# Patient Record
Sex: FEMALE | Race: BLACK OR AFRICAN AMERICAN | NOT HISPANIC OR LATINO | Employment: FULL TIME | URBAN - METROPOLITAN AREA
[De-identification: names, ages, dates, MRNs, and addresses within clinical notes are randomized per-mention and may not be internally consistent; named-entity substitution may affect disease eponyms.]

---

## 2017-01-09 ENCOUNTER — HOSPITAL ENCOUNTER (EMERGENCY)
Facility: CLINIC | Age: 42
Discharge: HOME | End: 2017-01-09
Attending: EMERGENCY MEDICINE | Admitting: EMERGENCY MEDICINE
Payer: COMMERCIAL

## 2017-01-09 ENCOUNTER — APPOINTMENT (OUTPATIENT)
Dept: GENERAL RADIOLOGY | Facility: CLINIC | Age: 42
End: 2017-01-09
Attending: EMERGENCY MEDICINE
Payer: COMMERCIAL

## 2017-01-09 VITALS
RESPIRATION RATE: 18 BRPM | BODY MASS INDEX: 30.12 KG/M2 | WEIGHT: 170 LBS | DIASTOLIC BLOOD PRESSURE: 78 MMHG | SYSTOLIC BLOOD PRESSURE: 126 MMHG | HEIGHT: 63 IN | TEMPERATURE: 98.1 F | OXYGEN SATURATION: 96 %

## 2017-01-09 DIAGNOSIS — R07.89 ATYPICAL CHEST PAIN: ICD-10-CM

## 2017-01-09 LAB
ANION GAP SERPL CALCULATED.3IONS-SCNC: 8 MMOL/L (ref 3–14)
BUN SERPL-MCNC: 6 MG/DL (ref 7–30)
CALCIUM SERPL-MCNC: 8.4 MG/DL (ref 8.5–10.1)
CHLORIDE SERPL-SCNC: 106 MMOL/L (ref 94–109)
CO2 SERPL-SCNC: 27 MMOL/L (ref 20–32)
CREAT SERPL-MCNC: 0.74 MG/DL (ref 0.52–1.04)
D DIMER PPP FEU-MCNC: 0.3 UG/ML FEU (ref 0–0.5)
ERYTHROCYTE [DISTWIDTH] IN BLOOD BY AUTOMATED COUNT: 14.5 % (ref 10–15)
GFR SERPL CREATININE-BSD FRML MDRD: 85 ML/MIN/1.7M2
GLUCOSE SERPL-MCNC: 87 MG/DL (ref 70–99)
HCT VFR BLD AUTO: 38.9 % (ref 35–47)
HGB BLD-MCNC: 13.5 G/DL (ref 11.7–15.7)
INTERPRETATION ECG - MUSE: NORMAL
MCH RBC QN AUTO: 29.8 PG (ref 26.5–33)
MCHC RBC AUTO-ENTMCNC: 34.7 G/DL (ref 31.5–36.5)
MCV RBC AUTO: 86 FL (ref 78–100)
PLATELET # BLD AUTO: 219 10E9/L (ref 150–450)
POTASSIUM SERPL-SCNC: 3.3 MMOL/L (ref 3.4–5.3)
RBC # BLD AUTO: 4.53 10E12/L (ref 3.8–5.2)
SODIUM SERPL-SCNC: 141 MMOL/L (ref 133–144)
TROPONIN I SERPL-MCNC: NORMAL UG/L (ref 0–0.04)
WBC # BLD AUTO: 8.1 10E9/L (ref 4–11)

## 2017-01-09 PROCEDURE — 85027 COMPLETE CBC AUTOMATED: CPT | Performed by: EMERGENCY MEDICINE

## 2017-01-09 PROCEDURE — 93005 ELECTROCARDIOGRAM TRACING: CPT

## 2017-01-09 PROCEDURE — 85379 FIBRIN DEGRADATION QUANT: CPT | Performed by: EMERGENCY MEDICINE

## 2017-01-09 PROCEDURE — 96374 THER/PROPH/DIAG INJ IV PUSH: CPT

## 2017-01-09 PROCEDURE — 99285 EMERGENCY DEPT VISIT HI MDM: CPT | Mod: 25

## 2017-01-09 PROCEDURE — 80048 BASIC METABOLIC PNL TOTAL CA: CPT | Performed by: EMERGENCY MEDICINE

## 2017-01-09 PROCEDURE — 71020 XR CHEST 2 VW: CPT

## 2017-01-09 PROCEDURE — 25000132 ZZH RX MED GY IP 250 OP 250 PS 637: Performed by: EMERGENCY MEDICINE

## 2017-01-09 PROCEDURE — 84484 ASSAY OF TROPONIN QUANT: CPT | Performed by: EMERGENCY MEDICINE

## 2017-01-09 PROCEDURE — 25000125 ZZHC RX 250: Performed by: EMERGENCY MEDICINE

## 2017-01-09 PROCEDURE — 96375 TX/PRO/DX INJ NEW DRUG ADDON: CPT

## 2017-01-09 RX ORDER — POTASSIUM CHLORIDE 1500 MG/1
40 TABLET, EXTENDED RELEASE ORAL ONCE
Status: COMPLETED | OUTPATIENT
Start: 2017-01-09 | End: 2017-01-09

## 2017-01-09 RX ORDER — MORPHINE SULFATE 4 MG/ML
4 INJECTION, SOLUTION INTRAMUSCULAR; INTRAVENOUS ONCE
Status: COMPLETED | OUTPATIENT
Start: 2017-01-09 | End: 2017-01-09

## 2017-01-09 RX ORDER — KETOROLAC TROMETHAMINE 15 MG/ML
15 INJECTION, SOLUTION INTRAMUSCULAR; INTRAVENOUS ONCE
Status: COMPLETED | OUTPATIENT
Start: 2017-01-09 | End: 2017-01-09

## 2017-01-09 RX ADMIN — KETOROLAC TROMETHAMINE 15 MG: 15 INJECTION, SOLUTION INTRAMUSCULAR; INTRAVENOUS at 20:29

## 2017-01-09 RX ADMIN — MORPHINE SULFATE 4 MG: 4 INJECTION, SOLUTION INTRAMUSCULAR; INTRAVENOUS at 22:16

## 2017-01-09 RX ADMIN — POTASSIUM CHLORIDE 40 MEQ: 1500 TABLET, EXTENDED RELEASE ORAL at 22:16

## 2017-01-09 ASSESSMENT — ENCOUNTER SYMPTOMS
VOMITING: 0
ABDOMINAL PAIN: 0
NAUSEA: 1

## 2017-01-09 NOTE — ED AVS SNAPSHOT
Emergency Department    [address] [place] [address]    Phone:  [phone]    Fax:  [phone]                                       Jeff Serra   MRN: [id number]    Department:   Emergency Department   Date of Visit:  1/9/2017           After Visit Summary Signature Page     I have received my discharge instructions, and my questions have been answered. I have discussed any challenges I see with this plan with the nurse or doctor.    ..........................................................................................................................................  Patient/Patient Representative Signature      ..........................................................................................................................................  Patient Representative Print Name and Relationship to Patient    ..................................................               ................................................  Date                                            Time    ..........................................................................................................................................  Reviewed by Signature/Title    ...................................................              ..............................................  Date                                                            Time

## 2017-01-09 NOTE — ED AVS SNAPSHOT
Emergency Department    [address] [facility] [address]    Phone:  [phone]    Fax:  [phone]                                       Jeff Serra   MRN: [id number]    Department:   Emergency Department   Date of Visit:  1/9/2017           Patient Information     Date Of Birth          1975        Your diagnoses for this visit were:     Atypical chest pain        You were seen by Stephany Mascorro MD.      Follow-up Information     Schedule an appointment as soon as possible for a visit with Jordyn Loredo MD.    Specialty:  Family Practice    Contact information:    [address] [address] [phone]          Follow up with  Emergency Department.    Specialty:  EMERGENCY MEDICINE    Why:  If symptoms worsen    Contact information:    [address] [facility] [id number] [phone]        Discharge Instructions       Follow up with your primary care provider about the pain  Try icing the area to see if this improves the pain    Discharge Instructions  Chest Pain    You have been seen today for chest pain or discomfort.  At this time, your doctor has found no signs that your chest pain is due to a serious or life-threatening condition, (or you have declined more testing and/or admission to the hospital). However, sometimes there is a serious problem that does not show up right away. Your evaluation today may not be complete and you may need further testing and evaluation.     You need to follow-up with your regular doctor within 3 days.    Return to the Emergency Department if:    Your chest pain changes, gets worse, starts to happen more often, or comes with less activity.    You are short of breath.    You get very weak or tired.    You pass out or faint.    You have any new symptoms, like fever, cough, numb legs, or you cough up blood.    You have anything else that worries you.    Until  you follow-up with your regular doctor please do the following:    Take one aspirin daily unless you have an allergy or are told not to by your doctor.    If a stress test appointment has been made, go to the appointment.    If you have questions, contact your regular doctor.    If your doctor today has told you to follow-up with your regular doctor, it is very important that you make an appointment with your clinic and go to the appointment.  If you do not follow-up with your primary doctor, it may result in missing an important development which could result in permanent injury or disability and/or lasting pain.  If there is any problem keeping your appointment, call your doctor or return to the Emergency Department.    If you were given a prescription for medicine here today, be sure to read all of the information (including the package insert) that comes with your prescription.  This will include important information about the medicine, its side effects, and any warnings that you need to know about.  The pharmacist who fills the prescription can provide more information and answer questions you may have about the medicine.  If you have questions or concerns that the pharmacist cannot address, please call or return to the Emergency Department.     Opioid Medication Information    Pain medications are among the most commonly prescribed medicines, so we are including this information for all our patients. If you did not receive pain medication or get a prescription for pain medicine, you can ignore it.     You may have been given a prescription for an opioid (narcotic) pain medicine and/or have received a pain medicine while here in the Emergency Department. These medicines can make you drowsy or impaired. You must not drive, operate dangerous equipment, or engage in any other dangerous activities while taking these medications. If you drive while taking these medications, you could be arrested for DUI, or driving  under the influence. Do not drink any alcohol while you are taking these medications.     Opioid pain medications can cause addiction. If you have a history of chemical dependency of any type, you are at a higher risk of becoming addicted to pain medications.  Only take these prescribed medications to treat your pain when all other options have been tried. Take it for as short a time and as few doses as possible. Store your pain pills in a secure place, as they are frequently stolen and provide a dangerous opportunity for children or visitors in your house to start abusing these powerful medications. We will not replace any lost or stolen medicine.  As soon as your pain is better, you should flush all your remaining medication.     Many prescription pain medications contain Tylenol  (acetaminophen), including Vicodin , Tylenol #3 , Norco , Lortab , and Percocet .  You should not take any extra pills of Tylenol  if you are using these prescription medications or you can get very sick.  Do not ever take more than 3000 mg of acetaminophen in any 24 hour period.    All opioids tend to cause constipation. Drink plenty of water and eat foods that have a lot of fiber, such as fruits, vegetables, prune juice, apple juice and high fiber cereal.  Take a laxative if you don t move your bowels at least every other day. Miralax , Milk of Magnesia, Colace , or Senna  can be used to keep you regular.      Remember that you can always come back to the Emergency Department if you are not able to see your regular doctor in the amount of time listed above, if you get any new symptoms, or if there is anything that worries you.          24 Hour Appointment Hotline       To make an appointment at any [facility], call [phone] ([phone]). If you don't have a family doctor or clinic, we will help you find one. [facility] clinics are conveniently located to serve the needs of you and your family.             Review of your  medicines      Our records show that you are taking the medicines listed below. If these are incorrect, please call your family doctor or clinic.        Dose / Directions Last dose taken    ASPIRIN PO   Dose:  81 mg        Take 81 mg by mouth daily   Refills:  0        azithromycin 250 MG tablet   Commonly known as:  ZITHROMAX   Quantity:  6 tablet        Two tabs today.  One tab daily x 4 days.   Refills:  0        fexofenadine 180 MG tablet   Commonly known as:  ALLEGRA   Dose:  180 mg   Quantity:  90 tablet        Take 1 tablet (180 mg) by mouth daily   Refills:  3        guaiFENesin-codeine 100-10 MG/5ML Soln solution   Commonly known as:  CHERATUSSIN AC   Dose:  1-2 tsp.   Quantity:  240 mL        Take 5-10 mLs by mouth every 4 hours as needed for cough   Refills:  0        hydrochlorothiazide 25 MG tablet   Commonly known as:  HYDRODIURIL   Dose:  25 mg        Take 25 mg by mouth daily   Refills:  0        loratadine 10 MG tablet   Commonly known as:  CLARITIN   Dose:  10 mg   Quantity:  30 tablet        Take 1 tablet (10 mg) by mouth daily as needed for allergies   Refills:  2        norethindrone 5 MG tablet   Commonly known as:  AYGESTIN   Dose:  5 mg   Quantity:  30 tablet        Take 1 tablet (5 mg) by mouth daily   Refills:  5        oxyCODONE 40 MG 12 hr tablet   Commonly known as:  OXYCONTIN   Dose:  40 mg   Quantity:  60 tablet        Take 1 tablet (40 mg) by mouth every 12 hours   Refills:  0        oxyCODONE-acetaminophen 7.5-325 MG per tablet   Commonly known as:  PERCOCET   Dose:  1-2 tablet   Quantity:  60 tablet        Take 1-2 tablets by mouth 3 times daily as needed (breakthrough pain)   Refills:  0        permethrin 5 % cream   Commonly known as:  ELIMITE   Quantity:  60 g        Apply cream from head to toe (execpt the face); leave on for 8-14 hours before washing off with water; may reapply in 1 week if live mites appear.   Refills:  1        predniSONE 20 MG tablet   Commonly known as:   DELTASONE   Quantity:  15 tablet        3 tabs daily for 5 days   Refills:  0                Procedures and tests performed during your visit     Basic metabolic panel    CBC (+ platelets, no diff)    Chest XR,  PA & LAT    D dimer quantitative    EKG 12 lead    Troponin I (now)      Orders Needing Specimen Collection     None      Pending Results     No orders found from 1/8/2017 to 1/10/2017.            Pending Culture Results     No orders found from 1/8/2017 to 1/10/2017.       Test Results from your hospital stay           1/9/2017  8:53 PM - Interface, Flexilab Results      Component Results     Component Value Ref Range & Units Status    Sodium 141 133 - 144 mmol/L Final    Potassium 3.3 (L) 3.4 - 5.3 mmol/L Final    Chloride 106 94 - 109 mmol/L Final    Carbon Dioxide 27 20 - 32 mmol/L Final    Anion Gap 8 3 - 14 mmol/L Final    Glucose 87 70 - 99 mg/dL Final    Urea Nitrogen 6 (L) 7 - 30 mg/dL Final    Creatinine 0.74 0.52 - 1.04 mg/dL Final    GFR Estimate 85 >60 mL/min/1.7m2 Final    Non  GFR Calc    GFR Estimate If Black >90   GFR Calc   >60 mL/min/1.7m2 Final    Calcium 8.4 (L) 8.5 - 10.1 mg/dL Final         1/9/2017  8:38 PM - Interface, Flexilab Results      Component Results     Component Value Ref Range & Units Status    WBC 8.1 4.0 - 11.0 10e9/L Final    RBC Count 4.53 3.8 - 5.2 10e12/L Final    Hemoglobin 13.5 11.7 - 15.7 g/dL Final    Hematocrit 38.9 35.0 - 47.0 % Final    MCV 86 78 - 100 fl Final    MCH 29.8 26.5 - 33.0 pg Final    MCHC 34.7 31.5 - 36.5 g/dL Final    RDW 14.5 10.0 - 15.0 % Final    Platelet Count 219 150 - 450 10e9/L Final         1/9/2017  8:57 PM - Interface, Flexilab Results      Component Results     Component Value Ref Range & Units Status    Troponin I ES  0.000 - 0.045 ug/L Final    <0.015  The 99th percentile for upper reference range is 0.045 ug/L.  Troponin values in   the range of 0.045 - 0.120 ug/L may be associated with risks of  adverse   clinical events.           1/9/2017  8:49 PM - Interface, Flexilab Results      Component Results     Component Value Ref Range & Units Status    D Dimer 0.3 0.0 - 0.50 ug/ml FEU Final    This D-dimer assay is intended for use in conjuntion with a clinical pretest   probability assessment model to exclude pulmonary embolism (PE) and as an aid   in the diagnosis of deep venous thrombosis (DVT) in outpatients suspected of   PE   or DVT. The cut-off value is 0.5 g/mL FEU.           1/9/2017 10:27 PM - Interface, Radiant Ib      Narrative     CHEST TWO VIEWS   1/9/2017  9:28 PM     HISTORY: Chest pain.    COMPARISON: 10/28/2014.        Impression     IMPRESSION: Normal.    ALTAGRACIA BURTON MD                Clinical Quality Measure: Blood Pressure Screening     Your blood pressure was checked while you were in the emergency department today. The last reading we obtained was  BP: 126/78 mmHg . Please read the guidelines below about what these numbers mean and what you should do about them.  If your systolic blood pressure (the top number) is less than 120 and your diastolic blood pressure (the bottom number) is less than 80, then your blood pressure is normal. There is nothing more that you need to do about it.  If your systolic blood pressure (the top number) is 120-139 or your diastolic blood pressure (the bottom number) is 80-89, your blood pressure may be higher than it should be. You should have your blood pressure rechecked within a year by a primary care provider.  If your systolic blood pressure (the top number) is 140 or greater or your diastolic blood pressure (the bottom number) is 90 or greater, you may have high blood pressure. High blood pressure is treatable, but if left untreated over time it can put you at risk for heart attack, stroke, or kidney failure. You should have your blood pressure rechecked by a primary care provider within the next 4 weeks.  If your provider in the emergency department  today gave you specific instructions to follow-up with your doctor or provider even sooner than that, you should follow that instruction and not wait for up to 4 weeks for your follow-up visit.        Thank you for choosing [facility]       Thank you for choosing [facility] for your care. Our goal is always to provide you with excellent care. Hearing back from our patients is one way we can continue to improve our services. Please take a few minutes to complete the written survey that you may receive in the mail after you visit with us. Thank you!        [employer] Information     [facility] [employer] [facility] gives you secure access to your electronic health record. If you see a primary care provider, you can also send messages to your care team and make appointments. If you have questions, please call your primary care clinic.  If you do not have a primary care provider, please call [phone] and they will assist you.        Care EveryWhere ID     This is your Care EveryWhere ID. This could be used by other organizations to access your [facility] medical records  [id number]        After Visit Summary       This is your record. Keep this with you and show to your community pharmacist(s) and doctor(s) at your next visit.

## 2017-01-10 LAB — INTERPRETATION ECG - MUSE: NORMAL

## 2017-01-10 NOTE — DISCHARGE INSTRUCTIONS
Follow up with your primary care provider about the pain  Try icing the area to see if this improves the pain    Discharge Instructions  Chest Pain    You have been seen today for chest pain or discomfort.  At this time, your doctor has found no signs that your chest pain is due to a serious or life-threatening condition, (or you have declined more testing and/or admission to the hospital). However, sometimes there is a serious problem that does not show up right away. Your evaluation today may not be complete and you may need further testing and evaluation.     You need to follow-up with your regular doctor within 3 days.    Return to the Emergency Department if:    Your chest pain changes, gets worse, starts to happen more often, or comes with less activity.    You are short of breath.    You get very weak or tired.    You pass out or faint.    You have any new symptoms, like fever, cough, numb legs, or you cough up blood.    You have anything else that worries you.    Until you follow-up with your regular doctor please do the following:    Take one aspirin daily unless you have an allergy or are told not to by your doctor.    If a stress test appointment has been made, go to the appointment.    If you have questions, contact your regular doctor.    If your doctor today has told you to follow-up with your regular doctor, it is very important that you make an appointment with your clinic and go to the appointment.  If you do not follow-up with your primary doctor, it may result in missing an important development which could result in permanent injury or disability and/or lasting pain.  If there is any problem keeping your appointment, call your doctor or return to the Emergency Department.    If you were given a prescription for medicine here today, be sure to read all of the information (including the package insert) that comes with your prescription.  This will include important information about the medicine,  its side effects, and any warnings that you need to know about.  The pharmacist who fills the prescription can provide more information and answer questions you may have about the medicine.  If you have questions or concerns that the pharmacist cannot address, please call or return to the Emergency Department.     Opioid Medication Information    Pain medications are among the most commonly prescribed medicines, so we are including this information for all our patients. If you did not receive pain medication or get a prescription for pain medicine, you can ignore it.     You may have been given a prescription for an opioid (narcotic) pain medicine and/or have received a pain medicine while here in the Emergency Department. These medicines can make you drowsy or impaired. You must not drive, operate dangerous equipment, or engage in any other dangerous activities while taking these medications. If you drive while taking these medications, you could be arrested for DUI, or driving under the influence. Do not drink any alcohol while you are taking these medications.     Opioid pain medications can cause addiction. If you have a history of chemical dependency of any type, you are at a higher risk of becoming addicted to pain medications.  Only take these prescribed medications to treat your pain when all other options have been tried. Take it for as short a time and as few doses as possible. Store your pain pills in a secure place, as they are frequently stolen and provide a dangerous opportunity for children or visitors in your house to start abusing these powerful medications. We will not replace any lost or stolen medicine.  As soon as your pain is better, you should flush all your remaining medication.     Many prescription pain medications contain Tylenol  (acetaminophen), including Vicodin , Tylenol #3 , Norco , Lortab , and Percocet .  You should not take any extra pills of Tylenol  if you are using these  prescription medications or you can get very sick.  Do not ever take more than 3000 mg of acetaminophen in any 24 hour period.    All opioids tend to cause constipation. Drink plenty of water and eat foods that have a lot of fiber, such as fruits, vegetables, prune juice, apple juice and high fiber cereal.  Take a laxative if you don t move your bowels at least every other day. Miralax , Milk of Magnesia, Colace , or Senna  can be used to keep you regular.      Remember that you can always come back to the Emergency Department if you are not able to see your regular doctor in the amount of time listed above, if you get any new symptoms, or if there is anything that worries you.

## 2017-01-10 NOTE — ED PROVIDER NOTES
History     Chief Complaint:  Chest Pain    HPI   Jeff Serra is a 41 year old female with a history of hypertension, DVT, PE and chronic pain syndrome who presents to the emergency department today for evaluation of chest pain. The patient had onset of tingling pain throughout her left arm around noon today that began to radiate into her left chest. She states she was simply sitting when her pain began. Her pain has been constant and she describes her chest pain as a tightening feeling. Pain has been constant. Her pain is worse with movement. Has had chest pain in past and stayed in hospital in Sept 2016 and had stress test. Pain similar. She denies any recent heavy lifting or falls recently. She has been feeling nauseated but denies vomiting or abdominal pain. She denies any heart problems, hypertension or hypercholesterolemia currently but notes she used to have hypertension and was on some medications to combat this previously. She is not diabetic and does not smoke. Her family heart history contains only an uncle who had a heart attack in his 50's. She has had no recent surgery or travel. She has some occasional feet swelling when ambulating a lot but states this is normal for her. She denies neck pain. She does have a history of PE but not on current anticoagulation. No leg swelling now. She took Benadryl earlier because she thought this might be allergy related but has taken no medication for the pain. Reports that she has not taken her narcotics for this pain.    9/17/2016:  Patient had left sided chest pain before, during and after the stress test  which did not vary from 5/10.  This was a normal stress echocardiogram with no evidence of stress-induced  ischemia. This was a normal stress EKG with no evidence of stress-induced  Ischemia.    Cardiac/PE/DVT Risk Factors:  History of hypertension - Negative  History of hyperlipidemia - Negative  History of diabetes - Negative  History of smoking -  "Negative  Personal history of PE/DVT - Positive  Family history of PE/DVT - Negative  Family history of heart complications - Negative  Recent travel - Negative  Recent surgery - Negative  Other immobilizations - Negative  Cancer - Negative    Allergies:  No Known Drug Allergies     Medications:    Percocet  Oxycontin  Aygestin   Hydrodiuril   Zithromax  Deltasone  Cheratussin  Allegra  Elimite  Claritin     Past Medical History:    Kidney stone   Hypertension   Chronic neck pain  Chronic low back pain  Right leg DVT  Pulmonary embolism    Hypokalemia   History of PE  Chronic pain syndrome    Past Surgical History:    Lithotripsy for kidney stones (2011)   Tubal ligation   Laparoscopy diagnostic (2016)      Family History:     Uncle - Heart Problem    Social History:  The patient was unaccompanied to the ED.  Smoking Status: Negative  Smokeless Tobacco: Negative  Alcohol Use: Occasional  Marital Status:   [2]     Review of Systems   Cardiovascular: Positive for chest pain. Negative for leg swelling.   Gastrointestinal: Positive for nausea. Negative for vomiting and abdominal pain.   Musculoskeletal:        Left arm pain   All other systems reviewed and are negative.    Physical Exam   Vitals:   Patient Vitals for the past 24 hrs:   BP Temp Temp src Heart Rate Resp SpO2 Height Weight   01/09/17 2000 (!) 155/103 mmHg - - 98 22 98 % - -   01/09/17 1935 170/85 mmHg 98.1  F (36.7  C) Temporal 96 - 99 % 1.6 m (5' 3\") 77.111 kg (170 lb)      Physical Exam    General: Resting comfortably on the gurney  Eyes:  The pupils are equal and round  ENT:    Atraumatic  Neck:  Normal range of motion  CV:  Regular rate and rhythm    Skin warm and well perfused     Radial pulses 2+ bilaterally    Tender to palpation of left anterior chest  Resp:  Lungs are clear    Non-labored    No rales    No wheezing   GI:  Abdomen is soft, there is no rigidity    No distension    No rebound tenderness     No abdominal tenderness  MS:  Normal " muscular tone    No asymmetric leg swelling    Pain in chest and left arm with movement of left arm    Tender to palpation of left arm    No left arm swelling, erythema  Skin:  No rash or acute skin lesions noted  Neuro:   Awake, alert.      Speech is normal and fluent.    Face is symmetric.     Moves all extremities equally    SILT on bilateral UE/LE   Psych: Normal affect.  Appropriate interactions.    Emergency Department Course     ECG:  ECG taken at 1942, ECG read at 1947  Normal sinus rhythm   Normal ECG  Rate 87 bpm. IA interval 122. QRS duration 80. QT/QTc 362/435. P-R-T axes 39 25 11.      ECG taken at 2244, ECG read at 2245  Normal sinus rhythm  Normal ECG  Rate 89 bpm. IA interval 120. QRS duration 84. QT/QTc 374/455. P-R-T axes 29 20 9.      Imaging:  Radiology findings were communicated with the patient who voiced understanding of the findings.    Chest XR, PA & LAT:   IMPRESSION: Normal.  Reading per radiology    Laboratory:  Laboratory findings were communicated with the patient who voiced understanding of the findings.    BMP: Potassium 3.3 (L), BUN 6 (L), Calcium 8.4 (L) (Creatinine 0.74)  CBC:  AWNL. (WBC 8.1, HGB 13.5, )   Troponin (Collected 1958): <0.015  D Dimer (Collected 1958): 0.3    Interventions:  2029 Toradol 15 mg IV   2216 potassium chloride 40 mEq PO  2216 morphine 4 mg IV     Emergency Department Course:  Nursing notes and vitals reviewed.  I performed an exam of the patient as documented above.   IV was inserted and blood was drawn for laboratory testing, results above.  The patient was sent for an x-ray while in the emergency department, results above.   At 2130 the patient was rechecked and was updated on the results of her laboratory and imaging studies.   I discussed the treatment plan with the patient. They expressed understanding of this plan and consented to discharge. They will be discharged home with instructions for care and follow up. In addition, the patient will  return to the emergency department if their symptoms persist, worsen, if new symptoms arise or if there is any concern.  All questions were answered.  I personally reviewed the laboratory and imaging results with the patient and answered all related questions prior to discharge.    Impression & Plan      Medical Decision Making:  Jeff Serra is a 41 year old female who presents to the emergency department with chest pain. Vital signs noted for hypertension, which she has a history on but not on any medications for this anymore. She has tenderness to palpation of her left chest and this pain is reproducible. She also had worsening pain in chest and left arm when she moves her left arm. Seems most consistent with musculoskeletal etiology given tenderness on palpation of chest/arm and worsens with movement. Low suspicion for ACS. EKG shows sinus rhythm without acute ischemic changes. Pain has been ongoing for several hours and troponin is normal. Do not think additional troponins are indicated given that pain has been present for several hours (>6 hours) and troponin is negative. Mild hypokalemia and this was replaced. D dimer also within normal limits. Atypical for PE, d-dimer nl, and no hypoxia, don't think additional workup is indicated. Chest x-ray was performed and appears within normal limits as well. She has normal mediastinum with no neuro deficits and equal pulses and I do not think dissection is likely. Recommended symptomatic treatment at home for pain and follow up with primary care provider. Has had recent stress test that was normal and given atypical pain, don't think additional stress test indicated. Repeat EKG with no dynamic changes. Reasons to return to the ED were discussed with patient.     Diagnosis:    ICD-10-CM    1. Atypical chest pain R07.89      Disposition:   Discharge to home    Scribe Disclosure:  I, Anamaria Altamirano, am serving as a scribe at 7:57 PM on 1/9/2017 to document  services personally performed by Stephany Mascorro MD, based on my observations and the provider's statements to me.    1/9/2017    EMERGENCY DEPARTMENT        Stephany Mascorro MD  01/10/17 0050

## 2017-01-12 ENCOUNTER — TELEPHONE (OUTPATIENT)
Dept: PEDIATRICS | Facility: CLINIC | Age: 42
End: 2017-01-12

## 2017-01-12 ENCOUNTER — RADIANT APPOINTMENT (OUTPATIENT)
Dept: GENERAL RADIOLOGY | Facility: CLINIC | Age: 42
End: 2017-01-12
Attending: FAMILY MEDICINE
Payer: COMMERCIAL

## 2017-01-12 ENCOUNTER — OFFICE VISIT (OUTPATIENT)
Dept: URGENT CARE | Facility: URGENT CARE | Age: 42
End: 2017-01-12
Payer: COMMERCIAL

## 2017-01-12 VITALS
SYSTOLIC BLOOD PRESSURE: 136 MMHG | TEMPERATURE: 98.2 F | WEIGHT: 182 LBS | BODY MASS INDEX: 32.25 KG/M2 | OXYGEN SATURATION: 96 % | HEART RATE: 100 BPM | HEIGHT: 63 IN | DIASTOLIC BLOOD PRESSURE: 80 MMHG

## 2017-01-12 DIAGNOSIS — R07.9 CHEST PAIN, UNSPECIFIED TYPE: Primary | ICD-10-CM

## 2017-01-12 DIAGNOSIS — M94.0 COSTOCHONDRITIS: ICD-10-CM

## 2017-01-12 DIAGNOSIS — I10 BENIGN ESSENTIAL HYPERTENSION: ICD-10-CM

## 2017-01-12 DIAGNOSIS — R11.2 NAUSEA AND VOMITING, INTRACTABILITY OF VOMITING NOT SPECIFIED, UNSPECIFIED VOMITING TYPE: ICD-10-CM

## 2017-01-12 DIAGNOSIS — M79.622 PAIN OF LEFT UPPER ARM: ICD-10-CM

## 2017-01-12 DIAGNOSIS — R51.9 INTRACTABLE HEADACHE, UNSPECIFIED CHRONICITY PATTERN, UNSPECIFIED HEADACHE TYPE: ICD-10-CM

## 2017-01-12 DIAGNOSIS — J20.9 ACUTE BRONCHITIS, UNSPECIFIED ORGANISM: ICD-10-CM

## 2017-01-12 DIAGNOSIS — R07.9 CHEST PAIN, UNSPECIFIED TYPE: ICD-10-CM

## 2017-01-12 DIAGNOSIS — I10 BENIGN ESSENTIAL HYPERTENSION: Primary | ICD-10-CM

## 2017-01-12 LAB
ANION GAP SERPL CALCULATED.3IONS-SCNC: 6 MMOL/L (ref 3–14)
BUN SERPL-MCNC: 7 MG/DL (ref 7–30)
CALCIUM SERPL-MCNC: 9.1 MG/DL (ref 8.5–10.1)
CHLORIDE SERPL-SCNC: 106 MMOL/L (ref 94–109)
CO2 SERPL-SCNC: 30 MMOL/L (ref 20–32)
CREAT SERPL-MCNC: 0.73 MG/DL (ref 0.52–1.04)
GFR SERPL CREATININE-BSD FRML MDRD: 87 ML/MIN/1.7M2
GLUCOSE SERPL-MCNC: 94 MG/DL (ref 70–99)
POTASSIUM SERPL-SCNC: 3.8 MMOL/L (ref 3.4–5.3)
SODIUM SERPL-SCNC: 142 MMOL/L (ref 133–144)
TROPONIN I SERPL-MCNC: NORMAL UG/L (ref 0–0.04)

## 2017-01-12 PROCEDURE — 80048 BASIC METABOLIC PNL TOTAL CA: CPT | Performed by: FAMILY MEDICINE

## 2017-01-12 PROCEDURE — 71020 XR CHEST 2 VW: CPT

## 2017-01-12 PROCEDURE — 93000 ELECTROCARDIOGRAM COMPLETE: CPT | Performed by: FAMILY MEDICINE

## 2017-01-12 PROCEDURE — 99215 OFFICE O/P EST HI 40 MIN: CPT | Performed by: FAMILY MEDICINE

## 2017-01-12 PROCEDURE — 36415 COLL VENOUS BLD VENIPUNCTURE: CPT | Performed by: FAMILY MEDICINE

## 2017-01-12 PROCEDURE — 84484 ASSAY OF TROPONIN QUANT: CPT | Performed by: FAMILY MEDICINE

## 2017-01-12 RX ORDER — LISINOPRIL 10 MG/1
10 TABLET ORAL DAILY
Qty: 30 TABLET | Refills: 0 | Status: CANCELLED | OUTPATIENT
Start: 2017-01-12

## 2017-01-12 RX ORDER — LISINOPRIL 10 MG/1
10 TABLET ORAL DAILY
Qty: 30 TABLET | Refills: 0 | Status: SHIPPED | OUTPATIENT
Start: 2017-01-12 | End: 2017-01-20

## 2017-01-12 RX ORDER — PREDNISONE 20 MG/1
TABLET ORAL
Qty: 15 TABLET | Refills: 0 | Status: CANCELLED | OUTPATIENT
Start: 2017-01-12

## 2017-01-12 RX ORDER — PREDNISONE 20 MG/1
20 TABLET ORAL DAILY
Qty: 5 TABLET | Refills: 0 | Status: SHIPPED | OUTPATIENT
Start: 2017-01-12 | End: 2017-01-17

## 2017-01-12 NOTE — MR AVS SNAPSHOT
After Visit Summary   1/12/2017    Jeff Serra    MRN: [id number]           Patient Information     Date Of Birth          1975        Visit Information        Provider Department      1/12/2017 9:30 AM Dylan Rodrigez DO [facility]        Today's Diagnoses     Chest pain, unspecified type    -  1     Benign essential hypertension         Pain of left upper arm         Nausea and vomiting, intractability of vomiting not specified, unspecified vomiting type         Intractable headache, unspecified chronicity pattern, unspecified headache type         Costochondritis            Follow-ups after your visit        Who to contact     If you have questions or need follow up information about today's clinic visit or your schedule please contact [facility] directly at [phone].  Normal or non-critical lab and imaging results will be communicated to you by MyChart, letter or phone within 4 business days after the clinic has received the results. If you do not hear from us within 7 days, please contact the clinic through MyChart or phone. If you have a critical or abnormal lab result, we will notify you by phone as soon as possible.  Submit refill requests through [employer] or call your pharmacy and they will forward the refill request to us. Please allow 3 business days for your refill to be completed.          Additional Information About Your Visit        MyChart Information     [employer] gives you secure access to your electronic health record. If you see a primary care provider, you can also send messages to your care team and make appointments. If you have questions, please call your primary care clinic.  If you do not have a primary care provider, please call [phone] and they will assist you.        Care EveryWhere ID     This is your Care EveryWhere ID. This could be used by other organizations to access your [facility]  "records  [id number]        Your Vitals Were     Pulse Temperature Height BMI (Body Mass Index) Pulse Oximetry       100 98.2  F (36.8  C) (Oral) 5' 3\" (1.6 m) 32.25 kg/m2 96%        Blood Pressure from Last 3 Encounters:   01/12/17 136/80   01/09/17 126/78   10/04/16 130/84    Weight from Last 3 Encounters:   01/12/17 182 lb (82.555 kg)   01/09/17 170 lb (77.111 kg)   10/04/16 176 lb 8 oz (80.06 kg)              We Performed the Following     Basic metabolic panel     EKG 12-lead complete w/read - Clinics     Troponin I          Today's Medication Changes          These changes are accurate as of: 1/12/17 11:00 AM.  If you have any questions, ask your nurse or doctor.               Start taking these medicines.        Dose/Directions    lisinopril 10 MG tablet   Commonly known as:  PRINIVIL/ZESTRIL   Used for:  Benign essential hypertension   Started by:  Dylan Rodrigez DO        Dose:  10 mg   Take 1 tablet (10 mg) by mouth daily   Quantity:  30 tablet   Refills:  0         These medicines have changed or have updated prescriptions.        Dose/Directions    fexofenadine 180 MG tablet   Commonly known as:  ALLEGRA   This may have changed:    - when to take this  - reasons to take this   Used for:  Seasonal allergic rhinitis        Dose:  180 mg   Take 1 tablet (180 mg) by mouth daily   Quantity:  90 tablet   Refills:  3       * predniSONE 20 MG tablet   Commonly known as:  DELTASONE   This may have changed:  Another medication with the same name was added. Make sure you understand how and when to take each.   Used for:  Acute bronchitis, unspecified organism   Changed by:  Jordyn Loredo MD        3 tabs daily for 5 days   Quantity:  15 tablet   Refills:  0       * predniSONE 20 MG tablet   Commonly known as:  DELTASONE   This may have changed:  You were already taking a medication with the same name, and this prescription was added. Make sure you understand how and when to take each.   Used for:  " Costochondritis   Changed by:  Dylan Rodrigez DO        Dose:  20 mg   Take 1 tablet (20 mg) by mouth daily for 5 days   Quantity:  5 tablet   Refills:  0       * Notice:  This list has 2 medication(s) that are the same as other medications prescribed for you. Read the directions carefully, and ask your doctor or other care provider to review them with you.         Where to get your medicines      These medications were sent to [id number] - [address] AT [facility] [address], [facility] [address]     Phone:  [phone]    - lisinopril 10 MG tablet  - predniSONE 20 MG tablet             Primary Care Provider Office Phone # Fax #    Jordyn Loredo [phone]       [address] [address]        Thank you!     Thank you for choosing [facility]  for your care. Our goal is always to provide you with excellent care. Hearing back from our patients is one way we can continue to improve our services. Please take a few minutes to complete the written survey that you may receive in the mail after your visit with us. Thank you!             Your Updated Medication List - Protect others around you: Learn how to safely use, store and throw away your medicines at www.disposemymeds.org.          This list is accurate as of: 1/12/17 11:00 AM.  Always use your most recent med list.                   Brand Name Dispense Instructions for use    ASPIRIN PO      Take 81 mg by mouth daily       azithromycin 250 MG tablet    ZITHROMAX    6 tablet    Two tabs today.  One tab daily x 4 days.       fexofenadine 180 MG tablet    ALLEGRA    90 tablet    Take 1 tablet (180 mg) by mouth daily       guaiFENesin-codeine 100-10 MG/5ML Soln solution    CHERATUSSIN AC    240 mL    Take 5-10 mLs by mouth every 4 hours as needed for cough       hydrochlorothiazide 25 MG tablet     HYDRODIURIL     Take 25 mg by mouth daily       lisinopril 10 MG tablet    PRINIVIL/ZESTRIL    30 tablet    Take 1 tablet (10 mg) by mouth daily       loratadine 10 MG tablet    CLARITIN    30 tablet    Take 1 tablet (10 mg) by mouth daily as needed for allergies       norethindrone 5 MG tablet    AYGESTIN    30 tablet    Take 1 tablet (5 mg) by mouth daily       oxyCODONE 40 MG 12 hr tablet    OXYCONTIN    60 tablet    Take 1 tablet (40 mg) by mouth every 12 hours       oxyCODONE-acetaminophen 7.5-325 MG per tablet    PERCOCET    60 tablet    Take 1-2 tablets by mouth 3 times daily as needed (breakthrough pain)       permethrin 5 % cream    ELIMITE    60 g    Apply cream from head to toe (execpt the face); leave on for 8-14 hours before washing off with water; may reapply in 1 week if live mites appear.       * predniSONE 20 MG tablet    DELTASONE    15 tablet    3 tabs daily for 5 days       * predniSONE 20 MG tablet    DELTASONE    5 tablet    Take 1 tablet (20 mg) by mouth daily for 5 days       * Notice:  This list has 2 medication(s) that are the same as other medications prescribed for you. Read the directions carefully, and ask your doctor or other care provider to review them with you.

## 2017-01-12 NOTE — TELEPHONE ENCOUNTER
Reason for Call:  Medication or medication refill:    Do you use a [facility]?  Name of the pharmacy and phone number for the current request:  [facility] [address] [place] [address] AVE [address] AT [facility]    Name of the medication requested: Lisinopril 10 mg, Prednisone 20 mg    Other request: Please call when approved.    Can we leave a detailed message on this number? YES    Phone number patient can be reached at: Cell number on file:    Telephone Information:   Mobile [phone]       Best Time: any     Call taken on 1/12/2017 at 12:11 PM by Mariam Figueredo
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
## 2017-01-12 NOTE — Clinical Note
[facility]  [address] [address]  [phone]      January 12, 2017    RE:  Jeff Serra                                                                                                                                                       [address] [facility] [address]            To whom it may concern:    Jeff Serra is under my professional care for Medical.   She  may return to work with the following: No working or lifting restrictions on or about 1-16-17.          Sincerely,        Dylan Rodrigez    [facility]

## 2017-01-12 NOTE — NURSING NOTE
"Chief Complaint   Patient presents with     Urgent Care     x3 days increasing BP and nausea since this morning        Initial /80 mmHg  Pulse 100  Temp(Src) 98.2  F (36.8  C) (Oral)  Ht 5' 3\" (1.6 m)  Wt 182 lb (82.555 kg)  BMI 32.25 kg/m2  SpO2 96% Estimated body mass index is 32.25 kg/(m^2) as calculated from the following:    Height as of this encounter: 5' 3\" (1.6 m).    Weight as of this encounter: 182 lb (82.555 kg).  BP completed using cuff size: danyel Shine MA      "

## 2017-01-20 ENCOUNTER — OFFICE VISIT (OUTPATIENT)
Dept: FAMILY MEDICINE | Facility: CLINIC | Age: 42
End: 2017-01-20
Payer: COMMERCIAL

## 2017-01-20 VITALS
BODY MASS INDEX: 32.85 KG/M2 | WEIGHT: 185.4 LBS | DIASTOLIC BLOOD PRESSURE: 86 MMHG | RESPIRATION RATE: 16 BRPM | HEIGHT: 63 IN | SYSTOLIC BLOOD PRESSURE: 138 MMHG | OXYGEN SATURATION: 99 % | HEART RATE: 88 BPM | TEMPERATURE: 97.9 F

## 2017-01-20 DIAGNOSIS — I10 HYPERTENSION GOAL BP (BLOOD PRESSURE) < 140/90: Primary | ICD-10-CM

## 2017-01-20 DIAGNOSIS — G89.29 CHRONIC NECK PAIN: ICD-10-CM

## 2017-01-20 DIAGNOSIS — M54.2 CHRONIC NECK PAIN: ICD-10-CM

## 2017-01-20 DIAGNOSIS — G89.29 CHRONIC MIDLINE LOW BACK PAIN WITHOUT SCIATICA: ICD-10-CM

## 2017-01-20 DIAGNOSIS — M54.50 CHRONIC MIDLINE LOW BACK PAIN WITHOUT SCIATICA: ICD-10-CM

## 2017-01-20 PROCEDURE — 99214 OFFICE O/P EST MOD 30 MIN: CPT | Performed by: FAMILY MEDICINE

## 2017-01-20 RX ORDER — OXYCODONE HCL 40 MG/1
40 TABLET, FILM COATED, EXTENDED RELEASE ORAL EVERY 12 HOURS
Qty: 60 TABLET | Refills: 0 | Status: SHIPPED | OUTPATIENT
Start: 2017-01-27 | End: 2017-02-08

## 2017-01-20 RX ORDER — OXYCODONE AND ACETAMINOPHEN 7.5; 325 MG/1; MG/1
1-2 TABLET ORAL 3 TIMES DAILY PRN
Qty: 60 TABLET | Refills: 0 | Status: SHIPPED | OUTPATIENT
Start: 2017-01-27 | End: 2017-02-20

## 2017-01-20 RX ORDER — LISINOPRIL 10 MG/1
10 TABLET ORAL DAILY
Qty: 90 TABLET | Refills: 0 | Status: SHIPPED | OUTPATIENT
Start: 2017-01-20 | End: 2017-06-06

## 2017-01-20 ASSESSMENT — PAIN SCALES - GENERAL: PAINLEVEL: NO PAIN (0)

## 2017-01-20 NOTE — PROGRESS NOTES
"  SUBJECTIVE:                                                    Jeff Serra is a 42 year old female who presents to clinic today for the following health issues:      ED/UC Followup:    Facility:  [facility]  Date of visit: 1/9/17,1/12/17  Reason for visit: chest pain, HTN, Nausea, Vomiting, L upper arm pain  Current Status: still having nausea, chest pain has improved  -- discuss BS levels -- pt states there are times it gets to the point where she might pass out     SUBJECTIVE:  Here today in follow-up of blood pressure and symptoms as above. Reviewed recent history including ER visits. Blood pressure is persistently been elevated and she was started on lisinopril 10 mg daily. No side effects from the medication. We have followed intermittent elevations of blood pressure in the past, but she has not had any additional risk factors of hyperlipidemia or diabetes. Stress test unremarkable last fall.  We talked about the normal diabetes screening which had her worried - she has noted what seems to be some low blood sugars recently. At times she can feel lightheaded and eating will improve her symptoms. I discussed with her that this is the opposite of diabetes and completely unrelated.    Review of systems otherwise negative.  Past medical, family, and social history reviewed and updated in chart.    OBJECTIVE:  /86 mmHg  Pulse 88  Temp(Src) 97.9  F (36.6  C) (Oral)  Resp 16  Ht 1.6 m (5' 3\")  Wt 84.097 kg (185 lb 6.4 oz)  BMI 32.85 kg/m2  SpO2 99%  Alert, pleasant, upbeat, and in no apparent discomfort.  S1 and S2 normal, no murmurs, clicks, gallops or rubs. Regular rate and rhythm. Chest is clear; no wheezes or rales. No edema or JVD.  Past labs reviewed with the patient.     ASSESSMENT / PLAN:  (I10) Hypertension goal BP (blood pressure) < 140/90  (primary encounter diagnosis)  Comment: We will stick with lisinopril at a dose of 10 mg for now continue to follow. Provided some information on " lifestyle improvement including the DASH diet  Plan: lisinopril (PRINIVIL/ZESTRIL) 10 MG tablet            (M54.5,  G89.29) Chronic midline low back pain without sciatica  Comment: Stable. Refilled.  Plan: oxyCODONE (OXYCONTIN) 40 MG 12 hr tablet,         oxyCODONE-acetaminophen (PERCOCET) 7.5-325 MG         per tablet            (M54.2,  G89.29) Chronic neck pain  Comment: Stable. Refilled.  Plan: oxyCODONE (OXYCONTIN) 40 MG 12 hr tablet,         oxyCODONE-acetaminophen (PERCOCET) 7.5-325 MG         per tablet            Follow up 2-3 months  S. Jae Loredo MD    (Chart documentation completed in part with Dragon voice-recognition software.  Even though reviewed some grammatical, spelling, and word errors may remain.)

## 2017-01-27 ENCOUNTER — TELEPHONE (OUTPATIENT)
Dept: FAMILY MEDICINE | Facility: CLINIC | Age: 42
End: 2017-01-27

## 2017-01-27 NOTE — TELEPHONE ENCOUNTER
Patient is returning call. She states she is still needing the prior auth to be completed on oxycontin. Please call to discuss if anyone is still in the clinic tonight. Otherwise, please call as soon as possible on Monday.    Jaqueline SALAZAR  Central Scheduler

## 2017-01-27 NOTE — TELEPHONE ENCOUNTER
Just printed those earlier today  Unfortunately we cannot send him to a pharmacy. She will need to  the written prescriptions

## 2017-01-27 NOTE — TELEPHONE ENCOUNTER
Reason for Call:  Other prescription    Detailed comments: Pt needs a Prior Auth sent to Pt's pharmacy for oxycodone.  Pt is currently out and would like another refill sent to the [facility] in [place].    Phone Number Patient can be reached at: Home number on file [phone] (home)    Best Time: Anytime    Can we leave a detailed message on this number? YES    Call taken on 1/27/2017 at 3:45 PM by Robert Riggins

## 2017-01-30 ENCOUNTER — MYC MEDICAL ADVICE (OUTPATIENT)
Dept: FAMILY MEDICINE | Facility: CLINIC | Age: 42
End: 2017-01-30

## 2017-01-30 NOTE — TELEPHONE ENCOUNTER
Sent over Cover My Meds - will await response from insurance.    [place]: [id number]    Winston Salinas MA

## 2017-02-06 NOTE — TELEPHONE ENCOUNTER
Received PA denial on Cover My Meds -   JORY NÚÑEZ ([place]: [id number]) - [id number]  OxyContin 40 mg ER tablets  Status: PA Response - Denied  Created: January 30th, 2017  Sent: January 31st, 2017      Routing to PCP as AMANDA Salinas MA

## 2017-02-08 ENCOUNTER — HOSPITAL ENCOUNTER (EMERGENCY)
Facility: CLINIC | Age: 42
Discharge: HOME | End: 2017-02-08
Attending: EMERGENCY MEDICINE | Admitting: EMERGENCY MEDICINE
Payer: COMMERCIAL

## 2017-02-08 ENCOUNTER — APPOINTMENT (OUTPATIENT)
Dept: CT IMAGING | Facility: CLINIC | Age: 42
End: 2017-02-08
Attending: EMERGENCY MEDICINE
Payer: COMMERCIAL

## 2017-02-08 ENCOUNTER — MYC MEDICAL ADVICE (OUTPATIENT)
Dept: FAMILY MEDICINE | Facility: CLINIC | Age: 42
End: 2017-02-08

## 2017-02-08 VITALS
DIASTOLIC BLOOD PRESSURE: 80 MMHG | OXYGEN SATURATION: 98 % | HEART RATE: 85 BPM | SYSTOLIC BLOOD PRESSURE: 135 MMHG | RESPIRATION RATE: 18 BRPM | TEMPERATURE: 98.6 F

## 2017-02-08 DIAGNOSIS — G89.29 CHRONIC MIDLINE LOW BACK PAIN WITHOUT SCIATICA: Primary | ICD-10-CM

## 2017-02-08 DIAGNOSIS — G89.29 CHRONIC NECK PAIN: ICD-10-CM

## 2017-02-08 DIAGNOSIS — M54.50 CHRONIC MIDLINE LOW BACK PAIN WITHOUT SCIATICA: Primary | ICD-10-CM

## 2017-02-08 DIAGNOSIS — M54.2 CHRONIC NECK PAIN: ICD-10-CM

## 2017-02-08 DIAGNOSIS — K59.00 CONSTIPATION, UNSPECIFIED CONSTIPATION TYPE: ICD-10-CM

## 2017-02-08 DIAGNOSIS — R10.84 ABDOMINAL PAIN, GENERALIZED: ICD-10-CM

## 2017-02-08 LAB
ALBUMIN SERPL-MCNC: 3.4 G/DL (ref 3.4–5)
ALBUMIN SERPL-MCNC: NORMAL G/DL (ref 3.4–5)
ALP SERPL-CCNC: 56 U/L (ref 40–150)
ALP SERPL-CCNC: NORMAL U/L (ref 40–150)
ALT SERPL W P-5'-P-CCNC: 30 U/L (ref 0–50)
ALT SERPL W P-5'-P-CCNC: NORMAL U/L (ref 0–50)
ANION GAP SERPL CALCULATED.3IONS-SCNC: 7 MMOL/L (ref 3–14)
ANION GAP SERPL CALCULATED.3IONS-SCNC: NORMAL MMOL/L (ref 6–17)
AST SERPL W P-5'-P-CCNC: 16 U/L (ref 0–45)
AST SERPL W P-5'-P-CCNC: NORMAL U/L (ref 0–45)
BASOPHILS # BLD AUTO: 0 10E9/L (ref 0–0.2)
BASOPHILS NFR BLD AUTO: 0.3 %
BILIRUB SERPL-MCNC: 0.9 MG/DL (ref 0.2–1.3)
BILIRUB SERPL-MCNC: NORMAL MG/DL (ref 0.2–1.3)
BUN SERPL-MCNC: 6 MG/DL (ref 7–30)
BUN SERPL-MCNC: NORMAL MG/DL (ref 7–30)
CALCIUM SERPL-MCNC: 7.7 MG/DL (ref 8.5–10.1)
CALCIUM SERPL-MCNC: NORMAL MG/DL (ref 8.5–10.1)
CHLORIDE SERPL-SCNC: 105 MMOL/L (ref 94–109)
CHLORIDE SERPL-SCNC: NORMAL MMOL/L (ref 94–109)
CO2 SERPL-SCNC: 27 MMOL/L (ref 20–32)
CO2 SERPL-SCNC: NORMAL MMOL/L (ref 20–32)
CREAT SERPL-MCNC: 0.79 MG/DL (ref 0.52–1.04)
CREAT SERPL-MCNC: NORMAL MG/DL (ref 0.52–1.04)
DIFFERENTIAL METHOD BLD: NORMAL
EOSINOPHIL # BLD AUTO: 0.1 10E9/L (ref 0–0.7)
EOSINOPHIL NFR BLD AUTO: 1.1 %
ERYTHROCYTE [DISTWIDTH] IN BLOOD BY AUTOMATED COUNT: 14.6 % (ref 10–15)
GFR SERPL CREATININE-BSD FRML MDRD: 79 ML/MIN/1.7M2
GFR SERPL CREATININE-BSD FRML MDRD: NORMAL ML/MIN/1.7M2
GLUCOSE SERPL-MCNC: 87 MG/DL (ref 70–99)
GLUCOSE SERPL-MCNC: NORMAL MG/DL (ref 70–99)
HCG SERPL QL: ABNORMAL
HCG SERPL QL: NEGATIVE
HCT VFR BLD AUTO: 37.5 % (ref 35–47)
HGB BLD-MCNC: 12.6 G/DL (ref 11.7–15.7)
IMM GRANULOCYTES # BLD: 0 10E9/L (ref 0–0.4)
IMM GRANULOCYTES NFR BLD: 0.3 %
LIPASE SERPL-CCNC: 121 U/L (ref 73–393)
LIPASE SERPL-CCNC: NORMAL U/L (ref 73–393)
LYMPHOCYTES # BLD AUTO: 1.9 10E9/L (ref 0.8–5.3)
LYMPHOCYTES NFR BLD AUTO: 25.4 %
MCH RBC QN AUTO: 29.2 PG (ref 26.5–33)
MCHC RBC AUTO-ENTMCNC: 33.6 G/DL (ref 31.5–36.5)
MCV RBC AUTO: 87 FL (ref 78–100)
MONOCYTES # BLD AUTO: 0.5 10E9/L (ref 0–1.3)
MONOCYTES NFR BLD AUTO: 7.1 %
NEUTROPHILS # BLD AUTO: 4.8 10E9/L (ref 1.6–8.3)
NEUTROPHILS NFR BLD AUTO: 65.8 %
NRBC # BLD AUTO: 0 10*3/UL
NRBC BLD AUTO-RTO: 0 /100
PLATELET # BLD AUTO: 198 10E9/L (ref 150–450)
POTASSIUM SERPL-SCNC: 3.3 MMOL/L (ref 3.4–5.3)
POTASSIUM SERPL-SCNC: NORMAL MMOL/L (ref 3.4–5.3)
PROT SERPL-MCNC: 6.7 G/DL (ref 6.8–8.8)
PROT SERPL-MCNC: NORMAL G/DL (ref 6.8–8.8)
RBC # BLD AUTO: 4.31 10E12/L (ref 3.8–5.2)
SODIUM SERPL-SCNC: 139 MMOL/L (ref 133–144)
SODIUM SERPL-SCNC: NORMAL MMOL/L (ref 133–144)
WBC # BLD AUTO: 7.3 10E9/L (ref 4–11)

## 2017-02-08 PROCEDURE — 25000132 ZZH RX MED GY IP 250 OP 250 PS 637: Performed by: EMERGENCY MEDICINE

## 2017-02-08 PROCEDURE — 74177 CT ABD & PELVIS W/CONTRAST: CPT

## 2017-02-08 PROCEDURE — 83690 ASSAY OF LIPASE: CPT | Performed by: EMERGENCY MEDICINE

## 2017-02-08 PROCEDURE — 99285 EMERGENCY DEPT VISIT HI MDM: CPT | Mod: 25

## 2017-02-08 PROCEDURE — 25500064 ZZH RX 255 OP 636: Performed by: EMERGENCY MEDICINE

## 2017-02-08 PROCEDURE — 84703 CHORIONIC GONADOTROPIN ASSAY: CPT | Performed by: EMERGENCY MEDICINE

## 2017-02-08 PROCEDURE — 25000125 ZZHC RX 250: Performed by: EMERGENCY MEDICINE

## 2017-02-08 PROCEDURE — 80053 COMPREHEN METABOLIC PANEL: CPT | Performed by: EMERGENCY MEDICINE

## 2017-02-08 RX ORDER — POLYETHYLENE GLYCOL 3350 17 G/17G
1 POWDER, FOR SOLUTION ORAL DAILY
Qty: 527 G | Refills: 0 | Status: SHIPPED | OUTPATIENT
Start: 2017-02-08 | End: 2017-03-10

## 2017-02-08 RX ORDER — IOPAMIDOL 755 MG/ML
93 INJECTION, SOLUTION INTRAVASCULAR ONCE
Status: COMPLETED | OUTPATIENT
Start: 2017-02-08 | End: 2017-02-08

## 2017-02-08 RX ORDER — POTASSIUM CHLORIDE 1500 MG/1
20 TABLET, EXTENDED RELEASE ORAL ONCE
Status: COMPLETED | OUTPATIENT
Start: 2017-02-08 | End: 2017-02-08

## 2017-02-08 RX ORDER — MORPHINE SULFATE 30 MG/1
30 TABLET, FILM COATED, EXTENDED RELEASE ORAL EVERY 12 HOURS
Qty: 60 TABLET | Refills: 0 | Status: SHIPPED | OUTPATIENT
Start: 2017-02-08 | End: 2017-02-20 | Stop reason: SINTOL

## 2017-02-08 RX ADMIN — IOPAMIDOL 93 ML: 755 INJECTION, SOLUTION INTRAVENOUS at 09:33

## 2017-02-08 RX ADMIN — SODIUM CHLORIDE 68 ML: 9 INJECTION, SOLUTION INTRAVENOUS at 09:33

## 2017-02-08 RX ADMIN — POTASSIUM CHLORIDE 20 MEQ: 1500 TABLET, EXTENDED RELEASE ORAL at 09:09

## 2017-02-08 ASSESSMENT — ENCOUNTER SYMPTOMS
VOMITING: 0
DIARRHEA: 1
CONSTIPATION: 1
ABDOMINAL PAIN: 1
NAUSEA: 1
COUGH: 1
CHILLS: 1

## 2017-02-08 NOTE — ED AVS SNAPSHOT
Emergency Department    [address] [facility] [address]    Phone:  [phone]    Fax:  [phone]                                       Jeff Serra   MRN: [id number]    Department:   Emergency Department   Date of Visit:  2/8/2017           Patient Information     Date Of Birth          1975        Your diagnoses for this visit were:     Abdominal pain, generalized     Constipation, unspecified constipation type        You were seen by Jasen Caruso MD.      Follow-up Information     Follow up with Jordyn Loredo MD In 5 days.    Specialty:  Family Practice    Contact information:    [address] [id number] [phone]          Follow up with  Emergency Department.    Specialty:  EMERGENCY MEDICINE    Why:  As needed, If symptoms worsen    Contact information:    [address] [facility] [id number] [phone]        Discharge Instructions         Abdominal Pain  Abdominal pain is pain in the stomach or intestinal area. Everyone has this pain from time to time. In many cases it goes away on its own. But abdominal pain can sometimes be due to a serious problem, such as appendicitis. So it s important to know when to seek help.  Causes of abdominal pain  There are many possible causes of abdominal pain. Common causes in adults include:    Constipation, diarrhea, or gas    GERD (gastroesophageal reflux disease) movement of stomach acid into the esophagus, also known as acid reflux or heartburn    Peptic ulcer (a sore in the lining of the stomach or small intestine)    Inflammation of the gallbladder, liver, or pancreas    Gallstones or kidney stones    Appendicitis     Obstruction of the intestines     Hernia (bulging of an internal organ through a muscle or other tissue)    Urinary tract infections    In women, menstrual cramps, fibroids, or endometriosis of the uterus    Inflammation or  infection of the intestines  Diagnosing the cause of abdominal pain  Your health care provider will examine you to help find the cause of your pain. If needed, tests will be ordered. Because abdominal pain has so many possible causes, it can be hard to discover the reason for the pain. Giving details about your pain can help. Be ready to tell your health care provider where and when you feel the pain and what makes it better or worse. Also mention whether you have other symptoms such as fever, tiredness, nausea, vomiting, or changes in bathroom habits.  Treating abdominal pain  Certain causes of pain, such as appendicitis or a bowel obstruction, need emergency treatment. Other problems can be treated with rest, fluids, or medications. Your health care provider can give you specific instructions for treatment or self-care based on the cause of your pain.  If you have vomiting or diarrhea, sip water or other clear fluids. When you are ready to eat solid foods again, start with small amounts of easy-to-digest, low-fat foods, such as applesauce, toast, or crackers.   When to call the doctor  Call 911 or go to the hospital right away if you:    Can t pass stool and are vomiting    Are vomiting blood or have black, tarry diarrhea    Also have chest, neck, or shoulder pain    Feel like you are about to pass out    Have pain in your shoulder blades with nausea    Have sudden, excruciating abdominal pain    Have new, severe pain unlike any you have felt before    Have a belly that is rigid, hard, and tender to touch  Call your doctor if you have:    Pain for more than 5 days    Bloating for more than 2 days    Diarrhea for more than 5 days    Fever of 101 F (38.3 C) or higher    Pain that continues to worsen    Unexplained weight loss    Continued lack of appetite    Blood in the stool  How to prevent abdominal pain  Here are some tips to help prevent abdominal pain:    Eat smaller amounts of food at one time.    Avoid  greasy, fried, or other high-fat foods.    Avoid foods that give you gas.    Exercise regularly.    Drink plenty of fluids.  To help prevent symptoms of gastroesophageal reflux disease (GERD):    Quit smoking.    Reduce alcohol and certain foods that increase stomach acid.     Lose excess weight.    Finish eating at least 2 hours before you go to bed or lie down.    Elevate the head of your bed.    8691-9058 [employer]. [address] [place] [address], [place] [address]. All rights reserved. This information is not intended as a substitute for professional medical care. Always follow your healthcare professional's instructions.        Constipation (Adult)  Constipation means that you have bowel movements that are less frequent than usual. Stools often become very hard and difficult to pass.  Constipation is very common. At some point in life it affects almost everyone. Since everyone's bowel habits are different, what is constipation to one person may not be to another. Your healthcare provider may do tests to diagnose constipation. It depends on what he or she finds when evaluating you.    Symptoms of constipation include:    Abdominal pain    Bloating    Vomiting    Painful bowel movements    Itching, swelling, bleeding, or pain around the anus  Causes  Constipation can have many causes. These include:    Diet low in fiber    Too much dairy    Not drinking enough liquids    Lack of exercise or physical activity. This is especially true for older adults.    Changes in lifestyle or daily routine, including pregnancy, aging, work, and travel    Frequent use or misuse of laxatives    Ignoring the urge to have a bowel movement or delaying it until later    Medicines, such as certain prescription pain medicines, iron supplements, antacids, certain antidepressants, and calcium supplements    Diseases like irritable bowel syndrome, bowel obstructions, stroke, diabetes, thyroid disease, Parkinson disease,  hemorrhoids, and colon cancer  Complications  Potential complications of constipation can include:    Hemorrhoids    Rectal bleeding from hemorrhoids or anal fissures (skin tears)    Hernias    Dependency on laxatives    Chronic constipation    Fecal impaction    Bowel obstruction or perforation  Home care  All treatment should be done after talking with your healthcare provider. This is especially true if you have another medical problems, are taking prescription medicines, or are an older adult. Treatment most often involves lifestyle changes. You may also need medicines. Your healthcare provider will tell you which will work best for you. Follow the advice below to help avoid this problem in the future.  Lifestyle changes  These lifestyle changes can help prevent constipation:    Diet. Eat a high-fiber diet, with fresh fruit and vegetables, and reduce dairy intake, meats, and processed foods    Fluids. It's important to get enough fluids each day. Drink plenty of water when you eat more fiber. If you are on diet that limits the amount of fluid you can have, talk about this with your healthcare provider.    Regular exercise. Check with your healthcare provider first.  Medications  Take any medicines as directed. Some laxatives are safe to use only every now and then. Others can be taken on a regular basis. Talk with your doctor or pharmacist if you have questions.  Prescription pain medicines can cause constipation. If you are taking this kind of medicine, ask your healthcare provider if you should also take a stool softener.  Medicines you may take to treat constipation include:    Fiber supplements    Stool softeners    Laxatives    Enemas    Rectal suppositories  Follow-up care  Follow up with your healthcare provider if symptoms don't get better in the next few days. You may need to have more tests or see a specialist.  Call 911  Call 911 if any of these occur:    Trouble breathing    Stiff, rigid abdomen that  is severely painful to touch    Confusion    Fainting or loss of consciousness    Rapid heart rate    Chest pain  When to seek medical advice  Call your healthcare provider right away if any of these occur:    Fever over 100.4 F (38 C)    Failure to resume normal bowel movements    Pain in your abdomen or back gets worse    Nausea or vomiting    Swelling in your abdomen    Blood in the stool    Black, tarry stool    Involuntary weight loss    Weakness    [id number] [employer]. [address] [place] [address], [place] [address]. All rights reserved. This information is not intended as a substitute for professional medical care. Always follow your healthcare professional's instructions.          Future Appointments        Provider Department Dept Phone Center    2/20/2017 2:40 PM Jordyn Loredo MD [facility] [phone] [facility]      24 Hour Appointment Hotline       To make an appointment at any [facility], call [phone] ([phone]). If you don't have a family doctor or clinic, we will help you find one. [facility] are conveniently located to serve the needs of you and your family.             Review of your medicines      START taking        Dose / Directions Last dose taken    polyethylene glycol powder   Commonly known as:  MIRALAX   Dose:  1 capful   Quantity:  527 g        Take 17 g (1 capful) by mouth daily   Refills:  0          Our records show that you are taking the medicines listed below. If these are incorrect, please call your family doctor or clinic.        Dose / Directions Last dose taken    ASPIRIN PO   Dose:  81 mg        Take 81 mg by mouth daily   Refills:  0        fexofenadine 180 MG tablet   Commonly known as:  ALLEGRA   Dose:  180 mg   Quantity:  90 tablet        Take 1 tablet (180 mg) by mouth daily   Refills:  3        hydrochlorothiazide 25 MG tablet   Commonly known as:  HYDRODIURIL   Dose:  25 mg        Take 25 mg by mouth daily    Refills:  0        lisinopril 10 MG tablet   Commonly known as:  PRINIVIL/ZESTRIL   Dose:  10 mg   Quantity:  90 tablet        Take 1 tablet (10 mg) by mouth daily   Refills:  0        loratadine 10 MG tablet   Commonly known as:  CLARITIN   Dose:  10 mg   Quantity:  30 tablet        Take 1 tablet (10 mg) by mouth daily as needed for allergies   Refills:  2        norethindrone 5 MG tablet   Commonly known as:  AYGESTIN   Dose:  5 mg   Quantity:  30 tablet        Take 1 tablet (5 mg) by mouth daily   Refills:  5        oxyCODONE 40 MG 12 hr tablet   Commonly known as:  OXYCONTIN   Dose:  40 mg   Quantity:  60 tablet        Take 1 tablet (40 mg) by mouth every 12 hours   Refills:  0        oxyCODONE-acetaminophen 7.5-325 MG per tablet   Commonly known as:  PERCOCET   Dose:  1-2 tablet   Quantity:  60 tablet        Take 1-2 tablets by mouth 3 times daily as needed (breakthrough pain)   Refills:  0        permethrin 5 % cream   Commonly known as:  ELIMITE   Quantity:  60 g        Apply cream from head to toe (execpt the face); leave on for 8-14 hours before washing off with water; may reapply in 1 week if live mites appear.   Refills:  1                Prescriptions were sent or printed at these locations (1 Prescription)                   Other Prescriptions                Printed at Department/Unit printer (1 of 1)         polyethylene glycol (MIRALAX) powder                Procedures and tests performed during your visit     Procedure/Test Number of Times Performed    CBC with platelets + differential 1    CT Abdomen Pelvis w Contrast 1    Comprehensive metabolic panel 2    HCG QUALitative pregnancy (blood) 1    HCG qualitative 1    Lipase 2      Orders Needing Specimen Collection     None      Pending Results     Date and Time Order Name Status Description    2/8/2017 0832 CT Abdomen Pelvis w Contrast Preliminary             Pending Culture Results     No orders found from 2/7/2017 to 2/9/2017.       Test Results  from your hospital stay           2/8/2017  7:34 AM - Interface, Flexilab Results      Component Results     Component Value Ref Range & Units Status    WBC 7.3 4.0 - 11.0 10e9/L Final    RBC Count 4.31 3.8 - 5.2 10e12/L Final    Hemoglobin 12.6 11.7 - 15.7 g/dL Final    Hematocrit 37.5 35.0 - 47.0 % Final    MCV 87 78 - 100 fl Final    MCH 29.2 26.5 - 33.0 pg Final    MCHC 33.6 31.5 - 36.5 g/dL Final    RDW 14.6 10.0 - 15.0 % Final    Platelet Count 198 150 - 450 10e9/L Final    Diff Method Automated Method  Final    % Neutrophils 65.8 % Final    % Lymphocytes 25.4 % Final    % Monocytes 7.1 % Final    % Eosinophils 1.1 % Final    % Basophils 0.3 % Final    % Immature Granulocytes 0.3 % Final    Nucleated RBCs 0 0 /100 Final    Absolute Neutrophil 4.8 1.6 - 8.3 10e9/L Final    Absolute Lymphocytes 1.9 0.8 - 5.3 10e9/L Final    Absolute Monocytes 0.5 0.0 - 1.3 10e9/L Final    Absolute Eosinophils 0.1 0.0 - 0.7 10e9/L Final    Absolute Basophils 0.0 0.0 - 0.2 10e9/L Final    Abs Immature Granulocytes 0.0 0 - 0.4 10e9/L Final    Absolute Nucleated RBC 0.0  Final         2/8/2017  7:41 AM - Interface, Flexilab Results      Component Results     Component Value Ref Range & Units Status    Sodium  133 - 144 mmol/L Final    Canceled, Test credited   Unsatisfactory specimen - hemolyzed      Potassium  3.4 - 5.3 mmol/L Final    Canceled, Test credited   Unsatisfactory specimen - hemolyzed      Chloride  94 - 109 mmol/L Final    Canceled, Test credited   Unsatisfactory specimen - hemolyzed      Carbon Dioxide  20 - 32 mmol/L Final    Canceled, Test credited   Unsatisfactory specimen - hemolyzed      Anion Gap  6 - 17 mmol/L Final    Canceled, Test credited   Unsatisfactory specimen - hemolyzed      Glucose  70 - 99 mg/dL Final    Canceled, Test credited   Unsatisfactory specimen - hemolyzed      Urea Nitrogen  7 - 30 mg/dL Final    Canceled, Test credited   Unsatisfactory specimen - hemolyzed      Creatinine  0.52 - 1.04  mg/dL Final    Canceled, Test credited   Unsatisfactory specimen - hemolyzed      GFR Estimate  >60 mL/min/1.7m2 Final    Canceled, Test credited   Unsatisfactory specimen - hemolyzed      GFR Estimate If Black  >60 mL/min/1.7m2 Final    Canceled, Test credited   Unsatisfactory specimen - hemolyzed      Calcium  8.5 - 10.1 mg/dL Final    Canceled, Test credited   Unsatisfactory specimen - hemolyzed      Bilirubin Total  0.2 - 1.3 mg/dL Final    Canceled, Test credited   Unsatisfactory specimen - hemolyzed      Albumin  3.4 - 5.0 g/dL Final    Canceled, Test credited   Unsatisfactory specimen - hemolyzed      Protein Total  6.8 - 8.8 g/dL Final    Canceled, Test credited   Unsatisfactory specimen - hemolyzed      Alkaline Phosphatase  40 - 150 U/L Final    Canceled, Test credited   Unsatisfactory specimen - hemolyzed      ALT  0 - 50 U/L Final    Canceled, Test credited   Unsatisfactory specimen - hemolyzed      AST  0 - 45 U/L Final    Canceled, Test credited   Unsatisfactory specimen - hemolyzed           2/8/2017  7:41 AM - Interface, Flexilab Results      Component Results     Component Value Ref Range & Units Status    Lipase  73 - 393 U/L Final    Canceled, Test credited   Unsatisfactory specimen - hemolyzed           2/8/2017  7:42 AM - Interface, Flexilab Results      Component Results     Component Value Ref Range & Units Status    HCG Qualitative Serum  NEG Final    Canceled, Test credited   Unsatisfactory specimen - hemolyzed   (A)         2/8/2017  8:20 AM - Interface, Flexilab Results      Component Results     Component Value Ref Range & Units Status    Sodium 139 133 - 144 mmol/L Final    Potassium 3.3 (L) 3.4 - 5.3 mmol/L Final    Chloride 105 94 - 109 mmol/L Final    Carbon Dioxide 27 20 - 32 mmol/L Final    Anion Gap 7 3 - 14 mmol/L Final    Glucose 87 70 - 99 mg/dL Final    Urea Nitrogen 6 (L) 7 - 30 mg/dL Final    Creatinine 0.79 0.52 - 1.04 mg/dL Final    GFR Estimate 79 >60 mL/min/1.7m2 Final     Non  GFR Calc    GFR Estimate If Black >90   GFR Calc   >60 mL/min/1.7m2 Final    Calcium 7.7 (L) 8.5 - 10.1 mg/dL Final    Bilirubin Total 0.9 0.2 - 1.3 mg/dL Final    Albumin 3.4 3.4 - 5.0 g/dL Final    Protein Total 6.7 (L) 6.8 - 8.8 g/dL Final    Alkaline Phosphatase 56 40 - 150 U/L Final    ALT 30 0 - 50 U/L Final    AST 16 0 - 45 U/L Final         2/8/2017  8:13 AM - Interface, Flexilab Results      Component Results     Component Value Ref Range & Units Status    HCG Qualitative Serum Negative NEG Final         2/8/2017  8:20 AM - Interface, Flexilab Results      Component Results     Component Value Ref Range & Units Status    Lipase 121 73 - 393 U/L Final         2/8/2017  9:56 AM - Interface, Radiant Ib      Narrative     CT ABDOMEN AND PELVIS WITH CONTRAST 2/8/2017 9:36 AM     HISTORY: Diffuse abdominal pain and bloating. Evaluate for bowel  obstruction.     COMPARISON: CT abdomen and pelvis 3/24/2015.    TECHNIQUE: Axial images from the lung bases to the symphysis are  performed with additional coronal reformatted images. 93 mL of Isovue  370 are given intravenously.  Radiation dose for this scan was reduced  using automated exposure control, adjustment of the mA and/or kV  according to patient size, or iterative reconstruction technique.    FINDINGS: The lung bases are clear.    Abdomen: The liver, spleen, gallbladder, pancreas, adrenal glands and  kidneys are unremarkable. No hydronephrosis. No enlarged lymph nodes.  The bowel is unremarkable. No obstruction or diverticulitis. Appendix  is normal.    Pelvis: The bladder, uterus, ovaries and rectum are unremarkable. No  enlarged pelvic lymph nodes or free pelvic fluid. Bone window  examination is within normal limits. No significant degenerative  changes are appreciated.        Impression     IMPRESSION:  1. No evidence of bowel obstruction, diverticulitis or appendicitis.  2. Abdominal and pelvic organs are within normal  limits. No  hydronephrosis. No calcified gallstones are appreciated.                Clinical Quality Measure: Blood Pressure Screening     Your blood pressure was checked while you were in the emergency department today. The last reading we obtained was  BP: (!) 146/94 mmHg . Please read the guidelines below about what these numbers mean and what you should do about them.  If your systolic blood pressure (the top number) is less than 120 and your diastolic blood pressure (the bottom number) is less than 80, then your blood pressure is normal. There is nothing more that you need to do about it.  If your systolic blood pressure (the top number) is 120-139 or your diastolic blood pressure (the bottom number) is 80-89, your blood pressure may be higher than it should be. You should have your blood pressure rechecked within a year by a primary care provider.  If your systolic blood pressure (the top number) is 140 or greater or your diastolic blood pressure (the bottom number) is 90 or greater, you may have high blood pressure. High blood pressure is treatable, but if left untreated over time it can put you at risk for heart attack, stroke, or kidney failure. You should have your blood pressure rechecked by a primary care provider within the next 4 weeks.  If your provider in the emergency department today gave you specific instructions to follow-up with your doctor or provider even sooner than that, you should follow that instruction and not wait for up to 4 weeks for your follow-up visit.        Thank you for choosing [facility]       Thank you for choosing [facility] for your care. Our goal is always to provide you with excellent care. Hearing back from our patients is one way we can continue to improve our services. Please take a few minutes to complete the written survey that you may receive in the mail after you visit with us. Thank you!        [employer] Information     [employer] gives you secure access to your electronic  health record. If you see a primary care provider, you can also send messages to your care team and make appointments. If you have questions, please call your primary care clinic.  If you do not have a primary care provider, please call [phone] and they will assist you.        Care EveryWhere ID     This is your Care EveryWhere ID. This could be used by other organizations to access your [facility] medical records  [id number]        After Visit Summary       This is your record. Keep this with you and show to your community pharmacist(s) and doctor(s) at your next visit.

## 2017-02-08 NOTE — DISCHARGE INSTRUCTIONS
Abdominal Pain  Abdominal pain is pain in the stomach or intestinal area. Everyone has this pain from time to time. In many cases it goes away on its own. But abdominal pain can sometimes be due to a serious problem, such as appendicitis. So it s important to know when to seek help.  Causes of abdominal pain  There are many possible causes of abdominal pain. Common causes in adults include:    Constipation, diarrhea, or gas    GERD (gastroesophageal reflux disease) movement of stomach acid into the esophagus, also known as acid reflux or heartburn    Peptic ulcer (a sore in the lining of the stomach or small intestine)    Inflammation of the gallbladder, liver, or pancreas    Gallstones or kidney stones    Appendicitis     Obstruction of the intestines     Hernia (bulging of an internal organ through a muscle or other tissue)    Urinary tract infections    In women, menstrual cramps, fibroids, or endometriosis of the uterus    Inflammation or infection of the intestines  Diagnosing the cause of abdominal pain  Your health care provider will examine you to help find the cause of your pain. If needed, tests will be ordered. Because abdominal pain has so many possible causes, it can be hard to discover the reason for the pain. Giving details about your pain can help. Be ready to tell your health care provider where and when you feel the pain and what makes it better or worse. Also mention whether you have other symptoms such as fever, tiredness, nausea, vomiting, or changes in bathroom habits.  Treating abdominal pain  Certain causes of pain, such as appendicitis or a bowel obstruction, need emergency treatment. Other problems can be treated with rest, fluids, or medications. Your health care provider can give you specific instructions for treatment or self-care based on the cause of your pain.  If you have vomiting or diarrhea, sip water or other clear fluids. When you are ready to eat solid foods again, start with  small amounts of easy-to-digest, low-fat foods, such as applesauce, toast, or crackers.   When to call the doctor  Call 911 or go to the hospital right away if you:    Can t pass stool and are vomiting    Are vomiting blood or have black, tarry diarrhea    Also have chest, neck, or shoulder pain    Feel like you are about to pass out    Have pain in your shoulder blades with nausea    Have sudden, excruciating abdominal pain    Have new, severe pain unlike any you have felt before    Have a belly that is rigid, hard, and tender to touch  Call your doctor if you have:    Pain for more than 5 days    Bloating for more than 2 days    Diarrhea for more than 5 days    Fever of 101 F (38.3 C) or higher    Pain that continues to worsen    Unexplained weight loss    Continued lack of appetite    Blood in the stool  How to prevent abdominal pain  Here are some tips to help prevent abdominal pain:    Eat smaller amounts of food at one time.    Avoid greasy, fried, or other high-fat foods.    Avoid foods that give you gas.    Exercise regularly.    Drink plenty of fluids.  To help prevent symptoms of gastroesophageal reflux disease (GERD):    Quit smoking.    Reduce alcohol and certain foods that increase stomach acid.     Lose excess weight.    Finish eating at least 2 hours before you go to bed or lie down.    Elevate the head of your bed.    7268-3433 [employer]. [address] [place] [address], [place] [address]. All rights reserved. This information is not intended as a substitute for professional medical care. Always follow your healthcare professional's instructions.        Constipation (Adult)  Constipation means that you have bowel movements that are less frequent than usual. Stools often become very hard and difficult to pass.  Constipation is very common. At some point in life it affects almost everyone. Since everyone's bowel habits are different, what is constipation to one person may not be to another. Your  healthcare provider may do tests to diagnose constipation. It depends on what he or she finds when evaluating you.    Symptoms of constipation include:    Abdominal pain    Bloating    Vomiting    Painful bowel movements    Itching, swelling, bleeding, or pain around the anus  Causes  Constipation can have many causes. These include:    Diet low in fiber    Too much dairy    Not drinking enough liquids    Lack of exercise or physical activity. This is especially true for older adults.    Changes in lifestyle or daily routine, including pregnancy, aging, work, and travel    Frequent use or misuse of laxatives    Ignoring the urge to have a bowel movement or delaying it until later    Medicines, such as certain prescription pain medicines, iron supplements, antacids, certain antidepressants, and calcium supplements    Diseases like irritable bowel syndrome, bowel obstructions, stroke, diabetes, thyroid disease, Parkinson disease, hemorrhoids, and colon cancer  Complications  Potential complications of constipation can include:    Hemorrhoids    Rectal bleeding from hemorrhoids or anal fissures (skin tears)    Hernias    Dependency on laxatives    Chronic constipation    Fecal impaction    Bowel obstruction or perforation  Home care  All treatment should be done after talking with your healthcare provider. This is especially true if you have another medical problems, are taking prescription medicines, or are an older adult. Treatment most often involves lifestyle changes. You may also need medicines. Your healthcare provider will tell you which will work best for you. Follow the advice below to help avoid this problem in the future.  Lifestyle changes  These lifestyle changes can help prevent constipation:    Diet. Eat a high-fiber diet, with fresh fruit and vegetables, and reduce dairy intake, meats, and processed foods    Fluids. It's important to get enough fluids each day. Drink plenty of water when you eat more  fiber. If you are on diet that limits the amount of fluid you can have, talk about this with your healthcare provider.    Regular exercise. Check with your healthcare provider first.  Medications  Take any medicines as directed. Some laxatives are safe to use only every now and then. Others can be taken on a regular basis. Talk with your doctor or pharmacist if you have questions.  Prescription pain medicines can cause constipation. If you are taking this kind of medicine, ask your healthcare provider if you should also take a stool softener.  Medicines you may take to treat constipation include:    Fiber supplements    Stool softeners    Laxatives    Enemas    Rectal suppositories  Follow-up care  Follow up with your healthcare provider if symptoms don't get better in the next few days. You may need to have more tests or see a specialist.  Call 911  Call 911 if any of these occur:    Trouble breathing    Stiff, rigid abdomen that is severely painful to touch    Confusion    Fainting or loss of consciousness    Rapid heart rate    Chest pain  When to seek medical advice  Call your healthcare provider right away if any of these occur:    Fever over 100.4 F (38 C)    Failure to resume normal bowel movements    Pain in your abdomen or back gets worse    Nausea or vomiting    Swelling in your abdomen    Blood in the stool    Black, tarry stool    Involuntary weight loss    Weakness    5068-1892 [employer]. [address] [place] [address], [place] [address]. All rights reserved. This information is not intended as a substitute for professional medical care. Always follow your healthcare professional's instructions.
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
## 2017-02-08 NOTE — ED AVS SNAPSHOT
Emergency Department    [address] [place] [address]    Phone:  [phone]    Fax:  [phone]                                       Jeff Serra   MRN: [id number]    Department:   Emergency Department   Date of Visit:  2/8/2017           After Visit Summary Signature Page     I have received my discharge instructions, and my questions have been answered. I have discussed any challenges I see with this plan with the nurse or doctor.    ..........................................................................................................................................  Patient/Patient Representative Signature      ..........................................................................................................................................  Patient Representative Print Name and Relationship to Patient    ..................................................               ................................................  Date                                            Time    ..........................................................................................................................................  Reviewed by Signature/Title    ...................................................              ..............................................  Date                                                            Time

## 2017-02-20 ENCOUNTER — OFFICE VISIT (OUTPATIENT)
Dept: FAMILY MEDICINE | Facility: CLINIC | Age: 42
End: 2017-02-20
Payer: COMMERCIAL

## 2017-02-20 VITALS
TEMPERATURE: 98.3 F | BODY MASS INDEX: 32.6 KG/M2 | DIASTOLIC BLOOD PRESSURE: 84 MMHG | HEIGHT: 63 IN | HEART RATE: 92 BPM | WEIGHT: 184 LBS | OXYGEN SATURATION: 99 % | SYSTOLIC BLOOD PRESSURE: 136 MMHG | RESPIRATION RATE: 16 BRPM

## 2017-02-20 DIAGNOSIS — G89.29 CHRONIC NECK PAIN: ICD-10-CM

## 2017-02-20 DIAGNOSIS — G89.29 CHRONIC MIDLINE LOW BACK PAIN WITHOUT SCIATICA: ICD-10-CM

## 2017-02-20 DIAGNOSIS — G89.4 CHRONIC PAIN SYNDROME: Primary | ICD-10-CM

## 2017-02-20 DIAGNOSIS — M54.2 CHRONIC NECK PAIN: ICD-10-CM

## 2017-02-20 DIAGNOSIS — M54.50 CHRONIC MIDLINE LOW BACK PAIN WITHOUT SCIATICA: ICD-10-CM

## 2017-02-20 PROCEDURE — 99213 OFFICE O/P EST LOW 20 MIN: CPT | Performed by: FAMILY MEDICINE

## 2017-02-20 RX ORDER — OXYCODONE HYDROCHLORIDE 15 MG/1
15-30 TABLET ORAL 3 TIMES DAILY PRN
Qty: 120 TABLET | Refills: 0 | Status: SHIPPED | OUTPATIENT
Start: 2017-02-20 | End: 2017-03-16

## 2017-02-20 ASSESSMENT — PAIN SCALES - GENERAL: PAINLEVEL: NO PAIN (0)

## 2017-02-20 NOTE — NURSING NOTE
"Chief Complaint   Patient presents with     Recheck Medication       Initial /84 (BP Location: Right arm, Patient Position: Right side, Cuff Size: Adult Regular)  Pulse 92  Temp 98.3  F (36.8  C) (Oral)  Resp 16  Ht 1.6 m (5' 3\")  Wt 83.5 kg (184 lb)  SpO2 99%  BMI 32.59 kg/m2 Estimated body mass index is 32.59 kg/(m^2) as calculated from the following:    Height as of this encounter: 1.6 m (5' 3\").    Weight as of this encounter: 83.5 kg (184 lb).  Medication Reconciliation: complete   Will Rita SIMMONS      "

## 2017-02-20 NOTE — MR AVS SNAPSHOT
After Visit Summary   2/20/2017    Jeff Serra    MRN: [id number]           Patient Information     Date Of Birth          1975        Visit Information        Provider Department      2/20/2017 2:40 PM Jordyn Loredo MD [facility]        Today's Diagnoses     Chronic pain syndrome    -  1    Chronic midline low back pain without sciatica        Chronic neck pain           Follow-ups after your visit        Follow-up notes from your care team     Return in about 3 months (around 5/20/2017).      Who to contact     If you have questions or need follow up information about today's clinic visit or your schedule please contact [facility] directly at [phone].  Normal or non-critical lab and imaging results will be communicated to you by MyChart, letter or phone within 4 business days after the clinic has received the results. If you do not hear from us within 7 days, please contact the clinic through [employer] or phone. If you have a critical or abnormal lab result, we will notify you by phone as soon as possible.  Submit refill requests through [employer] or call your pharmacy and they will forward the refill request to us. Please allow 3 business days for your refill to be completed.          Additional Information About Your Visit        MyChart Information     [employer] gives you secure access to your electronic health record. If you see a primary care provider, you can also send messages to your care team and make appointments. If you have questions, please call your primary care clinic.  If you do not have a primary care provider, please call [phone] and they will assist you.        Care EveryWhere ID     This is your Care EveryWhere ID. This could be used by other organizations to access your [facility] medical records  [id number]        Your Vitals Were     Pulse Temperature Respirations Height Pulse Oximetry BMI (Body Mass Index)    92 98.3  " F (36.8  C) (Oral) 16 1.6 m (5' 3\") 99% 32.59 kg/m2       Blood Pressure from Last 3 Encounters:   02/20/17 136/84   02/08/17 135/80   01/20/17 138/86    Weight from Last 3 Encounters:   02/20/17 83.5 kg (184 lb)   01/20/17 84.1 kg (185 lb 6.4 oz)   01/12/17 82.6 kg (182 lb)              Today, you had the following     No orders found for display         Today's Medication Changes          These changes are accurate as of: 2/20/17 11:59 PM.  If you have any questions, ask your nurse or doctor.               Start taking these medicines.        Dose/Directions    oxyCODONE 15 MG IR tablet   Commonly known as:  ROXICODONE   Used for:  Chronic midline low back pain without sciatica, Chronic neck pain   Replaces:  oxyCODONE-acetaminophen 7.5-325 MG per tablet   Started by:  Jordyn Loredo MD        Dose:  15-30 mg   Take 1-2 tablets (15-30 mg) by mouth 3 times daily as needed for moderate to severe pain   Quantity:  120 tablet   Refills:  0         These medicines have changed or have updated prescriptions.        Dose/Directions    fexofenadine 180 MG tablet   Commonly known as:  ALLEGRA   This may have changed:    - when to take this  - reasons to take this   Used for:  Seasonal allergic rhinitis        Dose:  180 mg   Take 1 tablet (180 mg) by mouth daily   Quantity:  90 tablet   Refills:  3         Stop taking these medicines if you haven't already. Please contact your care team if you have questions.     morphine 30 MG 12 hr tablet   Commonly known as:  MS CONTIN   Stopped by:  Jordyn Loredo MD           oxyCODONE-acetaminophen 7.5-325 MG per tablet   Commonly known as:  PERCOCET   Replaced by:  oxyCODONE 15 MG IR tablet   Stopped by:  Jordyn Loredo MD                Where to get your medicines      Some of these will need a paper prescription and others can be bought over the counter.  Ask your nurse if you have questions.     Bring a paper prescription for each of these medications "     oxyCODONE 15 MG IR tablet                Primary Care Provider Office Phone # Fax #    Jordyn Jae Loredo [phone]       [facility] [address] [place] [id number]        Thank you!     Thank you for choosing [facility]  for your care. Our goal is always to provide you with excellent care. Hearing back from our patients is one way we can continue to improve our services. Please take a few minutes to complete the written survey that you may receive in the mail after your visit with us. Thank you!             Your Updated Medication List - Protect others around you: Learn how to safely use, store and throw away your medicines at www.disposemymeds.org.          This list is accurate as of: 2/20/17 11:59 PM.  Always use your most recent med list.                   Brand Name Dispense Instructions for use    ASPIRIN PO      Take 81 mg by mouth daily       fexofenadine 180 MG tablet    ALLEGRA    90 tablet    Take 1 tablet (180 mg) by mouth daily       hydrochlorothiazide 25 MG tablet    HYDRODIURIL     Take 25 mg by mouth daily       lisinopril 10 MG tablet    PRINIVIL/ZESTRIL    90 tablet    Take 1 tablet (10 mg) by mouth daily       loratadine 10 MG tablet    CLARITIN    30 tablet    Take 1 tablet (10 mg) by mouth daily as needed for allergies       norethindrone 5 MG tablet    AYGESTIN    30 tablet    Take 1 tablet (5 mg) by mouth daily       oxyCODONE 15 MG IR tablet    ROXICODONE    120 tablet    Take 1-2 tablets (15-30 mg) by mouth 3 times daily as needed for moderate to severe pain       permethrin 5 % cream    ELIMITE    60 g    Apply cream from head to toe (execpt the face); leave on for 8-14 hours before washing off with water; may reapply in 1 week if live mites appear.       polyethylene glycol powder    MIRALAX    527 g    Take 17 g (1 capful) by mouth daily

## 2017-02-20 NOTE — PROGRESS NOTES
"  SUBJECTIVE:                                                    Jeff Serra is a 42 year old female who presents to clinic today for the following health issues:        Chronic Pain Follow-Up       Type / Location of Pain: neck, back  Analgesia/pain control:       Recent changes:  improved      Overall control: Tolerable with discomfort - intermittent  Activity level/function:      Daily activities:  Some days    Work:  Full time, no restrictions  Adverse effects:  No  Adherance    Taking medication as directed?  Yes    Participating in other treatments: None  Risk Factors:    Sleep:  Poor    Mood/anxiety:  Controlled - up and down    Recent family or social stressors:  none noted    Other aggravating factors: none  PHQ-9 SCORE 7/24/2015 8/31/2015 12/1/2015   Total Score 2 - -   Total Score - 4 3     CHAO-7 SCORE 7/24/2015 8/31/2015 12/1/2015   Total Score 1 - -   Total Score - 2 4     Encounter-Level CSA - 08/31/2015:                 Controlled Substance Agreement - Scan on 9/4/2015  5:07 PM : CONTROLLED SUBSTANCE AGREEMENT (below)               SUBJECTIVE:  Here in follow up chronic pain  Doing well.  Reports no interval health concerns.   Patient reports no side effects from medications, and desires no change in therapy.     Review of systems otherwise negative.  Past medical, family, and social history reviewed and updated in chart.    OBJECTIVE:  /84 (BP Location: Right arm, Patient Position: Right side, Cuff Size: Adult Regular)  Pulse 92  Temp 98.3  F (36.8  C) (Oral)  Resp 16  Ht 1.6 m (5' 3\")  Wt 83.5 kg (184 lb)  SpO2 99%  BMI 32.59 kg/m2  Alert, pleasant, upbeat, and in no apparent discomfort.  S1 and S2 normal, no murmurs, clicks, gallops or rubs. Regular rate and rhythm. Chest is clear; no wheezes or rales. No edema or JVD.  Past labs reviewed with the patient.     ASSESSMENT / PLAN:  (G89.4) Chronic pain syndrome  (primary encounter diagnosis)  Comment: stable - problem list and CSA up to " date   Plan:     (M54.5,  G89.29) Chronic midline low back pain without sciatica  Comment: stable - refilled   Plan: oxyCODONE (ROXICODONE) 15 MG IR tablet            (M54.2,  G89.29) Chronic neck pain  Comment: stable - refilled   Plan: oxyCODONE (ROXICODONE) 15 MG IR tablet            Follow up 3 months   MOISES Loredo MD    (Chart documentation completed in part with Dragon voice-recognition software.  Even though reviewed some grammatical, spelling, and word errors may remain.)

## 2017-03-14 ENCOUNTER — HOSPITAL ENCOUNTER (EMERGENCY)
Facility: CLINIC | Age: 42
Discharge: HOME | End: 2017-03-15
Attending: EMERGENCY MEDICINE | Admitting: EMERGENCY MEDICINE
Payer: COMMERCIAL

## 2017-03-14 DIAGNOSIS — R06.2 WHEEZING: ICD-10-CM

## 2017-03-14 DIAGNOSIS — J10.1 INFLUENZA A: ICD-10-CM

## 2017-03-14 PROCEDURE — 87804 INFLUENZA ASSAY W/OPTIC: CPT | Performed by: EMERGENCY MEDICINE

## 2017-03-14 PROCEDURE — 99284 EMERGENCY DEPT VISIT MOD MDM: CPT | Mod: 25

## 2017-03-14 NOTE — ED AVS SNAPSHOT
Emergency Department    [address] [place] [address]    Phone:  [phone]    Fax:  [phone]                                       Jeff Serra   MRN: [id number]    Department:   Emergency Department   Date of Visit:  3/14/2017           After Visit Summary Signature Page     I have received my discharge instructions, and my questions have been answered. I have discussed any challenges I see with this plan with the nurse or doctor.    ..........................................................................................................................................  Patient/Patient Representative Signature      ..........................................................................................................................................  Patient Representative Print Name and Relationship to Patient    ..................................................               ................................................  Date                                            Time    ..........................................................................................................................................  Reviewed by Signature/Title    ...................................................              ..............................................  Date                                                            Time

## 2017-03-14 NOTE — ED AVS SNAPSHOT
Emergency Department    [address] [facility] [address]    Phone:  [phone]    Fax:  [phone]                                       Jeff Serra   MRN: [id number]    Department:   Emergency Department   Date of Visit:  3/14/2017           Patient Information     Date Of Birth          1975        Your diagnoses for this visit were:     Influenza A     Wheezing        You were seen by Delon Nino MD.      Follow-up Information     Follow up with Jordyn Loredo MD. Schedule an appointment as soon as possible for a visit in 1 week.    Specialty:  Family Practice    Contact information:    [address] [id number] [phone]          Follow up with  Emergency Department.    Specialty:  EMERGENCY MEDICINE    Why:  If symptoms worsen    Contact information:    [address] [facility] [id number] [phone]        Discharge Instructions         Influenza  Influenza ( the flu ) is an infection that affects your respiratory tract (the mouth, nose, and lungs, and the passages between them). Unlike a cold, the flu can make you very ill. And it can lead to pneumonia, a serious lung infection. For some people, especially older adults, young children, and people with certain chronic conditions, the flu can have serious complications and even be fatal.  What Are the Risk Factors for the Flu?     Viruses that cause influenza spread through the air in droplets when someone who has the flu coughs, sneezes, laughs, or talks.   Anyone can get the flu. But you re more likely to become infected if you:    Have a weakened immune system.    Work in a health care setting where you may be exposed to flu germs.    Live or work with someone who has the flu.    Haven t received an annual flu shot.  How Does the Flu Spread?  The flu is caused by viruses. The viruses spread through the air in droplets when someone who  has the flu coughs, sneezes, laughs, or talks. You can become infected when you inhale these viruses directly. You can also become infected when you touch a surface on which the droplets have landed and then transfer the germs to your eyes, nose, or mouth. Touching used tissues, or sharing utensils, drinking glasses, or a toothbrush with an infected person can expose you to flu viruses, too.  What Are the Symptoms of the Flu?  Flu symptoms tend to come on quickly and may last a few days to a few weeks. They include:    Fever usually higher than 101 F  (38.3 C) and chills    Sore throat and headache    Dry cough    Runny nose    Tiredness and weakness    Muscle aches  Factors That Can Make Flu Worse  For some people, the flu can be very serious. The risk of complications is greater for:    Children under age 5.    Adults 65 years of age and older.    People with a chronic illness, such as diabetes or heart, kidney, or lung disease.    People who live in a nursing home or long-term care facility.   How Is the Flu Treated?  Influenza usually improves after 7 days or so. In some cases, your health care provider may prescribe an antiviral medication. This may help you get well sooner. For the medication to help, you need to take it as soon as possible (ideally within 48 hours) after your symptoms start. If you develop pneumonia or other serious illness, hospital care may be needed.  Easing Flu Symptoms    Drink lots of fluids such as water, juice, and warm soup. A good rule is to drink enough so that you urinate your normal amount.    Get plenty of rest.    Ask your health care provider what to take for fever and pain.    Call your provider if your fever rises over 101 F (38.3 C) or you become dizzy, lightheaded, or short of breath.  Taking Steps to Protect Others    Wash your hands often, especially after coughing or sneezing. Or, clean your hands with an alcohol-based hand  containing at least 60 percent  alcohol.    Cough or sneeze into a tissue. Then throw the tissue away and wash your hands. If you don t have a tissue, cough and sneeze into the crook of your elbow.    Stay home until at least 24 hours after you no longer have a fever or chills. Be sure the fever isn t being hidden by fever-reducing medication.    Don t share food, utensils, drinking glasses, or a toothbrush with others.    Ask your health care provider if others in your household should receive antiviral medication to help them avoid infection.  How Can the Flu Be Prevented?    One of the best ways to avoid the flu is to get a flu vaccination each year. Viruses that cause the flu change from year to year. For that reason, doctors recommend getting the flu vaccine each year, as soon as it's available in your area. The vaccine may be given as a shot or as a nasal spray. Your health care provider can tell you which vaccine is right for you.    Wash your hands often. Frequent handwashing is a proven way to help prevent infection.    Carry an alcohol-based hand gel containing at least 60 percent alcohol. Use it when you don t have access to soap and water. Then wash your hands as soon as you can.    Avoid touching your eyes, nose, and mouth.    At home and work, clean phones, computer keyboards, and toys often with disinfectant wipes.    If possible, avoid close contact with others who have the flu or symptoms of the flu.  Handwashing Tips  Handwashing is one of the best ways to prevent many common infections. If you re caring for or visiting someone with the flu, wash your hands each time you enter and leave the room. Follow these steps:    Use warm water and plenty of soap. Rub your hands together well.    Clean the whole hand, under your nails, between your fingers, and up the wrists.    Wash for at least 15 seconds.    Rinse, letting the water run down your fingers, not up your wrists.    Dry your hands well. Use a paper towel to turn off the  faucet and open the door.  Using Alcohol-Based Hand   Alcohol-based hand  are also a good choice. Use them when you don t have access to soap and water. Follow these steps:    Squeeze about a tablespoon of gel into the palm of one hand.    Rub your hands together briskly, cleaning the backs of your hands, the palms, between your fingers, and up the wrists.    Rub until the gel is gone and your hands are completely dry.  Preventing Influenza in Healthcare Settings  The flu is a special concern for people in hospitals and long-term care facilities. To help prevent the spread of flu, many hospitals and nursing homes take these steps:    Health care providers wash their hands or use an alcohol-based hand  before and after treating each patient.    People with the flu have private rooms and bathrooms or share a room with someone with the same infection.    High-risk patients who don t have the flu are encouraged to get the flu and pneumonia vaccines.    All health care workers are encouraged or required to get flu shots.        4072-2887 [employer]. [address] [place] [address]. All rights reserved. This information is not intended as a substitute for professional medical care. Always follow your healthcare professional's instructions.          Influenza (Adult)    Influenza is also called the flu. It is a viral illness that affects the air passages of your lungs. It is different from the common cold. The flu can easily be passed from one to person to another. It may be spread through the air by coughing and sneezing. Or it can be spread by touching the sick person and then touching your own eyes, nose, or mouth.  The flu starts 1 to 3 days after you are exposed to the flu virus. It may last for 1 to 2 weeks. You usually don t need to take antibiotics unless you have a complication. This might be an ear or sinus infection or pneumonia.  Symptoms of the flu may be mild or  severe. They can include extreme tiredness (wanting to stay in bed all day), chills, fevers, muscle aches, soreness with eye movement, headache, and a dry, hacking cough.  Home care  Follow these guidelines when caring for yourself at home:    Avoid being around cigarette smoke, whether yours or other people s.    Acetaminophen or ibuprofen will help ease your fever, muscle aches, and headache. Don t give aspirin to anyone younger than 18 who has the flu. Aspirin can harm the liver.    Nausea and loss of appetite are common with the flu. Eat light meals. Drink 6 to 8 glasses of liquids every day. Good choices are water, sport drinks, soft drinks without caffeine, juices, tea, and soup. Extra fluids will also help loosen secretions in your nose and lungs.    Over-the-counter cold medicines will not make the flu go away faster. But the medicines may help with coughing, sore throat, and congestion in your nose and sinuses. Don t use a decongestant if you have high blood pressure.    Stay home until your fever has been gone for at least 24 hours without using medicine to reduce fever.  Follow-up care  Follow up with your health care provider, or as advised, if you are not getting better over the next week.  If you are 65 or older, talk with your provider about getting a pneumococcal vaccine every 5 years. You should also get this vaccine if you have chronic asthma or COPD. All adults should get a flu vaccine every fall. Ask your provider about this.  When to seek medical advice  Call your health care provider right away if any of these occur:    Cough with lots of colored sputum (mucus) or blood in your sputum    Chest pain, shortness of breath, wheezing, or difficulty breathing    Severe headache, or face, neck, or ear pain    New rash with fever    Fever of 101 F (38 C) oral or higher that doesn t get better with fever medicine    Confusion, behavior change, or seizure    Severe weakness or dizziness    You get a  fever or cough after getting better for a few days       8763-5881 [employer]. [address] [place] [address], [place] [address]. All rights reserved. This information is not intended as a substitute for professional medical care. Always follow your healthcare professional's instructions.          24 Hour Appointment Hotline       To make an appointment at any [facility], call [phone] ([phone]). If you don't have a family doctor or clinic, we will help you find one. [facility] are conveniently located to serve the needs of you and your family.             Review of your medicines      START taking        Dose / Directions Last dose taken    albuterol 108 (90 BASE) MCG/ACT Inhaler   Commonly known as:  albuterol   Dose:  2 puff   Quantity:  1 Inhaler        Inhale 2 puffs into the lungs every 4 hours as needed for shortness of breath / dyspnea   Refills:  0        HYDROcodone-acetaminophen 5-325 MG per tablet   Commonly known as:  NORCO   Dose:  1-2 tablet   Quantity:  10 tablet        Take 1-2 tablets by mouth every 4 hours as needed for moderate to severe pain   Refills:  0        ibuprofen 600 MG tablet   Commonly known as:  ADVIL/MOTRIN   Dose:  600 mg   Quantity:  30 tablet        Take 1 tablet (600 mg) by mouth every 6 hours as needed for moderate pain   Refills:  0          CONTINUE these medicines which may have CHANGED, or have new prescriptions. If we are uncertain of the size of tablets/capsules you have at home, strength may be listed as something that might have changed.        Dose / Directions Last dose taken    fexofenadine 180 MG tablet   Commonly known as:  ALLEGRA   Dose:  180 mg   What changed:    - when to take this  - reasons to take this   Quantity:  90 tablet        Take 1 tablet (180 mg) by mouth daily   Refills:  3          Our records show that you are taking the medicines listed below. If these are incorrect, please call your family doctor or clinic.         Dose / Directions Last dose taken    ASPIRIN PO   Dose:  81 mg        Take 81 mg by mouth daily   Refills:  0        hydrochlorothiazide 25 MG tablet   Commonly known as:  HYDRODIURIL   Dose:  25 mg        Take 25 mg by mouth daily   Refills:  0        lisinopril 10 MG tablet   Commonly known as:  PRINIVIL/ZESTRIL   Dose:  10 mg   Quantity:  90 tablet        Take 1 tablet (10 mg) by mouth daily   Refills:  0        loratadine 10 MG tablet   Commonly known as:  CLARITIN   Dose:  10 mg   Quantity:  30 tablet        Take 1 tablet (10 mg) by mouth daily as needed for allergies   Refills:  2        norethindrone 5 MG tablet   Commonly known as:  AYGESTIN   Dose:  5 mg   Quantity:  30 tablet        Take 1 tablet (5 mg) by mouth daily   Refills:  5        oxyCODONE 15 MG IR tablet   Commonly known as:  ROXICODONE   Dose:  15-30 mg   Quantity:  120 tablet        Take 1-2 tablets (15-30 mg) by mouth 3 times daily as needed for moderate to severe pain   Refills:  0        permethrin 5 % cream   Commonly known as:  ELIMITE   Quantity:  60 g        Apply cream from head to toe (execpt the face); leave on for 8-14 hours before washing off with water; may reapply in 1 week if live mites appear.   Refills:  1                Prescriptions were sent or printed at these locations (3 Prescriptions)                   Other Prescriptions                Printed at Department/Unit printer (3 of 3)         ibuprofen (ADVIL/MOTRIN) 600 MG tablet               HYDROcodone-acetaminophen (NORCO) 5-325 MG per tablet               albuterol (ALBUTEROL) 108 (90 BASE) MCG/ACT Inhaler                Procedures and tests performed during your visit     Chest XR,  PA & LAT    Influenza A/B antigen      Orders Needing Specimen Collection     None      Pending Results     Date and Time Order Name Status Description    3/15/2017 0002 Chest XR,  PA & LAT Preliminary             Pending Culture Results     No orders found for last 3 day(s).              Test Results from your hospital stay     3/15/2017 12:12 AM - Interface, Flexilab Results      Component Results     Component Value Ref Range & Units Status    Influenza A/B Agn Specimen Nasal  Final    Influenza A Positive (A) NEG Final    Influenza B  NEG Final    Negative   Test results must be correlated with clinical data. If necessary, results   should be confirmed by a molecular assay or viral culture.           3/15/2017 12:44 AM - Interface, Radiant Ib      Narrative     CHEST 2 VIEWS  3/15/2017 12:09 AM     HISTORY: Cough and fever.    COMPARISON: 1/12/2017.    FINDINGS: The lungs are clear. Normal-sized cardiac silhouette.        Impression     IMPRESSION: No evidence of active cardiopulmonary disease.                Clinical Quality Measure: Blood Pressure Screening     Your blood pressure was checked while you were in the emergency department today. The last reading we obtained was  BP: 136/87 . Please read the guidelines below about what these numbers mean and what you should do about them.  If your systolic blood pressure (the top number) is less than 120 and your diastolic blood pressure (the bottom number) is less than 80, then your blood pressure is normal. There is nothing more that you need to do about it.  If your systolic blood pressure (the top number) is 120-139 or your diastolic blood pressure (the bottom number) is 80-89, your blood pressure may be higher than it should be. You should have your blood pressure rechecked within a year by a primary care provider.  If your systolic blood pressure (the top number) is 140 or greater or your diastolic blood pressure (the bottom number) is 90 or greater, you may have high blood pressure. High blood pressure is treatable, but if left untreated over time it can put you at risk for heart attack, stroke, or kidney failure. You should have your blood pressure rechecked by a primary care provider within the next 4 weeks.  If your provider in the  emergency department today gave you specific instructions to follow-up with your doctor or provider even sooner than that, you should follow that instruction and not wait for up to 4 weeks for your follow-up visit.        Thank you for choosing [facility]       Thank you for choosing [facility] for your care. Our goal is always to provide you with excellent care. Hearing back from our patients is one way we can continue to improve our services. Please take a few minutes to complete the written survey that you may receive in the mail after you visit with us. Thank you!        [employer] Information     [employer] gives you secure access to your electronic health record. If you see a primary care provider, you can also send messages to your care team and make appointments. If you have questions, please call your primary care clinic.  If you do not have a primary care provider, please call [phone] and they will assist you.        Care EveryWhere ID     This is your Care EveryWhere ID. This could be used by other organizations to access your [facility] medical records  [id number]        After Visit Summary       This is your record. Keep this with you and show to your community pharmacist(s) and doctor(s) at your next visit.

## 2017-03-15 ENCOUNTER — MYC MEDICAL ADVICE (OUTPATIENT)
Dept: FAMILY MEDICINE | Facility: CLINIC | Age: 42
End: 2017-03-15

## 2017-03-15 ENCOUNTER — APPOINTMENT (OUTPATIENT)
Dept: GENERAL RADIOLOGY | Facility: CLINIC | Age: 42
End: 2017-03-15
Attending: EMERGENCY MEDICINE
Payer: COMMERCIAL

## 2017-03-15 VITALS
DIASTOLIC BLOOD PRESSURE: 90 MMHG | SYSTOLIC BLOOD PRESSURE: 150 MMHG | OXYGEN SATURATION: 96 % | WEIGHT: 178 LBS | TEMPERATURE: 99.7 F | HEIGHT: 63 IN | BODY MASS INDEX: 31.54 KG/M2 | HEART RATE: 106 BPM

## 2017-03-15 DIAGNOSIS — J10.1 INFLUENZA A: Primary | ICD-10-CM

## 2017-03-15 LAB
FLUAV+FLUBV AG SPEC QL: ABNORMAL
FLUAV+FLUBV AG SPEC QL: POSITIVE
SPECIMEN SOURCE: ABNORMAL

## 2017-03-15 PROCEDURE — 40000275 ZZH STATISTIC RCP TIME EA 10 MIN

## 2017-03-15 PROCEDURE — 94640 AIRWAY INHALATION TREATMENT: CPT

## 2017-03-15 PROCEDURE — 25000132 ZZH RX MED GY IP 250 OP 250 PS 637: Performed by: EMERGENCY MEDICINE

## 2017-03-15 PROCEDURE — 25000125 ZZHC RX 250: Performed by: EMERGENCY MEDICINE

## 2017-03-15 PROCEDURE — 71020 XR CHEST 2 VW: CPT

## 2017-03-15 RX ORDER — IBUPROFEN 600 MG/1
600 TABLET, FILM COATED ORAL EVERY 6 HOURS PRN
Qty: 30 TABLET | Refills: 0 | Status: SHIPPED | OUTPATIENT
Start: 2017-03-15 | End: 2017-03-15

## 2017-03-15 RX ORDER — IBUPROFEN 600 MG/1
600 TABLET, FILM COATED ORAL ONCE
Status: COMPLETED | OUTPATIENT
Start: 2017-03-15 | End: 2017-03-15

## 2017-03-15 RX ORDER — IBUPROFEN 600 MG/1
600 TABLET, FILM COATED ORAL EVERY 6 HOURS PRN
Qty: 30 TABLET | Refills: 0 | Status: SHIPPED | OUTPATIENT
Start: 2017-03-15 | End: 2017-06-29

## 2017-03-15 RX ORDER — ALBUTEROL SULFATE 90 UG/1
2 AEROSOL, METERED RESPIRATORY (INHALATION) EVERY 4 HOURS PRN
Qty: 1 INHALER | Refills: 0 | Status: SHIPPED | OUTPATIENT
Start: 2017-03-15 | End: 2017-03-15

## 2017-03-15 RX ORDER — IPRATROPIUM BROMIDE AND ALBUTEROL SULFATE 2.5; .5 MG/3ML; MG/3ML
3 SOLUTION RESPIRATORY (INHALATION) ONCE
Status: COMPLETED | OUTPATIENT
Start: 2017-03-15 | End: 2017-03-15

## 2017-03-15 RX ORDER — HYDROCODONE BITARTRATE AND ACETAMINOPHEN 5; 325 MG/1; MG/1
2 TABLET ORAL ONCE
Status: COMPLETED | OUTPATIENT
Start: 2017-03-15 | End: 2017-03-15

## 2017-03-15 RX ORDER — HYDROCODONE BITARTRATE AND ACETAMINOPHEN 5; 325 MG/1; MG/1
1-2 TABLET ORAL EVERY 4 HOURS PRN
Qty: 10 TABLET | Refills: 0 | Status: SHIPPED | OUTPATIENT
Start: 2017-03-15 | End: 2017-05-03

## 2017-03-15 RX ORDER — HYDROCODONE BITARTRATE AND ACETAMINOPHEN 5; 325 MG/1; MG/1
1-2 TABLET ORAL EVERY 4 HOURS PRN
Qty: 10 TABLET | Refills: 0 | Status: SHIPPED | OUTPATIENT
Start: 2017-03-15 | End: 2017-03-15

## 2017-03-15 RX ORDER — ALBUTEROL SULFATE 90 UG/1
2 AEROSOL, METERED RESPIRATORY (INHALATION) EVERY 4 HOURS PRN
Qty: 1 INHALER | Refills: 0 | Status: SHIPPED | OUTPATIENT
Start: 2017-03-15 | End: 2017-06-29

## 2017-03-15 RX ADMIN — IPRATROPIUM BROMIDE AND ALBUTEROL SULFATE 3 ML: .5; 3 SOLUTION RESPIRATORY (INHALATION) at 00:12

## 2017-03-15 RX ADMIN — HYDROCODONE BITARTRATE AND ACETAMINOPHEN 2 TABLET: 5; 325 TABLET ORAL at 00:53

## 2017-03-15 RX ADMIN — IBUPROFEN 600 MG: 600 TABLET ORAL at 01:13

## 2017-03-15 ASSESSMENT — ENCOUNTER SYMPTOMS
DYSURIA: 0
COUGH: 1
VOMITING: 1
SHORTNESS OF BREATH: 1
DIARRHEA: 0
FEVER: 1

## 2017-03-15 NOTE — DISCHARGE INSTRUCTIONS
Influenza  Influenza ( the flu ) is an infection that affects your respiratory tract (the mouth, nose, and lungs, and the passages between them). Unlike a cold, the flu can make you very ill. And it can lead to pneumonia, a serious lung infection. For some people, especially older adults, young children, and people with certain chronic conditions, the flu can have serious complications and even be fatal.  What Are the Risk Factors for the Flu?     Viruses that cause influenza spread through the air in droplets when someone who has the flu coughs, sneezes, laughs, or talks.   Anyone can get the flu. But you re more likely to become infected if you:    Have a weakened immune system.    Work in a health care setting where you may be exposed to flu germs.    Live or work with someone who has the flu.    Haven t received an annual flu shot.  How Does the Flu Spread?  The flu is caused by viruses. The viruses spread through the air in droplets when someone who has the flu coughs, sneezes, laughs, or talks. You can become infected when you inhale these viruses directly. You can also become infected when you touch a surface on which the droplets have landed and then transfer the germs to your eyes, nose, or mouth. Touching used tissues, or sharing utensils, drinking glasses, or a toothbrush with an infected person can expose you to flu viruses, too.  What Are the Symptoms of the Flu?  Flu symptoms tend to come on quickly and may last a few days to a few weeks. They include:    Fever usually higher than 101 F  (38.3 C) and chills    Sore throat and headache    Dry cough    Runny nose    Tiredness and weakness    Muscle aches  Factors That Can Make Flu Worse  For some people, the flu can be very serious. The risk of complications is greater for:    Children under age 5.    Adults 65 years of age and older.    People with a chronic illness, such as diabetes or heart, kidney, or lung disease.    People who live in a nursing  home or long-term care facility.   How Is the Flu Treated?  Influenza usually improves after 7 days or so. In some cases, your health care provider may prescribe an antiviral medication. This may help you get well sooner. For the medication to help, you need to take it as soon as possible (ideally within 48 hours) after your symptoms start. If you develop pneumonia or other serious illness, hospital care may be needed.  Easing Flu Symptoms    Drink lots of fluids such as water, juice, and warm soup. A good rule is to drink enough so that you urinate your normal amount.    Get plenty of rest.    Ask your health care provider what to take for fever and pain.    Call your provider if your fever rises over 101 F (38.3 C) or you become dizzy, lightheaded, or short of breath.  Taking Steps to Protect Others    Wash your hands often, especially after coughing or sneezing. Or, clean your hands with an alcohol-based hand  containing at least 60 percent alcohol.    Cough or sneeze into a tissue. Then throw the tissue away and wash your hands. If you don t have a tissue, cough and sneeze into the crook of your elbow.    Stay home until at least 24 hours after you no longer have a fever or chills. Be sure the fever isn t being hidden by fever-reducing medication.    Don t share food, utensils, drinking glasses, or a toothbrush with others.    Ask your health care provider if others in your household should receive antiviral medication to help them avoid infection.  How Can the Flu Be Prevented?    One of the best ways to avoid the flu is to get a flu vaccination each year. Viruses that cause the flu change from year to year. For that reason, doctors recommend getting the flu vaccine each year, as soon as it's available in your area. The vaccine may be given as a shot or as a nasal spray. Your health care provider can tell you which vaccine is right for you.    Wash your hands often. Frequent handwashing is a proven way  to help prevent infection.    Carry an alcohol-based hand gel containing at least 60 percent alcohol. Use it when you don t have access to soap and water. Then wash your hands as soon as you can.    Avoid touching your eyes, nose, and mouth.    At home and work, clean phones, computer keyboards, and toys often with disinfectant wipes.    If possible, avoid close contact with others who have the flu or symptoms of the flu.  Handwashing Tips  Handwashing is one of the best ways to prevent many common infections. If you re caring for or visiting someone with the flu, wash your hands each time you enter and leave the room. Follow these steps:    Use warm water and plenty of soap. Rub your hands together well.    Clean the whole hand, under your nails, between your fingers, and up the wrists.    Wash for at least 15 seconds.    Rinse, letting the water run down your fingers, not up your wrists.    Dry your hands well. Use a paper towel to turn off the faucet and open the door.  Using Alcohol-Based Hand   Alcohol-based hand  are also a good choice. Use them when you don t have access to soap and water. Follow these steps:    Squeeze about a tablespoon of gel into the palm of one hand.    Rub your hands together briskly, cleaning the backs of your hands, the palms, between your fingers, and up the wrists.    Rub until the gel is gone and your hands are completely dry.  Preventing Influenza in Healthcare Settings  The flu is a special concern for people in hospitals and long-term care facilities. To help prevent the spread of flu, many hospitals and nursing homes take these steps:    Health care providers wash their hands or use an alcohol-based hand  before and after treating each patient.    People with the flu have private rooms and bathrooms or share a room with someone with the same infection.    High-risk patients who don t have the flu are encouraged to get the flu and pneumonia  vaccines.    All health care workers are encouraged or required to get flu shots.        6087-4511 [employer]. [address] [place] [address], [place] [address]. All rights reserved. This information is not intended as a substitute for professional medical care. Always follow your healthcare professional's instructions.          Influenza (Adult)    Influenza is also called the flu. It is a viral illness that affects the air passages of your lungs. It is different from the common cold. The flu can easily be passed from one to person to another. It may be spread through the air by coughing and sneezing. Or it can be spread by touching the sick person and then touching your own eyes, nose, or mouth.  The flu starts 1 to 3 days after you are exposed to the flu virus. It may last for 1 to 2 weeks. You usually don t need to take antibiotics unless you have a complication. This might be an ear or sinus infection or pneumonia.  Symptoms of the flu may be mild or severe. They can include extreme tiredness (wanting to stay in bed all day), chills, fevers, muscle aches, soreness with eye movement, headache, and a dry, hacking cough.  Home care  Follow these guidelines when caring for yourself at home:    Avoid being around cigarette smoke, whether yours or other people s.    Acetaminophen or ibuprofen will help ease your fever, muscle aches, and headache. Don t give aspirin to anyone younger than 18 who has the flu. Aspirin can harm the liver.    Nausea and loss of appetite are common with the flu. Eat light meals. Drink 6 to 8 glasses of liquids every day. Good choices are water, sport drinks, soft drinks without caffeine, juices, tea, and soup. Extra fluids will also help loosen secretions in your nose and lungs.    Over-the-counter cold medicines will not make the flu go away faster. But the medicines may help with coughing, sore throat, and congestion in your nose and sinuses. Don t use a decongestant if you have  high blood pressure.    Stay home until your fever has been gone for at least 24 hours without using medicine to reduce fever.  Follow-up care  Follow up with your health care provider, or as advised, if you are not getting better over the next week.  If you are 65 or older, talk with your provider about getting a pneumococcal vaccine every 5 years. You should also get this vaccine if you have chronic asthma or COPD. All adults should get a flu vaccine every fall. Ask your provider about this.  When to seek medical advice  Call your health care provider right away if any of these occur:    Cough with lots of colored sputum (mucus) or blood in your sputum    Chest pain, shortness of breath, wheezing, or difficulty breathing    Severe headache, or face, neck, or ear pain    New rash with fever    Fever of 101 F (38 C) oral or higher that doesn t get better with fever medicine    Confusion, behavior change, or seizure    Severe weakness or dizziness    You get a fever or cough after getting better for a few days       1146-0774 [employer]. [address] [place] [address], [place] [address]. All rights reserved. This information is not intended as a substitute for professional medical care. Always follow your healthcare professional's instructions.

## 2017-03-15 NOTE — ED PROVIDER NOTES
"  History     Chief Complaint:  Fever and cough    HPI   Jeff Serra is a 42 year old female who presents with concerns for fever and cough. The patient reports one week of URI symptoms and then onset of fever and worsening dry cough 3 days ago. Patient also notes mild shortness of breath and an episode of emesis after taking OTC cough medication. She also notes recent urinary frequency and cloudy urine. Patient denies any sick contacts. The patient denies any dysuria, diarrhea or any other symptoms.     Allergies:  No known drug allergies.     Medications:    lisinopril (PRINIVIL/ZESTRIL) 10 MG tablet  norethindrone (AYGESTIN) 5 MG tablet  hydrochlorothiazide (HYDRODIURIL) 25 MG tablet  ASPIRIN PO  fexofenadine (ALLEGRA) 180 MG tablet  permethrin (ELIMITE) 5 % cream  loratadine (CLARITIN) 10 MG tablet    Past Medical History:    Chronic low back pain  Chronic neck pain  Hypertension   Kidney stones  PE  DVT    Past Surgical History:    Lithotripsy   Tubal ligation  Laparoscopy     Family History:    History reviewed. No pertinent family history.     Social History:  Marital Status:     Smoking status: Never smoker  Alcohol use: Yes, occasional    Presents to the ED with family     Review of Systems   Constitutional: Positive for fever.   Respiratory: Positive for cough and shortness of breath.    Gastrointestinal: Positive for vomiting. Negative for diarrhea.   Genitourinary: Negative for dysuria.   All other systems reviewed and are negative.      Physical Exam     Patient Vitals for the past 24 hrs:   BP Temp Temp src Pulse SpO2 Height Weight   03/15/17 0128 - - - 113 96 % - -   03/15/17 0107 - - - 119 96 % - -   03/15/17 0106 136/87 101.9  F (38.8  C) Oral - - - -   03/15/17 0012 - - - - 98 % - -   03/14/17 2337 147/89 101.9  F (38.8  C) Oral 116 98 % 1.6 m (5' 3\") 80.7 kg (178 lb)      Physical Exam  Constitutional:  Looks uncomfortable. Cooperative.   HENT:   Head:    Atraumatic.   Mouth/Throat: "   Mildly erythematous posterior oropharynx without swelling or exudates.   Eyes:    Conjunctivae normal and EOM are normal.      Pupils are equal, round, and reactive to light.   Neck:    Normal range of motion. Neck supple.   Cardiovascular:  Tachycardic, regular rhythm, normal heart sounds and radial and dorsalis pedis pulses are 2+ and symmetric.    Pulmonary/Chest:  Occasional cough. Diminished breath sounds in bilateral bases.      No wheezes, rales or rhonchi.   Abdominal:   Soft. Bowel sounds are normal.      No splenomegaly or hepatomegaly. No tenderness. No rebound.   Musculoskeletal:  Normal range of motion. No edema and no tenderness.   Neurological:  Alert. Normal strength. No cranial nerve deficit.   Skin:    Skin is warm and dry.   Psychiatric:   Normal mood and affect.      Emergency Department Course     Imaging:  Radiographic findings were communicated with the patient who voiced understanding of the findings.    XR Chest PA & LAT  IMPRESSION: No evidence of active cardiopulmonary disease.  Preliminary radiology read.       Laboratory:  Influenza A/B: A positive, B negative    Interventions:  (0012) Albuterol-Ipratropium inhalation solution, 2.5 mg-0.5 mg/3 ml; 3 mL, inhalation   (0052) Norco 5-325 mg PO x 2  (0113) Ibuprofen 600 mg PO    Emergency Department Course:  Nursing notes and vitals reviewed.  (3786) I performed an exam of the patient as documented above.    The patient was sent for a chest xray while in the emergency department, findings above.      (0130) Findings and plan explained to the patient. Patient discharged home with instructions regarding supportive care, medications, and reasons to return. The importance of close follow-up was reviewed. The patient was prescribed Albuterol, Norco and Ibuprofen.      Impression & Plan      Medical Decision Making:    Jeff Serra is a 42 year old female who presents for evaluation of cough, fever and myalgias.  They have other symptoms  including cough and fever.   This is consistent with influenza.   The patient is out of treatment window for influenza and medications ordered as noted above.  They are at risk for pneumonia but no signs of this are detected on today's visit.  Close followup of primary care physician is indicated and return to the ED for high fevers > 103 for more than 48 hours more, increasing productive cough, shortness of breath, or confusion.  There is no signs of serious bacterial infection such as bacteremia, meningitis, UTI/pyelonephritis, strep pharyngitis, etc.         Diagnosis:    ICD-10-CM   1. Influenza A J10.1   2. Wheezing R06.2       Disposition:  Patient is discharged to home.     Discharge Medications:  New Prescriptions    ALBUTEROL (ALBUTEROL) 108 (90 BASE) MCG/ACT INHALER    Inhale 2 puffs into the lungs every 4 hours as needed for shortness of breath / dyspnea    HYDROCODONE-ACETAMINOPHEN (NORCO) 5-325 MG PER TABLET    Take 1-2 tablets by mouth every 4 hours as needed for moderate to severe pain    IBUPROFEN (ADVIL/MOTRIN) 600 MG TABLET    Take 1 tablet (600 mg) by mouth every 6 hours as needed for moderate pain         Fox Bernal  3/14/2017    EMERGENCY DEPARTMENT    I, Fox Bernal, am serving as a scribe on 3/14/2017 at 11:57 PM to personally document services performed by Dr. Nino based on my observations and the provider's statements to me.       Delon Nino MD  03/18/17 4783

## 2017-03-16 ENCOUNTER — MYC MEDICAL ADVICE (OUTPATIENT)
Dept: FAMILY MEDICINE | Facility: CLINIC | Age: 42
End: 2017-03-16

## 2017-03-16 DIAGNOSIS — M54.2 CHRONIC NECK PAIN: ICD-10-CM

## 2017-03-16 DIAGNOSIS — G89.29 CHRONIC NECK PAIN: ICD-10-CM

## 2017-03-16 DIAGNOSIS — M54.50 CHRONIC MIDLINE LOW BACK PAIN WITHOUT SCIATICA: ICD-10-CM

## 2017-03-16 DIAGNOSIS — G89.29 CHRONIC MIDLINE LOW BACK PAIN WITHOUT SCIATICA: ICD-10-CM

## 2017-03-16 NOTE — LETTER
[address] [address]  Phone: [phone]    March 16, 2017        Jeff Serra  [address] [facility] [address]          To whom it may concern:    RE: Jeff Serra    Off work due to illness 3/16/17    Please contact me for questions or concerns.      Sincerely,        Jordyn Loredo MD

## 2017-03-17 RX ORDER — OXYCODONE HYDROCHLORIDE 15 MG/1
15-30 TABLET ORAL 3 TIMES DAILY PRN
Qty: 120 TABLET | Refills: 0 | Status: SHIPPED | OUTPATIENT
Start: 2017-03-17 | End: 2017-04-17

## 2017-04-17 ENCOUNTER — MYC REFILL (OUTPATIENT)
Dept: FAMILY MEDICINE | Facility: CLINIC | Age: 42
End: 2017-04-17

## 2017-04-17 DIAGNOSIS — M54.2 CHRONIC NECK PAIN: ICD-10-CM

## 2017-04-17 DIAGNOSIS — M54.50 CHRONIC MIDLINE LOW BACK PAIN WITHOUT SCIATICA: ICD-10-CM

## 2017-04-17 DIAGNOSIS — G89.29 CHRONIC MIDLINE LOW BACK PAIN WITHOUT SCIATICA: ICD-10-CM

## 2017-04-17 DIAGNOSIS — G89.29 CHRONIC NECK PAIN: ICD-10-CM

## 2017-04-17 RX ORDER — OXYCODONE HYDROCHLORIDE 15 MG/1
15-30 TABLET ORAL 3 TIMES DAILY PRN
Qty: 120 TABLET | Refills: 0 | Status: SHIPPED | OUTPATIENT
Start: 2017-04-17 | End: 2017-05-09

## 2017-04-17 NOTE — TELEPHONE ENCOUNTER
Oxycodone 15 mg      Last Written Prescription Date: 03/17/17  Last Fill Quantity: 120,  # refills: 0   Last Office Visit with [facility], [facility] or [facility] prescribing provider: 02/20/17    Routing refill request to provider for review/approval because:  Drug not on the [facility] refill protocol

## 2017-04-18 NOTE — TELEPHONE ENCOUNTER
AMANDA Aguilar calling re:  Informing that her  Duy Serra will  script  If any questions call [phone], ok to leave a message  Thank you  OSCAR Blanchard  [facility]

## 2017-05-03 ENCOUNTER — OFFICE VISIT (OUTPATIENT)
Dept: URGENT CARE | Facility: URGENT CARE | Age: 42
End: 2017-05-03
Payer: MEDICAID

## 2017-05-03 ENCOUNTER — RADIANT APPOINTMENT (OUTPATIENT)
Dept: GENERAL RADIOLOGY | Facility: CLINIC | Age: 42
End: 2017-05-03
Attending: PHYSICIAN ASSISTANT
Payer: MEDICAID

## 2017-05-03 VITALS
SYSTOLIC BLOOD PRESSURE: 142 MMHG | TEMPERATURE: 98.2 F | DIASTOLIC BLOOD PRESSURE: 93 MMHG | WEIGHT: 179 LBS | BODY MASS INDEX: 31.71 KG/M2 | OXYGEN SATURATION: 99 % | HEART RATE: 98 BPM

## 2017-05-03 DIAGNOSIS — J30.2 SEASONAL ALLERGIC RHINITIS, UNSPECIFIED ALLERGIC RHINITIS TRIGGER: ICD-10-CM

## 2017-05-03 DIAGNOSIS — R05.9 COUGH: Primary | ICD-10-CM

## 2017-05-03 DIAGNOSIS — R05.9 COUGH: ICD-10-CM

## 2017-05-03 PROCEDURE — 99214 OFFICE O/P EST MOD 30 MIN: CPT | Performed by: PHYSICIAN ASSISTANT

## 2017-05-03 PROCEDURE — 71020 XR CHEST 2 VW: CPT

## 2017-05-03 RX ORDER — FEXOFENADINE HCL 180 MG/1
180 TABLET ORAL DAILY
Qty: 90 TABLET | Refills: 0 | Status: SHIPPED | OUTPATIENT
Start: 2017-05-03 | End: 2018-05-30

## 2017-05-03 NOTE — LETTER
[facility]  [address] [id number] [phone]      May 3, 2017    RE:  Jeff Serra                                                                                                                                                       [address] [facility] [address]            To whom it may concern:    Jeff Serra was seen in clinic today.  She may return to work tomorrow.            Sincerely,        Zabrina Stewart [facility] Urgent [facility]

## 2017-05-03 NOTE — NURSING NOTE
"Chief Complaint   Patient presents with     Cough     cough for one month.       Initial BP (!) 142/93 (BP Location: Left arm, Patient Position: Chair, Cuff Size: Adult Large)  Pulse 98  Temp 98.2  F (36.8  C)  Wt 179 lb (81.2 kg)  SpO2 99%  BMI 31.71 kg/m2 Estimated body mass index is 31.71 kg/(m^2) as calculated from the following:    Height as of 3/14/17: 5' 3\" (1.6 m).    Weight as of this encounter: 179 lb (81.2 kg).  Medication Reconciliation: complete    "

## 2017-05-03 NOTE — MR AVS SNAPSHOT
After Visit Summary   5/3/2017    Jeff Serra    MRN: [id number]           Patient Information     Date Of Birth          1975        Visit Information        Provider Department      5/3/2017 10:20 AM Zabrina Yusuf PA-C [facility]        Today's Diagnoses     Cough    -  1    Seasonal allergic rhinitis, unspecified allergic rhinitis trigger           Follow-ups after your visit        Who to contact     If you have questions or need follow up information about today's clinic visit or your schedule please contact [facility] directly at [phone].  Normal or non-critical lab and imaging results will be communicated to you by [employer], letter or phone within 4 business days after the clinic has received the results. If you do not hear from us within 7 days, please contact the clinic through [employer] or phone. If you have a critical or abnormal lab result, we will notify you by phone as soon as possible.  Submit refill requests through [employer] or call your pharmacy and they will forward the refill request to us. Please allow 3 business days for your refill to be completed.          Additional Information About Your Visit        MyChart Information     [employer] gives you secure access to your electronic health record. If you see a primary care provider, you can also send messages to your care team and make appointments. If you have questions, please call your primary care clinic.  If you do not have a primary care provider, please call [phone] and they will assist you.        Care EveryWhere ID     This is your Care EveryWhere ID. This could be used by other organizations to access your [facility] medical records  [id number]        Your Vitals Were     Pulse Temperature Pulse Oximetry BMI (Body Mass Index)          98 98.2  F (36.8  C) 99% 31.71 kg/m2         Blood Pressure from Last 3 Encounters:   05/03/17 (!) 142/93    03/15/17 150/90   02/20/17 136/84    Weight from Last 3 Encounters:   05/03/17 179 lb (81.2 kg)   03/14/17 178 lb (80.7 kg)   02/20/17 184 lb (83.5 kg)                 Where to get your medicines      These medications were sent to [facility] - [place], [place] - [address] AT [facility] [address] [facility] [address] [address]     Phone:  [phone]     fexofenadine 180 MG tablet          Primary Care Provider Office Phone # Fax #    Jordyn Jae Loredo [phone]       [address] [address]        Thank you!     Thank you for choosing [facility]  for your care. Our goal is always to provide you with excellent care. Hearing back from our patients is one way we can continue to improve our services. Please take a few minutes to complete the written survey that you may receive in the mail after your visit with us. Thank you!             Your Updated Medication List - Protect others around you: Learn how to safely use, store and throw away your medicines at www.disposemymeds.org.          This list is accurate as of: 5/3/17 12:10 PM.  Always use your most recent med list.                   Brand Name Dispense Instructions for use    albuterol 108 (90 BASE) MCG/ACT Inhaler    albuterol    1 Inhaler    Inhale 2 puffs into the lungs every 4 hours as needed for shortness of breath / dyspnea       ASPIRIN PO      Take 81 mg by mouth daily Reported on 5/3/2017       fexofenadine 180 MG tablet    ALLEGRA    90 tablet    Take 1 tablet (180 mg) by mouth daily       hydrochlorothiazide 25 MG tablet    HYDRODIURIL     Take 25 mg by mouth daily Reported on 5/3/2017       ibuprofen 600 MG tablet    ADVIL/MOTRIN    30 tablet    Take 1 tablet (600 mg) by mouth every 6 hours as needed for moderate pain       lisinopril 10 MG tablet    PRINIVIL/ZESTRIL    90 tablet    Take 1 tablet (10 mg)  by mouth daily       loratadine 10 MG tablet    CLARITIN    30 tablet    Take 1 tablet (10 mg) by mouth daily as needed for allergies       norethindrone 5 MG tablet    AYGESTIN    30 tablet    Take 1 tablet (5 mg) by mouth daily       oxyCODONE 15 MG IR tablet    ROXICODONE    120 tablet    Take 1-2 tablets (15-30 mg) by mouth 3 times daily as needed for moderate to severe pain       permethrin 5 % cream    ELIMITE    60 g    Apply cream from head to toe (execpt the face); leave on for 8-14 hours before washing off with water; may reapply in 1 week if live mites appear.
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
## 2017-05-09 ENCOUNTER — MYC REFILL (OUTPATIENT)
Dept: FAMILY MEDICINE | Facility: CLINIC | Age: 42
End: 2017-05-09

## 2017-05-09 DIAGNOSIS — G89.4 CHRONIC PAIN SYNDROME: ICD-10-CM

## 2017-05-09 DIAGNOSIS — G89.29 CHRONIC MIDLINE LOW BACK PAIN WITHOUT SCIATICA: ICD-10-CM

## 2017-05-09 DIAGNOSIS — M54.50 CHRONIC MIDLINE LOW BACK PAIN WITHOUT SCIATICA: ICD-10-CM

## 2017-05-09 DIAGNOSIS — M54.2 CHRONIC NECK PAIN: ICD-10-CM

## 2017-05-09 DIAGNOSIS — G89.29 CHRONIC NECK PAIN: ICD-10-CM

## 2017-05-09 RX ORDER — OXYCODONE HYDROCHLORIDE 15 MG/1
15-30 TABLET ORAL 3 TIMES DAILY PRN
Qty: 120 TABLET | Refills: 0 | Status: SHIPPED | OUTPATIENT
Start: 2017-05-12 | End: 2017-06-06

## 2017-05-09 NOTE — TELEPHONE ENCOUNTER
oxyCODONE (ROXICODONE) 15 MG IR tablet     Last Written Prescription Date:  4/17/17  Last Fill Quantity: 120,   # refills: 0  Last Office Visit with [facility], [facility] or [facility] prescribing provider: 2/20/17  Future Office visit:       Routing refill request to provider for review/approval because:  Drug not on the [facility], [facility] or [facility] refill protocol or controlled substance

## 2017-05-09 NOTE — TELEPHONE ENCOUNTER
Message from MyChart:  Original authorizing provider: Jordyn Loredo MD    Jeff PALOMINOKelly Ponce would like a refill of the following medications:  oxyCODONE (ROXICODONE) 15 MG IR tablet [Jordyn Loredo MD]    Preferred pharmacy: [facility] [address] [place] [address] AVE [address] AT [facility]    Comment:  this is an request for my pain medication thank you.

## 2017-06-06 ENCOUNTER — OFFICE VISIT (OUTPATIENT)
Dept: FAMILY MEDICINE | Facility: CLINIC | Age: 42
End: 2017-06-06
Payer: COMMERCIAL

## 2017-06-06 VITALS
WEIGHT: 182.8 LBS | HEART RATE: 100 BPM | DIASTOLIC BLOOD PRESSURE: 90 MMHG | TEMPERATURE: 98.7 F | SYSTOLIC BLOOD PRESSURE: 132 MMHG | OXYGEN SATURATION: 97 % | HEIGHT: 63 IN | BODY MASS INDEX: 32.39 KG/M2

## 2017-06-06 DIAGNOSIS — I10 HYPERTENSION GOAL BP (BLOOD PRESSURE) < 140/90: ICD-10-CM

## 2017-06-06 DIAGNOSIS — M54.50 CHRONIC MIDLINE LOW BACK PAIN WITHOUT SCIATICA: ICD-10-CM

## 2017-06-06 DIAGNOSIS — M54.2 CHRONIC NECK PAIN: ICD-10-CM

## 2017-06-06 DIAGNOSIS — G89.29 CHRONIC MIDLINE LOW BACK PAIN WITHOUT SCIATICA: ICD-10-CM

## 2017-06-06 DIAGNOSIS — G89.29 CHRONIC NECK PAIN: ICD-10-CM

## 2017-06-06 DIAGNOSIS — R05.9 COUGH: Primary | ICD-10-CM

## 2017-06-06 PROCEDURE — 99214 OFFICE O/P EST MOD 30 MIN: CPT | Performed by: FAMILY MEDICINE

## 2017-06-06 RX ORDER — PREDNISONE 20 MG/1
TABLET ORAL
Qty: 15 TABLET | Refills: 0 | Status: SHIPPED | OUTPATIENT
Start: 2017-06-06 | End: 2017-06-29

## 2017-06-06 RX ORDER — HYDROCHLOROTHIAZIDE 25 MG/1
25 TABLET ORAL DAILY
Qty: 30 TABLET | Status: CANCELLED | OUTPATIENT
Start: 2017-06-06

## 2017-06-06 RX ORDER — CODEINE PHOSPHATE AND GUAIFENESIN 10; 100 MG/5ML; MG/5ML
1-2 SOLUTION ORAL EVERY 4 HOURS PRN
Qty: 240 ML | Refills: 0 | Status: SHIPPED | OUTPATIENT
Start: 2017-06-06 | End: 2017-07-11

## 2017-06-06 RX ORDER — LOSARTAN POTASSIUM AND HYDROCHLOROTHIAZIDE 12.5; 5 MG/1; MG/1
1 TABLET ORAL DAILY
Qty: 90 TABLET | Refills: 3 | Status: SHIPPED | OUTPATIENT
Start: 2017-06-06 | End: 2017-07-11

## 2017-06-06 RX ORDER — OXYCODONE HYDROCHLORIDE 15 MG/1
15-30 TABLET ORAL 3 TIMES DAILY PRN
Qty: 120 TABLET | Refills: 0 | Status: SHIPPED | OUTPATIENT
Start: 2017-06-09 | End: 2017-07-11

## 2017-06-06 NOTE — MR AVS SNAPSHOT
After Visit Summary   6/6/2017    Jeff Serra    MRN: [id number]           Patient Information     Date Of Birth          1975        Visit Information        Provider Department      6/6/2017 4:20 PM Jordyn Loredo MD [facility]        Today's Diagnoses     Cough    -  1    Hypertension goal BP (blood pressure) < 140/90        Chronic midline low back pain without sciatica        Chronic neck pain           Follow-ups after your visit        Follow-up notes from your care team     Return in about 3 months (around 9/6/2017).      Who to contact     If you have questions or need follow up information about today's clinic visit or your schedule please contact [facility] directly at [phone].  Normal or non-critical lab and imaging results will be communicated to you by Vivatyhart, letter or phone within 4 business days after the clinic has received the results. If you do not hear from us within 7 days, please contact the clinic through Vivatyhart or phone. If you have a critical or abnormal lab result, we will notify you by phone as soon as possible.  Submit refill requests through BuildForge or call your pharmacy and they will forward the refill request to us. Please allow 3 business days for your refill to be completed.          Additional Information About Your Visit        MyChart Information     [employer] gives you secure access to your electronic health record. If you see a primary care provider, you can also send messages to your care team and make appointments. If you have questions, please call your primary care clinic.  If you do not have a primary care provider, please call [phone] and they will assist you.        Care EveryWhere ID     This is your Care EveryWhere ID. This could be used by other organizations to access your [place] medical records  [id number]        Your Vitals Were     Pulse Temperature Height Pulse Oximetry BMI (Body  "Mass Index)       100 98.7  F (37.1  C) (Oral) 1.6 m (5' 3\") 97% 32.38 kg/m2        Blood Pressure from Last 3 Encounters:   06/06/17 132/90   05/03/17 (!) 142/93   03/15/17 150/90    Weight from Last 3 Encounters:   06/06/17 82.9 kg (182 lb 12.8 oz)   05/03/17 81.2 kg (179 lb)   03/14/17 80.7 kg (178 lb)              Today, you had the following     No orders found for display         Today's Medication Changes          These changes are accurate as of: 6/6/17  4:38 PM.  If you have any questions, ask your nurse or doctor.               Start taking these medicines.        Dose/Directions    guaiFENesin-codeine 100-10 MG/5ML Soln solution   Commonly known as:  CHERATUSSIN AC   Used for:  Cough   Started by:  Jordyn Loredo MD        Dose:  1-2 tsp.   Take 5-10 mLs by mouth every 4 hours as needed for cough   Quantity:  240 mL   Refills:  0       losartan-hydrochlorothiazide 50-12.5 MG per tablet   Commonly known as:  HYZAAR   Used for:  Hypertension goal BP (blood pressure) < 140/90   Replaces:  lisinopril 10 MG tablet   Started by:  Jordyn Loredo MD        Dose:  1 tablet   Take 1 tablet by mouth daily   Quantity:  90 tablet   Refills:  3       predniSONE 20 MG tablet   Commonly known as:  DELTASONE   Used for:  Cough   Started by:  Jordyn Loredo MD        3 tabs daily for 3 days, 2 tabs x 2 days, 1 tab x 2 days   Quantity:  15 tablet   Refills:  0         Stop taking these medicines if you haven't already. Please contact your care team if you have questions.     hydrochlorothiazide 25 MG tablet   Commonly known as:  HYDRODIURIL   Stopped by:  Jordyn Loredo MD           lisinopril 10 MG tablet   Commonly known as:  PRINIVIL/ZESTRIL   Replaced by:  losartan-hydrochlorothiazide 50-12.5 MG per tablet   Stopped by:  Jordyn Loredo MD                Where to get your medicines      These [facility] [address] [place] [address] " [address]VE [address] AT [address]  [address], [facility] [address]     Phone:  [phone]     losartan-hydrochlorothiazide 50-12.5 MG per tablet    predniSONE 20 MG tablet         Some of these will need a paper prescription and others can be bought over the counter.  Ask your nurse if you have questions.     Bring a paper prescription for each of these medications     guaiFENesin-codeine 100-10 MG/5ML Soln solution    oxyCODONE 15 MG IR tablet                Primary Care Provider Office Phone # Fax #    Jordyn Jae Loredo [phone]       [address] [address]        Thank you!     Thank you for choosing [facility]  for your care. Our goal is always to provide you with excellent care. Hearing back from our patients is one way we can continue to improve our services. Please take a few minutes to complete the written survey that you may receive in the mail after your visit with us. Thank you!             Your Updated Medication List - Protect others around you: Learn how to safely use, store and throw away your medicines at www.disposemymeds.org.          This list is accurate as of: 6/6/17  4:38 PM.  Always use your most recent med list.                   Brand Name Dispense Instructions for use    albuterol 108 (90 BASE) MCG/ACT Inhaler    albuterol    1 Inhaler    Inhale 2 puffs into the lungs every 4 hours as needed for shortness of breath / dyspnea       ASPIRIN PO      Take 81 mg by mouth daily Reported on 5/3/2017       fexofenadine 180 MG tablet    ALLEGRA    90 tablet    Take 1 tablet (180 mg) by mouth daily       guaiFENesin-codeine 100-10 MG/5ML Soln solution    CHERATUSSIN AC    240 mL    Take 5-10 mLs by mouth every 4 hours as needed for cough       ibuprofen 600 MG tablet    ADVIL/MOTRIN    30 tablet    Take 1 tablet (600 mg) by mouth every 6 hours as needed for moderate pain       loratadine 10 MG  tablet    CLARITIN    30 tablet    Take 1 tablet (10 mg) by mouth daily as needed for allergies       losartan-hydrochlorothiazide 50-12.5 MG per tablet    HYZAAR    90 tablet    Take 1 tablet by mouth daily       norethindrone 5 MG tablet    AYGESTIN    30 tablet    Take 1 tablet (5 mg) by mouth daily       oxyCODONE 15 MG IR tablet   Start taking on:  6/9/2017    ROXICODONE    120 tablet    Take 1-2 tablets (15-30 mg) by mouth 3 times daily as needed for moderate to severe pain       permethrin 5 % cream    ELIMITE    60 g    Apply cream from head to toe (execpt the face); leave on for 8-14 hours before washing off with water; may reapply in 1 week if live mites appear.       predniSONE 20 MG tablet    DELTASONE    15 tablet    3 tabs daily for 3 days, 2 tabs x 2 days, 1 tab x 2 days

## 2017-06-06 NOTE — NURSING NOTE
"Chief Complaint   Patient presents with     Cough     Folloe up     URI     Refill Request       Initial /90 (BP Location: Right arm, Patient Position: Chair, Cuff Size: Adult Regular)  Pulse 100  Temp 98.7  F (37.1  C) (Oral)  Ht 5' 3\" (1.6 m)  Wt 182 lb 12.8 oz (82.9 kg)  SpO2 97%  BMI 32.38 kg/m2 Estimated body mass index is 32.38 kg/(m^2) as calculated from the following:    Height as of this encounter: 5' 3\" (1.6 m).    Weight as of this encounter: 182 lb 12.8 oz (82.9 kg).  Medication Reconciliation: complete   Colleen Mcdermott MA      "

## 2017-06-06 NOTE — PROGRESS NOTES
"  SUBJECTIVE:                                                    Jeff Serra is a 42 year old female who presents to clinic today for the following health issues:      ENT Symptoms             Symptoms: cc Present Absent Comment   Fever/Chills  x  Fever    Fatigue  x     Muscle Aches  x     Eye Irritation  x     Sneezing  x     Nasal Sudeep/Drg  x     Sinus Pressure/Pain  x     Loss of smell  x     Dental pain   x    Sore Throat  x     Swollen Glands   x    Ear Pain/Fullness  x     Cough  x  Side pain from coughing    Wheeze   x    Chest Pain  x     Shortness of breath  x     Rash  x  Face    Other         Symptom duration:  Over 1 month for the cough, cold for three weeks    Symptom severity:  Severe    Treatments tried:  Given allergy medication at urgent care, Robitussin, Nitequil    Contacts:  Yes- kids      SUBJECTIVE:  Here today for an ongoing cough. Notes symptoms as above but not all symptoms are present at all times. Reviewed urgent care visit and normal chest x-ray. Thought this was related to allergies and she was started on Allegra. This is really done nothing. She doesn't really feel any symptoms of allergies going on though. To her, it seems as though this started up around the time she had influenza A in March. Triggered by talking a lot or loudly. Feels as though a ticklish feeling in her lungs. She also did not start the lisinopril immediately when prescribed earlier this year but has been taking it daily lately.    Review of systems otherwise negative.  Past medical, family, and social history reviewed and updated in chart.    OBJECTIVE:  /90 (BP Location: Right arm, Patient Position: Chair, Cuff Size: Adult Regular)  Pulse 100  Temp 98.7  F (37.1  C) (Oral)  Ht 1.6 m (5' 3\")  Wt 82.9 kg (182 lb 12.8 oz)  SpO2 97%  BMI 32.38 kg/m2  Alert, pleasant, upbeat, and in no apparent discomfort.  Ears normal. Throat and pharynx normal. Neck supple. No adenopathy or masses in the neck or " supraclavicular regions. Sinuses non tender.  S1 and S2 normal, no murmurs, clicks, gallops or rubs. Regular rate and rhythm. Chest is clear; no wheezes or rales. No edema or JVD.  Past labs reviewed with the patient.     ASSESSMENT / PLAN:  (R05) Cough  (primary encounter diagnosis)  Comment: I suspect either post viral cough or her lisinopril or both. We will try a course of prednisone in suppressing cough with Robitussin-AC while changing her blood pressure medicine as well  Plan: predniSONE (DELTASONE) 20 MG tablet,         guaiFENesin-codeine (CHERATUSSIN AC) 100-10         MG/5ML SOLN solution            (I10) Hypertension goal BP (blood pressure) < 140/90  Comment: Change from hydrochlorothiazide and lisinopril to Hyzaar  Plan: losartan-hydrochlorothiazide (HYZAAR) 50-12.5         MG per tablet            (M54.5,  G89.29) Chronic midline low back pain without sciatica  Comment: refilled   Plan: oxyCODONE (ROXICODONE) 15 MG IR tablet            (M54.2,  G89.29) Chronic neck pain  Comment:   Plan: oxyCODONE (ROXICODONE) 15 MG IR tablet            Follow up 3 months  MOISES Loredo MD    (Chart documentation completed in part with Dragon voice-recognition software.  Even though reviewed some grammatical, spelling, and word errors may remain.)

## 2017-06-14 ENCOUNTER — CARE COORDINATION (OUTPATIENT)
Dept: CARE COORDINATION | Facility: CLINIC | Age: 42
End: 2017-06-14

## 2017-06-14 NOTE — LETTER
[address] [address]        June 14, 2017      Jeff Serra  [address] [place] [address]    Dear Jeff,  I am a clinic care coordinator who works with Dr. Loredo's clinic. I have been trying to reach you to introduce clinic care coordination and see if there was anything I can assist you with.  Below is a description of clinic care coordination  and how I can further assist you.     The clinic care coordinator is a registered nurse and/or  who understands the health care system. The goal of clinic care coordination is to help you manage your health and improve access to the [facility] in the most efficient manner.  The registered nurse can assist you in meeting your health care goals by providing education, coordinating services, and strengthening the communication among your providers. The  can assist you with financial, behavioral, psychosocial, and chemical dependency and counseling/psychiatric resources.    Please feel free to contact me at [phone] with any questions or concerns. We at [facility] are focused on providing you with the highest-quality healthcare experience possible and that all starts with you.       Sincerely,     Antonio NOVA,RN- BC  Clinic Care Coordinator  [facility]  Phone: [phone]      Enclosed: I have enclosed a copy of a 24 Hour Access Plan. This has helpful phone numbers for you to call when needed. Please keep this in an easy to access place to use as needed.

## 2017-06-14 NOTE — LETTER
Health Care Home - Access Care Plan  About Me  Patient Name:  Jeff Serra  YOB: 1975  Age:                            42 year old   [place] MRN:         [id number] Telephone Information:     Home Phone [phone]   Mobile [phone]       Address:    [address] [facility] [address] Email address:  [email or web address]      Emergency Contact(s)  Name Relationship Lgl Grd Work Phone Home Phone Mobile Phone   1. WILLIE SERRA* Spouse No NONE [phone]   2. NSC Other            Health Maintenance: Routine Health maintenance Reviewed: Due/Overdue  My Access Plan  Medical Emergency 911   Questions or concerns during clinic hours Primary Clinic Line, I will call the clinic directly: Primary Clinic: [facility] [phone] (resiticted)   24 Hour Appointment Line [phone] or  [phone] [place] ([phone])  (toll free)   24 Hour Nurse Line [phone] (toll free)   Questions or concerns outside clinic hours 24 Hour Appointment Line, I will call the after-hours on-call line:   [facility] [phone] or [phone] ([phone]) (toll-free)   Preferred Urgent Care Preferred Urgent Care: [facility] [phone]   Preferred Hospital Preferred Hospital: [facility]  [phone] (restricted)   Preferred Pharmacy [facility] [address] [place] [address] [address] AT [address]     Behavioral Health Crisis Line [facility], [phone] or [phone]

## 2017-06-14 NOTE — PROGRESS NOTES
Clinic Care Coordination Contact  [facility]/Voicemail    Referral Source: Pro-Active Outreach (IHP list)  Clinical Data: Care Coordinator Outreach  Per chart, patient is restricted to PCP and [facility]. Has had 3 ED visits in the last 6 months-one for CP, one for abdominal pain and the last for influenza. Had UC visit after Influenza for lingering cough. CXR neg and was started on Allegra for allergies. Had recent visit with PCP 6/6, for continued cough. Per note the following problems were addressed:  ASSESSMENT / PLAN:  (R05) Cough  (primary encounter diagnosis)  Comment: I suspect either post viral cough or her lisinopril or both. We will try a course of prednisone in suppressing cough with Robitussin-AC while changing her blood pressure medicine as well  Plan: predniSONE (DELTASONE) 20 MG tablet,         guaiFENesin-codeine (CHERATUSSIN AC) 100-10         MG/5ML SOLN solution  (I10) Hypertension goal BP (blood pressure) < 140/90  Comment: Change from hydrochlorothiazide and lisinopril to Hyzaar  Plan: losartan-hydrochlorothiazide (HYZAAR) 50-12.5         MG per tablet   (M54.5,  G89.29) Chronic midline low back pain without sciatica  Comment: refilled   Plan: oxyCODONE (ROXICODONE) 15 MG IR tablet  (M54.2,  G89.29) Chronic neck pain  Comment:   Plan: oxyCODONE (ROXICODONE) 15 MG IR tablet    Follow up 3 months  SKelly Loredo MD     Patient is on pain meds for chronic pain and these have not changed. Was given Prednisone and cough syrup for cough. BP this visit, was 132/90 with a goal of < 140/90. BP med changed as it may be contributing to cough as well.   Outreach attempted x 1.  Left message on voicemail with call back information and requested return call.  Plan: Care Coordinator will mail out care coordination introduction letter with care coordinator contact information and explanation of care coordination services. Care Coordinator will try to reach patient again in 1-2 business days.  Antonio Dunn  MAINORN,RN- BC  Clinic Care Coordinator  [facility]  Phone: [phone]

## 2017-06-16 NOTE — PROGRESS NOTES
Clinic Care Coordination Contact  [facility]/Voicemail    Referral Source: Pro-Active Outreach (IHP list)  Clinical Data: Care Coordinator Outreach  Refer to previous note for specifics from chart review.  Outreach attempted x 2.  Left message on voicemail with call back information and requested return call.  Plan: Care Coordinator mailed out care coordination introduction letter on 6/14/17. Care Coordinator will do no further outreaches at this time.  Antonio NOVA,RN- BC  Clinic Care Coordinator  [facility]  Phone: [phone]

## 2017-06-29 ENCOUNTER — HOSPITAL ENCOUNTER (EMERGENCY)
Facility: CLINIC | Age: 42
Discharge: HOME | End: 2017-06-30
Attending: EMERGENCY MEDICINE | Admitting: EMERGENCY MEDICINE
Payer: COMMERCIAL

## 2017-06-29 ENCOUNTER — APPOINTMENT (OUTPATIENT)
Dept: ULTRASOUND IMAGING | Facility: CLINIC | Age: 42
End: 2017-06-29
Attending: EMERGENCY MEDICINE
Payer: COMMERCIAL

## 2017-06-29 ENCOUNTER — APPOINTMENT (OUTPATIENT)
Dept: GENERAL RADIOLOGY | Facility: CLINIC | Age: 42
End: 2017-06-29
Attending: EMERGENCY MEDICINE
Payer: COMMERCIAL

## 2017-06-29 DIAGNOSIS — R10.31 ABDOMINAL PAIN, RIGHT LOWER QUADRANT: ICD-10-CM

## 2017-06-29 DIAGNOSIS — I10 ESSENTIAL HYPERTENSION: ICD-10-CM

## 2017-06-29 DIAGNOSIS — M79.661 PAIN OF RIGHT LOWER LEG: ICD-10-CM

## 2017-06-29 LAB
ANION GAP SERPL CALCULATED.3IONS-SCNC: 10 MMOL/L (ref 3–14)
BASOPHILS # BLD AUTO: 0 10E9/L (ref 0–0.2)
BASOPHILS NFR BLD AUTO: 0.4 %
BUN SERPL-MCNC: 10 MG/DL (ref 7–30)
CALCIUM SERPL-MCNC: 8.5 MG/DL (ref 8.5–10.1)
CHLORIDE SERPL-SCNC: 104 MMOL/L (ref 94–109)
CO2 SERPL-SCNC: 26 MMOL/L (ref 20–32)
CREAT SERPL-MCNC: 0.7 MG/DL (ref 0.52–1.04)
D DIMER PPP FEU-MCNC: 0.4 UG/ML FEU (ref 0–0.5)
DIFFERENTIAL METHOD BLD: NORMAL
EOSINOPHIL # BLD AUTO: 0.1 10E9/L (ref 0–0.7)
EOSINOPHIL NFR BLD AUTO: 1.1 %
ERYTHROCYTE [DISTWIDTH] IN BLOOD BY AUTOMATED COUNT: 14.9 % (ref 10–15)
GFR SERPL CREATININE-BSD FRML MDRD: ABNORMAL ML/MIN/1.7M2
GLUCOSE SERPL-MCNC: 104 MG/DL (ref 70–99)
HCT VFR BLD AUTO: 38.2 % (ref 35–47)
HGB BLD-MCNC: 13.2 G/DL (ref 11.7–15.7)
IMM GRANULOCYTES # BLD: 0 10E9/L (ref 0–0.4)
IMM GRANULOCYTES NFR BLD: 0.1 %
LYMPHOCYTES # BLD AUTO: 2.1 10E9/L (ref 0.8–5.3)
LYMPHOCYTES NFR BLD AUTO: 28 %
MCH RBC QN AUTO: 29.7 PG (ref 26.5–33)
MCHC RBC AUTO-ENTMCNC: 34.6 G/DL (ref 31.5–36.5)
MCV RBC AUTO: 86 FL (ref 78–100)
MONOCYTES # BLD AUTO: 0.6 10E9/L (ref 0–1.3)
MONOCYTES NFR BLD AUTO: 7.8 %
NEUTROPHILS # BLD AUTO: 4.7 10E9/L (ref 1.6–8.3)
NEUTROPHILS NFR BLD AUTO: 62.6 %
NRBC # BLD AUTO: 0 10*3/UL
NRBC BLD AUTO-RTO: 0 /100
PLATELET # BLD AUTO: 188 10E9/L (ref 150–450)
POTASSIUM SERPL-SCNC: 3.1 MMOL/L (ref 3.4–5.3)
RBC # BLD AUTO: 4.45 10E12/L (ref 3.8–5.2)
SODIUM SERPL-SCNC: 140 MMOL/L (ref 133–144)
TROPONIN I SERPL-MCNC: NORMAL UG/L (ref 0–0.04)
WBC # BLD AUTO: 7.4 10E9/L (ref 4–11)

## 2017-06-29 PROCEDURE — 80048 BASIC METABOLIC PNL TOTAL CA: CPT | Performed by: EMERGENCY MEDICINE

## 2017-06-29 PROCEDURE — 25000128 H RX IP 250 OP 636: Performed by: EMERGENCY MEDICINE

## 2017-06-29 PROCEDURE — 76705 ECHO EXAM OF ABDOMEN: CPT

## 2017-06-29 PROCEDURE — 85379 FIBRIN DEGRADATION QUANT: CPT | Performed by: EMERGENCY MEDICINE

## 2017-06-29 PROCEDURE — 71020 XR CHEST 2 VW: CPT

## 2017-06-29 PROCEDURE — 96360 HYDRATION IV INFUSION INIT: CPT

## 2017-06-29 PROCEDURE — 84484 ASSAY OF TROPONIN QUANT: CPT | Performed by: EMERGENCY MEDICINE

## 2017-06-29 PROCEDURE — 85025 COMPLETE CBC W/AUTO DIFF WBC: CPT | Performed by: EMERGENCY MEDICINE

## 2017-06-29 PROCEDURE — 99285 EMERGENCY DEPT VISIT HI MDM: CPT | Mod: 25

## 2017-06-29 RX ORDER — LABETALOL HYDROCHLORIDE 5 MG/ML
20 INJECTION, SOLUTION INTRAVENOUS ONCE
Status: DISCONTINUED | OUTPATIENT
Start: 2017-06-29 | End: 2017-06-30 | Stop reason: HOSPADM

## 2017-06-29 RX ORDER — POTASSIUM CHLORIDE 1.5 G/1.58G
40 POWDER, FOR SOLUTION ORAL ONCE
Status: COMPLETED | OUTPATIENT
Start: 2017-06-29 | End: 2017-06-30

## 2017-06-29 RX ADMIN — SODIUM CHLORIDE 1000 ML: 9 INJECTION, SOLUTION INTRAVENOUS at 23:26

## 2017-06-29 NOTE — ED AVS SNAPSHOT
Emergency Department    [address] [place] [address]    Phone:  [phone]    Fax:  [phone]                                       Jeff Serra   MRN: [id number]    Department:   Emergency Department   Date of Visit:  6/29/2017           After Visit Summary Signature Page     I have received my discharge instructions, and my questions have been answered. I have discussed any challenges I see with this plan with the nurse or doctor.    ..........................................................................................................................................  Patient/Patient Representative Signature      ..........................................................................................................................................  Patient Representative Print Name and Relationship to Patient    ..................................................               ................................................  Date                                            Time    ..........................................................................................................................................  Reviewed by Signature/Title    ...................................................              ..............................................  Date                                                            Time

## 2017-06-29 NOTE — ED AVS SNAPSHOT
Emergency Department    [address] [facility] [address]    Phone:  [phone]    Fax:  [phone]                                       Jeff Serra   MRN: [id number]    Department:   Emergency Department   Date of Visit:  6/29/2017           Patient Information     Date Of Birth          1975        Your diagnoses for this visit were:     Abdominal pain, right lower quadrant     Pain of right lower leg     Essential hypertension        You were seen by Eric Wilcox MD.      Follow-up Information     Follow up with Jordyn Loredo MD.    Specialty:  Family Practice    Why:  As needed, If symptoms worsen    Contact information:    [address] [id number] [phone]          Follow up with  Emergency Department.    Specialty:  EMERGENCY MEDICINE    Why:  As needed, If symptoms worsen    Contact information:    [address] [facility] [id number] [phone]        Discharge Instructions         *Abdominal Pain, Unknown Cause (Female)    The exact cause of your abdominal (stomach) pain is not certain. This does not mean that this is something to worry about, or the right tests were not done. Everyone likes to know the exact cause of the problem, but sometimes with abdominal pain, there is no clear-cut cause, and this could be a good thing. The good news is that your symptoms can be treated, and you will feel better.   Your condition does not seem serious now; however, sometimes the signs of a serious problem may take more time to appear. For this reason, it is important for you to watch for any new symptoms, problems, or worsening of your condition.  Over the next few days, the abdominal pain may come and go, or be continuous. Other common symptoms can include nausea and vomiting. Sometimes it can be difficult to tell if you feel nauseous, you may just feel bad and not associate that feeling with  nausea. Constipation, diarrhea, and a fever may go along with the pain.  The pain may continue even if treated correctly over the following days. Depending on how things go, sometimes the cause can become clear and may require further or different treatment. Additional evaluations, medications, or tests may be needed.  Home care  Your health care provider may prescribe medications for pain, symptoms, or an infection.  Follow the health care provider's instructions for taking these medications.  General care    Rest until your next exam. No strenuous activities.    Try to find positions that ease discomfort. A small pillow placed on the abdomen may help relieve pain.    Something warm on your abdomen (such as a heating pad) may help, but be careful not to burn yourself.  Diet    Do not force yourself to eat, especially if having cramps, vomiting, or diarrhea.    Water is important so you do not get dehydrated. Soup may also be good. Sports drinks may also help, especially if they are not too acidic. Make sure you don't drink sugary drinks as this can make things worse. Take liquids in small amounts. Do not guzzle them.    Caffeine sometimes makes the pain and cramping worse.    Avoid dairy products if you have vomiting or diarrhea.    Don't eat large amounts at a time. Wait a few minutes between bites.    Eat a diet low in fiber (called a low-residue diet). Foods allowed include refined breads, white rice, fruit and vegetable juices without pulp, tender meats. These foods will pass more easily through the intestine.    Avoid fried or fatty foods, dairy, alcohol and spicy foods until your symptoms go away.  Follow-up care  Follow up with your health care provider as instructed, or if your pain does not begin to improve in the next 24 hours.  When to seek medical care  Seek prompt medical care if any of the following occur:    Pain gets worse or moves to the right lower abdomen    New or worsening vomiting or  diarrhea    Swelling of the abdomen    Unable to pass stool for more than three days    New fever over 101  F (38.3 C), or rising fever    Blood in vomit or bowel movements (dark red or black color)    Jaundice (yellow color of eyes and skin)    Weakness, dizziness    Chest, arm, back, neck or jaw pain    Unexpected vaginal bleeding or missed period  Call 911  Call emergency services if any of the following occur:    Trouble breathing    Confusion    Fainting or loss of consciousness    Rapid heart rate    Seizure    [id number] [facility], [address] [place] [address], [place] [address]. All rights reserved. This information is not intended as a substitute for professional medical care. Always follow your healthcare professional's instructions.      These foods are high in Potassium.  In addition to any Potassium pills prescribed, these foods will more quickly improve the Potassium level in your body.    Baked Potatoes  Tomatoes  Lima Beans  Spinach  Bananas  Cantaloupe  Raisins  Oranges      Discharge Instructions  Hypertension - High Blood Pressure    During you visit to the Emergency Department, your blood pressure was higher than the recommended blood pressure.  This may be related to stress, pain, medication or other temporary conditions. In these cases, your blood pressure may return to normal on its own. If you have a history of high blood pressure, you may need to have your doctor adjust your medications. Sometimes, your high measurement here may indicate that you have developed high blood pressure that will stay high unless it is treated. Sudden very high blood pressure can cause problems, but usually high blood pressure causes problems over months to years.      Blood pressure is almost never lowered in the Emergency Department, because studies have shown that lowering blood pressure too quickly is much more dangerous than leaving it alone.    You need to follow up with your doctor in 1-3 days to get your  blood pressure rechecked.     Return to the Emergency Department if you start to have:    A severe headache.    Chest pain.    Shortness of breath.    Weakness or numbness that affects one part of the body.    Confusion.    Vision changes.    Significant swelling of legs and/or eyes.    A reaction to any medication started in the Emergency Department.    What can I do to help myself?    Avoid alcohol.    Take any blood pressure medicine that you are prescribed.    Get a good night s sleep.    Lower your salt intake.    Exercise.    Lose weight.    Manage stress.    If blood pressure medication was started in the Emergency Department:    The medicine may not have an immediate effect. The body and brain determine what blood pressure you have. The medicine s job is to retrain the body s  thermostat  to a lower blood pressure.    You will need to follow up with your doctor to see how this medicine is working for you.  If you were given a prescription for medicine here today, be sure to read all of the information (including the package insert) that comes with your prescription.  This will include important information about the medicine, its side effects, and any warnings that you need to know about.  The pharmacist who fills the prescription can provide more information and answer questions you may have about the medicine.  If you have questions or concerns that the pharmacist cannot address, please call or return to the Emergency Department.       Discharge References/Attachments     HYPOKALEMIA (ENGLISH)      24 Hour Appointment Hotline       To make an appointment at any [facility], call [phone] ([phone]). If you don't have a family doctor or clinic, we will help you find one. [facility] are conveniently located to serve the needs of you and your family.             Review of your medicines      START taking        Dose / Directions Last dose taken    potassium chloride SA 20 MEQ CR tablet    Commonly known as:  K-DUR/KLOR-CON M   Dose:  20 mEq   Quantity:  10 tablet        Take 1 tablet (20 mEq) by mouth daily for 10 days   Refills:  0          Our records show that you are taking the medicines listed below. If these are incorrect, please call your family doctor or clinic.        Dose / Directions Last dose taken    ASPIRIN PO   Dose:  81 mg        Take 81 mg by mouth daily Reported on 5/3/2017   Refills:  0        fexofenadine 180 MG tablet   Commonly known as:  ALLEGRA   Dose:  180 mg   Quantity:  90 tablet        Take 1 tablet (180 mg) by mouth daily   Refills:  0        guaiFENesin-codeine 100-10 MG/5ML Soln solution   Commonly known as:  CHERATUSSIN AC   Dose:  1-2 tsp.   Quantity:  240 mL        Take 5-10 mLs by mouth every 4 hours as needed for cough   Refills:  0        loratadine 10 MG tablet   Commonly known as:  CLARITIN   Dose:  10 mg   Quantity:  30 tablet        Take 1 tablet (10 mg) by mouth daily as needed for allergies   Refills:  2        losartan-hydrochlorothiazide 50-12.5 MG per tablet   Commonly known as:  HYZAAR   Dose:  1 tablet   Quantity:  90 tablet        Take 1 tablet by mouth daily   Refills:  3        norethindrone 5 MG tablet   Commonly known as:  AYGESTIN   Dose:  5 mg   Quantity:  30 tablet        Take 1 tablet (5 mg) by mouth daily   Refills:  5        oxyCODONE 15 MG IR tablet   Commonly known as:  ROXICODONE   Dose:  15-30 mg   Quantity:  120 tablet        Take 1-2 tablets (15-30 mg) by mouth 3 times daily as needed for moderate to severe pain   Refills:  0                Prescriptions were sent or printed at these locations (1 Prescription)                   Other Prescriptions                Printed at Department/Unit printer (1 of 1)         potassium chloride SA (K-DUR/KLOR-CON M) 20 MEQ CR tablet                Procedures and tests performed during your visit     Abdomen US, limited (RUQ only)    Basic metabolic panel    CBC with platelets differential    D  dimer quantitative    IV access    Troponin I    US Lower Extremity Venous Duplex Right    XR Chest 2 Views      Orders Needing Specimen Collection     None      Pending Results     Date and Time Order Name Status Description    6/29/2017 2304 Abdomen US, limited (RUQ only) Preliminary             Pending Culture Results     No orders found for last 3 day(s).            Pending Results Instructions     If you had any lab results that were not finalized at the time of your Discharge, you can call the ED Lab Result RN at [phone]. You will be contacted by this team for any positive Lab results or changes in treatment. The nurses are available 7 days a week from 10A to 6:30P.  You can leave a message 24 hours per day and they will return your call.        Test Results From Your Hospital Stay        6/29/2017 11:21 PM      Component Results     Component Value Ref Range & Units Status    WBC 7.4 4.0 - 11.0 10e9/L Final    RBC Count 4.45 3.8 - 5.2 10e12/L Final    Hemoglobin 13.2 11.7 - 15.7 g/dL Final    Hematocrit 38.2 35.0 - 47.0 % Final    MCV 86 78 - 100 fl Final    MCH 29.7 26.5 - 33.0 pg Final    MCHC 34.6 31.5 - 36.5 g/dL Final    RDW 14.9 10.0 - 15.0 % Final    Platelet Count 188 150 - 450 10e9/L Final    Diff Method Automated Method  Final    % Neutrophils 62.6 % Final    % Lymphocytes 28.0 % Final    % Monocytes 7.8 % Final    % Eosinophils 1.1 % Final    % Basophils 0.4 % Final    % Immature Granulocytes 0.1 % Final    Nucleated RBCs 0 0 /100 Final    Absolute Neutrophil 4.7 1.6 - 8.3 10e9/L Final    Absolute Lymphocytes 2.1 0.8 - 5.3 10e9/L Final    Absolute Monocytes 0.6 0.0 - 1.3 10e9/L Final    Absolute Eosinophils 0.1 0.0 - 0.7 10e9/L Final    Absolute Basophils 0.0 0.0 - 0.2 10e9/L Final    Abs Immature Granulocytes 0.0 0 - 0.4 10e9/L Final    Absolute Nucleated RBC 0.0  Final         6/29/2017 11:43 PM      Component Results     Component Value Ref Range & Units Status    Sodium 140 133 - 144  mmol/L Final    Potassium 3.1 (L) 3.4 - 5.3 mmol/L Final    Chloride 104 94 - 109 mmol/L Final    Carbon Dioxide 26 20 - 32 mmol/L Final    Anion Gap 10 3 - 14 mmol/L Final    Glucose 104 (H) 70 - 99 mg/dL Final    Urea Nitrogen 10 7 - 30 mg/dL Final    Creatinine 0.70 0.52 - 1.04 mg/dL Final    GFR Estimate >90  Non  GFR Calc   >60 mL/min/1.7m2 Final    GFR Estimate If Black >90   GFR Calc   >60 mL/min/1.7m2 Final    Calcium 8.5 8.5 - 10.1 mg/dL Final         6/29/2017 11:38 PM      Component Results     Component Value Ref Range & Units Status    D Dimer 0.4 0.0 - 0.50 ug/ml FEU Final    This D-dimer assay is intended for use in conjuntion with a clinical pretest   probability assessment model to exclude pulmonary embolism (PE) and as an aid   in the diagnosis of deep venous thrombosis (DVT) in outpatients suspected of   PE   or DVT. The cut-off value is 0.5 g/mL FEU.           6/30/2017 12:00 AM      Narrative     XR CHEST 2 VW  6/29/2017 11:52 PM      HISTORY: Right lower chest pain.     COMPARISON: 5/3/2017.    FINDINGS: The heart size is normal. The lungs are clear. No  pneumothorax or pleural effusion.        Impression     IMPRESSION:  No acute abnormality.    GAY SOLITARIO MD         6/30/2017 12:47 AM      Narrative     US ABDOMEN LIMITED  6/30/2017 12:31 AM      HISTORY: Pain right upper quadrant for two days, cholecystitis.     COMPARISON: None.    FINDINGS: The liver is diffusely increased in echotexture consistent  with fatty infiltration. No focal mass. There is no intrahepatic  biliary dilatation. The common hepatic duct is not seen due to  overlying bowel gas. The gallbladder is normal in appearance without  gallstones. The pancreas is completely obscured by bowel gas. The  right kidney measures 12.0 cm and is normal in appearance.        Impression     IMPRESSION:  1. Fatty infiltration of the liver.  2. Normal appearance of the gallbladder. No stones.          6/30/2017 12:36 AM      Narrative     US LOWER EXTREMITY VENOUS DUPLEX RIGHT   6/30/2017 12:32 AM     HISTORY: Right calf pain. History of DVT.    COMPARISON: 9/16/2016.    FINDINGS: Gray-scale, color and Doppler spectral analysis ultrasound  was performed of the right leg. Compression and augmentation imaging  was performed.    There is no evidence for deep venous thrombosis. The veins compress  and augment normally.        Impression     IMPRESSION: No DVT.     GAY SOLITARIO MD         6/29/2017 11:46 PM      Component Results     Component Value Ref Range & Units Status    Troponin I ES  0.000 - 0.045 ug/L Final    <0.015  The 99th percentile for upper reference range is 0.045 ug/L.  Troponin values in   the range of 0.045 - 0.120 ug/L may be associated with risks of adverse   clinical events.                  Clinical Quality Measure: Blood Pressure Screening     Your blood pressure was checked while you were in the emergency department today. The last reading we obtained was  BP: 152/89 . Please read the guidelines below about what these numbers mean and what you should do about them.  If your systolic blood pressure (the top number) is less than 120 and your diastolic blood pressure (the bottom number) is less than 80, then your blood pressure is normal. There is nothing more that you need to do about it.  If your systolic blood pressure (the top number) is 120-139 or your diastolic blood pressure (the bottom number) is 80-89, your blood pressure may be higher than it should be. You should have your blood pressure rechecked within a year by a primary care provider.  If your systolic blood pressure (the top number) is 140 or greater or your diastolic blood pressure (the bottom number) is 90 or greater, you may have high blood pressure. High blood pressure is treatable, but if left untreated over time it can put you at risk for heart attack, stroke, or kidney failure. You should have your blood pressure  rechecked by a primary care provider within the next 4 weeks.  If your provider in the emergency department today gave you specific instructions to follow-up with your doctor or provider even sooner than that, you should follow that instruction and not wait for up to 4 weeks for your follow-up visit.        Thank you for choosing [facility]       Thank you for choosing [facility] for your care. Our goal is always to provide you with excellent care. Hearing back from our patients is one way we can continue to improve our services. Please take a few minutes to complete the written survey that you may receive in the mail after you visit with us. Thank you!        [facility] Information     [facility] gives you secure access to your electronic health record. If you see a primary care provider, you can also send messages to your care team and make appointments. If you have questions, please call your primary care clinic.  If you do not have a primary care provider, please call [phone] and they will assist you.        Care EveryWhere ID     This is your Care EveryWhere ID. This could be used by other organizations to access your [facility] medical records  [id number]        Equal Access to Services     KHADAR BAEZ : Hadii roldan Marcos, waelvi casas, qagavi brian, ariel shafer . So [facility] [phone].    ATENCIÓN: Si habla español, tiene a guy disposición servicios gratuitos de asistencia lingüística. Llame al [phone].    We comply with applicable federal civil rights laws and [place] laws. We do not discriminate on the basis of race, color, national origin, age, disability sex, sexual orientation or gender identity.            After Visit Summary       This is your record. Keep this with you and show to your community pharmacist(s) and doctor(s) at your next visit.

## 2017-06-30 ENCOUNTER — APPOINTMENT (OUTPATIENT)
Dept: ULTRASOUND IMAGING | Facility: CLINIC | Age: 42
End: 2017-06-30
Attending: EMERGENCY MEDICINE
Payer: COMMERCIAL

## 2017-06-30 VITALS
WEIGHT: 182 LBS | RESPIRATION RATE: 18 BRPM | DIASTOLIC BLOOD PRESSURE: 89 MMHG | HEART RATE: 106 BPM | HEIGHT: 63 IN | TEMPERATURE: 98.9 F | OXYGEN SATURATION: 99 % | BODY MASS INDEX: 32.25 KG/M2 | SYSTOLIC BLOOD PRESSURE: 152 MMHG

## 2017-06-30 DIAGNOSIS — N92.0 EXCESSIVE OR FREQUENT MENSTRUATION: ICD-10-CM

## 2017-06-30 DIAGNOSIS — N94.6 DYSMENORRHEA: ICD-10-CM

## 2017-06-30 PROCEDURE — 93971 EXTREMITY STUDY: CPT | Mod: RT

## 2017-06-30 PROCEDURE — 25000132 ZZH RX MED GY IP 250 OP 250 PS 637: Performed by: EMERGENCY MEDICINE

## 2017-06-30 RX ORDER — NORETHINDRONE ACETATE 5 MG
TABLET ORAL
Qty: 30 TABLET | Refills: 5 | Status: SHIPPED | OUTPATIENT
Start: 2017-06-30 | End: 2017-09-14

## 2017-06-30 RX ORDER — POTASSIUM CHLORIDE 1500 MG/1
20 TABLET, EXTENDED RELEASE ORAL DAILY
Qty: 10 TABLET | Refills: 0 | Status: SHIPPED | OUTPATIENT
Start: 2017-06-30 | End: 2017-07-10

## 2017-06-30 RX ADMIN — POTASSIUM CHLORIDE 40 MEQ: 1.5 POWDER, FOR SOLUTION ORAL at 00:19

## 2017-06-30 ASSESSMENT — ENCOUNTER SYMPTOMS: ABDOMINAL PAIN: 1

## 2017-06-30 NOTE — DISCHARGE INSTRUCTIONS
*Abdominal Pain, Unknown Cause (Female)    The exact cause of your abdominal (stomach) pain is not certain. This does not mean that this is something to worry about, or the right tests were not done. Everyone likes to know the exact cause of the problem, but sometimes with abdominal pain, there is no clear-cut cause, and this could be a good thing. The good news is that your symptoms can be treated, and you will feel better.   Your condition does not seem serious now; however, sometimes the signs of a serious problem may take more time to appear. For this reason, it is important for you to watch for any new symptoms, problems, or worsening of your condition.  Over the next few days, the abdominal pain may come and go, or be continuous. Other common symptoms can include nausea and vomiting. Sometimes it can be difficult to tell if you feel nauseous, you may just feel bad and not associate that feeling with nausea. Constipation, diarrhea, and a fever may go along with the pain.  The pain may continue even if treated correctly over the following days. Depending on how things go, sometimes the cause can become clear and may require further or different treatment. Additional evaluations, medications, or tests may be needed.  Home care  Your health care provider may prescribe medications for pain, symptoms, or an infection.  Follow the health care provider's instructions for taking these medications.  General care    Rest until your next exam. No strenuous activities.    Try to find positions that ease discomfort. A small pillow placed on the abdomen may help relieve pain.    Something warm on your abdomen (such as a heating pad) may help, but be careful not to burn yourself.  Diet    Do not force yourself to eat, especially if having cramps, vomiting, or diarrhea.    Water is important so you do not get dehydrated. Soup may also be good. Sports drinks may also help, especially if they are not too acidic. Make sure you  don't drink sugary drinks as this can make things worse. Take liquids in small amounts. Do not guzzle them.    Caffeine sometimes makes the pain and cramping worse.    Avoid dairy products if you have vomiting or diarrhea.    Don't eat large amounts at a time. Wait a few minutes between bites.    Eat a diet low in fiber (called a low-residue diet). Foods allowed include refined breads, white rice, fruit and vegetable juices without pulp, tender meats. These foods will pass more easily through the intestine.    Avoid fried or fatty foods, dairy, alcohol and spicy foods until your symptoms go away.  Follow-up care  Follow up with your health care provider as instructed, or if your pain does not begin to improve in the next 24 hours.  When to seek medical care  Seek prompt medical care if any of the following occur:    Pain gets worse or moves to the right lower abdomen    New or worsening vomiting or diarrhea    Swelling of the abdomen    Unable to pass stool for more than three days    New fever over 101  F (38.3 C), or rising fever    Blood in vomit or bowel movements (dark red or black color)    Jaundice (yellow color of eyes and skin)    Weakness, dizziness    Chest, arm, back, neck or jaw pain    Unexpected vaginal bleeding or missed period  Call 911  Call emergency services if any of the following occur:    Trouble breathing    Confusion    Fainting or loss of consciousness    Rapid heart rate    Seizure    3102-2950 [facility], [address] [place] [address], [place] [address]. All rights reserved. This information is not intended as a substitute for professional medical care. Always follow your healthcare professional's instructions.      These foods are high in Potassium.  In addition to any Potassium pills prescribed, these foods will more quickly improve the Potassium level in your body.    Baked Potatoes  Tomatoes  Lima Beans  Spinach  Bananas  Cantaloupe  Raisins  Oranges      Discharge  Instructions  Hypertension - High Blood Pressure    During you visit to the Emergency Department, your blood pressure was higher than the recommended blood pressure.  This may be related to stress, pain, medication or other temporary conditions. In these cases, your blood pressure may return to normal on its own. If you have a history of high blood pressure, you may need to have your doctor adjust your medications. Sometimes, your high measurement here may indicate that you have developed high blood pressure that will stay high unless it is treated. Sudden very high blood pressure can cause problems, but usually high blood pressure causes problems over months to years.      Blood pressure is almost never lowered in the Emergency Department, because studies have shown that lowering blood pressure too quickly is much more dangerous than leaving it alone.    You need to follow up with your doctor in 1-3 days to get your blood pressure rechecked.     Return to the Emergency Department if you start to have:    A severe headache.    Chest pain.    Shortness of breath.    Weakness or numbness that affects one part of the body.    Confusion.    Vision changes.    Significant swelling of legs and/or eyes.    A reaction to any medication started in the Emergency Department.    What can I do to help myself?    Avoid alcohol.    Take any blood pressure medicine that you are prescribed.    Get a good night s sleep.    Lower your salt intake.    Exercise.    Lose weight.    Manage stress.    If blood pressure medication was started in the Emergency Department:    The medicine may not have an immediate effect. The body and brain determine what blood pressure you have. The medicine s job is to retrain the body s  thermostat  to a lower blood pressure.    You will need to follow up with your doctor to see how this medicine is working for you.  If you were given a prescription for medicine here today, be sure to read all of the  information (including the package insert) that comes with your prescription.  This will include important information about the medicine, its side effects, and any warnings that you need to know about.  The pharmacist who fills the prescription can provide more information and answer questions you may have about the medicine.  If you have questions or concerns that the pharmacist cannot address, please call or return to the Emergency Department.

## 2017-06-30 NOTE — TELEPHONE ENCOUNTER
norethindrone (AYGESTIN) 5 MG tablet      Last Written Prescription Date: 10/18/16  Last Fill Quantity: 30, # refills: 5  Last Office Visit with G, P or [facility] prescribing provider: 06/06/17 Dr. Loredo       BP Readings from Last 3 Encounters:   06/30/17 152/89   06/06/17 132/90   05/03/17 (!) 142/93     Date of last Breast Exam: 08/12/16

## 2017-06-30 NOTE — TELEPHONE ENCOUNTER
Routing refill request to provider for review/approval because:  Drug not on the [facility] refill protocol   Ayanna Bruce RN

## 2017-06-30 NOTE — ED PROVIDER NOTES
"  History     Chief Complaint:  Abdominal pain    HPI   Jeff Serra is a 42 year old female who presents with abdominal pain. The patient developed right-sided upper abdominal pain two days ago. The pain has been getting worse today. Patient still has her gallbladder.     The patient also complains of right leg pain and swelling. She has a prior history of right-leg DVT and PE. Blood pressure is elevated upon arrival, but patient notes she has been taking her BP meds as prescribed.    She is concerned about DVT.    Allergies:  The patient has no known drug allergies.    Medications:    Hyzaar  Roxicodone  Cheratussin  Allegra  Aygestin  Claritin  Aspirin     Past Medical History:    Chronic back pain  HTN  Kidney stone  PE  DVT    Past Surgical History:    Lithotripsy  Tubal ligation  Laparoscopic diagnostic gyn surgery    Family History:    Heart disease    Social History:  Relationship status:   Tobacco use: Negative  Alcohol use: Positive  The patient presents alone.    Review of Systems   Cardiovascular: Positive for leg swelling.   Gastrointestinal: Positive for abdominal pain.   Musculoskeletal:        Positive for right leg pain.   All other systems reviewed and are negative.      Physical Exam   First Vitals:  BP: (!) 173/100  Pulse: 106  Temp: 98.9  F (37.2  C)  Resp: 18  Height: 160 cm (5' 3\")  Weight: 82.6 kg (182 lb)  SpO2: 99 %    Physical Exam  General: Resting comfortably on the gurney, mild right-side pain.  Head:  The scalp, face, and head appear normal  Eyes:  The pupils are equal, round, and reactive to light    There is no nystagmus    Extraocular muscles are intact    Conjunctivae and sclerae are normal  ENT:    The nose is normal    Pinnae are normal    The oropharynx is normal    Uvula is in the midline  Neck:  Normal range of motion    There is no rigidity noted    There is no midline cervical spine pain/tenderness    Trachea is in the midline    No mass is " detected  CV:  Regular rate and underlying rhythm     Normal S1/S2, no S3/S4    No pathological murmur detected  Resp:  Lungs are clear    There is mild tenderness in right-subcostal and inferior chest region.     There is no tachypnea    Non-labored    No rales    No wheezing   GI:  Abdomen is soft, there is no rigidity    There is mild tenderness in the RUQ and subcostal region.     No distension    No tympani    No rebound tenderness     Non-surgical without peritoneal features  MS:  Right leg: There is mild soreness to the gastroc muscle medially and laterally. There is no obvious swelling   noted. The tibia and ankle appear normal. No swelling to foot or toes.     Normal muscular tone    Symmetric motor strength    No major joint effusions    No asymmetric leg swelling, no calf tenderness  Skin:  No rash or acute skin lesions noted  Neuro: Speech is normal and fluent  Psych:  Awake. Alert.      Normal affect.  Appropriate interactions.  Lymph: No anterior cervical lymphadenopathy noted      Emergency Department Course     Imaging:  Radiographic findings were communicated with the patient who voiced understanding of the findings.    Right Lower Extremity US, Arterial Duplex, per radiology:   No DVT.    Abdomen US, RUQ only, per radiology:   1. Fatty infiltration of the liver.  2. Normal appearance of the gallbladder. No stones.    Chest XR, per radiology:  No acute abnormality.    Laboratory:  CBC: WNL (WBC 7.4, HGB 13.2, )  BMP: Potassium 3.1 (L), Glucose 104 (H), o/w WNL (Creatinine 0.70)  2236: Troponin I: <0.015  D dimer: 0.4    Interventions:  2326: Normal Saline, 1000 mL, IV  0019: Potassium chloride, 40 mEq, PO    Emergency Department Course:  Nursing notes and vitals reviewed.  I performed an exam of the patient as documented above.  The above workup was undertaken.  0054: I rechecked the patient and discussed results.    Findings and plan explained to the Patient. Patient discharged home, status  improved, with instructions regarding supportive care, medications, and reasons to return as well as the importance of close follow-up was reviewed.      Impression & Plan      Medical Decision Making:  Jeff Serra is a 42 year old female who presents with several symptoms as noted above, including right abdominal and lower chest pain, concern for possible DVT in the right leg, and mild HTN. The patient had an extensive workup in the ED, including comprehensive blood work looking for infection, anemia, electrolyte disturbance, or renal failure, all of which are generally negative. Potassium is low at 3.1, and has been low in the past, likely secondary to hydrochlorothiazide therapy. Patient had a negative screening D-dimer at 0.4, and this coupled with a negative duplex ultrasound of the leg makes DVT extremely unlikely. PE is also essentially ruled out in this otherwise low risk patient. Troponin looking for cardiac etiologies is negative. Patient had RUQ pain. Ultrasound performed of the gallbladder, which is normal. The exact etiology of right abdominal and chest pain is unclear. Chest XR is also normal. Patient has history of chronic pain. I have reviewed the results with the patient and she is ready for discharge. Exact etiology of complaints today is unclear. Patient will need to follow up with PCP for further blood pressure management. I will place her on a potassium supplement for the next week.    Final diagnosis:  1. Hypertension  2. Subjective right leg pain  3. Abdominal pain    Disposition:  Discharge to home with primary care follow up.    Discharge Medications:   POTASSIUM CHLORIDE SA (K-DUR/KLOR-CON M) 20 MEQ CR TABLET    Take 1 tablet (20 mEq) by mouth daily for 10 days     Robert MOORE, am serving as a scribe on 6/29/2017 at 10:54 PM to personally document services performed by Eric Wilcox MD, based on my observations and the provider's statements to me.     EMERGENCY  DEPARTMENT       Rock, Eric GIMENEZ MD  06/30/17 0613

## 2017-07-11 ENCOUNTER — OFFICE VISIT (OUTPATIENT)
Dept: FAMILY MEDICINE | Facility: CLINIC | Age: 42
End: 2017-07-11
Payer: COMMERCIAL

## 2017-07-11 VITALS
RESPIRATION RATE: 16 BRPM | TEMPERATURE: 97.9 F | OXYGEN SATURATION: 99 % | BODY MASS INDEX: 32.11 KG/M2 | SYSTOLIC BLOOD PRESSURE: 128 MMHG | WEIGHT: 181.2 LBS | DIASTOLIC BLOOD PRESSURE: 82 MMHG | HEART RATE: 94 BPM | HEIGHT: 63 IN

## 2017-07-11 DIAGNOSIS — I10 HYPERTENSION GOAL BP (BLOOD PRESSURE) < 140/90: Primary | ICD-10-CM

## 2017-07-11 DIAGNOSIS — M54.2 CHRONIC NECK PAIN: ICD-10-CM

## 2017-07-11 DIAGNOSIS — G89.29 CHRONIC MIDLINE LOW BACK PAIN WITHOUT SCIATICA: ICD-10-CM

## 2017-07-11 DIAGNOSIS — M54.50 CHRONIC MIDLINE LOW BACK PAIN WITHOUT SCIATICA: ICD-10-CM

## 2017-07-11 DIAGNOSIS — G89.29 CHRONIC NECK PAIN: ICD-10-CM

## 2017-07-11 DIAGNOSIS — N92.0 EXCESSIVE OR FREQUENT MENSTRUATION: ICD-10-CM

## 2017-07-11 DIAGNOSIS — E87.6 HYPOKALEMIA: ICD-10-CM

## 2017-07-11 PROCEDURE — 99214 OFFICE O/P EST MOD 30 MIN: CPT | Performed by: FAMILY MEDICINE

## 2017-07-11 RX ORDER — LOSARTAN POTASSIUM 100 MG/1
100 TABLET ORAL DAILY
Qty: 30 TABLET | Refills: 1 | Status: SHIPPED | OUTPATIENT
Start: 2017-07-11 | End: 2018-09-04

## 2017-07-11 RX ORDER — OXYCODONE HYDROCHLORIDE 15 MG/1
15-30 TABLET ORAL 3 TIMES DAILY PRN
Qty: 120 TABLET | Refills: 0 | Status: SHIPPED | OUTPATIENT
Start: 2017-07-11 | End: 2017-07-28

## 2017-07-11 RX ORDER — POTASSIUM CHLORIDE 750 MG/1
10 TABLET, EXTENDED RELEASE ORAL DAILY
Qty: 10 TABLET | Refills: 0 | Status: SHIPPED | OUTPATIENT
Start: 2017-07-11 | End: 2017-10-31

## 2017-07-11 ASSESSMENT — PAIN SCALES - GENERAL: PAINLEVEL: NO PAIN (0)

## 2017-07-11 NOTE — PROGRESS NOTES
"  SUBJECTIVE:                                                    Jeff Serra is a 42 year old female who presents to clinic today for the following health issues:      Hypertension Follow-up      Outpatient blood pressures are being checked at home.  Results are up and down.    Low Salt Diet: this past weekend started doing low salt      Amount of exercise or physical activity: tried yesterday - BP, HR increased    Problems taking medications regularly: No    Medication side effects: BC? -- discharge, HA's, stomach cramps, spotting    Diet: regular (no restrictions)    SUBJECTIVE:  Here today in follow-up of hypertension. We recently changed her away from lisinopril to Hyzaar due to concerns regarding cough. Cough has actually gotten a little bit better. The patient's not feeling well overall. Just tired and run down. More abdominal cramping starting to have some breakthrough bleeding. She is on Aygestin as a way to suppress her menses. Saw Dr. Walker a couple of years ago and he suggested this versus endometrial ablation. Cannot have estrogen due to history of pulmonary emboli. Has been noted on recent lab work through urgent care or in ER the potassium is running low. Has not been replaced. Needs refill of oxycodone for chronic neck pain - Patient reports no side effects from medications, and desires no change in therapy.     Review of systems otherwise negative.  Past medical, family, and social history reviewed and updated in chart.    OBJECTIVE:  /82 (BP Location: Right arm, Patient Position: Right side, Cuff Size: Adult Regular)  Pulse 94  Temp 97.9  F (36.6  C) (Oral)  Resp 16  Ht 1.6 m (5' 3\")  Wt 82.2 kg (181 lb 3.2 oz)  SpO2 99%  BMI 32.1 kg/m2  Alert, pleasant, upbeat, and in no apparent discomfort.  S1 and S2 normal, no murmurs, clicks, gallops or rubs. Regular rate and rhythm. Chest is clear; no wheezes or rales. No edema or JVD.  The abdomen is soft without tenderness, guarding, " mass, rebound or organomegaly. Bowel sounds are normal. No CVA tenderness or inguinal adenopathy noted.  Past labs reviewed with the patient.     ASSESSMENT / PLAN:  (I10) Hypertension goal BP (blood pressure) < 140/90  (primary encounter diagnosis)  Comment: Blood pressure is well controlled on this current dosage of the but her hydrochlorothiazide seems to be dropping her potassium. This may play a large role in her overall achiness, etc.  We will go ahead and change to straight losartan at a dosage of 100 mg daily. Short-term supplementation of potassium. Plan to recheck blood pressure in 3-4 weeks  Plan: losartan (COZAAR) 100 MG tablet            (E87.6) Hypokalemia  Comment: Discontinue hydrochlorothiazide and short-term supplement of potassium  Plan: potassium chloride SA (K-DUR/KLOR-CON M) 10 MEQ        CR tablet            (N92.0) Excessive or frequent menstruation  Comment: Continue the Aygestin for now - if resolving the other issues does not help enough with her symptoms I suggest going back to see Dr. Walker  Plan:     (M54.5,  G89.29) Chronic midline low back pain without sciatica  Comment: stable - refilled   Plan: oxyCODONE (ROXICODONE) 15 MG IR tablet            (M54.2,  G89.29) Chronic neck pain  Comment:   Plan: oxyCODONE (ROXICODONE) 15 MG IR tablet            Follow up 3-4 weeks   MOISES Loredo MD    (Chart documentation completed in part with Dragon voice-recognition software.  Even though reviewed some grammatical, spelling, and word errors may remain.)

## 2017-07-11 NOTE — PATIENT INSTRUCTIONS
BP:  - Stop the Hyzaar (combination medicine)   - The water pill is lowering your potassium   - Probably making you feel crappy  - We will change to plain losartan at a dosage of 100 mg daily  - 10 days of a once daily potassium supplement to bring it back up quicker  - Recheck blood pressure in 4-6 weeks      Norethindrone:  - This might be perfectly fine once we get your potassium back to normal  - If not it looks like Dr. Walker was thinking he might need an endometrial ablation

## 2017-07-11 NOTE — MR AVS SNAPSHOT
After Visit Summary   7/11/2017    Jeff Serra    MRN: [id number]           Patient Information     Date Of Birth          1975        Visit Information        Provider Department      7/11/2017 6:40 PM Jordyn Loredo MD [facility]        Today's Diagnoses     Hypertension goal BP (blood pressure) < 140/90    -  1    Hypokalemia        Excessive or frequent menstruation        Chronic midline low back pain without sciatica        Chronic neck pain          Care Instructions    BP:  - Stop the Hyzaar (combination medicine)   - The water pill is lowering your potassium   - Probably making you feel crappy  - We will change to plain losartan at a dosage of 100 mg daily  - 10 days of a once daily potassium supplement to bring it back up quicker  - Recheck blood pressure in 4-6 weeks      Norethindrone:  - This might be perfectly fine once we get your potassium back to normal  - If not it looks like Dr. Walker was thinking he might need an endometrial ablation          Follow-ups after your visit        Follow-up notes from your care team     Return in about 6 weeks (around 8/22/2017).      Who to contact     If you have questions or need follow up information about today's clinic visit or your schedule please contact [facility] directly at [phone].  Normal or non-critical lab and imaging results will be communicated to you by MyChart, letter or phone within 4 business days after the clinic has received the results. If you do not hear from us within 7 days, please contact the clinic through MyChart or phone. If you have a critical or abnormal lab result, we will notify you by phone as soon as possible.  Submit refill requests through [employer] or call your pharmacy and they will forward the refill request to us. Please allow 3 business days for your refill to be completed.          Additional Information About Your Visit        MyChart Information      "[employer] gives you secure access to your electronic health record. If you see a primary care provider, you can also send messages to your care team and make appointments. If you have questions, please call your primary care clinic.  If you do not have a primary care provider, please call [phone] and they will assist you.        Care EveryWhere ID     This is your Care EveryWhere ID. This could be used by other organizations to access your [place] medical records  [id number]        Your Vitals Were     Pulse Temperature Respirations Height Pulse Oximetry BMI (Body Mass Index)    94 97.9  F (36.6  C) (Oral) 16 1.6 m (5' 3\") 99% 32.1 kg/m2       Blood Pressure from Last 3 Encounters:   07/11/17 128/82   06/30/17 152/89   06/06/17 132/90    Weight from Last 3 Encounters:   07/11/17 82.2 kg (181 lb 3.2 oz)   06/29/17 82.6 kg (182 lb)   06/06/17 82.9 kg (182 lb 12.8 oz)              Today, you had the following     No orders found for display         Today's Medication Changes          These changes are accurate as of: 7/11/17  6:53 PM.  If you have any questions, ask your nurse or doctor.               Start taking these medicines.        Dose/Directions    losartan 100 MG tablet   Commonly known as:  COZAAR   Used for:  Hypertension goal BP (blood pressure) < 140/90   Replaces:  losartan-hydrochlorothiazide 50-12.5 MG per tablet   Started by:  Jordyn Loredo MD        Dose:  100 mg   Take 1 tablet (100 mg) by mouth daily   Quantity:  30 tablet   Refills:  1         Stop taking these medicines if you haven't already. Please contact your care team if you have questions.     losartan-hydrochlorothiazide 50-12.5 MG per tablet   Commonly known as:  HYZAAR   Replaced by:  losartan 100 MG tablet   Stopped by:  Jordyn Loredo MD                Where to get your medicines      These medications were sent to [facility] [id number] [facility] [address] [place], [place] [address]E S AT [place] [address] " FANI REMI SANTANA [facility] [address]     Phone:  [phone]     losartan 100 MG tablet         Some of these will need a paper prescription and others can be bought over the counter.  Ask your nurse if you have questions.     Bring a paper prescription for each of these medications     oxyCODONE 15 MG IR tablet                Primary Care Provider Office Phone # Fax #    Jordyn Jae Loredo [phone]       [address] [address]        Equal Access to Services     [facility]: Hadii aad ku hadasho Soomaali, waaxda luqadaha, qaybta kaalmada adeegyada, waxay abigailin hayleigh shafer . So [facility] [phone].    ATENCIÓN: Si habla español, tiene a guy disposición servicios gratuitos de asistencia lingüística. LlReg[facility] [phone].    We comply with applicable federal civil rights laws and [place] laws. We do not discriminate on the basis of race, color, national origin, age, disability sex, sexual orientation or gender identity.            Thank you!     Thank you for choosing [facility]  for your care. Our goal is always to provide you with excellent care. Hearing back from our patients is one way we can continue to improve our services. Please take a few minutes to complete the written survey that you may receive in the mail after your visit with us. Thank you!             Your Updated Medication List - Protect others around you: Learn how to safely use, store and throw away your medicines at www.disposemymeds.org.          This list is accurate as of: 7/11/17  6:53 PM.  Always use your most recent med list.                   Brand Name Dispense Instructions for use Diagnosis    ASPIRIN PO      Take 81 mg by mouth daily Reported on 5/3/2017        fexofenadine 180 MG tablet    ALLEGRA    90 tablet    Take 1 tablet (180 mg) by mouth daily    Seasonal allergic rhinitis, unspecified allergic rhinitis trigger        loratadine 10 MG tablet    CLARITIN    30 tablet    Take 1 tablet (10 mg) by mouth daily as needed for allergies    Allergic rhinitis       losartan 100 MG tablet    COZAAR    30 tablet    Take 1 tablet (100 mg) by mouth daily    Hypertension goal BP (blood pressure) < 140/90       norethindrone 5 MG tablet    AYGESTIN    30 tablet    TAKE 1 TABLET BY MOUTH EVERY DAY    Dysmenorrhea, Excessive or frequent menstruation       oxyCODONE 15 MG IR tablet    ROXICODONE    120 tablet    Take 1-2 tablets (15-30 mg) by mouth 3 times daily as needed for moderate to severe pain    Chronic midline low back pain without sciatica, Chronic neck pain

## 2017-07-11 NOTE — NURSING NOTE
"Chief Complaint   Patient presents with     Recheck Medication       Initial /82 (BP Location: Right arm, Patient Position: Right side, Cuff Size: Adult Regular)  Pulse 94  Temp 97.9  F (36.6  C) (Oral)  Resp 16  Ht 1.6 m (5' 3\")  Wt 82.2 kg (181 lb 3.2 oz)  SpO2 99%  BMI 32.1 kg/m2 Estimated body mass index is 32.1 kg/(m^2) as calculated from the following:    Height as of this encounter: 1.6 m (5' 3\").    Weight as of this encounter: 82.2 kg (181 lb 3.2 oz).  Medication Reconciliation: complete     Will Rita SIMMONS      "

## 2017-07-17 ENCOUNTER — OFFICE VISIT (OUTPATIENT)
Dept: OBGYN | Facility: CLINIC | Age: 42
End: 2017-07-17
Payer: COMMERCIAL

## 2017-07-17 VITALS
DIASTOLIC BLOOD PRESSURE: 87 MMHG | WEIGHT: 184 LBS | SYSTOLIC BLOOD PRESSURE: 128 MMHG | HEART RATE: 103 BPM | BODY MASS INDEX: 32.59 KG/M2

## 2017-07-17 DIAGNOSIS — N92.1 BREAKTHROUGH BLEEDING: Primary | ICD-10-CM

## 2017-07-17 DIAGNOSIS — N94.10 DYSPAREUNIA IN FEMALE: ICD-10-CM

## 2017-07-17 DIAGNOSIS — R10.2 FEMALE PELVIC PAIN: ICD-10-CM

## 2017-07-17 DIAGNOSIS — N84.0 POLYP OF CORPUS UTERI: ICD-10-CM

## 2017-07-17 DIAGNOSIS — N39.46 MIXED INCONTINENCE URGE AND STRESS (MALE)(FEMALE): ICD-10-CM

## 2017-07-17 PROCEDURE — 99214 OFFICE O/P EST MOD 30 MIN: CPT | Performed by: OBSTETRICS & GYNECOLOGY

## 2017-07-17 RX ORDER — LOSARTAN POTASSIUM AND HYDROCHLOROTHIAZIDE 12.5; 5 MG/1; MG/1
TABLET ORAL
COMMUNITY
Start: 2017-07-07 | End: 2017-07-28

## 2017-07-17 NOTE — NURSING NOTE
"Chief Complaint   Patient presents with     Consult     Discuss fibroids,pressure and urinary incontinence       Initial /87 (BP Location: Right arm, Patient Position: Chair, Cuff Size: Adult Regular)  Pulse 103  Wt 83.5 kg (184 lb)  Breastfeeding? No  BMI 32.59 kg/m2 Estimated body mass index is 32.59 kg/(m^2) as calculated from the following:    Height as of 7/11/17: 1.6 m (5' 3\").    Weight as of this encounter: 83.5 kg (184 lb).  Medication Reconciliation: complete   Kathryn Pastrana CMA      "

## 2017-07-17 NOTE — PATIENT INSTRUCTIONS
If you have any questions regarding your visit, Please contact your care team.     [email or web address]: [phone]    [facility] CLINIC HOURS TELEPHONE NUMBER   AICHA Torres-    Evelyn Fraga-SHALINI Peralta-Medical Assistant   Monday-[place]  8:00a.m-4:45 p.m  Wednesday-[place] 8:00a.m-4:45 p.m.  Thursday-[place]  8:00a.m-4:45 p.m.  Friday-[place]  8:00a.m-4:45 p.m. [facility]  [address]  [place], [place] [address] [phone] ask [facility]  [phone] Fax  Imaging [phone]    [facility]  [address] [place] [address] Dr.  [place], [place] [address] [phone]    [facility]  [address] [place] [address]  [phone] ask [facility]  [phone] Fax  Imaging [phone]     Urgent Care locations:    [place]        [place] Monday-Friday  5 pm - 9 pm  Saturday and Sunday   9 am - 5 pm    Monday-Friday   11 am - 9 pm  Saturday and Sunday   9 am - 5 pm   [phone]       If you need a medication refill, please contact your pharmacy. Please allow 3 business days for your refill to be completed.  As always, Thank you for trusting us with your healthcare needs!

## 2017-07-17 NOTE — MR AVS SNAPSHOT
After Visit Summary   7/17/2017    Jeff Serra    MRN: [id number]           Patient Information     Date Of Birth          1975        Visit Information        Provider Department      7/17/2017 1:15 PM Filippo Brumfield MD [facility]        Care Instructions                                                        If you have any questions regarding your visit, Please contact your care team.     [employer] [facility]: [phone]    [facility] CLINIC HOURS TELEPHONE NUMBER   Filippo Brumfield M.D.      Arielle-    Evelyn Fraga-SHALINI Peralta-Medical Assistant   Monday-[place]  8:00a.m-4:45 p.m  Wednesday-[place] 8:00a.m-4:45 p.m.  Thursday-[place]  8:00a.m-4:45 p.m.  Friday-[place]  8:00a.m-4:45 p.m. [facility]  [address] [place] [address] [address].  [place], [place] [address] [phone] [facility] [employer]  [phone] Fax  Imaging [phone]    [facility]  [address] [place] [address] Dr.  [place], [place] [address] [phone]    [facility]  [address]  [place], [place] [address] [phone] [facility] [employer]  [phone] Fax  Imaging [phone]     Urgent Care locations:    [facility] Monday-Friday  5 pm - 9 pm  Saturday and Sunday   9 am - 5 pm    Monday-Friday   11 am - 9 pm  Saturday and Sunday   9 am - 5 pm   [phone]       If you need a medication refill, please contact your pharmacy. Please allow 3 business days for your refill to be completed.  As always, Thank you for trusting us with your healthcare needs!            Follow-ups after your visit        Who to contact     If you have questions or need follow up information about today's clinic visit or your schedule please contact [facility] directly at [phone].  Normal or non-critical lab and imaging results will be communicated to you by  MyChart, letter or phone within 4 business days after the clinic has received the results. If you do not hear from us within 7 days, please contact the clinic through [employer] or phone. If you have a critical or abnormal lab result, we will notify you by phone as soon as possible.  Submit refill requests through [employer] or call your pharmacy and they will forward the refill request to us. Please allow 3 business days for your refill to be completed.          Additional Information About Your Visit        [employer] Information     [employer] gives you secure access to your electronic health record. If you see a primary care provider, you can also send messages to your care team and make appointments. If you have questions, please call your primary care clinic.  If you do not have a primary care provider, please call [phone] and they will assist you.        Care EveryWhere ID     This is your Care EveryWhere ID. This could be used by other organizations to access your [place] medical records  [id number]        Your Vitals Were     Pulse Breastfeeding? BMI (Body Mass Index)             103 No 32.59 kg/m2          Blood Pressure from Last 3 Encounters:   07/17/17 128/87   07/11/17 128/82   06/30/17 152/89    Weight from Last 3 Encounters:   07/17/17 83.5 kg (184 lb)   07/11/17 82.2 kg (181 lb 3.2 oz)   06/29/17 82.6 kg (182 lb)              Today, you had the following     No orders found for display       Primary Care Provider Office Phone # Fax #    Jordyn Jae Loredo [phone]       [address] [address]        Equal Access to Services     [facility]: Hadii aad ku hadasho Soomaali, waaxda luqadaha, qaybta kaalmada adeegroberto, ariel marie. So [facility] [phone].    ATENCIÓN: Si habla español, tiene a guy disposición servicios gratuitos de asistencia lingüística. Llame al [phone].    We comply with applicable  federal civil rights laws and [place] laws. We do not discriminate on the basis of race, color, national origin, age, disability sex, sexual orientation or gender identity.            Thank you!     Thank you for choosing [facility]  for your care. Our goal is always to provide you with excellent care. Hearing back from our patients is one way we can continue to improve our services. Please take a few minutes to complete the written survey that you may receive in the mail after your visit with us. Thank you!             Your Updated Medication List - Protect others around you: Learn how to safely use, store and throw away your medicines at www.disposemymeds.org.          This list is accurate as of: 7/17/17  1:18 PM.  Always use your most recent med list.                   Brand Name Dispense Instructions for use Diagnosis    ASPIRIN PO      Take 81 mg by mouth daily Reported on 5/3/2017        fexofenadine 180 MG tablet    ALLEGRA    90 tablet    Take 1 tablet (180 mg) by mouth daily    Seasonal allergic rhinitis, unspecified allergic rhinitis trigger       loratadine 10 MG tablet    CLARITIN    30 tablet    Take 1 tablet (10 mg) by mouth daily as needed for allergies    Allergic rhinitis       losartan 100 MG tablet    COZAAR    30 tablet    Take 1 tablet (100 mg) by mouth daily    Hypertension goal BP (blood pressure) < 140/90       losartan-hydrochlorothiazide 50-12.5 MG per tablet    HYZAAR          norethindrone 5 MG tablet    AYGESTIN    30 tablet    TAKE 1 TABLET BY MOUTH EVERY DAY    Dysmenorrhea, Excessive or frequent menstruation       oxyCODONE 15 MG IR tablet    ROXICODONE    120 tablet    Take 1-2 tablets (15-30 mg) by mouth 3 times daily as needed for moderate to severe pain    Chronic midline low back pain without sciatica, Chronic neck pain       potassium chloride SA 10 MEQ CR tablet    K-DUR/KLOR-CON M    10 tablet    Take 1 tablet (10 mEq) by mouth daily    Hypokalemia

## 2017-07-17 NOTE — Clinical Note
Filippo Brumfield MD  [facility] Patient Care      Please assist patient in scheduling the surgery listed with Dr. Walker if he is in agreement.  Please review or place surgery orders to OB/GYN pool if approved.

## 2017-07-17 NOTE — LETTER
2017    RE: Jeff Serra,  1975    To Whom It May Concern:    Jeff Serra is under our care and was seen at our office on 2017 for a medical appointment.  I hope this information is sufficient for your needs.  If you have any additional questions regarding this matter please contact our office.  Thank you.      Sincerely,        Filippo Brumfield MD

## 2017-07-19 NOTE — PROGRESS NOTES
OB-GYN Problem-Oriented Visit or Consultation      Jeff Serra is a 42 year old year old P 5 who presents with a chief complaint of BTB on progestin treatment.  Referred by self.  No LMP recorded. Patient is not currently having periods (Reason: Birth Control).    HPI:     Followed by Dr. Walker and seeing me today to get in sooner. Had dysmenorrhea, menometrorrhagia, pelvic pain, and dyspareunia last yr and had laparoscopy by Dr. Walker showing a soft mobile uterus with subinvolution but no discrete fibroids. Patient thought she had fibroids. Pelvic u/s and MRI also were normal without adenomyosis but a tiny 4 mm endometrial polyp was suspected. Started on continuous Aygestin for suppression of menses and this worked until recent BTB spotting to light flow despite taking consistently. History of VTE and HTN contraindicate estrogen. Dr. Walker had advised endometrial ablation as next step if needed. Has new pelvic pressure also and dyspareunia and she thought this may be due to fibroids. Contraceptive method is TL. Also patient describes mixed urinary incontinence and desiring treatment.     Past medical, obstetrical, surgical, family and social history reviewed and as noted or updated in chart.     Allergies, meds and supplements are as noted or updated in chart.      ROS:   Systems reviewed include:  constitutional, gastrointestinal, genitourinary, psychological, hematologic/lymphatic and endocrine.    These systems were negative for significant symptoms except for the following additional: none; see HPI.    EXAM:  VS as noted.   Exam not repeated at this time.    Assessment:   Encounter Diagnoses   Name Primary?     Breakthrough bleeding Yes     Dyspareunia in female      Mixed incontinence urge and stress (male)(female)      Polyp of corpus uteri      Female pelvic pain      Plan and Recommendations: I reviewed the condition, causes, differential diagnosis, prognosis, evaluation and management  considerations and options.  Questions answered and information given. See orders. Advise hysteroscopy with possible Myosure polypectomy, dilatation and curettage, and endometrial ablation. I discussed the operation, indications, goals, general and specific risks, complications, alternatives and anticipated post-op course including success/failure rates, limitations and consequences.  Patient understands and desires to proceed and will arrange with Dr. Walker if he concurs. ACOG information given. Instructions reviewed. Pre-anesthetic medical evaluation is to be arranged.  Also advise [facility] consult with Dr. Washington.   Total encounter time= 30min. Direct counseling, education and care coordination time with the patient present= 20min.     A/P:  Jeff was seen today for consult.    Diagnoses and all orders for this visit:    Breakthrough bleeding    Dyspareunia in female    Mixed incontinence urge and stress (male)(female)  -     UROLOGY ADULT REFERRAL    Polyp of corpus uteri    Female pelvic pain        Filippo Brumfield MD

## 2017-07-20 ENCOUNTER — TELEPHONE (OUTPATIENT)
Dept: OBGYN | Facility: CLINIC | Age: 42
End: 2017-07-20

## 2017-07-20 DIAGNOSIS — N92.0 EXCESSIVE OR FREQUENT MENSTRUATION: Primary | ICD-10-CM

## 2017-07-20 NOTE — TELEPHONE ENCOUNTER
Patient seen on 7/17/17 with Dr. Brumfield. Chart routed to Dr. Walker to advise and place orders.    Office notes below.    Kathryn Pastrana CMA      Plan and Recommendations: I reviewed the condition, causes, differential diagnosis, prognosis, evaluation and management considerations and options.  Questions answered and information given. See orders. Advise hysteroscopy with possible Myosure polypectomy, dilatation and curettage, and endometrial ablation. I discussed the operation, indications, goals, general and specific risks, complications, alternatives and anticipated post-op course including success/failure rates, limitations and consequences.  Patient understands and desires to proceed and will arrange with Dr. Walker if he concurs. ACOG information given. Instructions reviewed. Pre-anesthetic medical evaluation is to be arranged.  Also advise [facility] consult with Dr. Washington.   Total encounter time= 30min. Direct counseling, education and care coordination time with the patient present= 20min.

## 2017-07-20 NOTE — TELEPHONE ENCOUNTER
[facility] Call Center    Phone Message    Name of Caller: Jeff    Phone Number: Home number on file [phone] (home)    Best time to return call: Any    May a detailed message be left on voicemail: yes    Relation to patient: Self    Reason for Call: Jeff called to schedule a Hysteroscopy.  She will wait for a call back.  Thank you.     Action Taken: Message routed to:  [facility] p [phone]

## 2017-07-23 NOTE — TELEPHONE ENCOUNTER
DX:   Encounter Diagnosis   Name Primary?     Excessive or frequent menstruation Yes        Procedure: Diagnostic Hysteroscopy, possible polypectomy versus biopsy, Endometrial ablation with Novasure    This is   A/an Outpatient procedure.     Date:       Time:      There is no height or weight on file to calculate BMI.   Location:        is at [facility]       Anesthesia: Local with MAC    Pre-op/Dates:     Post op appointment needed:  No    Pt: Pkt:  Pre Cert:  Notes:

## 2017-07-25 NOTE — TELEPHONE ENCOUNTER
Surgery Scheduled.    Date of Surgery 8/15/17 Time of Surgery 12:45 p.m.  Procedure: Diagnostic Hysteroscopy, possible polypectomy versus biopsy, endometrial ablation with Novasure  Hospital/Surgical Facility: [facility]  Surgeon: Dr. Walker  Type of Anesthesia Anticipated: Local with MAC  Pre-op: 7/2/17 with Dr. Loredo at 3:00 p.m    Pre-certification 7/25/17  Consent signed: N/A  Hospital Stay Same Day       [facility] surgery packet mailed to patient's home address.  Patient instructed NPO 12 hours prior to surgery, arrive 1.5 hours prior to surgery, Must have a .  Patient understood and agrees to the plan.      Kathryn Pastrana CMA

## 2017-07-25 NOTE — TELEPHONE ENCOUNTER
Surgery Pre-Certification    Medical Record Number: [id number]  Jeff Serra  YOB: 1975   Phone: [phone] (home) none (work)  Primary Provider: Jordyn Loredo    Reason for Admit:  Excessive or Frequent Menstruation    Surgeon: MICK Walker MD  Surgical Procedure: Diagnostic Hysteroscopy, Possible Polypectomy versus Biopsy, Endometrial ablation with Novasure.  ICD-9 Coded: N92.0  Date of Surgery: 8/15/17  Consent signed? N/A      Hospital: [facility]  Outpatient    Requestor:  Kathryn Pastrana     Location:  [facility] [phone]

## 2017-07-28 ENCOUNTER — OFFICE VISIT (OUTPATIENT)
Dept: FAMILY MEDICINE | Facility: CLINIC | Age: 42
End: 2017-07-28
Payer: COMMERCIAL

## 2017-07-28 VITALS
SYSTOLIC BLOOD PRESSURE: 130 MMHG | OXYGEN SATURATION: 99 % | WEIGHT: 185.8 LBS | DIASTOLIC BLOOD PRESSURE: 88 MMHG | HEIGHT: 63 IN | HEART RATE: 88 BPM | RESPIRATION RATE: 16 BRPM | TEMPERATURE: 98.5 F | BODY MASS INDEX: 32.92 KG/M2

## 2017-07-28 DIAGNOSIS — G89.29 CHRONIC MIDLINE LOW BACK PAIN WITHOUT SCIATICA: ICD-10-CM

## 2017-07-28 DIAGNOSIS — M54.50 CHRONIC MIDLINE LOW BACK PAIN WITHOUT SCIATICA: ICD-10-CM

## 2017-07-28 DIAGNOSIS — M54.2 CHRONIC NECK PAIN: ICD-10-CM

## 2017-07-28 DIAGNOSIS — G89.29 CHRONIC NECK PAIN: ICD-10-CM

## 2017-07-28 DIAGNOSIS — Z01.818 PREOP GENERAL PHYSICAL EXAM: Primary | ICD-10-CM

## 2017-07-28 PROCEDURE — 84132 ASSAY OF SERUM POTASSIUM: CPT | Performed by: FAMILY MEDICINE

## 2017-07-28 PROCEDURE — 99214 OFFICE O/P EST MOD 30 MIN: CPT | Performed by: FAMILY MEDICINE

## 2017-07-28 PROCEDURE — 36415 COLL VENOUS BLD VENIPUNCTURE: CPT | Performed by: FAMILY MEDICINE

## 2017-07-28 RX ORDER — AMOXICILLIN 250 MG
1 CAPSULE ORAL DAILY
Qty: 100 TABLET | Status: CANCELLED | OUTPATIENT
Start: 2017-07-28

## 2017-07-28 RX ORDER — AMOXICILLIN 250 MG
1 CAPSULE ORAL DAILY
COMMUNITY
End: 2017-07-28

## 2017-07-28 RX ORDER — OXYCODONE HYDROCHLORIDE 15 MG/1
15-30 TABLET ORAL 3 TIMES DAILY PRN
Qty: 120 TABLET | Refills: 0 | Status: SHIPPED | OUTPATIENT
Start: 2017-08-11 | End: 2017-09-08

## 2017-07-28 RX ORDER — AMOXICILLIN 250 MG
1 CAPSULE ORAL 2 TIMES DAILY PRN
Qty: 60 TABLET | Refills: 5 | Status: SHIPPED | OUTPATIENT
Start: 2017-07-28 | End: 2018-05-30

## 2017-07-28 ASSESSMENT — PAIN SCALES - GENERAL: PAINLEVEL: SEVERE PAIN (6)

## 2017-07-28 NOTE — LETTER
[address] [address]  Phone: [phone]    July 28, 2017        Jeff Serra  [address] [place] [id number]          To whom it may concern:    RE: Jeff Serra    Off work 7/26 - 8/30/17    Please contact me for questions or concerns.      Sincerely,        Jordyn Loredo MD

## 2017-07-28 NOTE — MR AVS SNAPSHOT
After Visit Summary   7/28/2017    Jeff Serra    MRN: [id number]           Patient Information     Date Of Birth          1975        Visit Information        Provider Department      7/28/2017 3:00 PM Jordyn Loredo MD [facility]        Today's Diagnoses     Preop general physical exam    -  1    Chronic midline low back pain without sciatica        Chronic neck pain          Care Instructions      Before Your Surgery      Call your surgeon if there is any change in your health. This includes signs of a cold or flu (such as a sore throat, runny nose, cough, rash or fever).    Do not smoke, drink alcohol or take over the counter medicine (unless your surgeon or primary care doctor tells you to) for the 24 hours before and after surgery.    If you take prescribed drugs: Follow your doctor s orders about which medicines to take and which to stop until after surgery.    Eating and drinking prior to surgery: follow the instructions from your surgeon    Take a shower or bath the night before surgery. Use the soap your surgeon gave you to gently clean your skin. If you do not have soap from your surgeon, use your regular soap. Do not shave or scrub the surgery site.  Wear clean pajamas and have clean sheets on your bed.           Follow-ups after your visit        Who to contact     If you have questions or need follow up information about today's clinic visit or your schedule please contact [facility] directly at [phone].  Normal or non-critical lab and imaging results will be communicated to you by MyChart, letter or phone within 4 business days after the clinic has received the results. If you do not hear from us within 7 days, please contact the clinic through [employer] or phone. If you have a critical or abnormal lab result, we will notify you by phone as soon as possible.  Submit refill requests through [employer] or call your pharmacy and they  "will forward the refill request to us. Please allow 3 business days for your refill to be completed.          Additional Information About Your Visit        [employer] Information     [employer] gives you secure access to your electronic health record. If you see a primary care provider, you can also send messages to your care team and make appointments. If you have questions, please call your primary care clinic.  If you do not have a primary care provider, please call [phone] and they will assist you.        Care EveryWhere ID     This is your Care EveryWhere ID. This could be used by other organizations to access your [place] medical records  [id number]        Your Vitals Were     Pulse Temperature Respirations Height Pulse Oximetry BMI (Body Mass Index)    88 98.5  F (36.9  C) (Oral) 16 1.6 m (5' 3\") 99% 32.91 kg/m2       Blood Pressure from Last 3 Encounters:   07/28/17 130/88   07/17/17 128/87   07/11/17 128/82    Weight from Last 3 Encounters:   07/28/17 84.3 kg (185 lb 12.8 oz)   07/17/17 83.5 kg (184 lb)   07/11/17 82.2 kg (181 lb 3.2 oz)              We Performed the Following     Potassium          Today's Medication Changes          These changes are accurate as of: 7/28/17  5:28 PM.  If you have any questions, ask your nurse or doctor.               These medicines have changed or have updated prescriptions.        Dose/Directions    senna-docusate 8.6-50 MG per tablet   Commonly known as:  SENOKOT-S;PERICOLACE   This may have changed:    - when to take this  - reasons to take this   Used for:  Chronic neck pain   Changed by:  Jordyn Loredo MD        Dose:  1 tablet   Take 1 tablet by mouth 2 times daily as needed for constipation   Quantity:  60 tablet   Refills:  5         Stop taking these medicines if you haven't already. Please contact your care team if you have questions.     ASPIRIN PO   Stopped by:  Jordyn Loredo MD           losartan-hydrochlorothiazide 50-12.5 MG per " tablet   Commonly known as:  HYZAAR   Stopped by:  Jordyn Loredo MD                Where to get your medicines      These medications were sent to [id number] - [address] AT [facility] [address], [facility] [address]     Phone:  [phone]     senna-docusate 8.6-50 MG per tablet         Some of these will need a paper prescription and others can be bought over the counter.  Ask your nurse if you have questions.     Bring a paper prescription for each of these medications     oxyCODONE 15 MG IR tablet                Primary Care Provider Office Phone # Fax #    Jordyn Loredo [phone]       [address] [address]        Equal Access to Services     KHADAR BAEZ : Hadii aad ku hadasho Soomaali, waaxda luqadaha, qaybta kaalmada adeegyada, waxay abigailin hayaan hammad shafer . So [facility] [phone].    ATENCIÓN: Si habla español, tiene a guy disposición servicios gratuitos de asistencia lingüística. Llame al [phone].    We comply with applicable federal civil rights laws and [place] laws. We do not discriminate on the basis of race, color, national origin, age, disability sex, sexual orientation or gender identity.            Thank you!     Thank you for choosing [facility]  for your care. Our goal is always to provide you with excellent care. Hearing back from our patients is one way we can continue to improve our services. Please take a few minutes to complete the written survey that you may receive in the mail after your visit with us. Thank you!             Your Updated Medication List - Protect others around you: Learn how to safely use, store and throw away your medicines at www.disposemymeds.org.          This list is accurate as of: 7/28/17  5:28 PM.  Always use your most recent med list.                   Brand Name Dispense  Instructions for use Diagnosis    fexofenadine 180 MG tablet    ALLEGRA    90 tablet    Take 1 tablet (180 mg) by mouth daily    Seasonal allergic rhinitis, unspecified allergic rhinitis trigger       loratadine 10 MG tablet    CLARITIN    30 tablet    Take 1 tablet (10 mg) by mouth daily as needed for allergies    Allergic rhinitis       losartan 100 MG tablet    COZAAR    30 tablet    Take 1 tablet (100 mg) by mouth daily    Hypertension goal BP (blood pressure) < 140/90       norethindrone 5 MG tablet    AYGESTIN    30 tablet    TAKE 1 TABLET BY MOUTH EVERY DAY    Dysmenorrhea, Excessive or frequent menstruation       oxyCODONE 15 MG IR tablet   Start taking on:  8/11/2017    ROXICODONE    120 tablet    Take 1-2 tablets (15-30 mg) by mouth 3 times daily as needed for moderate to severe pain    Chronic midline low back pain without sciatica, Chronic neck pain       potassium chloride SA 10 MEQ CR tablet    K-DUR/KLOR-CON M    10 tablet    Take 1 tablet (10 mEq) by mouth daily    Hypokalemia       senna-docusate 8.6-50 MG per tablet    SENOKOT-S;PERICOLACE    60 tablet    Take 1 tablet by mouth 2 times daily as needed for constipation    Chronic neck pain
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
## 2017-07-28 NOTE — NURSING NOTE
"Chief Complaint   Patient presents with     Pre-Op Exam       Initial /88 (BP Location: Right arm, Patient Position: Right side, Cuff Size: Adult Large)  Pulse 88  Temp 98.5  F (36.9  C) (Oral)  Resp 16  Ht 1.6 m (5' 3\")  Wt 84.3 kg (185 lb 12.8 oz)  SpO2 99%  BMI 32.91 kg/m2 Estimated body mass index is 32.91 kg/(m^2) as calculated from the following:    Height as of this encounter: 1.6 m (5' 3\").    Weight as of this encounter: 84.3 kg (185 lb 12.8 oz).  Medication Reconciliation: complete     Will Rita SIMMONS      "

## 2017-07-31 LAB — POTASSIUM SERPL-SCNC: 3.5 MMOL/L (ref 3.4–5.3)

## 2017-08-07 ENCOUNTER — TELEPHONE (OUTPATIENT)
Dept: FAMILY MEDICINE | Facility: CLINIC | Age: 42
End: 2017-08-07

## 2017-08-11 ENCOUNTER — TELEPHONE (OUTPATIENT)
Dept: FAMILY MEDICINE | Facility: CLINIC | Age: 42
End: 2017-08-11

## 2017-08-11 NOTE — TELEPHONE ENCOUNTER
Received a fax from [facility] to inform that patient is having surgery 8/15/2107.     [facility] would like the form signed to authorize the surgery.     Forwarding to Provider to review and sign or advise.        Sonya Henao CMA

## 2017-08-15 ENCOUNTER — TELEPHONE (OUTPATIENT)
Dept: OBGYN | Facility: CLINIC | Age: 42
End: 2017-08-15

## 2017-08-15 NOTE — TELEPHONE ENCOUNTER
Message handled by Nurse Triage with [facility] - provider name: Dr. Walker.  Dr. Walker stated he did not realize patient was given a Rx a few days ago for such a large quantity. Dr. Walker does not want patient to have the oxycodone Rx that he gave her today and can use her previous Rx for the post-op pain prn. Dr. Walker requested that pharmacist would destroy/shred the Rx.   Return call to pharmacy and spoke to Santa. Gave orders per Dr. Walker. She stated she has the Rx but the patient is not waiting for Rx as she was in the drive through. She will shred the Rx and call the patient to update her. Philly Villarreal RN, BAN

## 2017-08-15 NOTE — TELEPHONE ENCOUNTER
Pharmacist   oxyCODONE (ROXICODONE) 15 MG IR tablet 120 tablet 0 8/11/2017  No   Sig: Take 1-2 tablets (15-30 mg) by mouth 3 times daily as needed for moderate to severe pa     Phone call from pharmacist stating patient is wanting to fill an oxycodone 5 mg #15 tab take every 4-6 hr prn pain.   Pharmacist wanted Dr. Walker to be aware that patient filled above Rx on 08-09-17. Noted a different strength oxycodone.   Explained do noted patient had surgery today but Dr. Walker is not in clinic now. Explained unable to give verbal to fill Rx and will have to get message to Dr. Walker. Explained will get a message to him but not sure if he will see the message today.  Noted patient had surgery today so was given for post op pain.     Will call back to pharmacy as quickly as possible. Patient using [facility] in [place]. Philly Villarreal RN, BAN

## 2017-09-08 ENCOUNTER — MYC REFILL (OUTPATIENT)
Dept: FAMILY MEDICINE | Facility: CLINIC | Age: 42
End: 2017-09-08

## 2017-09-08 DIAGNOSIS — G89.29 CHRONIC MIDLINE LOW BACK PAIN WITHOUT SCIATICA: ICD-10-CM

## 2017-09-08 DIAGNOSIS — G89.29 CHRONIC NECK PAIN: ICD-10-CM

## 2017-09-08 DIAGNOSIS — M54.2 CHRONIC NECK PAIN: ICD-10-CM

## 2017-09-08 DIAGNOSIS — M54.50 CHRONIC MIDLINE LOW BACK PAIN WITHOUT SCIATICA: ICD-10-CM

## 2017-09-08 RX ORDER — OXYCODONE HYDROCHLORIDE 15 MG/1
15-30 TABLET ORAL 3 TIMES DAILY PRN
Qty: 120 TABLET | Refills: 0 | Status: SHIPPED | OUTPATIENT
Start: 2017-09-09 | End: 2017-10-10

## 2017-09-08 NOTE — TELEPHONE ENCOUNTER
Message from MyChart:  Original authorizing provider: MD Jeff Membreno GEORGETTEKelly Donohueon would like a refill of the following medications:  oxyCODONE (ROXICODONE) 15 MG IR tablet [Jordyn Loredo MD]    Preferred pharmacy: [facility] [address] [place] [address] [facility] [address] [facility]    Comment:

## 2017-09-08 NOTE — TELEPHONE ENCOUNTER
oxyCODONE (ROXICODONE) 15 MG IR tablet      Last Written Prescription Date:  8/11/17  Last Fill Quantity: 120,   # refills: 0  Last Office Visit with [facility], [facility] or [facility] prescribing provider: 7/28/17 Dr. Loredo  Future Office visit:       Routing refill request to provider for review/approval because:  Drug not on the [facility], [facility] or [facility] refill protocol or controlled substance

## 2017-09-14 ENCOUNTER — APPOINTMENT (OUTPATIENT)
Dept: CT IMAGING | Facility: CLINIC | Age: 42
End: 2017-09-14
Attending: EMERGENCY MEDICINE
Payer: COMMERCIAL

## 2017-09-14 ENCOUNTER — TELEPHONE (OUTPATIENT)
Dept: OBGYN | Facility: CLINIC | Age: 42
End: 2017-09-14

## 2017-09-14 ENCOUNTER — APPOINTMENT (OUTPATIENT)
Dept: ULTRASOUND IMAGING | Facility: CLINIC | Age: 42
End: 2017-09-14
Attending: EMERGENCY MEDICINE
Payer: COMMERCIAL

## 2017-09-14 ENCOUNTER — HOSPITAL ENCOUNTER (EMERGENCY)
Facility: CLINIC | Age: 42
Discharge: HOME | End: 2017-09-14
Attending: EMERGENCY MEDICINE | Admitting: EMERGENCY MEDICINE
Payer: COMMERCIAL

## 2017-09-14 VITALS
TEMPERATURE: 98.1 F | SYSTOLIC BLOOD PRESSURE: 139 MMHG | BODY MASS INDEX: 32.78 KG/M2 | WEIGHT: 185 LBS | HEIGHT: 63 IN | DIASTOLIC BLOOD PRESSURE: 91 MMHG | OXYGEN SATURATION: 99 % | HEART RATE: 88 BPM | RESPIRATION RATE: 185 BRPM

## 2017-09-14 DIAGNOSIS — R35.0 URINARY FREQUENCY: ICD-10-CM

## 2017-09-14 DIAGNOSIS — R10.84 ABDOMINAL PAIN, GENERALIZED: ICD-10-CM

## 2017-09-14 LAB
ALBUMIN SERPL-MCNC: 3.9 G/DL (ref 3.4–5)
ALBUMIN UR-MCNC: NEGATIVE MG/DL
ALP SERPL-CCNC: 75 U/L (ref 40–150)
ALT SERPL W P-5'-P-CCNC: 39 U/L (ref 0–50)
ANION GAP SERPL CALCULATED.3IONS-SCNC: 6 MMOL/L (ref 3–14)
APPEARANCE UR: CLEAR
AST SERPL W P-5'-P-CCNC: 13 U/L (ref 0–45)
BACTERIA #/AREA URNS HPF: ABNORMAL /HPF
BASOPHILS # BLD AUTO: 0 10E9/L (ref 0–0.2)
BASOPHILS NFR BLD AUTO: 0.5 %
BILIRUB SERPL-MCNC: 1 MG/DL (ref 0.2–1.3)
BILIRUB UR QL STRIP: NEGATIVE
BUN SERPL-MCNC: 5 MG/DL (ref 7–30)
CALCIUM SERPL-MCNC: 8.5 MG/DL (ref 8.5–10.1)
CHLORIDE SERPL-SCNC: 105 MMOL/L (ref 94–109)
CO2 SERPL-SCNC: 29 MMOL/L (ref 20–32)
COLOR UR AUTO: YELLOW
CREAT SERPL-MCNC: 0.66 MG/DL (ref 0.52–1.04)
DIFFERENTIAL METHOD BLD: NORMAL
EOSINOPHIL # BLD AUTO: 0.1 10E9/L (ref 0–0.7)
EOSINOPHIL NFR BLD AUTO: 0.8 %
ERYTHROCYTE [DISTWIDTH] IN BLOOD BY AUTOMATED COUNT: 13.9 % (ref 10–15)
GFR SERPL CREATININE-BSD FRML MDRD: >90 ML/MIN/1.7M2
GLUCOSE SERPL-MCNC: 83 MG/DL (ref 70–99)
GLUCOSE UR STRIP-MCNC: NEGATIVE MG/DL
HCT VFR BLD AUTO: 37.7 % (ref 35–47)
HGB BLD-MCNC: 13.3 G/DL (ref 11.7–15.7)
HGB UR QL STRIP: NEGATIVE
IMM GRANULOCYTES # BLD: 0 10E9/L (ref 0–0.4)
IMM GRANULOCYTES NFR BLD: 0.2 %
KETONES UR STRIP-MCNC: NEGATIVE MG/DL
LACTATE BLD-SCNC: 1.2 MMOL/L (ref 0.7–2)
LEUKOCYTE ESTERASE UR QL STRIP: ABNORMAL
LYMPHOCYTES # BLD AUTO: 1.6 10E9/L (ref 0.8–5.3)
LYMPHOCYTES NFR BLD AUTO: 24.4 %
MCH RBC QN AUTO: 30.2 PG (ref 26.5–33)
MCHC RBC AUTO-ENTMCNC: 35.3 G/DL (ref 31.5–36.5)
MCV RBC AUTO: 86 FL (ref 78–100)
MONOCYTES # BLD AUTO: 0.5 10E9/L (ref 0–1.3)
MONOCYTES NFR BLD AUTO: 7.5 %
NEUTROPHILS # BLD AUTO: 4.3 10E9/L (ref 1.6–8.3)
NEUTROPHILS NFR BLD AUTO: 66.6 %
NITRATE UR QL: NEGATIVE
NRBC # BLD AUTO: 0 10*3/UL
NRBC BLD AUTO-RTO: 0 /100
PH UR STRIP: 7.5 PH (ref 5–7)
PLATELET # BLD AUTO: 186 10E9/L (ref 150–450)
POTASSIUM SERPL-SCNC: 3.2 MMOL/L (ref 3.4–5.3)
PROT SERPL-MCNC: 7.3 G/DL (ref 6.8–8.8)
RBC # BLD AUTO: 4.4 10E12/L (ref 3.8–5.2)
RBC #/AREA URNS AUTO: ABNORMAL /HPF
SODIUM SERPL-SCNC: 140 MMOL/L (ref 133–144)
SOURCE: ABNORMAL
SP GR UR STRIP: 1.01 (ref 1–1.03)
TRICHOMONAS #/AREA URNS HPF: PRESENT /HPF
UROBILINOGEN UR STRIP-ACNC: 0.2 EU/DL (ref 0.2–1)
WBC # BLD AUTO: 6.8 10E9/L (ref 4–11)
WBC #/AREA URNS AUTO: ABNORMAL /HPF

## 2017-09-14 PROCEDURE — 25000128 H RX IP 250 OP 636: Performed by: EMERGENCY MEDICINE

## 2017-09-14 PROCEDURE — 96361 HYDRATE IV INFUSION ADD-ON: CPT

## 2017-09-14 PROCEDURE — 96376 TX/PRO/DX INJ SAME DRUG ADON: CPT

## 2017-09-14 PROCEDURE — 81001 URINALYSIS AUTO W/SCOPE: CPT | Performed by: EMERGENCY MEDICINE

## 2017-09-14 PROCEDURE — 80053 COMPREHEN METABOLIC PANEL: CPT | Performed by: EMERGENCY MEDICINE

## 2017-09-14 PROCEDURE — 99285 EMERGENCY DEPT VISIT HI MDM: CPT | Mod: 25

## 2017-09-14 PROCEDURE — 74177 CT ABD & PELVIS W/CONTRAST: CPT

## 2017-09-14 PROCEDURE — 83605 ASSAY OF LACTIC ACID: CPT | Performed by: EMERGENCY MEDICINE

## 2017-09-14 PROCEDURE — 96375 TX/PRO/DX INJ NEW DRUG ADDON: CPT

## 2017-09-14 PROCEDURE — 25000132 ZZH RX MED GY IP 250 OP 250 PS 637: Performed by: EMERGENCY MEDICINE

## 2017-09-14 PROCEDURE — 85025 COMPLETE CBC W/AUTO DIFF WBC: CPT | Performed by: EMERGENCY MEDICINE

## 2017-09-14 PROCEDURE — 76830 TRANSVAGINAL US NON-OB: CPT

## 2017-09-14 PROCEDURE — 25000125 ZZHC RX 250: Performed by: EMERGENCY MEDICINE

## 2017-09-14 PROCEDURE — 96374 THER/PROPH/DIAG INJ IV PUSH: CPT

## 2017-09-14 RX ORDER — IOPAMIDOL 755 MG/ML
93 INJECTION, SOLUTION INTRAVASCULAR ONCE
Status: COMPLETED | OUTPATIENT
Start: 2017-09-14 | End: 2017-09-14

## 2017-09-14 RX ORDER — MORPHINE SULFATE 4 MG/ML
4 INJECTION, SOLUTION INTRAMUSCULAR; INTRAVENOUS
Status: DISCONTINUED | OUTPATIENT
Start: 2017-09-14 | End: 2017-09-14 | Stop reason: HOSPADM

## 2017-09-14 RX ORDER — POTASSIUM CHLORIDE 1.5 G/1.58G
40 POWDER, FOR SOLUTION ORAL ONCE
Status: COMPLETED | OUTPATIENT
Start: 2017-09-14 | End: 2017-09-14

## 2017-09-14 RX ORDER — CEPHALEXIN 500 MG/1
500 CAPSULE ORAL 3 TIMES DAILY
Qty: 21 CAPSULE | Refills: 0 | Status: SHIPPED | OUTPATIENT
Start: 2017-09-14 | End: 2017-09-21

## 2017-09-14 RX ORDER — ONDANSETRON 2 MG/ML
4 INJECTION INTRAMUSCULAR; INTRAVENOUS EVERY 30 MIN PRN
Status: DISCONTINUED | OUTPATIENT
Start: 2017-09-14 | End: 2017-09-14 | Stop reason: HOSPADM

## 2017-09-14 RX ADMIN — MORPHINE SULFATE 4 MG: 4 INJECTION, SOLUTION INTRAMUSCULAR; INTRAVENOUS at 13:51

## 2017-09-14 RX ADMIN — IOPAMIDOL 93 ML: 755 INJECTION, SOLUTION INTRAVENOUS at 16:40

## 2017-09-14 RX ADMIN — POTASSIUM CHLORIDE 40 MEQ: 1.5 POWDER, FOR SOLUTION ORAL at 16:03

## 2017-09-14 RX ADMIN — ONDANSETRON 4 MG: 2 SOLUTION INTRAMUSCULAR; INTRAVENOUS at 13:51

## 2017-09-14 RX ADMIN — MORPHINE SULFATE 4 MG: 4 INJECTION, SOLUTION INTRAMUSCULAR; INTRAVENOUS at 16:03

## 2017-09-14 RX ADMIN — SODIUM CHLORIDE 68 ML: 9 INJECTION, SOLUTION INTRAVENOUS at 16:40

## 2017-09-14 RX ADMIN — SODIUM CHLORIDE 1000 ML: 9 INJECTION, SOLUTION INTRAVENOUS at 13:46

## 2017-09-14 ASSESSMENT — ENCOUNTER SYMPTOMS
DYSURIA: 0
ABDOMINAL PAIN: 1
VOMITING: 0
FREQUENCY: 0
CHILLS: 0
FEVER: 1
HEMATURIA: 0
DIARRHEA: 1
NAUSEA: 0

## 2017-09-14 NOTE — TELEPHONE ENCOUNTER
Patient called with concerns of acute sharp pain to her right lower abdomen.  The pain she describes is deep closest to where her right tube is located.  She rated her pain an 8 out of 10 on the pain scale. She is post op hysteroscopy endometrial ablation with Novasure 8/15/2017.  Dr. Walker is not in clinic until Monday.  I huddled with Dr. Lazcano.  Patient needs to be evaluated in the ER.  Possible ultrasound to be done.  Patient understands and will go to [facility].  Lucita Lipscomb RN

## 2017-09-14 NOTE — ED AVS SNAPSHOT
Emergency Department    [address] [place] [address]    Phone:  [phone]    Fax:  [phone]                                       Jeff Serra   MRN: [id number]    Department:   Emergency Department   Date of Visit:  9/14/2017           After Visit Summary Signature Page     I have received my discharge instructions, and my questions have been answered. I have discussed any challenges I see with this plan with the nurse or doctor.    ..........................................................................................................................................  Patient/Patient Representative Signature      ..........................................................................................................................................  Patient Representative Print Name and Relationship to Patient    ..................................................               ................................................  Date                                            Time    ..........................................................................................................................................  Reviewed by Signature/Title    ...................................................              ..............................................  Date                                                            Time

## 2017-09-14 NOTE — ED PROVIDER NOTES
"  History     Chief Complaint:  Abdominal pain    HPI   Jeff Serra is a 42 year old female who presents with abdominal pain. The patient recently underwent endometrial ablation last month on 8/15 and has otherwise been feeling fine without complication. She states that last night she had a fairly quick onset of suprapubic abdominal pain with some associated subjective fever and small amount of diarrhea. The pain has remained constant without relief. The patient called her OBGYN clinic today to schedule an appointment but given the location of her pain she was prompted to come here for emergency evaluation. Here, she localizes it in her lower abdomen, right worse than mid/central. She denies any nausea, vomiting, vaginal discharge/bleeding, urinary symptoms.     Sexually active, but monogamous with , no vaginal discharge.  No dysuria, but does state mild increase in frequency.    Allergies:  No known drug allergies     Medications:    Oxycodone  Cozaar  K-dur  Allegra  Claritin     Past Medical History:    Chronic low back pain  Chronic neck pain  Hypertension  Kidney stone  PE  DVT     Past Surgical History:    Laparoscopy diagnostic  Tubal ligation  Endometrial ablation  Lithotripsy     Family History:    Heart disease     Social History:  Smoking status: Never smoker  Alcohol use: Occasionally    Marital Status:        Review of Systems   Constitutional: Positive for fever. Negative for chills.   Gastrointestinal: Positive for abdominal pain and diarrhea. Negative for nausea and vomiting.   Genitourinary: Negative for dysuria, frequency, hematuria and vaginal discharge.   All other systems reviewed and are negative.      Physical Exam   Patient Vitals for the past 24 hrs:   BP Temp Pulse Resp SpO2 Height Weight   09/14/17 1600 (!) 143/92 - - - 100 % - -   09/14/17 1430 - - - - 100 % - -   09/14/17 1306 (!) 158/94 98.1  F (36.7  C) 88 (!) 185 100 % 1.6 m (5' 3\") 83.9 kg (185 lb)      Physical " Exam  General: Pt seen on [facility], cooperative, and alert to conversation  Eyes: Tracking well, clear conj BL  ENT: MMM, neck supple with no TTP  CV: RRR with no murmurs and no JVD noted  Respiratory:  Lungs are CTA BL.  No tachypnea, no accessory m use, speaking in full sentences.  Abd:Soft, + RLQ and suprapubic ttp, no GR  Skin: No skin changes, no edema or rash present to extremities  Neuro: Clear speech and no facial droop.  Psych: Not RIS, no e/o AH/VH    Emergency Department Course     Imaging:  Radiographic findings were communicated with the patient who voiced understanding of the findings.    CT-scan Abdomen/Pelvis w/ contrast:  No acute abnormality in the abdomen or pelvis. No cause  for right lower quadrant pain is identified.  Preliminary result per radiology.      US Pelvic Complete w/ transvaginal:  9 mm endometrial thickness after endometrial ablation. No  acute process demonstrated.  As read by Radiology.     Laboratory:  CBC: WNL (WBC 6.8, HGB 13.3, )    CMP: Potassium 3.2 (L), BUN 5 (L), o/w WNL (Creatinine 0.66)   Lactic Acid: 1.2    Interventions:  1346 - NS 1L IV Bolus   1351 - Zofran 4 mg IV  1603 - Morphine 4 mg IV  1603 - Klor-con 40 mEq PO    Emergency Department Course:  Past medical records, nursing notes, and vitals reviewed.  1426: I performed an exam of the patient and obtained history, as documented above.    IV inserted and blood drawn. The patient was placed on continuous cardiac monitoring and pulse oximetry.   The patient was sent for a US and CT-scan while in the emergency department, findings above.     1700: I rechecked the patient.  Findings and plan explained to the Patient. Patient discharged home with instructions regarding supportive care, medications, and reasons to return. The importance of close follow-up was reviewed.      Impression & Plan      Medical Decision Making:  Jeff Serra is a 42 year old female who is one month out from  endometrial ablation who presents with right lower quadrant pain. She does have significant guarding and tenderness at this point, and a normal pelvic ultrasound. With this in mind, CT-scan was done and this shows no cause of the patient s pain today. The patient has normal vital signs, normal white count, and I feel she can safely be discharged to home. Incidentally, she has low potassium for which she has received oral repletion. I have discussed very clear return to ED precautions and the patient is okay with this plan.    Patient does have some urinary frequency however, and a urine from clinic today that shows positive leuk esterase negative nitrate, 5-9 white blood cells but also with several squamous epithelial cells as well.  There is no clear cause of patient's pain has been found today, and patient states no dysuria but does have some increased frequency, will plan on treatment for empiric UTI.  No need to repeat UA here.  Diagnosis:    ICD-10-CM    1. Abdominal pain, RLQ R10.84    2. Urinary frequency R35.0        Ron Soto  9/14/2017    EMERGENCY DEPARTMENT  I, Ron Soto, am serving as a scribe at 2:26 PM on 9/14/2017 to document services personally performed by Eric Loyd MD based on my observations and the provider's statements to me.       Eric Loyd MD  09/14/17 3200

## 2017-09-14 NOTE — DISCHARGE INSTRUCTIONS
Abdominal Pain    Abdominal pain is pain in the stomach or belly area. Everyone has this pain from time to time. In many cases it goes away on its own. But abdominal pain can sometimes be due to a serious problem, such as appendicitis. So it s important to know when to seek help.  Causes of abdominal pain  There are many possible causes of abdominal pain. Common causes in adults include:    Constipation, diarrhea, or gas    Stomach acid flowing back up into the esophagus (acid reflux or heartburn)    Severe acid reflux, called GERD (gastroesophageal reflux disease)    A sore in the lining of the stomach or small intestine (peptic ulcer)    Inflammation of the gallbladder, liver, or pancreas    Gallstones or kidney stones    Appendicitis     Intestinal blockage     An internal organ pushing through a muscle or other tissue (hernia)    Urinary tract infections    In women, menstrual cramps, fibroids, or endometriosis    Inflammation or infection of the intestines  Diagnosing the cause of abdominal pain  Your healthcare provider will do a physical exam help find the cause of your pain. If needed, tests will be ordered. Belly pain has many possible causes. So it can be hard to find the reason for your pain. Giving details about your pain can help. Tell your provider where and when you feel the pain, and what makes it better or worse. Also let your provider know if you have other symptoms such as:    Fever    Tiredness    Upset stomach (nausea)    Vomiting    Changes in bathroom habits  Treating abdominal pain  Some causes of pain need emergency medical treatment right away. These include appendicitis or a bowel blockage. Other problems can be treated with rest, fluids, or medicines. Your healthcare provider can give you specific instructions for treatment or self-care based on what is causing your pain.  If you have vomiting or diarrhea, sip water or other clear fluids. When you are ready to eat solid foods again,  start with small amounts of easy-to-digest, low-fat foods. These include apple sauce, toast, or crackers.   When to seek medical care  Call 911 or go to the hospital right away if you:    Can t pass stool and are vomiting    Are vomiting blood or have bloody diarrhea or black, tarry diarrhea    Have chest, neck, or shoulder pain    Feel like you might pass out    Have pain in your shoulder blades with nausea    Have sudden, severe belly pain    Have new, severe pain unlike any you have felt before    Have a belly that is rigid, hard, and tender to touch  Call your healthcare provider if you have:    Pain for more than 5 days    Bloating for more than 2 days    Diarrhea for more than 5 days    A fever of 100.4 F (38.0 C) or higher, or as directed by your provider    Pain that gets worse    Weight loss for no reason    Continued lack of appetite    Blood in your stool  How to prevent abdominal pain  Here are some tips to help prevent abdominal pain:    Eat smaller amounts of food at one time.    Avoid greasy, fried, or other high-fat foods.    Avoid foods that give you gas.    Exercise regularly.    Drink plenty of fluids.  To help prevent GERD symptoms:    Quit smoking.    Reduce alcohol and certain foods that increase stomach acid.    Avoid aspirin and over-the-counter pain and fever medicines (NSAIDS or nonsteroidal anti-inflammatory drugs), if possible    Lose extra weight.    Finish eating at least 2 hours before you go to bed or lie down.    Raise the head of your bed.  Date Last Reviewed: 7/1/2016 2000-2017 [employer]. [address] [place] [address], [place] [address]. All rights reserved. This information is not intended as a substitute for professional medical care. Always follow your healthcare professional's instructions.          Discharge Instructions  Abdominal Pain    Abdominal pain can be caused by many things. Your evaluation today does not show the exact cause for your pain. Your doctor  today has decided that it is unlikely your pain is due to a life threatening problem, or a problem requiring surgery or hospital admission. Sometimes those problems cannot be found right away, so it is very important that you follow up as directed.  Sometimes only the changes which occur over time allow the cause of your pain to be found.    Return to the Emergency Department for a recheck in 8-12 hours if your pain continues.  If your pain gets worse, changes in location, or feels different, return to the Emergency Department right away.    ADULTS:  Return to the Emergency Department right away if:      You get an oral temperature above 102oF or as directed by your doctor.    You have blood in your stools (bright red or black, tarry stools).    You keep throwing up or can t drink liquids.    You see blood when you throw up.    You can t have a bowel movement or you can t pass gas.    Your stomach gets bloated or bigger.    Your skin or the whites of your eyes look yellow.    You faint.    You have bloody, frequent or painful urination.    You have new symptoms or anything that worries you.    CHILDREN:  Return to the Emergency Department right away if your child has any of the above-listed symptoms or the following:      Pushes your hand away or screams/cries when his/her belly is touched.    You notice your child is very fussy or weak.    Your child is very tired and is too tired to eat or drink.    Your child is dehydrated.  Signs of dehydration can be:  o Your infant has had no wet diapers in 4-5 hours.  o Your older child has not passed urine in 6-8 hours.  o Your infant or child starts to have dry mouth and lips, or no saliva or tears.    PREGNANT WOMEN:  Return to the Emergency Department right away if you have any of the above-listed symptoms or the following:      You have bleeding, leaking fluid or passing tissue from the vagina.    You have worse pain or cramping, or pain in your shoulder or back.    You  have vomiting that will not stop.    You have painful or bloody urination.    You have a temperature of 100oF or more.    Your baby is not moving as much as usual.    You faint.    You get a bad headache with or without eye problems and abdominal pain.    You have a convulsion or seizure.    You have unusual discharge from your vagina and abdominal pain.    Abdominal pain is pretty common during pregnancy.  Your pain may or may not be related to your pregnancy. You should follow-up closely with your OB doctor so they can evaluate you and your baby.  Until you follow-up with your regular doctor, do the following:       Avoid sex and do not put anything in your vagina.    Drink clear fluids.    Only take medications approved by your doctor.    MORE INFORMATION:    Appendicitis:  A possible cause of abdominal pain in any person who still has their appendix is acute appendicitis. Appendicitis is often hard to diagnose.  Testing does not always rule out early appendicitis or other causes of abdominal pain. Close follow-up with your doctor and re-evaluations may be needed to figure out the reason for your abdominal pain.    Follow-up:  It is very important that you make an appointment with your clinic and go to the appointment.  If you do not follow-up with your primary doctor, it may result in missing an important development which could result in permanent injury or disability and/or lasting pain.  If there is any problem keeping your appointment, call your doctor or return to the Emergency Department.    Medications:  Take your medications as directed by your doctor today.  Before using over-the-counter medications, ask your doctor and make sure to take the medications as directed.  If you have any questions about medications, ask your doctor.    Diet:  Resume your normal diet as much as possible, but do not eat fried, fatty or spicy foods while you have pain.  Do not drink alcohol or have caffeine.  Do not smoke  "tobacco.    Probiotics: If you have been given an antibiotic, you may want to also take a probiotic pill or eat yogurt with live cultures. Probiotics have \"good bacteria\" to help your intestines stay healthy. Studies have shown that probiotics help prevent diarrhea and other intestine problems (including C. diff infection) when you take antibiotics. You can buy these without a prescription in the pharmacy section of the store.     If you were given a prescription for medicine here today, be sure to read all of the information (including the package insert) that comes with your prescription.  This will include important information about the medicine, its side effects, and any warnings that you need to know about.  The pharmacist who fills the prescription can provide more information and answer questions you may have about the medicine.  If you have questions or concerns that the pharmacist cannot address, please call or return to the Emergency Department.         Opioid Medication Information    Pain medications are among the most commonly prescribed medicines, so we are including this information for all our patients. If you did not receive pain medication or get a prescription for pain medicine, you can ignore it.     You may have been given a prescription for an opioid (narcotic) pain medicine and/or have received a pain medicine while here in the Emergency Department. These medicines can make you drowsy or impaired. You must not drive, operate dangerous equipment, or engage in any other dangerous activities while taking these medications. If you drive while taking these medications, you could be arrested for DUI, or driving under the influence. Do not drink any alcohol while you are taking these medications.     Opioid pain medications can cause addiction. If you have a history of chemical dependency of any type, you are at a higher risk of becoming addicted to pain medications.  Only take these prescribed " medications to treat your pain when all other options have been tried. Take it for as short a time and as few doses as possible. Store your pain pills in a secure place, as they are frequently stolen and provide a dangerous opportunity for children or visitors in your house to start abusing these powerful medications. We will not replace any lost or stolen medicine.  As soon as your pain is better, you should flush all your remaining medication.     Many prescription pain medications contain Tylenol  (acetaminophen), including Vicodin , Tylenol #3 , Norco , Lortab , and Percocet .  You should not take any extra pills of Tylenol  if you are using these prescription medications or you can get very sick.  Do not ever take more than 3000 mg of acetaminophen in any 24 hour period.    All opioids tend to cause constipation. Drink plenty of water and eat foods that have a lot of fiber, such as fruits, vegetables, prune juice, apple juice and high fiber cereal.  Take a laxative if you don t move your bowels at least every other day. Miralax , Milk of Magnesia, Colace , or Senna  can be used to keep you regular.      Remember that you can always come back to the Emergency Department if you are not able to see your regular doctor in the amount of time listed above, if you get any new symptoms, or if there is anything that worries you.

## 2017-09-14 NOTE — ED AVS SNAPSHOT
Emergency Department    [address] [facility] [address]    Phone:  [phone]    Fax:  [phone]                                       Jeff Serra   MRN: [id number]    Department:   Emergency Department   Date of Visit:  9/14/2017           Patient Information     Date Of Birth          1975        Your diagnoses for this visit were:     Abdominal pain, RLQ     Urinary frequency        You were seen by Eric Loyd MD.      Follow-up Information     Follow up with Jordyn Loredo MD. Call in 2 days.    Specialty:  Family Practice    Contact information:    [address]  [place] [address] [phone]          Follow up with  Emergency Department.    Specialty:  EMERGENCY MEDICINE    Why:  If symptoms worsen    Contact information:    [address] [facility] [id number] [phone]        Discharge Instructions         Abdominal Pain    Abdominal pain is pain in the stomach or belly area. Everyone has this pain from time to time. In many cases it goes away on its own. But abdominal pain can sometimes be due to a serious problem, such as appendicitis. So it s important to know when to seek help.  Causes of abdominal pain  There are many possible causes of abdominal pain. Common causes in adults include:    Constipation, diarrhea, or gas    Stomach acid flowing back up into the esophagus (acid reflux or heartburn)    Severe acid reflux, called GERD (gastroesophageal reflux disease)    A sore in the lining of the stomach or small intestine (peptic ulcer)    Inflammation of the gallbladder, liver, or pancreas    Gallstones or kidney stones    Appendicitis     Intestinal blockage     An internal organ pushing through a muscle or other tissue (hernia)    Urinary tract infections    In women, menstrual cramps, fibroids, or endometriosis    Inflammation or infection of the intestines  Diagnosing the cause of abdominal  pain  Your healthcare provider will do a physical exam help find the cause of your pain. If needed, tests will be ordered. Belly pain has many possible causes. So it can be hard to find the reason for your pain. Giving details about your pain can help. Tell your provider where and when you feel the pain, and what makes it better or worse. Also let your provider know if you have other symptoms such as:    Fever    Tiredness    Upset stomach (nausea)    Vomiting    Changes in bathroom habits  Treating abdominal pain  Some causes of pain need emergency medical treatment right away. These include appendicitis or a bowel blockage. Other problems can be treated with rest, fluids, or medicines. Your healthcare provider can give you specific instructions for treatment or self-care based on what is causing your pain.  If you have vomiting or diarrhea, sip water or other clear fluids. When you are ready to eat solid foods again, start with small amounts of easy-to-digest, low-fat foods. These include apple sauce, toast, or crackers.   When to seek medical care  Call 911 or go to the hospital right away if you:    Can t pass stool and are vomiting    Are vomiting blood or have bloody diarrhea or black, tarry diarrhea    Have chest, neck, or shoulder pain    Feel like you might pass out    Have pain in your shoulder blades with nausea    Have sudden, severe belly pain    Have new, severe pain unlike any you have felt before    Have a belly that is rigid, hard, and tender to touch  Call your healthcare provider if you have:    Pain for more than 5 days    Bloating for more than 2 days    Diarrhea for more than 5 days    A fever of 100.4 F (38.0 C) or higher, or as directed by your provider    Pain that gets worse    Weight loss for no reason    Continued lack of appetite    Blood in your stool  How to prevent abdominal pain  Here are some tips to help prevent abdominal pain:    Eat smaller amounts of food at one time.    Avoid  greasy, fried, or other high-fat foods.    Avoid foods that give you gas.    Exercise regularly.    Drink plenty of fluids.  To help prevent GERD symptoms:    Quit smoking.    Reduce alcohol and certain foods that increase stomach acid.    Avoid aspirin and over-the-counter pain and fever medicines (NSAIDS or nonsteroidal anti-inflammatory drugs), if possible    Lose extra weight.    Finish eating at least 2 hours before you go to bed or lie down.    Raise the head of your bed.  Date Last Reviewed: 7/1/2016 2000-2017 [employer]. [address] [place] [id number]. All rights reserved. This information is not intended as a substitute for professional medical care. Always follow your healthcare professional's instructions.          Discharge Instructions  Abdominal Pain    Abdominal pain can be caused by many things. Your evaluation today does not show the exact cause for your pain. Your doctor today has decided that it is unlikely your pain is due to a life threatening problem, or a problem requiring surgery or hospital admission. Sometimes those problems cannot be found right away, so it is very important that you follow up as directed.  Sometimes only the changes which occur over time allow the cause of your pain to be found.    Return to the Emergency Department for a recheck in 8-12 hours if your pain continues.  If your pain gets worse, changes in location, or feels different, return to the Emergency Department right away.    ADULTS:  Return to the Emergency Department right away if:      You get an oral temperature above 102oF or as directed by your doctor.    You have blood in your stools (bright red or black, tarry stools).    You keep throwing up or can t drink liquids.    You see blood when you throw up.    You can t have a bowel movement or you can t pass gas.    Your stomach gets bloated or bigger.    Your skin or the whites of your eyes look yellow.    You faint.    You have  bloody, frequent or painful urination.    You have new symptoms or anything that worries you.    CHILDREN:  Return to the Emergency Department right away if your child has any of the above-listed symptoms or the following:      Pushes your hand away or screams/cries when his/her belly is touched.    You notice your child is very fussy or weak.    Your child is very tired and is too tired to eat or drink.    Your child is dehydrated.  Signs of dehydration can be:  o Your infant has had no wet diapers in 4-5 hours.  o Your older child has not passed urine in 6-8 hours.  o Your infant or child starts to have dry mouth and lips, or no saliva or tears.    PREGNANT WOMEN:  Return to the Emergency Department right away if you have any of the above-listed symptoms or the following:      You have bleeding, leaking fluid or passing tissue from the vagina.    You have worse pain or cramping, or pain in your shoulder or back.    You have vomiting that will not stop.    You have painful or bloody urination.    You have a temperature of 100oF or more.    Your baby is not moving as much as usual.    You faint.    You get a bad headache with or without eye problems and abdominal pain.    You have a convulsion or seizure.    You have unusual discharge from your vagina and abdominal pain.    Abdominal pain is pretty common during pregnancy.  Your pain may or may not be related to your pregnancy. You should follow-up closely with your OB doctor so they can evaluate you and your baby.  Until you follow-up with your regular doctor, do the following:       Avoid sex and do not put anything in your vagina.    Drink clear fluids.    Only take medications approved by your doctor.    MORE INFORMATION:    Appendicitis:  A possible cause of abdominal pain in any person who still has their appendix is acute appendicitis. Appendicitis is often hard to diagnose.  Testing does not always rule out early appendicitis or other causes of abdominal  "pain. Close follow-up with your doctor and re-evaluations may be needed to figure out the reason for your abdominal pain.    Follow-up:  It is very important that you make an appointment with your clinic and go to the appointment.  If you do not follow-up with your primary doctor, it may result in missing an important development which could result in permanent injury or disability and/or lasting pain.  If there is any problem keeping your appointment, call your doctor or return to the Emergency Department.    Medications:  Take your medications as directed by your doctor today.  Before using over-the-counter medications, ask your doctor and make sure to take the medications as directed.  If you have any questions about medications, ask your doctor.    Diet:  Resume your normal diet as much as possible, but do not eat fried, fatty or spicy foods while you have pain.  Do not drink alcohol or have caffeine.  Do not smoke tobacco.    Probiotics: If you have been given an antibiotic, you may want to also take a probiotic pill or eat yogurt with live cultures. Probiotics have \"good bacteria\" to help your intestines stay healthy. Studies have shown that probiotics help prevent diarrhea and other intestine problems (including C. diff infection) when you take antibiotics. You can buy these without a prescription in the pharmacy section of the store.     If you were given a prescription for medicine here today, be sure to read all of the information (including the package insert) that comes with your prescription.  This will include important information about the medicine, its side effects, and any warnings that you need to know about.  The pharmacist who fills the prescription can provide more information and answer questions you may have about the medicine.  If you have questions or concerns that the pharmacist cannot address, please call or return to the Emergency Department.         Opioid Medication Information    Pain " medications are among the most commonly prescribed medicines, so we are including this information for all our patients. If you did not receive pain medication or get a prescription for pain medicine, you can ignore it.     You may have been given a prescription for an opioid (narcotic) pain medicine and/or have received a pain medicine while here in the Emergency Department. These medicines can make you drowsy or impaired. You must not drive, operate dangerous equipment, or engage in any other dangerous activities while taking these medications. If you drive while taking these medications, you could be arrested for DUI, or driving under the influence. Do not drink any alcohol while you are taking these medications.     Opioid pain medications can cause addiction. If you have a history of chemical dependency of any type, you are at a higher risk of becoming addicted to pain medications.  Only take these prescribed medications to treat your pain when all other options have been tried. Take it for as short a time and as few doses as possible. Store your pain pills in a secure place, as they are frequently stolen and provide a dangerous opportunity for children or visitors in your house to start abusing these powerful medications. We will not replace any lost or stolen medicine.  As soon as your pain is better, you should flush all your remaining medication.     Many prescription pain medications contain Tylenol  (acetaminophen), including Vicodin , Tylenol #3 , Norco , Lortab , and Percocet .  You should not take any extra pills of Tylenol  if you are using these prescription medications or you can get very sick.  Do not ever take more than 3000 mg of acetaminophen in any 24 hour period.    All opioids tend to cause constipation. Drink plenty of water and eat foods that have a lot of fiber, such as fruits, vegetables, prune juice, apple juice and high fiber cereal.  Take a laxative if you don t move your bowels at  least every other day. Miralax , Milk of Magnesia, Colace , or Senna  can be used to keep you regular.      Remember that you can always come back to the Emergency Department if you are not able to see your regular doctor in the amount of time listed above, if you get any new symptoms, or if there is anything that worries you.          24 Hour Appointment Hotline       To make an appointment at any [facility], call [phone] ([phone]). If you don't have a family doctor or clinic, we will help you find one. [facility] are conveniently located to serve the needs of you and your family.             Review of your medicines      START taking        Dose / Directions Last dose taken    cephALEXin 500 MG capsule   Commonly known as:  KEFLEX   Dose:  500 mg   Quantity:  21 capsule        Take 1 capsule (500 mg) by mouth 3 times daily for 7 days   Refills:  0          Our records show that you are taking the medicines listed below. If these are incorrect, please call your family doctor or clinic.        Dose / Directions Last dose taken    fexofenadine 180 MG tablet   Commonly known as:  ALLEGRA   Dose:  180 mg   Quantity:  90 tablet        Take 1 tablet (180 mg) by mouth daily   Refills:  0        loratadine 10 MG tablet   Commonly known as:  CLARITIN   Dose:  10 mg   Quantity:  30 tablet        Take 1 tablet (10 mg) by mouth daily as needed for allergies   Refills:  2        losartan 100 MG tablet   Commonly known as:  COZAAR   Dose:  100 mg   Quantity:  30 tablet        Take 1 tablet (100 mg) by mouth daily   Refills:  1        oxyCODONE 15 MG IR tablet   Commonly known as:  ROXICODONE   Dose:  15-30 mg   Quantity:  120 tablet        Take 1-2 tablets (15-30 mg) by mouth 3 times daily as needed for moderate to severe pain   Refills:  0        potassium chloride SA 10 MEQ CR tablet   Commonly known as:  K-DUR/KLOR-CON M   Dose:  10 mEq   Quantity:  10 tablet        Take 1 tablet (10 mEq) by mouth  daily   Refills:  0        senna-docusate 8.6-50 MG per tablet   Commonly known as:  SENOKOT-S;PERICOLACE   Dose:  1 tablet   Quantity:  60 tablet        Take 1 tablet by mouth 2 times daily as needed for constipation   Refills:  5                Prescriptions were sent or printed at these locations (1 Prescription)                   Other Prescriptions                Printed at Department/Unit printer (1 of 1)         cephALEXin (KEFLEX) 500 MG capsule                Procedures and tests performed during your visit     CBC with platelets differential    CT Abdomen Pelvis w Contrast    Comprehensive metabolic panel    Lactic acid whole blood    UA with Microscopic    US Pelvic Complete w Transvaginal      Orders Needing Specimen Collection     None      Pending Results     Date and Time Order Name Status Description    9/14/2017 1553 CT Abdomen Pelvis w Contrast Preliminary             Pending Culture Results     No orders found from 9/12/2017 to 9/15/2017.            Pending Results Instructions     If you had any lab results that were not finalized at the time of your Discharge, you can call the ED Lab Result RN at [phone]. You will be contacted by this team for any positive Lab results or changes in treatment. The nurses are available 7 days a week from 10A to 6:30P.  You can leave a message 24 hours per day and they will return your call.        Test Results From Your Hospital Stay        9/14/2017  2:39 PM      Component Results     Component Value Ref Range & Units Status    WBC 6.8 4.0 - 11.0 10e9/L Final    RBC Count 4.40 3.8 - 5.2 10e12/L Final    Hemoglobin 13.3 11.7 - 15.7 g/dL Final    Hematocrit 37.7 35.0 - 47.0 % Final    MCV 86 78 - 100 fl Final    MCH 30.2 26.5 - 33.0 pg Final    MCHC 35.3 31.5 - 36.5 g/dL Final    RDW 13.9 10.0 - 15.0 % Final    Platelet Count 186 150 - 450 10e9/L Final    Diff Method Automated Method  Final    % Neutrophils 66.6 % Final    % Lymphocytes 24.4 % Final    %  Monocytes 7.5 % Final    % Eosinophils 0.8 % Final    % Basophils 0.5 % Final    % Immature Granulocytes 0.2 % Final    Nucleated RBCs 0 0 /100 Final    Absolute Neutrophil 4.3 1.6 - 8.3 10e9/L Final    Absolute Lymphocytes 1.6 0.8 - 5.3 10e9/L Final    Absolute Monocytes 0.5 0.0 - 1.3 10e9/L Final    Absolute Eosinophils 0.1 0.0 - 0.7 10e9/L Final    Absolute Basophils 0.0 0.0 - 0.2 10e9/L Final    Abs Immature Granulocytes 0.0 0 - 0.4 10e9/L Final    Absolute Nucleated RBC 0.0  Final         9/14/2017  2:29 PM      Component Results     Component Value Ref Range & Units Status    Sodium 140 133 - 144 mmol/L Final    Potassium 3.2 (L) 3.4 - 5.3 mmol/L Final    Chloride 105 94 - 109 mmol/L Final    Carbon Dioxide 29 20 - 32 mmol/L Final    Anion Gap 6 3 - 14 mmol/L Final    Glucose 83 70 - 99 mg/dL Final    Urea Nitrogen 5 (L) 7 - 30 mg/dL Final    Creatinine 0.66 0.52 - 1.04 mg/dL Final    GFR Estimate >90 >60 mL/min/1.7m2 Final    Non  GFR Calc    GFR Estimate If Black >90 >60 mL/min/1.7m2 Final    African American GFR Calc    Calcium 8.5 8.5 - 10.1 mg/dL Final    Bilirubin Total 1.0 0.2 - 1.3 mg/dL Final    Albumin 3.9 3.4 - 5.0 g/dL Final    Protein Total 7.3 6.8 - 8.8 g/dL Final    Alkaline Phosphatase 75 40 - 150 U/L Final    ALT 39 0 - 50 U/L Final    AST 13 0 - 45 U/L Final         9/14/2017  2:16 PM      Component Results     Component Value Ref Range & Units Status    Lactic Acid 1.2 0.7 - 2.0 mmol/L Final         9/14/2017  4:00 PM      Narrative     ULTRASOUND PELVIS WITH TRANSVAGINAL IMAGING  9/14/2017 3:33 PM     HISTORY: Pelvic pain.    COMPARISON: None.    FINDINGS:  Transvaginal images were performed to better evaluate the  patient's uterus, ovaries and endometrial stripe.    No fibroids are evident. The uterus is normal. Endometrial stripe  measures 9 mm and is normal for patient's age and menstrual status.  The right ovary is normal. The left ovary demonstrates a small  dominant  follicle.  No adnexal masses are present. No free pelvic  fluid is present.        Impression     IMPRESSION: 9 mm endometrial thickness after endometrial ablation. No  acute process demonstrated.    CHIOMA LUNA MD         9/14/2017  4:54 PM      Narrative     CT ABDOMEN AND PELVIS WITH CONTRAST   9/14/2017 4:46 PM     HISTORY: Right lower quadrant pain.    TECHNIQUE: 93 mL Isovue -370 IV were administered. After contrast  administration, volumetric helical sections were acquired from the  lung bases to the ischial tuberosities. Coronal images were also  reconstructed. Radiation dose for this scan was reduced using  automated exposure control, adjustment of the mA and/or kV according  to patient size, or iterative reconstruction technique.    COMPARISON: CT of the abdomen and pelvis performed 2/8/2017. Pelvic  ultrasound performed earlier today.    FINDINGS: No bowel obstruction. The appendix is well seen, and is  unremarkable. No evidence for appendicitis. No free fluid in the  pelvis. Scattered colonic diverticula are noted, without convincing  evidence for diverticulitis. No adnexal masses are seen. The liver,  gallbladder, spleen, adrenal glands, pancreas, and kidneys are  unremarkable. No hydronephrosis. No intra-abdominal fluid collections  to suggest abscess. The visualized lung bases are clear.        Impression     IMPRESSION: No acute abnormality in the abdomen or pelvis. No cause  for right lower quadrant pain is identified.                  Clinical Quality Measure: Blood Pressure Screening     Your blood pressure was checked while you were in the emergency department today. The last reading we obtained was  BP: (!) 139/91 . Please read the guidelines below about what these numbers mean and what you should do about them.  If your systolic blood pressure (the top number) is less than 120 and your diastolic blood pressure (the bottom number) is less than 80, then your blood pressure is normal. There is  nothing more that you need to do about it.  If your systolic blood pressure (the top number) is 120-139 or your diastolic blood pressure (the bottom number) is 80-89, your blood pressure may be higher than it should be. You should have your blood pressure rechecked within a year by a primary care provider.  If your systolic blood pressure (the top number) is 140 or greater or your diastolic blood pressure (the bottom number) is 90 or greater, you may have high blood pressure. High blood pressure is treatable, but if left untreated over time it can put you at risk for heart attack, stroke, or kidney failure. You should have your blood pressure rechecked by a primary care provider within the next 4 weeks.  If your provider in the emergency department today gave you specific instructions to follow-up with your doctor or provider even sooner than that, you should follow that instruction and not wait for up to 4 weeks for your follow-up visit.        Thank you for choosing [facility]       Thank you for choosing [facility] for your care. Our goal is always to provide you with excellent care. Hearing back from our patients is one way we can continue to improve our services. Please take a few minutes to complete the written survey that you may receive in the mail after you visit with us. Thank you!        [employer] Information     [employer] gives you secure access to your electronic health record. If you see a primary care provider, you can also send messages to your care team and make appointments. If you have questions, please call your primary care clinic.  If you do not have a primary care provider, please call [phone] and they will assist you.        Care EveryWhere ID     This is your Care EveryWhere ID. This could be used by other organizations to access your [facility] medical records  [id number]        Equal Access to Services     KHADAR BAEZ AH: lesli Wesley qaybta kaalmada  ariel brian ah. So [facility] [phone].    ATENCIÓN: Si habla español, tiene a guy disposición servicios gratuitos de asistencia lingüística. Llame al [phone].    We comply with applicable federal civil rights laws and [place] laws. We do not discriminate on the basis of race, color, national origin, age, disability sex, sexual orientation or gender identity.            After Visit Summary       This is your record. Keep this with you and show to your community pharmacist(s) and doctor(s) at your next visit.

## 2017-09-29 ENCOUNTER — HOSPITAL ENCOUNTER (EMERGENCY)
Facility: CLINIC | Age: 42
Discharge: HOME | End: 2017-09-29
Attending: EMERGENCY MEDICINE | Admitting: EMERGENCY MEDICINE
Payer: COMMERCIAL

## 2017-09-29 ENCOUNTER — APPOINTMENT (OUTPATIENT)
Dept: GENERAL RADIOLOGY | Facility: CLINIC | Age: 42
End: 2017-09-29
Attending: EMERGENCY MEDICINE
Payer: COMMERCIAL

## 2017-09-29 VITALS
WEIGHT: 178 LBS | DIASTOLIC BLOOD PRESSURE: 109 MMHG | HEIGHT: 63 IN | HEART RATE: 101 BPM | BODY MASS INDEX: 31.54 KG/M2 | OXYGEN SATURATION: 100 % | SYSTOLIC BLOOD PRESSURE: 159 MMHG | TEMPERATURE: 98 F | RESPIRATION RATE: 28 BRPM

## 2017-09-29 DIAGNOSIS — R00.2 HEART PALPITATIONS: ICD-10-CM

## 2017-09-29 LAB
ANION GAP SERPL CALCULATED.3IONS-SCNC: 7 MMOL/L (ref 3–14)
BASOPHILS # BLD AUTO: 0 10E9/L (ref 0–0.2)
BASOPHILS NFR BLD AUTO: 0.2 %
BUN SERPL-MCNC: 5 MG/DL (ref 7–30)
CALCIUM SERPL-MCNC: 8.9 MG/DL (ref 8.5–10.1)
CHLORIDE SERPL-SCNC: 105 MMOL/L (ref 94–109)
CO2 SERPL-SCNC: 28 MMOL/L (ref 20–32)
CREAT SERPL-MCNC: 0.7 MG/DL (ref 0.52–1.04)
D DIMER PPP FEU-MCNC: 0.4 UG/ML FEU (ref 0–0.5)
DIFFERENTIAL METHOD BLD: NORMAL
EOSINOPHIL # BLD AUTO: 0.1 10E9/L (ref 0–0.7)
EOSINOPHIL NFR BLD AUTO: 1.2 %
ERYTHROCYTE [DISTWIDTH] IN BLOOD BY AUTOMATED COUNT: 14 % (ref 10–15)
GFR SERPL CREATININE-BSD FRML MDRD: >90 ML/MIN/1.7M2
GLUCOSE SERPL-MCNC: 82 MG/DL (ref 70–99)
HCT VFR BLD AUTO: 38.8 % (ref 35–47)
HGB BLD-MCNC: 13.2 G/DL (ref 11.7–15.7)
IMM GRANULOCYTES # BLD: 0 10E9/L (ref 0–0.4)
IMM GRANULOCYTES NFR BLD: 0.2 %
INTERPRETATION ECG - MUSE: NORMAL
LYMPHOCYTES # BLD AUTO: 2 10E9/L (ref 0.8–5.3)
LYMPHOCYTES NFR BLD AUTO: 33.8 %
MCH RBC QN AUTO: 29.1 PG (ref 26.5–33)
MCHC RBC AUTO-ENTMCNC: 34 G/DL (ref 31.5–36.5)
MCV RBC AUTO: 86 FL (ref 78–100)
MONOCYTES # BLD AUTO: 0.5 10E9/L (ref 0–1.3)
MONOCYTES NFR BLD AUTO: 8.3 %
NEUTROPHILS # BLD AUTO: 3.3 10E9/L (ref 1.6–8.3)
NEUTROPHILS NFR BLD AUTO: 56.3 %
NRBC # BLD AUTO: 0 10*3/UL
NRBC BLD AUTO-RTO: 0 /100
PLATELET # BLD AUTO: 199 10E9/L (ref 150–450)
POTASSIUM SERPL-SCNC: 3.3 MMOL/L (ref 3.4–5.3)
RBC # BLD AUTO: 4.53 10E12/L (ref 3.8–5.2)
SODIUM SERPL-SCNC: 140 MMOL/L (ref 133–144)
TROPONIN I SERPL-MCNC: <0.015 UG/L (ref 0–0.04)
WBC # BLD AUTO: 5.8 10E9/L (ref 4–11)

## 2017-09-29 PROCEDURE — 96374 THER/PROPH/DIAG INJ IV PUSH: CPT

## 2017-09-29 PROCEDURE — 99285 EMERGENCY DEPT VISIT HI MDM: CPT | Mod: 25

## 2017-09-29 PROCEDURE — 93005 ELECTROCARDIOGRAM TRACING: CPT

## 2017-09-29 PROCEDURE — 80048 BASIC METABOLIC PNL TOTAL CA: CPT | Performed by: EMERGENCY MEDICINE

## 2017-09-29 PROCEDURE — 71020 XR CHEST 2 VW: CPT

## 2017-09-29 PROCEDURE — 85379 FIBRIN DEGRADATION QUANT: CPT | Performed by: EMERGENCY MEDICINE

## 2017-09-29 PROCEDURE — 96361 HYDRATE IV INFUSION ADD-ON: CPT

## 2017-09-29 PROCEDURE — 84484 ASSAY OF TROPONIN QUANT: CPT | Performed by: EMERGENCY MEDICINE

## 2017-09-29 PROCEDURE — 85025 COMPLETE CBC W/AUTO DIFF WBC: CPT | Performed by: EMERGENCY MEDICINE

## 2017-09-29 PROCEDURE — 25000128 H RX IP 250 OP 636: Performed by: EMERGENCY MEDICINE

## 2017-09-29 RX ORDER — SODIUM CHLORIDE 9 MG/ML
1000 INJECTION, SOLUTION INTRAVENOUS CONTINUOUS
Status: DISCONTINUED | OUTPATIENT
Start: 2017-09-29 | End: 2017-09-30 | Stop reason: HOSPADM

## 2017-09-29 RX ORDER — LORAZEPAM 2 MG/ML
0.5 INJECTION INTRAMUSCULAR ONCE
Status: COMPLETED | OUTPATIENT
Start: 2017-09-29 | End: 2017-09-29

## 2017-09-29 RX ADMIN — LORAZEPAM 0.5 MG: 2 INJECTION INTRAMUSCULAR; INTRAVENOUS at 22:30

## 2017-09-29 RX ADMIN — SODIUM CHLORIDE 1000 ML: 9 INJECTION, SOLUTION INTRAVENOUS at 22:23

## 2017-09-29 ASSESSMENT — ENCOUNTER SYMPTOMS
PALPITATIONS: 1
LIGHT-HEADEDNESS: 1
ABDOMINAL PAIN: 0
APPETITE CHANGE: 0
NUMBNESS: 1

## 2017-09-29 NOTE — ED AVS SNAPSHOT
"  Emergency Department    [address] [facility] [address]    Phone:  [phone]    Fax:  [phone]                                       Jeff Serra   MRN: [id number]    Department:   Emergency Department   Date of Visit:  9/29/2017           Patient Information     Date Of Birth          1975        Your diagnoses for this visit were:     Heart palpitations        You were seen by Trierweiler, Chad A, MD.      Follow-up Information     Follow up with Jordyn Loredo MD In 1 week.    Specialty:  Family Practice    Contact information:    [address] [facility] [address] [phone]          Follow up with  Emergency Department.    Specialty:  EMERGENCY MEDICINE    Why:  If symptoms worsen    Contact information:    [address] [facility] [address] [phone]        Discharge Instructions         * Heart Palpitations    Palpitations refers to the feeling that your heart is beating hard, fast or irregular. Some people describe it as \"pounding\" or \"skipped beats\". Palpitations may occur in persons with heart disease, but can also occur in healthy persons. Your doctor does not believe that anything dangerous is causing your symptoms at this time.  Heart-Related Causes:    Arrhythmia (a change from the heart's normal rhythm)    Disease of the heart valves  Non-Heart-Related Causes:    Certain medicines (such as asthma inhalers and decongestants)    Some herbal supplements, energy drinks and pills, and weight loss pills    Illegal stimulant drugs (such as cocaine, crank, methamphetamine, PCP)    Caffeine, alcohol and tobacco    Medical conditions such as thyroid disease, anemia, anxiety and panic disorder  Sometimes the cause cannot be found.  Home Care:  1. Avoid excess caffeine, alcohol, tobacco and any stimulant drugs.  2. Tell your doctor about any prescription or over-the-counter or herbal medicines you take.  Follow Up  with your " doctor or as advised by our staff.  Get Prompt Medical Attention  if any of the following occur together with palpitations:    Weakness, dizziness, light-headed or fainting    Chest pain or shortness of breath    Rapid heart rate (over 120 beats per minute, at rest)    Palpitations that lasts over 20 minutes    Weakness of an arm or leg or one side of the face    Difficulty with speech or vision    6947-8258 [employer]. [address] [place] [address], [place] [address]. All rights reserved. This information is not intended as a substitute for professional medical care. Always follow your healthcare professional's instructions.          Discharge References/Attachments     [facility]ARDIOVASCULAR HEALTH, CONTROLLING (ENGLISH)      24 Hour Appointment Hotline       To make an appointment at any [facility] clinic, call [phone] ([phone]). If you don't have a family doctor or clinic, we will help you find one. [facility] clinics are conveniently located to serve the needs of you and your family.             Review of your medicines      Our records show that you are taking the medicines listed below. If these are incorrect, please call your family doctor or clinic.        Dose / Directions Last dose taken    fexofenadine 180 MG tablet   Commonly known as:  ALLEGRA   Dose:  180 mg   Quantity:  90 tablet        Take 1 tablet (180 mg) by mouth daily   Refills:  0        loratadine 10 MG tablet   Commonly known as:  CLARITIN   Dose:  10 mg   Quantity:  30 tablet        Take 1 tablet (10 mg) by mouth daily as needed for allergies   Refills:  2        losartan 100 MG tablet   Commonly known as:  COZAAR   Dose:  100 mg   Quantity:  30 tablet        Take 1 tablet (100 mg) by mouth daily   Refills:  1        oxyCODONE 15 MG IR tablet   Commonly known as:  ROXICODONE   Dose:  15-30 mg   Quantity:  120 tablet        Take 1-2 tablets (15-30 mg) by mouth 3 times daily as needed for moderate to severe pain    Refills:  0        potassium chloride SA 10 MEQ CR tablet   Commonly known as:  K-DUR/KLOR-CON M   Dose:  10 mEq   Quantity:  10 tablet        Take 1 tablet (10 mEq) by mouth daily   Refills:  0        senna-docusate 8.6-50 MG per tablet   Commonly known as:  SENOKOT-S;PERICOLACE   Dose:  1 tablet   Quantity:  60 tablet        Take 1 tablet by mouth 2 times daily as needed for constipation   Refills:  5                Procedures and tests performed during your visit     Basic metabolic panel    CBC with platelets differential    Cardiac Continuous Monitoring    D dimer quantitative    EKG 12 lead    Peripheral IV: Standard    Pulse oximetry nursing    Troponin I    XR Chest 2 Views      Orders Needing Specimen Collection     None      Pending Results     No orders found from 9/27/2017 to 9/30/2017.            Pending Culture Results     No orders found from 9/27/2017 to 9/30/2017.            Pending Results Instructions     If you had any lab results that were not finalized at the time of your Discharge, you can call the ED Lab Result RN at [phone]. You will be contacted by this team for any positive Lab results or changes in treatment. The nurses are available 7 days a week from 10A to 6:30P.  You can leave a message 24 hours per day and they will return your call.        Test Results From Your Hospital Stay        9/29/2017 10:08 PM      Component Results     Component Value Ref Range & Units Status    WBC 5.8 4.0 - 11.0 10e9/L Final    RBC Count 4.53 3.8 - 5.2 10e12/L Final    Hemoglobin 13.2 11.7 - 15.7 g/dL Final    Hematocrit 38.8 35.0 - 47.0 % Final    MCV 86 78 - 100 fl Final    MCH 29.1 26.5 - 33.0 pg Final    MCHC 34.0 31.5 - 36.5 g/dL Final    RDW 14.0 10.0 - 15.0 % Final    Platelet Count 199 150 - 450 10e9/L Final    Diff Method Automated Method  Final    % Neutrophils 56.3 % Final    % Lymphocytes 33.8 % Final    % Monocytes 8.3 % Final    % Eosinophils 1.2 % Final    % Basophils 0.2 % Final     % Immature Granulocytes 0.2 % Final    Nucleated RBCs 0 0 /100 Final    Absolute Neutrophil 3.3 1.6 - 8.3 10e9/L Final    Absolute Lymphocytes 2.0 0.8 - 5.3 10e9/L Final    Absolute Monocytes 0.5 0.0 - 1.3 10e9/L Final    Absolute Eosinophils 0.1 0.0 - 0.7 10e9/L Final    Absolute Basophils 0.0 0.0 - 0.2 10e9/L Final    Abs Immature Granulocytes 0.0 0 - 0.4 10e9/L Final    Absolute Nucleated RBC 0.0  Final         9/29/2017 10:33 PM      Component Results     Component Value Ref Range & Units Status    Sodium 140 133 - 144 mmol/L Final    Potassium 3.3 (L) 3.4 - 5.3 mmol/L Final    Chloride 105 94 - 109 mmol/L Final    Carbon Dioxide 28 20 - 32 mmol/L Final    Anion Gap 7 3 - 14 mmol/L Final    Glucose 82 70 - 99 mg/dL Final    Urea Nitrogen 5 (L) 7 - 30 mg/dL Final    Creatinine 0.70 0.52 - 1.04 mg/dL Final    GFR Estimate >90 >60 mL/min/1.7m2 Final    Non  GFR Calc    GFR Estimate If Black >90 >60 mL/min/1.7m2 Final    African American GFR Calc    Calcium 8.9 8.5 - 10.1 mg/dL Final         9/29/2017 10:38 PM      Component Results     Component Value Ref Range & Units Status    Troponin I ES <0.015 0.000 - 0.045 ug/L Final    The 99th percentile for upper reference range is 0.045 ug/L.  Troponin values   in the range of 0.045 - 0.120 ug/L may be associated with risks of adverse   clinical events.           9/29/2017 10:28 PM      Component Results     Component Value Ref Range & Units Status    D Dimer 0.4 0.0 - 0.50 ug/ml FEU Final    This D-dimer assay is intended for use in conjunction with a clinical pretest   probability assessment model to exclude pulmonary embolism (PE) and deep   venous thrombosis (DVT) in outpatients suspected of PE or DVT. The cut-off   value is 0.5 ug/mL FEU.           9/29/2017 10:37 PM      Narrative     XR CHEST 2 VW   9/29/2017 10:28 PM     HISTORY: Chest pain    COMPARISON: 6/29/2017.    FINDINGS: Negative. No interval change.        Impression     IMPRESSION:  Negative.    ERNESTO AMEZQUITA MD                Clinical Quality Measure: Blood Pressure Screening     Your blood pressure was checked while you were in the emergency department today. The last reading we obtained was  BP: (!) 159/109 . Please read the guidelines below about what these numbers mean and what you should do about them.  If your systolic blood pressure (the top number) is less than 120 and your diastolic blood pressure (the bottom number) is less than 80, then your blood pressure is normal. There is nothing more that you need to do about it.  If your systolic blood pressure (the top number) is 120-139 or your diastolic blood pressure (the bottom number) is 80-89, your blood pressure may be higher than it should be. You should have your blood pressure rechecked within a year by a primary care provider.  If your systolic blood pressure (the top number) is 140 or greater or your diastolic blood pressure (the bottom number) is 90 or greater, you may have high blood pressure. High blood pressure is treatable, but if left untreated over time it can put you at risk for heart attack, stroke, or kidney failure. You should have your blood pressure rechecked by a primary care provider within the next 4 weeks.  If your provider in the emergency department today gave you specific instructions to follow-up with your doctor or provider even sooner than that, you should follow that instruction and not wait for up to 4 weeks for your follow-up visit.        Thank you for choosing [facility]       Thank you for choosing [facility] for your care. Our goal is always to provide you with excellent care. Hearing back from our patients is one way we can continue to improve our services. Please take a few minutes to complete the written survey that you may receive in the mail after you visit with us. Thank you!        [employer] Information     [facility] gives you secure access to your electronic health record. If you see a primary care  provider, you can also send messages to your care team and make appointments. If you have questions, please call your primary care clinic.  If you do not have a primary care provider, please call [phone] and they will assist you.        Care EveryWhere ID     This is your Care EveryWhere ID. This could be used by other organizations to access your [facility] medical records  [id number]        Equal Access to Services     KHADAR BAEZ : Sari Marcos, lesli casas, leonie brian, ariel marie. So [facility] [phone].    ATENCIÓN: Si habla español, tiene a guy disposición servicios gratuitos de asistencia lingüística. Llame al [phone].    We comply with applicable federal civil rights laws and [place] laws. We do not discriminate on the basis of race, color, national origin, age, disability, sex, sexual orientation, or gender identity.            After Visit Summary       This is your record. Keep this with you and show to your community pharmacist(s) and doctor(s) at your next visit.

## 2017-09-29 NOTE — ED AVS SNAPSHOT
Emergency Department    [address] [place] [address]    Phone:  [phone]    Fax:  [phone]                                       Jeff Serra   MRN: [id number]    Department:   Emergency Department   Date of Visit:  9/29/2017           After Visit Summary Signature Page     I have received my discharge instructions, and my questions have been answered. I have discussed any challenges I see with this plan with the nurse or doctor.    ..........................................................................................................................................  Patient/Patient Representative Signature      ..........................................................................................................................................  Patient Representative Print Name and Relationship to Patient    ..................................................               ................................................  Date                                            Time    ..........................................................................................................................................  Reviewed by Signature/Title    ...................................................              ..............................................  Date                                                            Time

## 2017-09-30 NOTE — ED PROVIDER NOTES
History     Chief Complaint:  Lightheadedness      HPI   Jeff Serra is a 42 year old female who presents with lightheadedness. The patient reports that she was driving home today at 1800 when she had a sudden onset of feeling like she was going to pass out and her whole body went numb. She states that when she got home she continued to feel light headed and also began to feel heart palpitations. She then tried to take her blood pressure and when she wrapped the cuff around her right arm she began to feel pain on the back side of her right upper arm She states that her current symptoms do not feel similar to her past episodes of chest problems. She notes that she had a normal day today and ate and drank at her baseline. She notes that her symptoms are all still currently present. Patient denies abdominal pain.    Allergies:  The patient has no known drug allergies.    Medications:    Roxicodone  Senokot  Cozaar  K-Dur  Allegra  Claritin    Past Medical History:    Excessive or frequent menstruation  Chronic midline low back pain without sciatica  Renal calculi  Dysmenorrhea  Pelvic pain  Chronic pain syndrome  Allergic rhinitis  Pulmonary embolism  Closed fracture of right fibula  Hypokalemia  Hypertension goal < 140/90  LDL goal less than 160  Chronic neck pain  Chronic low back pain  Right leg DVT    Past Surgical History:    Laparoscopy diagnostic (Gyn)  Laparotomy mini, tubal ligation (post partum)  Lithotripsy    Family History:    Heart disease    Social History:  Relationship status:   Tobacco use: Neg  Alcohol use: Pos (Occasional)  The patient presents alone.     Review of Systems   Constitutional: Negative for appetite change.   Cardiovascular: Positive for palpitations.   Gastrointestinal: Negative for abdominal pain.   Musculoskeletal:        Positive for pain in right upper arm.   Neurological: Positive for light-headedness and numbness (Whole body).   All other systems reviewed and are  "negative.    Physical Exam     Patient Vitals for the past 24 hrs:   BP Temp Temp src Pulse Heart Rate Resp SpO2 Height Weight   09/29/17 2113 (!) 159/109 - - - 92 28 100 % - -   09/29/17 1926 (!) 168/111 98  F (36.7  C) Oral 101 - 16 100 % 1.6 m (5' 3\") 80.7 kg (178 lb)     Physical Exam  Eye:  Pupils are equal, round, and reactive.  Extraocular movements intact.    ENT:  No rhinorrhea.  Moist mucus membranes.  Normal tongue and tonsil.    Cardiac:  Regular rate and rhythm.  No murmurs, gallops, or rubs.    Pulmonary:  Clear to auscultation bilaterally.  No wheezes, rales, or rhonchi.    Abdomen:  Positive bowel sounds.  Abdomen is soft and non-distended, without focal tenderness.    Musculoskeletal:  Normal movement of all extremities without evidence for deficit. No lower extremity edema or asymmetry.    Skin:  Warm and dry without rashes.    Neurologic:  Non-focal exam without asymmetric weakness or numbness.     Psychiatric:  Normal affect with appropriate interaction with examiner.    Emergency Department Course   ECG @ 1947  Indication: Lightheadedness  Rate 95 bpm.   WI interval 126 ms.   QRS duration 80 ms.   QT/QTc 352/442 ms.   P-R-T axes 22.  Notes: Normal sinus rhythm, Normal ECG.  Time read 2110     Imaging:  Radiographic findings were communicated with the patient who voiced understanding of the findings.    Chest XR, per radiology:  Negative.    Laboratory:  CBC: WNL (WBC 5.8, HGB 13.2, )   BMP: Potassium 3.3 (L), BUN 5 (L) o/w WNL (Creatinine 0.70)   2108: Troponin I: <0.015  D dimer: 0.4    Interventions:  2223: Normal Saline, 1000 mL, IV  2230: Ativan, 0.5 mg, IV    Emergency Department Course:  Nursing notes and vitals reviewed.  I performed an exam of the patient as documented above.  The above workup was undertaken.  2248: I rechecked the patient and discussed results.  Findings and plan explained to the Patient. Patient discharged home, status improved, with instructions regarding " supportive care, medications, and reasons to return as well as the importance of close follow-up was reviewed.    Impression & Plan    Medical Decision Making:  Jeff Serra is a 42 year old female who presents to us with complaints of palpitations, feeling anxious, and feeling lightheaded.  This is not dissimilar to prior evaluations in this emergency department.  She has a known history of prior DVT/PE for which she is not on anticoagulation.  She otherwise has no cardiac history.  On exam, she appears anxious which improved with Ativan.  EKG and continuous telemetry monitoring does not show any evidence of tachycardia, PVCs, or other electrical abnormality.  Laboratory investigation shows an unremarkable hemoglobin and reassuring lites.  Her troponin is negative and I feel that with a negative d-dimer, she is low risk per well's criteria and would not require a CT.    I feel the patient is safe for discharge home.  She describes increasing stress in her life and I believe that this was more of an acute reaction to stress than anything more organic.  She feels comfortable with the plan for discharge and to follow up with her primary doctor.  She was otherwise advised to return to us immediately if she develops any persistent discomfort, syncope, or other emergent concerns.    Diagnosis:    ICD-10-CM   1. Heart palpitations R00.2     Disposition:  Discharged to home.    I, Yaima Soto, am serving as a scribe on 9/29/2017 at 9:08 PM to personally document services performed by Trierweiler, Chad A, MD, based on my observations and the provider's statements to me.     EMERGENCY DEPARTMENT       Trierweiler, Chad A, MD  09/30/17 0044

## 2017-09-30 NOTE — DISCHARGE INSTRUCTIONS
"  * Heart Palpitations    Palpitations refers to the feeling that your heart is beating hard, fast or irregular. Some people describe it as \"pounding\" or \"skipped beats\". Palpitations may occur in persons with heart disease, but can also occur in healthy persons. Your doctor does not believe that anything dangerous is causing your symptoms at this time.  Heart-Related Causes:    Arrhythmia (a change from the heart's normal rhythm)    Disease of the heart valves  Non-Heart-Related Causes:    Certain medicines (such as asthma inhalers and decongestants)    Some herbal supplements, energy drinks and pills, and weight loss pills    Illegal stimulant drugs (such as cocaine, crank, methamphetamine, PCP)    Caffeine, alcohol and tobacco    Medical conditions such as thyroid disease, anemia, anxiety and panic disorder  Sometimes the cause cannot be found.  Home Care:  1. Avoid excess caffeine, alcohol, tobacco and any stimulant drugs.  2. Tell your doctor about any prescription or over-the-counter or herbal medicines you take.  Follow Up  with your doctor or as advised by our staff.  Get Prompt Medical Attention  if any of the following occur together with palpitations:    Weakness, dizziness, light-headed or fainting    Chest pain or shortness of breath    Rapid heart rate (over 120 beats per minute, at rest)    Palpitations that lasts over 20 minutes    Weakness of an arm or leg or one side of the face    Difficulty with speech or vision    6084-2340 [employer]. [address] [place] [address]. All rights reserved. This information is not intended as a substitute for professional medical care. Always follow your healthcare professional's instructions.        "

## 2017-10-10 DIAGNOSIS — M54.50 CHRONIC MIDLINE LOW BACK PAIN WITHOUT SCIATICA: ICD-10-CM

## 2017-10-10 DIAGNOSIS — M54.2 CHRONIC NECK PAIN: ICD-10-CM

## 2017-10-10 DIAGNOSIS — G89.29 CHRONIC NECK PAIN: ICD-10-CM

## 2017-10-10 DIAGNOSIS — G89.29 CHRONIC MIDLINE LOW BACK PAIN WITHOUT SCIATICA: ICD-10-CM

## 2017-10-10 RX ORDER — OXYCODONE HYDROCHLORIDE 15 MG/1
15-30 TABLET ORAL 3 TIMES DAILY PRN
Qty: 120 TABLET | Refills: 0 | Status: SHIPPED | OUTPATIENT
Start: 2017-10-10 | End: 2017-11-01

## 2017-10-24 ENCOUNTER — TELEPHONE (OUTPATIENT)
Dept: OBGYN | Facility: CLINIC | Age: 42
End: 2017-10-24

## 2017-10-24 NOTE — TELEPHONE ENCOUNTER
Patient called with concerns of vaginal bleeding.  She had a hysteroscopy ablation with Nov[employer] in August.  She has had on and off vaginal bleeding which she expects.  Recently she has had increase vaginal bleeding.  Sunday she had bright red blood pouring out of her.  Today the blood is brownish in color-moderate amount.  She is not sure if she has hemorrhoids because she bleeds more after having a stool.  She denied pelvic pain or abdominal pain. I placed patient with Dr. Walker in [facility] tomorrow morning.  She is aware she is being worked into the schedule so might have to wait.  Lucita Lipscomb RN

## 2017-10-25 ENCOUNTER — OFFICE VISIT (OUTPATIENT)
Dept: OBGYN | Facility: CLINIC | Age: 42
End: 2017-10-25
Payer: COMMERCIAL

## 2017-10-25 ENCOUNTER — TELEPHONE (OUTPATIENT)
Dept: OBGYN | Facility: CLINIC | Age: 42
End: 2017-10-25

## 2017-10-25 VITALS
SYSTOLIC BLOOD PRESSURE: 139 MMHG | HEART RATE: 86 BPM | TEMPERATURE: 98.3 F | DIASTOLIC BLOOD PRESSURE: 96 MMHG | OXYGEN SATURATION: 99 % | WEIGHT: 184.2 LBS | BODY MASS INDEX: 32.63 KG/M2

## 2017-10-25 DIAGNOSIS — N93.9 ABNORMAL UTERINE BLEEDING (AUB): Primary | ICD-10-CM

## 2017-10-25 PROCEDURE — 99214 OFFICE O/P EST MOD 30 MIN: CPT | Performed by: OBSTETRICS & GYNECOLOGY

## 2017-10-25 NOTE — NURSING NOTE
"Chief Complaint   Patient presents with     Consult     Heavy Bleeding post Ablation 08/15/2017       Initial BP (!) 138/96  Pulse 86  Temp 98.3  F (36.8  C) (Oral)  Wt 83.6 kg (184 lb 3.2 oz)  SpO2 99%  BMI 32.63 kg/m2 Estimated body mass index is 32.63 kg/(m^2) as calculated from the following:    Height as of 9/29/17: 1.6 m (5' 3\").    Weight as of this encounter: 83.6 kg (184 lb 3.2 oz).  Medication Reconciliation: complete   Stephany Noonan, CMA      "

## 2017-10-25 NOTE — PROGRESS NOTES
Jeff is a 42 year old   here for follow up of her bleeding.  She is bleeding daily and it waxes and wanes, but never stops.  She reports no significant relief from the bleeding since her ablation..  ROS: No urinary frequency or dysuria, bladder or kidney problems  ROS: Ten point review of systems was reviewed and negative except the above.    PMH: Her past medical, surgical, and obstetric histories were reviewed and are documented in their appropriate chart areas.    ALL/Meds: Her medication and allergy histories were reviewed and are documented in their appropriate chart areas.    SH/FMH: Reviewed and documented in the appropriate area.    PE: BP (!) 139/96  Pulse 86  Temp 98.3  F (36.8  C) (Oral)  Wt 83.6 kg (184 lb 3.2 oz)  SpO2 99%  BMI 32.63 kg/m2    Discussed that repeat ablation hasn't been shown to be effective.  I recommend a hysterectomy.  Reviewed the risks, benefits, and alternatives of hysterectomy including but not limited to bleeding, infection, injury to bowel,bladder and other structures.  The patient is aware that hysterectomy will render her sterile and unable to have further children.  We discussed the different routes of surgery including abdominal, vaginal, and laparoscopic and the possibility that the route of surgery may change during the procedure.  We discussed both total and supracervical hysterectomy and the benefits and contraindications involved.  We discussed ovarian sparing as well as oophrectomy. Reviewed pre and post op course. Patient was given the opportunity to ask questions and have them answered. The patient agrees.    A/P:   Jeff presents with (N93.9) Abnormal uterine bleeding (AUB)  (primary encounter diagnosis)  Comment: 20 minutes was spent face to face with the patient today, out of a total visit of 30 minutes.  Over 50% of the visit was spent in counseling and coordination of care.      Plan: TOTAL LAPAROSCOPIC HYSTERECTOMY BILATERAL SALPINGECTOMY            -    No orders of the defined types were placed in this encounter.        Margarito Walker MD FACOG

## 2017-10-25 NOTE — PATIENT INSTRUCTIONS
If you have any questions regarding your visit, Please contact your care team.    Women s Health CLINIC HOURS TELEPHONE NUMBER   MD Stephany Hatfield CMA Lisa -    SHALINI Beavers RN       Monday:       7:30-4:30 [place]  Wednesday:       7:30-4:30 [place]  Thursday:       7:30-1:30 [place]  Friday:       7:30-11:30 [facility]  [address] [facility] [place]  [address]  [phone] ask for Women[facility]  [address] [place] [address]  [phone] ask for [facility]    Imaging Scheduling for [place]:  [phone]    Imaging Scheduling for [place]: [phone]       Urgent Care locations:    [facility] Saturday and Sunday   9 am - 5 pm    Monday-Friday   12 pm - 8 pm  Saturday and Sunday   9 am - 5 pm   [phone]     [facility] Labor and Delivery:  [phone]    If you need a medication refill, please contact your pharmacy. Please allow 3 business days for your refill to be completed.  As always, Thank you for trusting us with your healthcare needs!

## 2017-10-25 NOTE — MR AVS SNAPSHOT
After Visit Summary   10/25/2017    Jeff Serra    MRN: [id number]           Patient Information     Date Of Birth          1975        Visit Information        Provider Department      10/25/2017 10:00 AM Margarito Walker MD [facility]        Today's Diagnoses     Abnormal uterine bleeding (AUB)    -  1      Care Instructions                                                         If you have any questions regarding your visit, Please contact your care team.    Women s Health CLINIC HOURS TELEPHONE NUMBER   MD Stephany Hatfield CMA Lisa -    SHALINI Beavers RN       Monday:       7:30-4:30 [place]  Wednesday:       7:30-4:30 [place]  Thursday:       7:30-1:30 [place]  Friday:       7:30-11:30 [facility]  [address] [facility] [place]  [address]  [phone] ask for Critical access hospital's [facility]  [address] [place] [address]  [phone] ask for [facility]    Imaging Scheduling for [place]:  [phone]    Imaging Scheduling for [place]: [phone]       Urgent Care locations:    [facility] Saturday and Sunday   9 am - 5 pm    Monday-Friday   12 pm - 8 pm  Saturday and Sunday   9 am - 5 pm   [phone]     [facility] Labor and Delivery:  [phone]    If you need a medication refill, please contact your pharmacy. Please allow 3 business days for your refill to be completed.  As always, Thank you for trusting us with your healthcare needs!              Follow-ups after your visit        Who to contact     If you have questions or need follow up information about today's clinic visit or your schedule please contact [facility] directly at [phone].  Normal or non-critical lab and imaging results will be communicated to you by MyChart, letter or phone within 4 business days after the clinic has  received the results. If you do not hear from us within 7 days, please contact the clinic through [employer] or phone. If you have a critical or abnormal lab result, we will notify you by phone as soon as possible.  Submit refill requests through [employer] or call your pharmacy and they will forward the refill request to us. Please allow 3 business days for your refill to be completed.          Additional Information About Your Visit        [employer] [facility] Information     [employer] gives you secure access to your electronic health record. If you see a primary care provider, you can also send messages to your care team and make appointments. If you have questions, please call your primary care clinic.  If you do not have a primary care provider, please call [phone] and they will assist you.        Care EveryWhere ID     This is your Care EveryWhere ID. This could be used by other organizations to access your [facility] medical records  [id number]        Your Vitals Were     Pulse Temperature Pulse Oximetry BMI (Body Mass Index)          86 98.3  F (36.8  C) (Oral) 99% 32.63 kg/m2         Blood Pressure from Last 3 Encounters:   10/25/17 (!) 139/96   09/29/17 (!) 159/109   09/14/17 (!) 139/91    Weight from Last 3 Encounters:   10/25/17 83.6 kg (184 lb 3.2 oz)   09/29/17 80.7 kg (178 lb)   09/14/17 83.9 kg (185 lb)              Today, you had the following     No orders found for display       Primary Care Provider Office Phone # Fax #    Jordyn Jae Loredo [phone] [address] [facility] [address]        Equal Access to Services     [facility] Center[facility] : Hadii aad ku hadasho Soomaali, waaxda luqadaha, qaybta kaalmada ariel brian. So [facility] [phone].    ATENCIÓN: Si habla español, tiene a guy disposición servicios gratuitos de asistencia lingüística. Llame al [phone].    We comply with applicable federal civil rights laws and [place] laws. We do  not discriminate on the basis of race, color, national origin, age, disability, sex, sexual orientation, or gender identity.            Thank you!     Thank you for choosing [facility]  for your care. Our goal is always to provide you with excellent care. Hearing back from our patients is one way we can continue to improve our services. Please take a few minutes to complete the written survey that you may receive in the mail after your visit with us. Thank you!             Your Updated Medication List - Protect others around you: Learn how to safely use, store and throw away your medicines at www.disposemymeds.org.          This list is accurate as of: 10/25/17  4:56 PM.  Always use your most recent med list.                   Brand Name Dispense Instructions for use Diagnosis    fexofenadine 180 MG tablet    ALLEGRA    90 tablet    Take 1 tablet (180 mg) by mouth daily    Seasonal allergic rhinitis, unspecified allergic rhinitis trigger       loratadine 10 MG tablet    CLARITIN    30 tablet    Take 1 tablet (10 mg) by mouth daily as needed for allergies    Allergic rhinitis       losartan 100 MG tablet    COZAAR    30 tablet    Take 1 tablet (100 mg) by mouth daily    Hypertension goal BP (blood pressure) < 140/90       oxyCODONE 15 MG IR tablet    ROXICODONE    120 tablet    Take 1-2 tablets (15-30 mg) by mouth 3 times daily as needed for moderate to severe pain    Chronic midline low back pain without sciatica, Chronic neck pain       potassium chloride SA 10 MEQ CR tablet    K-DUR/KLOR-CON M    10 tablet    Take 1 tablet (10 mEq) by mouth daily    Hypokalemia       senna-docusate 8.6-50 MG per tablet    SENOKOT-S;PERICOLACE    60 tablet    Take 1 tablet by mouth 2 times daily as needed for constipation    Chronic neck pain

## 2017-10-25 NOTE — TELEPHONE ENCOUNTER
Jeff Serra  1975  Gender: Female  Phone: [phone] (home) none (work)     Pre-operative diagnosis:   Encounter Diagnosis   Name Primary?     Abnormal uterine bleeding (AUB) Yes     Surgical procedure: TOTAL LAPAROSCOPIC HYSTERECTOMY BILATERAL SALPINGECTOMY   Special equipment: None    Anesthesia: General  Position:  Lithotomy   Latex Allergy: No  VRE or MRSA or Other Precautions: None  Initiate Pre-op orders for above procedure: Yes as ordered in Epic.   Additional orders noted there also.   Location for Surgery: [facility]  Consent signed: Not yet.     Sterilization or Hysterectomy consent signed in advance: Yes:   Date sterilization consent signed for Med Assistance pt: NA  Surgeon: MD DENIS SweetOG  PA assistant: No  Physician assistant: No  Operating room  requested: Yes    Electronically signed by: MD DENIS SweetOG         October 25, 2017          4:51 PM   There is no height or weight on file to calculate BMI.  (Cut off for ASC  is 45)   Surgery Date:  TBD  Estimated Case Length: 2 hour(s).  Scheduled as: Day of Surgery Admit    Other Special Preparations: None.  Pre-anesthetic medical evaluation: to be scheduled with their primary care provider.  Post op appointment needed:  Yes in 2 weeks

## 2017-10-26 NOTE — TELEPHONE ENCOUNTER
Surgery     Medical Record Number: [id number]  Jeff Serra  YOB: 1975   Phone: [phone] (home) none (work)  Primary Provider: Jordyn Loredo    Reason for Admit:  Same Day    Surgeon: MICK Walker MD  Surgical Procedure: TOTAL LAPAROSCOPIC HYSTERECTOMY BILATERAL SALPINGECTOMY   ICD-9 Coded: N 92.0  Date of Surgery: 11/17/2017  Consent signed? No    Date signed: not yet  Hospital: [facility]  Outpatient less than 24 hour stay  -Pre op need's to be scheduled  -Informed PT that a drivers needed  -Packet placed in mail  -2 week post to be scheduled  Requestor:  Stephany Noonan     Location:  [facility] [phone]            Surgery Pre-Certification    Medical Record Number: [id number]  Jeff Serra  YOB: 1975   Phone: [phone] (home) none (work)  Primary Provider: Jordyn Loredo    Reason for Admit:  Same Day    Surgeon: MICK Walker MD  Surgical Procedure: TOTAL LAPAROSCOPIC HYSTERECTOMY BILATERAL SALPINGECTOMY   ICD-9 Coded: N 92.0  Date of Surgery: 11/17/2017  Consent signed? No    Date signed: not yet  Hospital: [facility]  Outpatient less than 24 hour stay    Requestor:  Stephany Noonan     Location:  [facility] [phone]

## 2017-10-31 ENCOUNTER — HOSPITAL ENCOUNTER (EMERGENCY)
Facility: CLINIC | Age: 42
Discharge: HOME | End: 2017-10-31
Attending: EMERGENCY MEDICINE | Admitting: EMERGENCY MEDICINE
Payer: COMMERCIAL

## 2017-10-31 VITALS
SYSTOLIC BLOOD PRESSURE: 131 MMHG | OXYGEN SATURATION: 99 % | RESPIRATION RATE: 17 BRPM | HEIGHT: 63 IN | DIASTOLIC BLOOD PRESSURE: 90 MMHG | TEMPERATURE: 98.1 F | WEIGHT: 189 LBS | BODY MASS INDEX: 33.49 KG/M2

## 2017-10-31 DIAGNOSIS — E87.6 HYPOKALEMIA: ICD-10-CM

## 2017-10-31 DIAGNOSIS — R42 LIGHTHEADEDNESS: ICD-10-CM

## 2017-10-31 LAB
ALBUMIN UR-MCNC: NEGATIVE MG/DL
ANION GAP SERPL CALCULATED.3IONS-SCNC: 10 MMOL/L (ref 3–14)
APPEARANCE UR: CLEAR
BASOPHILS # BLD AUTO: 0 10E9/L (ref 0–0.2)
BASOPHILS NFR BLD AUTO: 0.5 %
BILIRUB UR QL STRIP: NEGATIVE
BUN SERPL-MCNC: 5 MG/DL (ref 7–30)
CALCIUM SERPL-MCNC: 8.9 MG/DL (ref 8.5–10.1)
CHLORIDE SERPL-SCNC: 102 MMOL/L (ref 94–109)
CO2 SERPL-SCNC: 27 MMOL/L (ref 20–32)
COLOR UR AUTO: ABNORMAL
CREAT SERPL-MCNC: 0.75 MG/DL (ref 0.52–1.04)
D DIMER PPP FEU-MCNC: 0.3 UG/ML FEU (ref 0–0.5)
DIFFERENTIAL METHOD BLD: NORMAL
EOSINOPHIL # BLD AUTO: 0.1 10E9/L (ref 0–0.7)
EOSINOPHIL NFR BLD AUTO: 1.1 %
ERYTHROCYTE [DISTWIDTH] IN BLOOD BY AUTOMATED COUNT: 13.9 % (ref 10–15)
GFR SERPL CREATININE-BSD FRML MDRD: 84 ML/MIN/1.7M2
GLUCOSE SERPL-MCNC: 91 MG/DL (ref 70–99)
GLUCOSE UR STRIP-MCNC: NEGATIVE MG/DL
HCT VFR BLD AUTO: 40.5 % (ref 35–47)
HGB BLD-MCNC: 13.7 G/DL (ref 11.7–15.7)
HGB UR QL STRIP: NEGATIVE
IMM GRANULOCYTES # BLD: 0 10E9/L (ref 0–0.4)
IMM GRANULOCYTES NFR BLD: 0.2 %
INTERPRETATION ECG - MUSE: NORMAL
KETONES UR STRIP-MCNC: NEGATIVE MG/DL
LEUKOCYTE ESTERASE UR QL STRIP: NEGATIVE
LYMPHOCYTES # BLD AUTO: 1.9 10E9/L (ref 0.8–5.3)
LYMPHOCYTES NFR BLD AUTO: 30.8 %
MCH RBC QN AUTO: 28.3 PG (ref 26.5–33)
MCHC RBC AUTO-ENTMCNC: 33.8 G/DL (ref 31.5–36.5)
MCV RBC AUTO: 84 FL (ref 78–100)
MONOCYTES # BLD AUTO: 0.6 10E9/L (ref 0–1.3)
MONOCYTES NFR BLD AUTO: 9.5 %
MUCOUS THREADS #/AREA URNS LPF: PRESENT /LPF
NEUTROPHILS # BLD AUTO: 3.6 10E9/L (ref 1.6–8.3)
NEUTROPHILS NFR BLD AUTO: 57.9 %
NITRATE UR QL: NEGATIVE
PH UR STRIP: 6.5 PH (ref 5–7)
PLATELET # BLD AUTO: 193 10E9/L (ref 150–450)
POTASSIUM SERPL-SCNC: 2.9 MMOL/L (ref 3.4–5.3)
RBC # BLD AUTO: 4.84 10E12/L (ref 3.8–5.2)
RBC #/AREA URNS AUTO: 1 /HPF (ref 0–2)
SODIUM SERPL-SCNC: 139 MMOL/L (ref 133–144)
SOURCE: ABNORMAL
SP GR UR STRIP: 1 (ref 1–1.03)
SQUAMOUS #/AREA URNS AUTO: 1 /HPF (ref 0–1)
TROPONIN I SERPL-MCNC: <0.015 UG/L (ref 0–0.04)
UROBILINOGEN UR STRIP-MCNC: NORMAL MG/DL (ref 0–2)
WBC # BLD AUTO: 6.2 10E9/L (ref 4–11)
WBC #/AREA URNS AUTO: 2 /HPF (ref 0–2)

## 2017-10-31 PROCEDURE — 93005 ELECTROCARDIOGRAM TRACING: CPT

## 2017-10-31 PROCEDURE — 80048 BASIC METABOLIC PNL TOTAL CA: CPT | Performed by: EMERGENCY MEDICINE

## 2017-10-31 PROCEDURE — 85025 COMPLETE CBC W/AUTO DIFF WBC: CPT | Performed by: EMERGENCY MEDICINE

## 2017-10-31 PROCEDURE — 84484 ASSAY OF TROPONIN QUANT: CPT | Performed by: EMERGENCY MEDICINE

## 2017-10-31 PROCEDURE — 81001 URINALYSIS AUTO W/SCOPE: CPT | Performed by: EMERGENCY MEDICINE

## 2017-10-31 PROCEDURE — 25000128 H RX IP 250 OP 636: Performed by: EMERGENCY MEDICINE

## 2017-10-31 PROCEDURE — 99284 EMERGENCY DEPT VISIT MOD MDM: CPT

## 2017-10-31 PROCEDURE — 25000132 ZZH RX MED GY IP 250 OP 250 PS 637: Performed by: EMERGENCY MEDICINE

## 2017-10-31 PROCEDURE — 85379 FIBRIN DEGRADATION QUANT: CPT | Performed by: EMERGENCY MEDICINE

## 2017-10-31 RX ORDER — SODIUM CHLORIDE 9 MG/ML
1000 INJECTION, SOLUTION INTRAVENOUS CONTINUOUS
Status: DISCONTINUED | OUTPATIENT
Start: 2017-10-31 | End: 2017-10-31 | Stop reason: HOSPADM

## 2017-10-31 RX ORDER — POTASSIUM CHLORIDE 1500 MG/1
20 TABLET, EXTENDED RELEASE ORAL DAILY
Qty: 2 TABLET | Refills: 1 | Status: SHIPPED | OUTPATIENT
Start: 2017-10-31 | End: 2018-03-07

## 2017-10-31 RX ORDER — POTASSIUM CHLORIDE 1500 MG/1
40 TABLET, EXTENDED RELEASE ORAL ONCE
Status: COMPLETED | OUTPATIENT
Start: 2017-10-31 | End: 2017-10-31

## 2017-10-31 RX ADMIN — SODIUM CHLORIDE 1000 ML: 9 INJECTION, SOLUTION INTRAVENOUS at 00:55

## 2017-10-31 RX ADMIN — POTASSIUM CHLORIDE 40 MEQ: 1500 TABLET, EXTENDED RELEASE ORAL at 01:37

## 2017-10-31 ASSESSMENT — ENCOUNTER SYMPTOMS
SHORTNESS OF BREATH: 1
FEVER: 0
ABDOMINAL PAIN: 0
LIGHT-HEADEDNESS: 1
CHEST TIGHTNESS: 1
NAUSEA: 1

## 2017-10-31 NOTE — ED AVS SNAPSHOT
Emergency Department    [address] [place] [address]    Phone:  [phone]    Fax:  [phone]                                       Jeff Serra   MRN: [id number]    Department:   Emergency Department   Date of Visit:  10/31/2017           After Visit Summary Signature Page     I have received my discharge instructions, and my questions have been answered. I have discussed any challenges I see with this plan with the nurse or doctor.    ..........................................................................................................................................  Patient/Patient Representative Signature      ..........................................................................................................................................  Patient Representative Print Name and Relationship to Patient    ..................................................               ................................................  Date                                            Time    ..........................................................................................................................................  Reviewed by Signature/Title    ...................................................              ..............................................  Date                                                            Time

## 2017-10-31 NOTE — ED PROVIDER NOTES
"  History     Chief Complaint:  Chest Tightness    HPI   Jeff Serra is a 42 year old female with a history of hypertension, pulmonary embolism, and DVT who presents to the ED for evaluation of chest tightness. For the last 2 days, she has been having a centralized chest tightness with associated shortness of breath, nausea, and lightheadedness. She took an aleve last night, with no relief of symptoms. She notes her blood pressures have been in the 160s recently, which also worries her. Of note, she has been compliant with her blood pressure medications.    Allergies:  NKDA    Medications:    Roxicodone  Pericolace  Losartan  KCl  Allegra  Claritin     Past Medical History:    Chronic back and neck pain  HTN  Hypokalemia  Kidney stone  PE  Right leg DVT  Dysmenorrhea    Past Surgical History:    Lithotripsy  Tubal ligation  Gyn surgery    Family History:    Heart disease    Social History:  Marital Status:   [2]  Negative for tobacco use.  Negative for alcohol use.    Review of Systems   Constitutional: Negative for fever.   Respiratory: Positive for chest tightness and shortness of breath.    Gastrointestinal: Positive for nausea. Negative for abdominal pain.   Neurological: Positive for light-headedness.   All other systems reviewed and are negative.      Physical Exam   First Vitals:  BP: (!) 163/100  Heart Rate: 134  Temp: 98.1  F (36.7  C)  Height: 160 cm (5' 3\")  Weight: 85.7 kg (189 lb)    Physical Exam  General: Appears well-developed and well-nourished.   Head: No signs of trauma.   Mouth/Throat: Oropharynx is clear and moist.   CV: Normal rate and regular rhythm.    Resp: Effort normal and breath sounds normal. No respiratory distress.   GI: Soft. There is no tenderness or guarding.  Normal bowel sounds.  No CVA tenderness.  MSK: Normal range of motion. no edema. No Calf tenderness.  Neuro: The patient is alert and oriented.  Speech normal.  GCS 15  Skin: Skin is warm and dry. No rash noted. "   Psych: normal mood and affect. behavior is normal.       Emergency Department Course   ECG:  Indication: chest pain  Time: 0026  Vent. Rate 87 bpm. NH interval 122. QRS duration 84. QT/QTc 384/462 P-R-T axis 46 23 26.  Normal sinus rhythm. Normal ECG. Read time: 0032    Laboratory:  CBC: WBC: 6.2, HGB: 13.7, PLT: 193  BMP: Potassium 2.9 (L), BUN 5 (L), o/w WNL (Creatinine: 0.75)    Troponin: <0.015  D dimer: 0.3    UA with Microscopic: mucous present (A), o/w WNL    Interventions:  0055 NS 1L IV  0137 KCl SA 40 mEq Oral    Emergency Department Course:  Nursing notes and vitals reviewed. 0040 I performed an exam of the patient as documented above.     IV inserted. Medicine administered as documented above. Blood drawn. This was sent to the lab for further testing, results above.    The patient provided a urine sample here in the emergency department. This was sent for laboratory testing, findings above.     EKG obtained in the ED, see results above.     0152 I rechecked the patient and discussed the results of her workup thus far.     Findings and plan explained to the Patient. Patient discharged home with instructions regarding supportive care, medications, and reasons to return. The importance of close follow-up was reviewed. The patient was prescribed Potassium Chloride SA.    I personally reviewed the laboratory results with the Patient and answered all related questions prior to discharge.       Impression & Plan      Medical Decision Making:  Jeff Serra is a 42 year old female who presents due to feeling somewhat lightheaded and generally unwell. She said she's had symptoms for the last couple of days and noted her blood pressure been somewhat elevated to 160 systolic. On my evaluation, she was not in any respiratory distress and appeared well overall.  An EKG was obtained and showed normal sinus rhythm without any concerning ST segment changes or arrhythmias.  Blood work was obtained that showed  normal hemoglobin, which the patient was worried about since she's had some abnormal vaginal bleeding and is scheduled for a hysterectomy. She also had a normal troponin.  I have a lower suspicion for an acute MI, given she has had symptoms for the last two days with a normal EKG and normal troponin. She does have a history of blood clot issues in the past, so I did obtain a d-dimer which was negative, so I have a low suspicion for pulmonary embolism. Her potassium did come back somewhat on the low side and she was given oral replacement for this.  The patient has been told that she's had low potassium in the past as well.  Given the overall negative workup, the patient is appropriate for discharge home and outpatient management. I recommended that she continues to push plenty fluids and that she eat foods that are high in potassium.  She was also given a couple of extra doses of potassium supplementation for the next couple of days.  She was told to check in with her primary care doctor, or return immediately for any new or worsened symptoms or further concerns.    Diagnosis:    ICD-10-CM   1. Lightheadedness R42   2. Hypokalemia E87.6     Disposition:  discharged to home    Discharge Medications:  New Prescriptions    POTASSIUM CHLORIDE SA (KLOR-CON) 20 MEQ CR TABLET    Take 1 tablet (20 mEq) by mouth daily     I, Carlie Andrew, am serving as a scribe on 10/31/2017 at 12:33 AM to personally document services performed by Jasen Caruso MD based on my observations and the provider's statements to me.     Carlie Andrew  10/31/2017    EMERGENCY DEPARTMENT       Jasen Caruso MD  10/31/17 0421

## 2017-10-31 NOTE — ED AVS SNAPSHOT
Emergency Department    [address] [facility] [address]    Phone:  [phone]    Fax:  [phone]                                       Jeff Serra   MRN: [id number]    Department:   Emergency Department   Date of Visit:  10/31/2017           Patient Information     Date Of Birth          1975        Your diagnoses for this visit were:     Lightheadedness     Hypokalemia        You were seen by Jasen Caruso MD.      Follow-up Information     Follow up with Jordyn Loredo MD In 5 days.    Specialty:  Family Practice    Contact information:    [address]  [place] [id number] [phone]          Follow up with  Emergency Department.    Specialty:  EMERGENCY MEDICINE    Why:  As needed, If symptoms worsen    Contact information:    [address] [facility] [address] [phone]        Discharge Instructions         Discharge Instructions for Hypokalemia  You have been diagnosed with hypokalemia. This means you have a low level of potassium in your blood. Potassium helps your nerve and muscle cells work as they should. These cells include the cells in your heart. A low level of potassium in the blood can cause serious problems, such as abnormal heart rhythms and even heart attack.  Diet changes  Eat more potassium-rich foods:    Bananas    Oranges and orange juice    Tomatoes, tomato sauce, and tomato juice    Leafy green vegetables, such as spinach, kale, salad greens, collards, and chard    Melons (all kinds)    Pomegranates    Peas    Beans    Potatoes    Sweet potatoes    Avocados, including guacamole    Vegetable juices, such as V8    Fruit juices    All nuts and seeds    Fish, including tuna, halibut, salmon, cod, snapper, vanna, swordfish, and perch    Milk, including fat-free, low-fat, whole, chocolate, and buttermilk    Soy milk  Other home care    Take a potassium supplement as directed by your healthcare  provider.    After strenuous exercise or any activity that causes you to sweat a lot, grab a beverage high in potassium. This includes chocolate milk, coconut water, orange juice, or low-sodium vegetable juices.    Be sure to eat foods or drink fluids that contain potassium if you are having diarrhea or vomiting.    Have your potassium levels checked regularly.    Take all medicines exactly as directed.    Tell your healthcare provider about all prescription and over-the-counter medicines you are taking. This includes herbal products.    Avoid foods that are high in salt. Avoid canned and prepared foods that are high in salt.  Follow-up    Make a follow-up appointment as directed by our staff.    Keep all follow-up appointments. Your healthcare provider needs to monitor your condition closely.     When to call your healthcare provider  Call your provider right away or go to the emergency room if you have any of the following:    Vomiting    Fatigue    Diarrhea    Rapid, irregular heartbeat    Shortness of breath    Chest pain    Muscle cramps, spasms, or twitching    Weakness    Paralysis   Date Last Reviewed: 6/1/2017 2000-2017 [employer]. [address] [place] [address]. All rights reserved. This information is not intended as a substitute for professional medical care. Always follow your healthcare professional's instructions.        Dizziness (Uncertain Cause)  Dizziness is a common symptom. It may be described as lightheadedness, spinning, or feeling like you are going to faint. Dizziness can have many causes.  Be sure to tell the healthcare provider about:    All medicines you take, including prescription, over-the-counter, herbs, and supplements    Any other symptoms you have    Any health problems you are being treated for    Anything that causes the dizziness to get worse or better  Today's exam did not show an exact cause for your dizziness. Other tests may be needed. Follow up  with your healthcare provider.  Home care    Dizziness that occurs with sudden standing may be a sign of mild dehydration. Drink extra fluids for the next few days.    If you recently started a new medicine, stopped a medicine, or had the dose of a current medicine changed, talk with the prescribing healthcare provider. Your medicine plan may need adjustment.    If dizziness lasts more than a few seconds, sit or lie down until it passes. This may help prevent injury in case you pass out.    Do not drive or use power tools or dangerous equipment until you have had no dizziness for at least 48 hours.  Follow-up care  Follow up with your healthcare provider for further evaluation within the next 7 days or as advised.  When to seek medical advice  Call your healthcare provider for any of the following:    Worsening of symptoms or new symptoms    Passing out or seizure    Repeated vomiting    Headache    Palpitations (the sense that your heart is fluttering or beating fast or hard)    Shortness of breath    Blood in vomit or stool (black or red color)    Weakness of an arm or leg or one side of the face    Vision or hearing changes    Trouble walking or speaking    Chest, arm, neck, back, or jaw pain  Date Last Reviewed: 8/23/2015 2000-2017 [employer]. [address] [place] [address]. All rights reserved. This information is not intended as a substitute for professional medical care. Always follow your healthcare professional's instructions.            Future Appointments        Provider Department Dept Phone Center    11/1/2017 10:00 AM Jordyn Loredo MD [facility] [phone] [facility]      24 Hour Appointment Hotline       To make an appointment at any [facility], call [phone] ([phone]). If you don't have a family doctor or clinic, we will help you find one. [facility] are conveniently located to serve the needs of you and your  family.             Review of your medicines      CONTINUE these medicines which may have CHANGED, or have new prescriptions. If we are uncertain of the size of tablets/capsules you have at home, strength may be listed as something that might have changed.        Dose / Directions Last dose taken    potassium chloride SA 20 MEQ CR tablet   Commonly known as:  KLOR-CON   Dose:  20 mEq   What changed:    - medication strength  - how much to take   Quantity:  2 tablet        Take 1 tablet (20 mEq) by mouth daily   Refills:  1          Our records show that you are taking the medicines listed below. If these are incorrect, please call your family doctor or clinic.        Dose / Directions Last dose taken    fexofenadine 180 MG tablet   Commonly known as:  ALLEGRA   Dose:  180 mg   Quantity:  90 tablet        Take 1 tablet (180 mg) by mouth daily   Refills:  0        loratadine 10 MG tablet   Commonly known as:  CLARITIN   Dose:  10 mg   Quantity:  30 tablet        Take 1 tablet (10 mg) by mouth daily as needed for allergies   Refills:  2        losartan 100 MG tablet   Commonly known as:  COZAAR   Dose:  100 mg   Quantity:  30 tablet        Take 1 tablet (100 mg) by mouth daily   Refills:  1        oxyCODONE 15 MG IR tablet   Commonly known as:  ROXICODONE   Dose:  15-30 mg   Quantity:  120 tablet        Take 1-2 tablets (15-30 mg) by mouth 3 times daily as needed for moderate to severe pain   Refills:  0        senna-docusate 8.6-50 MG per tablet   Commonly known as:  SENOKOT-S;PERICOLACE   Dose:  1 tablet   Quantity:  60 tablet        Take 1 tablet by mouth 2 times daily as needed for constipation   Refills:  5                Prescriptions were sent or printed at these locations (1 Prescription)                   Other Prescriptions                Printed at Department/Unit printer (1 of 1)         potassium chloride SA (KLOR-CON) 20 MEQ CR tablet                Procedures and tests performed during your visit      Basic metabolic panel    CBC with platelets + differential    D dimer quantitative    EKG 12 lead    Troponin I (now)    UA with Microscopic      Orders Needing Specimen Collection     None      Pending Results     No orders found from 10/29/2017 to 11/1/2017.            Pending Culture Results     No orders found from 10/29/2017 to 11/1/2017.            Pending Results Instructions     If you had any lab results that were not finalized at the time of your Discharge, you can call the ED Lab Result RN at [phone]. You will be contacted by this team for any positive Lab results or changes in treatment. The nurses are available 7 days a week from 10A to 6:30P.  You can leave a message 24 hours per day and they will return your call.        Test Results From Your Hospital Stay        10/31/2017  1:09 AM      Component Results     Component Value Ref Range & Units Status    WBC 6.2 4.0 - 11.0 10e9/L Final    RBC Count 4.84 3.8 - 5.2 10e12/L Final    Hemoglobin 13.7 11.7 - 15.7 g/dL Final    Hematocrit 40.5 35.0 - 47.0 % Final    MCV 84 78 - 100 fl Final    MCH 28.3 26.5 - 33.0 pg Final    MCHC 33.8 31.5 - 36.5 g/dL Final    RDW 13.9 10.0 - 15.0 % Final    Platelet Count 193 150 - 450 10e9/L Final    Diff Method Automated Method  Final    % Neutrophils 57.9 % Final    % Lymphocytes 30.8 % Final    % Monocytes 9.5 % Final    % Eosinophils 1.1 % Final    % Basophils 0.5 % Final    % Immature Granulocytes 0.2 % Final    Absolute Neutrophil 3.6 1.6 - 8.3 10e9/L Final    Absolute Lymphocytes 1.9 0.8 - 5.3 10e9/L Final    Absolute Monocytes 0.6 0.0 - 1.3 10e9/L Final    Absolute Eosinophils 0.1 0.0 - 0.7 10e9/L Final    Absolute Basophils 0.0 0.0 - 0.2 10e9/L Final    Abs Immature Granulocytes 0.0 0 - 0.4 10e9/L Final         10/31/2017  1:24 AM      Component Results     Component Value Ref Range & Units Status    Sodium 139 133 - 144 mmol/L Final    Potassium 2.9 (L) 3.4 - 5.3 mmol/L Final    Chloride 102 94 - 109 mmol/L  Final    Carbon Dioxide 27 20 - 32 mmol/L Final    Anion Gap 10 3 - 14 mmol/L Final    Glucose 91 70 - 99 mg/dL Final    Urea Nitrogen 5 (L) 7 - 30 mg/dL Final    Creatinine 0.75 0.52 - 1.04 mg/dL Final    GFR Estimate 84 >60 mL/min/1.7m2 Final    Non  GFR Calc    GFR Estimate If Black >90 >60 mL/min/1.7m2 Final    African American GFR Calc    Calcium 8.9 8.5 - 10.1 mg/dL Final         10/31/2017  1:27 AM      Component Results     Component Value Ref Range & Units Status    Troponin I ES <0.015 0.000 - 0.045 ug/L Final    The 99th percentile for upper reference range is 0.045 ug/L.  Troponin values   in the range of 0.045 - 0.120 ug/L may be associated with risks of adverse   clinical events.           10/31/2017  1:11 AM      Component Results     Component Value Ref Range & Units Status    Color Urine Light Yellow  Final    Appearance Urine Clear  Final    Glucose Urine Negative NEG^Negative mg/dL Final    Bilirubin Urine Negative NEG^Negative Final    Ketones Urine Negative NEG^Negative mg/dL Final    Specific Gravity Urine 1.004 1.003 - 1.035 Final    Blood Urine Negative NEG^Negative Final    pH Urine 6.5 5.0 - 7.0 pH Final    Protein Albumin Urine Negative NEG^Negative mg/dL Final    Urobilinogen mg/dL Normal 0.0 - 2.0 mg/dL Final    Nitrite Urine Negative NEG^Negative Final    Leukocyte Esterase Urine Negative NEG^Negative Final    Source Midstream Urine  Final    WBC Urine 2 0 - 2 /HPF Final    RBC Urine 1 0 - 2 /HPF Final    Squamous Epithelial /HPF Urine 1 0 - 1 /HPF Final    Mucous Urine Present (A) NEG^Negative /LPF Final         10/31/2017  1:23 AM      Component Results     Component Value Ref Range & Units Status    D Dimer 0.3 0.0 - 0.50 ug/ml FEU Final    This D-dimer assay is intended for use in conjunction with a clinical pretest   probability assessment model to exclude pulmonary embolism (PE) and deep   venous thrombosis (DVT) in outpatients suspected of PE or DVT. The cut-off    value is 0.5 ug/mL FEU.                  Clinical Quality Measure: Blood Pressure Screening     Your blood pressure was checked while you were in the emergency department today. The last reading we obtained was  BP: 129/87 . Please read the guidelines below about what these numbers mean and what you should do about them.  If your systolic blood pressure (the top number) is less than 120 and your diastolic blood pressure (the bottom number) is less than 80, then your blood pressure is normal. There is nothing more that you need to do about it.  If your systolic blood pressure (the top number) is 120-139 or your diastolic blood pressure (the bottom number) is 80-89, your blood pressure may be higher than it should be. You should have your blood pressure rechecked within a year by a primary care provider.  If your systolic blood pressure (the top number) is 140 or greater or your diastolic blood pressure (the bottom number) is 90 or greater, you may have high blood pressure. High blood pressure is treatable, but if left untreated over time it can put you at risk for heart attack, stroke, or kidney failure. You should have your blood pressure rechecked by a primary care provider within the next 4 weeks.  If your provider in the emergency department today gave you specific instructions to follow-up with your doctor or provider even sooner than that, you should follow that instruction and not wait for up to 4 weeks for your follow-up visit.        Thank you for choosing [facility]       Thank you for choosing [facility] for your care. Our goal is always to provide you with excellent care. Hearing back from our patients is one way we can continue to improve our services. Please take a few minutes to complete the written survey that you may receive in the mail after you visit with us. Thank you!        [employer] Information     [employer] gives you secure access to your electronic health record. If you see a primary care provider,  you can also send messages to your care team and make appointments. If you have questions, please call your primary care clinic.  If you do not have a primary care provider, please call [phone] and they will assist you.        Care EveryWhere ID     This is your Care EveryWhere ID. This could be used by other organizations to access your [facility] medical records  [id number]        Equal Access to Services     KHADAR BAEZ : Hadii roldan Marcos, waelianada augusto, qaeanta kaalmadino brian, ariel marie. So [facility] [phone].    ATENCIÓN: Si habla español, tiene a guy disposición servicios gratuitos de asistencia lingüística. Carlos al [phone].    We comply with applicable federal civil rights laws and [place] laws. We do not discriminate on the basis of race, color, national origin, age, disability, sex, sexual orientation, or gender identity.            After Visit Summary       This is your record. Keep this with you and show to your community pharmacist(s) and doctor(s) at your next visit.

## 2017-10-31 NOTE — DISCHARGE INSTRUCTIONS
Discharge Instructions for Hypokalemia  You have been diagnosed with hypokalemia. This means you have a low level of potassium in your blood. Potassium helps your nerve and muscle cells work as they should. These cells include the cells in your heart. A low level of potassium in the blood can cause serious problems, such as abnormal heart rhythms and even heart attack.  Diet changes  Eat more potassium-rich foods:    Bananas    Oranges and orange juice    Tomatoes, tomato sauce, and tomato juice    Leafy green vegetables, such as spinach, kale, salad greens, collards, and chard    Melons (all kinds)    Pomegranates    Peas    Beans    Potatoes    Sweet potatoes    Avocados, including guacamole    Vegetable juices, such as V8    Fruit juices    All nuts and seeds    Fish, including tuna, halibut, salmon, cod, snapper, vanna, swordfish, and perch    Milk, including fat-free, low-fat, whole, chocolate, and buttermilk    Soy milk  Other home care    Take a potassium supplement as directed by your healthcare provider.    After strenuous exercise or any activity that causes you to sweat a lot, grab a beverage high in potassium. This includes chocolate milk, coconut water, orange juice, or low-sodium vegetable juices.    Be sure to eat foods or drink fluids that contain potassium if you are having diarrhea or vomiting.    Have your potassium levels checked regularly.    Take all medicines exactly as directed.    Tell your healthcare provider about all prescription and over-the-counter medicines you are taking. This includes herbal products.    Avoid foods that are high in salt. Avoid canned and prepared foods that are high in salt.  Follow-up    Make a follow-up appointment as directed by our staff.    Keep all follow-up appointments. Your healthcare provider needs to monitor your condition closely.     When to call your healthcare provider  Call your provider right away or go to the emergency room if you have any of  the following:    Vomiting    Fatigue    Diarrhea    Rapid, irregular heartbeat    Shortness of breath    Chest pain    Muscle cramps, spasms, or twitching    Weakness    Paralysis   Date Last Reviewed: 6/1/2017 2000-2017 [employer] [address] [place] [address], [place] [address]. All rights reserved. This information is not intended as a substitute for professional medical care. Always follow your healthcare professional's instructions.        Dizziness (Uncertain Cause)  Dizziness is a common symptom. It may be described as lightheadedness, spinning, or feeling like you are going to faint. Dizziness can have many causes.  Be sure to tell the healthcare provider about:    All medicines you take, including prescription, over-the-counter, herbs, and supplements    Any other symptoms you have    Any health problems you are being treated for    Anything that causes the dizziness to get worse or better  Today's exam did not show an exact cause for your dizziness. Other tests may be needed. Follow up with your healthcare provider.  Home care    Dizziness that occurs with sudden standing may be a sign of mild dehydration. Drink extra fluids for the next few days.    If you recently started a new medicine, stopped a medicine, or had the dose of a current medicine changed, talk with the prescribing healthcare provider. Your medicine plan may need adjustment.    If dizziness lasts more than a few seconds, sit or lie down until it passes. This may help prevent injury in case you pass out.    Do not drive or use power tools or dangerous equipment until you have had no dizziness for at least 48 hours.  Follow-up care  Follow up with your healthcare provider for further evaluation within the next 7 days or as advised.  When to seek medical advice  Call your healthcare provider for any of the following:    Worsening of symptoms or new symptoms    Passing out or seizure    Repeated vomiting    Headache    Palpitations  (the sense that your heart is fluttering or beating fast or hard)    Shortness of breath    Blood in vomit or stool (black or red color)    Weakness of an arm or leg or one side of the face    Vision or hearing changes    Trouble walking or speaking    Chest, arm, neck, back, or jaw pain  Date Last Reviewed: 8/23/2015 2000-2017 [employer]. [address] [place] [address]. All rights reserved. This information is not intended as a substitute for professional medical care. Always follow your healthcare professional's instructions.

## 2017-11-01 ENCOUNTER — OFFICE VISIT (OUTPATIENT)
Dept: FAMILY MEDICINE | Facility: CLINIC | Age: 42
End: 2017-11-01
Payer: COMMERCIAL

## 2017-11-01 VITALS
OXYGEN SATURATION: 95 % | HEIGHT: 63 IN | HEART RATE: 84 BPM | TEMPERATURE: 97.9 F | SYSTOLIC BLOOD PRESSURE: 136 MMHG | RESPIRATION RATE: 16 BRPM | BODY MASS INDEX: 33.4 KG/M2 | WEIGHT: 188.5 LBS | DIASTOLIC BLOOD PRESSURE: 86 MMHG

## 2017-11-01 DIAGNOSIS — M54.2 CHRONIC NECK PAIN: ICD-10-CM

## 2017-11-01 DIAGNOSIS — Z23 NEED FOR PROPHYLACTIC VACCINATION AND INOCULATION AGAINST INFLUENZA: ICD-10-CM

## 2017-11-01 DIAGNOSIS — G89.29 CHRONIC MIDLINE LOW BACK PAIN WITHOUT SCIATICA: ICD-10-CM

## 2017-11-01 DIAGNOSIS — Z01.818 PREOP GENERAL PHYSICAL EXAM: Primary | ICD-10-CM

## 2017-11-01 DIAGNOSIS — M54.50 CHRONIC MIDLINE LOW BACK PAIN WITHOUT SCIATICA: ICD-10-CM

## 2017-11-01 DIAGNOSIS — G89.29 CHRONIC NECK PAIN: ICD-10-CM

## 2017-11-01 DIAGNOSIS — E87.6 HYPOKALEMIA: ICD-10-CM

## 2017-11-01 PROCEDURE — 90686 IIV4 VACC NO PRSV 0.5 ML IM: CPT | Performed by: FAMILY MEDICINE

## 2017-11-01 PROCEDURE — 99214 OFFICE O/P EST MOD 30 MIN: CPT | Mod: 25 | Performed by: FAMILY MEDICINE

## 2017-11-01 PROCEDURE — 90471 IMMUNIZATION ADMIN: CPT | Performed by: FAMILY MEDICINE

## 2017-11-01 RX ORDER — POTASSIUM CHLORIDE 750 MG/1
10 TABLET, EXTENDED RELEASE ORAL DAILY
Qty: 15 TABLET | Refills: 0 | Status: SHIPPED | OUTPATIENT
Start: 2017-11-01 | End: 2018-03-07

## 2017-11-01 RX ORDER — OXYCODONE HYDROCHLORIDE 15 MG/1
15-30 TABLET ORAL 3 TIMES DAILY PRN
Qty: 120 TABLET | Refills: 0 | Status: SHIPPED | OUTPATIENT
Start: 2017-11-06 | End: 2017-12-01

## 2017-11-01 ASSESSMENT — PAIN SCALES - GENERAL: PAINLEVEL: NO PAIN (0)

## 2017-11-01 NOTE — PROGRESS NOTES
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
## 2017-11-01 NOTE — NURSING NOTE
"Chief Complaint   Patient presents with     Pre-Op Exam       Initial /86 (BP Location: Right arm, Patient Position: Right side, Cuff Size: Adult Regular)  Pulse 84  Temp 97.9  F (36.6  C) (Oral)  Resp 16  Ht 1.6 m (5' 3\")  Wt 85.5 kg (188 lb 8 oz)  SpO2 95%  BMI 33.39 kg/m2 Estimated body mass index is 33.39 kg/(m^2) as calculated from the following:    Height as of this encounter: 1.6 m (5' 3\").    Weight as of this encounter: 85.5 kg (188 lb 8 oz).  Medication Reconciliation: completecomplete    Winston Salinas MA      "

## 2017-11-01 NOTE — MR AVS SNAPSHOT
After Visit Summary   11/1/2017    Jeff Serra    MRN: [id number]           Patient Information     Date Of Birth          1975        Visit Information        Provider Department      11/1/2017 10:00 AM Jordyn Loredo MD [facility]        Today's Diagnoses     Preop general physical exam    -  1    Hypokalemia        Need for prophylactic vaccination and inoculation against influenza        Chronic midline low back pain without sciatica        Chronic neck pain          Care Instructions      Before Your Surgery      Call your surgeon if there is any change in your health. This includes signs of a cold or flu (such as a sore throat, runny nose, cough, rash or fever).    Do not smoke, drink alcohol or take over the counter medicine (unless your surgeon or primary care doctor tells you to) for the 24 hours before and after surgery.    If you take prescribed drugs: Follow your doctor s orders about which medicines to take and which to stop until after surgery.    Eating and drinking prior to surgery: follow the instructions from your surgeon    Take a shower or bath the night before surgery. Use the soap your surgeon gave you to gently clean your skin. If you do not have soap from your surgeon, use your regular soap. Do not shave or scrub the surgery site.  Wear clean pajamas and have clean sheets on your bed.           Follow-ups after your visit        Follow-up notes from your care team     Return if symptoms worsen or fail to improve.      Future tests that were ordered for you today     Open Future Orders        Priority Expected Expires Ordered    Potassium Routine 11/13/2017 11/1/2018 11/1/2017            Who to contact     If you have questions or need follow up information about today's clinic visit or your schedule please contact [facility] directly at [phone].  Normal or non-critical lab and imaging results will be communicated to you  "by [employer], letter or phone within 4 business days after the clinic has received the results. If you do not hear from us within 7 days, please contact the clinic through [employer] or phone. If you have a critical or abnormal lab result, we will notify you by phone as soon as possible.  Submit refill requests through [employer] or call your pharmacy and they will forward the refill request to us. Please allow 3 business days for your refill to be completed.          Additional Information About Your Visit        [employer] Information     [employer] gives you secure access to your electronic health record. If you see a primary care provider, you can also send messages to your care team and make appointments. If you have questions, please call your primary care clinic.  If you do not have a primary care provider, please call [phone] and they will assist you.        Care EveryWhere ID     This is your Care EveryWhere ID. This could be used by other organizations to access your [place] medical records  [id number]        Your Vitals Were     Pulse Temperature Respirations Height Pulse Oximetry BMI (Body Mass Index)    84 97.9  F (36.6  C) (Oral) 16 1.6 m (5' 3\") 95% 33.39 kg/m2       Blood Pressure from Last 3 Encounters:   11/01/17 136/86   10/31/17 131/90   10/25/17 (!) 139/96    Weight from Last 3 Encounters:   11/01/17 85.5 kg (188 lb 8 oz)   10/31/17 85.7 kg (189 lb)   10/25/17 83.6 kg (184 lb 3.2 oz)              We Performed the Following     FLU VAC, SPLIT VIRUS IM > 3 YO (QUADRIVALENT) [26830]     Vaccine Administration, Initial [53878]          Today's Medication Changes          These changes are accurate as of: 11/1/17 11:06 AM.  If you have any questions, ask your nurse or doctor.               These medicines have changed or have updated prescriptions.        Dose/Directions    * potassium chloride SA 20 MEQ CR tablet   Commonly known as:  KLOR-CON   This may have changed:  Another medication with the same " name was added. Make sure you understand how and when to take each.        Dose:  20 mEq   Take 1 tablet (20 mEq) by mouth daily   Quantity:  2 tablet   Refills:  1       * potassium chloride SA 10 MEQ CR tablet   Commonly known as:  K-DUR/KLOR-CON M   This may have changed:  You were already taking a medication with the same name, and this prescription was added. Make sure you understand how and when to take each.   Used for:  Hypokalemia   Changed by:  Jordyn Loredo MD        Dose:  10 mEq   Take 1 tablet (10 mEq) by mouth daily   Quantity:  15 tablet   Refills:  0       * Notice:  This list has 2 medication(s) that are the same as other medications prescribed for you. Read the directions carefully, and ask your doctor or other care provider to review them with you.         Where to get your medicines      These medications were sent to [facility] [id number] [place] [address] AT [facility] [address] [facility] [address]     Phone:  [phone]     potassium chloride SA 10 MEQ CR tablet         Some of these will need a paper prescription and others can be bought over the counter.  Ask your nurse if you have questions.     Bring a paper prescription for each of these medications     oxyCODONE 15 MG IR tablet                Primary Care Provider Office Phone # Fax #    Jordyn Loredo [phone] [address] [facility] [address]        Equal Access to Services     [facility] Hospital[facility] AH: Hadii aad ku hadasho Soomaali, waaxda luqadaha, qaybta kaalmada adeegyada, waxay marielle hayleigh marie. So [facility] [phone].    ATENCIÓN: Si habla español, tiene a guy disposición servicios gratuitos de asistencia lingüística. Llame al [phone].    We comply with applicable federal civil rights laws and [place] laws. We do not discriminate on the basis of race, color, national origin, age, disability, sex, sexual  orientation, or gender identity.            Thank you!     Thank you for choosing [facility]  for your care. Our goal is always to provide you with excellent care. Hearing back from our patients is one way we can continue to improve our services. Please take a few minutes to complete the written survey that you may receive in the mail after your visit with us. Thank you!             Your Updated Medication List - Protect others around you: Learn how to safely use, store and throw away your medicines at www.disposemymeds.org.          This list is accurate as of: 11/1/17 11:06 AM.  Always use your most recent med list.                   Brand Name Dispense Instructions for use Diagnosis    fexofenadine 180 MG tablet    ALLEGRA    90 tablet    Take 1 tablet (180 mg) by mouth daily    Seasonal allergic rhinitis, unspecified allergic rhinitis trigger       loratadine 10 MG tablet    CLARITIN    30 tablet    Take 1 tablet (10 mg) by mouth daily as needed for allergies    Allergic rhinitis       losartan 100 MG tablet    COZAAR    30 tablet    Take 1 tablet (100 mg) by mouth daily    Hypertension goal BP (blood pressure) < 140/90       oxyCODONE 15 MG IR tablet   Start taking on:  11/6/2017    ROXICODONE    120 tablet    Take 1-2 tablets (15-30 mg) by mouth 3 times daily as needed for moderate to severe pain    Chronic midline low back pain without sciatica, Chronic neck pain       * potassium chloride SA 20 MEQ CR tablet    KLOR-CON    2 tablet    Take 1 tablet (20 mEq) by mouth daily        * potassium chloride SA 10 MEQ CR tablet    K-DUR/KLOR-CON M    15 tablet    Take 1 tablet (10 mEq) by mouth daily    Hypokalemia       senna-docusate 8.6-50 MG per tablet    SENOKOT-S;PERICOLACE    60 tablet    Take 1 tablet by mouth 2 times daily as needed for constipation    Chronic neck pain       * Notice:  This list has 2 medication(s) that are the same as other medications prescribed for you. Read the  directions carefully, and ask your doctor or other care provider to review them with you.

## 2017-11-06 ENCOUNTER — MYC MEDICAL ADVICE (OUTPATIENT)
Dept: FAMILY MEDICINE | Facility: CLINIC | Age: 42
End: 2017-11-06

## 2017-11-06 ENCOUNTER — OFFICE VISIT (OUTPATIENT)
Dept: FAMILY MEDICINE | Facility: CLINIC | Age: 42
End: 2017-11-06
Payer: COMMERCIAL

## 2017-11-06 VITALS
HEART RATE: 86 BPM | BODY MASS INDEX: 32.82 KG/M2 | DIASTOLIC BLOOD PRESSURE: 86 MMHG | OXYGEN SATURATION: 98 % | RESPIRATION RATE: 16 BRPM | HEIGHT: 63 IN | SYSTOLIC BLOOD PRESSURE: 136 MMHG | TEMPERATURE: 98 F | WEIGHT: 185.2 LBS

## 2017-11-06 DIAGNOSIS — R10.31 ABDOMINAL PAIN, RIGHT LOWER QUADRANT: Primary | ICD-10-CM

## 2017-11-06 LAB
ALBUMIN SERPL-MCNC: 3.6 G/DL (ref 3.4–5)
ALBUMIN UR-MCNC: NEGATIVE MG/DL
ALP SERPL-CCNC: 81 U/L (ref 40–150)
ALT SERPL W P-5'-P-CCNC: 44 U/L (ref 0–50)
ANION GAP SERPL CALCULATED.3IONS-SCNC: 5 MMOL/L (ref 3–14)
APPEARANCE UR: CLEAR
AST SERPL W P-5'-P-CCNC: 19 U/L (ref 0–45)
BILIRUB SERPL-MCNC: 0.8 MG/DL (ref 0.2–1.3)
BILIRUB UR QL STRIP: NEGATIVE
BUN SERPL-MCNC: 7 MG/DL (ref 7–30)
CALCIUM SERPL-MCNC: 8.5 MG/DL (ref 8.5–10.1)
CHLORIDE SERPL-SCNC: 102 MMOL/L (ref 94–109)
CO2 SERPL-SCNC: 32 MMOL/L (ref 20–32)
COLOR UR AUTO: YELLOW
CREAT SERPL-MCNC: 0.66 MG/DL (ref 0.52–1.04)
ERYTHROCYTE [DISTWIDTH] IN BLOOD BY AUTOMATED COUNT: 14.1 % (ref 10–15)
GFR SERPL CREATININE-BSD FRML MDRD: >90 ML/MIN/1.7M2
GLUCOSE SERPL-MCNC: 115 MG/DL (ref 70–99)
GLUCOSE UR STRIP-MCNC: NEGATIVE MG/DL
HCT VFR BLD AUTO: 41.3 % (ref 35–47)
HGB BLD-MCNC: 13.7 G/DL (ref 11.7–15.7)
HGB UR QL STRIP: NEGATIVE
KETONES UR STRIP-MCNC: NEGATIVE MG/DL
LEUKOCYTE ESTERASE UR QL STRIP: NEGATIVE
MCH RBC QN AUTO: 28.7 PG (ref 26.5–33)
MCHC RBC AUTO-ENTMCNC: 33.2 G/DL (ref 31.5–36.5)
MCV RBC AUTO: 87 FL (ref 78–100)
NITRATE UR QL: NEGATIVE
PH UR STRIP: 7.5 PH (ref 5–7)
PLATELET # BLD AUTO: 194 10E9/L (ref 150–450)
POTASSIUM SERPL-SCNC: 3.3 MMOL/L (ref 3.4–5.3)
PROT SERPL-MCNC: 7.7 G/DL (ref 6.8–8.8)
RBC # BLD AUTO: 4.77 10E12/L (ref 3.8–5.2)
SODIUM SERPL-SCNC: 139 MMOL/L (ref 133–144)
SOURCE: ABNORMAL
SP GR UR STRIP: 1.02 (ref 1–1.03)
UROBILINOGEN UR STRIP-ACNC: 0.2 EU/DL (ref 0.2–1)
WBC # BLD AUTO: 6.4 10E9/L (ref 4–11)

## 2017-11-06 PROCEDURE — 99214 OFFICE O/P EST MOD 30 MIN: CPT | Performed by: FAMILY MEDICINE

## 2017-11-06 PROCEDURE — 80053 COMPREHEN METABOLIC PANEL: CPT | Performed by: FAMILY MEDICINE

## 2017-11-06 PROCEDURE — 85027 COMPLETE CBC AUTOMATED: CPT | Performed by: FAMILY MEDICINE

## 2017-11-06 PROCEDURE — 36415 COLL VENOUS BLD VENIPUNCTURE: CPT | Performed by: FAMILY MEDICINE

## 2017-11-06 PROCEDURE — 81003 URINALYSIS AUTO W/O SCOPE: CPT | Performed by: FAMILY MEDICINE

## 2017-11-06 RX ORDER — HYDROMORPHONE HYDROCHLORIDE 2 MG/1
2-4 TABLET ORAL EVERY 4 HOURS PRN
Qty: 20 TABLET | Refills: 0 | Status: SHIPPED | OUTPATIENT
Start: 2017-11-06 | End: 2017-11-21

## 2017-11-06 ASSESSMENT — PAIN SCALES - GENERAL: PAINLEVEL: WORST PAIN (10)

## 2017-11-06 NOTE — NURSING NOTE
"Chief Complaint   Patient presents with     Abdominal Pain       Initial /86 (BP Location: Right arm, Patient Position: Right side, Cuff Size: Adult Regular)  Pulse 86  Temp 98  F (36.7  C) (Oral)  Resp 16  Ht 1.6 m (5' 3\")  Wt 84 kg (185 lb 3.2 oz)  SpO2 98%  BMI 32.81 kg/m2 Estimated body mass index is 32.81 kg/(m^2) as calculated from the following:    Height as of this encounter: 1.6 m (5' 3\").    Weight as of this encounter: 84 kg (185 lb 3.2 oz).  Medication Reconciliation: complete       Will Rita SIMMONS      "

## 2017-11-06 NOTE — LETTER
November 6, 2017        Jeff Serra  [address] [facility] [address]          To whom it may concern:    RE: Jeff Serra    Patient was seen and treated today at our clinic.  Off work due to illness 11/6 - 11/7/17    Please contact me for questions or concerns.      Sincerely,        Jordyn Loredo MD

## 2017-11-06 NOTE — MR AVS SNAPSHOT
After Visit Summary   11/6/2017    Jeff Serra    MRN: [id number]           Patient Information     Date Of Birth          1975        Visit Information        Provider Department      11/6/2017 3:00 PM Jordyn Loredo MD [facility]        Today's Diagnoses     Abdominal pain, right lower quadrant    -  1       Follow-ups after your visit        Your next 10 appointments already scheduled     Nov 07, 2017  2:15 PM CST   CT ABDOMEN PELVIS W CONTRAST with [facility]CT[facility]   [facility]    [address] [place] [id number] [phone]           Please bring any scans or X-rays taken at other hospitals, if similar tests were done. Also bring a list of your medicines, including vitamins, minerals and over-the-counter drugs. It is safest to leave personal items at home.  Be sure to tell your doctor:   If you have any allergies.   If there s any chance you are pregnant.   If you are breastfeeding.   If you have any special needs.  You may have contrast for this exam. To prepare:   Do not eat or drink for 2 hours before your exam. If you need to take medicine, you may take it with small sips of water. (We may ask you to take liquid medicine as well.)   The day before your exam, drink extra fluids at least six 8-ounce glasses (unless your doctor tells you to restrict your fluids).  Patients over 70 or patients with diabetes or kidney problems:   If you haven t had a blood test (creatinine test) within the last 30 days, go to your clinic or Diagnostic Imaging Department for this test.  If you have diabetes:   If your kidney function is normal, continue taking your metformin (Avandamet, Glucophage, Glucovance, Metaglip) on the day of your exam.   If your kidney function is abnormal, wait 48 hours before restarting this medicine.  You will have oral contrast for this exam:   You will drink the contrast at home. Get this from  your clinic or Diagnostic Imaging Department. Please follow the directions given.  Please wear loose clothing, such as a sweat suit or jogging clothes. Avoid snaps, zippers and other metal. We may ask you to undress and put on a hospital gown.  If you have any questions, please call the Imaging Department where you will have your exam.              Future tests that were ordered for you today     Open Future Orders        Priority Expected Expires Ordered    CT Abdomen Pelvis w Contrast STAT  12/21/2017 11/6/2017            Who to contact     If you have questions or need follow up information about today's clinic visit or your schedule please contact [facility] directly at [phone].  Normal or non-critical lab and imaging results will be communicated to you by Localisthart, letter or phone within 4 business days after the clinic has received the results. If you do not hear from us within 7 days, please contact the clinic through Transmit Promot or phone. If you have a critical or abnormal lab result, we will notify you by phone as soon as possible.  Submit refill requests through 8aweek or call your pharmacy and they will forward the refill request to us. Please allow 3 business days for your refill to be completed.          Additional Information About Your Visit        Localisthart Information     8aweek gives you secure access to your electronic health record. If you see a primary care provider, you can also send messages to your care team and make appointments. If you have questions, please call your primary care clinic.  If you do not have a primary care provider, please call [phone] and they will assist you.        Care EveryWhere ID     This is your Care EveryWhere ID. This could be used by other organizations to access your [facility] medical records  [id number]        Your Vitals Were     Pulse Temperature Respirations Height Pulse Oximetry BMI (Body Mass Index)    86 98  F (36.7  C) (Oral) 16 1.6  "m (5' 3\") 98% 32.81 kg/m2       Blood Pressure from Last 3 Encounters:   11/06/17 136/86   11/01/17 136/86   10/31/17 131/90    Weight from Last 3 Encounters:   11/06/17 84 kg (185 lb 3.2 oz)   11/01/17 85.5 kg (188 lb 8 oz)   10/31/17 85.7 kg (189 lb)              We Performed the Following     *UA reflex to Microscopic and Culture ([place] (except [place] and [place])     CBC with platelets     Comprehensive metabolic panel          Today's Medication Changes          These changes are accurate as of: 11/6/17  3:52 PM.  If you have any questions, ask your nurse or doctor.               Start taking these medicines.        Dose/Directions    HYDROmorphone 2 MG tablet   Commonly known as:  DILAUDID   Used for:  Abdominal pain, right lower quadrant   Started by:  Jordyn Loredo MD        Dose:  2-4 mg   Take 1-2 tablets (2-4 mg) by mouth every 4 hours as needed for breakthrough pain maximum 8 tablet(s) per day   Quantity:  20 tablet   Refills:  0            Where to get your medicines      Some of these will need a paper prescription and others can be bought over the counter.  Ask your nurse if you have questions.     Bring a paper prescription for each of these medications     HYDROmorphone 2 MG tablet                Primary Care Provider Office Phone # Fax #    Jordyn Loredo [phone] [address] [facility], [place] [address]        Equal Access to Services     [facility] Center[facility] AH: Hadii roldan ku hadasho Soomaali, waaxda luqadaha, qaybta kaalmada adeegyada, ariel shafer . So [facility] [phone].    ATENCIÓN: Si habla carolyne, tiene a guy disposición servicios gratuitos de asistencia lingüística. Llame al [phone].    We comply with applicable federal civil rights laws and [place] laws. We do not discriminate on the basis of race, color, national origin, age, disability, sex, sexual orientation, or gender identity.          "   Thank you!     Thank you for choosing [facility]  for your care. Our goal is always to provide you with excellent care. Hearing back from our patients is one way we can continue to improve our services. Please take a few minutes to complete the written survey that you may receive in the mail after your visit with us. Thank you!             Your Updated Medication List - Protect others around you: Learn how to safely use, store and throw away your medicines at www.disposemymeds.org.          This list is accurate as of: 11/6/17  3:52 PM.  Always use your most recent med list.                   Brand Name Dispense Instructions for use Diagnosis    fexofenadine 180 MG tablet    ALLEGRA    90 tablet    Take 1 tablet (180 mg) by mouth daily    Seasonal allergic rhinitis, unspecified allergic rhinitis trigger       HYDROmorphone 2 MG tablet    DILAUDID    20 tablet    Take 1-2 tablets (2-4 mg) by mouth every 4 hours as needed for breakthrough pain maximum 8 tablet(s) per day    Abdominal pain, right lower quadrant       loratadine 10 MG tablet    CLARITIN    30 tablet    Take 1 tablet (10 mg) by mouth daily as needed for allergies    Allergic rhinitis       losartan 100 MG tablet    COZAAR    30 tablet    Take 1 tablet (100 mg) by mouth daily    Hypertension goal BP (blood pressure) < 140/90       oxyCODONE IR 15 MG tablet    ROXICODONE    120 tablet    Take 1-2 tablets (15-30 mg) by mouth 3 times daily as needed for moderate to severe pain    Chronic midline low back pain without sciatica, Chronic neck pain       * potassium chloride SA 20 MEQ CR tablet    KLOR-CON    2 tablet    Take 1 tablet (20 mEq) by mouth daily        * potassium chloride SA 10 MEQ CR tablet    K-DUR/KLOR-CON M    15 tablet    Take 1 tablet (10 mEq) by mouth daily    Hypokalemia       senna-docusate 8.6-50 MG per tablet    SENOKOT-S;PERICOLACE    60 tablet    Take 1 tablet by mouth 2 times daily as needed for constipation     Chronic neck pain       * Notice:  This list has 2 medication(s) that are the same as other medications prescribed for you. Read the directions carefully, and ask your doctor or other care provider to review them with you.

## 2017-11-06 NOTE — PROGRESS NOTES
"  SUBJECTIVE:   Jeff Serra is a 42 year old female who presents to clinic today for the following health issues:      ABDOMINAL   PAIN     Onset: 3 days    Description:   Character: Sharp  Location: right lower quadrant (started on R flank)  Radiation: None    Intensity: 10/10    Progression of Symptoms:  worsening    Accompanying Signs & Symptoms:  Fever/Chills?: no   Gas/Bloating: YES  Nausea: YES  Vomitting: no   Diarrhea?: YES  Constipation:YES  Dysuria or Hematuria: no    History:   Trauma: no   Previous similar pain: YES - kidney stone  Previous tests done: none    Precipitating factors:   Does the pain change with:     Food: no      BM: no     Urination: no     Alleviating factors:  None    Therapies Tried and outcome: None    LMP:  not applicable     SUBJECTIVE:  Here today with the above symptoms started a few days ago. Patient contacted me via my chart with concern for appendicitis. Appetite is down and she is feeling somewhat nauseated. Right lower quadrant pain. Stools a little loose. No vaginal discharge. No urinary symptoms.    Review of systems otherwise negative.  Past medical, family, and social history reviewed and updated in chart.    OBJECTIVE:  /86 (BP Location: Right arm, Patient Position: Right side, Cuff Size: Adult Regular)  Pulse 86  Temp 98  F (36.7  C) (Oral)  Resp 16  Ht 1.6 m (5' 3\")  Wt 84 kg (185 lb 3.2 oz)  SpO2 98%  BMI 32.81 kg/m2  Alert, pleasant, upbeat, and in no apparent discomfort.  Eyes normal in color  Ears normal. Throat and pharynx normal. Neck supple. No adenopathy or masses in the neck or supraclavicular regions. Sinuses non tender.  S1 and S2 normal, no murmurs, clicks, gallops or rubs. Regular rate and rhythm. Chest is clear; no wheezes or rales. No edema or JVD.  Abdomen - soft throughout without rebound or guarding. She is tender in the right lower quadrant but no focal masses or point tenderness noted  Past labs reviewed with the patient. "     UA RESULTS:  Recent Labs   Lab Test  11/06/17   1520  10/31/17   0052   COLOR  Yellow  Light Yellow   APPEARANCE  Clear  Clear   URINEGLC  Negative  Negative   URINEBILI  Negative  Negative   URINEKETONE  Negative  Negative   SG  1.020  1.004   UBLD  Negative  Negative   URINEPH  7.5*  6.5   PROTEIN  Negative  Negative   UROBILINOGEN  0.2   --    NITRITE  Negative  Negative   LEUKEST  Negative  Negative   RBCU   --   1   WBCU   --   2     CBC RESULTS:   Recent Labs   Lab Test  11/06/17   1517   WBC  6.4   RBC  4.77   HGB  13.7   HCT  41.3   MCV  87   MCH  28.7   MCHC  33.2   RDW  14.1   PLT  194         ASSESSMENT / PLAN:  (R10.31) Abdominal pain, right lower quadrant  (primary encounter diagnosis)  Comment: Abdominal pain, right lower quadrLow level of concern for appendicitis, but patient is worried about this and has an upcoming surgery. Given that point we discussed options and she would feel more comfortable having another CT scan done to rule out something that could cause a surgical problem.  Plan: *UA reflex to Microscopic and Culture ([place]         and [facility] (except [place] and         [place]), CBC with platelets, Comprehensive         metabolic panel, CT Abdomen Pelvis w Contrast            Follow up based upon results   MOISES Loredo MD    (Chart documentation completed in part with Dragon voice-recognition software.  Even though reviewed some grammatical, spelling, and word errors may remain.)

## 2017-11-06 NOTE — TELEPHONE ENCOUNTER
See other [employer] message.    Latricia Zapien RN, [facility]

## 2017-11-07 ENCOUNTER — RADIANT APPOINTMENT (OUTPATIENT)
Dept: CT IMAGING | Facility: CLINIC | Age: 42
End: 2017-11-07
Attending: FAMILY MEDICINE
Payer: COMMERCIAL

## 2017-11-07 DIAGNOSIS — R10.31 ABDOMINAL PAIN, RIGHT LOWER QUADRANT: ICD-10-CM

## 2017-11-07 PROCEDURE — 74177 CT ABD & PELVIS W/CONTRAST: CPT | Performed by: RADIOLOGY

## 2017-11-07 RX ORDER — IOPAMIDOL 755 MG/ML
135 INJECTION, SOLUTION INTRAVASCULAR ONCE
Status: COMPLETED | OUTPATIENT
Start: 2017-11-07 | End: 2017-11-07

## 2017-11-07 RX ADMIN — IOPAMIDOL 135 ML: 755 INJECTION, SOLUTION INTRAVASCULAR at 14:21

## 2017-11-08 NOTE — PROGRESS NOTES
Jeff,  Not sure why you are having the pain, but it is definitely not appendicitis.  Everything looked fine.  MOISES Loredo M.D.

## 2017-11-20 ENCOUNTER — MYC MEDICAL ADVICE (OUTPATIENT)
Dept: FAMILY MEDICINE | Facility: CLINIC | Age: 42
End: 2017-11-20

## 2017-11-20 DIAGNOSIS — R10.31 ABDOMINAL PAIN, RIGHT LOWER QUADRANT: ICD-10-CM

## 2017-11-21 RX ORDER — HYDROMORPHONE HYDROCHLORIDE 2 MG/1
2-4 TABLET ORAL EVERY 4 HOURS PRN
Qty: 40 TABLET | Refills: 0 | Status: SHIPPED | OUTPATIENT
Start: 2017-11-21 | End: 2018-01-09

## 2017-12-01 ENCOUNTER — MYC REFILL (OUTPATIENT)
Dept: FAMILY MEDICINE | Facility: CLINIC | Age: 42
End: 2017-12-01

## 2017-12-01 ENCOUNTER — HOSPITAL ENCOUNTER (EMERGENCY)
Facility: CLINIC | Age: 42
Discharge: HOME | End: 2017-12-01
Attending: EMERGENCY MEDICINE | Admitting: EMERGENCY MEDICINE
Payer: COMMERCIAL

## 2017-12-01 VITALS
OXYGEN SATURATION: 98 % | TEMPERATURE: 98.7 F | BODY MASS INDEX: 31.71 KG/M2 | DIASTOLIC BLOOD PRESSURE: 93 MMHG | HEIGHT: 63 IN | SYSTOLIC BLOOD PRESSURE: 141 MMHG | WEIGHT: 179 LBS | HEART RATE: 104 BPM | RESPIRATION RATE: 16 BRPM

## 2017-12-01 DIAGNOSIS — G89.29 CHRONIC MIDLINE LOW BACK PAIN WITHOUT SCIATICA: ICD-10-CM

## 2017-12-01 DIAGNOSIS — M54.2 CHRONIC NECK PAIN: ICD-10-CM

## 2017-12-01 DIAGNOSIS — J30.2 SEASONAL ALLERGIC RHINITIS, UNSPECIFIED CHRONICITY, UNSPECIFIED TRIGGER: Primary | ICD-10-CM

## 2017-12-01 DIAGNOSIS — M54.50 CHRONIC MIDLINE LOW BACK PAIN WITHOUT SCIATICA: ICD-10-CM

## 2017-12-01 DIAGNOSIS — G89.29 CHRONIC NECK PAIN: ICD-10-CM

## 2017-12-01 DIAGNOSIS — J30.9 ALLERGIC RHINITIS: ICD-10-CM

## 2017-12-01 DIAGNOSIS — N93.9 VAGINAL BLEEDING: ICD-10-CM

## 2017-12-01 LAB
ERYTHROCYTE [DISTWIDTH] IN BLOOD BY AUTOMATED COUNT: 13.6 % (ref 10–15)
HCT VFR BLD AUTO: 38.1 % (ref 35–47)
HGB BLD-MCNC: 13.2 G/DL (ref 11.7–15.7)
MCH RBC QN AUTO: 28.8 PG (ref 26.5–33)
MCHC RBC AUTO-ENTMCNC: 34.6 G/DL (ref 31.5–36.5)
MCV RBC AUTO: 83 FL (ref 78–100)
PLATELET # BLD AUTO: 224 10E9/L (ref 150–450)
RBC # BLD AUTO: 4.58 10E12/L (ref 3.8–5.2)
WBC # BLD AUTO: 7.4 10E9/L (ref 4–11)

## 2017-12-01 PROCEDURE — 99283 EMERGENCY DEPT VISIT LOW MDM: CPT

## 2017-12-01 PROCEDURE — 85027 COMPLETE CBC AUTOMATED: CPT | Performed by: EMERGENCY MEDICINE

## 2017-12-01 ASSESSMENT — ENCOUNTER SYMPTOMS: ABDOMINAL PAIN: 1

## 2017-12-01 NOTE — TELEPHONE ENCOUNTER
Message from MyChart:  Original authorizing provider: MD Jeff Membreno would like a refill of the following medications:  loratadine (CLARITIN) 10 MG tablet [Jordyn Loredo MD]    Preferred pharmacy: [facility] #[id number] [place] [id number] [facility] AT CORNER OF [address] [place] [address]    Comment:

## 2017-12-01 NOTE — DISCHARGE INSTRUCTIONS
Continue to have nothing in vagina  Take it easy today  If bleeding starts again, would recommend calling the clinic and seeing if you can get seen in clinic today  Otherwise follow-up with your OB/GYN next week at your appointment    Otherwise concerning symptoms would be severe abdominal pain, fever

## 2017-12-01 NOTE — ED AVS SNAPSHOT
Emergency Department    [address] [place] [address]    Phone:  [phone]    Fax:  [phone]                                       Jeff Serra   MRN: [id number]    Department:   Emergency Department   Date of Visit:  12/1/2017           After Visit Summary Signature Page     I have received my discharge instructions, and my questions have been answered. I have discussed any challenges I see with this plan with the nurse or doctor.    ..........................................................................................................................................  Patient/Patient Representative Signature      ..........................................................................................................................................  Patient Representative Print Name and Relationship to Patient    ..................................................               ................................................  Date                                            Time    ..........................................................................................................................................  Reviewed by Signature/Title    ...................................................              ..............................................  Date                                                            Time

## 2017-12-01 NOTE — TELEPHONE ENCOUNTER
oxyCODONE (ROXICODONE) 15 MG IR tablet      Last Written Prescription Date:  11/06/17  Last Fill Quantity: 120,   # refills: 0  Last Office Visit: 11/06/17 Dr. Loredo  Future Office visit:    Next 5 appointments (look out 90 days)     Dec 06, 2017  1:30 PM CST   Office Visit with Margarito Walker MD   [facility] ([facility]    [address] [place] [id number] [phone]                   Routing refill request to provider for review/approval because:  Drug not on the [facility], [facility] or  Health refill protocol or controlled substance

## 2017-12-01 NOTE — ED PROVIDER NOTES
History     Chief Complaint:  Vaginal Bleeding    HPI   Jeff Serra is a 42 year old female who presents to the emergency department today for evaluation of vaginal bleeding. The patient reports she had a laparoscopic total hysterectomy on 11/17/17 in [facility] in [place] and as of this morning at 0730 presents with heavy vaginal bleeding that is still present. She reports going through 2 pads already over one hour. Had spotting last night. She reports no restrictions given after the surgery and she has not called the clinic or her physician, Dr. Walker, regarding the bleeding. The patient reports the surgery went well with only lower abdominal pain that has been present since surgery though improved. Not on anticoagulation.    Allergies:  No Known Drug Allergies    Medications:    Dilaudid  Potassium Chloride  Roxicodone   Senokot  Cozaar  Allegra  Claritin     Past Medical History:    Chronic low back pain   Chronic neck pain   HTN (hypertension)   Kidney stone   Pulmonary embolism  Right leg DVT    Past Surgical History:    Laparotomy mini, tubal ligation (post partum), combined  Lithotripsy     Family History:    History reviewed. No pertinent family history.     Social History:  The patient was accompanied to the ED by .  Smoking Status: Never Smoker  Smokeless Tobacco: Never Used  Alcohol Use: Positive  Marital Status:   [2]     Review of Systems   Gastrointestinal: Positive for abdominal pain (suprapubic).   Genitourinary: Positive for vaginal bleeding (heavy). Negative for vaginal discharge.   All other systems reviewed and are negative.    Physical Exam     Patient Vitals for the past 24 hrs:   BP Temp Temp src Pulse Heart Rate Resp SpO2 Height Weight   12/01/17 1000 (!) 141/93 - - - - - 98 % - -   12/01/17 0930 (!) 149/93 - - - 83 16 97 % - -   12/01/17 0900 (!) 131/92 - - - 84 16 96 % - -   12/01/17 0841 - - - - 95 - 97 % - -   12/01/17 0826 (!) 140/98 - - - 104  "16 98 % - -   12/01/17 0809 (!) 157/105 98.7  F (37.1  C) Oral 104 - 18 96 % 1.6 m (5' 3\") 81.2 kg (179 lb)     Physical Exam  General: Resting comfortably on the gurney  Eyes:  The pupils are equal and round  ENT:    Moist mucous membranes  Neck:  Normal range of motion  CV:  Tachycardic rate and rhythm    Skin warm and well perfused   Resp:  Lungs are clear    Non-labored    No rales    No wheezing   GI:  Abdomen is soft, there is no rigidity    No distension    Minimal suprapubic tenderness     Incisions are clean, dry and intact on abdomen  :  No active bleeding.     Clots in vaginal vault.    No visible location of bleeding    No sutures seen  MS:  Normal muscular tone  Skin:  No rash or acute skin lesions noted  Neuro:   Awake, alert.      Speech is normal and fluent.    Face is symmetric.     Moves all extremities  Psych:  Normal affect.  Appropriate interactions.    Emergency Department Course     Laboratory:  Laboratory findings were communicated with the patient who voiced understanding of the findings.    CBC: WBC 7.4, HGB 13.2,     Emergency Department Course:    0809 Nursing notes and vitals reviewed.    0813 I performed an exam of the patient as documented above.     0830 I performed a pelvic exam of the patient as documented above.     0845 I consulted with Dr. Val Napoles, the on-call physician for Dr. Walker, regarding the patient's presentation and findings.     0900 Has not had any vaginal bleeding since vaginal exam. Pt stood up and no bleeding    0902 I updated with Dr. Val Napoles who recommended consulting [facility]    0904 I rechecked the patient and updated her on the plan.    0922 I consulted with Dr. Betsey Claros from [facility].     0932 Dr. Claros called me back after consulting with a colleague.     0939 I updated Dr. Napoles and consulted for further plan and treatment. Dr. Napoles recommended if the patient is stable for discharge " then she can be discharged and follow up in clinic.      0948 I discussed the treatment plan with the patient. They expressed understanding of this plan and consented to discharge. They will be discharged home with instructions for care and follow up. In addition, the patient will return to the emergency department if their symptoms persist, worsen, if new symptoms arise or if there is any concern.  All questions were answered.    0950 I personally reviewed the laboratory and physical examination results with the patient and answered all related questions prior to discharge.    0952 On my final reassessment, the patient continues to have no bleeding and denies any new abdominal tenderness.     Impression & Plan      Medical Decision Making:  Jeff Serra is a 42 year old female who presents to the emergency department today for evaluation of vaginal bleeding. She has minimal suprapubic tenderness on exam (has been having mild pain since surgery and this is unchanged) and incisions look clean, dry, and intact. She did have recent hysterectomy and on vaginal exam, no obvious bleeding at this point, although there are some clots. I discussed the patient with provider, Dr. Napoles, who is on call for Dr. Walker, who recommended speaking to [facility]. Dr. Napoles/Denise does not come to [facility] and in this case usually [facility] would see the patient given that both groups are part of [facility]. [facility] provider [facility] and should not be seen by their group either and they don't usually cover the [facility] patients. Given this, I spoke with Dr. Napoles again who recommended that if patient is stable then she could be discharged and follow up in clinic. I do think she is stable and I reevaluated her twice and she continues to have no vaginal bleeding. I doubt perforation or intraabdominal process given minimal  suprapubic tenderness and no peritoneal signs. The bleeding has stopped and so the patient will call the clinic if the bleeding restarts and she can hopefully be seen in clinic today, otherwise Dr. Napoles recommended the patient be seen in [place] emergency department as they do cover that hospital if she would need an intervention. Does have f/u appt next week with Dr. Walker. The patient is in agreement with this plan. She is hemodynamically stable with a normal hemoglobin and no vaginal bleeding at this point. Reasons to return to the emergency department were discussed with the patient.     Diagnosis:    ICD-10-CM    1. Vaginal bleeding N93.9      Disposition:   The patient is discharged to home.    Scribe Disclosure:  Ty MOORE, am serving as a scribe at 8:07 AM on 12/1/2017 to document services personally performed by Stephany Mascorro MD based on my observations and the provider's statements to me.     EMERGENCY DEPARTMENT       Stephany Mascorro MD  12/01/17 [id number]

## 2017-12-01 NOTE — TELEPHONE ENCOUNTER
Message from MyChart:  Original authorizing provider: MD Jeff Membreno would like a refill of the following medications:  oxyCODONE (ROXICODONE) 15 MG IR tablet [Jordyn Loredo MD]    Preferred pharmacy: [facility] [address] [place] [address] AT [facility]    Comment:  Hi Dr Loredo this is Jeff Serra requesting for my pain medication thank you.

## 2017-12-01 NOTE — ED AVS SNAPSHOT
Emergency Department    [address] [facility] [address]    Phone:  [phone]    Fax:  [phone]                                       Jeff Serra   MRN: [id number]    Department:   Emergency Department   Date of Visit:  12/1/2017           Patient Information     Date Of Birth          1975        Your diagnoses for this visit were:     Vaginal bleeding        You were seen by Stephany Mascorro MD.      Follow-up Information     Schedule an appointment as soon as possible for a visit with Margarito Walker MD.    Specialty:  OB/Gyn    Contact information:    [address]  [place] [address] [phone]          Follow up with  Emergency Department.    Specialty:  EMERGENCY MEDICINE    Why:  If symptoms worsen    Contact information:    [address] [facility] [id number] [phone]        Discharge Instructions       Continue to have nothing in vagina  Take it easy today  If bleeding starts again, would recommend calling the clinic and seeing if you can get seen in clinic today  Otherwise follow-up with your OB/GYN next week at your appointment    Otherwise concerning symptoms would be severe abdominal pain, fever    Future Appointments        Provider Department Dept Phone Center    12/6/2017 1:30 PM Margarito Walker MD [facility] [phone] [facility]      24 Hour Appointment Hotline       To make an appointment at any [facility], call [phone] ([phone]). If you don't have a family doctor or clinic, we will help you find one. [facility] are conveniently located to serve the needs of you and your family.             Review of your medicines      Our records show that you are taking the medicines listed below. If these are incorrect, please call your family doctor or clinic.        Dose / Directions Last dose taken    fexofenadine 180 MG tablet   Commonly known as:  ALLEGRA   Dose:  180 mg    Quantity:  90 tablet        Take 1 tablet (180 mg) by mouth daily   Refills:  0        HYDROmorphone 2 MG tablet   Commonly known as:  DILAUDID   Dose:  2-4 mg   Quantity:  40 tablet        Take 1-2 tablets (2-4 mg) by mouth every 4 hours as needed for breakthrough pain maximum 8 tablet(s) per day   Refills:  0        loratadine 10 MG tablet   Commonly known as:  CLARITIN   Dose:  10 mg   Quantity:  30 tablet        Take 1 tablet (10 mg) by mouth daily as needed for allergies   Refills:  2        losartan 100 MG tablet   Commonly known as:  COZAAR   Dose:  100 mg   Quantity:  30 tablet        Take 1 tablet (100 mg) by mouth daily   Refills:  1        oxyCODONE IR 15 MG tablet   Commonly known as:  ROXICODONE   Dose:  15-30 mg   Quantity:  120 tablet        Take 1-2 tablets (15-30 mg) by mouth 3 times daily as needed for moderate to severe pain   Refills:  0        * potassium chloride SA 20 MEQ CR tablet   Commonly known as:  KLOR-CON   Dose:  20 mEq   Quantity:  2 tablet        Take 1 tablet (20 mEq) by mouth daily   Refills:  1        * potassium chloride SA 10 MEQ CR tablet   Commonly known as:  K-DUR/KLOR-CON M   Dose:  10 mEq   Quantity:  15 tablet        Take 1 tablet (10 mEq) by mouth daily   Refills:  0        senna-docusate 8.6-50 MG per tablet   Commonly known as:  SENOKOT-S;PERICOLACE   Dose:  1 tablet   Quantity:  60 tablet        Take 1 tablet by mouth 2 times daily as needed for constipation   Refills:  5        * Notice:  This list has 2 medication(s) that are the same as other medications prescribed for you. Read the directions carefully, and ask your doctor or other care provider to review them with you.            Procedures and tests performed during your visit     CBC (+ platelets, no diff)      Orders Needing Specimen Collection     None      Pending Results     No orders found from 11/29/2017 to 12/2/2017.            Pending Culture Results     No orders found from 11/29/2017 to 12/2/2017.             Pending Results Instructions     If you had any lab results that were not finalized at the time of your Discharge, you can call the ED Lab Result RN at [phone]. You will be contacted by this team for any positive Lab results or changes in treatment. The nurses are available 7 days a week from 10A to 6:30P.  You can leave a message 24 hours per day and they will return your call.        Test Results From Your Hospital Stay        12/1/2017  8:33 AM      Component Results     Component Value Ref Range & Units Status    WBC 7.4 4.0 - 11.0 10e9/L Final    RBC Count 4.58 3.8 - 5.2 10e12/L Final    Hemoglobin 13.2 11.7 - 15.7 g/dL Final    Hematocrit 38.1 35.0 - 47.0 % Final    MCV 83 78 - 100 fl Final    MCH 28.8 26.5 - 33.0 pg Final    MCHC 34.6 31.5 - 36.5 g/dL Final    RDW 13.6 10.0 - 15.0 % Final    Platelet Count 224 150 - 450 10e9/L Final                Clinical Quality Measure: Blood Pressure Screening     Your blood pressure was checked while you were in the emergency department today. The last reading we obtained was  BP: (!) 149/93 . Please read the guidelines below about what these numbers mean and what you should do about them.  If your systolic blood pressure (the top number) is less than 120 and your diastolic blood pressure (the bottom number) is less than 80, then your blood pressure is normal. There is nothing more that you need to do about it.  If your systolic blood pressure (the top number) is 120-139 or your diastolic blood pressure (the bottom number) is 80-89, your blood pressure may be higher than it should be. You should have your blood pressure rechecked within a year by a primary care provider.  If your systolic blood pressure (the top number) is 140 or greater or your diastolic blood pressure (the bottom number) is 90 or greater, you may have high blood pressure. High blood pressure is treatable, but if left untreated over time it can put you at risk for heart attack, stroke, or  kidney failure. You should have your blood pressure rechecked by a primary care provider within the next 4 weeks.  If your provider in the emergency department today gave you specific instructions to follow-up with your doctor or provider even sooner than that, you should follow that instruction and not wait for up to 4 weeks for your follow-up visit.        Thank you for choosing [facility]       Thank you for choosing [facility] for your care. Our goal is always to provide you with excellent care. Hearing back from our patients is one way we can continue to improve our services. Please take a few minutes to complete the written survey that you may receive in the mail after you visit with us. Thank you!        [employer] Information     [employer] gives you secure access to your electronic health record. If you see a primary care provider, you can also send messages to your care team and make appointments. If you have questions, please call your primary care clinic.  If you do not have a primary care provider, please call [phone] and they will assist you.        Care EveryWhere ID     This is your Care EveryWhere ID. This could be used by other organizations to access your [facility] medical records  [id number]        Equal Access to Services     KHADAR BAEZ : Hadbrian Marcos, lesli casas, ariel self. So [facility] [phone].    ATENCIÓN: Si habla español, tiene a guy disposición servicios gratuitos de asistencia lingüística. Carlos al [phone].    We comply with applicable federal civil rights laws and [place] laws. We do not discriminate on the basis of race, color, national origin, age, disability, sex, sexual orientation, or gender identity.            After Visit Summary       This is your record. Keep this with you and show to your community pharmacist(s) and doctor(s) at your next visit.

## 2017-12-01 NOTE — ED NOTES
Patient with new onset vagiinal bleeding. She had no vaginal bleeding since hysterectomy 2 weeks ago.

## 2017-12-04 RX ORDER — OXYCODONE HYDROCHLORIDE 15 MG/1
15-30 TABLET ORAL 3 TIMES DAILY PRN
Qty: 120 TABLET | Refills: 0 | Status: SHIPPED | OUTPATIENT
Start: 2017-12-05 | End: 2018-01-09

## 2017-12-04 NOTE — TELEPHONE ENCOUNTER
Reason for Call:  Other prescription    Detailed comments: Jeff called to follow up on her refill request.     Phone Number Patient can be reached at: Home number on file [phone] (home)    Best Time: Any    Can we leave a detailed message on this number? YES    Call taken on 12/4/2017 at 2:04 PM by Reg Perez

## 2017-12-04 NOTE — TELEPHONE ENCOUNTER
Reason for Call:  Other prescription    Detailed comments: Jeff called to follow up on her refill request     Phone Number Patient can be reached at: Home number on file [phone] (home)    Best Time: Any    Can we leave a detailed message on this number? YES    Call taken on 12/4/2017 at 2:05 PM by Reg Perez

## 2017-12-05 RX ORDER — LORATADINE 10 MG/1
10 TABLET ORAL DAILY PRN
Qty: 90 TABLET | Refills: 3 | Status: SHIPPED | OUTPATIENT
Start: 2017-12-05 | End: 2018-01-05

## 2017-12-06 ENCOUNTER — OFFICE VISIT (OUTPATIENT)
Dept: OBGYN | Facility: CLINIC | Age: 42
End: 2017-12-06
Payer: COMMERCIAL

## 2017-12-06 VITALS
TEMPERATURE: 97.8 F | OXYGEN SATURATION: 98 % | WEIGHT: 184.2 LBS | BODY MASS INDEX: 32.63 KG/M2 | DIASTOLIC BLOOD PRESSURE: 76 MMHG | SYSTOLIC BLOOD PRESSURE: 124 MMHG | HEART RATE: 97 BPM

## 2017-12-06 DIAGNOSIS — Z98.890 POST-OPERATIVE STATE: ICD-10-CM

## 2017-12-06 DIAGNOSIS — R82.90 NONSPECIFIC FINDING ON EXAMINATION OF URINE: ICD-10-CM

## 2017-12-06 DIAGNOSIS — R30.0 DYSURIA: Primary | ICD-10-CM

## 2017-12-06 LAB
ALBUMIN UR-MCNC: 10 MG/DL
APPEARANCE UR: ABNORMAL
BACTERIA #/AREA URNS HPF: ABNORMAL /HPF
BILIRUB UR QL STRIP: NEGATIVE
COLOR UR AUTO: YELLOW
GLUCOSE UR STRIP-MCNC: NEGATIVE MG/DL
HGB UR QL STRIP: ABNORMAL
KETONES UR STRIP-MCNC: 5 MG/DL
LEUKOCYTE ESTERASE UR QL STRIP: ABNORMAL
MUCOUS THREADS #/AREA URNS LPF: PRESENT /LPF
NITRATE UR QL: NEGATIVE
NON-SQ EPI CELLS #/AREA URNS LPF: ABNORMAL /LPF
PH UR STRIP: 6.5 PH (ref 5–7)
RBC #/AREA URNS AUTO: ABNORMAL /HPF
SOURCE: ABNORMAL
SP GR UR STRIP: 1.01 (ref 1–1.03)
UROBILINOGEN UR STRIP-MCNC: NORMAL MG/DL (ref 0–2)
WBC #/AREA URNS AUTO: ABNORMAL /HPF

## 2017-12-06 PROCEDURE — 87086 URINE CULTURE/COLONY COUNT: CPT | Performed by: OBSTETRICS & GYNECOLOGY

## 2017-12-06 PROCEDURE — 81001 URINALYSIS AUTO W/SCOPE: CPT | Performed by: OBSTETRICS & GYNECOLOGY

## 2017-12-06 PROCEDURE — 99024 POSTOP FOLLOW-UP VISIT: CPT | Performed by: OBSTETRICS & GYNECOLOGY

## 2017-12-06 NOTE — PATIENT INSTRUCTIONS
If you have any questions regarding your visit, Please contact your care team.    Women s Health CLINIC HOURS TELEPHONE NUMBER   MD Stephany Hatfield CMA Lisa -    SHALINI Beavers RN       Monday:       7:30-4:30 [place]  Wednesday:       7:30-4:30 [place]  Thursday:       7:30-1:30 [place]  Friday:       7:30-11:30 [facility]  [address] [place]  [address]  [phone] ask for Women's [facility]  [address] [place] [address]  [phone] ask for [facility] [place]    Imaging Scheduling for [place]:  [phone]    Imaging Scheduling for [place]: [phone]       Urgent Care locations:    [facility] Saturday and Sunday   9 am - 5 pm    Monday-Friday   12 pm - 8 pm  Saturday and Sunday   9 am - 5 pm   [phone]     [facility] Labor and Delivery:  [phone]    If you need a medication refill, please contact your pharmacy. Please allow 3 business days for your refill to be completed.  As always, Thank you for trusting us with your healthcare needs!

## 2017-12-06 NOTE — MR AVS SNAPSHOT
After Visit Summary   12/6/2017    Jeff Serra    MRN: [id number]           Patient Information     Date Of Birth          1975        Visit Information        Provider Department      12/6/2017 1:30 PM Margarito Walker MD [facility]        Today's Diagnoses     Dysuria    -  1    Nonspecific finding on examination of urine        Post-operative state          Care Instructions                                                         If you have any questions regarding your visit, Please contact your care team.    Women s Health CLINIC HOURS TELEPHONE NUMBER   MD Stephany Hatfield CMA Lisa -    SHALINI Beavers RN       Monday:       7:30-4:30 [place]  Wednesday:       7:30-4:30 [place]  Thursday:       7:30-1:30 [place]  Friday:       7:30-11:30 [facility]  [address] [place]  [address] [phone] ask for [facility]  [id number] [place] [address] [place] [address] [phone] ask for [facility]    Imaging Scheduling for [place]:  [phone]    Imaging Scheduling for [place]: [phone]       Urgent Care locations:    [facility] Saturday and Sunday   9 am - 5 pm    Monday-Friday   12 pm - 8 pm  Saturday and Sunday   9 am - 5 pm   [phone]     [facility] Labor and Delivery:  [phone]    If you need a medication refill, please contact your pharmacy. Please allow 3 business days for your refill to be completed.  As always, Thank you for trusting us with your healthcare needs!              Follow-ups after your visit        Your next 10 appointments already scheduled     Dec 20, 2017  2:00 PM CST   Office Visit with Margarito Walker MD   [facility] ([facility])    [id number] [place] [address] [address]  [facility] [address] [phone]           Bring a current list of meds  and any records pertaining to this visit. For Physicals, please bring immunization records and any forms needing to be filled out. Please arrive 10 minutes early to complete paperwork.              Who to contact     If you have questions or need follow up information about today's clinic visit or your schedule please contact [facility] directly at [phone].  Normal or non-critical lab and imaging results will be communicated to you by Nov[employer]xishart, letter or phone within 4 business days after the clinic has received the results. If you do not hear from us within 7 days, please contact the clinic through Novopyxishart or phone. If you have a critical or abnormal lab result, we will notify you by phone as soon as possible.  Submit refill requests through [employer] or call your pharmacy and they will forward the refill request to us. Please allow 3 business days for your refill to be completed.          Additional Information About Your Visit        [employer] Information     [employer] gives you secure access to your electronic health record. If you see a primary care provider, you can also send messages to your care team and make appointments. If you have questions, please call your primary care clinic.  If you do not have a primary care provider, please call [phone] and they will assist you.        Care EveryWhere ID     This is your Care EveryWhere ID. This could be used by other organizations to access your [facility] medical records  [id number]        Your Vitals Were     Pulse Temperature Last Period Pulse Oximetry BMI (Body Mass Index)       97 97.8  F (36.6  C) (Oral) 09/12/2016 98% 32.63 kg/m2        Blood Pressure from Last 3 Encounters:   12/06/17 124/76   12/01/17 (!) 141/93   11/06/17 136/86    Weight from Last 3 Encounters:   12/06/17 83.6 kg (184 lb 3.2 oz)   12/01/17 81.2 kg (179 lb)   11/06/17 84 kg (185 lb 3.2 oz)              We Performed the Following     UA reflex to Microscopic and  Culture     Urine Culture Aerobic Bacterial     Urine Microscopic        Primary Care Provider Office Phone # Fax #    Jordyn Jae Loredo [phone] [address] [facility] [address]        Equal Access to Services     KHADAR BAEZ : Hadii roldan whittaker longo Soomaali, waaxda luqadaha, qaybta kaalmada adeegyada, waxobi leen hammad feliciano soni marie. So [facility] [phone].    ATENCIÓN: Si habla español, tiene a guy disposición servicios gratuitos de asistencia lingüística. Llame al [phone].    We comply with applicable federal civil rights laws and [place] laws. We do not discriminate on the basis of race, color, national origin, age, disability, sex, sexual orientation, or gender identity.            Thank you!     Thank you for choosing [facility]  for your care. Our goal is always to provide you with excellent care. Hearing back from our patients is one way we can continue to improve our services. Please take a few minutes to complete the written survey that you may receive in the mail after your visit with us. Thank you!             Your Updated Medication List - Protect others around you: Learn how to safely use, store and throw away your medicines at www.disposemymeds.org.          This list is accurate as of: 12/6/17 11:59 PM.  Always use your most recent med list.                   Brand Name Dispense Instructions for use Diagnosis    fexofenadine 180 MG tablet    ALLEGRA    90 tablet    Take 1 tablet (180 mg) by mouth daily    Seasonal allergic rhinitis, unspecified allergic rhinitis trigger       HYDROmorphone 2 MG tablet    DILAUDID    40 tablet    Take 1-2 tablets (2-4 mg) by mouth every 4 hours as needed for breakthrough pain maximum 8 tablet(s) per day    Abdominal pain, right lower quadrant       loratadine 10 MG tablet    CLARITIN    90 tablet    Take 1 tablet (10 mg) by mouth daily as needed for allergies    Seasonal allergic rhinitis,  unspecified chronicity, unspecified trigger       losartan 100 MG tablet    COZAAR    30 tablet    Take 1 tablet (100 mg) by mouth daily    Hypertension goal BP (blood pressure) < 140/90       oxyCODONE IR 15 MG tablet    ROXICODONE    120 tablet    Take 1-2 tablets (15-30 mg) by mouth 3 times daily as needed for moderate to severe pain    Chronic midline low back pain without sciatica, Chronic neck pain       * potassium chloride SA 20 MEQ CR tablet    KLOR-CON    2 tablet    Take 1 tablet (20 mEq) by mouth daily        * potassium chloride SA 10 MEQ CR tablet    K-DUR/KLOR-CON M    15 tablet    Take 1 tablet (10 mEq) by mouth daily    Hypokalemia       senna-docusate 8.6-50 MG per tablet    SENOKOT-S;PERICOLACE    60 tablet    Take 1 tablet by mouth 2 times daily as needed for constipation    Chronic neck pain       * Notice:  This list has 2 medication(s) that are the same as other medications prescribed for you. Read the directions carefully, and ask your doctor or other care provider to review them with you.

## 2017-12-06 NOTE — NURSING NOTE
"Chief Complaint   Patient presents with     Surgical Followup     TOTAL LAPAROSCOPIC HYSTERECTOMY BILATERAL SALPINGECTOMY 11/17/2017       Initial /76  Pulse 97  Temp 97.8  F (36.6  C) (Oral)  Wt 83.6 kg (184 lb 3.2 oz)  LMP 09/12/2016  SpO2 98%  BMI 32.63 kg/m2 Estimated body mass index is 32.63 kg/(m^2) as calculated from the following:    Height as of 12/1/17: 1.6 m (5' 3\").    Weight as of this encounter: 83.6 kg (184 lb 3.2 oz).  Medication Reconciliation: complete   Stephany Noonan CMA      "

## 2017-12-06 NOTE — PROGRESS NOTES
Jeff is a 42 year old  2 weeks s/p  TOTAL LAPAROSCOPIC HYSTERECTOMY complicated by spotting.  She was seen in the ED, no active bleeding and she reports the bleeding has stopped..  She is currently requiring Ibuprofen for pain management.  + vaginal bleeding, - hot flashes.  + GI/ complaints.  Energy level is Medium.  Denies fever.  Reviewed the pathology results  PE: There were no vitals taken for this visit.  Abd: soft, non tender, no masses  Incision: intact, no erythema, induration or discharge  vaginal cuff intact and non tender  Ext. Genitalia: Normal  Vagina:cuff healing, no lesions, Normal mucosa, no discharge  BME: no tenderness    A/P 2 weeks s/p surgery, doing well     1. Increase activity as tolerated, follow up in 2 weeks  Margarito Walker

## 2017-12-07 LAB
BACTERIA SPEC CULT: NORMAL
BACTERIA SPEC CULT: NORMAL
SPECIMEN SOURCE: NORMAL

## 2017-12-20 ENCOUNTER — OFFICE VISIT (OUTPATIENT)
Dept: OBGYN | Facility: CLINIC | Age: 42
End: 2017-12-20
Payer: COMMERCIAL

## 2017-12-20 VITALS
OXYGEN SATURATION: 100 % | TEMPERATURE: 98 F | DIASTOLIC BLOOD PRESSURE: 90 MMHG | HEART RATE: 96 BPM | BODY MASS INDEX: 32.36 KG/M2 | WEIGHT: 182.7 LBS | SYSTOLIC BLOOD PRESSURE: 138 MMHG

## 2017-12-20 DIAGNOSIS — Z98.890 POST-OPERATIVE STATE: Primary | ICD-10-CM

## 2017-12-20 PROCEDURE — 99024 POSTOP FOLLOW-UP VISIT: CPT | Performed by: OBSTETRICS & GYNECOLOGY

## 2017-12-20 NOTE — PATIENT INSTRUCTIONS
If you have any questions regarding your visit, Please contact your care team.    Women s Health CLINIC HOURS TELEPHONE NUMBER   MD Stephany Hatfield CMA Lisa -    SHALINI Beavers RN       Monday:       7:30-4:30 [place]  Wednesday:       7:30-4:30 [place]  Thursday:       7:30-1:30 [place]  Friday:       7:30-11:30 [facility]  [address] [place]  [address]  [phone] ask for Women's [facility]  [address] [place] [address]  [phone] ask for [facility]    Imaging Scheduling for [place]:  [phone]    Imaging Scheduling for [place]: [phone]       Urgent Care locations:    [facility] Saturday and Sunday   9 am - 5 pm    Monday-Friday   12 pm - 8 pm  Saturday and Sunday   9 am - 5 pm   [phone]     [facility] Labor and Delivery:  [phone]    If you need a medication refill, please contact your pharmacy. Please allow 3 business days for your refill to be completed.  As always, Thank you for trusting us with your healthcare needs!

## 2017-12-20 NOTE — LETTER
[address] [address]  [phone]    Jeff Serra    To whom it may concern:    Jeff has been under my care for her surgery.  She may return to work without restriction, as of December 27, 2017.    Thank you.    Margarito Walker MD FACOG  December 20, 2017

## 2017-12-20 NOTE — MR AVS SNAPSHOT
After Visit Summary   12/20/2017    Jeff Serra    MRN: [id number]           Patient Information     Date Of Birth          1975        Visit Information        Provider Department      12/20/2017 2:00 PM Margarito Walker MD [facility]        Today's Diagnoses     Post-operative state    -  1      Care Instructions                                                         If you have any questions regarding your visit, Please contact your care team.    Women s Health CLINIC HOURS TELEPHONE NUMBER   MD Stephany Hatfield CMA Lisa -    SHALINI Beavers RN       Monday:       7:30-4:30 [place]  Wednesday:       7:30-4:30 [place]  Thursday:       7:30-1:30 [place]  Friday:       7:30-11:30 [facility]  [address] [place], [place]  [id number] [phone] ask for [facility]  [address] [place] [address]  [phone] ask for [facility]    Imaging Scheduling for [place]:  [phone]    Imaging Scheduling for [place]: [phone]       Urgent Care locations:    [facility] Saturday and Sunday   9 am - 5 pm    Monday-Friday   12 pm - 8 pm  Saturday and Sunday   9 am - 5 pm   [phone]     [facility] Labor and Delivery:  [phone]    If you need a medication refill, please contact your pharmacy. Please allow 3 business days for your refill to be completed.  As always, Thank you for trusting us with your healthcare needs!              Follow-ups after your visit        Who to contact     If you have questions or need follow up information about today's clinic visit or your schedule please contact [facility] directly at [phone].  Normal or non-critical lab and imaging results will be communicated to you by MyChart, letter or phone within 4 business days after the clinic has received the  results. If you do not hear from us within 7 days, please contact the clinic through [employer] or phone. If you have a critical or abnormal lab result, we will notify you by phone as soon as possible.  Submit refill requests through [employer] or call your pharmacy and they will forward the refill request to us. Please allow 3 business days for your refill to be completed.          Additional Information About Your Visit        [email or web address] Information     [employer] gives you secure access to your electronic health record. If you see a primary care provider, you can also send messages to your care team and make appointments. If you have questions, please call your primary care clinic.  If you do not have a primary care provider, please call [phone] and they will assist you.        Care EveryWhere ID     This is your Care EveryWhere ID. This could be used by other organizations to access your [facility] medical records  [id number]        Your Vitals Were     Pulse Temperature Last Period Pulse Oximetry BMI (Body Mass Index)       96 98  F (36.7  C) (Oral) 09/12/2016 100% 32.36 kg/m2        Blood Pressure from Last 3 Encounters:   12/20/17 138/90   12/06/17 124/76   12/01/17 (!) 141/93    Weight from Last 3 Encounters:   12/20/17 82.9 kg (182 lb 11.2 oz)   12/06/17 83.6 kg (184 lb 3.2 oz)   12/01/17 81.2 kg (179 lb)              Today, you had the following     No orders found for display       Primary Care Provider Office Phone # Fax #    Jordyn Jae Loredo [phone] [address] [facility] [address] [address]        Equal Access to Services     [facility]: Hadii roldan ku hadasho Somariza, waaxda luqadaha, qaybta kaalmada roc, ariel marie. So [facility] [phone].    ATENCIÓN: Si habla español, tiene a guy disposición servicios gratuitos de asistencia lingüística. Llame al [phone].    We comply with applicable federal civil rights laws and [place] laws. We  do not discriminate on the basis of race, color, national origin, age, disability, sex, sexual orientation, or gender identity.            Thank you!     Thank you for choosing [facility]  for your care. Our goal is always to provide you with excellent care. Hearing back from our patients is one way we can continue to improve our services. Please take a few minutes to complete the written survey that you may receive in the mail after your visit with us. Thank you!             Your Updated Medication List - Protect others around you: Learn how to safely use, store and throw away your medicines at www.disposemymeds.org.          This list is accurate as of: 12/20/17 11:59 PM.  Always use your most recent med list.                   Brand Name Dispense Instructions for use Diagnosis    fexofenadine 180 MG tablet    ALLEGRA    90 tablet    Take 1 tablet (180 mg) by mouth daily    Seasonal allergic rhinitis, unspecified allergic rhinitis trigger       HYDROmorphone 2 MG tablet    DILAUDID    40 tablet    Take 1-2 tablets (2-4 mg) by mouth every 4 hours as needed for breakthrough pain maximum 8 tablet(s) per day    Abdominal pain, right lower quadrant       loratadine 10 MG tablet    CLARITIN    90 tablet    Take 1 tablet (10 mg) by mouth daily as needed for allergies    Seasonal allergic rhinitis, unspecified chronicity, unspecified trigger       losartan 100 MG tablet    COZAAR    30 tablet    Take 1 tablet (100 mg) by mouth daily    Hypertension goal BP (blood pressure) < 140/90       oxyCODONE IR 15 MG tablet    ROXICODONE    120 tablet    Take 1-2 tablets (15-30 mg) by mouth 3 times daily as needed for moderate to severe pain    Chronic midline low back pain without sciatica, Chronic neck pain       * potassium chloride SA 20 MEQ CR tablet    KLOR-CON    2 tablet    Take 1 tablet (20 mEq) by mouth daily        * potassium chloride SA 10 MEQ CR tablet    K-DUR/KLOR-CON M    15 tablet    Take 1 tablet  (10 mEq) by mouth daily    Hypokalemia       senna-docusate 8.6-50 MG per tablet    SENOKOT-S;PERICOLACE    60 tablet    Take 1 tablet by mouth 2 times daily as needed for constipation    Chronic neck pain       * Notice:  This list has 2 medication(s) that are the same as other medications prescribed for you. Read the directions carefully, and ask your doctor or other care provider to review them with you.

## 2017-12-20 NOTE — NURSING NOTE
"Chief Complaint   Patient presents with     Surgical Followup     Dysuria       Initial /90  Pulse 96  Temp 98  F (36.7  C) (Oral)  Wt 82.9 kg (182 lb 11.2 oz)  LMP 09/12/2016  SpO2 100%  BMI 32.36 kg/m2 Estimated body mass index is 32.36 kg/(m^2) as calculated from the following:    Height as of 12/1/17: 1.6 m (5' 3\").    Weight as of this encounter: 82.9 kg (182 lb 11.2 oz).  Medication Reconciliation: complete   Stephany Noonan CMA      "

## 2017-12-26 ENCOUNTER — TELEPHONE (OUTPATIENT)
Dept: OBGYN | Facility: CLINIC | Age: 42
End: 2017-12-26

## 2017-12-26 ENCOUNTER — TELEPHONE (OUTPATIENT)
Dept: FAMILY MEDICINE | Facility: CLINIC | Age: 42
End: 2017-12-26

## 2017-12-26 NOTE — TELEPHONE ENCOUNTER
Provider received a disability form from [facility], it was filled out, signed, and faxed on 12/26/2017 to # [phone].  Stephany Noonan CMA

## 2017-12-26 NOTE — PROGRESS NOTES
Jeff is a 42 year old  4 weeks s/p  TOTAL LAPAROSCOPIC HYSTERECTOMY BILATERAL SALPINGECTOMY complicated by vaginal bleeding.  She is currently requiring nothing for pain management.  - vaginal bleeding, - hot flashes.  - GI/ complaints.  Energy level is Medium.  Denies fever.  Reviewed the pathology results previously  PE: /90  Pulse 96  Temp 98  F (36.7  C) (Oral)  Wt 82.9 kg (182 lb 11.2 oz)  LMP 2016  SpO2 100%  BMI 32.36 kg/m2  Abd: soft, non tender, no masses  Incision: intact, no erythema, induration or discharge  vaginal cuff intact and non tender  Ext. Genitalia: Normal  Vagina:cuff healing, no lesions, Normal mucosa, no discharge  BME: no mass or tenderness    A/P 4 weeks s/p surgery, doing well     1. Release back to work as of   Follow up as needed  Margarito Walker

## 2018-01-05 ENCOUNTER — MYC REFILL (OUTPATIENT)
Dept: FAMILY MEDICINE | Facility: CLINIC | Age: 43
End: 2018-01-05

## 2018-01-05 DIAGNOSIS — J30.2 SEASONAL ALLERGIC RHINITIS, UNSPECIFIED CHRONICITY, UNSPECIFIED TRIGGER: ICD-10-CM

## 2018-01-05 DIAGNOSIS — M54.50 CHRONIC MIDLINE LOW BACK PAIN WITHOUT SCIATICA: ICD-10-CM

## 2018-01-05 DIAGNOSIS — G89.29 CHRONIC NECK PAIN: ICD-10-CM

## 2018-01-05 DIAGNOSIS — G89.29 CHRONIC MIDLINE LOW BACK PAIN WITHOUT SCIATICA: ICD-10-CM

## 2018-01-05 DIAGNOSIS — M54.2 CHRONIC NECK PAIN: ICD-10-CM

## 2018-01-05 RX ORDER — OXYCODONE HYDROCHLORIDE 15 MG/1
15-30 TABLET ORAL 3 TIMES DAILY PRN
Qty: 120 TABLET | Refills: 0 | Status: CANCELLED | OUTPATIENT
Start: 2018-01-05

## 2018-01-05 NOTE — TELEPHONE ENCOUNTER
Requested Prescriptions   Pending Prescriptions Disp Refills     oxyCODONE IR (ROXICODONE) 15 MG tablet  Last Written Prescription Date:  12/05/17  Last Fill Quantity: 120 tablet,   # refills: 0  Last Office Visit: 11/6/17  Future Office visit:       Routing refill request to provider for review/approval because:  Drug not on the [facility], [facility] or [facility] refill protocol or controlled substance   120 tablet 0     Sig: Take 1-2 tablets (15-30 mg) by mouth 3 times daily as needed for moderate to severe pain    There is no refill protocol information for this order                loratadine (CLARITIN) 10 MG tablet  May have refills available.  Last Written Prescription Date:  12/5/17  Last Fill Quantity: 90 tablet,  # refills: 3   Last Office Visit with [facility], [facility] or [facility] prescribing provider:  11/6/17   Future Office Visit:    90 tablet 3     Sig: Take 1 tablet (10 mg) by mouth daily as needed for allergies    Antihistamines Protocol Passed    1/5/2018  7:51 AM       Passed - Recent or future visit with authorizing provider's specialty    Patient had office visit in the last year or has a visit in the next 30 days with authorizing provider.  See chart review.              Passed - Patient is age 3 or older    Apply age and/or weight-based dosing for peds patients age 3 and older.    Forward request to provider for patients under the age of 3.

## 2018-01-05 NOTE — TELEPHONE ENCOUNTER
Message from MyChart:  Original authorizing provider: Jordyn Loredo MD    Jeff Serra would like a refill of the following medications:  oxyCODONE IR (ROXICODONE) 15 MG tablet [Jordyn Loredo MD]  loratadine (CLARITIN) 10 MG tablet [Jordyn Loredo MD]    Preferred pharmacy: [facility] [address] [place] [address] AT [facility] [address]    Comment:  loradine was sent to the wrong pharmacy it was sent to [facility] and never got filled when its suppose to be changed to [facility] pharmacy off of [address] in [facility] please update thank you.

## 2018-01-09 ENCOUNTER — MYC REFILL (OUTPATIENT)
Dept: FAMILY MEDICINE | Facility: CLINIC | Age: 43
End: 2018-01-09

## 2018-01-09 DIAGNOSIS — M54.50 CHRONIC MIDLINE LOW BACK PAIN WITHOUT SCIATICA: ICD-10-CM

## 2018-01-09 DIAGNOSIS — G89.29 CHRONIC NECK PAIN: ICD-10-CM

## 2018-01-09 DIAGNOSIS — G89.29 CHRONIC MIDLINE LOW BACK PAIN WITHOUT SCIATICA: ICD-10-CM

## 2018-01-09 DIAGNOSIS — J30.2 SEASONAL ALLERGIC RHINITIS, UNSPECIFIED CHRONICITY, UNSPECIFIED TRIGGER: ICD-10-CM

## 2018-01-09 DIAGNOSIS — M54.2 CHRONIC NECK PAIN: ICD-10-CM

## 2018-01-09 RX ORDER — LORATADINE 10 MG/1
10 TABLET ORAL DAILY PRN
Qty: 90 TABLET | Refills: 3 | Status: CANCELLED | OUTPATIENT
Start: 2018-01-09

## 2018-01-09 RX ORDER — OXYCODONE HYDROCHLORIDE 15 MG/1
15-30 TABLET ORAL 3 TIMES DAILY PRN
Qty: 120 TABLET | Refills: 0 | Status: SHIPPED | OUTPATIENT
Start: 2018-01-09 | End: 2018-02-06

## 2018-01-09 RX ORDER — LORATADINE 10 MG/1
10 TABLET ORAL DAILY PRN
Qty: 90 TABLET | Refills: 3 | Status: SHIPPED | OUTPATIENT
Start: 2018-01-09 | End: 2018-09-21

## 2018-01-09 NOTE — TELEPHONE ENCOUNTER
"Patient wanted loratadine sent to [facility] pharmacy in [place] [facility] of [facility]. Sent new prescription there.     Prescription approved per [facility] Refill Protocol.    Requested Prescriptions   Pending Prescriptions Disp Refills     loratadine (CLARITIN) 10 MG tablet 90 tablet 3     Sig: Take 1 tablet (10 mg) by mouth daily as needed for allergies    Antihistamines Protocol Passed    1/5/2018  8:07 AM       Passed - Recent or future visit with authorizing provider's specialty    Patient had office visit in the last year or has a visit in the next 30 days with authorizing provider.  See \"Patient Info\" tab in inbasket, or \"Choose Columns\" in Meds & Orders section of the refill encounter.              Passed - Patient is age 3 or older    Apply age and/or weight-based dosing for peds patients age 3 and older.    Forward request to provider for patients under the age of 3.          Herminia Rutherford RN, BSN    "

## 2018-01-09 NOTE — TELEPHONE ENCOUNTER
Message from MyChart:  Original authorizing provider: Jordyn Loredo MD    Himaanayeliwaaqr PALOMINOKelly Ponce would like a refill of the following medications:  oxyCODONE IR (ROXICODONE) 15 MG tablet [Jordyn Loredo MD]  loratadine (CLARITIN) 10 MG tablet [Jordyn Loredo MD]    Preferred pharmacy: [facility] [address] [place] [address] [address] AT [facility]    Comment:

## 2018-01-09 NOTE — TELEPHONE ENCOUNTER
Claritin was sent to pharmacy on 12/7/17 and patient should still have refills. Notified her of this via [employer].     Please advise on Oxycodone refill. Last OV: 11/6/17    Routing refill request to provider for review/approval because:  Drug not on the [facility] refill protocol     Herminia Rutherford RN, BSN

## 2018-02-06 ENCOUNTER — MYC REFILL (OUTPATIENT)
Dept: FAMILY MEDICINE | Facility: CLINIC | Age: 43
End: 2018-02-06

## 2018-02-06 DIAGNOSIS — G89.29 CHRONIC MIDLINE LOW BACK PAIN WITHOUT SCIATICA: ICD-10-CM

## 2018-02-06 DIAGNOSIS — G89.29 CHRONIC NECK PAIN: ICD-10-CM

## 2018-02-06 DIAGNOSIS — M54.50 CHRONIC MIDLINE LOW BACK PAIN WITHOUT SCIATICA: ICD-10-CM

## 2018-02-06 DIAGNOSIS — M54.2 CHRONIC NECK PAIN: ICD-10-CM

## 2018-02-06 RX ORDER — OXYCODONE HYDROCHLORIDE 15 MG/1
15-30 TABLET ORAL 3 TIMES DAILY PRN
Qty: 120 TABLET | Refills: 0 | Status: CANCELLED | OUTPATIENT
Start: 2018-02-06

## 2018-02-06 RX ORDER — OXYCODONE HYDROCHLORIDE 15 MG/1
15-30 TABLET ORAL 3 TIMES DAILY PRN
Qty: 120 TABLET | Refills: 0 | Status: SHIPPED | OUTPATIENT
Start: 2018-02-06 | End: 2018-03-07

## 2018-02-06 NOTE — TELEPHONE ENCOUNTER
Reason for Call:  Other prescription    Detailed comments: Patient called please allow  Duy Serra to pickup my scripts.    Phone Number Patient can be reached at: Cell number on file:    Telephone Information:   Mobile [phone]       Best Time: any    Can we leave a detailed message on this number? YES    Call taken on 2/6/2018 at 11:34 AM by Mariam Figueredo

## 2018-02-06 NOTE — TELEPHONE ENCOUNTER
Message from MyChart:  Original authorizing provider: MD Jeff Membrenoon would like a refill of the following medications:  oxyCODONE IR (ROXICODONE) 15 MG tablet [Jordyn Loredo MD]    Preferred pharmacy: [facility] [address] [place] [address] [address] AT [facility] & [facility]    Comment:

## 2018-02-06 NOTE — TELEPHONE ENCOUNTER
Requested Prescriptions   Pending Prescriptions Disp Refills     oxyCODONE IR (ROXICODONE) 15 MG tablet 120 tablet 0     Sig: Take 1-2 tablets (15-30 mg) by mouth 3 times daily as needed for moderate to severe pain    There is no refill protocol information for this order      Last OV: 11/6/17  Routing refill request to provider for review/approval because:  Drug not on the [facility] refill protocol     Herminia Rutherford RN, BSN

## 2018-02-06 NOTE — TELEPHONE ENCOUNTER
Reviewed [place]  and last filled 1/10/18.  No fills outside of this office.  Problem list reports visit required every 3 months.   Last visit 11/2017  Prescription refilled. Please place at  and notify patient

## 2018-02-09 ENCOUNTER — TELEPHONE (OUTPATIENT)
Dept: FAMILY MEDICINE | Facility: CLINIC | Age: 43
End: 2018-02-09

## 2018-02-09 NOTE — TELEPHONE ENCOUNTER
Obtain PA or change medications?    PA needed for oxyCODONE IR (ROXICODONE) 15 MG tablet.  To Complete the PA:  1. Got to key.covermymeds.com and click Enter a Key  2. Enter the patient's last name and  and the key'     Blanco: [id number]     Last Name: Ponce    : 1975  3. Complete the form and click Send To Plan

## 2018-02-10 NOTE — TELEPHONE ENCOUNTER
PA Initiation    Medication: oxyCODONE IR (ROXICODONE) 15 MG tablet  Insurance Company: [place] Medicaid ([facility]) - Phone [phone] Fax [phone]  Pharmacy Filling the Rx: [facility] [address] [place] [address] [address] AT [facility]  Filling Pharmacy Phone: [phone]  Filling Pharmacy Fax:  [phone]  Start Date: 2/10/2018

## 2018-02-13 NOTE — TELEPHONE ENCOUNTER
Form in basket, but I really do not think most of this is applicable to us.  This is not a form that I need to fill out her sign.  So included some basic information and I think they want a copy of the pain contract and/or some recent chart notes.  But most of the form is for her to fill out and sign

## 2018-02-13 NOTE — TELEPHONE ENCOUNTER
Received response back from [employer] that they need the 'High Dose Opioid Drug Prior Authorization' form signed by the prescriber along with a 'Clinic Tool for the Assessment and Management of Persistent Pain form' signed by the patient. Below are the links for the forms:     https://[employer]/download/high-dose-opioid-drug-pa/    https://[employer]/download/pain-management/     If you could please fill them out and obtain patient's signature and prescriber's signature where needed, then either fax these filled out forms back to us at the PA team fax# [phone] or attach in EPIC That would be great. Please notify us once sent/attached. Thanks

## 2018-02-16 ENCOUNTER — MYC MEDICAL ADVICE (OUTPATIENT)
Dept: FAMILY MEDICINE | Facility: CLINIC | Age: 43
End: 2018-02-16

## 2018-02-16 NOTE — TELEPHONE ENCOUNTER
Form was sent to scanning before getting signature.    Printed another form and placed at     This writer attempted to contact pt on 02/16/18      Reason for call form and left detailed message.      If patient calls back:   Relay message that pt will need to stop in to sign forms for Prior Auth on oxycodone so we can fax to insurance, (read verbatim), document that pt called and close encounter        Mary Vaca, CMA

## 2018-02-23 NOTE — TELEPHONE ENCOUNTER
This writer attempted to contact 1 on 02/23/18      Reason for call form and left detailed message.      If patient calls back:  Relay message that pt will need to stop in to sign forms for Prior Auth on oxycodone so we can fax to insurance, (read verbatim), document that pt called and close encounter      Terrence Kennedy, Medical Assistant

## 2018-02-26 ENCOUNTER — TELEPHONE (OUTPATIENT)
Dept: FAMILY MEDICINE | Facility: CLINIC | Age: 43
End: 2018-02-26

## 2018-02-27 ENCOUNTER — TELEPHONE (OUTPATIENT)
Dept: FAMILY MEDICINE | Facility: CLINIC | Age: 43
End: 2018-02-27

## 2018-02-27 NOTE — TELEPHONE ENCOUNTER
Jose?      Are they wondering whether I wrote her an extension note?  I did fill out a form earlier in February.  Not sure if that is what they are referring to.  Otherwise I had be happy to take a look at whatever note they have to see if it is my writing or not.

## 2018-02-27 NOTE — TELEPHONE ENCOUNTER
"That actually is not my handwriting.  Most of the form, dated November 6, 2017, is mine.  But I did not write the part about \"for another month.\"  So if the patient feels she is not ready to go back to work I need to see her in the office.  I have not seen her since early November  "

## 2018-02-27 NOTE — TELEPHONE ENCOUNTER
Called them back - they are going to fax us a copy of the note to review.     Gali - can you look for this one?    Thanks    Will Rita SIMMONS

## 2018-02-27 NOTE — TELEPHONE ENCOUNTER
..Reason for Call:  medical forms    Detailed comments: needs to clarify the additional hand written note added to extend for one more month; it is believed it may not have been done by staff.     Phone Number Patient can be reached at:   phone number:  [phone]    Best Time: anytime    Can we leave a detailed message on this number? YES    Call taken on 2/27/2018 at 8:20 AM by Earnestine Blanchard

## 2018-02-28 NOTE — TELEPHONE ENCOUNTER
This writer attempted to contact pt on 02/28/18      Reason for call form/appt and left detailed message.      If patient calls back:   Relay message below per Dr. Loredo. And schedule pt for appt , (read verbatim), document that pt called and close encounter        Mary Vaca CMA

## 2018-03-01 NOTE — TELEPHONE ENCOUNTER
Vane  returned call    Best number to reach caller: [phone]    Is it ok to leave a detailed message: YES

## 2018-03-05 NOTE — TELEPHONE ENCOUNTER
Forms copied, scanned and originals placed at .    LM informing pt.    Gali HOSKINS, Patient Care

## 2018-03-07 ENCOUNTER — OFFICE VISIT (OUTPATIENT)
Dept: FAMILY MEDICINE | Facility: CLINIC | Age: 43
End: 2018-03-07
Payer: COMMERCIAL

## 2018-03-07 VITALS
SYSTOLIC BLOOD PRESSURE: 130 MMHG | HEIGHT: 63 IN | WEIGHT: 179.3 LBS | DIASTOLIC BLOOD PRESSURE: 80 MMHG | OXYGEN SATURATION: 99 % | TEMPERATURE: 97.9 F | RESPIRATION RATE: 18 BRPM | HEART RATE: 89 BPM | BODY MASS INDEX: 31.77 KG/M2

## 2018-03-07 DIAGNOSIS — M54.50 CHRONIC MIDLINE LOW BACK PAIN WITHOUT SCIATICA: ICD-10-CM

## 2018-03-07 DIAGNOSIS — M54.2 CHRONIC NECK PAIN: ICD-10-CM

## 2018-03-07 DIAGNOSIS — Z90.710 S/P HYSTERECTOMY: Primary | ICD-10-CM

## 2018-03-07 DIAGNOSIS — G89.29 CHRONIC NECK PAIN: ICD-10-CM

## 2018-03-07 DIAGNOSIS — G89.29 CHRONIC MIDLINE LOW BACK PAIN WITHOUT SCIATICA: ICD-10-CM

## 2018-03-07 PROCEDURE — 99213 OFFICE O/P EST LOW 20 MIN: CPT | Performed by: FAMILY MEDICINE

## 2018-03-07 RX ORDER — OXYCODONE HYDROCHLORIDE 15 MG/1
15-30 TABLET ORAL 3 TIMES DAILY PRN
Qty: 120 TABLET | Refills: 0 | Status: SHIPPED | OUTPATIENT
Start: 2018-03-07 | End: 2018-04-04

## 2018-03-07 NOTE — NURSING NOTE
"Chief Complaint   Patient presents with     RECHECK       Initial /80 (BP Location: Right arm, Patient Position: Sitting, Cuff Size: Adult Regular)  Pulse 89  Temp 97.9  F (36.6  C) (Oral)  Resp 18  Ht 1.6 m (5' 3\")  Wt 81.3 kg (179 lb 4.8 oz)  LMP 09/12/2016  SpO2 99%  BMI 31.76 kg/m2 Estimated body mass index is 31.76 kg/(m^2) as calculated from the following:    Height as of this encounter: 1.6 m (5' 3\").    Weight as of this encounter: 81.3 kg (179 lb 4.8 oz).  Medication Reconciliation: fanny Vaca        "

## 2018-03-07 NOTE — LETTER
March 7, 2018        Jeff Serra  [address] [address]          To whom it may concern:    RE: Jeff Serra    I am the primary physician for Ms. Jeff Serra.  She had a hysterectomy performed 11/17/17 and was off work in a convalescent state until 1/16/18 when she returned to work.  Please make appropriate accommodations.    Please contact me for questions or concerns.      Sincerely,        Jordyn Loredo MD

## 2018-03-07 NOTE — PROGRESS NOTES
"  SUBJECTIVE:   Jeff Serra is a 43 year old female who presents to clinic today for the following health issues:      Chronic Pain Follow-Up       Type / Location of Pain: abdominal pain  Analgesia/pain control:       Recent changes:  same      Overall control: Tolerable with discomfort  Activity level/function:      Daily activities:  Can do most things most days, with some rest    Work:  Pain does not limit any  work  Adverse effects:  No  Adherance    Taking medication as directed?  Yes    Participating in other treatments: yes  Risk Factors:    Sleep:  Good    Mood/anxiety:  controlled    Recent family or social stressors:  none noted    Other aggravating factors: none  PHQ-9 SCORE 7/24/2015 8/31/2015 12/1/2015   Total Score 2 - -   Total Score - 4 3     CHAO-7 SCORE 7/24/2015 8/31/2015 12/1/2015   Total Score 1 - -   Total Score - 2 4     Encounter-Level CSA - 08/31/2015:          Controlled Substance Agreement - Scan on 9/4/2015  5:07 PM : CONTROLLED SUBSTANCE AGREEMENT (below)                Amount of exercise or physical activity: None    Problems taking medications regularly: No    Medication side effects: none    Diet: regular (no restrictions)    SUBJECTIVE:  Here in follow up hysterectomy, back pain.  Continues to heal up  Patient reports no side effects from medications, and desires no change in therapy.     Review of systems otherwise negative.  Past medical, family, and social history reviewed and updated in chart.    OBJECTIVE:  /80 (BP Location: Right arm, Patient Position: Sitting, Cuff Size: Adult Regular)  Pulse 89  Temp 97.9  F (36.6  C) (Oral)  Resp 18  Ht 1.6 m (5' 3\")  Wt 81.3 kg (179 lb 4.8 oz)  LMP 09/12/2016  SpO2 99%  BMI 31.76 kg/m2  Alert, pleasant, upbeat, and in no apparent discomfort.  S1 and S2 normal, no murmurs, clicks, gallops or rubs. Regular rate and rhythm. Chest is clear; no wheezes or rales. No edema or JVD.  The abdomen is soft without tenderness, " guarding, mass, rebound or organomegaly. Bowel sounds are normal. No CVA tenderness or inguinal adenopathy noted.   Past labs reviewed with the patient.     ASSESSMENT / PLAN:  (Z90.710) S/P hysterectomy  (primary encounter diagnosis)  Comment: continues to improve - back at work now   Plan:     (M54.5,  G89.29) Chronic midline low back pain without sciatica  Comment: stable - refilled   Plan: oxyCODONE IR (ROXICODONE) 15 MG tablet            (M54.2,  G89.29) Chronic neck pain  Comment:   Plan: oxyCODONE IR (ROXICODONE) 15 MG tablet            Follow up 3 months   S. Jae Loredo MD    (Chart documentation completed in part with Dragon voice-recognition software.  Even though reviewed some grammatical, spelling, and word errors may remain.)

## 2018-03-07 NOTE — MR AVS SNAPSHOT
After Visit Summary   3/7/2018    Jeff Serra    MRN: [id number]           Patient Information     Date Of Birth          1975        Visit Information        Provider Department      3/7/2018 4:00 PM Jordyn Loredo MD [facility]        Today's Diagnoses     S/P hysterectomy    -  1    Chronic midline low back pain without sciatica        Chronic neck pain           Follow-ups after your visit        Follow-up notes from your care team     Return in about 3 months (around 6/7/2018).      Who to contact     If you have questions or need follow up information about today's clinic visit or your schedule please contact [facility] directly at [phone].  Normal or non-critical lab and imaging results will be communicated to you by MyChart, letter or phone within 4 business days after the clinic has received the results. If you do not hear from us within 7 days, please contact the clinic through [employer] or phone. If you have a critical or abnormal lab result, we will notify you by phone as soon as possible.  Submit refill requests through Clutch or call your pharmacy and they will forward the refill request to us. Please allow 3 business days for your refill to be completed.          Additional Information About Your Visit        MyChart Information     Clutch gives you secure access to your electronic health record. If you see a primary care provider, you can also send messages to your care team and make appointments. If you have questions, please call your primary care clinic.  If you do not have a primary care provider, please call [phone] and they will assist you.        Care EveryWhere ID     This is your Care EveryWhere ID. This could be used by other organizations to access your [place] medical records  [id number]        Your Vitals Were     Pulse Temperature Respirations Height Last Period Pulse Oximetry    89 97.9  F (36.6  C) (Oral)  "18 1.6 m (5' 3\") 09/12/2016 99%    BMI (Body Mass Index)                   31.76 kg/m2            Blood Pressure from Last 3 Encounters:   03/07/18 130/80   12/20/17 138/90   12/06/17 124/76    Weight from Last 3 Encounters:   03/07/18 81.3 kg (179 lb 4.8 oz)   12/20/17 82.9 kg (182 lb 11.2 oz)   12/06/17 83.6 kg (184 lb 3.2 oz)              Today, you had the following     No orders found for display         Where to get your medicines      Some of these will need a paper prescription and others can be bought over the counter.  Ask your nurse if you have questions.     Bring a paper prescription for each of these medications     oxyCODONE IR 15 MG tablet          Primary Care Provider Office Phone # Fax #    Jordyn Jae Loredo [phone] [address] [facility] [address]        Equal Access to Services     [facility] : Hadii aad ku hadasho Soomaali, waaxda luqadaha, qaybta kaalmada adeegyada, waxay abigailin heath shafer . So [facility] [phone].    ATENCIÓN: Si habla español, tiene a guy disposición servicios gratuitos de asistencia lingüística. Llame al [phone].    We comply with applicable federal civil rights laws and [place] laws. We do not discriminate on the basis of race, color, national origin, age, disability, sex, sexual orientation, or gender identity.            Thank you!     Thank you for choosing [facility]  for your care. Our goal is always to provide you with excellent care. Hearing back from our patients is one way we can continue to improve our services. Please take a few minutes to complete the written survey that you may receive in the mail after your visit with us. Thank you!             Your Updated Medication List - Protect others around you: Learn how to safely use, store and throw away your medicines at www.disposemymeds.org.          This list is accurate as of 3/7/18 11:59 PM.  Always use your most recent med list. "                   Brand Name Dispense Instructions for use Diagnosis    fexofenadine 180 MG tablet    ALLEGRA    90 tablet    Take 1 tablet (180 mg) by mouth daily    Seasonal allergic rhinitis, unspecified allergic rhinitis trigger       loratadine 10 MG tablet    CLARITIN    90 tablet    Take 1 tablet (10 mg) by mouth daily as needed for allergies    Seasonal allergic rhinitis, unspecified chronicity, unspecified trigger       losartan 100 MG tablet    COZAAR    30 tablet    Take 1 tablet (100 mg) by mouth daily    Hypertension goal BP (blood pressure) < 140/90       oxyCODONE IR 15 MG tablet    ROXICODONE    120 tablet    Take 1-2 tablets (15-30 mg) by mouth 3 times daily as needed for moderate to severe pain    Chronic midline low back pain without sciatica, Chronic neck pain       senna-docusate 8.6-50 MG per tablet    SENOKOT-S;PERICOLACE    60 tablet    Take 1 tablet by mouth 2 times daily as needed for constipation    Chronic neck pain

## 2018-03-08 ASSESSMENT — PATIENT HEALTH QUESTIONNAIRE - PHQ9: 5. POOR APPETITE OR OVEREATING: SEVERAL DAYS

## 2018-03-08 ASSESSMENT — ANXIETY QUESTIONNAIRES
1. FEELING NERVOUS, ANXIOUS, OR ON EDGE: NOT AT ALL
IF YOU CHECKED OFF ANY PROBLEMS ON THIS QUESTIONNAIRE, HOW DIFFICULT HAVE THESE PROBLEMS MADE IT FOR YOU TO DO YOUR WORK, TAKE CARE OF THINGS AT HOME, OR GET ALONG WITH OTHER PEOPLE: SOMEWHAT DIFFICULT
2. NOT BEING ABLE TO STOP OR CONTROL WORRYING: SEVERAL DAYS
3. WORRYING TOO MUCH ABOUT DIFFERENT THINGS: SEVERAL DAYS
5. BEING SO RESTLESS THAT IT IS HARD TO SIT STILL: NOT AT ALL
6. BECOMING EASILY ANNOYED OR IRRITABLE: NOT AT ALL
GAD7 TOTAL SCORE: 3
7. FEELING AFRAID AS IF SOMETHING AWFUL MIGHT HAPPEN: NOT AT ALL

## 2018-03-09 ENCOUNTER — TELEPHONE (OUTPATIENT)
Dept: FAMILY MEDICINE | Facility: CLINIC | Age: 43
End: 2018-03-09

## 2018-03-09 ASSESSMENT — ANXIETY QUESTIONNAIRES: GAD7 TOTAL SCORE: 3

## 2018-03-09 ASSESSMENT — PATIENT HEALTH QUESTIONNAIRE - PHQ9: SUM OF ALL RESPONSES TO PHQ QUESTIONS 1-9: 2

## 2018-03-09 NOTE — TELEPHONE ENCOUNTER
[place] [facility] [place] [facility] Prior Auth Form placed on Dr. Loredo's desk for completion        Camelia CHAMPION (R))

## 2018-03-11 PROBLEM — Z90.710 S/P HYSTERECTOMY: Status: ACTIVE | Noted: 2018-03-11

## 2018-03-11 NOTE — TELEPHONE ENCOUNTER
The remaining part of the form is a self-assessment form for her to fill out - have her  that section to fill out then we can fax it all in together

## 2018-03-12 NOTE — TELEPHONE ENCOUNTER
LM informing pt that she needs to complte part of the form.    Form placed at .    Gali HOSKINS, Patient Care

## 2018-04-04 ENCOUNTER — MYC REFILL (OUTPATIENT)
Dept: FAMILY MEDICINE | Facility: CLINIC | Age: 43
End: 2018-04-04

## 2018-04-04 DIAGNOSIS — G89.29 CHRONIC NECK PAIN: ICD-10-CM

## 2018-04-04 DIAGNOSIS — M54.50 CHRONIC MIDLINE LOW BACK PAIN WITHOUT SCIATICA: ICD-10-CM

## 2018-04-04 DIAGNOSIS — G89.29 CHRONIC MIDLINE LOW BACK PAIN WITHOUT SCIATICA: ICD-10-CM

## 2018-04-04 DIAGNOSIS — M54.2 CHRONIC NECK PAIN: ICD-10-CM

## 2018-04-04 RX ORDER — OXYCODONE HYDROCHLORIDE 15 MG/1
15-30 TABLET ORAL 3 TIMES DAILY PRN
Qty: 120 TABLET | Refills: 0 | Status: SHIPPED | OUTPATIENT
Start: 2018-04-06 | End: 2018-05-09

## 2018-04-04 RX ORDER — OXYCODONE HYDROCHLORIDE 15 MG/1
15-30 TABLET ORAL 3 TIMES DAILY PRN
Qty: 120 TABLET | Refills: 0 | Status: CANCELLED | OUTPATIENT
Start: 2018-04-04

## 2018-04-04 NOTE — TELEPHONE ENCOUNTER
Message from MyChart:  Original authorizing provider: MD Jeff Membreno would like a refill of the following medications:  oxyCODONE IR (ROXICODONE) 15 MG tablet [Jordyn Loredo MD]    Preferred pharmacy: WRITTEN PRESCRIPTION REQUESTED    Comment:

## 2018-04-04 NOTE — TELEPHONE ENCOUNTER
Requested Prescriptions   Pending Prescriptions Disp Refills     oxyCODONE IR (ROXICODONE) 15 MG tablet 120 tablet 0     Sig: Take 1-2 tablets (15-30 mg) by mouth 3 times daily as needed for moderate to severe pain    There is no refill protocol information for this order        oxyCODONE IR (ROXICODONE) 15 MG tablet      Last Written Prescription Date:  3/7/18  Last Fill Quantity: 120,   # refills: 0  Last Office Visit: 3/7/18  Future Office visit:       Routing refill request to provider for review/approval because:  Drug not on the [facility], P or [facility] refill protocol or controlled substance

## 2018-04-04 NOTE — TELEPHONE ENCOUNTER
Message from MyChart:  Original authorizing provider: MD Jeff Membreno GEORGETTEKelly Donohueon would like a refill of the following medications:  oxyCODONE IR (ROXICODONE) 15 MG tablet [Jordyn Loredo MD]    Preferred pharmacy: [facility] - [address]    Comment:

## 2018-04-13 ENCOUNTER — OFFICE VISIT (OUTPATIENT)
Dept: FAMILY MEDICINE | Facility: CLINIC | Age: 43
End: 2018-04-13
Payer: COMMERCIAL

## 2018-04-13 VITALS
WEIGHT: 181 LBS | DIASTOLIC BLOOD PRESSURE: 92 MMHG | BODY MASS INDEX: 32.07 KG/M2 | HEIGHT: 63 IN | HEART RATE: 79 BPM | SYSTOLIC BLOOD PRESSURE: 162 MMHG | RESPIRATION RATE: 16 BRPM | TEMPERATURE: 98.2 F

## 2018-04-13 DIAGNOSIS — I10 HYPERTENSION GOAL BP (BLOOD PRESSURE) < 140/90: ICD-10-CM

## 2018-04-13 DIAGNOSIS — F11.20 CONTINUOUS OPIOID DEPENDENCE (H): ICD-10-CM

## 2018-04-13 DIAGNOSIS — M54.2 CERVICALGIA: Primary | ICD-10-CM

## 2018-04-13 DIAGNOSIS — M54.2 CHRONIC NECK PAIN: ICD-10-CM

## 2018-04-13 DIAGNOSIS — G89.29 CHRONIC NECK PAIN: ICD-10-CM

## 2018-04-13 PROCEDURE — 99214 OFFICE O/P EST MOD 30 MIN: CPT | Performed by: FAMILY MEDICINE

## 2018-04-13 NOTE — NURSING NOTE
"Chief Complaint   Patient presents with     Neck Pain     X 2 DAYS on both sides down both shoulders        Initial BP (!) 162/92 (BP Location: Right arm, Patient Position: Chair, Cuff Size: Adult Large)  Pulse 79  Temp 98.2  F (36.8  C) (Oral)  Resp 16  Ht 5' 3\" (1.6 m)  Wt 181 lb (82.1 kg)  LMP 09/12/2016  Breastfeeding? No  BMI 32.06 kg/m2 Estimated body mass index is 32.06 kg/(m^2) as calculated from the following:    Height as of this encounter: 5' 3\" (1.6 m).    Weight as of this encounter: 181 lb (82.1 kg).  Medication Reconciliation: complete rt arm Randi Olivera MA      "

## 2018-04-13 NOTE — LETTER
[address] [address]  [phone]          April 13, 2018    Jeff Serra                                                                                                                     [address] [facility] [address]            Re  Jeff,    Ms Serra has been to the doctor. Please excuse her    Sincerely,         Felix Nobles MD

## 2018-04-13 NOTE — MR AVS SNAPSHOT
After Visit Summary   4/13/2018    Jeff Serra    MRN: [id number]           Patient Information     Date Of Birth          1975        Visit Information        Provider Department      4/13/2018 9:30 AM Felix Nobles MD [facility]        Today's Diagnoses     Cervicalgia    -  1    Chronic neck pain        Continuous opioid dependence (H)        Hypertension goal BP (blood pressure) < 140/90           Follow-ups after your visit        Additional Services     [facility] PT, HAND, AND CHIROPRACTIC REFERRAL       **This order will print in the [facility] Scheduling Office**    Physical Therapy, Hand Therapy and Chiropractic Care are available through:    *[facility]  *[facility]  *[facility]    Call one number to schedule at any of the above locations: [phone].    Your provider has referred you to: As Indicated:     Indication/Reason for Referral: cervicalgia  Onset of Illness: years  Therapy Orders: Evaluate and Treat  Special Programs: None and [facility]  Special Request:     Phylicia Whatley      Additional Comments for the Therapist or Chiropractor:     Please be aware that coverage of these services is subject to the terms and limitations of your health insurance plan.  Call member services at your health plan with any benefit or coverage questions.      Please bring the following to your appointment:    *Your personal calendar for scheduling future appointments  *Comfortable clothing                  Who to contact     If you have questions or need follow up information about today's clinic visit or your schedule please contact [facility] directly at [phone].  Normal or non-critical lab and imaging results will be communicated to you by MyChart, letter or phone within 4 business days after the clinic has received the results. If you do not hear from us within 7 days, please contact the  "clinic through [employer] or phone. If you have a critical or abnormal lab result, we will notify you by phone as soon as possible.  Submit refill requests through [employer] or call your pharmacy and they will forward the refill request to us. Please allow 3 business days for your refill to be completed.          Additional Information About Your Visit        Personify Inchart Information     [employer] gives you secure access to your electronic health record. If you see a primary care provider, you can also send messages to your care team and make appointments. If you have questions, please call your primary care clinic.  If you do not have a primary care provider, please call [phone] and they will assist you.        Care EveryWhere ID     This is your Care EveryWhere ID. This could be used by other organizations to access your [place] medical records  [id number]        Your Vitals Were     Pulse Temperature Respirations Height Last Period Breastfeeding?    79 98.2  F (36.8  C) (Oral) 16 5' 3\" (1.6 m) 09/12/2016 No    BMI (Body Mass Index)                   32.06 kg/m2            Blood Pressure from Last 3 Encounters:   04/13/18 (!) 162/92   03/07/18 130/80   12/20/17 138/90    Weight from Last 3 Encounters:   04/13/18 181 lb (82.1 kg)   03/07/18 179 lb 4.8 oz (81.3 kg)   12/20/17 182 lb 11.2 oz (82.9 kg)              We Performed the Following     [facility] PT, HAND, AND CHIROPRACTIC REFERRAL          Today's Medication Changes          These changes are accurate as of 4/13/18 10:20 AM.  If you have any questions, ask your nurse or doctor.               Start taking these medicines.        Dose/Directions    diclofenac 1 % Gel topical gel   Commonly known as:  VOLTAREN   Used for:  Cervicalgia   Started by:  Felix Nobles MD        Dose:  4 g   Apply 4 g topically 4 times daily   Quantity:  100 g   Refills:  1            Where to get your medicines      These medications were sent to [facility] " [facility] [address] [facility]  [address] [facility] [address]     Phone:  [phone]     diclofenac 1 % Gel topical gel                Primary Care Provider Fax #    Physician No Ref-Primary [phone]       No address on file        Equal Access to Services     DIPTICARMEN SASHA : Hadii roldan whittaker longo Socharlesali, waaxda luqadaha, qaybta kaalmada adeegyada, waxobi abigailin hayaan adefinn feliciano soni marie. So [facility] [phone].    ATENCIÓN: Si habla español, tiene a guy disposición servicios gratuitos de asistencia lingüística. Llame al [phone].    We comply with applicable federal civil rights laws and [place] laws. We do not discriminate on the basis of race, color, national origin, age, disability, sex, sexual orientation, or gender identity.            Thank you!     Thank you for choosing [facility]  for your care. Our goal is always to provide you with excellent care. Hearing back from our patients is one way we can continue to improve our services. Please take a few minutes to complete the written survey that you may receive in the mail after your visit with us. Thank you!             Your Updated Medication List - Protect others around you: Learn how to safely use, store and throw away your medicines at www.disposemymeds.org.          This list is accurate as of 4/13/18 10:20 AM.  Always use your most recent med list.                   Brand Name Dispense Instructions for use Diagnosis    diclofenac 1 % Gel topical gel    VOLTAREN    100 g    Apply 4 g topically 4 times daily    Cervicalgia       fexofenadine 180 MG tablet    ALLEGRA    90 tablet    Take 1 tablet (180 mg) by mouth daily    Seasonal allergic rhinitis, unspecified allergic rhinitis trigger       loratadine 10 MG tablet    CLARITIN    90 tablet    Take 1 tablet (10 mg) by mouth daily as needed for allergies    Seasonal allergic rhinitis, unspecified chronicity, unspecified trigger       losartan 100 MG tablet    COZAAR     30 tablet    Take 1 tablet (100 mg) by mouth daily    Hypertension goal BP (blood pressure) < 140/90       oxyCODONE IR 15 MG tablet    ROXICODONE    120 tablet    Take 1-2 tablets (15-30 mg) by mouth 3 times daily as needed for moderate to severe pain    Chronic midline low back pain without sciatica, Chronic neck pain       senna-docusate 8.6-50 MG per tablet    SENOKOT-S;PERICOLACE    60 tablet    Take 1 tablet by mouth 2 times daily as needed for constipation    Chronic neck pain
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
## 2018-05-09 ENCOUNTER — MYC REFILL (OUTPATIENT)
Dept: FAMILY MEDICINE | Facility: CLINIC | Age: 43
End: 2018-05-09

## 2018-05-09 DIAGNOSIS — M54.2 CHRONIC NECK PAIN: ICD-10-CM

## 2018-05-09 DIAGNOSIS — M54.50 CHRONIC MIDLINE LOW BACK PAIN WITHOUT SCIATICA: ICD-10-CM

## 2018-05-09 DIAGNOSIS — G89.29 CHRONIC NECK PAIN: ICD-10-CM

## 2018-05-09 DIAGNOSIS — G89.29 CHRONIC MIDLINE LOW BACK PAIN WITHOUT SCIATICA: ICD-10-CM

## 2018-05-09 RX ORDER — OXYCODONE HYDROCHLORIDE 15 MG/1
15-30 TABLET ORAL 3 TIMES DAILY PRN
Qty: 120 TABLET | Refills: 0 | Status: SHIPPED | OUTPATIENT
Start: 2018-05-09 | End: 2018-06-08

## 2018-05-30 ENCOUNTER — APPOINTMENT (OUTPATIENT)
Dept: CT IMAGING | Facility: CLINIC | Age: 43
End: 2018-05-30
Attending: FAMILY MEDICINE
Payer: COMMERCIAL

## 2018-05-30 ENCOUNTER — HOSPITAL ENCOUNTER (EMERGENCY)
Facility: CLINIC | Age: 43
Discharge: HOME | End: 2018-05-30
Attending: FAMILY MEDICINE | Admitting: FAMILY MEDICINE
Payer: COMMERCIAL

## 2018-05-30 ENCOUNTER — APPOINTMENT (OUTPATIENT)
Dept: ULTRASOUND IMAGING | Facility: CLINIC | Age: 43
End: 2018-05-30
Attending: FAMILY MEDICINE
Payer: COMMERCIAL

## 2018-05-30 VITALS
BODY MASS INDEX: 31.8 KG/M2 | HEART RATE: 72 BPM | OXYGEN SATURATION: 100 % | SYSTOLIC BLOOD PRESSURE: 134 MMHG | TEMPERATURE: 97.9 F | WEIGHT: 179.5 LBS | RESPIRATION RATE: 18 BRPM | DIASTOLIC BLOOD PRESSURE: 80 MMHG

## 2018-05-30 DIAGNOSIS — R10.31 ABDOMINAL PAIN, RIGHT LOWER QUADRANT: ICD-10-CM

## 2018-05-30 LAB
ALBUMIN SERPL-MCNC: 4.1 G/DL (ref 3.4–5)
ALBUMIN UR-MCNC: NEGATIVE MG/DL
ALP SERPL-CCNC: 82 U/L (ref 40–150)
ALT SERPL W P-5'-P-CCNC: 27 U/L (ref 0–50)
ANION GAP SERPL CALCULATED.3IONS-SCNC: 7 MMOL/L (ref 3–14)
APPEARANCE UR: CLEAR
AST SERPL W P-5'-P-CCNC: 17 U/L (ref 0–45)
BASOPHILS # BLD AUTO: 0 10E9/L (ref 0–0.2)
BASOPHILS NFR BLD AUTO: 0.2 %
BILIRUB SERPL-MCNC: 1.1 MG/DL (ref 0.2–1.3)
BILIRUB UR QL STRIP: NEGATIVE
BUN SERPL-MCNC: 5 MG/DL (ref 7–30)
CALCIUM SERPL-MCNC: 8.7 MG/DL (ref 8.5–10.1)
CHLORIDE SERPL-SCNC: 103 MMOL/L (ref 94–109)
CO2 SERPL-SCNC: 30 MMOL/L (ref 20–32)
COLOR UR AUTO: ABNORMAL
CREAT SERPL-MCNC: 0.6 MG/DL (ref 0.52–1.04)
DIFFERENTIAL METHOD BLD: NORMAL
EOSINOPHIL # BLD AUTO: 0.1 10E9/L (ref 0–0.7)
EOSINOPHIL NFR BLD AUTO: 0.8 %
ERYTHROCYTE [DISTWIDTH] IN BLOOD BY AUTOMATED COUNT: 14.7 % (ref 10–15)
GFR SERPL CREATININE-BSD FRML MDRD: >90 ML/MIN/1.7M2
GLUCOSE SERPL-MCNC: 87 MG/DL (ref 70–99)
GLUCOSE UR STRIP-MCNC: NEGATIVE MG/DL
HCG UR QL: NEGATIVE
HCT VFR BLD AUTO: 39.7 % (ref 35–47)
HGB BLD-MCNC: 13.2 G/DL (ref 11.7–15.7)
HGB UR QL STRIP: NEGATIVE
IMM GRANULOCYTES # BLD: 0 10E9/L (ref 0–0.4)
IMM GRANULOCYTES NFR BLD: 0.2 %
INTERNAL QC OK POCT: YES
KETONES UR STRIP-MCNC: NEGATIVE MG/DL
LEUKOCYTE ESTERASE UR QL STRIP: NEGATIVE
LIPASE SERPL-CCNC: 98 U/L (ref 73–393)
LYMPHOCYTES # BLD AUTO: 1.6 10E9/L (ref 0.8–5.3)
LYMPHOCYTES NFR BLD AUTO: 25.8 %
MCH RBC QN AUTO: 28.2 PG (ref 26.5–33)
MCHC RBC AUTO-ENTMCNC: 33.2 G/DL (ref 31.5–36.5)
MCV RBC AUTO: 85 FL (ref 78–100)
MONOCYTES # BLD AUTO: 0.5 10E9/L (ref 0–1.3)
MONOCYTES NFR BLD AUTO: 7.5 %
NEUTROPHILS # BLD AUTO: 4.1 10E9/L (ref 1.6–8.3)
NEUTROPHILS NFR BLD AUTO: 65.5 %
NITRATE UR QL: NEGATIVE
NRBC # BLD AUTO: 0 10*3/UL
NRBC BLD AUTO-RTO: 0 /100
PH UR STRIP: 7 PH (ref 5–7)
PLATELET # BLD AUTO: 190 10E9/L (ref 150–450)
POTASSIUM SERPL-SCNC: 3.3 MMOL/L (ref 3.4–5.3)
PROT SERPL-MCNC: 7.7 G/DL (ref 6.8–8.8)
RBC # BLD AUTO: 4.68 10E12/L (ref 3.8–5.2)
RBC #/AREA URNS AUTO: 0 /HPF (ref 0–2)
SODIUM SERPL-SCNC: 140 MMOL/L (ref 133–144)
SOURCE: ABNORMAL
SP GR UR STRIP: 1 (ref 1–1.03)
SQUAMOUS #/AREA URNS AUTO: 1 /HPF (ref 0–1)
UROBILINOGEN UR STRIP-MCNC: NORMAL MG/DL (ref 0–2)
WBC # BLD AUTO: 6.2 10E9/L (ref 4–11)
WBC #/AREA URNS AUTO: <1 /HPF (ref 0–5)

## 2018-05-30 PROCEDURE — 96361 HYDRATE IV INFUSION ADD-ON: CPT | Performed by: FAMILY MEDICINE

## 2018-05-30 PROCEDURE — 74176 CT ABD & PELVIS W/O CONTRAST: CPT

## 2018-05-30 PROCEDURE — 99285 EMERGENCY DEPT VISIT HI MDM: CPT | Mod: Z6 | Performed by: FAMILY MEDICINE

## 2018-05-30 PROCEDURE — 80053 COMPREHEN METABOLIC PANEL: CPT | Performed by: FAMILY MEDICINE

## 2018-05-30 PROCEDURE — 81001 URINALYSIS AUTO W/SCOPE: CPT | Performed by: FAMILY MEDICINE

## 2018-05-30 PROCEDURE — 25000128 H RX IP 250 OP 636: Performed by: FAMILY MEDICINE

## 2018-05-30 PROCEDURE — 81025 URINE PREGNANCY TEST: CPT | Performed by: FAMILY MEDICINE

## 2018-05-30 PROCEDURE — 99285 EMERGENCY DEPT VISIT HI MDM: CPT | Mod: 25 | Performed by: FAMILY MEDICINE

## 2018-05-30 PROCEDURE — 83690 ASSAY OF LIPASE: CPT | Performed by: FAMILY MEDICINE

## 2018-05-30 PROCEDURE — 85025 COMPLETE CBC W/AUTO DIFF WBC: CPT | Performed by: FAMILY MEDICINE

## 2018-05-30 PROCEDURE — 76830 TRANSVAGINAL US NON-OB: CPT

## 2018-05-30 PROCEDURE — 96374 THER/PROPH/DIAG INJ IV PUSH: CPT | Performed by: FAMILY MEDICINE

## 2018-05-30 PROCEDURE — 96375 TX/PRO/DX INJ NEW DRUG ADDON: CPT | Performed by: FAMILY MEDICINE

## 2018-05-30 PROCEDURE — 96376 TX/PRO/DX INJ SAME DRUG ADON: CPT | Performed by: FAMILY MEDICINE

## 2018-05-30 RX ORDER — POLYETHYLENE GLYCOL 3350 17 G/17G
1 POWDER, FOR SOLUTION ORAL DAILY
Qty: 527 G | Refills: 0 | Status: SHIPPED | OUTPATIENT
Start: 2018-05-30 | End: 2018-06-29

## 2018-05-30 RX ORDER — ONDANSETRON 2 MG/ML
4 INJECTION INTRAMUSCULAR; INTRAVENOUS
Status: COMPLETED | OUTPATIENT
Start: 2018-05-30 | End: 2018-05-30

## 2018-05-30 RX ORDER — SODIUM CHLORIDE, SODIUM LACTATE, POTASSIUM CHLORIDE, CALCIUM CHLORIDE 600; 310; 30; 20 MG/100ML; MG/100ML; MG/100ML; MG/100ML
1000 INJECTION, SOLUTION INTRAVENOUS CONTINUOUS
Status: DISCONTINUED | OUTPATIENT
Start: 2018-05-30 | End: 2018-05-30 | Stop reason: HOSPADM

## 2018-05-30 RX ORDER — HYDROMORPHONE HYDROCHLORIDE 1 MG/ML
0.5 INJECTION, SOLUTION INTRAMUSCULAR; INTRAVENOUS; SUBCUTANEOUS ONCE
Status: COMPLETED | OUTPATIENT
Start: 2018-05-30 | End: 2018-05-30

## 2018-05-30 RX ORDER — MAGNESIUM CITRATE
1 SOLUTION, ORAL ORAL ONCE
Qty: 1 BOTTLE | Refills: 1 | Status: SHIPPED | OUTPATIENT
Start: 2018-05-30 | End: 2018-05-30

## 2018-05-30 RX ORDER — KETOROLAC TROMETHAMINE 15 MG/ML
15 INJECTION, SOLUTION INTRAMUSCULAR; INTRAVENOUS ONCE
Status: COMPLETED | OUTPATIENT
Start: 2018-05-30 | End: 2018-05-30

## 2018-05-30 RX ADMIN — SODIUM CHLORIDE, POTASSIUM CHLORIDE, SODIUM LACTATE AND CALCIUM CHLORIDE 1000 ML: 600; 310; 30; 20 INJECTION, SOLUTION INTRAVENOUS at 13:12

## 2018-05-30 RX ADMIN — HYDROMORPHONE HYDROCHLORIDE 0.5 MG: 1 INJECTION, SOLUTION INTRAMUSCULAR; INTRAVENOUS; SUBCUTANEOUS at 12:59

## 2018-05-30 RX ADMIN — ONDANSETRON 4 MG: 2 INJECTION INTRAMUSCULAR; INTRAVENOUS at 11:43

## 2018-05-30 RX ADMIN — KETOROLAC TROMETHAMINE 15 MG: 15 INJECTION, SOLUTION INTRAMUSCULAR; INTRAVENOUS at 11:43

## 2018-05-30 RX ADMIN — SODIUM CHLORIDE, POTASSIUM CHLORIDE, SODIUM LACTATE AND CALCIUM CHLORIDE 1000 ML: 600; 310; 30; 20 INJECTION, SOLUTION INTRAVENOUS at 11:43

## 2018-05-30 NOTE — ED AVS SNAPSHOT
[facility], Emergency Department    [address]    [place] [address]    Phone:  [phone]    Fax:  [phone]                                       Jeff Serra   MRN: [id number]    Department:  [facility], Emergency Department   Date of Visit:  5/30/2018           Patient Information     Date Of Birth          1975        Your diagnoses for this visit were:     Abdominal pain, right lower quadrant        You were seen by Arturo Lara MD.        Discharge Instructions       Thank you for choosing [facility].     Please closely monitor for further symptoms. Return to the Emergency Department if you develop any new or worsening signs or symptoms.    If you received any opiate pain medications or sedatives during your visit, please do not drive for at least 8 hours.     Labs, cultures or final xray interpretations may still need to be reviewed.  We will call you if your plan of care needs to be changed.    Please follow up with your primary care physician or clinic.      Abdominal Pain    Abdominal pain is pain in the stomach or belly area. Everyone has this pain from time to time. In many cases it goes away on its own. But abdominal pain can sometimes be due to a serious problem, such as appendicitis. So it s important to know when to seek help.  Causes of abdominal pain  There are many possible causes of abdominal pain. Common causes in adults include:    Constipation, diarrhea, or gas    Stomach acid flowing back up into the esophagus (acid reflux or heartburn)    Severe acid reflux, called GERD (gastroesophageal reflux disease)    A sore in the lining of the stomach or small intestine (peptic ulcer)    Inflammation of the gallbladder, liver, or pancreas    Gallstones or kidney stones    Appendicitis     Intestinal blockage     An internal organ pushing through a muscle or other tissue (hernia)    Urinary tract infections    In women, menstrual cramps,  fibroids, or endometriosis    Inflammation or infection of the intestines  Diagnosing the cause of abdominal pain  Your healthcare provider will do a physical exam help find the cause of your pain. If needed, tests will be ordered. Belly pain has many possible causes. So it can be hard to find the reason for your pain. Giving details about your pain can help. Tell your provider where and when you feel the pain, and what makes it better or worse. Also let your provider know if you have other symptoms such as:    Fever    Tiredness    Upset stomach (nausea)    Vomiting    Changes in bathroom habits  Treating abdominal pain  Some causes of pain need emergency medical treatment right away. These include appendicitis or a bowel blockage. Other problems can be treated with rest, fluids, or medicines. Your healthcare provider can give you specific instructions for treatment or self-care based on what is causing your pain.  If you have vomiting or diarrhea, sip water or other clear fluids. When you are ready to eat solid foods again, start with small amounts of easy-to-digest, low-fat foods. These include apple sauce, toast, or crackers.   When to seek medical care  Call 911 or go to the hospital right away if you:    Can t pass stool and are vomiting    Are vomiting blood or have bloody diarrhea or black, tarry diarrhea    Have chest, neck, or shoulder pain    Feel like you might pass out    Have pain in your shoulder blades with nausea    Have sudden, severe belly pain    Have new, severe pain unlike any you have felt before    Have a belly that is rigid, hard, and tender to touch  Call your healthcare provider if you have:    Pain for more than 5 days    Bloating for more than 2 days    Diarrhea for more than 5 days    A fever of 100.4 F (38 C) or higher, or as directed by your healthcare provider    Pain that gets worse    Weight loss for no reason    Continued lack of appetite    Blood in your stool  How to prevent  abdominal pain  Here are some tips to help prevent abdominal pain:    Eat smaller amounts of food at one time.    Avoid greasy, fried, or other high-fat foods.    Avoid foods that give you gas.    Exercise regularly.    Drink plenty of fluids.  To help prevent GERD symptoms:    Quit smoking.    Reduce alcohol and certain foods that increase stomach acid.    Avoid aspirin and over-the-counter pain and fever medicines (NSAIDS or nonsteroidal anti-inflammatory drugs), if possible    Lose extra weight.    Finish eating at least 2 hours before you go to bed or lie down.    Raise the head of your bed.  Date Last Reviewed: 7/1/2016 2000-2017 [employer]. [address] [place] [address], [place] [address]. All rights reserved. This information is not intended as a substitute for professional medical care. Always follow your healthcare professional's instructions.          24 Hour Appointment Hotline       To make an appointment at any [facility], call [phone] ([phone]). If you don't have a family doctor or clinic, we will help you find one. [facility] are conveniently located to serve the needs of you and your family.             Review of your medicines      START taking        Dose / Directions Last dose taken    citrate of magnesia Soln   Dose:  1 Bottle   Quantity:  1 Bottle        Take 1 Bottle by mouth once for 1 dose   Refills:  1        polyethylene glycol powder   Commonly known as:  MIRALAX   Dose:  1 capful   Quantity:  527 g        Take 17 g (1 capful) by mouth daily   Refills:  0          Our records show that you are taking the medicines listed below. If these are incorrect, please call your family doctor or clinic.        Dose / Directions Last dose taken    diclofenac 1 % Gel topical gel   Commonly known as:  VOLTAREN   Dose:  4 g   Quantity:  100 g        Apply 4 g topically 4 times daily   Refills:  1        loratadine 10 MG tablet   Commonly known as:  CLARITIN   Dose:  10  mg   Quantity:  90 tablet        Take 1 tablet (10 mg) by mouth daily as needed for allergies   Refills:  3        losartan 100 MG tablet   Commonly known as:  COZAAR   Dose:  100 mg   Quantity:  30 tablet        Take 1 tablet (100 mg) by mouth daily   Refills:  1        oxyCODONE IR 15 MG tablet   Commonly known as:  ROXICODONE   Dose:  15-30 mg   Quantity:  120 tablet        Take 1-2 tablets (15-30 mg) by mouth 3 times daily as needed for moderate to severe pain   Refills:  0                Prescriptions were sent or printed at these locations (2 Prescriptions)                   Other Prescriptions                Printed at Department/Unit printer (2 of 2)         Magnesium Citrate (CITRATE OF MAGNESIA) SOLN               polyethylene glycol (MIRALAX) powder                Procedures and tests performed during your visit     CBC with platelets differential    CT Abdomen Pelvis without Contrast (stone protocol)    Comprehensive metabolic panel    Lipase    UA with Microscopic reflex to Culture    US Pelvis Cmplt w Transvag & Doppler LmtPel Duplex Limited    hCG qual urine POCT      Orders Needing Specimen Collection     None      Pending Results     Date and Time Order Name Status Description    5/30/2018 1257 US Pelvis Cmplt w Transvag & Doppler LmtPel Duplex Limited Preliminary     5/30/2018 1122 CT Abdomen Pelvis without Contrast (stone protocol) Preliminary             Pending Culture Results     No orders found from 5/28/2018 to 5/31/2018.            Pending Results Instructions     If you had any lab results that were not finalized at the time of your Discharge, you can call the ED Lab Result RN at [phone]. You will be contacted by this team for any positive Lab results or changes in treatment. The nurses are available 7 days a week from 10A to 6:30P.  You can leave a message 24 hours per day and they will return your call.        Thank you for choosing [facility]       Thank you for choosing [facility] for  your care. Our goal is always to provide you with excellent care. Hearing back from our patients is one way we can continue to improve our services. Please take a few minutes to complete the written survey that you may receive in the mail after you visit with us. Thank you!        [employer] Information     [employer] gives you secure access to your electronic health record. If you see a primary care provider, you can also send messages to your care team and make appointments. If you have questions, please call your primary care clinic.  If you do not have a primary care provider, please call [phone] and they will assist you.        Care EveryWhere ID     This is your Care EveryWhere ID. This could be used by other organizations to access your [facility] medical records  [id number]        Equal Access to Services     KHADAR BAEZ : Sari Marcos, lesli casas, leonie brian, ariel marie. So [facility] [phone].    ATENCIÓN: Si habla español, tiene a guy disposición servicios gratuitos de asistencia lingüística. Llame al [phone].    We comply with applicable federal civil rights laws and [place] laws. We do not discriminate on the basis of race, color, national origin, age, disability, sex, sexual orientation, or gender identity.            After Visit Summary       This is your record. Keep this with you and show to your community pharmacist(s) and doctor(s) at your next visit.

## 2018-05-30 NOTE — ED AVS SNAPSHOT
[facility], [place], Emergency Department    [address]    [facility] [address]    Phone:  [phone]    Fax:  [phone]                                       Jeff Serra   MRN: [id number]    Department:  [facility], Emergency Department   Date of Visit:  5/30/2018           After Visit Summary Signature Page     I have received my discharge instructions, and my questions have been answered. I have discussed any challenges I see with this plan with the nurse or doctor.    ..........................................................................................................................................  Patient/Patient Representative Signature      ..........................................................................................................................................  Patient Representative Print Name and Relationship to Patient    ..................................................               ................................................  Date                                            Time    ..........................................................................................................................................  Reviewed by Signature/Title    ...................................................              ..............................................  Date                                                            Time

## 2018-05-30 NOTE — DISCHARGE INSTRUCTIONS
Thank you for choosing [facility].     Please closely monitor for further symptoms. Return to the Emergency Department if you develop any new or worsening signs or symptoms.    If you received any opiate pain medications or sedatives during your visit, please do not drive for at least 8 hours.     Labs, cultures or final xray interpretations may still need to be reviewed.  We will call you if your plan of care needs to be changed.    Please follow up with your primary care physician or clinic.      Abdominal Pain    Abdominal pain is pain in the stomach or belly area. Everyone has this pain from time to time. In many cases it goes away on its own. But abdominal pain can sometimes be due to a serious problem, such as appendicitis. So it s important to know when to seek help.  Causes of abdominal pain  There are many possible causes of abdominal pain. Common causes in adults include:    Constipation, diarrhea, or gas    Stomach acid flowing back up into the esophagus (acid reflux or heartburn)    Severe acid reflux, called GERD (gastroesophageal reflux disease)    A sore in the lining of the stomach or small intestine (peptic ulcer)    Inflammation of the gallbladder, liver, or pancreas    Gallstones or kidney stones    Appendicitis     Intestinal blockage     An internal organ pushing through a muscle or other tissue (hernia)    Urinary tract infections    In women, menstrual cramps, fibroids, or endometriosis    Inflammation or infection of the intestines  Diagnosing the cause of abdominal pain  Your healthcare provider will do a physical exam help find the cause of your pain. If needed, tests will be ordered. Belly pain has many possible causes. So it can be hard to find the reason for your pain. Giving details about your pain can help. Tell your provider where and when you feel the pain, and what makes it better or worse. Also let your provider know if you have other symptoms such  as:    Fever    Tiredness    Upset stomach (nausea)    Vomiting    Changes in bathroom habits  Treating abdominal pain  Some causes of pain need emergency medical treatment right away. These include appendicitis or a bowel blockage. Other problems can be treated with rest, fluids, or medicines. Your healthcare provider can give you specific instructions for treatment or self-care based on what is causing your pain.  If you have vomiting or diarrhea, sip water or other clear fluids. When you are ready to eat solid foods again, start with small amounts of easy-to-digest, low-fat foods. These include apple sauce, toast, or crackers.   When to seek medical care  Call 911 or go to the hospital right away if you:    Can t pass stool and are vomiting    Are vomiting blood or have bloody diarrhea or black, tarry diarrhea    Have chest, neck, or shoulder pain    Feel like you might pass out    Have pain in your shoulder blades with nausea    Have sudden, severe belly pain    Have new, severe pain unlike any you have felt before    Have a belly that is rigid, hard, and tender to touch  Call your healthcare provider if you have:    Pain for more than 5 days    Bloating for more than 2 days    Diarrhea for more than 5 days    A fever of 100.4 F (38 C) or higher, or as directed by your healthcare provider    Pain that gets worse    Weight loss for no reason    Continued lack of appetite    Blood in your stool  How to prevent abdominal pain  Here are some tips to help prevent abdominal pain:    Eat smaller amounts of food at one time.    Avoid greasy, fried, or other high-fat foods.    Avoid foods that give you gas.    Exercise regularly.    Drink plenty of fluids.  To help prevent GERD symptoms:    Quit smoking.    Reduce alcohol and certain foods that increase stomach acid.    Avoid aspirin and over-the-counter pain and fever medicines (NSAIDS or nonsteroidal anti-inflammatory drugs), if possible    Lose extra weight.    Finish  eating at least 2 hours before you go to bed or lie down.    Raise the head of your bed.  Date Last Reviewed: 7/1/2016 2000-2017 [employer]. [address] [place] [address], [place] [address]. All rights reserved. This information is not intended as a substitute for professional medical care. Always follow your healthcare professional's instructions.
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
## 2018-06-08 ENCOUNTER — TELEPHONE (OUTPATIENT)
Dept: FAMILY MEDICINE | Facility: CLINIC | Age: 43
End: 2018-06-08

## 2018-06-08 ENCOUNTER — MYC REFILL (OUTPATIENT)
Dept: FAMILY MEDICINE | Facility: CLINIC | Age: 43
End: 2018-06-08

## 2018-06-08 DIAGNOSIS — M54.2 CHRONIC NECK PAIN: ICD-10-CM

## 2018-06-08 DIAGNOSIS — M54.50 CHRONIC MIDLINE LOW BACK PAIN WITHOUT SCIATICA: ICD-10-CM

## 2018-06-08 DIAGNOSIS — G89.29 CHRONIC NECK PAIN: ICD-10-CM

## 2018-06-08 DIAGNOSIS — G89.29 CHRONIC MIDLINE LOW BACK PAIN WITHOUT SCIATICA: ICD-10-CM

## 2018-06-08 DIAGNOSIS — G89.4 CHRONIC PAIN SYNDROME: Primary | ICD-10-CM

## 2018-06-08 NOTE — TELEPHONE ENCOUNTER
Requested Prescriptions   Pending Prescriptions Disp Refills     oxyCODONE IR (ROXICODONE) 15 MG tablet      Last Written Prescription Date:  5/9/18  Last Fill Quantity: 120 tablet,   # refills: 0  Last Office Visit:  Dr. Loredo  3/07/18  Future Office visit:       Routing refill request to provider for review/approval because:  Drug not on the G, P or [facility] refill protocol or controlled substance 120 tablet 0     Sig: Take 1-2 tablets (15-30 mg) by mouth 3 times daily as needed for moderate to severe pain    There is no refill protocol information for this order

## 2018-06-08 NOTE — TELEPHONE ENCOUNTER
Routing refill request to provider for review/approval because:  Drug not on the [facility] refill protocol   Denise Noland RN

## 2018-06-08 NOTE — LETTER
June 8, 2018      Jeff Serra  [address] [address] [address]        Dear Jeff,     Please call [phone] to schedule a blood pressure recheck appointment with your provider. You are overdue for a visit.       Thanks,      [facility]

## 2018-06-08 NOTE — TELEPHONE ENCOUNTER
Panel Management Review      BP Readings from Last 1 Encounters:   05/30/18 134/80      Last Office Visit with this department: 3/9/2018        Patient is due/failing the following:   BP CHECK    Action needed:   Patient needs office visit for hypertension with provider.    Type of outreach:    Sent [employer] message. and Sent letter.    Terrence Kennedy, Medical Assistant  .

## 2018-06-11 DIAGNOSIS — G89.4 CHRONIC PAIN SYNDROME: ICD-10-CM

## 2018-06-11 PROCEDURE — 99000 SPECIMEN HANDLING OFFICE-LAB: CPT | Performed by: FAMILY MEDICINE

## 2018-06-11 PROCEDURE — 80307 DRUG TEST PRSMV CHEM ANLYZR: CPT | Mod: 90 | Performed by: FAMILY MEDICINE

## 2018-06-11 RX ORDER — OXYCODONE HYDROCHLORIDE 15 MG/1
15-30 TABLET ORAL 3 TIMES DAILY PRN
Qty: 120 TABLET | Refills: 0 | Status: SHIPPED | OUTPATIENT
Start: 2018-06-11 | End: 2018-07-09

## 2018-06-11 NOTE — TELEPHONE ENCOUNTER
Prescription refilled.  But I would like a urine sample left for urine tox screen before picking up prescription.  This is part of the new policy regarding chronic pain prescriptions.

## 2018-06-16 LAB — COMPREHEN DRUG ANALYSIS UR: NORMAL

## 2018-06-18 NOTE — PROGRESS NOTES
Jeff,  Your urine screen was normal and as expected - showing the medication as prescribed.  As part of [facility]'s new monitoring policy we will periodically recheck this test.  MOISES Loredo M.D.

## 2018-06-21 ENCOUNTER — APPOINTMENT (OUTPATIENT)
Dept: CT IMAGING | Facility: CLINIC | Age: 43
End: 2018-06-21
Attending: INTERNAL MEDICINE
Payer: COMMERCIAL

## 2018-06-21 ENCOUNTER — HOSPITAL ENCOUNTER (EMERGENCY)
Facility: CLINIC | Age: 43
Discharge: HOME | End: 2018-06-22
Attending: INTERNAL MEDICINE | Admitting: INTERNAL MEDICINE
Payer: COMMERCIAL

## 2018-06-21 VITALS
BODY MASS INDEX: 31.71 KG/M2 | HEART RATE: 89 BPM | DIASTOLIC BLOOD PRESSURE: 89 MMHG | WEIGHT: 179 LBS | TEMPERATURE: 98.1 F | SYSTOLIC BLOOD PRESSURE: 125 MMHG | RESPIRATION RATE: 16 BRPM | OXYGEN SATURATION: 99 %

## 2018-06-21 DIAGNOSIS — R06.09 DYSPNEA ON EXERTION: ICD-10-CM

## 2018-06-21 LAB
ALBUMIN SERPL-MCNC: 3.7 G/DL (ref 3.4–5)
ALP SERPL-CCNC: 80 U/L (ref 40–150)
ALT SERPL W P-5'-P-CCNC: 25 U/L (ref 0–50)
ANION GAP SERPL CALCULATED.3IONS-SCNC: 6 MMOL/L (ref 3–14)
AST SERPL W P-5'-P-CCNC: 14 U/L (ref 0–45)
BASOPHILS # BLD AUTO: 0 10E9/L (ref 0–0.2)
BASOPHILS NFR BLD AUTO: 0.1 %
BILIRUB SERPL-MCNC: 0.7 MG/DL (ref 0.2–1.3)
BUN SERPL-MCNC: 9 MG/DL (ref 7–30)
CALCIUM SERPL-MCNC: 8.3 MG/DL (ref 8.5–10.1)
CHLORIDE SERPL-SCNC: 107 MMOL/L (ref 94–109)
CO2 SERPL-SCNC: 29 MMOL/L (ref 20–32)
CREAT SERPL-MCNC: 0.73 MG/DL (ref 0.52–1.04)
CRP SERPL-MCNC: <2.9 MG/L (ref 0–8)
D DIMER PPP FEU-MCNC: 0.3 UG/ML FEU (ref 0–0.5)
DIFFERENTIAL METHOD BLD: NORMAL
EOSINOPHIL # BLD AUTO: 0.1 10E9/L (ref 0–0.7)
EOSINOPHIL NFR BLD AUTO: 0.8 %
ERYTHROCYTE [DISTWIDTH] IN BLOOD BY AUTOMATED COUNT: 14.3 % (ref 10–15)
GFR SERPL CREATININE-BSD FRML MDRD: 86 ML/MIN/1.7M2
GLUCOSE SERPL-MCNC: 74 MG/DL (ref 70–99)
HCT VFR BLD AUTO: 39.6 % (ref 35–47)
HGB BLD-MCNC: 13.3 G/DL (ref 11.7–15.7)
IMM GRANULOCYTES # BLD: 0 10E9/L (ref 0–0.4)
IMM GRANULOCYTES NFR BLD: 0.1 %
INR PPP: 0.96 (ref 0.86–1.14)
LYMPHOCYTES # BLD AUTO: 2.4 10E9/L (ref 0.8–5.3)
LYMPHOCYTES NFR BLD AUTO: 34.1 %
MCH RBC QN AUTO: 28.7 PG (ref 26.5–33)
MCHC RBC AUTO-ENTMCNC: 33.6 G/DL (ref 31.5–36.5)
MCV RBC AUTO: 86 FL (ref 78–100)
MONOCYTES # BLD AUTO: 0.5 10E9/L (ref 0–1.3)
MONOCYTES NFR BLD AUTO: 7.5 %
NEUTROPHILS # BLD AUTO: 4 10E9/L (ref 1.6–8.3)
NEUTROPHILS NFR BLD AUTO: 57.4 %
NRBC # BLD AUTO: 0 10*3/UL
NRBC BLD AUTO-RTO: 0 /100
NT-PROBNP SERPL-MCNC: 27 PG/ML (ref 0–450)
PLATELET # BLD AUTO: 182 10E9/L (ref 150–450)
POTASSIUM SERPL-SCNC: 3.4 MMOL/L (ref 3.4–5.3)
PROT SERPL-MCNC: 7.6 G/DL (ref 6.8–8.8)
RBC # BLD AUTO: 4.63 10E12/L (ref 3.8–5.2)
SODIUM SERPL-SCNC: 142 MMOL/L (ref 133–144)
TROPONIN I SERPL-MCNC: <0.015 UG/L (ref 0–0.04)
WBC # BLD AUTO: 7.1 10E9/L (ref 4–11)

## 2018-06-21 PROCEDURE — 25000128 H RX IP 250 OP 636: Performed by: INTERNAL MEDICINE

## 2018-06-21 PROCEDURE — 85610 PROTHROMBIN TIME: CPT | Performed by: INTERNAL MEDICINE

## 2018-06-21 PROCEDURE — 93005 ELECTROCARDIOGRAM TRACING: CPT | Performed by: INTERNAL MEDICINE

## 2018-06-21 PROCEDURE — 84484 ASSAY OF TROPONIN QUANT: CPT | Performed by: INTERNAL MEDICINE

## 2018-06-21 PROCEDURE — 25000125 ZZHC RX 250: Performed by: INTERNAL MEDICINE

## 2018-06-21 PROCEDURE — 83880 ASSAY OF NATRIURETIC PEPTIDE: CPT | Performed by: INTERNAL MEDICINE

## 2018-06-21 PROCEDURE — 85379 FIBRIN DEGRADATION QUANT: CPT | Performed by: INTERNAL MEDICINE

## 2018-06-21 PROCEDURE — 99285 EMERGENCY DEPT VISIT HI MDM: CPT | Mod: 25 | Performed by: INTERNAL MEDICINE

## 2018-06-21 PROCEDURE — 80053 COMPREHEN METABOLIC PANEL: CPT | Performed by: INTERNAL MEDICINE

## 2018-06-21 PROCEDURE — 85025 COMPLETE CBC W/AUTO DIFF WBC: CPT | Performed by: INTERNAL MEDICINE

## 2018-06-21 PROCEDURE — 86140 C-REACTIVE PROTEIN: CPT | Performed by: INTERNAL MEDICINE

## 2018-06-21 PROCEDURE — 93010 ELECTROCARDIOGRAM REPORT: CPT | Mod: Z6 | Performed by: INTERNAL MEDICINE

## 2018-06-21 PROCEDURE — 71260 CT THORAX DX C+: CPT

## 2018-06-21 RX ORDER — IOPAMIDOL 755 MG/ML
100 INJECTION, SOLUTION INTRAVASCULAR ONCE
Status: COMPLETED | OUTPATIENT
Start: 2018-06-21 | End: 2018-06-21

## 2018-06-21 RX ORDER — ALBUTEROL SULFATE 90 UG/1
2 AEROSOL, METERED RESPIRATORY (INHALATION) EVERY 6 HOURS PRN
Qty: 1 INHALER | Refills: 0 | Status: SHIPPED | OUTPATIENT
Start: 2018-06-21 | End: 2018-10-18

## 2018-06-21 RX ADMIN — SODIUM CHLORIDE 68 ML: 9 INJECTION, SOLUTION INTRAVENOUS at 21:59

## 2018-06-21 RX ADMIN — IOPAMIDOL 64 ML: 755 INJECTION, SOLUTION INTRAVENOUS at 21:58

## 2018-06-21 ASSESSMENT — ENCOUNTER SYMPTOMS
DIFFICULTY URINATING: 0
NAUSEA: 0
NUMBNESS: 0
FLANK PAIN: 0
ADENOPATHY: 0
WHEEZING: 0
LIGHT-HEADEDNESS: 0
BACK PAIN: 1
ABDOMINAL PAIN: 0
CHEST TIGHTNESS: 1
SHORTNESS OF BREATH: 1
WEAKNESS: 0
VOMITING: 0
CHILLS: 0
NECK PAIN: 1
COUGH: 1
TROUBLE SWALLOWING: 0
PALPITATIONS: 1
FEVER: 0
CONFUSION: 0
SEIZURES: 0
DIARRHEA: 1

## 2018-06-21 NOTE — ED AVS SNAPSHOT
[facility], Emergency Department    [address]    [place] [address]    Phone:  [phone]    Fax:  [phone]                                       Jeff Serra   MRN: [id number]    Department:  [facility], Emergency Department   Date of Visit:  6/21/2018           Patient Information     Date Of Birth          1975        Your diagnoses for this visit were:     Dyspnea on exertion        You were seen by Dylan Crabtree MD.      Follow-up Information     Follow up with Jordyn Loredo MD.    Specialty:  Family Practice    Contact information:    [address]  [place] [address] [phone]          Discharge Instructions       Albuterol 2 puffs 4 times/ day as needed for shortness of breath on exertion.  Follow up with your primary clinic next week.  Return as needed for new or worsening symptoms.    24 Hour Appointment Hotline       To make an appointment at any [facility] clinic, call [phone]). If you don't have a family doctor or clinic, we will help you find one. [facility] clinics are conveniently located to serve the needs of you and your family.             Review of your medicines      START taking        Dose / Directions Last dose taken    albuterol 108 (90 Base) MCG/ACT Inhaler   Commonly known as:  PROAIR HFA/PROVENTIL HFA/VENTOLIN HFA   Dose:  2 puff   Quantity:  1 Inhaler        Inhale 2 puffs into the lungs every 6 hours as needed for shortness of breath / dyspnea or wheezing   Refills:  0          Our records show that you are taking the medicines listed below. If these are incorrect, please call your family doctor or clinic.        Dose / Directions Last dose taken    diclofenac 1 % Gel topical gel   Commonly known as:  VOLTAREN   Dose:  4 g   Quantity:  100 g        Apply 4 g topically 4 times daily   Refills:  1        loratadine 10 MG tablet   Commonly known as:  CLARITIN   Dose:  10 mg   Quantity:  90 tablet         Take 1 tablet (10 mg) by mouth daily as needed for allergies   Refills:  3        losartan 100 MG tablet   Commonly known as:  COZAAR   Dose:  100 mg   Quantity:  30 tablet        Take 1 tablet (100 mg) by mouth daily   Refills:  1        oxyCODONE IR 15 MG tablet   Commonly known as:  ROXICODONE   Dose:  15-30 mg   Quantity:  120 tablet        Take 1-2 tablets (15-30 mg) by mouth 3 times daily as needed for moderate to severe pain   Refills:  0        polyethylene glycol powder   Commonly known as:  MIRALAX   Dose:  1 capful   Quantity:  527 g        Take 17 g (1 capful) by mouth daily   Refills:  0                Prescriptions were sent or printed at these locations (1 Prescription)                   Other Prescriptions                Printed at Department/Unit printer (1 of 1)         albuterol (PROAIR HFA/PROVENTIL HFA/VENTOLIN HFA) 108 (90 Base) MCG/ACT Inhaler                Procedures and tests performed during your visit     BNP    CBC with platelets differential    CRP inflammation    Chest CT, IV contrast only - PE protocol    Comprehensive metabolic panel    D dimer quantitative    EKG 12 lead    INR    Troponin I      Orders Needing Specimen Collection     None      Pending Results     Date and Time Order Name Status Description    6/21/2018 2037 EKG 12 lead Preliminary             Pending Culture Results     No orders found from 6/19/2018 to 6/22/2018.            Pending Results Instructions     If you had any lab results that were not finalized at the time of your Discharge, you can call the ED Lab Result RN at [phone]. You will be contacted by this team for any positive Lab results or changes in treatment. The nurses are available 7 days a week from 10A to 6:30P.  You can leave a message 24 hours per day and they will return your call.        Thank you for choosing [facility]       Thank you for choosing [facility] for your care. Our goal is always to provide you with excellent care. Hearing back  from our patients is one way we can continue to improve our services. Please take a few minutes to complete the written survey that you may receive in the mail after you visit with us. Thank you!        [facility] Information     [facility] gives you secure access to your electronic health record. If you see a primary care provider, you can also send messages to your care team and make appointments. If you have questions, please call your primary care clinic.  If you do not have a primary care provider, please call [phone] and they will assist you.        Care EveryWhere ID     This is your Care EveryWhere ID. This could be used by other organizations to access your [facility] medical records  [id number]        Equal Access to Services     KHADAR BAEZ : Sari Marcos, lesli casas, leonie brian, ariel marie. So [facility] [phone].    ATENCIÓN: Si habla español, tiene a guy disposición servicios gratuitos de asistencia lingüística. Llame al [phone].    We comply with applicable federal civil rights laws and [place] laws. We do not discriminate on the basis of race, color, national origin, age, disability, sex, sexual orientation, or gender identity.            After Visit Summary       This is your record. Keep this with you and show to your community pharmacist(s) and doctor(s) at your next visit.

## 2018-06-21 NOTE — ED AVS SNAPSHOT
[facility], [place], Emergency Department    [address]    [facility] [address]    Phone:  [phone]    Fax:  [phone]                                       Jeff Serra   MRN: [id number]    Department:  [facility], Emergency Department   Date of Visit:  6/21/2018           After Visit Summary Signature Page     I have received my discharge instructions, and my questions have been answered. I have discussed any challenges I see with this plan with the nurse or doctor.    ..........................................................................................................................................  Patient/Patient Representative Signature      ..........................................................................................................................................  Patient Representative Print Name and Relationship to Patient    ..................................................               ................................................  Date                                            Time    ..........................................................................................................................................  Reviewed by Signature/Title    ...................................................              ..............................................  Date                                                            Time

## 2018-06-22 LAB — INTERPRETATION ECG - MUSE: NORMAL

## 2018-06-22 NOTE — DISCHARGE INSTRUCTIONS
Albuterol 2 puffs 4 times/ day as needed for shortness of breath on exertion.  Follow up with your primary clinic next week.  Return as needed for new or worsening symptoms.

## 2018-07-09 ENCOUNTER — MYC REFILL (OUTPATIENT)
Dept: FAMILY MEDICINE | Facility: CLINIC | Age: 43
End: 2018-07-09

## 2018-07-09 DIAGNOSIS — M54.50 CHRONIC MIDLINE LOW BACK PAIN WITHOUT SCIATICA: ICD-10-CM

## 2018-07-09 DIAGNOSIS — G89.29 CHRONIC NECK PAIN: ICD-10-CM

## 2018-07-09 DIAGNOSIS — M54.2 CHRONIC NECK PAIN: ICD-10-CM

## 2018-07-09 DIAGNOSIS — G89.29 CHRONIC MIDLINE LOW BACK PAIN WITHOUT SCIATICA: ICD-10-CM

## 2018-07-09 RX ORDER — OXYCODONE HYDROCHLORIDE 15 MG/1
15-30 TABLET ORAL 3 TIMES DAILY PRN
Qty: 120 TABLET | Refills: 0 | Status: SHIPPED | OUTPATIENT
Start: 2018-07-09 | End: 2018-08-08

## 2018-07-10 ENCOUNTER — TELEPHONE (OUTPATIENT)
Dept: FAMILY MEDICINE | Facility: CLINIC | Age: 43
End: 2018-07-10

## 2018-07-10 NOTE — TELEPHONE ENCOUNTER
Due for ov with pcp, please schedule for med check.     [place] Prescription Monitoring Program was reviewed and confirmed past prescriptions. No variances were noted. Will fill per pain contract in pcp absence.      please place rx at front for  and update patient when completed.

## 2018-07-10 NOTE — TELEPHONE ENCOUNTER
...Reason for Call:  Other     Detailed comments: Patient called said her  will  her written script today for her.    Phone Number Patient can be reached at: Home number on file [phone] (home)    Best Time: anytime    Can we leave a detailed message on this number? YES    Call taken on 7/10/2018 at 8:34 AM by Jarad Tolliver

## 2018-08-08 ENCOUNTER — MYC MEDICAL ADVICE (OUTPATIENT)
Dept: OTHER | Age: 43
End: 2018-08-08

## 2018-08-08 ENCOUNTER — MYC REFILL (OUTPATIENT)
Dept: FAMILY MEDICINE | Facility: CLINIC | Age: 43
End: 2018-08-08

## 2018-08-08 DIAGNOSIS — J30.2 SEASONAL ALLERGIC RHINITIS, UNSPECIFIED CHRONICITY, UNSPECIFIED TRIGGER: ICD-10-CM

## 2018-08-08 DIAGNOSIS — I10 HYPERTENSION GOAL BP (BLOOD PRESSURE) < 140/90: ICD-10-CM

## 2018-08-08 DIAGNOSIS — M54.2 CHRONIC NECK PAIN: ICD-10-CM

## 2018-08-08 DIAGNOSIS — M54.50 CHRONIC MIDLINE LOW BACK PAIN WITHOUT SCIATICA: ICD-10-CM

## 2018-08-08 DIAGNOSIS — G89.29 CHRONIC NECK PAIN: ICD-10-CM

## 2018-08-08 DIAGNOSIS — G89.29 CHRONIC MIDLINE LOW BACK PAIN WITHOUT SCIATICA: ICD-10-CM

## 2018-08-08 RX ORDER — OXYCODONE HYDROCHLORIDE 15 MG/1
15-30 TABLET ORAL 3 TIMES DAILY PRN
Qty: 120 TABLET | Refills: 0 | Status: SHIPPED | OUTPATIENT
Start: 2018-08-08 | End: 2018-09-07

## 2018-08-08 NOTE — TELEPHONE ENCOUNTER
Requested Prescriptions   Pending Prescriptions Disp Refills     oxyCODONE IR (ROXICODONE) 15 MG tablet 120 tablet 0     Sig: Take 1-2 tablets (15-30 mg) by mouth 3 times daily as needed for moderate to severe pain    There is no refill protocol information for this order        Routing refill request to provider for review/approval because:  Drug not on the [facility] refill protocol     Herminia Rutherford RN, BSN

## 2018-08-08 NOTE — TELEPHONE ENCOUNTER
"Requested Prescriptions   Pending Prescriptions Disp Refills     losartan (COZAAR) 100 MG tablet 30 tablet 1     Sig: Take 1 tablet (100 mg) by mouth daily    Angiotensin-II Receptors Passed    8/8/2018  8:25 AM       Passed - Blood pressure under 140/90 in past 12 months    BP Readings from Last 3 Encounters:   06/21/18 125/89   05/30/18 134/80   04/13/18 (!) 162/92                Passed - Recent (12 mo) or future (30 days) visit within the authorizing provider's specialty    Patient had office visit in the last 12 months or has a visit in the next 30 days with authorizing provider or within the authorizing provider's specialty.  See \"Patient Info\" tab in inbasket, or \"Choose Columns\" in Meds & Orders section of the refill encounter.           Passed - Patient is age 18 or older       Passed - No active pregnancy on record       Passed - Normal serum creatinine on file in past 12 months    Recent Labs   Lab Test  06/21/18 2128   CR  0.73            Passed - Normal serum potassium on file in past 12 months    Recent Labs   Lab Test  06/21/18 2128   POTASSIUM  3.4                   Passed - No positive pregnancy test in past 12 months        loratadine (CLARITIN) 10 MG tablet 90 tablet 3     Sig: Take 1 tablet (10 mg) by mouth daily as needed for allergies    Antihistamines Protocol Passed    8/8/2018  8:25 AM       Passed - Patient is 3-64 years of age    Apply weight-based dosing for peds patients age 3 - 12 years of age.    Forward request to provider for patients under the age of 3 or over the age of 64.         Passed - Recent (12 mo) or future (30 days) visit within the authorizing provider's specialty    Patient had office visit in the last 12 months or has a visit in the next 30 days with authorizing provider or within the authorizing provider's specialty.  See \"Patient Info\" tab in inbasket, or \"Choose Columns\" in Meds & Orders section of the refill encounter.            loratadine (CLARITIN) 10 MG " tablet  Last Written Prescription Date:  1/9/18  Last Fill Quantity: 90,  # refills: 3   Last office visit: 4/13/2018 with prescribing provider:  Dr. Facundo Tian Office Visit:      losartan (COZAAR) 100 MG tablet  Last Written Prescription Date:  7/11/17  Last Fill Quantity: 30,  # refills: 1   Last office visit: 4/13/2018 with prescribing provider:  Dr. Facundo Tian Office Visit:

## 2018-08-08 NOTE — TELEPHONE ENCOUNTER
[facility]  Original authorizing provider: Carmenza Mccartney MD    Jeff Serra would like a refill of the following medications:  oxyCODONE IR (ROXICODONE) 15 MG tablet [Carmenza Mccartney MD]    Preferred pharmacy: WRITTEN PRESCRIPTION REQUESTED    Comment:      Medication renewals requested in this message routed to other providers:  losartan (COZAAR) 100 MG tablet [Jordyn Loredo MD]  loratadine (CLARITIN) 10 MG tablet [Jordyn Loredo MD]

## 2018-08-08 NOTE — TELEPHONE ENCOUNTER
Due for med check - please schedule with pcp  please place rx at front for  and update patient when completed.      [place] Pres[place] was [place] and confirmed past prescriptions. No variances were noted.

## 2018-08-08 NOTE — TELEPHONE ENCOUNTER
Routing refill request to provider for review/approval because: Losartan  A break in medication - a 2 month supply sent over 1 year ago on 7/11/17:  Medication Detail      Disp Refills Start End SYDNIE   losartan (COZAAR) 100 MG tablet 30 tablet 1 7/11/2017  --   Sig - Route: Take 1 tablet (100 mg) by mouth daily - Oral   Class: E-Prescribe   Order: [id number]   E-Prescribing Status: Receipt confirmed by pharmacy (7/11/2017  6:49 PM CDT)       Also, refill request for Loratadine is too soon, patient had a year supply of Rx sent to pharmacy in 01/2018 - patient should have enough refills until 12/2018.    Routing to provider to review and advise.     Angelica Bernardo RN

## 2018-08-08 NOTE — TELEPHONE ENCOUNTER
Message from [facility]:  Original authorizing provider: Jordyn Loredo MD    Jeff Serra would like a refill of the following medications:  losartan (COZAAR) 100 MG tablet [Jordyn Loredo MD]  loratadine (CLARITIN) 10 MG tablet [Jordyn Loredo MD]    Preferred pharmacy: WRITTEN PRESCRIPTION REQUESTED    Comment:      Medication renewals requested in this message routed to other providers:  oxyCODONE IR (ROXICODONE) 15 MG tablet [Carmenza Mccartney MD]

## 2018-08-08 NOTE — TELEPHONE ENCOUNTER
Please clarify with patient why refill being requested of med that appears she was not taking over the last year (see RN note).   She is due for med check and should be scheduled

## 2018-08-09 RX ORDER — LORATADINE 10 MG/1
10 TABLET ORAL DAILY PRN
Qty: 90 TABLET | Refills: 3 | Status: CANCELLED | OUTPATIENT
Start: 2018-08-09

## 2018-08-09 NOTE — TELEPHONE ENCOUNTER
Paty messaged patient letting her know her RX for oxycodone is ready for  at the .    Eugenie Neumann

## 2018-08-09 NOTE — TELEPHONE ENCOUNTER
Reason for Call:  Other prescription    Detailed comments: Pt calling for  she would like to authorize her spouse Duy Serra to come  hard copy of Rx at .    Phone Number Patient can be reached at: Home number on file [phone] (home)    Best Time: anytime    Can we leave a detailed message on this number? YES    Call taken on 8/9/2018 at 8:00 AM by Robert Riggins

## 2018-08-09 NOTE — TELEPHONE ENCOUNTER
Reason for Call:  Other prescription    Detailed comments: Patient returned call and made an appointment for 9/21/2018.    Phone Number Patient can be reached at: Home number on file [phone] (home)    Best Time: any    Can we leave a detailed message on this number? YES    Call taken on 8/9/2018 at 8:59 AM by Temitope Quinteros

## 2018-08-09 NOTE — TELEPHONE ENCOUNTER
This writer attempted to contact [phone] on 08/09/18      Reason for call Losartan Medication and left message.      If patient calls back:   Schedule Office Visit appointment within 1 month with PCP, for Medication Check, needs to be seen before provider can refill  Losartan medication, document that pt called and close encounter         Francoise Rhoades MA

## 2018-08-09 NOTE — TELEPHONE ENCOUNTER
Please clarify with patient if she is taking the losartan. Appears she should have been out of this med for 11 months.     Med might have been changed from losartan-hydrochlorothiazide to losartan last year     Wondering if she has actually been taking the losartan-hydrochlorothiazide instead.

## 2018-08-10 NOTE — TELEPHONE ENCOUNTER
This writer attempted to contact 1 on 08/10/18      Reason for call Medication and left detailed message.      If patient calls back:   Patient contacted by [facility] Team (MA/TC). Inform patient that someone from the team will contact them, document that pt called and route to care team.         LXIONG3, MEDICAL ASSISTANT

## 2018-08-20 NOTE — TELEPHONE ENCOUNTER
This writer attempted to contact 1 on 08/20/18      Reason for call Medication  and left detailed message.      If patient calls back:   Patient contacted by [facility] Team (MA/TC). Inform patient that someone from the team will contact them, document that pt called and route to care team.       LXIONG3, MEDICAL ASSISTANT

## 2018-08-23 RX ORDER — LOSARTAN POTASSIUM 100 MG/1
100 TABLET ORAL DAILY
Qty: 30 TABLET | Refills: 1 | OUTPATIENT
Start: 2018-08-23

## 2018-08-23 NOTE — TELEPHONE ENCOUNTER
Please send mychart with questions below.   I will decline refill for now until we hear back from her.

## 2018-08-23 NOTE — TELEPHONE ENCOUNTER
LM for pt to call clinic.  3rd attempt, with no response.  Please advise.      Gali HOSKINS, Patient Care

## 2018-09-04 ENCOUNTER — OFFICE VISIT (OUTPATIENT)
Dept: URGENT CARE | Facility: URGENT CARE | Age: 43
End: 2018-09-04
Payer: COMMERCIAL

## 2018-09-04 ENCOUNTER — HOSPITAL ENCOUNTER (EMERGENCY)
Facility: CLINIC | Age: 43
Discharge: HOME | End: 2018-09-04
Attending: EMERGENCY MEDICINE | Admitting: EMERGENCY MEDICINE
Payer: COMMERCIAL

## 2018-09-04 ENCOUNTER — APPOINTMENT (OUTPATIENT)
Dept: GENERAL RADIOLOGY | Facility: CLINIC | Age: 43
End: 2018-09-04
Attending: EMERGENCY MEDICINE
Payer: COMMERCIAL

## 2018-09-04 VITALS
BODY MASS INDEX: 32.78 KG/M2 | DIASTOLIC BLOOD PRESSURE: 97 MMHG | WEIGHT: 185 LBS | HEART RATE: 90 BPM | TEMPERATURE: 98.4 F | OXYGEN SATURATION: 99 % | SYSTOLIC BLOOD PRESSURE: 147 MMHG | HEIGHT: 63 IN | RESPIRATION RATE: 16 BRPM

## 2018-09-04 VITALS
WEIGHT: 185.8 LBS | OXYGEN SATURATION: 100 % | BODY MASS INDEX: 32.91 KG/M2 | DIASTOLIC BLOOD PRESSURE: 93 MMHG | SYSTOLIC BLOOD PRESSURE: 154 MMHG | TEMPERATURE: 98 F | HEART RATE: 95 BPM

## 2018-09-04 DIAGNOSIS — M94.0 COSTOCHONDRITIS: ICD-10-CM

## 2018-09-04 DIAGNOSIS — J06.9 UPPER RESPIRATORY TRACT INFECTION, UNSPECIFIED TYPE: ICD-10-CM

## 2018-09-04 DIAGNOSIS — R07.9 ACUTE CHEST PAIN: Primary | ICD-10-CM

## 2018-09-04 LAB — INTERPRETATION ECG - MUSE: NORMAL

## 2018-09-04 PROCEDURE — 99215 OFFICE O/P EST HI 40 MIN: CPT | Performed by: PEDIATRICS

## 2018-09-04 PROCEDURE — 93005 ELECTROCARDIOGRAM TRACING: CPT | Performed by: PEDIATRICS

## 2018-09-04 PROCEDURE — 93005 ELECTROCARDIOGRAM TRACING: CPT

## 2018-09-04 PROCEDURE — 99284 EMERGENCY DEPT VISIT MOD MDM: CPT | Mod: 25

## 2018-09-04 PROCEDURE — 25000132 ZZH RX MED GY IP 250 OP 250 PS 637: Performed by: EMERGENCY MEDICINE

## 2018-09-04 PROCEDURE — 71046 X-RAY EXAM CHEST 2 VIEWS: CPT

## 2018-09-04 RX ORDER — NAPROXEN 500 MG/1
500 TABLET ORAL 2 TIMES DAILY WITH MEALS
Qty: 20 TABLET | Refills: 0 | Status: SHIPPED | OUTPATIENT
Start: 2018-09-04 | End: 2018-09-21

## 2018-09-04 RX ORDER — NAPROXEN 500 MG/1
500 TABLET ORAL ONCE
Status: COMPLETED | OUTPATIENT
Start: 2018-09-04 | End: 2018-09-04

## 2018-09-04 RX ADMIN — NAPROXEN 500 MG: 500 TABLET ORAL at 20:13

## 2018-09-04 ASSESSMENT — ENCOUNTER SYMPTOMS: COUGH: 1

## 2018-09-04 NOTE — PATIENT INSTRUCTIONS
Go to Emergency Department, for evaluation, treatment and observation of:   - your high blood pressure (150s/90s & 152/105 in Urgent Care). High blood pressure can cause blurry vision (which you mentioned), chest pain, and headaches.   -  chest tightness (history of PE about 1 year ago). Chest pain concerning potentially for heart attack, PE, hypertensive urgency. Ms. Serra has discomfort with deep breaths, lying back. No hypoxia in urgent care. EKG completed, sent with patient.   - Right flank pain ( with hx of nephrolithiasis). UA with eval for infection, nephrolithiasis recommended.

## 2018-09-04 NOTE — NURSING NOTE
"Chief Complaint   Patient presents with     Urgent Care     right side back pain and chest congestion        Initial BP (!) 154/93 (BP Location: Left arm, Patient Position: Chair, Cuff Size: Adult Large)  Pulse 95  Temp 98  F (36.7  C) (Tympanic)  Wt 185 lb 12.8 oz (84.3 kg)  LMP 09/12/2016  SpO2 100%  BMI 32.91 kg/m2 Estimated body mass index is 32.91 kg/(m^2) as calculated from the following:    Height as of 4/13/18: 5' 3\" (1.6 m).    Weight as of this encounter: 185 lb 12.8 oz (84.3 kg)..    BP completed using cuff size: large  MEDICATIONS REVIEWED  SOCIAL AND FAMILY HX REVIEWED  Coretta Moore CMA  "
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
## 2018-09-04 NOTE — MR AVS SNAPSHOT
After Visit Summary   9/4/2018    Jeff Serra    MRN: [id number]           Patient Information     Date Of Birth          1975        Visit Information        Provider Department      9/4/2018 4:10 PM Felecia Willard MD [facility]        Care Instructions    Go to Emergency Department, for evaluation, treatment and observation of:   - your high blood pressure (150s/90s & 152/105 in Urgent Care). High blood pressure can cause blurry vision (which you mentioned), chest pain, and headaches.   -  chest tightness (history of PE about 1 year ago). Chest pain concerning potentially for heart attack, PE, hypertensive urgency. Ms. Serra has discomfort with deep breaths, lying back. No hypoxia in urgent care. EKG completed, sent with patient.   - Right flank pain ( with hx of nephrolithiasis). UA with eval for infection, nephrolithiasis recommended.                    Follow-ups after your visit        Follow-up notes from your care team     Return in about 1 week (around 9/11/2018).      Your next 10 appointments already scheduled     Sep 21, 2018  3:20 PM CDT   [facility]ord[facility]o [facility]   [facility]    [address] [place] [id number] [phone]           Bring a current list of meds and any records pertaining to this visit. For Physicals, please bring immunization records and any forms needing to be filled out. Please arrive 10 minutes early to complete paperwork.              Who to contact     If you have questions or need follow up information about today's clinic visit or your schedule please contact [facility] directly at [phone].  Normal or non-critical lab and imaging results will be communicated to you by MyChart, letter or phone within 4 business days after the clinic has received the results. If you do not hear from us within 7 days,  please contact the clinic through [employer] or phone. If you have a critical or abnormal lab result, we will notify you by phone as soon as possible.  Submit refill requests through [employer] or call your pharmacy and they will forward the refill request to us. Please allow 3 business days for your refill to be completed.          Additional Information About Your Visit        [employer] Information     [employer] gives you secure access to your electronic health record. If you see a primary care provider, you can also send messages to your care team and make appointments. If you have questions, please call your primary care clinic.  If you do not have a primary care provider, please call [phone] and they will assist you.        Care EveryWhere ID     This is your Care EveryWhere ID. This could be used by other organizations to access your [place] medical records  [id number]        Your Vitals Were     Pulse Temperature Last Period Pulse Oximetry BMI (Body Mass Index)       95 98  F (36.7  C) (Tympanic) 09/12/2016 100% 32.91 kg/m2        Blood Pressure from Last 3 Encounters:   09/04/18 (!) 154/93   06/21/18 125/89   05/30/18 134/80    Weight from Last 3 Encounters:   09/04/18 185 lb 12.8 oz (84.3 kg)   06/21/18 179 lb (81.2 kg)   05/30/18 179 lb 8 oz (81.4 kg)              Today, you had the following     No orders found for display       Primary Care Provider Office Phone # Fax #    Jordyn Jae Loredo [phone] [address] [facility] [address]        Equal Access to Services     [facility]: Hadii aad ku hadasho Soomaali, waaxda luqadaha, qaybta kaalmada adejordy, ariel shafer . So [facility] [phone].    ATENCIÓN: Si habla español, tiene a guy disposición servicios gratuitos de asistencia lingüística. Llame al [phone].    We comply with applicable federal civil rights laws and [place] laws. We do not discriminate on the basis of race, color,  national origin, age, disability, sex, sexual orientation, or gender identity.            Thank you!     Thank you for choosing [facility]  for your care. Our goal is always to provide you with excellent care. Hearing back from our patients is one way we can continue to improve our services. Please take a few minutes to complete the written survey that you may receive in the mail after your visit with us. Thank you!             Your Updated Medication List - Protect others around you: Learn how to safely use, store and throw away your medicines at www.disposemymeds.org.          This list is accurate as of 9/4/18  6:09 PM.  Always use your most recent med list.                   Brand Name Dispense Instructions for use Diagnosis    albuterol 108 (90 Base) MCG/ACT inhaler    PROAIR HFA/PROVENTIL HFA/VENTOLIN HFA    1 Inhaler    Inhale 2 puffs into the lungs every 6 hours as needed for shortness of breath / dyspnea or wheezing        diclofenac 1 % Gel topical gel    VOLTAREN    100 g    Apply 4 g topically 4 times daily    Cervicalgia       loratadine 10 MG tablet    CLARITIN    90 tablet    Take 1 tablet (10 mg) by mouth daily as needed for allergies    Seasonal allergic rhinitis, unspecified chronicity, unspecified trigger       losartan 100 MG tablet    COZAAR    30 tablet    Take 1 tablet (100 mg) by mouth daily    Hypertension goal BP (blood pressure) < 140/90       oxyCODONE IR 15 MG tablet    ROXICODONE    120 tablet    Take 1-2 tablets (15-30 mg) by mouth 3 times daily as needed for moderate to severe pain    Chronic midline low back pain without sciatica, Chronic neck pain

## 2018-09-04 NOTE — ED AVS SNAPSHOT
Emergency Department    [address] [facility] [address]    Phone:  [phone]    Fax:  [phone]                                       Jeff Serra   MRN: [id number]    Department:   Emergency Department   Date of Visit:  9/4/2018           Patient Information     Date Of Birth          1975        Your diagnoses for this visit were:     Upper respiratory tract infection, unspecified type     Costochondritis        You were seen by Sarah Osullivan MD.      Follow-up Information     Schedule an appointment as soon as possible for a visit with Jordyn Loredo MD.    Specialty:  Family Practice    Why:  If symptoms worsen    Contact information:    [address]  [place] [address] [phone]          Discharge Instructions       Heat and/or ice to your chest wall, over-the-counter cough syrup as needed.  Naprosyn 2 times a day with food to help with the inflammation in your rib cartilage.  Follow-up in the clinic later this week if needed.        Viral Upper Respiratory Illness (Adult)  You have a viral upper respiratory illness (URI), which is another term for the common cold. This illness is contagious during the first few days. It is spread through the air by coughing and sneezing. It may also be spread by direct contact (touching the sick person and then touching your own eyes, nose, or mouth). Frequent handwashing will decrease risk of spread. Most viral illnesses go away within 7 to 10 days with rest and simple home remedies. Sometimes the illness may last for several weeks. Antibiotics will not kill a virus, and they are generally not prescribed for this condition.    Home care    If symptoms are severe, rest at home for the first 2 to 3 days. When you resume activity, don't let yourself get too tired.    Avoid being exposed to cigarette smoke (yours or others ).    You may use acetaminophen or ibuprofen to control pain and fever, unless another  medicine was prescribed. If you have chronic liver or kidney disease, have ever had a stomach ulcer or gastrointestinal bleeding, or are taking blood-thinning medicines, talk with your healthcare provider before using these medicines. Aspirin should never be given to anyone under 18 years of age who is ill with a viral infection or fever. It may cause severe liver or brain damage.    Your appetite may be poor, so a light diet is fine. Avoid dehydration by drinking 6 to 8 glasses of fluids per day (water, soft drinks, juices, tea, or soup). Extra fluids will help loosen secretions in the nose and lungs.    Over-the-counter cold medicines will not shorten the length of time you re sick, but they may be helpful for the following symptoms: cough, sore throat, and nasal and sinus congestion. (Note: Do not use decongestants if you have high blood pressure.)  Follow-up care  Follow up with your healthcare provider, or as advised.  When to seek medical advice  Call your healthcare provider right away if any of these occur:    Cough with lots of colored sputum (mucus)    Severe headache; face, neck, or ear pain    Difficulty swallowing due to throat pain    Fever of 100.4 F (38 C) or higher, or as directed by your healthcare provider  Call 911  Call 911 if any of these occur:    Chest pain, shortness of breath, wheezing, or difficulty breathing    Coughing up blood    Inability to swallow due to throat pain  Date Last Reviewed: 9/13/2015 2000-2017 [employer]. [address] [place], [place] [id number]. All rights reserved. This information is not intended as a substitute for professional medical care. Always follow your healthcare professional's instructions.          Chest Wall Pain: Costochondritis    The chest pain that you have had today is caused by costochondritis. This condition is caused by an inflammation of the cartilage joining your ribs to your breastbone. It is not caused by heart or lung  problems. Your healthcare team has made sure that the chest pain you feel is not from a life threatening cause of chest pain such as heart attack, collapsed lung, blood clot in the lung, tear in the aorta, or esophageal rupture. The inflammation may have been brought on by a blow to the chest, lifting heavy objects, intense exercise, or an illness that made you cough and sneeze a lot. It often occurs during times of emotional stress. It can be painful, but it is not dangerous. It usually goes away in 1 to 2 weeks. But it may happen again. Rarely, a more serious condition may cause symptoms similar to costochondritis. That s why it s important to watch for the warning signs listed below.  Home care  Follow these guidelines when caring for yourself at home:    If you feel that emotional stress is a cause of your condition, try to figure out the sources of that stress. It may not be obvious. Learn ways to deal with the stress in your life. This can include regular exercise, muscle relaxation, meditation, or simply taking time out for yourself.    You may use acetaminophen, ibuprofen, or naproxen to control pain, unless another pain medicine was prescribed. If you have liver or kidney disease or ever had a stomach ulcer, talk with your healthcare provider before using these medicines.    You can also help ease pain by using a hot, wet compress or heating pad. Use this with or without a medicated skin cream that helps relieves pain.    Do stretching exercise as advised by your provider.    Take any prescribed medicines as directed.  Follow-up care  Follow up with your healthcare provider, or as advised, if you do not start to get better in the next 2 days.  When to seek medical advice  Call your healthcare provider right away if any of these occur:    A change in the type of pain. Call if it feels different, becomes more serious, lasts longer, or spreads into your shoulder, arm, neck, jaw, or back.    Shortness of breath  or pain gets worse when you breathe    Weakness, dizziness, or fainting    Cough with dark-colored sputum (phlegm) or blood    Abdominal pain    Dark red or black stools    Fever of 100.4 F (38 C) or higher, or as directed by your healthcare provider  Date Last Reviewed: 12/1/2016 2000-2017 [employer]. [address] [place] [address]. All rights reserved. This information is not intended as a substitute for professional medical care. Always follow your healthcare professional's instructions.          Your next 10 appointments already scheduled     Sep 21, 2018  3:20 PM CDT   Office Visit with Jordyn Loredo MD   [facility]    [address] [place] [id number] [phone]           Bring a current list of meds and any records pertaining to this visit. For Physicals, please bring immunization records and any forms needing to be filled out. Please arrive 10 minutes early to complete paperwork.              24 Hour Appointment Hotline       To make an appointment at any [facility], call [phone] ([phone]). If you don't have a family doctor or clinic, we will help you find one. [facility] clinics are conveniently located to serve the needs of you and your family.             Review of your medicines      START taking        Dose / Directions Last dose taken    naproxen 500 MG tablet   Commonly known as:  NAPROSYN   Dose:  500 mg   Quantity:  20 tablet        Take 1 tablet (500 mg) by mouth 2 times daily (with meals)   Refills:  0          Our records show that you are taking the medicines listed below. If these are incorrect, please call your family doctor or clinic.        Dose / Directions Last dose taken    albuterol 108 (90 Base) MCG/ACT inhaler   Commonly known as:  PROAIR HFA/PROVENTIL HFA/VENTOLIN HFA   Dose:  2 puff   Quantity:  1 Inhaler        Inhale 2 puffs into the lungs every 6 hours as  needed for shortness of breath / dyspnea or wheezing   Refills:  0        diclofenac 1 % Gel topical gel   Commonly known as:  VOLTAREN   Dose:  4 g   Quantity:  100 g        Apply 4 g topically 4 times daily   Refills:  1        loratadine 10 MG tablet   Commonly known as:  CLARITIN   Dose:  10 mg   Quantity:  90 tablet        Take 1 tablet (10 mg) by mouth daily as needed for allergies   Refills:  3        oxyCODONE IR 15 MG tablet   Commonly known as:  ROXICODONE   Dose:  15-30 mg   Quantity:  120 tablet        Take 1-2 tablets (15-30 mg) by mouth 3 times daily as needed for moderate to severe pain   Refills:  0                Prescriptions were sent or printed at these locations (1 Prescription)                   Other Prescriptions                Printed at Department/Unit printer (1 of 1)         naproxen (NAPROSYN) 500 MG tablet                Procedures and tests performed during your visit     Chest XR,  PA & LAT    EKG 12-lead, tracing only      Orders Needing Specimen Collection     None      Pending Results     Date and Time Order Name Status Description    9/4/2018 1950 Chest XR,  PA & LAT Preliminary     9/4/2018 1810 EKG 12-LEAD, TRACING ONLY Preliminary             Pending Culture Results     No orders found from 9/2/2018 to 9/5/2018.            Pending Results Instructions     If you had any lab results that were not finalized at the time of your Discharge, you can call the ED Lab Result RN at [phone]. You will be contacted by this team for any positive Lab results or changes in treatment. The nurses are available 7 days a week from 10A to 6:30P.  You can leave a message 24 hours per day and they will return your call.        Test Results From Your Hospital Stay        9/4/2018  8:19 PM      Narrative     XR CHEST TWO VIEWS  9/4/2018 8:11 PM     COMPARISON: None.    HISTORY: Cough, reported fevers.    FINDINGS: The cardiac silhouette, pulmonary vasculature, lungs and  pleural spaces are within  normal limits.        Impression     IMPRESSION: Clear lungs.                Clinical Quality Measure: Blood Pressure Screening     Your blood pressure was checked while you were in the emergency department today. The last reading we obtained was  BP: (!) 147/97 . Please read the guidelines below about what these numbers mean and what you should do about them.  If your systolic blood pressure (the top number) is less than 120 and your diastolic blood pressure (the bottom number) is less than 80, then your blood pressure is normal. There is nothing more that you need to do about it.  If your systolic blood pressure (the top number) is 120-139 or your diastolic blood pressure (the bottom number) is 80-89, your blood pressure may be higher than it should be. You should have your blood pressure rechecked within a year by a primary care provider.  If your systolic blood pressure (the top number) is 140 or greater or your diastolic blood pressure (the bottom number) is 90 or greater, you may have high blood pressure. High blood pressure is treatable, but if left untreated over time it can put you at risk for heart attack, stroke, or kidney failure. You should have your blood pressure rechecked by a primary care provider within the next 4 weeks.  If your provider in the emergency department today gave you specific instructions to follow-up with your doctor or provider even sooner than that, you should follow that instruction and not wait for up to 4 weeks for your follow-up visit.        Thank you for choosing [facility]       Thank you for choosing [facility] for your care. Our goal is always to provide you with excellent care. Hearing back from our patients is one way we can continue to improve our services. Please take a few minutes to complete the written survey that you may receive in the mail after you visit with us. Thank you!        [employer] Information     [employer] gives you secure access to your electronic health  record. If you see a primary care provider, you can also send messages to your care team and make appointments. If you have questions, please call your primary care clinic.  If you do not have a primary care provider, please call [phone] and they will assist you.        Care EveryWhere ID     This is your Care EveryWhere ID. This could be used by other organizations to access your [place] medical records  [id number]        Equal Access to Services     KHADAR BAEZ : Sari Marcos, lesli casas, leonie brian, ariel marie. So [facility] [phone].    ATENCIÓN: Si habla español, tiene a guy disposición servicios gratuitos de asistencia lingüística. Llame al [phone].    We comply with applicable federal civil rights laws and [place] laws. We do not discriminate on the basis of race, color, national origin, age, disability, sex, sexual orientation, or gender identity.            After Visit Summary       This is your record. Keep this with you and show to your community pharmacist(s) and doctor(s) at your next visit.

## 2018-09-04 NOTE — ED AVS SNAPSHOT
Emergency Department    [address] [place] [address]    Phone:  [phone]    Fax:  [phone]                                       Jeff Serra   MRN: [id number]    Department:   Emergency Department   Date of Visit:  9/4/2018           After Visit Summary Signature Page     I have received my discharge instructions, and my questions have been answered. I have discussed any challenges I see with this plan with the nurse or doctor.    ..........................................................................................................................................  Patient/Patient Representative Signature      ..........................................................................................................................................  Patient Representative Print Name and Relationship to Patient    ..................................................               ................................................  Date                                            Time    ..........................................................................................................................................  Reviewed by Signature/Title    ...................................................              ..............................................  Date                                                            Time          22EPIC Rev 08/18

## 2018-09-05 NOTE — DISCHARGE INSTRUCTIONS
Heat and/or ice to your chest wall, over-the-counter cough syrup as needed.  Naprosyn 2 times a day with food to help with the inflammation in your rib cartilage.  Follow-up in the clinic later this week if needed.        Viral Upper Respiratory Illness (Adult)  You have a viral upper respiratory illness (URI), which is another term for the common cold. This illness is contagious during the first few days. It is spread through the air by coughing and sneezing. It may also be spread by direct contact (touching the sick person and then touching your own eyes, nose, or mouth). Frequent handwashing will decrease risk of spread. Most viral illnesses go away within 7 to 10 days with rest and simple home remedies. Sometimes the illness may last for several weeks. Antibiotics will not kill a virus, and they are generally not prescribed for this condition.    Home care    If symptoms are severe, rest at home for the first 2 to 3 days. When you resume activity, don't let yourself get too tired.    Avoid being exposed to cigarette smoke (yours or others ).    You may use acetaminophen or ibuprofen to control pain and fever, unless another medicine was prescribed. If you have chronic liver or kidney disease, have ever had a stomach ulcer or gastrointestinal bleeding, or are taking blood-thinning medicines, talk with your healthcare provider before using these medicines. Aspirin should never be given to anyone under 18 years of age who is ill with a viral infection or fever. It may cause severe liver or brain damage.    Your appetite may be poor, so a light diet is fine. Avoid dehydration by drinking 6 to 8 glasses of fluids per day (water, soft drinks, juices, tea, or soup). Extra fluids will help loosen secretions in the nose and lungs.    Over-the-counter cold medicines will not shorten the length of time you re sick, but they may be helpful for the following symptoms: cough, sore throat, and nasal and sinus congestion.  (Note: Do not use decongestants if you have high blood pressure.)  Follow-up care  Follow up with your healthcare provider, or as advised.  When to seek medical advice  Call your healthcare provider right away if any of these occur:    Cough with lots of colored sputum (mucus)    Severe headache; face, neck, or ear pain    Difficulty swallowing due to throat pain    Fever of 100.4 F (38 C) or higher, or as directed by your healthcare provider  Call 911  Call 911 if any of these occur:    Chest pain, shortness of breath, wheezing, or difficulty breathing    Coughing up blood    Inability to swallow due to throat pain  Date Last Reviewed: 9/13/2015 2000-2017 [employer]. [address] [place] [address], [place] [address]. All rights reserved. This information is not intended as a substitute for professional medical care. Always follow your healthcare professional's instructions.          Chest Wall Pain: Costochondritis    The chest pain that you have had today is caused by costochondritis. This condition is caused by an inflammation of the cartilage joining your ribs to your breastbone. It is not caused by heart or lung problems. Your healthcare team has made sure that the chest pain you feel is not from a life threatening cause of chest pain such as heart attack, collapsed lung, blood clot in the lung, tear in the aorta, or esophageal rupture. The inflammation may have been brought on by a blow to the chest, lifting heavy objects, intense exercise, or an illness that made you cough and sneeze a lot. It often occurs during times of emotional stress. It can be painful, but it is not dangerous. It usually goes away in 1 to 2 weeks. But it may happen again. Rarely, a more serious condition may cause symptoms similar to costochondritis. That s why it s important to watch for the warning signs listed below.  Home care  Follow these guidelines when caring for yourself at home:    If you feel that emotional stress  is a cause of your condition, try to figure out the sources of that stress. It may not be obvious. Learn ways to deal with the stress in your life. This can include regular exercise, muscle relaxation, meditation, or simply taking time out for yourself.    You may use acetaminophen, ibuprofen, or naproxen to control pain, unless another pain medicine was prescribed. If you have liver or kidney disease or ever had a stomach ulcer, talk with your healthcare provider before using these medicines.    You can also help ease pain by using a hot, wet compress or heating pad. Use this with or without a medicated skin cream that helps relieves pain.    Do stretching exercise as advised by your provider.    Take any prescribed medicines as directed.  Follow-up care  Follow up with your healthcare provider, or as advised, if you do not start to get better in the next 2 days.  When to seek medical advice  Call your healthcare provider right away if any of these occur:    A change in the type of pain. Call if it feels different, becomes more serious, lasts longer, or spreads into your shoulder, arm, neck, jaw, or back.    Shortness of breath or pain gets worse when you breathe    Weakness, dizziness, or fainting    Cough with dark-colored sputum (phlegm) or blood    Abdominal pain    Dark red or black stools    Fever of 100.4 F (38 C) or higher, or as directed by your healthcare provider  Date Last Reviewed: 12/1/2016 2000-2017 [employer]. [address] [place] [address], [place] [address]. All rights reserved. This information is not intended as a substitute for professional medical care. Always follow your healthcare professional's instructions.

## 2018-09-05 NOTE — ED PROVIDER NOTES
"  History     Chief Complaint:  Chest Pain      HPI   Jeff Serra is a 43 year old female, with a history of pulmonary embolism and hypertension, who presents with chest pain. Patient states she began having pain about four days ago. She has also been experiencing cough with no phlegm as well as fevers for the past two days of around 101. She describes the pain as chest tightness. Patient does not smoke. She has been taking Nyquil and she took two 400 mg Ibuprofen this morning and states it did not relieve her pain.  Of note, patient has a chronic pain contract.            Allergies:  No Known Drug Allergies    Medications:    Albuterol  Voltaren  Claritin   Oxycodone     Past Medical History:    Allergic rhinitis   Hypokalemia   Chronic pain syndrome   Renal calculi   Dysmenorrhea   Excessive or frequent menstruation   Continuous opoid dependence   Chronic low back pain  Chronic neck pain  Continuous opioid dependence   Hypertension   Kidney stone  Pulmonary embolism   Right leg DVT    Past Surgical History:    Hysterectomy   Laparoscopy   Laparotomy mini, tubal ligation  Lithotripsy     Family History:    Heart disease    Social History:  Smoking Status: No  Smokeless Tobacco: No  Alcohol Use: No  Marital Status:   [2]    Review of Systems   Respiratory: Positive for cough.    Cardiovascular: Positive for chest pain.   All other systems reviewed and are negative.    Physical Exam   Vitals:  Patient Vitals for the past 24 hrs:   BP Temp Temp src Pulse Heart Rate Resp SpO2 Height Weight   09/04/18 2046 (!) 147/97 - - - 95 - 99 % - -   09/04/18 2030 (!) 147/97 - - - - - 100 % - -   09/04/18 2016 - - - - - - 100 % - -   09/04/18 2015 (!) 154/96 - - - - - - - -   09/04/18 1924 (!) 183/104 98.4  F (36.9  C) Temporal 90 - 16 100 % 1.6 m (5' 3\") 83.9 kg (185 lb)           Physical Exam  Nursing note and vitals reviewed.    Constitutional:  Appears well-developed and well-nourished, comfortable.    HENT:   "   Nose normal.  No discharge.      Oropharynx is clear and moist.  Lymph:  No enlarged or tender cervical or submandibular lymph nodes.   Cardiovascular:  Normal rate, regular rhythm with normal S1 and S2.      Normal heart sounds and peripheral pulses 2+ and equal.       No murmur or rocco.  Pulmonary:  Effort normal and breath sounds clear to auscultation bilaterally       No respiratory distress.  No stridor.     No wheezes. No rales.     GI:    Soft. No distension and no mass. No tenderness.      No rebound and no guarding. No flank pain.  No HSM.  Musculoskeletal:  Normal range of motion. No extremity deformity.  No calf tenderness.     No edema.  No cyanosis. Lower bilateral costochondral tenderness which    reproduces her pain.    Neurological:   Alert and oriented. No cranial nerve deficit, no facial droop.   Skin:    Skin is warm and dry. No rash noted. No diaphoresis.      No erythema. No pallor.  No lesions.  Psychiatric:   Behavior is normal. Appropriate mood and affect.     Judgment and thought content normal.     Emergency Department Course     ECG:  ECG taken at 1928, ECG read at 1945  Normal sinus rhythm with sinus arrhythmia   Normal ECG  Rate 83 bpm. MN interval 128. QRS duration 80. QT/QTc 398/467. P-R-T axes 38, 5, 1.  No significant changes compared EKG dated 06/21/18    Imaging:  Radiology findings were communicated with the patient who voiced understanding of the findings.  Chest XR, PA, & LAT  IMPRESSION: Clear lungs.  Reading per radiology.    Interventions:  2013 Naproxen 500 mg Oral    Emergency Department Course:  Nursing notes and vitals reviewed.  I performed an exam of the patient as documented above.     2040 check in with the patient.    I personally answered all related questions prior to discharge.    Findings and plan explained to the patient. Patient discharged home with instructions regarding supportive care, medications, and reasons to return. The importance of close follow-up  was reviewed.     Impression & Plan      Medical Decision Making:  Patient comes in with some anterior chest pain which is pleuritic. History of PE but it was years ago after surgery. Her vital signs do not suggest PE, she has been coughing and reports of a low-grade fever at home. I did a chest x-ray which was normal. She was given 500 mg of oral Naprosyn here for the pain. I suspect that this is costochondritis secondary to her coughing.  It is reproducible on palpation. She will go home on Naprosyn and follow-up in the clinic as needed. Her EKG was unremarkable as well. There is no findings to suggest PE coronary disease or dissection.    Diagnosis:    ICD-10-CM    1. Upper respiratory tract infection, unspecified type J06.9    2. Costochondritis M94.0        Disposition:   Discharged    Discharged Instructions:   Heat and/or ice to your chest wall, over-the-counter cough syrup as needed. Naprosyn 2 times a day with food to help with the inflammation in your rib cartilage. Follow-up in the clinic later this week if needed.    CMS Diagnoses: None     Discharge Medications:  Discharge Medication List as of 9/4/2018  8:42 PM      START taking these medications    Details   naproxen (NAPROSYN) 500 MG tablet Take 1 tablet (500 mg) by mouth 2 times daily (with meals), Disp-20 tablet, R-0, Local Print           Scribe Disclosure:  I, Paula Paul, am serving as a scribe at 7:46 PM on 9/4/2018 to document services personally performed by Sarah Osullivan MD, based on my observations and the provider's statements to me.   EMERGENCY DEPARTMENT       Sarah Osullivan MD  09/05/18 0104

## 2018-09-07 ENCOUNTER — MYC REFILL (OUTPATIENT)
Dept: FAMILY MEDICINE | Facility: CLINIC | Age: 43
End: 2018-09-07

## 2018-09-07 ENCOUNTER — MYC MEDICAL ADVICE (OUTPATIENT)
Dept: FAMILY MEDICINE | Facility: CLINIC | Age: 43
End: 2018-09-07

## 2018-09-07 DIAGNOSIS — M54.50 CHRONIC MIDLINE LOW BACK PAIN WITHOUT SCIATICA: ICD-10-CM

## 2018-09-07 DIAGNOSIS — G89.29 CHRONIC NECK PAIN: ICD-10-CM

## 2018-09-07 DIAGNOSIS — G89.29 CHRONIC MIDLINE LOW BACK PAIN WITHOUT SCIATICA: ICD-10-CM

## 2018-09-07 DIAGNOSIS — M54.2 CHRONIC NECK PAIN: ICD-10-CM

## 2018-09-07 DIAGNOSIS — G89.4 CHRONIC PAIN SYNDROME: ICD-10-CM

## 2018-09-07 RX ORDER — OXYCODONE HYDROCHLORIDE 15 MG/1
15 TABLET ORAL 3 TIMES DAILY PRN
Qty: 90 TABLET | Refills: 0 | Status: SHIPPED | OUTPATIENT
Start: 2018-09-07 | End: 2018-09-21

## 2018-09-07 RX ORDER — OXYCODONE HYDROCHLORIDE 15 MG/1
15-30 TABLET ORAL 3 TIMES DAILY PRN
Qty: 120 TABLET | Refills: 0 | Status: CANCELLED | OUTPATIENT
Start: 2018-09-07

## 2018-09-07 NOTE — TELEPHONE ENCOUNTER
Requested Prescriptions   Pending Prescriptions Disp Refills     oxyCODONE IR (ROXICODONE) 15 MG tablet  Last Written Prescription Date:  8/8/18  Last Fill Quantity: 120 tablet,  # refills: 0   Last office visit: 4/13/2018 with prescribing provider:  Dr. Mccartney   Future Office Visit:   Next 5 appointments (look out 90 days)     Sep 21, 2018  3:20 PM CDT   Office Visit with Jordyn [facility]o MD   [facility]    [address] [place] [id number] [phone]                Routing refill request to provider for review/approval because:  Drug not on the [facility], P or  Health refill protocol or controlled substance   120 tablet 0     Sig: Take 1-2 tablets (15-30 mg) by mouth 3 times daily as needed for moderate to severe pain    There is no refill protocol information for this order

## 2018-09-07 NOTE — TELEPHONE ENCOUNTER
Message from [employer]:  Original authorizing provider: MD Jeff Goodrich would like a refill of the following medications:  oxyCODONE IR (ROXICODONE) 15 MG tablet [Carmenza Mccartney MD]    Preferred pharmacy: WRITTEN PRESCRIPTION REQUESTED    Comment:

## 2018-09-07 NOTE — TELEPHONE ENCOUNTER
Message from [employer]:  Original authorizing provider: MD Jeff Goodrich would like a refill of the following medications:  oxyCODONE IR (ROXICODONE) 15 MG tablet [Carmenza Mccartney MD]    Preferred pharmacy: WRITTEN PRESCRIPTION REQUESTED    Comment:

## 2018-09-07 NOTE — TELEPHONE ENCOUNTER
Requested Prescriptions   Pending Prescriptions Disp Refills     oxyCODONE IR (ROXICODONE) 15 MG tablet    Routing refill request to provider for review/approval because:  Drug not on the [facility], [facility] or [facility] refill protocol or controlled substance  Last Written Prescription Date:  8/8/18  Last Fill Quantity: 120 tablet,  # refills: 0   Last office visit: 4/13/2018 with prescribing provider:  Dr. Mccartney   Future Office Visit:   Next 5 appointments (look out 90 days)     Sep 21, 2018  3:20 PM CDT   Office Visit with Jordyn Loredo MD   [facility]    [address] [place] [id number] [phone]                  120 tablet 0     Sig: Take 1-2 tablets (15-30 mg) by mouth 3 times daily as needed for moderate to severe pain    There is no refill protocol information for this order

## 2018-09-21 ENCOUNTER — OFFICE VISIT (OUTPATIENT)
Dept: FAMILY MEDICINE | Facility: CLINIC | Age: 43
End: 2018-09-21
Payer: COMMERCIAL

## 2018-09-21 VITALS
BODY MASS INDEX: 33.13 KG/M2 | TEMPERATURE: 98.2 F | WEIGHT: 187 LBS | HEIGHT: 63 IN | OXYGEN SATURATION: 100 % | RESPIRATION RATE: 107 BRPM | DIASTOLIC BLOOD PRESSURE: 119 MMHG | SYSTOLIC BLOOD PRESSURE: 164 MMHG

## 2018-09-21 DIAGNOSIS — M54.50 CHRONIC MIDLINE LOW BACK PAIN WITHOUT SCIATICA: ICD-10-CM

## 2018-09-21 DIAGNOSIS — M54.2 CHRONIC NECK PAIN: ICD-10-CM

## 2018-09-21 DIAGNOSIS — G89.29 CHRONIC NECK PAIN: ICD-10-CM

## 2018-09-21 DIAGNOSIS — G89.29 CHRONIC MIDLINE LOW BACK PAIN WITHOUT SCIATICA: ICD-10-CM

## 2018-09-21 DIAGNOSIS — M79.672 BILATERAL FOOT PAIN: ICD-10-CM

## 2018-09-21 DIAGNOSIS — J30.2 SEASONAL ALLERGIC RHINITIS, UNSPECIFIED CHRONICITY, UNSPECIFIED TRIGGER: ICD-10-CM

## 2018-09-21 DIAGNOSIS — I10 HYPERTENSION GOAL BP (BLOOD PRESSURE) < 140/90: Primary | ICD-10-CM

## 2018-09-21 DIAGNOSIS — M79.671 BILATERAL FOOT PAIN: ICD-10-CM

## 2018-09-21 DIAGNOSIS — Z23 NEED FOR PROPHYLACTIC VACCINATION AND INOCULATION AGAINST INFLUENZA: ICD-10-CM

## 2018-09-21 PROCEDURE — 90686 IIV4 VACC NO PRSV 0.5 ML IM: CPT | Performed by: FAMILY MEDICINE

## 2018-09-21 PROCEDURE — 99214 OFFICE O/P EST MOD 30 MIN: CPT | Mod: 25 | Performed by: FAMILY MEDICINE

## 2018-09-21 PROCEDURE — 90471 IMMUNIZATION ADMIN: CPT | Performed by: FAMILY MEDICINE

## 2018-09-21 RX ORDER — LORATADINE 10 MG/1
10 TABLET ORAL DAILY PRN
Qty: 90 TABLET | Refills: 3 | Status: SHIPPED | OUTPATIENT
Start: 2018-09-21 | End: 2020-04-08

## 2018-09-21 RX ORDER — AMLODIPINE BESYLATE 5 MG/1
5 TABLET ORAL DAILY
Qty: 30 TABLET | Refills: 0 | Status: SHIPPED | OUTPATIENT
Start: 2018-09-21 | End: 2018-10-19

## 2018-09-21 RX ORDER — OXYCODONE HYDROCHLORIDE 15 MG/1
15 TABLET ORAL 3 TIMES DAILY PRN
Qty: 90 TABLET | Refills: 0 | Status: SHIPPED | OUTPATIENT
Start: 2018-10-05 | End: 2018-10-24

## 2018-09-21 NOTE — MR AVS SNAPSHOT
After Visit Summary   9/21/2018    Jeff Serra    MRN: [id number]           Patient Information     Date Of Birth          1975        Visit Information        Provider Department      9/21/2018 3:20 PM Jordyn Loredo MD [facility]        Today's Diagnoses     Hypertension goal BP (blood pressure) < 140/90    -  1    Bilateral foot pain        Chronic midline low back pain without sciatica        Chronic neck pain        Seasonal allergic rhinitis, unspecified chronicity, unspecified trigger        Need for prophylactic vaccination and inoculation against influenza          Care Instructions    BP:    - start  1 tablet 5 mg daily  - Schedule a blood pressure recheck with 1 of our nurses in 2-4 weeks (before the prescription runs out) and we can decide whether to continue the same dosage or make some adjustments      Feet:    - Let us try a dose of piroxicam at bedtime for a week or 2 and see if you wake up feeling better          Follow-ups after your visit        Follow-up notes from your care team     Return in about 3 weeks (around 10/12/2018) for BP Recheck.      Who to contact     If you have questions or need follow up information about today's clinic visit or your schedule please contact [facility] directly at [phone].  Normal or non-critical lab and imaging results will be communicated to you by MyChart, letter or phone within 4 business days after the clinic has received the results. If you do not hear from us within 7 days, please contact the clinic through [employer] or phone. If you have a critical or abnormal lab result, we will notify you by phone as soon as possible.  Submit refill requests through [employer] or call your pharmacy and they will forward the refill request to us. Please allow 3 business days for your refill to be completed.          Additional Information About Your Visit        MyChart Information     [employer] gives you  "secure access to your electronic health record. If you see a primary care provider, you can also send messages to your care team and make appointments. If you have questions, please call your primary care clinic.  If you do not have a primary care provider, please call [phone] and they will assist you.        Care EveryWhere ID     This is your Care EveryWhere ID. This could be used by other organizations to access your [facility] medical records  [id number]        Your Vitals Were     Temperature Respirations Height Last Period Pulse Oximetry BMI (Body Mass Index)    98.2  F (36.8  C) (Oral) 107 1.6 m (5' 3\") 09/12/2016 100% 33.13 kg/m2       Blood Pressure from Last 3 Encounters:   09/21/18 (!) 164/119   09/04/18 (!) 147/97   09/04/18 (!) 154/93    Weight from Last 3 Encounters:   09/21/18 84.8 kg (187 lb)   09/04/18 83.9 kg (185 lb)   09/04/18 84.3 kg (185 lb 12.8 oz)              We Performed the Following          ADMIN VACCINE, FIRST [30969]     HC FLU VAC PRESRV FREE QUAD SPLIT VIR 3+YRS IM  [49023]          Today's Medication Changes          These changes are accurate as of 9/21/18  3:51 PM.  If you have any questions, ask your nurse or doctor.               Start taking these medicines.        Dose/Directions    amLODIPine 5 MG tablet   Commonly known as:  NORVASC   Used for:  Hypertension goal BP (blood pressure) < 140/90   Started by:  Jordyn Loredo MD        Dose:  5 mg   Take 1 tablet (5 mg) by mouth daily   Quantity:  30 tablet   Refills:  0       piroxicam 20 MG capsule   Commonly known as:  FELDENE   Used for:  Bilateral foot pain   Started by:  Jodryn Loredo MD        Dose:  20 mg   Take 1 capsule (20 mg) by mouth At Bedtime   Quantity:  30 capsule   Refills:  0         These medicines have changed or have updated prescriptions.        Dose/Directions    oxyCODONE IR 15 MG tablet   Commonly known as:  ROXICODONE   This may have changed:  These instructions start on " 10/5/2018. If you are unsure what to do until then, ask your doctor or other care provider.   Used for:  Chronic midline low back pain without sciatica, Chronic neck pain   Changed by:  Jordyn Loredo MD        Dose:  15 mg   Start taking on:  10/5/2018   Take 1 tablet (15 mg) by mouth 3 times daily as needed for moderate to severe pain   Quantity:  90 tablet   Refills:  0         Stop taking these medicines if you haven't already. Please contact your care team if you have questions.     naproxen 500 MG tablet   Commonly known as:  NAPROSYN   Stopped by:  Jordyn Loredo MD                Where to get your medicines      These medications were sent to [facility] [address] [facility] [address] [facility] [address] [address] [facility], [facility] [address]     Phone:  [phone]     amLODIPine 5 MG tablet    loratadine 10 MG tablet    piroxicam 20 MG capsule         Some of these will need a paper prescription and others can be bought over the counter.  Ask your nurse if you have questions.     Bring a paper prescription for each of these medications     oxyCODONE IR 15 MG tablet               Information about OPIOIDS     PRESCRIPTION OPIOIDS: WHAT YOU NEED TO KNOW   We gave you an opioid (narcotic) pain medicine. It is important to manage your pain, but opioids are not always the best choice. You should first try all the other options your care team gave you. Take this medicine for as short a time (and as few doses) as possible.    Some activities can increase your pain, such as bandage changes or therapy sessions. It may help to take your pain medicine 30 to 60 minutes before these activities. Reduce your stress by getting enough sleep, working on hobbies you enjoy and practicing relaxation or meditation. Talk to your care team about ways to manage your pain beyond prescription opioids.    These medicines have risks:    DO NOT drive when on new or higher  doses of pain medicine. These medicines can affect your alertness and reaction times, and you could be arrested for driving under the influence (DUI). If you need to use opioids long-term, talk to your care team about driving.    DO NOT operate heavy machinery    DO NOT do any other dangerous activities while taking these medicines.    DO NOT drink any alcohol while taking these medicines.     If the opioid prescribed includes acetaminophen, DO NOT take with any other medicines that contain acetaminophen. Read all labels carefully. Look for the word  acetaminophen  or  Tylenol.  Ask your pharmacist if you have questions or are unsure.    You can get addicted to pain medicines, especially if you have a history of addiction (chemical, alcohol or substance dependence). Talk to your care team about ways to reduce this risk.    All opioids tend to cause constipation. Drink plenty of water and eat foods that have a lot of fiber, such as fruits, vegetables, prune juice, apple juice and high-fiber cereal. Take a laxative (Miralax, milk of magnesia, Colace, Senna) if you don t move your bowels at least every other day. Other side effects include upset stomach, sleepiness, dizziness, throwing up, tolerance (needing more of the medicine to have the same effect), physical dependence and slowed breathing.    Store your pills in a secure place, locked if possible. We will not replace any lost or stolen medicine. If you don t finish your medicine, please throw away (dispose) as directed by your pharmacist. The [place] [employer]ution [employer] has more information about safe disposal: [email or web address]         Primary Care Provider Office Phone # Fax #    Jordyn Loredo [phone] [address] [facility] [address]        Equal Access to Services     KHADAR BAEZ AH: lesli Wesley qaybta kaalmada  ariel brianfinn bacaaan ah. So [facility] [phone].    ATENCIÓN: Si rosala carolyne, tiene a guy disposición servicios gratuitos de asistencia lingüística. Carlos al [phone].    We comply with applicable federal civil rights laws and [place] laws. We do not discriminate on the basis of race, color, national origin, age, disability, sex, sexual orientation, or gender identity.            Thank you!     Thank you for choosing [facility]  for your care. Our goal is always to provide you with excellent care. Hearing back from our patients is one way we can continue to improve our services. Please take a few minutes to complete the written survey that you may receive in the mail after your visit with us. Thank you!             Your Updated Medication List - Protect others around you: Learn how to safely use, store and throw away your medicines at www.disposemymeds.org.          This list is accurate as of 9/21/18  3:51 PM.  Always use your most recent med list.                   Brand Name Dispense Instructions for use Diagnosis    albuterol 108 (90 Base) MCG/ACT inhaler    PROAIR HFA/PROVENTIL HFA/VENTOLIN HFA    1 Inhaler    Inhale 2 puffs into the lungs every 6 hours as needed for shortness of breath / dyspnea or wheezing        amLODIPine 5 MG tablet    NORVASC    30 tablet    Take 1 tablet (5 mg) by mouth daily    Hypertension goal BP (blood pressure) < 140/90       diclofenac 1 % Gel topical gel    VOLTAREN    100 g    Apply 4 g topically 4 times daily    Cervicalgia       loratadine 10 MG tablet    CLARITIN    90 tablet    Take 1 tablet (10 mg) by mouth daily as needed for allergies    Seasonal allergic rhinitis, unspecified chronicity, unspecified trigger       oxyCODONE IR 15 MG tablet   Start taking on:  10/5/2018    ROXICODONE    90 tablet    Take 1 tablet (15 mg) by mouth 3 times daily as needed for moderate to severe pain    Chronic midline low back pain without  sciatica, Chronic neck pain       piroxicam 20 MG capsule    FELDENE    30 capsule    Take 1 capsule (20 mg) by mouth At Bedtime    Bilateral foot pain

## 2018-09-21 NOTE — PATIENT INSTRUCTIONS
BP:    - start  1 tablet 5 mg daily  - Schedule a blood pressure recheck with 1 of our nurses in 2-4 weeks (before the prescription runs out) and we can decide whether to continue the same dosage or make some adjustments      Feet:    - Let us try a dose of piroxicam at bedtime for a week or 2 and see if you wake up feeling better

## 2018-09-21 NOTE — PROGRESS NOTES
"  SUBJECTIVE:   Jeff Serra is a 43 year old female who presents to clinic today for the following health issues:      Hypertension Follow-up      Outpatient blood pressures are being checked at home.  Results are 150/100.    Low Salt Diet: no added salt      Amount of exercise or physical activity: None    Problems taking medications regularly: No    Medication side effects: none    Diet: regular (no restrictions)    SUBJECTIVE:  Here today to discuss blood pressure as above.  The patient has a history of hypertension was previously on lisinopril and/or losartan but it stopped these over a year ago.  However blood pressures have been consistently running high even in other locations and the patient is now concerned enough to want to talk about.  She denies any symptoms related to the blood pressure such as headache, visual changes, cardiac symptoms, etc.  It has been a bit since we last checked her cholesterol panel but was not particularly high and she does not carry a diagnosis of diabetes or early coronary disease.    Most notably we have dealt with some chronic neck and back pain and it kept a close eye on her use of pain medication regarding this.  She is now having trouble with bilateral foot pain, left greater than right, especially in the mornings.  Her feet feel very stiff.  Not particularly in the heel or the toes or any location, the entire feet basically.  She is concerned may represent something going on with her nerves were related to her blood pressure.    Review of systems otherwise negative.  Past medical, family, and social history reviewed and updated in chart.    OBJECTIVE:  BP (!) 164/119 (BP Location: Left arm, Patient Position: Sitting, Cuff Size: Adult Large)  Temp 98.2  F (36.8  C) (Oral)  Resp (!) 107  Ht 1.6 m (5' 3\")  Wt 84.8 kg (187 lb)  LMP 09/12/2016  SpO2 100%  BMI 33.13 kg/m2  Alert, pleasant, upbeat, and in no apparent discomfort.  S1 and S2 normal, no murmurs, " clicks, gallops or rubs. Regular rate and rhythm. Chest is clear; no wheezes or rales. No edema or JVD.  Normal peripheral pulses  No specific tenderness over the medial plantar tubercles  Past labs reviewed with the patient.     ASSESSMENT / PLAN:  (I10) Hypertension goal BP (blood pressure) < 140/90  (primary encounter diagnosis)  Comment: Tend to get her back on therapy for hypertension and I think a good choice to be a calcium channel blocker starting 5 mg of amlodipine.    Plan: amLODIPine (NORVASC) 5 MG tablet        Start medication return for blood pressure recheck before the month is up for adjustments or continuation    (M79.671,  M79.672) Bilateral foot pain  Comment: at bedtime.  I think this is more of a mechanical foot pain, not related to progression of lumbar disease  Plan: piroxicam (FELDENE) 20 MG capsule            (M54.5,  G89.29) Chronic midline low back pain without sciatica  Comment: Stable and monitored.  Refilled  Plan: oxyCODONE IR (ROXICODONE) 15 MG tablet            (M54.2,  G89.29) Chronic neck pain  Comment:   Plan: oxyCODONE IR (ROXICODONE) 15 MG tablet            (J30.2) Seasonal allergic rhinitis, unspecified chronicity, unspecified trigger  Comment: Stable.  Refilled.  Plan: loratadine (CLARITIN) 10 MG tablet            (Z23) Need for prophylactic vaccination and inoculation against influenza  Comment:   Plan: HC FLU VAC PRESRV FREE QUAD SPLIT VIR 3+YRS IM         [14068],      ADMIN VACCINE, FIRST [99652]            Follow up within 1 month for blood pressure recheck  SKelly Loredo MD    (Chart documentation completed in part with Dragon voice-recognition software.  Even though reviewed some grammatical, spelling, and word errors may remain.)       Injectable Influenza Immunization Documentation    1.  Is the person to be vaccinated sick today?   No    2. Does the person to be vaccinated have an allergy to a component   of the vaccine?   No  Egg Allergy Algorithm Link    3.  Has the person to be vaccinated ever had a serious reaction   to influenza vaccine in the past?   No    4. Has the person to be vaccinated ever had Guillain-Barré syndrome?   No    Form completed by BRADLY, MEDICAL ASSISTANT

## 2018-10-03 ENCOUNTER — HOSPITAL ENCOUNTER (EMERGENCY)
Facility: CLINIC | Age: 43
Discharge: HOME | End: 2018-10-03
Attending: FAMILY MEDICINE | Admitting: FAMILY MEDICINE
Payer: COMMERCIAL

## 2018-10-03 ENCOUNTER — APPOINTMENT (OUTPATIENT)
Dept: CT IMAGING | Facility: CLINIC | Age: 43
End: 2018-10-03
Payer: COMMERCIAL

## 2018-10-03 VITALS
RESPIRATION RATE: 14 BRPM | HEART RATE: 123 BPM | SYSTOLIC BLOOD PRESSURE: 129 MMHG | BODY MASS INDEX: 32.77 KG/M2 | WEIGHT: 185 LBS | OXYGEN SATURATION: 100 % | DIASTOLIC BLOOD PRESSURE: 80 MMHG | TEMPERATURE: 98.8 F

## 2018-10-03 DIAGNOSIS — R10.9 RIGHT FLANK PAIN: ICD-10-CM

## 2018-10-03 DIAGNOSIS — R10.31 RLQ ABDOMINAL PAIN: ICD-10-CM

## 2018-10-03 DIAGNOSIS — R10.11 RUQ ABDOMINAL PAIN: ICD-10-CM

## 2018-10-03 LAB
ALBUMIN SERPL-MCNC: 4.1 G/DL (ref 3.4–5)
ALBUMIN UR-MCNC: NEGATIVE MG/DL
ALP SERPL-CCNC: 88 U/L (ref 40–150)
ALT SERPL W P-5'-P-CCNC: 68 U/L (ref 0–50)
ANION GAP SERPL CALCULATED.3IONS-SCNC: 5 MMOL/L (ref 3–14)
APPEARANCE UR: CLEAR
AST SERPL W P-5'-P-CCNC: 34 U/L (ref 0–45)
BASOPHILS # BLD AUTO: 0 10E9/L (ref 0–0.2)
BASOPHILS NFR BLD AUTO: 0.3 %
BILIRUB DIRECT SERPL-MCNC: 0.2 MG/DL (ref 0–0.2)
BILIRUB SERPL-MCNC: 0.9 MG/DL (ref 0.2–1.3)
BILIRUB UR QL STRIP: NEGATIVE
BUN SERPL-MCNC: 6 MG/DL (ref 7–30)
CALCIUM SERPL-MCNC: 8.7 MG/DL (ref 8.5–10.1)
CHLORIDE SERPL-SCNC: 105 MMOL/L (ref 94–109)
CO2 SERPL-SCNC: 30 MMOL/L (ref 20–32)
COLOR UR AUTO: ABNORMAL
CREAT SERPL-MCNC: 0.62 MG/DL (ref 0.52–1.04)
D DIMER PPP FEU-MCNC: 0.3 UG/ML FEU (ref 0–0.5)
DIFFERENTIAL METHOD BLD: NORMAL
EOSINOPHIL # BLD AUTO: 0.1 10E9/L (ref 0–0.7)
EOSINOPHIL NFR BLD AUTO: 1 %
ERYTHROCYTE [DISTWIDTH] IN BLOOD BY AUTOMATED COUNT: 14.1 % (ref 10–15)
GFR SERPL CREATININE-BSD FRML MDRD: >90 ML/MIN/1.7M2
GLUCOSE SERPL-MCNC: 87 MG/DL (ref 70–99)
GLUCOSE UR STRIP-MCNC: NEGATIVE MG/DL
HCG UR QL: NEGATIVE
HCT VFR BLD AUTO: 39.9 % (ref 35–47)
HGB BLD-MCNC: 13.5 G/DL (ref 11.7–15.7)
HGB UR QL STRIP: NEGATIVE
IMM GRANULOCYTES # BLD: 0 10E9/L (ref 0–0.4)
IMM GRANULOCYTES NFR BLD: 0.2 %
KETONES UR STRIP-MCNC: NEGATIVE MG/DL
LEUKOCYTE ESTERASE UR QL STRIP: NEGATIVE
LYMPHOCYTES # BLD AUTO: 1.7 10E9/L (ref 0.8–5.3)
LYMPHOCYTES NFR BLD AUTO: 27.7 %
MCH RBC QN AUTO: 28.9 PG (ref 26.5–33)
MCHC RBC AUTO-ENTMCNC: 33.8 G/DL (ref 31.5–36.5)
MCV RBC AUTO: 85 FL (ref 78–100)
MONOCYTES # BLD AUTO: 0.5 10E9/L (ref 0–1.3)
MONOCYTES NFR BLD AUTO: 7.4 %
NEUTROPHILS # BLD AUTO: 4 10E9/L (ref 1.6–8.3)
NEUTROPHILS NFR BLD AUTO: 63.4 %
NITRATE UR QL: NEGATIVE
NRBC # BLD AUTO: 0 10*3/UL
NRBC BLD AUTO-RTO: 0 /100
PH UR STRIP: 6.5 PH (ref 5–7)
PLATELET # BLD AUTO: 196 10E9/L (ref 150–450)
POTASSIUM SERPL-SCNC: 3.5 MMOL/L (ref 3.4–5.3)
PROT SERPL-MCNC: 8 G/DL (ref 6.8–8.8)
RBC # BLD AUTO: 4.67 10E12/L (ref 3.8–5.2)
RBC #/AREA URNS AUTO: 0 /HPF (ref 0–2)
SODIUM SERPL-SCNC: 140 MMOL/L (ref 133–144)
SOURCE: ABNORMAL
SP GR UR STRIP: 1 (ref 1–1.03)
SQUAMOUS #/AREA URNS AUTO: 2 /HPF (ref 0–1)
UROBILINOGEN UR STRIP-MCNC: NORMAL MG/DL (ref 0–2)
WBC # BLD AUTO: 6.3 10E9/L (ref 4–11)
WBC #/AREA URNS AUTO: <1 /HPF (ref 0–5)

## 2018-10-03 PROCEDURE — 81001 URINALYSIS AUTO W/SCOPE: CPT | Performed by: FAMILY MEDICINE

## 2018-10-03 PROCEDURE — 96361 HYDRATE IV INFUSION ADD-ON: CPT | Mod: 59

## 2018-10-03 PROCEDURE — 96374 THER/PROPH/DIAG INJ IV PUSH: CPT

## 2018-10-03 PROCEDURE — 96376 TX/PRO/DX INJ SAME DRUG ADON: CPT

## 2018-10-03 PROCEDURE — 80048 BASIC METABOLIC PNL TOTAL CA: CPT | Performed by: STUDENT IN AN ORGANIZED HEALTH CARE EDUCATION/TRAINING PROGRAM

## 2018-10-03 PROCEDURE — 85025 COMPLETE CBC W/AUTO DIFF WBC: CPT | Performed by: STUDENT IN AN ORGANIZED HEALTH CARE EDUCATION/TRAINING PROGRAM

## 2018-10-03 PROCEDURE — 74176 CT ABD & PELVIS W/O CONTRAST: CPT

## 2018-10-03 PROCEDURE — 99284 EMERGENCY DEPT VISIT MOD MDM: CPT | Mod: 25

## 2018-10-03 PROCEDURE — 80076 HEPATIC FUNCTION PANEL: CPT | Performed by: STUDENT IN AN ORGANIZED HEALTH CARE EDUCATION/TRAINING PROGRAM

## 2018-10-03 PROCEDURE — 85379 FIBRIN DEGRADATION QUANT: CPT | Performed by: STUDENT IN AN ORGANIZED HEALTH CARE EDUCATION/TRAINING PROGRAM

## 2018-10-03 PROCEDURE — 99284 EMERGENCY DEPT VISIT MOD MDM: CPT | Mod: Z6 | Performed by: FAMILY MEDICINE

## 2018-10-03 PROCEDURE — 81025 URINE PREGNANCY TEST: CPT | Performed by: FAMILY MEDICINE

## 2018-10-03 PROCEDURE — 25000128 H RX IP 250 OP 636: Performed by: STUDENT IN AN ORGANIZED HEALTH CARE EDUCATION/TRAINING PROGRAM

## 2018-10-03 PROCEDURE — 25000131 ZZH RX MED GY IP 250 OP 636 PS 637: Performed by: STUDENT IN AN ORGANIZED HEALTH CARE EDUCATION/TRAINING PROGRAM

## 2018-10-03 RX ORDER — KETOROLAC TROMETHAMINE 15 MG/ML
15 INJECTION, SOLUTION INTRAMUSCULAR; INTRAVENOUS ONCE
Status: COMPLETED | OUTPATIENT
Start: 2018-10-03 | End: 2018-10-03

## 2018-10-03 RX ORDER — POLYETHYLENE GLYCOL 3350 17 G/17G
1 POWDER, FOR SOLUTION ORAL DAILY
Qty: 527 G | Refills: 0 | Status: SHIPPED | OUTPATIENT
Start: 2018-10-03 | End: 2019-03-19

## 2018-10-03 RX ORDER — ONDANSETRON 4 MG/1
4 TABLET, ORALLY DISINTEGRATING ORAL ONCE
Status: COMPLETED | OUTPATIENT
Start: 2018-10-03 | End: 2018-10-03

## 2018-10-03 RX ORDER — MAGNESIUM CARB/ALUMINUM HYDROX 105-160MG
296 TABLET,CHEWABLE ORAL ONCE
Qty: 1 BOTTLE | Refills: 1 | Status: SHIPPED | OUTPATIENT
Start: 2018-10-03 | End: 2019-03-19

## 2018-10-03 RX ORDER — SODIUM CHLORIDE 9 MG/ML
INJECTION, SOLUTION INTRAVENOUS CONTINUOUS
Status: DISCONTINUED | OUTPATIENT
Start: 2018-10-03 | End: 2018-10-03 | Stop reason: HOSPADM

## 2018-10-03 RX ADMIN — ONDANSETRON 4 MG: 4 TABLET, ORALLY DISINTEGRATING ORAL at 13:14

## 2018-10-03 RX ADMIN — KETOROLAC TROMETHAMINE 15 MG: 15 INJECTION, SOLUTION INTRAMUSCULAR; INTRAVENOUS at 16:04

## 2018-10-03 RX ADMIN — SODIUM CHLORIDE: 9 INJECTION, SOLUTION INTRAVENOUS at 13:14

## 2018-10-03 RX ADMIN — KETOROLAC TROMETHAMINE 15 MG: 15 INJECTION, SOLUTION INTRAMUSCULAR; INTRAVENOUS at 13:14

## 2018-10-03 ASSESSMENT — ENCOUNTER SYMPTOMS
ABDOMINAL DISTENTION: 0
BACK PAIN: 1
ABDOMINAL PAIN: 0
FEVER: 1
PALPITATIONS: 0
HEADACHES: 0
VOMITING: 0
COUGH: 0
SHORTNESS OF BREATH: 1
DIARRHEA: 0
DIFFICULTY URINATING: 0
CHILLS: 0
CHEST TIGHTNESS: 0
WHEEZING: 0
HEMATURIA: 0
NAUSEA: 1
FLANK PAIN: 1
APPETITE CHANGE: 1
CONSTIPATION: 1
DYSURIA: 1

## 2018-10-03 NOTE — ED PROVIDER NOTES
"  History     Chief Complaint   Patient presents with     Flank Pain     R side flank pain that radiates to upper abd started Sunday; +urinary frequency     HPI  Jeff Serra is a 43 year old female with a past medical history significant for nephrolithiasis, history of provoked right LE DVT and PE in 2011, chronic low back pain, hypertension recently started on amlodipine, surgeries significant for laparotomy and hysterectomy presenting to the ED with right upper quadrant abdominal pain and some right sided flank pain. The pain started Sunday, comes and goes, and is a burning sensation. The pain radiates and \"wraps around\" right side of back to right upper quadrant. Feels similar in nature to previous kidney stones. She reports a history of pyelonephritis \"years ago\". Some dysuria. No vaginal discharge or hematuria. Reports feeling \"feverish\" but not taking her temperature. Denies chills. Reports some nausea. No vomiting, no diarrhea. Chronic constipation. Decreased appetite. No recent sickness. Some increased shortness of breath which worries patient as she has history of DVT/PE. No recent travel. Not on OCP as patient has hysterectomy. She believes that she has had increased LE swelling lately bilaterally.   Pain is worse with eating, walking, and taking deep breaths. Tried ibuprofen but did not seem to help. Works as  and enjoys her job.     I have reviewed the Medications, Allergies, Past Medical and Surgical History, and Social History in the Epic system.    Review of Systems   Constitutional: Positive for appetite change (decreased) and fever. Negative for chills.   Respiratory: Positive for shortness of breath. Negative for cough, chest tightness and wheezing.    Cardiovascular: Positive for leg swelling (bilaterally). Negative for chest pain and palpitations.   Gastrointestinal: Positive for constipation (chronic) and nausea. Negative for abdominal distention, abdominal pain, " diarrhea and vomiting.   Genitourinary: Positive for dysuria and flank pain (right sided). Negative for difficulty urinating, hematuria and urgency.   Musculoskeletal: Positive for back pain (chronic).   Neurological: Negative for headaches.     Physical Exam   BP: (!) 159/94  Pulse: 123  Heart Rate: 98  Temp: 97.4  F (36.3  C)  Resp: 18  Weight: 83.9 kg (185 lb)  SpO2: 97 %    Physical Exam   Constitutional: She is oriented to person, place, and time. She appears well-developed and well-nourished. No distress.   Eyes: Conjunctivae and EOM are normal.   Neck: Neck supple.   Cardiovascular: Regular rhythm, S1 normal, S2 normal and intact distal pulses.  Tachycardia present.    No murmur heard.  Pulmonary/Chest: Breath sounds normal. No respiratory distress. She has no wheezes.   Abdominal: Soft. Bowel sounds are normal. She exhibits no distension. There is tenderness (RLQ and RUQ pain on palpation). There is no rebound and no guarding.   Genitourinary:   Genitourinary Comments: Right CVA tenderness to palpation   Musculoskeletal: She exhibits no edema (no edema bilaterally) or tenderness (no calf tenderness).   Negative homans, no pain on palpation, no asymmetry of LE   Neurological: She is alert and oriented to person, place, and time.   Skin: Skin is warm and dry. She is not diaphoretic.   Psychiatric: She has a normal mood and affect. Her behavior is normal.   Vitals reviewed.    ED Course     ED Course     Procedures           Labs Ordered and Resulted from Time of ED Arrival Up to the Time of Departure from the ED   ROUTINE UA WITH MICROSCOPIC REFLEX TO CULTURE - Abnormal; Notable for the following:        Result Value    Squamous Epithelial /HPF Urine 2 (*)     All other components within normal limits   BASIC METABOLIC PANEL - Abnormal; Notable for the following:     Urea Nitrogen 6 (*)     All other components within normal limits   HEPATIC PANEL - Abnormal; Notable for the following:     ALT 68 (*)     All  other components within normal limits   HCG QUALITATIVE URINE   CBC WITH PLATELETS DIFFERENTIAL   D DIMER QUANTITATIVE        Assessments & Plan (with Medical Decision Making)   Jeff Serra is a 43 year old female with a past medical history significant for nephrolithiasis, chronic pain, history of provoked right LE DVT and PE in 2011, chronic low back pain, hypertension recently started on amlodipine, surgeries significant for laparotomy and hysterectomy presenting to the ED with right upper quadrant abdominal pain and some right sided flank pain. Initial differential diagnosis includes nephrolithiasis, cholelithiasis, cholecystitis, pyelonephritis, acute gastroenteritis, PE, herniated disc, SBO, appendicitis, abdominal compartment syndrome, diverticulitis, ischemic bowel, AAA rupture, vertebral fracture.     Vitals show tachycardia initially of 123, BP elevated at 159/94, afebrile, RR 18 and satting 97% on RA. Physical exam shows patient resting in bed, non toxic appearing, mildly uncomfortable. Exam was significant for tachycardia, abdominal exam shows right sided pain to palpation, normal bowel sounds with no rebound or concern for peritonitis or perforation. Exam otherwise benign and nonsurgical. Mild right sided flank pain on palpation. No concern for DVT on physical exam. Labs ordered included UA, CBC, CMP, D dimer. Wells criteria score of 3 with prior history of PE along with tachycardia. Given MIVF and IV toradol along with Zofran tablet for nausea.  CBC within normal limits. D dimer negative. UA negative. UPT negative. Hepatic panel largely normal with mildly elevated ALT of 68. CT scan w/o contrast shows no hydronephrosis or nephrolithiasis and no other acute abnormalities. Patient was treated for pain here with two 15 mg IV toradol administrations with improved pain.     Etiology of abdominal pain is unclear at this time (and she was seen in the ED back in May 2018 with similar presentation, also  negative workup). Will likely benefit from close follow up with PCP for outpatient evaluation and management. She has a history of constipation so we will send her home with stool softeners and laxative as this has seemed to help in the past. Follow up with PCP in 1 week. May benefit from outpatient GI consult.     I have reviewed the nursing notes.    I have reviewed the findings, diagnosis, plan and need for follow up with the patient.  Discharge Medication List as of 10/3/2018  4:17 PM      START taking these medications    Details   magnesium citrate 1.745 GM/30ML solution Take 296 mLs by mouth once for 1 dose, Disp-1 Bottle, R-1, Local Print      polyethylene glycol (MIRALAX) powder Take 17 g (1 capful) by mouth daily, Disp-527 g, R-0, Local Print           Final diagnoses:   Right flank pain   RLQ abdominal pain   RUQ abdominal pain     10/3/2018   [facility], EMERGENCY DEPARTMENT    David Romero MD PGY-1  [facility]   This data collected with the Resident working in the Emergency Department.  Patient was seen and evaluated by myself and I repeated the history and physical exam with the patient.  The plan of care was discussed with them.  The key portions of the note including the entire assessment and plan reflect my documentation.           Arturo Lara MD  10/03/18 [id number]

## 2018-10-03 NOTE — ED AVS SNAPSHOT
[facility], Emergency Department    [address]    [place] [address]    Phone:  [phone]    Fax:  [phone]                                       Jeff Serra   MRN: [id number]    Department:  [facility], Emergency Department   Date of Visit:  10/3/2018           Patient Information     Date Of Birth          1975        Your diagnoses for this visit were:     Right flank pain     RLQ abdominal pain     RUQ abdominal pain        You were seen by Arturo Lara MD.        Discharge Instructions       Follow up with PCP in 1 week.   Return to ED if symptoms worsen or do not improve.     Discharge References/Attachments     FLANK PAIN, UNCERTAIN CAUSE (ENGLISH)    ABDOMINAL PAIN, UNKNOWN CAUSE, (FEMALE) (ENGLISH)    ABDOMINAL PAIN, ADULT (ENGLISH)      24 Hour Appointment Hotline       To make an appointment at any [facility] clinic, call [phone] ([phone]). If you don't have a family doctor or clinic, we will help you find one. [facility] clinics are conveniently located to serve the needs of you and your family.             Review of your medicines      START taking        Dose / Directions Last dose taken    magnesium citrate 1.745 GM/30ML solution   Dose:  296 mL   Quantity:  1 Bottle        Take 296 mLs by mouth once for 1 dose   Refills:  1        polyethylene glycol powder   Commonly known as:  MIRALAX   Dose:  1 capful   Quantity:  527 g        Take 17 g (1 capful) by mouth daily   Refills:  0          Our records show that you are taking the medicines listed below. If these are incorrect, please call your family doctor or clinic.        Dose / Directions Last dose taken    albuterol 108 (90 Base) MCG/ACT inhaler   Commonly known as:  PROAIR HFA/PROVENTIL HFA/VENTOLIN HFA   Dose:  2 puff   Quantity:  1 Inhaler        Inhale 2 puffs into the lungs every 6 hours as needed for shortness of breath / dyspnea or wheezing   Refills:  0        amLODIPine 5 MG tablet    Commonly known as:  NORVASC   Dose:  5 mg   Quantity:  30 tablet        Take 1 tablet (5 mg) by mouth daily   Refills:  0        diclofenac 1 % Gel topical gel   Commonly known as:  VOLTAREN   Dose:  4 g   Quantity:  100 g        Apply 4 g topically 4 times daily   Refills:  1        loratadine 10 MG tablet   Commonly known as:  CLARITIN   Dose:  10 mg   Quantity:  90 tablet        Take 1 tablet (10 mg) by mouth daily as needed for allergies   Refills:  3        oxyCODONE IR 15 MG tablet   Commonly known as:  ROXICODONE   Dose:  15 mg   Quantity:  90 tablet   Start taking on:  10/5/2018        Take 1 tablet (15 mg) by mouth 3 times daily as needed for moderate to severe pain   Refills:  0        piroxicam 20 MG capsule   Commonly known as:  FELDENE   Dose:  20 mg   Quantity:  30 capsule        Take 1 capsule (20 mg) by mouth At Bedtime   Refills:  0                Prescriptions were sent or printed at these locations (2 Prescriptions)                   Other Prescriptions                Printed at Department/Unit printer (2 of 2)         magnesium citrate 1.745 GM/30ML solution               polyethylene glycol (MIRALAX) powder                Procedures and tests performed during your visit     Abd/pelvis CT no contrast - Stone Protocol    Basic metabolic panel    CBC with platelets differential    D dimer quantitative    HCG qualitative urine    Hepatic panel    UA with Microscopic reflex to Culture      Orders Needing Specimen Collection     None      Pending Results     No orders found from 10/1/2018 to 10/4/2018.            Pending Culture Results     No orders found from 10/1/2018 to 10/4/2018.            Pending Results Instructions     If you had any lab results that were not finalized at the time of your Discharge, you can call the ED Lab Result RN at [phone]. You will be contacted by this team for any positive Lab results or changes in treatment. The nurses are available 7 days a week from 10A to  6:30P.  You can leave a message 24 hours per day and they will return your call.        Thank you for choosing [facility]       Thank you for choosing [facility] for your care. Our goal is always to provide you with excellent care. Hearing back from our patients is one way we can continue to improve our services. Please take a few minutes to complete the written survey that you may receive in the mail after you visit with us. Thank you!        [employer] Information     [facility] gives you secure access to your electronic health record. If you see a primary care provider, you can also send messages to your care team and make appointments. If you have questions, please call your primary care clinic.  If you do not have a primary care provider, please call [phone] and they will assist you.        Care EveryWhere ID     This is your Care EveryWhere ID. This could be used by other organizations to access your [facility] medical records  [id number]        Equal Access to Services     KHADAR BAEZ : Sari Marcos, lesli casas, ariel self . So [facility] [phone].    ATENCIÓN: Si habla español, tiene a guy disposición servicios gratuitos de asistencia lingüística. Llame al [phone].    We comply with applicable federal civil rights laws and [place] laws. We do not discriminate on the basis of race, color, national origin, age, disability, sex, sexual orientation, or gender identity.            After Visit Summary       This is your record. Keep this with you and show to your community pharmacist(s) and doctor(s) at your next visit.

## 2018-10-03 NOTE — ED AVS SNAPSHOT
[facility], [place], Emergency Department    [address]    [facility] [address]    Phone:  [phone]    Fax:  [phone]                                       Jeff Serra   MRN: [id number]    Department:  [facility], Emergency Department   Date of Visit:  10/3/2018           After Visit Summary Signature Page     I have received my discharge instructions, and my questions have been answered. I have discussed any challenges I see with this plan with the nurse or doctor.    ..........................................................................................................................................  Patient/Patient Representative Signature      ..........................................................................................................................................  Patient Representative Print Name and Relationship to Patient    ..................................................               ................................................  Date                                   Time    ..........................................................................................................................................  Reviewed by Signature/Title    ...................................................              ..............................................  Date                                               Time          22EPIC Rev 08/18

## 2018-10-16 ENCOUNTER — HOSPITAL ENCOUNTER (EMERGENCY)
Facility: CLINIC | Age: 43
Discharge: HOME | End: 2018-10-16
Attending: EMERGENCY MEDICINE | Admitting: EMERGENCY MEDICINE
Payer: COMMERCIAL

## 2018-10-16 VITALS
DIASTOLIC BLOOD PRESSURE: 99 MMHG | RESPIRATION RATE: 18 BRPM | WEIGHT: 178 LBS | SYSTOLIC BLOOD PRESSURE: 140 MMHG | HEART RATE: 108 BPM | BODY MASS INDEX: 31.54 KG/M2 | TEMPERATURE: 98.5 F | HEIGHT: 63 IN | OXYGEN SATURATION: 100 %

## 2018-10-16 DIAGNOSIS — E86.0 DEHYDRATION: ICD-10-CM

## 2018-10-16 DIAGNOSIS — R10.9 ABDOMINAL PAIN, UNSPECIFIED ABDOMINAL LOCATION: ICD-10-CM

## 2018-10-16 LAB
ALBUMIN SERPL-MCNC: 4.1 G/DL (ref 3.4–5)
ALBUMIN UR-MCNC: NEGATIVE MG/DL
ALP SERPL-CCNC: 82 U/L (ref 40–150)
ALT SERPL W P-5'-P-CCNC: 52 U/L (ref 0–50)
ANION GAP SERPL CALCULATED.3IONS-SCNC: 6 MMOL/L (ref 3–14)
APPEARANCE UR: CLEAR
AST SERPL W P-5'-P-CCNC: 20 U/L (ref 0–45)
BASOPHILS # BLD AUTO: 0 10E9/L (ref 0–0.2)
BASOPHILS NFR BLD AUTO: 0.3 %
BILIRUB SERPL-MCNC: 0.8 MG/DL (ref 0.2–1.3)
BILIRUB UR QL STRIP: NEGATIVE
BUN SERPL-MCNC: 7 MG/DL (ref 7–30)
CALCIUM SERPL-MCNC: 8.8 MG/DL (ref 8.5–10.1)
CHLORIDE SERPL-SCNC: 105 MMOL/L (ref 94–109)
CO2 SERPL-SCNC: 29 MMOL/L (ref 20–32)
COLOR UR AUTO: ABNORMAL
CREAT SERPL-MCNC: 0.58 MG/DL (ref 0.52–1.04)
DIFFERENTIAL METHOD BLD: NORMAL
EOSINOPHIL # BLD AUTO: 0 10E9/L (ref 0–0.7)
EOSINOPHIL NFR BLD AUTO: 0.3 %
ERYTHROCYTE [DISTWIDTH] IN BLOOD BY AUTOMATED COUNT: 14.3 % (ref 10–15)
GFR SERPL CREATININE-BSD FRML MDRD: >90 ML/MIN/1.7M2
GLUCOSE SERPL-MCNC: 85 MG/DL (ref 70–99)
GLUCOSE UR STRIP-MCNC: NEGATIVE MG/DL
HCT VFR BLD AUTO: 38.4 % (ref 35–47)
HGB BLD-MCNC: 13.2 G/DL (ref 11.7–15.7)
HGB UR QL STRIP: NEGATIVE
IMM GRANULOCYTES # BLD: 0 10E9/L (ref 0–0.4)
IMM GRANULOCYTES NFR BLD: 0.2 %
INTERPRETATION ECG - MUSE: NORMAL
KETONES UR STRIP-MCNC: NEGATIVE MG/DL
LACTATE BLD-SCNC: 1.5 MMOL/L (ref 0.7–2)
LEUKOCYTE ESTERASE UR QL STRIP: NEGATIVE
LIPASE SERPL-CCNC: 99 U/L (ref 73–393)
LYMPHOCYTES # BLD AUTO: 2 10E9/L (ref 0.8–5.3)
LYMPHOCYTES NFR BLD AUTO: 30.4 %
MCH RBC QN AUTO: 29.1 PG (ref 26.5–33)
MCHC RBC AUTO-ENTMCNC: 34.4 G/DL (ref 31.5–36.5)
MCV RBC AUTO: 85 FL (ref 78–100)
MONOCYTES # BLD AUTO: 0.4 10E9/L (ref 0–1.3)
MONOCYTES NFR BLD AUTO: 5.9 %
NEUTROPHILS # BLD AUTO: 4.1 10E9/L (ref 1.6–8.3)
NEUTROPHILS NFR BLD AUTO: 62.9 %
NITRATE UR QL: NEGATIVE
NRBC # BLD AUTO: 0 10*3/UL
NRBC BLD AUTO-RTO: 0 /100
PH UR STRIP: 7 PH (ref 5–7)
PLATELET # BLD AUTO: 172 10E9/L (ref 150–450)
POTASSIUM SERPL-SCNC: 3.4 MMOL/L (ref 3.4–5.3)
PROT SERPL-MCNC: 8 G/DL (ref 6.8–8.8)
RBC # BLD AUTO: 4.54 10E12/L (ref 3.8–5.2)
RBC #/AREA URNS AUTO: 0 /HPF (ref 0–2)
SODIUM SERPL-SCNC: 140 MMOL/L (ref 133–144)
SOURCE: ABNORMAL
SP GR UR STRIP: 1.01 (ref 1–1.03)
SQUAMOUS #/AREA URNS AUTO: 2 /HPF (ref 0–1)
UROBILINOGEN UR STRIP-MCNC: NORMAL MG/DL (ref 0–2)
WBC # BLD AUTO: 6.2 10E9/L (ref 4–11)
WBC #/AREA URNS AUTO: 1 /HPF (ref 0–5)

## 2018-10-16 PROCEDURE — 96360 HYDRATION IV INFUSION INIT: CPT

## 2018-10-16 PROCEDURE — 83605 ASSAY OF LACTIC ACID: CPT | Performed by: EMERGENCY MEDICINE

## 2018-10-16 PROCEDURE — 83690 ASSAY OF LIPASE: CPT | Performed by: EMERGENCY MEDICINE

## 2018-10-16 PROCEDURE — 85025 COMPLETE CBC W/AUTO DIFF WBC: CPT | Performed by: EMERGENCY MEDICINE

## 2018-10-16 PROCEDURE — 99284 EMERGENCY DEPT VISIT MOD MDM: CPT | Mod: 25

## 2018-10-16 PROCEDURE — 81001 URINALYSIS AUTO W/SCOPE: CPT | Performed by: EMERGENCY MEDICINE

## 2018-10-16 PROCEDURE — 80053 COMPREHEN METABOLIC PANEL: CPT | Performed by: EMERGENCY MEDICINE

## 2018-10-16 PROCEDURE — 93005 ELECTROCARDIOGRAM TRACING: CPT

## 2018-10-16 PROCEDURE — 25000128 H RX IP 250 OP 636: Performed by: EMERGENCY MEDICINE

## 2018-10-16 RX ORDER — LORAZEPAM 2 MG/ML
1 INJECTION INTRAMUSCULAR ONCE
Status: DISCONTINUED | OUTPATIENT
Start: 2018-10-16 | End: 2018-10-16

## 2018-10-16 RX ADMIN — SODIUM CHLORIDE 1000 ML: 9 INJECTION, SOLUTION INTRAVENOUS at 11:25

## 2018-10-16 ASSESSMENT — ENCOUNTER SYMPTOMS
LIGHT-HEADEDNESS: 1
VOMITING: 1
FEVER: 0
ABDOMINAL PAIN: 1
MYALGIAS: 1
DIARRHEA: 1
NECK STIFFNESS: 1
NAUSEA: 1
APPETITE CHANGE: 1
FREQUENCY: 0
SORE THROAT: 0
BLOOD IN STOOL: 0
HEMATURIA: 0
COUGH: 1
CHILLS: 1
DYSURIA: 1
RHINORRHEA: 0
HEADACHES: 1

## 2018-10-16 NOTE — ED PROVIDER NOTES
History     Chief Complaint:  Dizziness    HPI   Jeff Serra is a 43 year old female who presents to the emergency department today for evaluation of lightheadedness, body aches, and dysuria. The patient reports that for the past week she has experienced lightheadedness, bladder pressure, neck soreness, head pressure, chills, painful urination, nausea with 1 episode of vomiting, diarrhea, decreased appetite, and cough. The patient reports that she is under quite a bit of stress right now particularly with work. She notes taking Tylenol about 0700 this morning upon arriving at work. She denies any hematuria, urinary frequency, vaginal discharge or bleeding, blood in the stool, measured fever, blood in the stool, rhinorrhea, congestions, sore throat. She denies any recent surgery, falls, or trauma.     Allergies:  No Known Drug Allergies     Medications:    Albuterol, inhaler  Amlodipine  Diclofenac  Claritin  Oxycodone  Feldene  Miralax     Past Medical History:    Chronic low back pain  Chronic neck pain  Continuous opiod dependence  Hypertension   Kidney stone  PE  Right leg DVT    Past Surgical History:    Hysterectomy  Tubal ligation  Lithotripsy    Family History:    History reviewed. No pertinent family history.     Social History:  Smoking Status: Never Smoker  Smokeless Tobacco: Never Used  Alcohol Use: Positive  Marital Status:       Review of Systems   Constitutional: Positive for appetite change and chills. Negative for fever.   HENT: Negative for congestion, rhinorrhea and sore throat.    Respiratory: Positive for cough.    Gastrointestinal: Positive for abdominal pain, diarrhea, nausea and vomiting. Negative for blood in stool.   Genitourinary: Positive for dysuria. Negative for frequency, hematuria, vaginal bleeding and vaginal discharge.   Musculoskeletal: Positive for myalgias and neck stiffness.   Neurological: Positive for light-headedness and headaches.   All other systems reviewed  "and are negative.    Physical Exam     Patient Vitals for the past 24 hrs:   BP Temp Temp src Pulse Resp SpO2 Height Weight   10/16/18 1223 - - - 93 - 100 % - -   10/16/18 1200 (!) 139/97 - - 95 - 100 % - -   10/16/18 1154 (!) 141/94 - - - - 100 % - -   10/16/18 1018 (!) 153/98 98.5  F (36.9  C) Temporal 108 18 100 % 1.6 m (5' 3\") 80.7 kg (178 lb)        Physical Exam  Physical Exam   General:  Sitting on bed.   HENT:  No obvious trauma to head  Right Ear:  External ear normal.   Left Ear:  External ear normal.   Nose:  Nose normal.   Eyes:  Conjunctivae and EOM are normal. Pupils are equal, round, and reactive.   Neck: Normal range of motion. Neck supple. No tracheal deviation present.   CV:  Normal heart sounds. No murmur heard.  Pulm/Chest: Effort normal and breath sounds normal.   Abd: Soft. No distension. There is very slight b/l lower quadrant tenderness. There is no rigidity, no rebound and no guarding.   M/S: Normal range of motion.   Neuro: Alert. GCS 15. CN II-XII grossly intact. No focal weakness.  Skin: Skin is warm and dry. No rash noted. Not diaphoretic.   Psych: Normal mood and affect. Behavior is normal.      Emergency Department Course     ECG:  ECG taken at 1023, ECG read at 1059  Sinus tachycardia  Possible left atrial enlargement  Borderline ECG  Rate 102 bpm. DC interval 118 ms. QRS duration 84 ms. QT/QTc 356/463 ms. P-R-T axes 51 30 24.    Laboratory:  Laboratory findings were communicated with the patient who voiced understanding of the findings.    CBC: WBC 6.2, HGB 13.2,   CMP: ALT 52 (H) o/w WNL (Creatinine 0.58)  Lipase: 99  Lactic Acid (Resulted: 1137): 1.5    UA: Squamous Epithelial 2 (H)    Interventions:  1125 NS 1000 ml IV    Emergency Department Course:    1048 Nursing notes and vitals reviewed.    1053 I performed an exam of the patient as documented above.     1123 IV was inserted and blood was drawn for laboratory testing, results above.    1203 The patient was rechecked " and updated.     1220 I personally reviewed the ECG and laboratory results with the patient and answered all related questions prior to discharge.    Impression & Plan    Medical Decision Making:  Jeff Serra is a very pleasant 43 year old female who presents to the emergency department today for evaluation of multiple concerns of been ongoing for the past week.  The patient has had some lightheadedness.  She does not describe vertigo.  The patient has no nystagmus.  The patient was provided IV fluids and did feel somewhat better.  Labs are unremarkable.  She has no fever nor does she have a leukocytosis or elevation in her lactate to suggest a significant infection.  She has clear lungs and no cough to suggest pneumonia.  She has no meningeal signs to suggest meningitis. The patient mentions slight lower abdominal pain.  She has no significant tenderness on exam and since labs are unremarkable I do not believe imaging is indicated.  Reviewed the risk and benefit of this with the patient including the possibility of performing a CT scan of her abdomen and pelvis and after doing so the patient is in agreement against this.  A screening EKG shows a normal sinus rhythm.  The patient reports significant increased stress with concern of paying for bills and stress at work being a  for medical bills.  She also reports poor sleep recently.  I encouraged rest and taking the rest and a day off of work.  The patient agrees that this would help her to feel better.  I discussed with her certainly there could be other pathology that is not clearly present at this time and she should return immediately if she develops any new symptoms.    The treatment plan was discussed with the patient and they expressed understanding of this plan and consented to the plan.  In addition, the patient will return to the emergency department if their symptoms persist, worsen, if new symptoms arise or if there is any concern  as other pathology may be present that is not evident at this time. They also understand the importance of close follow up in the clinic and if unable to do so will return to the emergency department for a reevaluation. All questions were answered.    Diagnosis:    ICD-10-CM    1. Dehydration E86.0    2. Abdominal pain, unspecified abdominal location R10.9      Disposition:   The patient is discharged to home.    Discharge Medications:  No discharge medications.    Scribe Disclosure:  Jaqueline MOORE, am serving as a scribe at 10:51 AM on 10/16/2018 to document services personally performed by Jacoby Iglesias DO based on my observations and the provider's statements to me.    EMERGENCY DEPARTMENT       Jacoby Iglesias DO  10/16/18 [id number]

## 2018-10-16 NOTE — ED AVS SNAPSHOT
Emergency Department    [address] [place] [address]    Phone:  [phone]    Fax:  [phone]                                       Jeff Serra   MRN: [id number]    Department:   Emergency Department   Date of Visit:  10/16/2018           After Visit Summary Signature Page     I have received my discharge instructions, and my questions have been answered. I have discussed any challenges I see with this plan with the nurse or doctor.    ..........................................................................................................................................  Patient/Patient Representative Signature      ..........................................................................................................................................  Patient Representative Print Name and Relationship to Patient    ..................................................               ................................................  Date                                   Time    ..........................................................................................................................................  Reviewed by Signature/Title    ...................................................              ..............................................  Date                                               Time          22EPIC Rev 08/18

## 2018-10-16 NOTE — ED AVS SNAPSHOT
Emergency Department    [address] [facility] [address]    Phone:  [phone]    Fax:  [phone]                                       Jeff Serra   MRN: [id number]    Department:   Emergency Department   Date of Visit:  10/16/2018           Patient Information     Date Of Birth          1975        Your diagnoses for this visit were:     Dehydration     Abdominal pain, unspecified abdominal location        You were seen by Eric Loyd MD and Jacoby Iglesias DO.      Follow-up Information     Follow up with Jordyn Loredo MD In 2 days.    Specialty:  Family Practice    Contact information:    [address]  [place] [address] [phone]          Follow up with  Emergency Department.    Specialty:  EMERGENCY MEDICINE    Why:  If symptoms worsen    Contact information:    [address] [facility] [id number] [phone]        Discharge Instructions         Dehydration (Adult)  Dehydration occurs when your body loses too much fluid. This may be the result of prolonged vomiting or diarrhea, excessive sweating, or a high fever. It may also happen if you don t drink enough fluid when you re sick or out in the heat. Misuse of diuretics (water pills) can also be a cause.  Symptoms include thirst and decreased urine output. You may also feel dizzy, weak, fatigued, or very drowsy. The diet described below is usually enough to treat dehydration. In some cases, you may need medicine.  Home care    Drink at least 12 8-ounce glasses of fluid every day to resolve the dehydration. Fluid may include water; orange juice; lemonade; apple, grape, or cranberry juice; clear fruit drinks; electrolyte replacement and sports drinks; and teas and coffee without caffeine. Don't drink alcohol. If you have been diagnosed with a kidney disease, ask your doctor how much and what types of fluids you should drink to prevent  dehydration. If you have kidney disease, fluid can build up in the body. This can be dangerous to your health.    If you have a fever, muscle aches, or a headache as a result of a cold or flu, you may take acetaminophen or ibuprofen, unless another medicine was prescribed. If you have chronic liver or kidney disease, or have ever had a stomach ulcer or gastrointestinal bleeding, talk with your healthcare provider before using these medicines. Don't take aspirin if you are younger than 18 and have a fever. Aspirin raises the chance for severe liver injury.  Follow-up care  Follow up with your healthcare provider, or as advised.  When to seek medical advice  Call your healthcare provider right away if any of these occur:    Continued vomiting    Frequent diarrhea (more than 5 times a day); blood (red or black color) or mucus in diarrhea    Blood in vomit or stool    Swollen abdomen or increasing abdominal pain    Weakness, dizziness, or fainting    Unusual drowsiness or confusion    Reduced urine output or extreme thirst    Fever of 100.4 F (38 C) or higher  Date Last Reviewed: 5/1/2017 2000-2017 [employer]. [address] [place] [address]. All rights reserved. This information is not intended as a substitute for professional medical care. Always follow your healthcare professional's instructions.          Abdominal Pain    Abdominal pain is pain in the stomach or belly area. Everyone has this pain from time to time. In many cases it goes away on its own. But abdominal pain can sometimes be due to a serious problem, such as appendicitis. So it s important to know when to seek help.  Causes of abdominal pain  There are many possible causes of abdominal pain. Common causes in adults include:    Constipation, diarrhea, or gas    Stomach acid flowing back up into the esophagus (acid reflux or heartburn)    Severe acid reflux, called GERD (gastroesophageal reflux disease)    A sore in the lining  of the stomach or small intestine (peptic ulcer)    Inflammation of the gallbladder, liver, or pancreas    Gallstones or kidney stones    Appendicitis     Intestinal blockage     An internal organ pushing through a muscle or other tissue (hernia)    Urinary tract infections    In women, menstrual cramps, fibroids, or endometriosis    Inflammation or infection of the intestines  Diagnosing the cause of abdominal pain  Your healthcare provider will do a physical exam help find the cause of your pain. If needed, tests will be ordered. Belly pain has many possible causes. So it can be hard to find the reason for your pain. Giving details about your pain can help. Tell your provider where and when you feel the pain, and what makes it better or worse. Also let your provider know if you have other symptoms such as:    Fever    Tiredness    Upset stomach (nausea)    Vomiting    Changes in bathroom habits  Treating abdominal pain  Some causes of pain need emergency medical treatment right away. These include appendicitis or a bowel blockage. Other problems can be treated with rest, fluids, or medicines. Your healthcare provider can give you specific instructions for treatment or self-care based on what is causing your pain.  If you have vomiting or diarrhea, sip water or other clear fluids. When you are ready to eat solid foods again, start with small amounts of easy-to-digest, low-fat foods. These include apple sauce, toast, or crackers.   When to seek medical care  Call 911 or go to the hospital right away if you:    Can t pass stool and are vomiting    Are vomiting blood or have bloody diarrhea or black, tarry diarrhea    Have chest, neck, or shoulder pain    Feel like you might pass out    Have pain in your shoulder blades with nausea    Have sudden, severe belly pain    Have new, severe pain unlike any you have felt before    Have a belly that is rigid, hard, and tender to touch  Call your healthcare provider if you  have:    Pain for more than 5 days    Bloating for more than 2 days    Diarrhea for more than 5 days    A fever of 100.4 F (38 C) or higher, or as directed by your healthcare provider    Pain that gets worse    Weight loss for no reason    Continued lack of appetite    Blood in your stool  How to prevent abdominal pain  Here are some tips to help prevent abdominal pain:    Eat smaller amounts of food at one time.    Avoid greasy, fried, or other high-fat foods.    Avoid foods that give you gas.    Exercise regularly.    Drink plenty of fluids.  To help prevent GERD symptoms:    Quit smoking.    Reduce alcohol and certain foods that increase stomach acid.    Avoid aspirin and over-the-counter pain and fever medicines (NSAIDS or nonsteroidal anti-inflammatory drugs), if possible    Lose extra weight.    Finish eating at least 2 hours before you go to bed or lie down.    Raise the head of your bed.  Date Last Reviewed: 7/1/2016 2000-2017 [employer]. [address] [place] [address]. All rights reserved. This information is not intended as a substitute for professional medical care. Always follow your healthcare professional's instructions.          Your next 10 appointments already scheduled     Oct 18, 2018  3:40 PM CDT   [facility] Rosalee Nicole [facility]-C   [facility]    [address] [place] [id number] [phone]           Bring a current list of meds and any records pertaining to this visit. For Physicals, please bring immunization records and any forms needing to be filled out. Please arrive 10 minutes early to complete paperwork.              24 Hour Appointment Hotline       To make an appointment at any [facility], call [phone] ([phone]). If you don't have a family doctor or clinic, we will help you find one. [facility] are conveniently located to serve the needs of you and your  family.             Review of your medicines      Our records show that you are taking the medicines listed below. If these are incorrect, please call your family doctor or clinic.        Dose / Directions Last dose taken    albuterol 108 (90 Base) MCG/ACT inhaler   Commonly known as:  PROAIR HFA/PROVENTIL HFA/VENTOLIN HFA   Dose:  2 puff   Quantity:  1 Inhaler        Inhale 2 puffs into the lungs every 6 hours as needed for shortness of breath / dyspnea or wheezing   Refills:  0        amLODIPine 5 MG tablet   Commonly known as:  NORVASC   Dose:  5 mg   Quantity:  30 tablet        Take 1 tablet (5 mg) by mouth daily   Refills:  0        diclofenac 1 % Gel topical gel   Commonly known as:  VOLTAREN   Dose:  4 g   Quantity:  100 g        Apply 4 g topically 4 times daily   Refills:  1        loratadine 10 MG tablet   Commonly known as:  CLARITIN   Dose:  10 mg   Quantity:  90 tablet        Take 1 tablet (10 mg) by mouth daily as needed for allergies   Refills:  3        oxyCODONE IR 15 MG tablet   Commonly known as:  ROXICODONE   Dose:  15 mg   Quantity:  90 tablet        Take 1 tablet (15 mg) by mouth 3 times daily as needed for moderate to severe pain   Refills:  0        piroxicam 20 MG capsule   Commonly known as:  FELDENE   Dose:  20 mg   Quantity:  30 capsule        Take 1 capsule (20 mg) by mouth At Bedtime   Refills:  0        polyethylene glycol powder   Commonly known as:  MIRALAX   Dose:  1 capful   Quantity:  527 g        Take 17 g (1 capful) by mouth daily   Refills:  0                Procedures and tests performed during your visit     CBC with platelets differential    Comprehensive metabolic panel    EKG 12 lead    Lactic acid whole blood    Lipase    UA with Microscopic      Orders Needing Specimen Collection     None      Pending Results     No orders found from 10/14/2018 to 10/17/2018.            Pending Culture Results     No orders found from 10/14/2018 to 10/17/2018.            Pending Results  Instructions     If you had any lab results that were not finalized at the time of your Discharge, you can call the ED Lab Result RN at [phone]. You will be contacted by this team for any positive Lab results or changes in treatment. The nurses are available 7 days a week from 10A to 6:30P.  You can leave a message 24 hours per day and they will return your call.        Test Results From Your Hospital Stay        10/16/2018 11:03 AM      Component Results     Component Value Ref Range & Units Status    Color Urine Straw  Final    Appearance Urine Clear  Final    Glucose Urine Negative NEG^Negative mg/dL Final    Bilirubin Urine Negative NEG^Negative Final    Ketones Urine Negative NEG^Negative mg/dL Final    Specific Gravity Urine 1.006 1.003 - 1.035 Final    Blood Urine Negative NEG^Negative Final    pH Urine 7.0 5.0 - 7.0 pH Final    Protein Albumin Urine Negative NEG^Negative mg/dL Final    Urobilinogen mg/dL Normal 0.0 - 2.0 mg/dL Final    Nitrite Urine Negative NEG^Negative Final    Leukocyte Esterase Urine Negative NEG^Negative Final    Source Midstream Urine  Final    WBC Urine 1 0 - 5 /HPF Final    RBC Urine 0 0 - 2 /HPF Final    Squamous Epithelial /HPF Urine 2 (H) 0 - 1 /HPF Final         10/16/2018 12:25 PM      Component Results     Component Value Ref Range & Units Status    WBC 6.2 4.0 - 11.0 10e9/L Final    RBC Count 4.54 3.8 - 5.2 10e12/L Final    Hemoglobin 13.2 11.7 - 15.7 g/dL Final    Hematocrit 38.4 35.0 - 47.0 % Final    MCV 85 78 - 100 fl Final    MCH 29.1 26.5 - 33.0 pg Final    MCHC 34.4 31.5 - 36.5 g/dL Final    RDW 14.3 10.0 - 15.0 % Final    Platelet Count 172 150 - 450 10e9/L Final    Diff Method Automated Method  Final    % Neutrophils 62.9 % Final    % Lymphocytes 30.4 % Final    % Monocytes 5.9 % Final    % Eosinophils 0.3 % Final    % Basophils 0.3 % Final    % Immature Granulocytes 0.2 % Final    Nucleated RBCs 0 0 /100 Final    Absolute Neutrophil 4.1 1.6 - 8.3 10e9/L Final     Absolute Lymphocytes 2.0 0.8 - 5.3 10e9/L Final    Absolute Monocytes 0.4 0.0 - 1.3 10e9/L Final    Absolute Eosinophils 0.0 0.0 - 0.7 10e9/L Final    Absolute Basophils 0.0 0.0 - 0.2 10e9/L Final    Abs Immature Granulocytes 0.0 0 - 0.4 10e9/L Final    Absolute Nucleated RBC 0.0  Final         10/16/2018 11:54 AM      Component Results     Component Value Ref Range & Units Status    Sodium 140 133 - 144 mmol/L Final    Potassium 3.4 3.4 - 5.3 mmol/L Final    Chloride 105 94 - 109 mmol/L Final    Carbon Dioxide 29 20 - 32 mmol/L Final    Anion Gap 6 3 - 14 mmol/L Final    Glucose 85 70 - 99 mg/dL Final    Urea Nitrogen 7 7 - 30 mg/dL Final    Creatinine 0.58 0.52 - 1.04 mg/dL Final    GFR Estimate >90 >60 mL/min/1.7m2 Final    Non  GFR Calc    GFR Estimate If Black >90 >60 mL/min/1.7m2 Final    African American GFR Calc    Calcium 8.8 8.5 - 10.1 mg/dL Final    Bilirubin Total 0.8 0.2 - 1.3 mg/dL Final    Albumin 4.1 3.4 - 5.0 g/dL Final    Protein Total 8.0 6.8 - 8.8 g/dL Final    Alkaline Phosphatase 82 40 - 150 U/L Final    ALT 52 (H) 0 - 50 U/L Final    AST 20 0 - 45 U/L Final         10/16/2018 11:51 AM      Component Results     Component Value Ref Range & Units Status    Lipase 99 73 - 393 U/L Final         10/16/2018 11:37 AM      Component Results     Component Value Ref Range & Units Status    Lactic Acid 1.5 0.7 - 2.0 mmol/L Final                Clinical Quality Measure: Blood Pressure Screening     Your blood pressure was checked while you were in the emergency department today. The last reading we obtained was  BP: (!) 139/97 (Simultaneous filing. User may not have seen previous data.) . Please read the guidelines below about what these numbers mean and what you should do about them.  If your systolic blood pressure (the top number) is less than 120 and your diastolic blood pressure (the bottom number) is less than 80, then your blood pressure is normal. There is nothing more that you  need to do about it.  If your systolic blood pressure (the top number) is 120-139 or your diastolic blood pressure (the bottom number) is 80-89, your blood pressure may be higher than it should be. You should have your blood pressure rechecked within a year by a primary care provider.  If your systolic blood pressure (the top number) is 140 or greater or your diastolic blood pressure (the bottom number) is 90 or greater, you may have high blood pressure. High blood pressure is treatable, but if left untreated over time it can put you at risk for heart attack, stroke, or kidney failure. You should have your blood pressure rechecked by a primary care provider within the next 4 weeks.  If your provider in the emergency department today gave you specific instructions to follow-up with your doctor or provider even sooner than that, you should follow that instruction and not wait for up to 4 weeks for your follow-up visit.        Thank you for choosing [facility]       Thank you for choosing [facility] for your care. Our goal is always to provide you with excellent care. Hearing back from our patients is one way we can continue to improve our services. Please take a few minutes to complete the written survey that you may receive in the mail after you visit with us. Thank you!        [facility] Information     [employer] gives you secure access to your electronic health record. If you see a primary care provider, you can also send messages to your care team and make appointments. If you have questions, please call your primary care clinic.  If you do not have a primary care provider, please call [phone] and they will assist you.        Care EveryWhere ID     This is your Care EveryWhere ID. This could be used by other organizations to access your [facility] medical records  [id number]        Equal Access to Services     KHADAR BAEZ : Sari Marcos, lesli casas, ariel self  hammad shafer ah. So [facility] [phone].    ATENCIÓN: Si habla español, tiene a guy disposición servicios gratuitos de asistencia lingüística. Llame al [phone].    We comply with applicable federal civil rights laws and [place] laws. We do not discriminate on the basis of race, color, national origin, age, disability, sex, sexual orientation, or gender identity.            After Visit Summary       This is your record. Keep this with you and show to your community pharmacist(s) and doctor(s) at your next visit.

## 2018-10-16 NOTE — DISCHARGE INSTRUCTIONS
Dehydration (Adult)  Dehydration occurs when your body loses too much fluid. This may be the result of prolonged vomiting or diarrhea, excessive sweating, or a high fever. It may also happen if you don t drink enough fluid when you re sick or out in the heat. Misuse of diuretics (water pills) can also be a cause.  Symptoms include thirst and decreased urine output. You may also feel dizzy, weak, fatigued, or very drowsy. The diet described below is usually enough to treat dehydration. In some cases, you may need medicine.  Home care    Drink at least 12 8-ounce glasses of fluid every day to resolve the dehydration. Fluid may include water; orange juice; lemonade; apple, grape, or cranberry juice; clear fruit drinks; electrolyte replacement and sports drinks; and teas and coffee without caffeine. Don't drink alcohol. If you have been diagnosed with a kidney disease, ask your doctor how much and what types of fluids you should drink to prevent dehydration. If you have kidney disease, fluid can build up in the body. This can be dangerous to your health.    If you have a fever, muscle aches, or a headache as a result of a cold or flu, you may take acetaminophen or ibuprofen, unless another medicine was prescribed. If you have chronic liver or kidney disease, or have ever had a stomach ulcer or gastrointestinal bleeding, talk with your healthcare provider before using these medicines. Don't take aspirin if you are younger than 18 and have a fever. Aspirin raises the chance for severe liver injury.  Follow-up care  Follow up with your healthcare provider, or as advised.  When to seek medical advice  Call your healthcare provider right away if any of these occur:    Continued vomiting    Frequent diarrhea (more than 5 times a day); blood (red or black color) or mucus in diarrhea    Blood in vomit or stool    Swollen abdomen or increasing abdominal pain    Weakness, dizziness, or fainting    Unusual drowsiness or  confusion    Reduced urine output or extreme thirst    Fever of 100.4 F (38 C) or higher  Date Last Reviewed: 5/1/2017 2000-2017 [employer]. [address] [place] [address], [place] [address]. All rights reserved. This information is not intended as a substitute for professional medical care. Always follow your healthcare professional's instructions.          Abdominal Pain    Abdominal pain is pain in the stomach or belly area. Everyone has this pain from time to time. In many cases it goes away on its own. But abdominal pain can sometimes be due to a serious problem, such as appendicitis. So it s important to know when to seek help.  Causes of abdominal pain  There are many possible causes of abdominal pain. Common causes in adults include:    Constipation, diarrhea, or gas    Stomach acid flowing back up into the esophagus (acid reflux or heartburn)    Severe acid reflux, called GERD (gastroesophageal reflux disease)    A sore in the lining of the stomach or small intestine (peptic ulcer)    Inflammation of the gallbladder, liver, or pancreas    Gallstones or kidney stones    Appendicitis     Intestinal blockage     An internal organ pushing through a muscle or other tissue (hernia)    Urinary tract infections    In women, menstrual cramps, fibroids, or endometriosis    Inflammation or infection of the intestines  Diagnosing the cause of abdominal pain  Your healthcare provider will do a physical exam help find the cause of your pain. If needed, tests will be ordered. Belly pain has many possible causes. So it can be hard to find the reason for your pain. Giving details about your pain can help. Tell your provider where and when you feel the pain, and what makes it better or worse. Also let your provider know if you have other symptoms such as:    Fever    Tiredness    Upset stomach (nausea)    Vomiting    Changes in bathroom habits  Treating abdominal pain  Some causes of pain need emergency medical  treatment right away. These include appendicitis or a bowel blockage. Other problems can be treated with rest, fluids, or medicines. Your healthcare provider can give you specific instructions for treatment or self-care based on what is causing your pain.  If you have vomiting or diarrhea, sip water or other clear fluids. When you are ready to eat solid foods again, start with small amounts of easy-to-digest, low-fat foods. These include apple sauce, toast, or crackers.   When to seek medical care  Call 911 or go to the hospital right away if you:    Can t pass stool and are vomiting    Are vomiting blood or have bloody diarrhea or black, tarry diarrhea    Have chest, neck, or shoulder pain    Feel like you might pass out    Have pain in your shoulder blades with nausea    Have sudden, severe belly pain    Have new, severe pain unlike any you have felt before    Have a belly that is rigid, hard, and tender to touch  Call your healthcare provider if you have:    Pain for more than 5 days    Bloating for more than 2 days    Diarrhea for more than 5 days    A fever of 100.4 F (38 C) or higher, or as directed by your healthcare provider    Pain that gets worse    Weight loss for no reason    Continued lack of appetite    Blood in your stool  How to prevent abdominal pain  Here are some tips to help prevent abdominal pain:    Eat smaller amounts of food at one time.    Avoid greasy, fried, or other high-fat foods.    Avoid foods that give you gas.    Exercise regularly.    Drink plenty of fluids.  To help prevent GERD symptoms:    Quit smoking.    Reduce alcohol and certain foods that increase stomach acid.    Avoid aspirin and over-the-counter pain and fever medicines (NSAIDS or nonsteroidal anti-inflammatory drugs), if possible    Lose extra weight.    Finish eating at least 2 hours before you go to bed or lie down.    Raise the head of your bed.  Date Last Reviewed: 7/1/2016 2000-2017 [employer]. [address]  [place] [address]. All rights reserved. This information is not intended as a substitute for professional medical care. Always follow your healthcare professional's instructions.

## 2018-10-18 ENCOUNTER — OFFICE VISIT (OUTPATIENT)
Dept: FAMILY MEDICINE | Facility: CLINIC | Age: 43
End: 2018-10-18
Payer: COMMERCIAL

## 2018-10-18 VITALS
WEIGHT: 186 LBS | RESPIRATION RATE: 17 BRPM | TEMPERATURE: 98.2 F | HEART RATE: 98 BPM | OXYGEN SATURATION: 100 % | SYSTOLIC BLOOD PRESSURE: 133 MMHG | BODY MASS INDEX: 32.96 KG/M2 | HEIGHT: 63 IN | DIASTOLIC BLOOD PRESSURE: 88 MMHG

## 2018-10-18 DIAGNOSIS — R42 DIZZINESS: ICD-10-CM

## 2018-10-18 DIAGNOSIS — R07.1 CHEST PAIN ON BREATHING: ICD-10-CM

## 2018-10-18 DIAGNOSIS — R10.9 RIGHT FLANK PAIN: Primary | ICD-10-CM

## 2018-10-18 LAB
ALBUMIN UR-MCNC: NEGATIVE MG/DL
APPEARANCE UR: CLEAR
BASOPHILS # BLD AUTO: 0 10E9/L (ref 0–0.2)
BASOPHILS NFR BLD AUTO: 0.4 %
BILIRUB UR QL STRIP: NEGATIVE
COLOR UR AUTO: YELLOW
D DIMER PPP FEU-MCNC: 0.3 UG/ML FEU (ref 0–0.5)
DIFFERENTIAL METHOD BLD: NORMAL
EOSINOPHIL # BLD AUTO: 0.1 10E9/L (ref 0–0.7)
EOSINOPHIL NFR BLD AUTO: 1 %
ERYTHROCYTE [DISTWIDTH] IN BLOOD BY AUTOMATED COUNT: 14.7 % (ref 10–15)
GLUCOSE UR STRIP-MCNC: NEGATIVE MG/DL
HCT VFR BLD AUTO: 38.3 % (ref 35–47)
HGB BLD-MCNC: 12.9 G/DL (ref 11.7–15.7)
HGB UR QL STRIP: NEGATIVE
KETONES UR STRIP-MCNC: ABNORMAL MG/DL
LEUKOCYTE ESTERASE UR QL STRIP: NEGATIVE
LYMPHOCYTES # BLD AUTO: 2.1 10E9/L (ref 0.8–5.3)
LYMPHOCYTES NFR BLD AUTO: 30.6 %
MCH RBC QN AUTO: 29.1 PG (ref 26.5–33)
MCHC RBC AUTO-ENTMCNC: 33.7 G/DL (ref 31.5–36.5)
MCV RBC AUTO: 86 FL (ref 78–100)
MONOCYTES # BLD AUTO: 0.7 10E9/L (ref 0–1.3)
MONOCYTES NFR BLD AUTO: 10.2 %
NEUTROPHILS # BLD AUTO: 4 10E9/L (ref 1.6–8.3)
NEUTROPHILS NFR BLD AUTO: 57.8 %
NITRATE UR QL: NEGATIVE
PH UR STRIP: 8.5 PH (ref 5–7)
PLATELET # BLD AUTO: 182 10E9/L (ref 150–450)
RBC # BLD AUTO: 4.44 10E12/L (ref 3.8–5.2)
SOURCE: ABNORMAL
SP GR UR STRIP: 1.01 (ref 1–1.03)
UROBILINOGEN UR STRIP-ACNC: 0.2 EU/DL (ref 0.2–1)
WBC # BLD AUTO: 7 10E9/L (ref 4–11)

## 2018-10-18 PROCEDURE — 81003 URINALYSIS AUTO W/O SCOPE: CPT | Performed by: PHYSICIAN ASSISTANT

## 2018-10-18 PROCEDURE — 85379 FIBRIN DEGRADATION QUANT: CPT | Performed by: PHYSICIAN ASSISTANT

## 2018-10-18 PROCEDURE — 36415 COLL VENOUS BLD VENIPUNCTURE: CPT | Performed by: PHYSICIAN ASSISTANT

## 2018-10-18 PROCEDURE — 85025 COMPLETE CBC W/AUTO DIFF WBC: CPT | Performed by: PHYSICIAN ASSISTANT

## 2018-10-18 PROCEDURE — 99214 OFFICE O/P EST MOD 30 MIN: CPT | Performed by: PHYSICIAN ASSISTANT

## 2018-10-18 ASSESSMENT — PAIN SCALES - GENERAL: PAINLEVEL: SEVERE PAIN (7)

## 2018-10-18 NOTE — PATIENT INSTRUCTIONS
Schedule ultrasound at [facility] ([phone]) for evaluation of abdominal pain.   Go to emergency department if any change in symptoms or worsening symptoms.   Follow up with Dr. Loredo  On Monday if symptoms not improving - may cancel appointment if symptoms resolved.     Follow up with us At [facility], we strive to deliver an exceptional experience to you, every time we see you.  If you receive a survey in the mail, please send us back your thoughts. We really do value your feedback.    Your care team:     Family Medicine   KATE Hays MD Emily Bunt, APRN CNP   S. MD Christine Edmond MD Angela Wermerskirchen, MD         Clinic hours: Monday - Wednesday 7 am-7 pm   Thursdays and Fridays 7 am-5 pm.     [facility]: Monday - Friday 11 am-9 pm,   Saturday and Sunday 9 am-5 pm.    [facility]: Monday -Thursday 8 am-8 pm; Friday 8 am-6 pm; Saturday and Sunday 9 am-5 pm.     [facility]: Monday - Thursday 8 am - 7 pm; Friday 8 am - 6 pm    Clinic: [phone]   [facility]: [phone]   [facility]: [phone]

## 2018-10-18 NOTE — LETTER
October 18, 2018      Jeff Serra  [address] [address]        To Whom It May Concern,     Please excuse Jeff from work until Tuesday October 23.  She was seen and evaluated in clinic.        Sincerely,        Rosalee Nicole PA-C

## 2018-10-18 NOTE — MR AVS SNAPSHOT
After Visit Summary   10/18/2018    Jeff Serra    MRN: [id number]           Patient Information     Date Of Birth          1975        Visit Information        Provider Department      10/18/2018 3:40 PM Rosalee Nicole PA-C [facility]        Today's Diagnoses     Right flank pain    -  1    Chest pain on breathing        Dizziness          Care Instructions    Schedule ultrasound at [facility] ([phone]) for evaluation of abdominal pain.   Go to emergency department if any change in symptoms or worsening symptoms.   Follow up with Dr. Loredo  On Monday if symptoms not improving - may cancel appointment if symptoms resolved.     Follow up with us At [facility], we strive to deliver an exceptional experience to you, every time we see you.  If you receive a survey in the mail, please send us back your thoughts. We really do value your feedback.    Your care team:     Family Medicine   KATE Hays MD Emily Bunt, APRN CNP   S. MD Christine Edmond MD Angela Wermerskirchen, MD         Clinic hours: Monday - Wednesday 7 am-7 pm   Thursdays and Fridays 7 am-5 pm.     [facility]: Monday - Friday 11 am-9 pm,   Saturday and Sunday 9 am-5 pm.    [facility]: Monday -Thursday 8 am-8 pm; Friday 8 am-6 pm; Saturday and Sunday 9 am-5 pm.     [facility]: Monday - Thursday 8 am - 7 pm; Friday 8 am - 6 pm    Clinic: [phone]   [facility]: [phone]   [facility]: [phone]                    Follow-ups after your visit        Follow-up notes from your care team     Return in about 3 days (around 10/21/2018), or if symptoms worsen or fail to improve.      Your next 10 appointments already scheduled     Oct 22, 2018  9:00 AM CDT   Office Visit with Jordyn Loredo MD    [facility] ([facility])    [address] [address] [phone]           Bring a current list of meds and any records pertaining to this visit. For Physicals, please bring immunization records and any forms needing to be filled out. Please arrive 10 minutes early to complete paperwork.              Future tests that were ordered for you today     Open Future Orders        Priority Expected Expires Ordered    US Abdomen Complete Routine 1/16/2019 4/16/2019 10/18/2018            Who to contact     If you have questions or need follow up information about today's clinic visit or your schedule please contact [facility] directly at [phone].  Normal or non-critical lab and imaging results will be communicated to you by AMKAIhart, letter or phone within 4 business days after the clinic has received the results. If you do not hear from us within 7 days, please contact the clinic through [employer] or phone. If you have a critical or abnormal lab result, we will notify you by phone as soon as possible.  Submit refill requests through [employer] or call your pharmacy and they will forward the refill request to us. Please allow 3 business days for your refill to be completed.          Additional Information About Your Visit        AMKAIharRedSeguro Information     [employer] gives you secure access to your electronic health record. If you see a primary care provider, you can also send messages to your care team and make appointments. If you have questions, please call your primary care clinic.  If you do not have a primary care provider, please call [phone] and they will assist you.        Care EveryWhere ID     This is your Care EveryWhere ID. This could be used by other organizations to access your [place] medical records  [id number]        Your Vitals Were     Pulse Temperature Respirations Height Last Period Pulse Oximetry    98 98.2  F (36.8  C)  "(Oral) 17 1.6 m (5' 3\") 09/12/2016 (Exact Date) 100%    Breastfeeding? BMI (Body Mass Index)                No 32.95 kg/m2           Blood Pressure from Last 3 Encounters:   10/18/18 133/88   10/16/18 (!) 140/99   10/03/18 129/80    Weight from Last 3 Encounters:   10/18/18 84.4 kg (186 lb)   10/16/18 80.7 kg (178 lb)   10/03/18 83.9 kg (185 lb)              We Performed the Following     *UA reflex to Microscopic     CBC with platelets differential     D dimer, quantitative        Primary Care Provider Office Phone # Fax #    Jordyn Jae Loredo [phone] [address] [address]        Equal Access to Services     [facility] Center[facility] : Hadii aad ku hadasho Somariza, waaxda luqadaha, qaybta kaalmada adeegyada, ariel shafer . So [facility] [phone].    ATENCIÓN: Si habla español, tiene a guy disposición servicios gratuitos de asistencia lingüística. Carlos al [phone].    We comply with applicable federal civil rights laws and [place] laws. We do not discriminate on the basis of race, color, national origin, age, disability, sex, sexual orientation, or gender identity.            Thank you!     Thank you for choosing [facility]  for your care. Our goal is always to provide you with excellent care. Hearing back from our patients is one way we can continue to improve our services. Please take a few minutes to complete the written survey that you may receive in the mail after your visit with us. Thank you!             Your Updated Medication List - Protect others around you: Learn how to safely use, store and throw away your medicines at www.disposemymeds.org.          This list is accurate as of 10/18/18  4:42 PM.  Always use your most recent med list.                   Brand Name Dispense Instructions for use Diagnosis    amLODIPine 5 MG tablet    NORVASC    30 tablet    Take 1 tablet (5 mg) by mouth daily    Hypertension goal BP " (blood pressure) < 140/90       diclofenac 1 % Gel topical gel    VOLTAREN    100 g    Apply 4 g topically 4 times daily    Cervicalgia       loratadine 10 MG tablet    CLARITIN    90 tablet    Take 1 tablet (10 mg) by mouth daily as needed for allergies    Seasonal allergic rhinitis, unspecified chronicity, unspecified trigger       oxyCODONE IR 15 MG tablet    ROXICODONE    90 tablet    Take 1 tablet (15 mg) by mouth 3 times daily as needed for moderate to severe pain    Chronic midline low back pain without sciatica, Chronic neck pain       piroxicam 20 MG capsule    FELDENE    30 capsule    Take 1 capsule (20 mg) by mouth At Bedtime    Bilateral foot pain       polyethylene glycol powder    MIRALAX    527 g    Take 17 g (1 capful) by mouth daily

## 2018-10-18 NOTE — PROGRESS NOTES
"  SUBJECTIVE:   Jeff Serra is a 43 year old female who presents to clinic today for the following health issues:      ED/UC Followup:    Facility:  [facility]  Date of visit: 10/16/18  Reason for visit: Dizziness  Current Status: still dizzy       Wanted to call ambulance last night because felt like ready to blackout. No headache.  Pressure in back of neck and whole head feels pressure.  No thunderclap headache. Not worst headache of life  Episode nauseated.  Not as bad now   Has had episodes twice during the day- worse if sitting at desk and looking at computer  Iv fluids in emergency department helped some  Only in emergency department for 20 minutes.   Used to be on losartan and switched to amlodipine - only med change  Blood pressure normal.   Feels lightheaded.  Feels like ready to fall over.    Right upper abdominal pain right side.   Yesterday taking breaths and felt pain with deep breath  Full hysterectomy but still has ovaries.   Lightheaded all the time now -at first every few hours if sitting or staring too long.   Little cough for awhile.   History of dvt and PE   No shortness of breath. - \"just feels like body is clogging not getting enough blood flow\"-  Feels bloated with fluids.  Stomach is bloated  Pain with urination.  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  Additional history: as documented    Patient Active Problem List   Diagnosis     CARDIOVASCULAR SCREENING; LDL GOAL LESS THAN 160     Chronic neck pain     Chronic low back pain     Hypertension goal BP (blood pressure) < 140/90     Hypokalemia     History of pulmonary embolism     Closed fracture of right fibula     Allergic rhinitis     Chronic pain syndrome     Dysmenorrhea     Pelvic pain in female     Hx of renal calculi     Chronic midline low back pain " without sciatica     Excessive or frequent menstruation     S/P hysterectomy     Continuous opioid dependence (H)     Past Surgical History:   Procedure Laterality Date     HYSTERECTOMY, PAP NO LONGER INDICATED  11/17/2017     LAPAROSCOPY DIAGNOSTIC (GYN) N/A 4/12/2016    Procedure: LAPAROSCOPY DIAGNOSTIC (GYN);  Surgeon: Margarito Walker MD;  Location: [facility] OR     LAPAROTOMY MINI, TUBAL LIGATION (POST PARTUM), COMBINED  2005     LITHOTRIPSY  2011       Social History   Substance Use Topics     Smoking status: Never Smoker     Smokeless tobacco: Never Used     Alcohol use 0.0 oz/week     0 Standard drinks or equivalent per week      Comment: Occasional     Family History   Problem Relation Age of Onset     HEART DISEASE Other      Breast Cancer Maternal Grandmother      50s      Breast Cancer Paternal Grandmother      50s      Diabetes Maternal Uncle      Colon Cancer No family hx of          Current Outpatient Prescriptions   Medication Sig Dispense Refill     amLODIPine (NORVASC) 5 MG tablet Take 1 tablet (5 mg) by mouth daily 30 tablet 0     diclofenac (VOLTAREN) 1 % GEL topical gel Apply 4 g topically 4 times daily 100 g 1     loratadine (CLARITIN) 10 MG tablet Take 1 tablet (10 mg) by mouth daily as needed for allergies 90 tablet 3     oxyCODONE IR (ROXICODONE) 15 MG tablet Take 1 tablet (15 mg) by mouth 3 times daily as needed for moderate to severe pain 90 tablet 0     piroxicam (FELDENE) 20 MG capsule Take 1 capsule (20 mg) by mouth At Bedtime 30 capsule 0     polyethylene glycol (MIRALAX) powder Take 17 g (1 capful) by mouth daily 527 g 0     BP Readings from Last 3 Encounters:   10/18/18 133/88   10/16/18 (!) 140/99   10/03/18 129/80    Wt Readings from Last 3 Encounters:   10/18/18 84.4 kg (186 lb)   10/16/18 80.7 kg (178 lb)   10/03/18 83.9 kg (185 lb)                    Reviewed and updated as needed this visit by clinical staff  Tobacco  Allergies  Meds  Problems  Med Hx  Surg Hx  Fam Hx  Soc Hx  "       Reviewed and updated as needed this visit by Provider  Tobacco  Allergies  Meds  Problems  Med Hx  Surg Hx  Fam Hx  Soc Hx          ROS:  Constitutional, HEENT, cardiovascular, pulmonary, gi and gu systems are negative, except as otherwise noted.    OBJECTIVE:     /88 (BP Location: Right arm, Patient Position: Chair, Cuff Size: Adult Large)  Pulse 98  Temp 98.2  F (36.8  C) (Oral)  Resp 17  Ht 1.6 m (5' 3\")  Wt 84.4 kg (186 lb)  LMP 09/12/2016 (Exact Date)  SpO2 100%  Breastfeeding? No  BMI 32.95 kg/m2  Body mass index is 32.95 kg/(m^2).  GENERAL: healthy, alert and no distress  EYES: Eyes grossly normal to inspection, PERRL and conjunctivae and sclerae normal  HENT: ear canals and TM's normal, nose and mouth without ulcers or lesions  NECK: no adenopathy, no asymmetry, masses, or scars and thyroid normal to palpation  RESP: lungs clear to auscultation - no rales, rhonchi or wheezes  CV: regular rate and rhythm, normal S1 S2, no S3 or S4, no murmur, click or rub, no peripheral edema and peripheral pulses strong  No carotid bruits   ABDOMEN: soft, tender right upper quadrant, no hepatosplenomegaly, no masses and bowel sounds normal  MS: no gross musculoskeletal defects noted, no edema  NEURO: Normal strength and tone, sensory exam grossly normal, mentation intact, cranial nerves 2-12 intact, gait normal including heel/toe/tandem walking, Romberg normal and rapid alternating movements normal  PSYCH: mentation appears normal, affect normal/bright, judgement and insight intact and appearance well groomed    Diagnostic Test Results:  Results for orders placed or performed in visit on 10/18/18   D dimer, quantitative   Result Value Ref Range    D Dimer 0.3 0.0 - 0.50 ug/ml FEU   *UA reflex to Microscopic   Result Value Ref Range    Color Urine Yellow     Appearance Urine Clear     Glucose Urine Negative NEG^Negative mg/dL    Bilirubin Urine Negative NEG^Negative    Ketones Urine Trace (A) " NEG^Negative mg/dL    Specific Gravity Urine 1.015 1.003 - 1.035    Blood Urine Negative NEG^Negative    pH Urine 8.5 (H) 5.0 - 7.0 pH    Protein Albumin Urine Negative NEG^Negative mg/dL    Urobilinogen Urine 0.2 0.2 - 1.0 EU/dL    Nitrite Urine Negative NEG^Negative    Leukocyte Esterase Urine Negative NEG^Negative    Source Midstream Urine    CBC with platelets differential   Result Value Ref Range    WBC 7.0 4.0 - 11.0 10e9/L    RBC Count 4.44 3.8 - 5.2 10e12/L    Hemoglobin 12.9 11.7 - 15.7 g/dL    Hematocrit 38.3 35.0 - 47.0 %    MCV 86 78 - 100 fl    MCH 29.1 26.5 - 33.0 pg    MCHC 33.7 31.5 - 36.5 g/dL    RDW 14.7 10.0 - 15.0 %    Platelet Count 182 150 - 450 10e9/L    % Neutrophils 57.8 %    % Lymphocytes 30.6 %    % Monocytes 10.2 %    % Eosinophils 1.0 %    % Basophils 0.4 %    Absolute Neutrophil 4.0 1.6 - 8.3 10e9/L    Absolute Lymphocytes 2.1 0.8 - 5.3 10e9/L    Absolute Monocytes 0.7 0.0 - 1.3 10e9/L    Absolute Eosinophils 0.1 0.0 - 0.7 10e9/L    Absolute Basophils 0.0 0.0 - 0.2 10e9/L    Diff Method Automated Method        ASSESSMENT/PLAN:             1. Right flank pain  Cbc normal.  No elevation in white blood cell count.   No blood in urine. No infection of urine- encouraged to schedule abdominal ultrasound   Rule out gallbladder disease  - *UA reflex to Microscopic  - US Abdomen Complete; Future    2. Chest pain on breathing  Rule out PE -given history .  ddimer negative.  Not PE   - D dimer, quantitative    3. Dizziness  Has had thorough lab evaluation in emergency department.  Recheck cbc given length of symptoms and no anemia or leukocytosis  Unclear etiology- follow up with PCP in 4 days if symptoms not improving  Warning signs and symptoms warranting emergency department evaluation reviewed  - D dimer, quantitative  - CBC with platelets differential  - US Abdomen Complete; Future    Patient Instructions     Schedule ultrasound at [facility]  ([phone]) for evaluation of abdominal pain.   Go to emergency department if any change in symptoms or worsening symptoms.   Follow up with Dr. Loredo  On Monday if symptoms not improving - may cancel appointment if symptoms resolved.     Follow up with us At [facility], we strive to deliver an exceptional experience to you, every time we see you.  If you receive a survey in the mail, please send us back your thoughts. We really do value your feedback.    Your care team:     Family Medicine   KATE Hays MD Emily Bunt, HARLEY SELBY   S. MD Christine Edmond MD Angela Wermerskirchen, MD         Clinic hours: Monday - Wednesday 7 am-7 pm   Thursdays and Fridays 7 am-5 pm.     [facility]: Monday - Friday 11 am-9 pm,   Saturday and Sunday 9 am-5 pm.    [facility]: Monday -Thursday 8 am-8 pm; Friday 8 am-6 pm; Saturday and Sunday 9 am-5 pm.     [facility]: Monday - Thursday 8 am - 7 pm; Friday 8 am - 6 pm    Clinic: [phone]   [facility]: [phone]   [facility]: [phone]                 Rosalee Nicole PA-C  [facility]

## 2018-10-19 DIAGNOSIS — M79.671 BILATERAL FOOT PAIN: ICD-10-CM

## 2018-10-19 DIAGNOSIS — M79.672 BILATERAL FOOT PAIN: ICD-10-CM

## 2018-10-19 DIAGNOSIS — I10 HYPERTENSION GOAL BP (BLOOD PRESSURE) < 140/90: ICD-10-CM

## 2018-10-19 NOTE — PROGRESS NOTES
Josef Aguilar  Your test for a possible blood clot was negative.   Your blood counts were normal.   Please call or MyChart my office with any questions or concerns.    Rosalee Nicole, PAC

## 2018-10-19 NOTE — TELEPHONE ENCOUNTER
"Requested Prescriptions   Pending Prescriptions Disp Refills     piroxicam (FELDENE) 20 MG capsule [Pharmacy Med Name: PIROXICAM 20MG CAPSULES]  Last Written Prescription Date:  09/21/18  Last Fill Quantity: 30 capsule,  # refills: 0   Last office visit: 10/18/2018 with prescribing provider:  Dr. Loredo   Future Office Visit:   Next 5 appointments (look out 90 days)     Oct 22, 2018  9:00 AM CDT   Office Visit with Jordyn Loredo MD   [facility] ([address] [address] [phone]                30 capsule 0     Sig: TAKE ONE CAPSULE BY MOUTH AT BEDTIME    NSAID Medications Failed    10/19/2018 11:56 AM       Failed - Normal ALT on file in past 12 months    Recent Labs   Lab Test  10/16/18   1122   ALT  52*            Passed - Blood pressure under 140/90 in past 12 months    BP Readings from Last 3 Encounters:   10/18/18 133/88   10/16/18 (!) 140/99   10/03/18 129/80                Passed - Normal AST on file in past 12 months    Recent Labs   Lab Test  10/16/18   1122   AST  20            Passed - Recent (12 mo) or future (30 days) visit within the authorizing provider's specialty    Patient had office visit in the last 12 months or has a visit in the next 30 days with authorizing provider or within the authorizing provider's specialty.  See \"Patient Info\" tab in inbasket, or \"Choose Columns\" in Meds & Orders section of the refill encounter.             Passed - Patient is age 6-64 years       Passed - Normal CBC on file in past 12 months    Recent Labs   Lab Test  10/18/18   1600   WBC  7.0   RBC  4.44   HGB  12.9   HCT  38.3   PLT  182                Passed - No active pregnancy on record       Passed - Normal serum creatinine on file in past 12 months    Recent Labs   Lab Test  10/16/18   1122   CR  0.58            Passed - No positive pregnancy test in past 12 months              amLODIPine (NORVASC) 5 MG tablet [Pharmacy " "Med Name: AMLODIPINE BESYLATE 5MG TABLETS]  Last Written Prescription Date:  9/21/18  Last Fill Quantity: 30 tablet,  # refills: 0   Last office visit: 10/18/2018 with prescribing provider:  Dr. Loredo   Future Office Visit:   Next 5 appointments (look out 90 days)     Oct 22, 2018  9:00 AM CDT   Office Visit with Jordyn Loredo MD   [facility] ([address] [address] [phone]                30 tablet 0     Sig: TAKE 1 TABLET BY MOUTH DAILY    Calcium Channel Blockers Protocol  Passed    10/19/2018 11:56 AM       Passed - Blood pressure under 140/90 in past 12 months    BP Readings from Last 3 Encounters:   10/18/18 133/88   10/16/18 (!) 140/99   10/03/18 129/80                Passed - Recent (12 mo) or future (30 days) visit within the authorizing provider's specialty    Patient had office visit in the last 12 months or has a visit in the next 30 days with authorizing provider or within the authorizing provider's specialty.  See \"Patient Info\" tab in inbasket, or \"Choose Columns\" in Meds & Orders section of the refill encounter.             Passed - Patient is age 18 or older       Passed - No active pregnancy on record       Passed - Normal serum creatinine on file in past 12 months    Recent Labs   Lab Test  10/16/18   1122   CR  0.58            Passed - No positive pregnancy test in past 12 months          "

## 2018-10-23 RX ORDER — AMLODIPINE BESYLATE 5 MG/1
TABLET ORAL
Qty: 30 TABLET | Refills: 0 | Status: SHIPPED | OUTPATIENT
Start: 2018-10-23 | End: 2018-10-24

## 2018-10-23 NOTE — TELEPHONE ENCOUNTER
For piroxicam:  Routing refill request to provider for review/approval because:  Labs out of range:  ALT    For amlodipine:  Prescription approved per [facility] [place] Refill Protocol.          Herminia Rutherford RN, BSN

## 2018-10-24 ENCOUNTER — OFFICE VISIT (OUTPATIENT)
Dept: FAMILY MEDICINE | Facility: CLINIC | Age: 43
End: 2018-10-24
Payer: COMMERCIAL

## 2018-10-24 VITALS
OXYGEN SATURATION: 100 % | DIASTOLIC BLOOD PRESSURE: 90 MMHG | HEART RATE: 114 BPM | HEIGHT: 63 IN | SYSTOLIC BLOOD PRESSURE: 138 MMHG | WEIGHT: 189 LBS | TEMPERATURE: 98.6 F | BODY MASS INDEX: 33.49 KG/M2

## 2018-10-24 DIAGNOSIS — M62.838 MUSCLE SPASM: ICD-10-CM

## 2018-10-24 DIAGNOSIS — G89.29 CHRONIC NECK PAIN: ICD-10-CM

## 2018-10-24 DIAGNOSIS — G89.29 CHRONIC MIDLINE LOW BACK PAIN WITHOUT SCIATICA: ICD-10-CM

## 2018-10-24 DIAGNOSIS — M54.50 CHRONIC MIDLINE LOW BACK PAIN WITHOUT SCIATICA: ICD-10-CM

## 2018-10-24 DIAGNOSIS — I10 HYPERTENSION GOAL BP (BLOOD PRESSURE) < 140/90: Primary | ICD-10-CM

## 2018-10-24 DIAGNOSIS — M54.2 CHRONIC NECK PAIN: ICD-10-CM

## 2018-10-24 PROCEDURE — 99214 OFFICE O/P EST MOD 30 MIN: CPT | Performed by: FAMILY MEDICINE

## 2018-10-24 RX ORDER — OXYCODONE HYDROCHLORIDE 15 MG/1
15 TABLET ORAL 3 TIMES DAILY PRN
Qty: 90 TABLET | Refills: 0 | Status: SHIPPED | OUTPATIENT
Start: 2018-10-24 | End: 2018-12-03

## 2018-10-24 RX ORDER — HYDROXYZINE HYDROCHLORIDE 25 MG/1
25 TABLET, FILM COATED ORAL 3 TIMES DAILY PRN
Qty: 30 TABLET | Refills: 1 | Status: SHIPPED | OUTPATIENT
Start: 2018-11-03 | End: 2018-10-24

## 2018-10-24 RX ORDER — METOPROLOL SUCCINATE 50 MG/1
50 TABLET, EXTENDED RELEASE ORAL DAILY
Qty: 30 TABLET | Refills: 1 | Status: SHIPPED | OUTPATIENT
Start: 2018-10-24 | End: 2018-12-07

## 2018-10-24 ASSESSMENT — PAIN SCALES - GENERAL: PAINLEVEL: EXTREME PAIN (8)

## 2018-10-24 NOTE — MR AVS SNAPSHOT
After Visit Summary   10/24/2018    Jeff Serra    MRN: [id number]           Patient Information     Date Of Birth          1975        Visit Information        Provider Department      10/24/2018 3:20 PM Jordyn Loredo MD [facility]        Today's Diagnoses     Hypertension goal BP (blood pressure) < 140/90    -  1    Muscle spasm        Chronic midline low back pain without sciatica        Chronic neck pain           Follow-ups after your visit        Follow-up notes from your care team     Return in about 6 weeks (around 12/5/2018) for BP Recheck.      Who to contact     If you have questions or need follow up information about today's clinic visit or your schedule please contact [facility] directly at [phone].  Normal or non-critical lab and imaging results will be communicated to you by MyChart, letter or phone within 4 business days after the clinic has received the results. If you do not hear from us within 7 days, please contact the clinic through [employer] or phone. If you have a critical or abnormal lab result, we will notify you by phone as soon as possible.  Submit refill requests through [facility] or call your pharmacy and they will forward the refill request to us. Please allow 3 business days for your refill to be completed.          Additional Information About Your Visit        MyChart Information     [facility] gives you secure access to your electronic health record. If you see a primary care provider, you can also send messages to your care team and make appointments. If you have questions, please call your primary care clinic.  If you do not have a primary care provider, please call [phone] and they will assist you.        Care EveryWhere ID     This is your Care EveryWhere ID. This could be used by other organizations to access your [facility] medical records  [id number]        Your Vitals Were     Pulse Temperature Height  "Last Period Pulse Oximetry Breastfeeding?    114 98.6  F (37  C) (Oral) 1.6 m (5' 3\") 09/12/2016 (Exact Date) 100% No    BMI (Body Mass Index)                   33.48 kg/m2            Blood Pressure from Last 3 Encounters:   10/24/18 138/90   10/18/18 133/88   10/16/18 (!) 140/99    Weight from Last 3 Encounters:   10/24/18 85.7 kg (189 lb)   10/18/18 84.4 kg (186 lb)   10/16/18 80.7 kg (178 lb)              Today, you had the following     No orders found for display         Today's Medication Changes          These changes are accurate as of 10/24/18 11:59 PM.  If you have any questions, ask your nurse or doctor.               Start taking these medicines.        Dose/Directions    hydrOXYzine 25 MG tablet   Commonly known as:  ATARAX   Used for:  Muscle spasm   Started by:  Jordyn Loredo MD        Dose:  25 mg   Take 1 tablet (25 mg) by mouth 3 times daily as needed for anxiety (muscle spasm)   Quantity:  30 tablet   Refills:  1       metoprolol succinate 50 MG 24 hr tablet   Commonly known as:  TOPROL-XL   Used for:  Hypertension goal BP (blood pressure) < 140/90   Replaces:  amLODIPine 5 MG tablet   Started by:  Jordyn Loredo MD        Dose:  50 mg   Take 1 tablet (50 mg) by mouth daily   Quantity:  30 tablet   Refills:  1         Stop taking these medicines if you haven't already. Please contact your care team if you have questions.     amLODIPine 5 MG tablet   Commonly known as:  NORVASC   Replaced by:  metoprolol succinate 50 MG 24 hr tablet   Stopped by:  Jordyn Loredo MD                Where to get your medicines      These medications were sent to [facility] [address] [place], [place] - [address]  [address], [facility] [address]     Phone:  [phone]     hydrOXYzine 25 MG tablet    metoprolol succinate 50 MG 24 hr tablet         Some of these will need a paper prescription and others can be bought over the " counter.  Ask your nurse if you have questions.     Bring a paper prescription for each of these medications     oxyCODONE IR 15 MG tablet               Information about OPIOIDS     PRESCRIPTION OPIOIDS: WHAT YOU NEED TO KNOW   We gave you an opioid (narcotic) pain medicine. It is important to manage your pain, but opioids are not always the best choice. You should first try all the other options your care team gave you. Take this medicine for as short a time (and as few doses) as possible.    Some activities can increase your pain, such as bandage changes or therapy sessions. It may help to take your pain medicine 30 to 60 minutes before these activities. Reduce your stress by getting enough sleep, working on hobbies you enjoy and practicing relaxation or meditation. Talk to your care team about ways to manage your pain beyond prescription opioids.    These medicines have risks:    DO NOT drive when on new or higher doses of pain medicine. These medicines can affect your alertness and reaction times, and you could be arrested for driving under the influence (DUI). If you need to use opioids long-term, talk to your care team about driving.    DO NOT operate heavy machinery    DO NOT do any other dangerous activities while taking these medicines.    DO NOT drink any alcohol while taking these medicines.     If the opioid prescribed includes acetaminophen, DO NOT take with any other medicines that contain acetaminophen. Read all labels carefully. Look for the word  acetaminophen  or  Tylenol.  Ask your pharmacist if you have questions or are unsure.    You can get addicted to pain medicines, especially if you have a history of addiction (chemical, alcohol or substance dependence). Talk to your care team about ways to reduce this risk.    All opioids tend to cause constipation. Drink plenty of water and eat foods that have a lot of fiber, such as fruits, vegetables, prune juice, apple juice and high-fiber cereal. Take  a laxative (Miralax, milk of magnesia, Colace, Senna) if you don t move your bowels at least every other day. Other side effects include upset stomach, sleepiness, dizziness, throwing up, tolerance (needing more of the medicine to have the same effect), physical dependence and slowed breathing.    Store your pills in a secure place, locked if possible. We will not replace any lost or stolen medicine. If you don t finish your medicine, please throw away (dispose) as directed by your pharmacist. The [place] Pollution Control Agency has more information about safe disposal: https://www.pca.[facility].mn.us/living-green/managing-unwanted-medications         Primary Care Provider Office Phone # Fax #    Jordyn Loredo [phone] [address] [facility] [address]        Equal Access to Services     [facility] : Hadii roldan whittaker hadasho Soomaali, waaxda luqadaha, qaybta kaalmada adefinnyada, ariel shafer . So [facility] [phone].    ATENCIÓN: Si habla español, tiene a guy disposición servicios gratuitos de asistencia lingüística. Kimame al [phone].    We comply with applicable federal civil rights laws and [place] laws. We do not discriminate on the basis of race, color, national origin, age, disability, sex, sexual orientation, or gender identity.            Thank you!     Thank you for choosing [facility]  for your care. Our goal is always to provide you with excellent care. Hearing back from our patients is one way we can continue to improve our services. Please take a few minutes to complete the written survey that you may receive in the mail after your visit with us. Thank you!             Your Updated Medication List - Protect others around you: Learn how to safely use, store and throw away your medicines at www.disposemymeds.org.          This list is accurate as of 10/24/18 11:59 PM.  Always use your most recent med list.                    Brand Name Dispense Instructions for use Diagnosis    diclofenac 1 % Gel topical gel    VOLTAREN    100 g    Apply 4 g topically 4 times daily    Cervicalgia       hydrOXYzine 25 MG tablet    ATARAX    30 tablet    Take 1 tablet (25 mg) by mouth 3 times daily as needed for anxiety (muscle spasm)    Muscle spasm       loratadine 10 MG tablet    CLARITIN    90 tablet    Take 1 tablet (10 mg) by mouth daily as needed for allergies    Seasonal allergic rhinitis, unspecified chronicity, unspecified trigger       metoprolol succinate 50 MG 24 hr tablet    TOPROL-XL    30 tablet    Take 1 tablet (50 mg) by mouth daily    Hypertension goal BP (blood pressure) < 140/90       oxyCODONE IR 15 MG tablet    ROXICODONE    90 tablet    Take 1 tablet (15 mg) by mouth 3 times daily as needed for moderate to severe pain    Chronic midline low back pain without sciatica, Chronic neck pain       piroxicam 20 MG capsule    FELDENE    30 capsule    TAKE ONE CAPSULE BY MOUTH AT BEDTIME    Bilateral foot pain       polyethylene glycol powder    MIRALAX    527 g    Take 17 g (1 capful) by mouth daily

## 2018-10-24 NOTE — PROGRESS NOTES
"  SUBJECTIVE:   Jeff Serra is a 43 year old female who presents to clinic today for the following health issues:      Hypertension Follow-up      Outpatient blood pressures are being checked at home.  Results are Over 140 .    Low Salt Diet: not monitoring salt      Amount of exercise or physical activity: None    Problems taking medications regularly: Yes,  side effects    Medication side effects: Feldene- loose stools    Diet: regular (no restrictions)    SUBJECTIVE:  Here today to follow-up on blood pressure.  He has a history of hypertension but actually gotten off medication was doing well until recently when blood pressures have been running somewhat high.  Also noted that the patient's pulse is running somewhat high because feel that at times as well.  Not irregular.  She was chalking this up to a lot of stress at home and at work.  Still struggling with back pain.  We have continue to monitor this through the  database.  Having some muscle cramps at nighttime, especially in her calves.  We discussed hydration, stretching, etc.    Review of systems otherwise negative.  Past medical, family, and social history reviewed and updated in chart.    OBJECTIVE:  /90 (BP Location: Right arm, Patient Position: Chair, Cuff Size: Adult Large)  Pulse 114  Temp 98.6  F (37  C) (Oral)  Ht 1.6 m (5' 3\")  Wt 85.7 kg (189 lb)  LMP 09/12/2016 (Exact Date)  SpO2 100%  Breastfeeding? No  BMI 33.48 kg/m2  Alert, pleasant, upbeat, and in no apparent discomfort.  Heart -borderline tachycardic but regular rhythm  Lungs clear to auscultation bilaterally  BACK - Good range of motion with straight leg raising negative bilaterally.  No significant tenderness to palpation.  CMS intact lower extremities bilaterally.  Normal deep tendon reflexes bilaterally.   Past labs reviewed with the patient.     ASSESSMENT / PLAN:  (I10) Hypertension goal BP (blood pressure) < 140/90  (primary encounter diagnosis)  Comment: It " is time to get started on an agent and a beta-blocker may be a good choice for her given her heart rate and somewhat anxious symptomatology.  Plan: metoprolol succinate (TOPROL-XL) 50 MG 24 hr         tablet        Discussed mechanism of action of the proposed medication, as well as potential effects, both good and bad.  Patient expressed understanding and agreed with treatment.     (M62.591) Muscle spasm  Comment: Discussed mechanism of action of the proposed medication, as well as potential effects, both good and bad.  Patient expressed understanding and agreed with treatment.   Plan: DISCONTINUED: hydrOXYzine (ATARAX) 25 MG tablet            (M54.5,  G89.29) Chronic midline low back pain without sciatica  Comment:   Plan: oxyCODONE IR (ROXICODONE) 15 MG tablet            (M54.2,  G89.29) Chronic neck pain  Comment:   Plan: oxyCODONE IR (ROXICODONE) 15 MG tablet            Follow up 6 weeks to recheck blood pressure  S. Jae Loredo MD    (Chart documentation completed in part with Dragon voice-recognition software.  Even though reviewed some grammatical, spelling, and word errors may remain.)

## 2018-11-19 NOTE — TELEPHONE ENCOUNTER
"Requested Prescriptions   Pending Prescriptions Disp Refills     amLODIPine (NORVASC) 5 MG tablet [Pharmacy Med Name: AMLODIPINE BESYLATE 5MG TABLETS] 30 tablet 0     Sig: TAKE 1 TABLET BY MOUTH DAILY    Calcium Channel Blockers Protocol  Failed    11/17/2018 10:17 AM       Failed - Blood pressure under 140/90 in past 12 months    BP Readings from Last 3 Encounters:   10/24/18 138/90   10/18/18 133/88   10/16/18 (!) 140/99                Passed - Recent (12 mo) or future (30 days) visit within the authorizing provider's specialty    Patient had office visit in the last 12 months or has a visit in the next 30 days with authorizing provider or within the authorizing provider's specialty.  See \"Patient Info\" tab in inbasket, or \"Choose Columns\" in Meds & Orders section of the refill encounter.             Passed - Patient is age 18 or older       Passed - No active pregnancy on record       Passed - Normal serum creatinine on file in past 12 months    Recent Labs   Lab Test  10/16/18   1122   CR  0.58            Passed - No positive pregnancy test in past 12 months          amLODIPine (NORVASC) 5 MG tablet (Discontinued)      Last Written Prescription Date:  10/23/18  Last Fill Quantity: 30,   # refills: 0  Last Office Visit: 10/24/18  Future Office visit:    Next 5 appointments (look out 90 days)     Nov 21, 2018  2:30 PM CST   Office Visit with Margarito Walker [place]   [facility] [place]    [address] [place] [address] [phone]                   Routing refill request to provider for review/approval because:  Drug not active on patient's medication list    "

## 2018-11-20 RX ORDER — AMLODIPINE BESYLATE 5 MG/1
TABLET ORAL
Qty: 30 TABLET | Refills: 0 | OUTPATIENT
Start: 2018-11-20

## 2018-11-20 NOTE — TELEPHONE ENCOUNTER
Routing refill request to provider for review/approval because:  Drug not active on patient's medication list  Lindsey Ureña RN

## 2018-11-21 ENCOUNTER — OFFICE VISIT (OUTPATIENT)
Dept: OBGYN | Facility: CLINIC | Age: 43
End: 2018-11-21
Payer: COMMERCIAL

## 2018-11-21 VITALS
HEART RATE: 80 BPM | OXYGEN SATURATION: 98 % | DIASTOLIC BLOOD PRESSURE: 90 MMHG | BODY MASS INDEX: 33.09 KG/M2 | TEMPERATURE: 98.1 F | SYSTOLIC BLOOD PRESSURE: 135 MMHG | WEIGHT: 186.8 LBS

## 2018-11-21 DIAGNOSIS — Z98.890 POST-OPERATIVE STATE: ICD-10-CM

## 2018-11-21 DIAGNOSIS — R32 URINARY INCONTINENCE, UNSPECIFIED TYPE: Primary | ICD-10-CM

## 2018-11-21 PROCEDURE — 99214 OFFICE O/P EST MOD 30 MIN: CPT | Performed by: OBSTETRICS & GYNECOLOGY
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
## 2018-11-21 NOTE — PATIENT INSTRUCTIONS
If you have any questions regarding your visit, Please contact your care team.    [email or web address]: [phone]      Women s Health CLINIC HOURS TELEPHONE NUMBER   MD Stephany Hatfield CMA Lisa -    SHALINI Fraga       Monday:       7:30-4:30 [place]  Wednesday:       7:30-4:30 [place]  Thursday:       7:30-1:30 [place]  Friday:       7:30-11:30 [facility]  [address] [facility] [place]  [address]  [phone] ask for [facility]  [address] [place] [address]  [phone] ask for [facility]    Imaging Scheduling for [place]:  [phone]    Imaging Scheduling for [place]: [phone]       Urgent Care locations:    [facility] Saturday and Sunday   9 am - 5 pm    Monday-Friday   12 pm - 8 pm  Saturday and Sunday   9 am - 5 pm   [phone]     [facility] Labor and Delivery:  [phone]    If you need a medication refill, please contact your pharmacy. Please allow 3 business days for your refill to be completed.  As always, Thank you for trusting us with your healthcare needs!

## 2018-12-03 ENCOUNTER — MYC REFILL (OUTPATIENT)
Dept: FAMILY MEDICINE | Facility: CLINIC | Age: 43
End: 2018-12-03

## 2018-12-03 DIAGNOSIS — M54.2 CHRONIC NECK PAIN: ICD-10-CM

## 2018-12-03 DIAGNOSIS — G89.29 CHRONIC NECK PAIN: ICD-10-CM

## 2018-12-03 DIAGNOSIS — G89.29 CHRONIC MIDLINE LOW BACK PAIN WITHOUT SCIATICA: ICD-10-CM

## 2018-12-03 DIAGNOSIS — M54.50 CHRONIC MIDLINE LOW BACK PAIN WITHOUT SCIATICA: ICD-10-CM

## 2018-12-03 NOTE — TELEPHONE ENCOUNTER
Message from MyChart:  Original authorizing provider: MD Jeff Membreno GEORGETTEKelly Donohueon would like a refill of the following medications:  oxyCODONE IR (ROXICODONE) 15 MG tablet [Jordyn Loredo MD]    Preferred pharmacy: [facility] [address] [place] [address] [id number]    Comment:

## 2018-12-03 NOTE — TELEPHONE ENCOUNTER
Requested Prescriptions   Pending Prescriptions Disp Refills     oxyCODONE IR (ROXICODONE) 15 MG tablet 90 tablet 0     Sig: Take 1 tablet (15 mg) by mouth 3 times daily as needed for moderate to severe pain    There is no refill protocol information for this order        oxyCODONE IR (ROXICODONE) 15 MG tablet      Last Written Prescription Date:  10/24/18  Last Fill Quantity: 90,   # refills: 0  Last Office Visit: 10/24/18  Future Office visit:       Routing refill request to provider for review/approval because:  Drug not on the [facility], [facility] or [facility] refill protocol or controlled substance

## 2018-12-05 RX ORDER — OXYCODONE HYDROCHLORIDE 15 MG/1
15 TABLET ORAL 3 TIMES DAILY PRN
Qty: 90 TABLET | Refills: 0 | Status: SHIPPED | OUTPATIENT
Start: 2018-12-05 | End: 2018-12-07

## 2018-12-05 NOTE — TELEPHONE ENCOUNTER
Patient is requesting her , Duy Serra  her RX at the . Patient was notified that he'll have to show ID upon receiving RX. Thanks.

## 2018-12-07 ENCOUNTER — TELEPHONE (OUTPATIENT)
Dept: FAMILY MEDICINE | Facility: CLINIC | Age: 43
End: 2018-12-07

## 2018-12-07 ENCOUNTER — OFFICE VISIT (OUTPATIENT)
Dept: FAMILY MEDICINE | Facility: CLINIC | Age: 43
End: 2018-12-07
Payer: COMMERCIAL

## 2018-12-07 VITALS
OXYGEN SATURATION: 100 % | DIASTOLIC BLOOD PRESSURE: 100 MMHG | BODY MASS INDEX: 32.59 KG/M2 | SYSTOLIC BLOOD PRESSURE: 142 MMHG | HEART RATE: 101 BPM | WEIGHT: 184 LBS | TEMPERATURE: 98.1 F

## 2018-12-07 DIAGNOSIS — M54.50 CHRONIC MIDLINE LOW BACK PAIN WITHOUT SCIATICA: ICD-10-CM

## 2018-12-07 DIAGNOSIS — M54.2 CHRONIC NECK PAIN: ICD-10-CM

## 2018-12-07 DIAGNOSIS — R20.0 LEFT ARM NUMBNESS: Primary | ICD-10-CM

## 2018-12-07 DIAGNOSIS — I10 HYPERTENSION GOAL BP (BLOOD PRESSURE) < 140/90: ICD-10-CM

## 2018-12-07 DIAGNOSIS — G89.29 CHRONIC NECK PAIN: ICD-10-CM

## 2018-12-07 DIAGNOSIS — G89.29 CHRONIC MIDLINE LOW BACK PAIN WITHOUT SCIATICA: ICD-10-CM

## 2018-12-07 PROCEDURE — 99214 OFFICE O/P EST MOD 30 MIN: CPT | Performed by: FAMILY MEDICINE

## 2018-12-07 RX ORDER — OXYCODONE HYDROCHLORIDE 15 MG/1
15 TABLET ORAL 3 TIMES DAILY PRN
Qty: 90 TABLET | Refills: 0 | Status: SHIPPED | OUTPATIENT
Start: 2019-01-03 | End: 2019-02-01

## 2018-12-07 RX ORDER — METOPROLOL SUCCINATE 100 MG/1
100 TABLET, EXTENDED RELEASE ORAL DAILY
Qty: 90 TABLET | Refills: 1 | Status: SHIPPED | OUTPATIENT
Start: 2018-12-07 | End: 2019-03-19

## 2018-12-07 RX ORDER — METOPROLOL SUCCINATE 50 MG/1
50 TABLET, EXTENDED RELEASE ORAL DAILY
Qty: 90 TABLET | Refills: 1 | Status: SHIPPED | OUTPATIENT
Start: 2018-12-07 | End: 2018-12-07

## 2018-12-07 RX ORDER — METHYLPREDNISOLONE 4 MG
TABLET, DOSE PACK ORAL
Qty: 21 TABLET | Refills: 0 | Status: SHIPPED | OUTPATIENT
Start: 2018-12-07 | End: 2019-03-19

## 2018-12-07 ASSESSMENT — PAIN SCALES - GENERAL: PAINLEVEL: EXTREME PAIN (8)

## 2018-12-07 NOTE — TELEPHONE ENCOUNTER
Called the patient and noticed that provider had an opening for today at 2:40 pm and scheduled patient in that slot for assessment.    Latricia Zapien RN, [facility]

## 2018-12-07 NOTE — MR AVS SNAPSHOT
After Visit Summary   12/7/2018    Jeff Serra    MRN: [id number]           Patient Information     Date Of Birth          1975        Visit Information        Provider Department      12/7/2018 2:40 PM Jordyn Loredo MD [facility]        Today's Diagnoses     Left arm numbness    -  1    Hypertension goal BP (blood pressure) < 140/90        Chronic midline low back pain without sciatica        Chronic neck pain           Follow-ups after your visit        Follow-up notes from your care team     Return in about 6 weeks (around 1/18/2019) for BP Recheck.      Who to contact     If you have questions or need follow up information about today's clinic visit or your schedule please contact [facility] directly at [phone].  Normal or non-critical lab and imaging results will be communicated to you by MyChart, letter or phone within 4 business days after the clinic has received the results. If you do not hear from us within 7 days, please contact the clinic through MyChart or phone. If you have a critical or abnormal lab result, we will notify you by phone as soon as possible.  Submit refill requests through [employer] or call your pharmacy and they will forward the refill request to us. Please allow 3 business days for your refill to be completed.          Additional Information About Your Visit        MyChart Information     [employer] gives you secure access to your electronic health record. If you see a primary care provider, you can also send messages to your care team and make appointments. If you have questions, please call your primary care clinic.  If you do not have a primary care provider, please call [phone] and they will assist you.        Care EveryWhere ID     This is your Care EveryWhere ID. This could be used by other organizations to access your [place] medical records  [id number]        Your Vitals Were     Pulse Temperature Last  Period Pulse Oximetry Breastfeeding? BMI (Body Mass Index)    101 98.1  F (36.7  C) (Oral) 09/12/2016 (Exact Date) 100% No 32.59 kg/m2       Blood Pressure from Last 3 Encounters:   12/07/18 (!) 142/100   11/21/18 135/90   10/24/18 138/90    Weight from Last 3 Encounters:   12/07/18 83.5 kg (184 lb)   11/21/18 84.7 kg (186 lb 12.8 oz)   10/24/18 85.7 kg (189 lb)              Today, you had the following     No orders found for display         Today's Medication Changes          These changes are accurate as of 12/7/18  3:23 PM.  If you have any questions, ask your nurse or doctor.               Start taking these medicines.        Dose/Directions    methylPREDNISolone 4 MG tablet therapy pack   Commonly known as:  MEDROL DOSEPAK   Used for:  Left arm numbness   Started by:  Jordyn Lordeo MD        Follow package instructions   Quantity:  21 tablet   Refills:  0       metoprolol succinate  MG 24 hr tablet   Commonly known as:  TOPROL-XL   Used for:  Hypertension goal BP (blood pressure) < 140/90   Started by:  Jordyn Loredo MD        Dose:  100 mg   Take 1 tablet (100 mg) by mouth daily   Quantity:  90 tablet   Refills:  1         These medicines have changed or have updated prescriptions.        Dose/Directions    oxyCODONE IR 15 MG tablet   Commonly known as:  ROXICODONE   This may have changed:  These instructions start on 1/3/2019. If you are unsure what to do until then, ask your doctor or other care provider.   Used for:  Chronic midline low back pain without sciatica, Chronic neck pain   Changed by:  Jordyn Loredo MD        Dose:  15 mg   Start taking on:  1/3/2019   Take 1 tablet (15 mg) by mouth 3 times daily as needed for moderate to severe pain   Quantity:  90 tablet   Refills:  0         Stop taking these medicines if you haven't already. Please contact your care team if you have questions.     diclofenac 1 % topical gel   Commonly known as:  VOLTAREN   Stopped by:   Jordyn Loredo MD           hydrOXYzine 25 MG tablet   Commonly known as:  ATARAX   Stopped by:  Jordyn Loredo MD                Where to get your medicines      These medications were sent to [facility] - [place], [place] - [address], [facility] [address]     Phone:  [phone]     methylPREDNISolone 4 MG tablet therapy pack    metoprolol succinate  MG 24 hr tablet         Some of these will need a paper prescription and others can be bought over the counter.  Ask your nurse if you have questions.     Bring a paper prescription for each of these medications     oxyCODONE IR 15 MG tablet               Information about OPIOIDS     PRESCRIPTION OPIOIDS: WHAT YOU NEED TO KNOW   We gave you an opioid (narcotic) pain medicine. It is important to manage your pain, but opioids are not always the best choice. You should first try all the other options your care team gave you. Take this medicine for as short a time (and as few doses) as possible.    Some activities can increase your pain, such as bandage changes or therapy sessions. It may help to take your pain medicine 30 to 60 minutes before these activities. Reduce your stress by getting enough sleep, working on hobbies you enjoy and practicing relaxation or meditation. Talk to your care team about ways to manage your pain beyond prescription opioids.    These medicines have risks:    DO NOT drive when on new or higher doses of pain medicine. These medicines can affect your alertness and reaction times, and you could be arrested for driving under the influence (DUI). If you need to use opioids long-term, talk to your care team about driving.    DO NOT operate heavy machinery    DO NOT do any other dangerous activities while taking these medicines.    DO NOT drink any alcohol while taking these medicines.     If the opioid prescribed includes acetaminophen, DO NOT  take with any other medicines that contain acetaminophen. Read all labels carefully. Look for the word  acetaminophen  or  Tylenol.  Ask your pharmacist if you have questions or are unsure.    You can get addicted to pain medicines, especially if you have a history of addiction (chemical, alcohol or substance dependence). Talk to your care team about ways to reduce this risk.    All opioids tend to cause constipation. Drink plenty of water and eat foods that have a lot of fiber, such as fruits, vegetables, prune juice, apple juice and high-fiber cereal. Take a laxative (Miralax, milk of magnesia, Colace, Senna) if you don t move your bowels at least every other day. Other side effects include upset stomach, sleepiness, dizziness, throwing up, tolerance (needing more of the medicine to have the same effect), physical dependence and slowed breathing.    Store your pills in a secure place, locked if possible. We will not replace any lost or stolen medicine. If you don t finish your medicine, please throw away (dispose) as directed by your pharmacist. The [place] Pollution [employer] has more information about safe disposal: [email or web address]         Primary Care Provider Office Phone # Fax #    Jordyn Jae Loredo [phone] [address] [facility] [address]        Equal Access to Services     KHADAR BAEZ : Hadii roldan whittaker hadasho Soomaali, waaxda luqadaha, qaybta kaalmada adejordy, ariel marie. So [facility] [phone].    ATENCIÓN: Si habla español, tiene a guy disposición servicios gratuitos de asistencia lingüística. Llame al [phone].    We comply with applicable federal civil rights laws and [place] laws. We do not discriminate on the basis of race, color, national origin, age, disability, sex, sexual orientation, or gender identity.            Thank you!     Thank you for choosing  [facility]  for your care. Our goal is always to provide you with excellent care. Hearing back from our patients is one way we can continue to improve our services. Please take a few minutes to complete the written survey that you may receive in the mail after your visit with us. Thank you!             Your Updated Medication List - Protect others around you: Learn how to safely use, store and throw away your medicines at www.disposemymeds.org.          This list is accurate as of 12/7/18  3:23 PM.  Always use your most recent med list.                   Brand Name Dispense Instructions for use Diagnosis    loratadine 10 MG tablet    CLARITIN    90 tablet    Take 1 tablet (10 mg) by mouth daily as needed for allergies    Seasonal allergic rhinitis, unspecified chronicity, unspecified trigger       methylPREDNISolone 4 MG tablet therapy pack    MEDROL DOSEPAK    21 tablet    Follow package instructions    Left arm numbness       metoprolol succinate  MG 24 hr tablet    TOPROL-XL    90 tablet    Take 1 tablet (100 mg) by mouth daily    Hypertension goal BP (blood pressure) < 140/90       oxyCODONE IR 15 MG tablet   Start taking on:  1/3/2019    ROXICODONE    90 tablet    Take 1 tablet (15 mg) by mouth 3 times daily as needed for moderate to severe pain    Chronic midline low back pain without sciatica, Chronic neck pain       piroxicam 20 MG capsule    FELDENE    30 capsule    TAKE ONE CAPSULE BY MOUTH AT BEDTIME    Bilateral foot pain

## 2018-12-07 NOTE — TELEPHONE ENCOUNTER
..Reason for call:  Patient reporting a symptom    Symptom or request: tingling and numbness - left arm    Duration (how long have symptoms been present): unknown    Have you been treated for this before? unknown    Additional comments: stated it is intermittent - scheduled first available with Dr Loredo (12-), declined seeing another provider;     Phone Number patient can be reached at:  Home number on file [phone] (home)    Best Time:  anytime    Can we leave a detailed message on this number:  YES    Call taken on 12/7/2018 at 12:52 PM by Earnestine Blanchard

## 2018-12-07 NOTE — PROGRESS NOTES
SUBJECTIVE:   Jeff Serra is a 43 year old female who presents to clinic today for the following health issues:      Joint Numbness    Onset: On and off since the weekend     Description:   Location: Left arm   Character: Dull ache to sharp pain    Intensity: mild to moderate     Progression of Symptoms: worse    Accompanying Signs & Symptoms:  Other symptoms: numbness and tingling    History:   Previous similar pain: no       Precipitating factors:   Trauma or overuse: no     Alleviating factors:  Improved by: Pad    Therapies Tried and outcome: None    SUBJECTIVE:  Here today with symptoms as above.  Is been bothering her for a week or so without any known specific trauma.  She feels a numbness seems to center around her left elbow and down her forearm but not quite to her hand.  Has a known issue with chronic neck and back pain but her neck does not feel any worse than normal.  Most problematic at night when she is holding her hands on the wheel to drive.  Does a lot of typing at work and that does not necessarily trigger things off.    In October we started her on metoprolol to help bring her blood pressure and pulse down.  No side effects from the medication.  Has not been following this on the outside.    Review of systems otherwise negative.  Past medical, family, and social history reviewed and updated in chart.    OBJECTIVE:  BP (!) 142/100 (BP Location: Right arm, Patient Position: Chair, Cuff Size: Adult Large)  Pulse 101  Temp 98.1  F (36.7  C) (Oral)  Wt 83.5 kg (184 lb)  LMP 09/12/2016 (Exact Date)  SpO2 100%  Breastfeeding? No  BMI 32.59 kg/m2  Alert, pleasant, upbeat, and in no apparent discomfort.  S1 and S2 normal, no murmurs, clicks, gallops or rubs. Regular rate and rhythm. Chest is clear; no wheezes or rales. No edema or JVD.  LUE - Normal active range of motion and CMS throughout.  She does have a little bit of tenderness around the cubital tunnel region on the left  Past labs  reviewed with the patient.     ASSESSMENT / PLAN:  (R20.0) Left arm numbness  (primary encounter diagnosis)  Comment: I suspect this is impingement of the ulnar nerve around the elbow.  Discussed an elbow sleeve and a course of oral steroids  Plan: methylPREDNISolone (MEDROL DOSEPAK) 4 MG tablet        therapy pack        Discussed mechanism of action of the proposed medication, as well as potential effects, both good and bad.  Patient expressed understanding and agreed with treatment.     (I10) Hypertension goal BP (blood pressure) < 140/90  Comment: Not at goal.  Will increase to 100 mg daily  Plan: metoprolol succinate ER (TOPROL-XL) 100 MG 24 hr        tablet            (M54.5,  G89.29) Chronic midline low back pain without sciatica  Comment:   Plan: oxyCODONE IR (ROXICODONE) 15 MG tablet            (M54.2,  G89.29) Chronic neck pain  Comment: Stable and refilled  Plan: oxyCODONE IR (ROXICODONE) 15 MG tablet            Follow up 6 weeks for blood pressure recheck  SKelly Loredo MD    (Chart documentation completed in part with Dragon voice-recognition software.  Even though reviewed some grammatical, spelling, and word errors may remain.)

## 2019-02-01 ENCOUNTER — MYC REFILL (OUTPATIENT)
Dept: FAMILY MEDICINE | Facility: CLINIC | Age: 44
End: 2019-02-01

## 2019-02-01 DIAGNOSIS — M54.2 CHRONIC NECK PAIN: ICD-10-CM

## 2019-02-01 DIAGNOSIS — G89.29 CHRONIC NECK PAIN: ICD-10-CM

## 2019-02-01 DIAGNOSIS — G89.29 CHRONIC MIDLINE LOW BACK PAIN WITHOUT SCIATICA: ICD-10-CM

## 2019-02-01 DIAGNOSIS — M54.50 CHRONIC MIDLINE LOW BACK PAIN WITHOUT SCIATICA: ICD-10-CM

## 2019-02-01 NOTE — TELEPHONE ENCOUNTER
Requested Prescriptions   Pending Prescriptions Disp Refills     oxyCODONE IR (ROXICODONE) 15 MG tablet  Last Written Prescription Date:  1/3/19  Last Fill Quantity: 30 tablet,  # refills: 0   Last office visit: 12/7/2018 with prescribing provider:  Dr. Loredo   Future Office Visit:    Routing refill request to provider for review/approval because:  Drug not on the [facility], P or  Health refill protocol or controlled substance   90 tablet 0     Sig: Take 1 tablet (15 mg) by mouth 3 times daily as needed for moderate to severe pain    There is no refill protocol information for this order

## 2019-02-05 RX ORDER — OXYCODONE HYDROCHLORIDE 15 MG/1
15 TABLET ORAL 3 TIMES DAILY PRN
Qty: 90 TABLET | Refills: 0 | Status: SHIPPED | OUTPATIENT
Start: 2019-02-05 | End: 2019-03-01

## 2019-02-05 NOTE — TELEPHONE ENCOUNTER
Patient was to schedule office visit with Dr. Loredo 6 weeks from 12/7/19-no office visit scheduled.  Overdue for follow up with PCP - routing to him to approve or deny since he is back in clinic tomorrow

## 2019-02-05 NOTE — TELEPHONE ENCOUNTER
Chart review shows Rx dated 1/3/19 is linked to OV 12/7/18.     Given during OV with start date of 1/3/19?    Routing to provider to review and advise.   Lindsey Ureña RN

## 2019-02-06 ENCOUNTER — ALLIED HEALTH/NURSE VISIT (OUTPATIENT)
Dept: NURSING | Facility: CLINIC | Age: 44
End: 2019-02-06
Payer: COMMERCIAL

## 2019-02-06 VITALS — DIASTOLIC BLOOD PRESSURE: 90 MMHG | SYSTOLIC BLOOD PRESSURE: 132 MMHG

## 2019-02-06 DIAGNOSIS — I10 HYPERTENSION GOAL BP (BLOOD PRESSURE) < 140/90: Primary | ICD-10-CM

## 2019-02-06 PROCEDURE — 99207 ZZC NO CHARGE NURSE ONLY: CPT

## 2019-02-06 NOTE — TELEPHONE ENCOUNTER
Reason for Call:  Other     Detailed comments: OK for Duy SAUCEDO) to pickup written prescription.    Phone Number Patient can be reached at: Cell number on file:    Telephone Information:   Mobile [phone]     Best Time: any    Can we leave a detailed message on this number? YES    Call taken on 2/6/2019 at 8:38 AM by Mariam Figueredo

## 2019-02-06 NOTE — TELEPHONE ENCOUNTER
Rx placed at .  LM informing pt.  Advised to get BP checked at time of .    Gali Mcneil, Patient Care

## 2019-03-01 ENCOUNTER — MYC MEDICAL ADVICE (OUTPATIENT)
Dept: FAMILY MEDICINE | Facility: CLINIC | Age: 44
End: 2019-03-01

## 2019-03-01 ENCOUNTER — MYC REFILL (OUTPATIENT)
Dept: FAMILY MEDICINE | Facility: CLINIC | Age: 44
End: 2019-03-01

## 2019-03-01 DIAGNOSIS — M54.50 CHRONIC MIDLINE LOW BACK PAIN WITHOUT SCIATICA: ICD-10-CM

## 2019-03-01 DIAGNOSIS — M54.2 CHRONIC NECK PAIN: ICD-10-CM

## 2019-03-01 DIAGNOSIS — G89.29 CHRONIC NECK PAIN: ICD-10-CM

## 2019-03-01 DIAGNOSIS — G89.4 CHRONIC PAIN SYNDROME: ICD-10-CM

## 2019-03-01 DIAGNOSIS — G89.29 CHRONIC MIDLINE LOW BACK PAIN WITHOUT SCIATICA: ICD-10-CM

## 2019-03-01 RX ORDER — OXYCODONE HYDROCHLORIDE 15 MG/1
15 TABLET ORAL 3 TIMES DAILY PRN
Qty: 90 TABLET | Refills: 0 | Status: SHIPPED | OUTPATIENT
Start: 2019-04-05 | End: 2019-03-05

## 2019-03-01 NOTE — TELEPHONE ENCOUNTER
Controlled Substance Refill Request for Roxicodone  Problem List Complete:  Yes  Chronic pain syndrome   Problem Detail     Noted:  12/1/2015    Priority:  Medium    Overview Addendum 9/7/2018  4:11 PM by Patricia Loredo MD   Patient is followed by PATRICIA LOREDO for ongoing prescription of pain medication.  All refills should be approved by this provider, or covering partner.     Medication(s): oxycodone 15 three times daily = 67.5 MED  Narcan n/a     Plan - minimize dependence      Clinic visit frequency required: Q 3 months      Controlled substance agreement on file: Yes       Date(s): 8/31/15     Pain Clinic evaluation in the past: No     DIRE Total Score(s):    8/31/2015   Total Score 16         Last [facility] website verification:  done on 9/7/18   [email or web address]        checked in past 3 months?  No, route to RN. Completed today.     Last Written Prescription Date:  2/5/19  Last Fill Quantity: 90,  # refills: 0   Last office visit: 12/7/2018 with prescribing provider:  Facundo   Future Office Visit:      [place] reviewed:  reviewed- no concerns    [place]  [email or web address]    Angelica Bernardo RN

## 2019-03-05 ENCOUNTER — MYC MEDICAL ADVICE (OUTPATIENT)
Dept: FAMILY MEDICINE | Facility: CLINIC | Age: 44
End: 2019-03-05

## 2019-03-05 DIAGNOSIS — G89.29 CHRONIC MIDLINE LOW BACK PAIN WITHOUT SCIATICA: ICD-10-CM

## 2019-03-05 DIAGNOSIS — G89.29 CHRONIC NECK PAIN: ICD-10-CM

## 2019-03-05 DIAGNOSIS — M54.2 CHRONIC NECK PAIN: ICD-10-CM

## 2019-03-05 DIAGNOSIS — M54.50 CHRONIC MIDLINE LOW BACK PAIN WITHOUT SCIATICA: ICD-10-CM

## 2019-03-05 RX ORDER — OXYCODONE HYDROCHLORIDE 15 MG/1
15 TABLET ORAL 3 TIMES DAILY PRN
Qty: 90 TABLET | Refills: 0 | Status: SHIPPED | OUTPATIENT
Start: 2019-03-05 | End: 2019-03-19

## 2019-03-05 NOTE — TELEPHONE ENCOUNTER
Patient brought back RX.  It is dated April 5, 2019 and patient states it should be dated March 5, 2019.

## 2019-03-19 ENCOUNTER — ANCILLARY PROCEDURE (OUTPATIENT)
Dept: GENERAL RADIOLOGY | Facility: CLINIC | Age: 44
End: 2019-03-19
Attending: FAMILY MEDICINE
Payer: COMMERCIAL

## 2019-03-19 ENCOUNTER — OFFICE VISIT (OUTPATIENT)
Dept: FAMILY MEDICINE | Facility: CLINIC | Age: 44
End: 2019-03-19
Payer: COMMERCIAL

## 2019-03-19 VITALS
SYSTOLIC BLOOD PRESSURE: 130 MMHG | HEART RATE: 95 BPM | BODY MASS INDEX: 33.13 KG/M2 | WEIGHT: 187 LBS | TEMPERATURE: 97.8 F | OXYGEN SATURATION: 96 % | DIASTOLIC BLOOD PRESSURE: 94 MMHG

## 2019-03-19 DIAGNOSIS — E55.9 VITAMIN D DEFICIENCY: ICD-10-CM

## 2019-03-19 DIAGNOSIS — M54.2 CHRONIC NECK PAIN: ICD-10-CM

## 2019-03-19 DIAGNOSIS — G89.29 CHRONIC MIDLINE LOW BACK PAIN WITHOUT SCIATICA: ICD-10-CM

## 2019-03-19 DIAGNOSIS — I10 HYPERTENSION GOAL BP (BLOOD PRESSURE) < 140/90: ICD-10-CM

## 2019-03-19 DIAGNOSIS — R20.0 ARM NUMBNESS: Primary | ICD-10-CM

## 2019-03-19 DIAGNOSIS — E87.6 HYPOKALEMIA: ICD-10-CM

## 2019-03-19 DIAGNOSIS — M54.50 CHRONIC MIDLINE LOW BACK PAIN WITHOUT SCIATICA: ICD-10-CM

## 2019-03-19 DIAGNOSIS — G89.29 CHRONIC NECK PAIN: ICD-10-CM

## 2019-03-19 PROCEDURE — 72040 X-RAY EXAM NECK SPINE 2-3 VW: CPT | Mod: FY

## 2019-03-19 PROCEDURE — 99214 OFFICE O/P EST MOD 30 MIN: CPT | Performed by: FAMILY MEDICINE

## 2019-03-19 PROCEDURE — 80053 COMPREHEN METABOLIC PANEL: CPT | Performed by: FAMILY MEDICINE

## 2019-03-19 PROCEDURE — 84443 ASSAY THYROID STIM HORMONE: CPT | Performed by: FAMILY MEDICINE

## 2019-03-19 PROCEDURE — 82306 VITAMIN D 25 HYDROXY: CPT | Performed by: FAMILY MEDICINE

## 2019-03-19 PROCEDURE — 80061 LIPID PANEL: CPT | Performed by: FAMILY MEDICINE

## 2019-03-19 PROCEDURE — 36415 COLL VENOUS BLD VENIPUNCTURE: CPT | Performed by: FAMILY MEDICINE

## 2019-03-19 RX ORDER — METOPROLOL SUCCINATE 100 MG/1
100 TABLET, EXTENDED RELEASE ORAL DAILY
Qty: 90 TABLET | Refills: 1 | Status: SHIPPED | OUTPATIENT
Start: 2019-03-19 | End: 2019-10-11

## 2019-03-19 RX ORDER — OXYCODONE HYDROCHLORIDE 15 MG/1
15 TABLET ORAL 3 TIMES DAILY PRN
Qty: 90 TABLET | Refills: 0 | Status: SHIPPED | OUTPATIENT
Start: 2019-04-04 | End: 2019-04-04

## 2019-03-19 NOTE — PROGRESS NOTES
"  SUBJECTIVE:   Jeff Serra is a 44 year old female who presents to clinic today for the following health issues:      Joint Tingling     Onset: Months     Description:   Location: First started on left arm has now moved to right arm   Character: Pt states it \"Feels like when you hit your funny bone\"    Intensity: mild    Progression of Symptoms: intermittent    Accompanying Signs & Symptoms:  Other symptoms: Tingling and swelling in right arm     History:   Previous similar pain: no       Precipitating factors:   Trauma or overuse: no     Alleviating factors:  Improved by: Medrol Dosepak    Therapies Tried and outcome: Medrol Dosepak helped with symptoms but caused fatigue    SUBJECTIVE:  Here today with arm symptoms as above.  I saw her back in December she was having similar in the left arm but now is noticing it bilaterally.  Seems to be a numbness and an ache starting on her elbows and radiating towards her hands.  But she does not feel weakness or incoordination per se.  At times her arms feel puffy.  At night sometimes she feels as though they fall asleep easily but is not specifically just her hands.  We tried a course of methylprednisolone but it did not really do anything.  She does note some generalized stiffness in her neck and occasional headaches.  Is not been taking her metoprolol but not because of any side effects.  She thought she got a letter about it not being covered though I find that hard to believe.  Weight has been going up a little bit and she just feels slightly off.  Discussed metabolic causes for numbness.    Review of systems otherwise negative.  Past medical, family, and social history reviewed and updated in chart.    OBJECTIVE:  BP (!) 130/94 (BP Location: Right arm, Patient Position: Chair, Cuff Size: Adult Large)   Pulse 95   Temp 97.8  F (36.6  C) (Oral)   Wt 84.8 kg (187 lb)   LMP 09/12/2016 (Exact Date)   SpO2 96%   Breastfeeding? No   BMI 33.13 kg/m    Alert, " pleasant, upbeat, and in no apparent discomfort.  S1 and S2 normal, no murmurs, clicks, gallops or rubs. Regular rate and rhythm. Chest is clear; no wheezes or rales. No edema or JVD.  Upper extremities -full active range of motion and CMS.  Normal pulses.  Past labs reviewed with the patient.   XRAY - my independent reading of the films showed normal cervical spine without significant degeneration    ASSESSMENT / PLAN:  (R20.0) Arm numbness  (primary encounter diagnosis)  Comment: Unknown cause at this point.  We will take a look at some lab work to see if this is involved.  Possibly an atypical carpal tunnel syndrome or possibly even cubital tunnel  Plan: XR Cervical Spine 2/3 Views, TSH with free T4         reflex, Vitamin D Deficiency            (I10) Hypertension goal BP (blood pressure) < 140/90  Comment: Get back on the medication and we will continue to monitor  Plan: metoprolol succinate ER (TOPROL-XL) 100 MG 24         hr tablet, Comprehensive metabolic panel, Lipid        panel reflex to direct LDL Non-fasting            (M54.5,  G89.29) Chronic midline low back pain without sciatica  Comment:   Plan: oxyCODONE IR (ROXICODONE) 15 MG tablet            (M54.2,  G89.29) Chronic neck pain  Comment:   Plan: oxyCODONE IR (ROXICODONE) 15 MG tablet            Follow up 1 week based on results  MOISES Loredo MD    (Chart documentation completed in part with Dragon voice-recognition software.  Even though reviewed some grammatical, spelling, and word errors may remain.)

## 2019-03-20 LAB
ALBUMIN SERPL-MCNC: 4.1 G/DL (ref 3.4–5)
ALP SERPL-CCNC: 84 U/L (ref 40–150)
ALT SERPL W P-5'-P-CCNC: 63 U/L (ref 0–50)
ANION GAP SERPL CALCULATED.3IONS-SCNC: 9 MMOL/L (ref 3–14)
AST SERPL W P-5'-P-CCNC: 29 U/L (ref 0–45)
BILIRUB SERPL-MCNC: 0.9 MG/DL (ref 0.2–1.3)
BUN SERPL-MCNC: 6 MG/DL (ref 7–30)
CALCIUM SERPL-MCNC: 8.6 MG/DL (ref 8.5–10.1)
CHLORIDE SERPL-SCNC: 104 MMOL/L (ref 94–109)
CHOLEST SERPL-MCNC: 223 MG/DL
CO2 SERPL-SCNC: 27 MMOL/L (ref 20–32)
CREAT SERPL-MCNC: 0.58 MG/DL (ref 0.52–1.04)
GFR SERPL CREATININE-BSD FRML MDRD: >90 ML/MIN/{1.73_M2}
GLUCOSE SERPL-MCNC: 71 MG/DL (ref 70–99)
HDLC SERPL-MCNC: 72 MG/DL
LDLC SERPL CALC-MCNC: 136 MG/DL
NONHDLC SERPL-MCNC: 151 MG/DL
POTASSIUM SERPL-SCNC: 3.1 MMOL/L (ref 3.4–5.3)
PROT SERPL-MCNC: 7.6 G/DL (ref 6.8–8.8)
SODIUM SERPL-SCNC: 140 MMOL/L (ref 133–144)
TRIGL SERPL-MCNC: 74 MG/DL
TSH SERPL DL<=0.005 MIU/L-ACNC: 0.93 MU/L (ref 0.4–4)

## 2019-03-21 LAB — DEPRECATED CALCIDIOL+CALCIFEROL SERPL-MC: 5 UG/L (ref 20–75)

## 2019-03-22 PROBLEM — E55.9 VITAMIN D DEFICIENCY: Status: ACTIVE | Noted: 2019-03-22

## 2019-03-22 RX ORDER — ERGOCALCIFEROL 1.25 MG/1
50000 CAPSULE, LIQUID FILLED ORAL WEEKLY
Qty: 12 CAPSULE | Refills: 1 | Status: SHIPPED | OUTPATIENT
Start: 2019-03-22 | End: 2019-09-11

## 2019-03-22 RX ORDER — POTASSIUM CHLORIDE 750 MG/1
10 TABLET, EXTENDED RELEASE ORAL DAILY
Qty: 90 TABLET | Refills: 1 | Status: SHIPPED | OUTPATIENT
Start: 2019-03-22 | End: 2019-09-11

## 2019-03-22 NOTE — RESULT ENCOUNTER NOTE
Jeff,  I note a couple of abnormalities in your lab work, and may be these contribute to some of those symptoms you are having.  Your vitamin D is very low.  I will definitely go ahead and send in a supplement for this that we use once weekly.  Potassium is running low again.  I know you used to take a potassium supplement.  Going to send that in as well and we can see how things go over the next month or so.  MOISES Loredo M.D.

## 2019-04-04 ENCOUNTER — HOSPITAL ENCOUNTER (EMERGENCY)
Facility: CLINIC | Age: 44
Discharge: HOME | End: 2019-04-04
Attending: EMERGENCY MEDICINE | Admitting: EMERGENCY MEDICINE
Payer: MEDICAID

## 2019-04-04 VITALS
WEIGHT: 191.38 LBS | TEMPERATURE: 98 F | OXYGEN SATURATION: 100 % | DIASTOLIC BLOOD PRESSURE: 86 MMHG | RESPIRATION RATE: 16 BRPM | SYSTOLIC BLOOD PRESSURE: 139 MMHG | BODY MASS INDEX: 33.9 KG/M2

## 2019-04-04 DIAGNOSIS — K64.5 THROMBOSED EXTERNAL HEMORRHOIDS: ICD-10-CM

## 2019-04-04 PROCEDURE — 99283 EMERGENCY DEPT VISIT LOW MDM: CPT | Mod: 25 | Performed by: EMERGENCY MEDICINE

## 2019-04-04 PROCEDURE — 46083 INC THROMBOSED HROID XTRNL: CPT | Mod: Z6 | Performed by: EMERGENCY MEDICINE

## 2019-04-04 PROCEDURE — 46083 INC THROMBOSED HROID XTRNL: CPT | Performed by: EMERGENCY MEDICINE

## 2019-04-04 RX ORDER — LIDOCAINE HYDROCHLORIDE AND EPINEPHRINE 10; 10 MG/ML; UG/ML
INJECTION, SOLUTION INFILTRATION; PERINEURAL
Status: DISCONTINUED
Start: 2019-04-04 | End: 2019-04-04 | Stop reason: HOSPADM

## 2019-04-04 NOTE — ED AVS SNAPSHOT
[facility], [place], Emergency Department  [address]  [facility] [address]  Phone:  [phone]  Fax:  [phone]                                    Jeff Serra   MRN: [id number]    Department:  [facility], Emergency Department   Date of Visit:  4/4/2019           After Visit Summary Signature Page    I have received my discharge instructions, and my questions have been answered. I have discussed any challenges I see with this plan with the nurse or doctor.    ..........................................................................................................................................  Patient/Patient Representative Signature      ..........................................................................................................................................  Patient Representative Print Name and Relationship to Patient    ..................................................               ................................................  Date                                   Time    ..........................................................................................................................................  Reviewed by Signature/Title    ...................................................              ..............................................  Date                                               Time          22EPIC Rev 08/18

## 2019-04-04 NOTE — DISCHARGE INSTRUCTIONS
Take ibuprofen for pain, you may slight bleeding for the rest of today  Keep the area clean with soapy water  Return if any problems or concerns
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
## 2019-05-02 ENCOUNTER — MYC MEDICAL ADVICE (OUTPATIENT)
Dept: FAMILY MEDICINE | Facility: CLINIC | Age: 44
End: 2019-05-02

## 2019-05-02 DIAGNOSIS — G89.4 CHRONIC PAIN SYNDROME: Primary | ICD-10-CM

## 2019-05-02 NOTE — TELEPHONE ENCOUNTER
Requested Prescriptions   Pending Prescriptions Disp Refills     oxyCODONE IR (ROXICODONE) 15 MG tablet 90 tablet 0     Sig: Take 1 tablet (15 mg) by mouth 3 times daily as needed for moderate to severe pain       There is no refill protocol information for this order          oxyCODONE IR (ROXICODONE) 15 MG tablet (Discontinued)      Last Written Prescription Date:  4/4/19  Last Fill Quantity: 90,   # refills: 0  Last Office Visit: 3/19/19  Future Office visit:       Routing refill request to provider for review/approval because:  Drug not on the [facility], P or [facility] refill protocol or controlled substance  Drug not active on patient's medication list

## 2019-05-03 NOTE — TELEPHONE ENCOUNTER
Please call Patient  @ [phone] when script is ready  Ok to leave detailed message    Thank you    I Lo    Patient rep [facility]

## 2019-05-06 RX ORDER — OXYCODONE HYDROCHLORIDE 15 MG/1
15 TABLET ORAL 3 TIMES DAILY PRN
Qty: 90 TABLET | Refills: 0 | Status: SHIPPED | OUTPATIENT
Start: 2019-05-06 | End: 2019-06-10

## 2019-05-06 NOTE — TELEPHONE ENCOUNTER
Controlled Substance Refill Request for Oxycodone  Problem List Complete:  Yes  Problem Detail     Noted:  12/1/2015   Priority:  Medium   Overview Addendum 3/1/2019  9:26 AM by Angelica Bernardo RN   Patient is followed by PATRICIA RAYMOND for ongoing prescription of pain medication.  All refills should be approved by this provider, or covering partner.     Medication(s): oxycodone 15 three times daily = 67.5 MED  Narcan n/a     Plan - minimize dependence      Clinic visit frequency required: Q 3 months      Controlled substance agreement on file: Yes       Date(s): 8/31/15     Pain Clinic evaluation in the past: No     DIRE Total Score(s):    8/31/2015   Total Score 16         Last [facility] website verification:  done on 9/7/18, 3/1/19,   [email or web address]      checked in past 3 months?  Yes 3/1/19  Last Written Prescription Date:  4/4/2019  Last Fill Quantity: 90 tablets  # refills: 0   Last office visit: 3/19/2019 with prescribing provider:  3/19/19   Future Office Visit:  None  [place] reviewed:  not reviewed/not due - last done on 3/1/19    [place] profile:  [email or web address]          Herminia Rutherford RN, BSN, PHN

## 2019-05-07 NOTE — TELEPHONE ENCOUNTER
.Reason for Call:  Patient calling with name of person collecting script    Detailed comments: Patient's spouse, Duy Serra  Will stop by to  the script for the oxycodone;   Phone Number Patient can be reached at: Home number on file [phone] (home)    Best Time: anytiem    Can we leave a detailed message on this number? YES    Call taken on 5/7/2019 at 9:54 AM by Earnestine Blanchard

## 2019-05-08 ENCOUNTER — ANCILLARY PROCEDURE (OUTPATIENT)
Dept: GENERAL RADIOLOGY | Facility: CLINIC | Age: 44
End: 2019-05-08
Attending: FAMILY MEDICINE
Payer: COMMERCIAL

## 2019-05-08 ENCOUNTER — OFFICE VISIT (OUTPATIENT)
Dept: FAMILY MEDICINE | Facility: CLINIC | Age: 44
End: 2019-05-08
Payer: COMMERCIAL

## 2019-05-08 VITALS
SYSTOLIC BLOOD PRESSURE: 130 MMHG | HEART RATE: 92 BPM | DIASTOLIC BLOOD PRESSURE: 78 MMHG | WEIGHT: 191 LBS | TEMPERATURE: 97.7 F | OXYGEN SATURATION: 100 % | BODY MASS INDEX: 33.83 KG/M2

## 2019-05-08 DIAGNOSIS — I10 HYPERTENSION GOAL BP (BLOOD PRESSURE) < 140/90: ICD-10-CM

## 2019-05-08 DIAGNOSIS — M54.50 CHRONIC MIDLINE LOW BACK PAIN WITHOUT SCIATICA: ICD-10-CM

## 2019-05-08 DIAGNOSIS — M25.572 PAIN IN JOINT INVOLVING ANKLE AND FOOT, LEFT: Primary | ICD-10-CM

## 2019-05-08 DIAGNOSIS — M25.572 PAIN IN JOINT INVOLVING ANKLE AND FOOT, LEFT: ICD-10-CM

## 2019-05-08 DIAGNOSIS — G89.4 CHRONIC PAIN SYNDROME: ICD-10-CM

## 2019-05-08 DIAGNOSIS — G89.29 CHRONIC MIDLINE LOW BACK PAIN WITHOUT SCIATICA: ICD-10-CM

## 2019-05-08 PROCEDURE — 73610 X-RAY EXAM OF ANKLE: CPT | Mod: LT | Performed by: FAMILY MEDICINE

## 2019-05-08 PROCEDURE — 80307 DRUG TEST PRSMV CHEM ANLYZR: CPT | Mod: 90 | Performed by: FAMILY MEDICINE

## 2019-05-08 PROCEDURE — 99214 OFFICE O/P EST MOD 30 MIN: CPT | Performed by: FAMILY MEDICINE

## 2019-05-08 PROCEDURE — 99000 SPECIMEN HANDLING OFFICE-LAB: CPT | Performed by: FAMILY MEDICINE

## 2019-05-08 RX ORDER — GABAPENTIN 100 MG/1
CAPSULE ORAL
Qty: 60 CAPSULE | Refills: 2 | Status: SHIPPED | OUTPATIENT
Start: 2019-05-08 | End: 2019-06-10

## 2019-05-08 NOTE — LETTER
May 8, 2019        Jeff Serra  [address] [address] [address]          To whom it may concern:    RE: Jeff Serra    Patient was seen and treated today at our clinic.  Off work 5/8 - 5/10/19.  May return to work early if symptoms resolved.    Please contact me for questions or concerns.      Sincerely,        Jordyn Loredo MD

## 2019-05-08 NOTE — PROGRESS NOTES
SUBJECTIVE:   Jeff Serra is a 44 year old female who presents to clinic today for the following   health issues:      Joint Pain    Onset: 1 month    Description:   Location: Left ankle   Character: Throbbing sharp pain     Intensity: severe    Progression of Symptoms: worse    Accompanying Signs & Symptoms:  Other symptoms: swelling    History:   Previous similar pain: Yes but in right leg    Precipitating factors:   Trauma or overuse: no     Alleviating factors:  Improved by: Ibuprofen    Therapies Tried and outcome: Ibuprofen and cream with no relief     SUBJECTIVE:  Here today with pain in her left leg as above.  The primary focus seems to be the lateral aspect of her left ankle where it is sore and swollen, but there is been no injury.  Is been going on for quite some time and now she is feeling an aching that also involves lateral left thigh and lateral lower leg.  Is not numb or tingling and there is no weakness, but she is to deep ache.  Has a long-standing history of back pain but that has not really flared up recently and she is really had no change in activity.  But is hard to be on her feet for long period of time.    Review of systems otherwise negative.  Past medical, family, and social history reviewed and updated in chart.    OBJECTIVE:  /78 (BP Location: Right arm, Patient Position: Chair, Cuff Size: Adult Regular)   Pulse 92   Temp 97.7  F (36.5  C) (Oral)   Wt 86.6 kg (191 lb)   LMP 09/12/2016 (Exact Date)   SpO2 100%   Breastfeeding? No   BMI 33.83 kg/m    Alert, pleasant, upbeat, and in no apparent discomfort.  S1 and S2 normal, no murmurs, clicks, gallops or rubs. Regular rate and rhythm. Chest is clear; no wheezes or rales. No edema or JVD.  BACK - Good range of motion with straight leg raising negative bilaterally.  No significant tenderness to palpation.  CMS intact lower extremities bilaterally.  Normal deep tendon reflexes bilaterally.  Lower extremities -normal  muscle tone and range of motion throughout.  Her left ankle is definitely a bit puffy but not red or warm.  Normal range of motion.  Past labs reviewed with the patient.   XRAY - my independent reading of the films showed a normal left ankle    ASSESSMENT / PLAN:  (M25.572) Pain in joint involving ankle and foot, left  (primary encounter diagnosis)  Comment: I really do not have a great explanation for this.  I did suggest that we could try an elastic ankle sleeve and some nonspecific rehab and see how she does.  Plan: XR Ankle Left G/E 3 Views, order for DME            (G89.4) Chronic pain syndrome  Comment: Would like to add gabapentin at a low dose to her current regimen and slowly increase over time  Plan: gabapentin (NEURONTIN) 100 MG capsule        Discussed mechanism of action of the proposed medication, as well as potential effects, both good and bad.  Patient expressed understanding and agreed with treatment.     (M54.5,  G89.29) Chronic midline low back pain without sciatica  Comment:   Plan: gabapentin (NEURONTIN) 100 MG capsule        As above     (I10) Hypertension goal BP (blood pressure) < 140/90  Comment:   Plan: Well-controlled on current regimen.  Continue same    Follow up 3 months  MOISES Loredo MD    (Chart documentation completed in part with Dragon voice-recognition software.  Even though reviewed some grammatical, spelling, and word errors may remain.)

## 2019-05-13 ENCOUNTER — MYC MEDICAL ADVICE (OUTPATIENT)
Dept: FAMILY MEDICINE | Facility: CLINIC | Age: 44
End: 2019-05-13

## 2019-05-16 LAB — COMPREHEN DRUG ANALYSIS UR: NORMAL

## 2019-05-17 NOTE — RESULT ENCOUNTER NOTE
Jeff Mccann,  I was shocked to see the presence of benzo's (xanax, valium) in your drug screening.  I certainly have not prescribed this for you.  So this is a violation of our contract.  Is there a reason why this was found?  MOISES Loredo M.D.

## 2019-05-20 ENCOUNTER — TELEPHONE (OUTPATIENT)
Dept: FAMILY MEDICINE | Facility: CLINIC | Age: 44
End: 2019-05-20

## 2019-05-20 NOTE — LETTER
May 20, 2019      Jeff Serra  [address] [address] [address]        Dear Jeff,       Your provider has reviewed your chart and it is important that we keep a close eye on your blood pressure at least every 6 months, more if out of range. Please give us a call at [phone] to schedule your hypertension/blood pressure recheck appointment.     Return around 8/8/2019 for a Routine Follow-up of chronic issues.         Thanks,   [facility]

## 2019-05-20 NOTE — TELEPHONE ENCOUNTER
Panel Management Review   One phone call and send letter if unable to reach them or MyChart message and send letter if not read after 2 weeks (You will get a message to your inbasket)      BP Readings from Last 1 Encounters:   05/08/19 130/78        Health Maintenance Due   Topic Date Due     HIV SCREENING  01/14/1988     PREVENTIVE CARE VISIT  05/13/2014     CHAO ASSESSMENT  03/07/2019     PHQ-9  03/07/2019              Patient is due/failing the following:   BP CHECK    Action needed:   Patient needs office visit for hypertension with provider.    Type of outreach:    Sent MyChart message. and Sent letter.    LXIONG3, MEDICAL ASSISTANT

## 2019-06-03 ENCOUNTER — MYC REFILL (OUTPATIENT)
Dept: FAMILY MEDICINE | Facility: CLINIC | Age: 44
End: 2019-06-03

## 2019-06-03 DIAGNOSIS — G89.4 CHRONIC PAIN SYNDROME: ICD-10-CM

## 2019-06-03 RX ORDER — OXYCODONE HYDROCHLORIDE 15 MG/1
15 TABLET ORAL 3 TIMES DAILY PRN
Qty: 90 TABLET | Refills: 0 | Status: CANCELLED | OUTPATIENT
Start: 2019-06-03

## 2019-06-03 NOTE — TELEPHONE ENCOUNTER
Requested Prescriptions   Pending Prescriptions Disp Refills     oxyCODONE IR (ROXICODONE) 15 MG tablet 90 tablet 0     Sig: Take 1 tablet (15 mg) by mouth 3 times daily as needed for moderate to severe pain       There is no refill protocol information for this order          oxyCODONE IR (ROXICODONE) 15 MG tablet      Last Written Prescription Date:  5/6/19  Last Fill Quantity: 90,   # refills: 0  Last Office Visit: 5/8/19  Future Office visit:       Routing refill request to provider for review/approval because:  Drug not on the [facility], [facility] or [facility] refill protocol or controlled substance

## 2019-06-05 NOTE — TELEPHONE ENCOUNTER
Refill denied as patient had unexpected medicines and her last urine drug screen.  I sent her message about this and I need to see her for any future refill.  Okay to use a same-day slot with me.

## 2019-06-05 NOTE — TELEPHONE ENCOUNTER
Controlled Substance Refill Request for Oxycodone IR 15 mg  Problem List Complete:  Yes   checked in past 3 months?  Yes : 6/5/19     RX [place] reviewed:  reviewed- no concerns    [place] profile:  [email or web address]    Routing refill request to provider for review/approval because:  Drug not on the [facility] refill protocol     Yessi Alvarez RN

## 2019-06-06 NOTE — TELEPHONE ENCOUNTER
This writer attempted to contact pt on 06/06/19      Reason for call relay message and sche and left message.      If patient calls back:   Schedule Office Visit appointment within 1 week with PCP, document that pt called and close encounter     Refill denied as patient had unexpected medicines and her last urine drug screen.  I sent her message about this and I need to see her for any future refill.  Okay to use a same-day slot with me.      Gali Mcneil MA

## 2019-06-10 ENCOUNTER — OFFICE VISIT (OUTPATIENT)
Dept: FAMILY MEDICINE | Facility: CLINIC | Age: 44
End: 2019-06-10
Payer: COMMERCIAL

## 2019-06-10 VITALS
SYSTOLIC BLOOD PRESSURE: 134 MMHG | HEIGHT: 63 IN | TEMPERATURE: 98.3 F | BODY MASS INDEX: 33.66 KG/M2 | WEIGHT: 190 LBS | DIASTOLIC BLOOD PRESSURE: 86 MMHG | HEART RATE: 85 BPM | OXYGEN SATURATION: 98 %

## 2019-06-10 DIAGNOSIS — M79.662 PAIN OF LEFT LOWER LEG: ICD-10-CM

## 2019-06-10 DIAGNOSIS — M54.50 CHRONIC MIDLINE LOW BACK PAIN WITHOUT SCIATICA: ICD-10-CM

## 2019-06-10 DIAGNOSIS — G89.4 CHRONIC PAIN SYNDROME: ICD-10-CM

## 2019-06-10 DIAGNOSIS — I10 HYPERTENSION GOAL BP (BLOOD PRESSURE) < 140/90: Primary | ICD-10-CM

## 2019-06-10 DIAGNOSIS — J30.1 SEASONAL ALLERGIC RHINITIS DUE TO POLLEN: ICD-10-CM

## 2019-06-10 DIAGNOSIS — G89.29 CHRONIC MIDLINE LOW BACK PAIN WITHOUT SCIATICA: ICD-10-CM

## 2019-06-10 LAB
AMPHETAMINES UR QL: NOT DETECTED NG/ML
BARBITURATES UR QL SCN: NOT DETECTED NG/ML
BENZODIAZ UR QL SCN: NOT DETECTED NG/ML
BUPRENORPHINE UR QL: NOT DETECTED NG/ML
CANNABINOIDS UR QL: NOT DETECTED NG/ML
COCAINE UR QL SCN: NOT DETECTED NG/ML
D-METHAMPHET UR QL: NOT DETECTED NG/ML
METHADONE UR QL SCN: NOT DETECTED NG/ML
OPIATES UR QL SCN: NOT DETECTED NG/ML
OXYCODONE UR QL SCN: NOT DETECTED NG/ML
PCP UR QL SCN: NOT DETECTED NG/ML
PROPOXYPH UR QL: NOT DETECTED NG/ML
TRICYCLICS UR QL SCN: NOT DETECTED NG/ML

## 2019-06-10 PROCEDURE — 99214 OFFICE O/P EST MOD 30 MIN: CPT | Performed by: FAMILY MEDICINE

## 2019-06-10 PROCEDURE — 80306 DRUG TEST PRSMV INSTRMNT: CPT | Performed by: FAMILY MEDICINE

## 2019-06-10 RX ORDER — PREDNISONE 20 MG/1
TABLET ORAL
Qty: 15 TABLET | Refills: 0 | Status: SHIPPED | OUTPATIENT
Start: 2019-06-10 | End: 2019-09-11

## 2019-06-10 RX ORDER — OXYCODONE HYDROCHLORIDE 15 MG/1
15 TABLET ORAL 3 TIMES DAILY PRN
Qty: 90 TABLET | Refills: 0 | Status: SHIPPED | OUTPATIENT
Start: 2019-06-10 | End: 2019-07-08

## 2019-06-10 RX ORDER — GABAPENTIN 300 MG/1
300 CAPSULE ORAL AT BEDTIME
Qty: 90 CAPSULE | Refills: 0 | Status: SHIPPED | OUTPATIENT
Start: 2019-06-10 | End: 2019-12-26

## 2019-06-10 ASSESSMENT — ANXIETY QUESTIONNAIRES
2. NOT BEING ABLE TO STOP OR CONTROL WORRYING: SEVERAL DAYS
3. WORRYING TOO MUCH ABOUT DIFFERENT THINGS: SEVERAL DAYS
IF YOU CHECKED OFF ANY PROBLEMS ON THIS QUESTIONNAIRE, HOW DIFFICULT HAVE THESE PROBLEMS MADE IT FOR YOU TO DO YOUR WORK, TAKE CARE OF THINGS AT HOME, OR GET ALONG WITH OTHER PEOPLE: SOMEWHAT DIFFICULT
GAD7 TOTAL SCORE: 2
6. BECOMING EASILY ANNOYED OR IRRITABLE: NOT AT ALL
1. FEELING NERVOUS, ANXIOUS, OR ON EDGE: NOT AT ALL
7. FEELING AFRAID AS IF SOMETHING AWFUL MIGHT HAPPEN: NOT AT ALL
5. BEING SO RESTLESS THAT IT IS HARD TO SIT STILL: NOT AT ALL

## 2019-06-10 ASSESSMENT — PATIENT HEALTH QUESTIONNAIRE - PHQ9
SUM OF ALL RESPONSES TO PHQ QUESTIONS 1-9: 3
5. POOR APPETITE OR OVEREATING: NOT AT ALL

## 2019-06-10 ASSESSMENT — MIFFLIN-ST. JEOR: SCORE: 1470.96

## 2019-06-10 NOTE — PATIENT INSTRUCTIONS
1) blood pressure looks great.  Keep up the good work    2) let us try a course of prednisone to see if that fixes the issue with the allergies.    3) let us increase the gabapentin to 300 mg at bedtime.  I will send in a new prescription for this    4) I am hoping that the combination of #2 & #3 gets rid of your leg pain.  If not, let me know and maybe we should consider an ultrasound or something like that.    5) we need to check monthly urine drug screens for the next few months just to make sure nothing unexpected is present.

## 2019-06-11 ASSESSMENT — ANXIETY QUESTIONNAIRES: GAD7 TOTAL SCORE: 2

## 2019-06-11 NOTE — RESULT ENCOUNTER NOTE
Jeff,  Your urine screen was normal and as expected.  As part of [facility] new monitoring policy we will periodically recheck this test.  MOISES Loredo M.D.

## 2019-06-25 ENCOUNTER — APPOINTMENT (OUTPATIENT)
Dept: GENERAL RADIOLOGY | Facility: CLINIC | Age: 44
End: 2019-06-25
Attending: EMERGENCY MEDICINE
Payer: COMMERCIAL

## 2019-06-25 ENCOUNTER — HOSPITAL ENCOUNTER (EMERGENCY)
Facility: CLINIC | Age: 44
Discharge: HOME | End: 2019-06-25
Attending: EMERGENCY MEDICINE | Admitting: EMERGENCY MEDICINE
Payer: COMMERCIAL

## 2019-06-25 VITALS
SYSTOLIC BLOOD PRESSURE: 141 MMHG | RESPIRATION RATE: 18 BRPM | OXYGEN SATURATION: 96 % | TEMPERATURE: 97.8 F | HEART RATE: 88 BPM | DIASTOLIC BLOOD PRESSURE: 91 MMHG

## 2019-06-25 DIAGNOSIS — R51.9 ACUTE NONINTRACTABLE HEADACHE, UNSPECIFIED HEADACHE TYPE: ICD-10-CM

## 2019-06-25 DIAGNOSIS — R07.9 CHEST PAIN, UNSPECIFIED TYPE: ICD-10-CM

## 2019-06-25 LAB
ALBUMIN SERPL-MCNC: 3.5 G/DL (ref 3.4–5)
ALP SERPL-CCNC: 86 U/L (ref 40–150)
ALT SERPL W P-5'-P-CCNC: 31 U/L (ref 0–50)
ANION GAP SERPL CALCULATED.3IONS-SCNC: 5 MMOL/L (ref 3–14)
AST SERPL W P-5'-P-CCNC: 17 U/L (ref 0–45)
BILIRUB SERPL-MCNC: 0.6 MG/DL (ref 0.2–1.3)
BUN SERPL-MCNC: 9 MG/DL (ref 7–30)
CALCIUM SERPL-MCNC: 8 MG/DL (ref 8.5–10.1)
CHLORIDE SERPL-SCNC: 105 MMOL/L (ref 94–109)
CO2 SERPL-SCNC: 29 MMOL/L (ref 20–32)
CREAT SERPL-MCNC: 0.59 MG/DL (ref 0.52–1.04)
D DIMER PPP FEU-MCNC: 0.4 UG/ML FEU (ref 0–0.5)
ERYTHROCYTE [DISTWIDTH] IN BLOOD BY AUTOMATED COUNT: 14.6 % (ref 10–15)
GFR SERPL CREATININE-BSD FRML MDRD: >90 ML/MIN/{1.73_M2}
GLUCOSE SERPL-MCNC: 103 MG/DL (ref 70–99)
HCT VFR BLD AUTO: 40.3 % (ref 35–47)
HGB BLD-MCNC: 13.4 G/DL (ref 11.7–15.7)
INTERPRETATION ECG - MUSE: NORMAL
LIPASE SERPL-CCNC: 124 U/L (ref 73–393)
MCH RBC QN AUTO: 28.6 PG (ref 26.5–33)
MCHC RBC AUTO-ENTMCNC: 33.3 G/DL (ref 31.5–36.5)
MCV RBC AUTO: 86 FL (ref 78–100)
PLATELET # BLD AUTO: 178 10E9/L (ref 150–450)
POTASSIUM SERPL-SCNC: 3.1 MMOL/L (ref 3.4–5.3)
PROT SERPL-MCNC: 7.2 G/DL (ref 6.8–8.8)
RBC # BLD AUTO: 4.68 10E12/L (ref 3.8–5.2)
SODIUM SERPL-SCNC: 139 MMOL/L (ref 133–144)
TROPONIN I SERPL-MCNC: <0.015 UG/L (ref 0–0.04)
WBC # BLD AUTO: 11.4 10E9/L (ref 4–11)

## 2019-06-25 PROCEDURE — 71046 X-RAY EXAM CHEST 2 VIEWS: CPT

## 2019-06-25 PROCEDURE — 84484 ASSAY OF TROPONIN QUANT: CPT | Performed by: EMERGENCY MEDICINE

## 2019-06-25 PROCEDURE — 85379 FIBRIN DEGRADATION QUANT: CPT | Performed by: EMERGENCY MEDICINE

## 2019-06-25 PROCEDURE — 25000128 H RX IP 250 OP 636: Performed by: EMERGENCY MEDICINE

## 2019-06-25 PROCEDURE — 96361 HYDRATE IV INFUSION ADD-ON: CPT

## 2019-06-25 PROCEDURE — 96375 TX/PRO/DX INJ NEW DRUG ADDON: CPT

## 2019-06-25 PROCEDURE — 96374 THER/PROPH/DIAG INJ IV PUSH: CPT

## 2019-06-25 PROCEDURE — 93005 ELECTROCARDIOGRAM TRACING: CPT

## 2019-06-25 PROCEDURE — 99285 EMERGENCY DEPT VISIT HI MDM: CPT | Mod: 25

## 2019-06-25 PROCEDURE — 85027 COMPLETE CBC AUTOMATED: CPT | Performed by: EMERGENCY MEDICINE

## 2019-06-25 PROCEDURE — 80053 COMPREHEN METABOLIC PANEL: CPT | Performed by: EMERGENCY MEDICINE

## 2019-06-25 PROCEDURE — 83690 ASSAY OF LIPASE: CPT | Performed by: EMERGENCY MEDICINE

## 2019-06-25 RX ORDER — DIPHENHYDRAMINE HYDROCHLORIDE 50 MG/ML
25 INJECTION INTRAMUSCULAR; INTRAVENOUS ONCE
Status: COMPLETED | OUTPATIENT
Start: 2019-06-25 | End: 2019-06-25

## 2019-06-25 RX ORDER — METOCLOPRAMIDE 10 MG/1
10 TABLET ORAL 4 TIMES DAILY PRN
Qty: 10 TABLET | Refills: 0 | Status: SHIPPED | OUTPATIENT
Start: 2019-06-25 | End: 2019-09-11

## 2019-06-25 RX ORDER — METOCLOPRAMIDE HYDROCHLORIDE 5 MG/ML
10 INJECTION INTRAMUSCULAR; INTRAVENOUS ONCE
Status: COMPLETED | OUTPATIENT
Start: 2019-06-25 | End: 2019-06-25

## 2019-06-25 RX ORDER — KETOROLAC TROMETHAMINE 15 MG/ML
15 INJECTION, SOLUTION INTRAMUSCULAR; INTRAVENOUS ONCE
Status: COMPLETED | OUTPATIENT
Start: 2019-06-25 | End: 2019-06-25

## 2019-06-25 RX ADMIN — SODIUM CHLORIDE 1000 ML: 9 INJECTION, SOLUTION INTRAVENOUS at 00:48

## 2019-06-25 RX ADMIN — METOCLOPRAMIDE 10 MG: 5 INJECTION, SOLUTION INTRAMUSCULAR; INTRAVENOUS at 00:47

## 2019-06-25 RX ADMIN — DIPHENHYDRAMINE HYDROCHLORIDE 25 MG: 50 INJECTION, SOLUTION INTRAMUSCULAR; INTRAVENOUS at 00:47

## 2019-06-25 RX ADMIN — KETOROLAC TROMETHAMINE 15 MG: 15 INJECTION, SOLUTION INTRAMUSCULAR; INTRAVENOUS at 02:27

## 2019-06-25 ASSESSMENT — ENCOUNTER SYMPTOMS
CONSTIPATION: 0
HEADACHES: 1
FEVER: 0
NAUSEA: 1
NUMBNESS: 0
DIARRHEA: 0
WEAKNESS: 0
SHORTNESS OF BREATH: 1
VOMITING: 0

## 2019-06-25 NOTE — ED AVS SNAPSHOT
[facility] Emergency Department  [address]  [facility] [address]  Phone:  [phone]  Fax:  [phone]                                    Jeff Serra   MRN: [id number]    Department:  [facility] Emergency Department   Date of Visit:  6/25/2019           After Visit Summary Signature Page    I have received my discharge instructions, and my questions have been answered. I have discussed any challenges I see with this plan with the nurse or doctor.    ..........................................................................................................................................  Patient/Patient Representative Signature      ..........................................................................................................................................  Patient Representative Print Name and Relationship to Patient    ..................................................               ................................................  Date                                   Time    ..........................................................................................................................................  Reviewed by Signature/Title    ...................................................              ..............................................  Date                                               Time          22EPIC Rev 08/18

## 2019-06-25 NOTE — ED PROVIDER NOTES
History     Chief Complaint:  Chest Pain    HPI   Jeff Serra is a 44 year old female with a history of DVT who presents to the emergency department today with chest pain. Patient reports chest pain intermittently for the last few days that has become constant in the last day with feeling like there is something stuck in her chest. Nothing makes the pain better or worse. She has experienced similar pain in the past but never to this degree. She reports associated constant left sided headache as well. The headache was progressive and non-radiating. She reports some shortness of breath, nausea, and left leg swelling. She denies vomiting, cough or blood in cough, fever, numbness or weakness, stool changes, urinary changes. She has history of provoked DVT/PE after a right leg fracture.  Patient is no longer on blood thinners.    Cardiac/PE/DVT Risk Factors:  History of hypertension - positive   History of hyperlipidemia - negative   History of diabetes - negative   History of smoking - negative   Personal history of PE/DVT - positive   Family history of PE/DVT - negative   Family history of heart complications - negative   Recent travel - negative   Recent surgery - negative   Other immobilizations - negative   Cancer - negative      Allergies:  No Known Drug Allergies     Medications:    Gabapentin (Neurontin) 300 Mg Capsule  Loratadine (Claritin) 10 Mg Tablet  Metoprolol Succinate Er (Toprol-xl) 100 Mg 24 Hr Tablet  Oxycodone Ir (Roxicodone) 15 Mg Tablet  Piroxicam (Feldene) 20 Mg Capsule  Potassium Chloride Er (K-tab/klor-con) 10 Meq Cr Tablet  Prednisone (Deltasone) 20 Mg Tablet    Past Medical History:    Allergic rhinitis  Chronic low back pain  Chronic midline low back pain without sciatica  Chronic neck pain   Chronic pain syndrome  Closed fracture of right fibula  Continuous opioid dependence   Dysmenorrhea  Excessive or frequent menstruation  Pulmonary embolism   Hypertension  Renal  calculi  Hypokalemia  Kidney stone   Pelvic pain in female  Pulmonary embolism    Right leg DVT   Vitamin D deficiency    Past Surgical History:    Hysterectomy  Laparoscopy  Lithotripsy   Laparotomy mini, tubal ligation      Family History:    Heart disease  Breast cancer  Diabetes     Social History:  The patient was alone.  Smoking Status: Never  Smokeless Tobacco: Never  Alcohol Use: Yes   Marital Status:      Review of Systems   Constitutional: Negative for fever.   Respiratory: Positive for shortness of breath.    Cardiovascular: Positive for chest pain and leg swelling (left).   Gastrointestinal: Positive for nausea. Negative for constipation, diarrhea and vomiting.   Neurological: Positive for headaches. Negative for weakness and numbness.   All other systems reviewed and are negative.      Physical Exam     Patient Vitals for the past 24 hrs:   BP Temp Temp src Pulse Heart Rate Resp SpO2   06/25/19 0012 (!) 164/105 97.8  F (36.6  C) Oral 92 92 18 98 %      Physical Exam    GENERAL:  Patient appears in no obvious discomfort     Resting comfortably in the bed  HEENT:   External ears are normal.     Temporal arteries are non-tender.      Oropharynx is moist, without lesions or trismus.  Eyes:   PERRL.  EOMI.      No corneal clouding.   NECK:   Supple, no meningismus.       Negative Brudzinski's sign.  CV:    Regular rate and rhythm.    No murmurs, rubs or gallops.    2+ radial pulses    No peripheral edema  PULM:   Clear to auscultation bilateral.      No respiratory distress.      No stridor or wheezing.  ABD:  Soft, non-distended.      Moderate epigastric tenderness    No CVA tenderness    No pulsatile masses.      No rebound or guarding.  MSK:    No gross deformity to all four extremities.      No significant joint effusions.  LYMPH:  No cervical lymphadenopathy.  NEURO:  A & O x 3    CN II-XII intact, speech is clear with no aphasia.      Strength is 5/5 in all 4 extremities.  Sensation is intact.       Normal muscular tone, no tremor.  SKIN:   Warm, dry and intact.    PSYCH:   Mood is good and affect is appropriate.    Emergency Department Course     ECG:  Indication: chest pain  Completed at 0007.  Read at 0026.   Normal sinus rhythm   Normal ECG   No significant change from 10/16/18   Rate 94 bpm. MO interval 122. QRS duration 78. QT/QTc 348/435. P-R-T axes 40 19 20.     Imaging:  Radiology findings were communicated with the patient who voiced understanding of the findings.  Chest XR,  PA & LAT   Preliminary Result   IMPRESSION: No evidence of active cardiopulmonary disease.      Report per radiology      Laboratory:  Laboratory findings were communicated with the patient who voiced understanding of the findings.  Troponin (Collected 45): <0.015  D Dimer (Collected 45): 0.4  Lipase: 124  CMP: 3.1 K, 103 Glucose, 8.0 Ca o/w WNL (Creatinine 0.59)   CBC: AWNL (WBC 11.4, HGB 13.4, )      Interventions:  0047: Reglan 10mg IV   0047: Benadryl 25 mg, IV   0048: 0.9% NaCl 1L IV Bolus   0227: Toradol 15 mg IV     Emergency Department Course:  Nursing notes and vitals reviewed.  1235: I performed an exam of the patient as documented above.   IV was inserted and blood was drawn for laboratory testing, results above.  The patient was sent for a Chest XR while in the emergency department, results above.   0224: Findings and plan explained to the Patient. Patient discharged home with instructions regarding supportive care, medications, and reasons to return. The importance of close follow-up was reviewed. The patient was prescribed Reglan.    I personally reviewed the laboratory and imaging results with the Patient and answered all related questions prior to discharge.      Impression & Plan    Medical Decision Makin-year-old female with history of DVT presents the ED with intermittent and now constant chest pain.  She was evaluated for ACS in which EKG reveals no ischemic changes.  Troponin is within  normal limits despite growth in 24 hours of constant symptoms thus ruling out MI.  She does have a history of provoked DVT.  Low suspicion for pulmonary embolus.  D-dimer checked and unremarkable thus no indication for CT scan of the chest.  She did have upper abdominal tenderness but states that this is not the pain she is experiencing in her chest.  Lipase and LFTs are unremarkable.  Low suspicion for biliary disease.  Differential diagnosis would include costochondritis, pleurisy, atypical reflux, esophageal spasm, peptic ulcer disease or gastritis.  Patient safe for discharge home with close follow-up with PCP in the next 2 to 3 days.    The patient had a minor complaint of headache.  She has no features concerning for intracranial hemorrhage, mass, dural sinus thrombosis, glaucoma, temporal arteritis, meningitis.  Symptoms are most consistent with mixed type headache with migraine and tension features.  Patient improved with the interventions as described above.  Patient safe for discharge home.    Diagnosis:    ICD-10-CM    1. Chest pain, unspecified type R07.9    2. Acute nonintractable headache, unspecified headache type R51        Disposition:  discharged to home    Discharge Medications:     Medication List      Started    metoclopramide 10 MG tablet  Commonly known as:  REGLAN  10 mg, Oral, 4 TIMES DAILY PRN            Scribe Disclosure:  Nanda MOORE, am serving as a scribe at 12:46 AM on 6/25/2019 to document services personally performed by Giuliano Pyle MD based on my observations and the provider's statements to me.    6/25/2019   [facility] EMERGENCY DEPARTMENT       Giuliano Pyle MD  06/25/19 0251

## 2019-06-25 NOTE — DISCHARGE INSTRUCTIONS
Discharge Instructions  Headache    You were seen today for a headache. Headaches may be caused by many different things such as muscle tension, sinus inflammation, anxiety and stress, having too little sleep, too much alcohol, some medical conditions or injury. You may have a migraine, which is caused by changes in the blood vessels in your head.  At this time your provider does not find that your headache is a sign of anything dangerous or life-threatening.  However, sometimes the signs of serious illness do not show up right away.      Generally, every Emergency Department visit should have a follow-up clinic visit with either a primary or a specialty clinic/provider. Please follow-up as instructed by your emergency provider today.    Return to the Emergency Department if:  You get a new fever of 100.4 F or higher.  Your headache gets much worse.  You get a stiff neck with your headache.  You get a new headache that is significantly different or worse than headaches you have had before.  You are vomiting (throwing up) and cannot keep food or water down.  You have blurry or double vision or other problems with your eyes.  You have a new weakness on one side of your body.  You have difficulty with balance which is new.  You or your family thinks you are confused.  You have a seizure.    What can I do to help myself?  Pain medications - You may take a pain medication such as Tylenol  (acetaminophen), Advil , Motrin  (ibuprofen) or Aleve  (naproxen).  Take a pain reliever as soon as you notice symptoms.  Starting medications as soon as you start to have symptoms may lessen the amount of pain you have.  Relaxing in a quiet, dark room may help.  Get enough sleep and eat meals regularly.  You may need to watch for certain foods or other things which may trigger your headaches.  Keeping a journal of your headaches and possible triggers may help you and your primary provider to identify things which you should avoid which  may be causing your headaches.  If you were given a prescription for medicine here today, be sure to read all of the information (including the package insert) that comes with your prescription.  This will include important information about the medicine, its side effects, and any warnings that you need to know about.  The pharmacist who fills the prescription can provide more information and answer questions you may have about the medicine.  If you have questions or concerns that the pharmacist cannot address, please call or return to the Emergency Department.   Remember that you can always come back to the Emergency Department if you are not able to see your regular provider in the amount of time listed above, if you get any new symptoms, or if there is anything that worries you.  Discharge Instructions  Chest Pain    You have been seen today for chest pain or discomfort.  At this time, your provider has found no signs that your chest pain is due to a serious or life-threatening condition, (or you have declined more testing and/or admission to the hospital). However, sometimes there is a serious problem that does not show up right away. Your evaluation today may not be complete and you may need further testing and evaluation.     Generally, every Emergency Department visit should have a follow-up clinic visit with either a primary or a specialty clinic/provider. Please follow-up as instructed by your emergency provider today.  Return to the Emergency Department if:  Your chest pain changes, gets worse, starts to happen more often, or comes with less activity.  You are newly short of breath.  You get very weak or tired.  You pass out or faint.  You have any new symptoms, like fever, cough, numb legs, or you cough up blood.  You have anything else that worries you.    Until you follow-up with your regular provider, please do the following:  Take one aspirin daily unless you have an allergy or are told not to by your  provider.  If a stress test appointment has been made, go to the appointment.  If you have questions, contact your regular provider.  Follow-up with your regular provider/clinic as directed; this is very important.    If you were given a prescription for medicine here today, be sure to read all of the information (including the package insert) that comes with your prescription.  This will include important information about the medicine, its side effects, and any warnings that you need to know about.  The pharmacist who fills the prescription can provide more information and answer questions you may have about the medicine.  If you have questions or concerns that the pharmacist cannot address, please call or return to the Emergency Department.       Remember that you can always come back to the Emergency Department if you are not able to see your regular provider in the amount of time listed above, if you get any new symptoms, or if there is anything that worries you.

## 2019-06-25 NOTE — ED TRIAGE NOTES
Here for midsternal chest pain started about 1 week ago, intermittent and worst when laying down or palpation. Coughing. Also c/o hypertension of 167/103 at home. Also c/o left sided headache. ABCs intact.

## 2019-07-08 ENCOUNTER — MYC REFILL (OUTPATIENT)
Dept: FAMILY MEDICINE | Facility: CLINIC | Age: 44
End: 2019-07-08

## 2019-07-08 DIAGNOSIS — M54.50 CHRONIC MIDLINE LOW BACK PAIN WITHOUT SCIATICA: ICD-10-CM

## 2019-07-08 DIAGNOSIS — G89.4 CHRONIC PAIN SYNDROME: ICD-10-CM

## 2019-07-08 DIAGNOSIS — G89.29 CHRONIC MIDLINE LOW BACK PAIN WITHOUT SCIATICA: ICD-10-CM

## 2019-07-08 NOTE — TELEPHONE ENCOUNTER
Requested Prescriptions   Pending Prescriptions Disp Refills     oxyCODONE IR (ROXICODONE) 15 MG tablet 90 tablet 0     Sig: Take 1 tablet (15 mg) by mouth 3 times daily as needed for moderate to severe pain       There is no refill protocol information for this order          oxyCODONE IR (ROXICODONE) 15 MG tablet      Last Written Prescription Date:  6/10/19  Last Fill Quantity: 90,   # refills: 0  Last Office Visit: 6/10/19  Future Office visit:       Routing refill request to provider for review/approval because:  Drug not on the [facility], [facility] or [facility] refill protocol or controlled substance

## 2019-07-11 DIAGNOSIS — G89.4 CHRONIC PAIN SYNDROME: Primary | ICD-10-CM

## 2019-07-11 DIAGNOSIS — M54.50 CHRONIC MIDLINE LOW BACK PAIN WITHOUT SCIATICA: ICD-10-CM

## 2019-07-11 DIAGNOSIS — G89.29 CHRONIC MIDLINE LOW BACK PAIN WITHOUT SCIATICA: ICD-10-CM

## 2019-07-11 PROCEDURE — 80307 DRUG TEST PRSMV CHEM ANLYZR: CPT | Mod: 90 | Performed by: FAMILY MEDICINE

## 2019-07-11 PROCEDURE — 99000 SPECIMEN HANDLING OFFICE-LAB: CPT | Performed by: FAMILY MEDICINE

## 2019-07-11 RX ORDER — OXYCODONE HYDROCHLORIDE 15 MG/1
15 TABLET ORAL 3 TIMES DAILY PRN
Qty: 90 TABLET | Refills: 0 | Status: SHIPPED | OUTPATIENT
Start: 2019-07-11 | End: 2019-07-30

## 2019-07-11 NOTE — TELEPHONE ENCOUNTER
Patient was to follow up with Dr. Loredo in one month when last seen 6/10/19.  Please assist with scheduling follow up with him  Also needs to leave urine drug screen when picks up prescription.   Prescription signed. Please place prescription at  and notify patient.

## 2019-07-11 NOTE — TELEPHONE ENCOUNTER
Controlled Substance Refill Request for Oxycodone  Problem List Complete:  Yes  Problem Detail     Noted:  12/1/2015   Priority:  Medium   Overview Addendum 6/5/2019  3:36 PM by Yessi Alvarez RN   Patient is followed by PATRICIA RAYMOND for ongoing prescription of pain medication.  All refills should be approved by this provider, or covering partner.     Medication(s): oxycodone 15 three times daily = 67.5 MED  Narcan n/a     UDS (May '19) showed unexpected benzo  Plan - monthly UDS for a while - any further fail will nullify contract     Clinic visit frequency required: Q 3 months      Controlled substance agreement on file: Yes       Date(s): 8/31/15     Pain Clinic evaluation in the past: No     DIRE Total Score(s):    8/31/2015   Total Score 16         Last [facility] website verification:  done on 6/5/19   [email or web address]      checked in past 3 months?  Yes 6/5/19   Last Written Prescription Date:  6/10/19  Last Fill Quantity: 90 tablets  # refills: 0   Last office visit: 6/10/2019 with prescribing provider:  6/10/19   Future Office Visit:    RX [place] reviewed:  not reviewed/not due - last done on 6/5/19    [place] profile:  [email or web address]      Herminia Rutherford RN, BSN, PHN

## 2019-07-11 NOTE — TELEPHONE ENCOUNTER
oxyCODONE IR (ROXICODONE) 15 MG tablet Rx placed at .  Pt informed via [facility].  pt needs to leave urine drug screen when picks up prescription.     Camelia VALENTIN)(HUGO)

## 2019-07-17 LAB — COMPREHEN DRUG ANALYSIS UR: NORMAL

## 2019-07-17 NOTE — RESULT ENCOUNTER NOTE
Jeff,  Your urine screen was normal and as expected - showing the medication as prescribed.  As part of [facility]'s new monitoring policy we will periodically recheck this test.  MOISES Loredo M.D.

## 2019-07-20 ENCOUNTER — APPOINTMENT (OUTPATIENT)
Dept: CT IMAGING | Facility: CLINIC | Age: 44
End: 2019-07-20
Attending: EMERGENCY MEDICINE
Payer: COMMERCIAL

## 2019-07-20 ENCOUNTER — HOSPITAL ENCOUNTER (EMERGENCY)
Facility: CLINIC | Age: 44
Discharge: HOME | End: 2019-07-20
Attending: EMERGENCY MEDICINE | Admitting: EMERGENCY MEDICINE
Payer: COMMERCIAL

## 2019-07-20 VITALS
BODY MASS INDEX: 32.96 KG/M2 | RESPIRATION RATE: 18 BRPM | OXYGEN SATURATION: 96 % | SYSTOLIC BLOOD PRESSURE: 153 MMHG | DIASTOLIC BLOOD PRESSURE: 98 MMHG | WEIGHT: 186 LBS | TEMPERATURE: 98.9 F | HEART RATE: 85 BPM | HEIGHT: 63 IN

## 2019-07-20 DIAGNOSIS — K59.03 DRUG-INDUCED CONSTIPATION: ICD-10-CM

## 2019-07-20 DIAGNOSIS — R30.0 DYSURIA: ICD-10-CM

## 2019-07-20 DIAGNOSIS — R10.9 RIGHT FLANK PAIN: ICD-10-CM

## 2019-07-20 DIAGNOSIS — N83.11 CORPUS LUTEUM CYST OF RIGHT OVARY: ICD-10-CM

## 2019-07-20 LAB
ALBUMIN SERPL-MCNC: 3.6 G/DL (ref 3.4–5)
ALBUMIN UR-MCNC: NEGATIVE MG/DL
ALP SERPL-CCNC: 84 U/L (ref 40–150)
ALT SERPL W P-5'-P-CCNC: 40 U/L (ref 0–50)
ANION GAP SERPL CALCULATED.3IONS-SCNC: 5 MMOL/L (ref 3–14)
APPEARANCE UR: CLEAR
AST SERPL W P-5'-P-CCNC: 14 U/L (ref 0–45)
B-HCG FREE SERPL-ACNC: <5 IU/L
BASOPHILS # BLD AUTO: 0 10E9/L (ref 0–0.2)
BASOPHILS NFR BLD AUTO: 0.5 %
BILIRUB SERPL-MCNC: 0.6 MG/DL (ref 0.2–1.3)
BILIRUB UR QL STRIP: NEGATIVE
BUN SERPL-MCNC: 4 MG/DL (ref 7–30)
CALCIUM SERPL-MCNC: 8.6 MG/DL (ref 8.5–10.1)
CHLORIDE SERPL-SCNC: 107 MMOL/L (ref 94–109)
CO2 SERPL-SCNC: 29 MMOL/L (ref 20–32)
COLOR UR AUTO: ABNORMAL
CREAT SERPL-MCNC: 0.59 MG/DL (ref 0.52–1.04)
DIFFERENTIAL METHOD BLD: NORMAL
EOSINOPHIL # BLD AUTO: 0.1 10E9/L (ref 0–0.7)
EOSINOPHIL NFR BLD AUTO: 0.9 %
ERYTHROCYTE [DISTWIDTH] IN BLOOD BY AUTOMATED COUNT: 14.2 % (ref 10–15)
GFR SERPL CREATININE-BSD FRML MDRD: >90 ML/MIN/{1.73_M2}
GLUCOSE SERPL-MCNC: 85 MG/DL (ref 70–99)
GLUCOSE UR STRIP-MCNC: NEGATIVE MG/DL
HCG SERPL QL: NEGATIVE
HCT VFR BLD AUTO: 40.3 % (ref 35–47)
HGB BLD-MCNC: 13.4 G/DL (ref 11.7–15.7)
HGB UR QL STRIP: NEGATIVE
IMM GRANULOCYTES # BLD: 0 10E9/L (ref 0–0.4)
IMM GRANULOCYTES NFR BLD: 0.3 %
KETONES UR STRIP-MCNC: NEGATIVE MG/DL
LEUKOCYTE ESTERASE UR QL STRIP: NEGATIVE
LYMPHOCYTES # BLD AUTO: 2.3 10E9/L (ref 0.8–5.3)
LYMPHOCYTES NFR BLD AUTO: 30 %
MCH RBC QN AUTO: 29.1 PG (ref 26.5–33)
MCHC RBC AUTO-ENTMCNC: 33.3 G/DL (ref 31.5–36.5)
MCV RBC AUTO: 88 FL (ref 78–100)
MONOCYTES # BLD AUTO: 0.7 10E9/L (ref 0–1.3)
MONOCYTES NFR BLD AUTO: 8.3 %
NEUTROPHILS # BLD AUTO: 4.7 10E9/L (ref 1.6–8.3)
NEUTROPHILS NFR BLD AUTO: 60 %
NITRATE UR QL: NEGATIVE
NRBC # BLD AUTO: 0 10*3/UL
NRBC BLD AUTO-RTO: 0 /100
PH UR STRIP: 7 PH (ref 5–7)
PLATELET # BLD AUTO: 191 10E9/L (ref 150–450)
POTASSIUM SERPL-SCNC: 3.2 MMOL/L (ref 3.4–5.3)
PROT SERPL-MCNC: 7.2 G/DL (ref 6.8–8.8)
RBC # BLD AUTO: 4.6 10E12/L (ref 3.8–5.2)
RBC #/AREA URNS AUTO: <1 /HPF (ref 0–2)
SODIUM SERPL-SCNC: 141 MMOL/L (ref 133–144)
SOURCE: ABNORMAL
SP GR UR STRIP: 1.01 (ref 1–1.03)
SQUAMOUS #/AREA URNS AUTO: 5 /HPF (ref 0–1)
UROBILINOGEN UR STRIP-MCNC: NORMAL MG/DL (ref 0–2)
WBC # BLD AUTO: 7.8 10E9/L (ref 4–11)
WBC #/AREA URNS AUTO: <1 /HPF (ref 0–5)

## 2019-07-20 PROCEDURE — 25000128 H RX IP 250 OP 636: Performed by: EMERGENCY MEDICINE

## 2019-07-20 PROCEDURE — 96374 THER/PROPH/DIAG INJ IV PUSH: CPT | Mod: 59

## 2019-07-20 PROCEDURE — 81001 URINALYSIS AUTO W/SCOPE: CPT | Performed by: EMERGENCY MEDICINE

## 2019-07-20 PROCEDURE — 85025 COMPLETE CBC W/AUTO DIFF WBC: CPT | Performed by: EMERGENCY MEDICINE

## 2019-07-20 PROCEDURE — 96361 HYDRATE IV INFUSION ADD-ON: CPT

## 2019-07-20 PROCEDURE — 84702 CHORIONIC GONADOTROPIN TEST: CPT

## 2019-07-20 PROCEDURE — 96376 TX/PRO/DX INJ SAME DRUG ADON: CPT

## 2019-07-20 PROCEDURE — 96375 TX/PRO/DX INJ NEW DRUG ADDON: CPT

## 2019-07-20 PROCEDURE — 99285 EMERGENCY DEPT VISIT HI MDM: CPT | Mod: 25

## 2019-07-20 PROCEDURE — 80053 COMPREHEN METABOLIC PANEL: CPT | Performed by: EMERGENCY MEDICINE

## 2019-07-20 PROCEDURE — 74177 CT ABD & PELVIS W/CONTRAST: CPT

## 2019-07-20 PROCEDURE — 84703 CHORIONIC GONADOTROPIN ASSAY: CPT | Performed by: EMERGENCY MEDICINE

## 2019-07-20 RX ORDER — ONDANSETRON 4 MG/1
4 TABLET, ORALLY DISINTEGRATING ORAL EVERY 8 HOURS PRN
Qty: 10 TABLET | Refills: 0 | Status: SHIPPED | OUTPATIENT
Start: 2019-07-20 | End: 2019-09-11

## 2019-07-20 RX ORDER — IOPAMIDOL 755 MG/ML
500 INJECTION, SOLUTION INTRAVASCULAR ONCE
Status: COMPLETED | OUTPATIENT
Start: 2019-07-20 | End: 2019-07-20

## 2019-07-20 RX ORDER — HYDROMORPHONE HYDROCHLORIDE 1 MG/ML
0.5 INJECTION, SOLUTION INTRAMUSCULAR; INTRAVENOUS; SUBCUTANEOUS
Status: DISCONTINUED | OUTPATIENT
Start: 2019-07-20 | End: 2019-07-20 | Stop reason: HOSPADM

## 2019-07-20 RX ORDER — SENNA AND DOCUSATE SODIUM 50; 8.6 MG/1; MG/1
1 TABLET, FILM COATED ORAL AT BEDTIME
Qty: 30 TABLET | Refills: 1 | Status: SHIPPED | OUTPATIENT
Start: 2019-07-20 | End: 2021-03-19

## 2019-07-20 RX ORDER — ONDANSETRON 2 MG/ML
4 INJECTION INTRAMUSCULAR; INTRAVENOUS
Status: COMPLETED | OUTPATIENT
Start: 2019-07-20 | End: 2019-07-20

## 2019-07-20 RX ORDER — KETOROLAC TROMETHAMINE 15 MG/ML
15 INJECTION, SOLUTION INTRAMUSCULAR; INTRAVENOUS ONCE
Status: COMPLETED | OUTPATIENT
Start: 2019-07-20 | End: 2019-07-20

## 2019-07-20 RX ORDER — POLYETHYLENE GLYCOL 3350 17 G/17G
1 POWDER, FOR SOLUTION ORAL 2 TIMES DAILY
Qty: 527 G | Refills: 0 | Status: SHIPPED | OUTPATIENT
Start: 2019-07-20 | End: 2019-10-11

## 2019-07-20 RX ADMIN — SODIUM CHLORIDE 1000 ML: 9 INJECTION, SOLUTION INTRAVENOUS at 14:31

## 2019-07-20 RX ADMIN — KETOROLAC TROMETHAMINE 15 MG: 15 INJECTION, SOLUTION INTRAMUSCULAR; INTRAVENOUS at 14:42

## 2019-07-20 RX ADMIN — ONDANSETRON HYDROCHLORIDE 4 MG: 2 INJECTION, SOLUTION INTRAMUSCULAR; INTRAVENOUS at 14:41

## 2019-07-20 RX ADMIN — HYDROMORPHONE HYDROCHLORIDE 0.5 MG: 1 INJECTION, SOLUTION INTRAMUSCULAR; INTRAVENOUS; SUBCUTANEOUS at 14:48

## 2019-07-20 RX ADMIN — IOPAMIDOL 93 ML: 755 INJECTION, SOLUTION INTRAVENOUS at 15:51

## 2019-07-20 RX ADMIN — HYDROMORPHONE HYDROCHLORIDE 0.5 MG: 1 INJECTION, SOLUTION INTRAMUSCULAR; INTRAVENOUS; SUBCUTANEOUS at 15:11

## 2019-07-20 RX ADMIN — SODIUM CHLORIDE 63 ML: 9 INJECTION, SOLUTION INTRAVENOUS at 15:51

## 2019-07-20 ASSESSMENT — MIFFLIN-ST. JEOR: SCORE: 1452.82

## 2019-07-20 ASSESSMENT — ENCOUNTER SYMPTOMS
HEMATURIA: 0
NAUSEA: 1
SHORTNESS OF BREATH: 1
DYSURIA: 1
FEVER: 0
ABDOMINAL PAIN: 1
VOMITING: 0
CHILLS: 1

## 2019-07-20 NOTE — ED TRIAGE NOTES
"A&O x4, ABCs intact. Pt arrives for right sided flank pain that started 2 days ago. Pt has hx of kidney stones, and states \"this feels worse this time.\" Pt denies blood in urine.   "

## 2019-07-20 NOTE — DISCHARGE INSTRUCTIONS
Discharge Instructions  Constipation  Constipation can cause severe cramping pain and your provider thinks this might be the cause of your abdominal pain (belly pain) today.  People usually recognize that they are constipated because they have difficulty having bowel movements, are not having bowel movements frequently enough, or are not having large enough bowel movements. Sometimes, especially in children or older people, you do not recognize that you are constipated until it becomes severe. The most common causes of constipation are a lack of exercise and not eating enough fruits, vegetables, and whole grains. Constipation can also be a side effect of medications, such as narcotics, or may be caused by a disease of the digestive system.    Generally, every Emergency Department visit should have a follow-up clinic visit with either a primary or a specialty clinic/provider. Please follow-up as instructed by your emergency provider today. Sometimes, chronic constipation requires further testing to determine the cause. If you are over 50 years old, you may need a colonoscopy if you have not had one before.     Return to the Emergency Department if:  Your abdominal pain worsens or does not improve after a bowel movement.  You become very weak.  You get a temperature above 102oF or as directed by your provider.  You have blood in your stools (bright red or black, tarry stools).  You keep vomiting (throwing up) or cannot drink liquids.  Your see blood when you vomit.  Your stomach gets bloated or bigger.  You have new symptoms or anything that worries you.    What can I do to help myself?  If your provider gave you a cathartic medication, like magnesium citrate or GoLytely  (polyethylene glycol), you can expect to have cramps and gas pains after taking it. You can expect to have a number of bowel movements and even diarrhea (loose or watery stools) in the course of clearing your bowels.  You will know your bowels have  been cleaned out after you pass clear liquid. The cramps and gas should let up after you have emptied your bowels. You may want to wait until morning to take this type of medication so you aren t up in the night.   Sometimes instead of cathartics, we recommend laxatives like milk of magnesia to move your bowels more slowly, or an enema to help the bowels to move. Read and follow the package directions, or follow your provider s instructions.  Once you have become very constipated, it takes time for your bowels to return to normal and you need to be very careful to prevent becoming constipated again. Take a laxative if you do not move your bowels at least every two days.     Eat foods that have a lot of fiber. Good choices are fruits, vegetables, prune juice, apple juice, and high fiber cereal. Limit dairy products such as milk and cheese, since these can make constipation worse.   Drink plenty of water.   When you feel the need to go to the bathroom, go to the bathroom. Do not hold it.  Miralax , Metamucil , Colace , Senna or fiber supplements can be used daily.  Miralax  daily is often the best choice for children.  If you were given a prescription for medicine here today, be sure to read all of the information (including the package insert) that comes with your prescription.  This will include important information about the medicine, its side effects, and any warnings that you need to know about.  The pharmacist who fills the prescription can provide more information and answer questions you may have about the medicine.  If you have questions or concerns that the pharmacist cannot address, please call or return to the Emergency Department.   Remember that you can always come back to the Emergency Department if you are not able to see your regular provider in the amount of time listed above, if you get any new symptoms, or if there is anything that worries you.

## 2019-07-20 NOTE — ED AVS SNAPSHOT
[facility] Emergency Department  [address]  [facility] [address]  Phone:  [phone]  Fax:  [phone]                                    Jeff Serra   MRN: [id number]    Department:  [facility] Emergency Department   Date of Visit:  7/20/2019           After Visit Summary Signature Page    I have received my discharge instructions, and my questions have been answered. I have discussed any challenges I see with this plan with the nurse or doctor.    ..........................................................................................................................................  Patient/Patient Representative Signature      ..........................................................................................................................................  Patient Representative Print Name and Relationship to Patient    ..................................................               ................................................  Date                                   Time    ..........................................................................................................................................  Reviewed by Signature/Title    ...................................................              ..............................................  Date                                               Time          22EPIC Rev 08/18

## 2019-07-20 NOTE — ED PROVIDER NOTES
"  History     Chief Complaint:  Flank Pain    HPI   Jeff Serra is a 44 year old female with a history of kidney stones who presents to the emergency department for evaluation of flank pain. The patient reports she has experienced 3 days of right flank pain. She indicates the pain is similar to her previous episodes of kidney stones, but more intense. She notes she has had nephrostomy performed for her kidney stones in the past. The patient further reports dysuria, chills, nausea, and shortness of breath on exertion accompanying her pain, though she denies any fever, vomit, chest pain, or hematuria. She also denies any recent fall or injury.    Allergies:  NKDA     Medications:    Gabapentin  Claritin  Reglan  Metoprolol  Feldene   Potassium chloride  Deltasone  Roxicodone     Past Medical History:    Chronic low back pain  Chronic neck pain  Opioid dependence  HTN  Kidney stone  PE   Right leg DVT  Hypokalemia    Past Surgical History:    Hysterectomy  Laparotomy  Lithotripsy    Family History:    No past pertinent family history.    Social History:  Presents alone.  Never smoker.  Positive for alcohol use.   Marital Status:   [2]     Review of Systems   Constitutional: Positive for chills. Negative for fever.   Respiratory: Positive for shortness of breath.    Cardiovascular: Negative for chest pain.   Gastrointestinal: Positive for abdominal pain and nausea. Negative for vomiting.   Genitourinary: Positive for dysuria. Negative for hematuria.   All other systems reviewed and are negative.    Physical Exam     Patient Vitals for the past 24 hrs:   BP Temp Temp src Pulse Heart Rate Resp SpO2 Height Weight   07/20/19 1500 (!) 153/98 -- -- 85 -- -- 96 % -- --   07/20/19 1445 (!) 160/102 -- -- -- -- -- -- -- --   07/20/19 1405 (!) 163/111 98.9  F (37.2  C) Temporal -- 88 18 100 % 1.6 m (5' 3\") 84.4 kg (186 lb)     Physical Exam  General: Alert, appears well-developed and well-nourished. Cooperative. "     In moderate distress  HEENT:  Head:  Atraumatic  Ears:  External ears are normal  Mouth/Throat:  Oropharynx is without erythema or exudate and mucous membranes are moist.   Eyes:   Conjunctivae normal and EOM are normal. No scleral icterus.  CV:  Normal rate, regular rhythm, normal heart sounds and radial pulses are 2+ and symmetric.  No murmur.  Resp:  Breath sounds are clear bilaterally    Non-labored, no retractions or accessory muscle use  GI:  Abdomen is soft, no distension, RLQ tenderness with guarding. No rebound. Right CVA tenderness   MS:  Normal range of motion. No edema.    Normal strength in all 4 extremities.     Back atraumatic.    No midline cervical, thoracic, or lumbar tenderness  Skin:  Warm and dry.  No rash or lesions noted.  Neuro: Alert. Normal strength.  GCS: 15  Psych:  Normal mood and affect.    Emergency Department Course     Imaging:  Radiographic findings were communicated with the patient who voiced understanding of the findings.    CT Abdomen/Pelvis with IV contrast:   1. Possible mild colonic constipation. No bowel obstruction,  diverticulitis or appendicitis. No urinary tract calculi or  hydronephrosis.  2. Corpus luteal cyst right ovary with trace amount of free pelvic  fluid. These findings are physiologic in nature but could account for  patient's symptoms as well. As per radiology.    Laboratory:  CBC: WBC: 7.8, HGB: 13.4, PLT: 191  CMP: Potassium 3.2 (L), Urea Nitrogen 4 (L), o/w WNL (Creatinine: 0.59)    ISTAT HCG Quantitative Pregnancy POCT: <5.0    HCG qualitative: Negative    UA with micro: Squamous Epithelial 5 (H) o/w negative    Interventions:  1431 NS 1L IV Bolus  1441 Zofran 4 mg IV  1442 Toradol 15 mg IV  1511 Dilaudid 0.5 mg IV    Emergency Department Course:  Nursing notes and vitals reviewed. 1424 I performed an exam of the patient as documented above.     IV inserted. Medicine administered as documented above. Blood drawn. This was sent to the lab for further  testing, results above.    The patient provided a urine sample here in the emergency department. This was sent for laboratory testing, findings above.     The patient was sent for a CT Abdomen/Pelvis while in the emergency department, findings above.     I rechecked the patient and discussed the results of her workup thus far.     Findings and plan explained to the Patient. Patient discharged home with instructions regarding supportive care, medications, and reasons to return. The importance of close follow-up was reviewed.     I personally reviewed the laboratory results with the Patient and answered all related questions prior to discharge.    Impression & Plan      Medical Decision Making:  Patient is a 44-year-old female with a history of prior kidney stones who presents with right-sided flank pain and right abdominal pain.  Patient states that she had a complex [place] with [place] nephrostomy tube and had multiple procedures due to obstructing kidney stones.  Reassuringly urinalysis shows no evidence of infection or RBCs today.  She did have a CT scan due to sinister surgical history and prior stones in the past.  There is no evidence of stone, bowel obstruction, diverticulitis, or appendicitis.  There is possible mild colonic constipation and I suspect this is consistent with her chronic opiate medication use secondary to chronic neck and back pain.  CT scan also comments on corpus luteal cyst of the right ovary although patient does not have significant pain in the suprapubic region and lower concern that this is the primary cause of her discomfort today.  We did discuss this diagnosis at bedside and patient understood close follow-up with her primary care provider and her OB/GYN in several months to ensure resolution of this.  No indication for admission at this time as no sinister process was identified on CT scan as well as on blood work.  Return precautions discussed and all  questions answered prior to discharge.  Discharged home.      Diagnosis:    ICD-10-CM    1. Right flank pain R10.9    2. Drug-induced constipation K59.03    3. Dysuria R30.0    4. Corpus luteum cyst of right ovary N83.11        Disposition:  discharged to home    Discharge Medications:     Medication List      Started    magnesium citrate solution  296 mLs, Oral, ONCE     ondansetron 4 MG ODT tab  Commonly known as:  ZOFRAN ODT  4 mg, Oral, EVERY 8 HOURS PRN     polyethylene glycol powder  Commonly known as:  MIRALAX  1 capful, Oral, 2 TIMES DAILY     SENNA-docusate sodium 8.6-50 MG tablet  Commonly known as:  SENNA S  1 tablet, Oral, AT BEDTIME          IMitul, am serving as a scribe on 7/20/2019 at 2:11 PM to personally document services performed by Eren Le MD based on my observations and the provider's statements to me.     Mitul Manley  7/20/2019   [facility] EMERGENCY DEPARTMENT       Eren Le MD  07/20/19 7854

## 2019-07-22 ENCOUNTER — TELEPHONE (OUTPATIENT)
Dept: SCHEDULING | Facility: CLINIC | Age: 44
End: 2019-07-22

## 2019-07-23 ENCOUNTER — ANCILLARY PROCEDURE (OUTPATIENT)
Dept: MAMMOGRAPHY | Facility: CLINIC | Age: 44
End: 2019-07-23
Attending: FAMILY MEDICINE
Payer: COMMERCIAL

## 2019-07-23 DIAGNOSIS — Z12.31 VISIT FOR SCREENING MAMMOGRAM: ICD-10-CM

## 2019-07-23 PROCEDURE — 77067 SCR MAMMO BI INCL CAD: CPT | Mod: TC

## 2019-07-30 ENCOUNTER — NURSE TRIAGE (OUTPATIENT)
Dept: NURSING | Facility: CLINIC | Age: 44
End: 2019-07-30

## 2019-07-30 ENCOUNTER — OFFICE VISIT (OUTPATIENT)
Dept: FAMILY MEDICINE | Facility: CLINIC | Age: 44
End: 2019-07-30
Payer: COMMERCIAL

## 2019-07-30 VITALS
OXYGEN SATURATION: 100 % | WEIGHT: 187 LBS | HEART RATE: 84 BPM | BODY MASS INDEX: 33.13 KG/M2 | SYSTOLIC BLOOD PRESSURE: 136 MMHG | DIASTOLIC BLOOD PRESSURE: 90 MMHG | TEMPERATURE: 98 F | HEIGHT: 63 IN

## 2019-07-30 DIAGNOSIS — G89.4 CHRONIC PAIN SYNDROME: ICD-10-CM

## 2019-07-30 DIAGNOSIS — M54.50 CHRONIC MIDLINE LOW BACK PAIN WITHOUT SCIATICA: ICD-10-CM

## 2019-07-30 DIAGNOSIS — G89.29 CHRONIC MIDLINE LOW BACK PAIN WITHOUT SCIATICA: ICD-10-CM

## 2019-07-30 DIAGNOSIS — K59.00 CONSTIPATION, UNSPECIFIED CONSTIPATION TYPE: ICD-10-CM

## 2019-07-30 DIAGNOSIS — I10 HYPERTENSION GOAL BP (BLOOD PRESSURE) < 140/90: Primary | ICD-10-CM

## 2019-07-30 PROCEDURE — 99214 OFFICE O/P EST MOD 30 MIN: CPT | Performed by: FAMILY MEDICINE

## 2019-07-30 RX ORDER — TRIAMTERENE/HYDROCHLOROTHIAZID 37.5-25 MG
1 TABLET ORAL DAILY
Qty: 30 TABLET | Refills: 2 | Status: SHIPPED | OUTPATIENT
Start: 2019-07-30 | End: 2019-10-21

## 2019-07-30 RX ORDER — OXYCODONE HYDROCHLORIDE 15 MG/1
15 TABLET ORAL 3 TIMES DAILY PRN
Qty: 90 TABLET | Refills: 0 | Status: SHIPPED | OUTPATIENT
Start: 2019-07-30 | End: 2019-08-28

## 2019-07-30 ASSESSMENT — MIFFLIN-ST. JEOR: SCORE: 1457.36

## 2019-07-30 NOTE — PATIENT INSTRUCTIONS
"1) start MaxZide once daily -should help both blood pressure and swelling  2) stop the potassium supplement for now.  Think the MaxZide may help keep the potassium high on its own  3) we can plan to recheck blood pressure and potassium level in a couple of months      Constipation Treatment   Safe for everyday use (even in combo)   - Fiber (metamucil, citrucil, etc)   - Miralax   - Colace (stool softener)   For \"as needed use\"   - senna (Senokot, Ex Lax)   - Milk of Magnesia   - Magnesium Citrate     "

## 2019-07-30 NOTE — PROGRESS NOTES
Subjective     Jeff Serra is a 44 year old female who presents to clinic today for the following health issues:    HPI     Hypertension Follow-up      Do you check your blood pressure regularly outside of the clinic? Yes     Are you following a low salt diet? Yes    Are your blood pressures ever more than 140 on the top number (systolic) OR more   than 90 on the bottom number (diastolic), for example 140/90? Yes  Chronic Pain Follow-Up       Type / Location of Pain: Abdominal   Analgesia/pain control:       Recent changes:  worse      Overall control: Tolerable with discomfort  Activity level/function:      Daily activities:  Able to do light housework, cooking    Work:  Able to work part time with limitations  Adverse effects:  No  Adherance    Taking medication as directed?  Yes    Participating in other treatments: no   Risk Factors:    Sleep:  Fair    Mood/anxiety:  worsened    Recent family or social stressors:  none noted    Other aggravating factors: prolonged sitting and prolonged standing  PHQ-9 SCORE 12/1/2015 3/8/2018 6/10/2019   PHQ-9 Total Score - - -   PHQ-9 Total Score 3 2 3     CHAO-7 SCORE 12/1/2015 3/8/2018 6/10/2019   Total Score - - -   Total Score 4 3 2     Encounter-Level CSA - 08/31/2015:    Controlled Substance Agreement - Scan on 9/4/2015  5:07 PM: CONTROLLED SUBSTANCE AGREEMENT (below)       Patient-Level CSA:    There are no patient-level csa.         Amount of exercise or physical activity: 1 day/week for an average of 15-30 minutes    Problems taking medications regularly: No    Medication side effects: none    Diet: low salt    SUBJECTIVE:  Here today in follow-up of hypertension.  Also reviewed recent ER visits including one for constipation.  Patient says she has been somewhat backed up recently but some of the medications have helped.  Looks like she was prescribed Senokot and MiraLAX.  Patient has been noting that she is been feeling very puffy lately.  Hands and legs seem  "to be swollen.  Still dealing with ongoing back issues that are well-known to me and up-to-date on her monitorING.    Review of systems otherwise negative.  Past medical, family, and social history reviewed and updated in chart.    OBJECTIVE:  BP (!) 136/90 (BP Location: Right arm, Patient Position: Chair, Cuff Size: Adult Regular)   Pulse 84   Temp 98  F (36.7  C) (Oral)   Ht 1.6 m (5' 3\")   Wt 84.8 kg (187 lb)   LMP 09/12/2016 (Exact Date)   SpO2 100%   Breastfeeding? No   BMI 33.13 kg/m    Alert, pleasant, upbeat, and in no apparent discomfort.  S1 and S2 normal, no murmurs, clicks, gallops or rubs. Regular rate and rhythm. Chest is clear; no wheezes or rales.  Trace edema bilaterally.  The abdomen is soft without tenderness, guarding, mass, rebound or organomegaly. Bowel sounds are normal. No CVA tenderness or inguinal adenopathy noted.   Past labs reviewed with the patient.     ASSESSMENT / PLAN:  (I10) Hypertension goal BP (blood pressure) < 140/90  (primary encounter diagnosis)  Comment: Blood pressure not fully at goal and is having some issues of swelling.  Has had some issues additionally of keeping potassium up I think we should go ahead and use MaxZide for because we do not know the effect that it will have on her potassium I would like her to stop that for now.  We will plan to recheck blood pressure and potassium levels in a couple of months  Plan: triamterene-HCTZ (MAXZIDE-25) 37.5-25 MG tablet            (K59.00) Constipation, unspecified constipation type  Comment: Counseled on the various therapies and what to keep up with  Plan:     (G89.4) Chronic pain syndrome  Comment: Up-to-date  Plan: oxyCODONE IR (ROXICODONE) 15 MG tablet            (M54.5,  G89.29) Chronic midline low back pain without sciatica  Comment:   Plan: oxyCODONE IR (ROXICODONE) 15 MG tablet            Follow up to my  S. Jae Loredo MD    (Chart documentation completed in part with Dragon voice-recognition software.  " Even though reviewed some grammatical, spelling, and word errors may remain.)

## 2019-07-31 NOTE — TELEPHONE ENCOUNTER
[facility] is calling to verify if the prescription for oxycodone was written today and is authentic. I looked in her chart and gave them the Dr name and number of tablets I have in the chart.    Bambi Shine RN/ [facility]      Reason for Disposition    Caller has medication question only, adult not sick, and triager answers question    Protocols used: MEDICATION QUESTION CALL-A-AH

## 2019-08-26 ENCOUNTER — APPOINTMENT (OUTPATIENT)
Dept: GENERAL RADIOLOGY | Facility: CLINIC | Age: 44
End: 2019-08-26
Attending: EMERGENCY MEDICINE
Payer: COMMERCIAL

## 2019-08-26 ENCOUNTER — HOSPITAL ENCOUNTER (EMERGENCY)
Facility: CLINIC | Age: 44
Discharge: HOME | End: 2019-08-26
Attending: EMERGENCY MEDICINE | Admitting: EMERGENCY MEDICINE
Payer: COMMERCIAL

## 2019-08-26 ENCOUNTER — APPOINTMENT (OUTPATIENT)
Dept: ULTRASOUND IMAGING | Facility: CLINIC | Age: 44
End: 2019-08-26
Attending: EMERGENCY MEDICINE
Payer: COMMERCIAL

## 2019-08-26 VITALS
RESPIRATION RATE: 18 BRPM | TEMPERATURE: 98.3 F | DIASTOLIC BLOOD PRESSURE: 92 MMHG | HEART RATE: 84 BPM | OXYGEN SATURATION: 100 % | SYSTOLIC BLOOD PRESSURE: 148 MMHG

## 2019-08-26 DIAGNOSIS — R07.9 ACUTE CHEST PAIN: ICD-10-CM

## 2019-08-26 DIAGNOSIS — R10.9 ACUTE ABDOMINAL PAIN: ICD-10-CM

## 2019-08-26 LAB
ALBUMIN SERPL-MCNC: 4 G/DL (ref 3.4–5)
ALP SERPL-CCNC: 75 U/L (ref 40–150)
ALT SERPL W P-5'-P-CCNC: 57 U/L (ref 0–50)
ANION GAP SERPL CALCULATED.3IONS-SCNC: 4 MMOL/L (ref 3–14)
AST SERPL W P-5'-P-CCNC: 20 U/L (ref 0–45)
BASOPHILS # BLD AUTO: 0.1 10E9/L (ref 0–0.2)
BASOPHILS NFR BLD AUTO: 0.8 %
BILIRUB SERPL-MCNC: 0.8 MG/DL (ref 0.2–1.3)
BUN SERPL-MCNC: 7 MG/DL (ref 7–30)
CALCIUM SERPL-MCNC: 8.7 MG/DL (ref 8.5–10.1)
CHLORIDE SERPL-SCNC: 105 MMOL/L (ref 94–109)
CO2 SERPL-SCNC: 29 MMOL/L (ref 20–32)
CREAT SERPL-MCNC: 0.68 MG/DL (ref 0.52–1.04)
D DIMER PPP FEU-MCNC: 0.3 UG/ML FEU (ref 0–0.5)
DIFFERENTIAL METHOD BLD: NORMAL
EOSINOPHIL # BLD AUTO: 0 10E9/L (ref 0–0.7)
EOSINOPHIL NFR BLD AUTO: 0.7 %
ERYTHROCYTE [DISTWIDTH] IN BLOOD BY AUTOMATED COUNT: 14.2 % (ref 10–15)
GFR SERPL CREATININE-BSD FRML MDRD: >90 ML/MIN/{1.73_M2}
GLUCOSE SERPL-MCNC: 91 MG/DL (ref 70–99)
HCT VFR BLD AUTO: 39.9 % (ref 35–47)
HGB BLD-MCNC: 13.3 G/DL (ref 11.7–15.7)
IMM GRANULOCYTES # BLD: 0 10E9/L (ref 0–0.4)
IMM GRANULOCYTES NFR BLD: 0.3 %
LYMPHOCYTES # BLD AUTO: 1.9 10E9/L (ref 0.8–5.3)
LYMPHOCYTES NFR BLD AUTO: 31.4 %
MCH RBC QN AUTO: 28.8 PG (ref 26.5–33)
MCHC RBC AUTO-ENTMCNC: 33.3 G/DL (ref 31.5–36.5)
MCV RBC AUTO: 86 FL (ref 78–100)
MONOCYTES # BLD AUTO: 0.5 10E9/L (ref 0–1.3)
MONOCYTES NFR BLD AUTO: 7.4 %
NEUTROPHILS # BLD AUTO: 3.6 10E9/L (ref 1.6–8.3)
NEUTROPHILS NFR BLD AUTO: 59.4 %
NRBC # BLD AUTO: 0 10*3/UL
NRBC BLD AUTO-RTO: 0 /100
PLATELET # BLD AUTO: 167 10E9/L (ref 150–450)
POTASSIUM SERPL-SCNC: 3.5 MMOL/L (ref 3.4–5.3)
PROT SERPL-MCNC: 7.7 G/DL (ref 6.8–8.8)
RBC # BLD AUTO: 4.62 10E12/L (ref 3.8–5.2)
SODIUM SERPL-SCNC: 138 MMOL/L (ref 133–144)
TROPONIN I SERPL-MCNC: <0.015 UG/L (ref 0–0.04)
WBC # BLD AUTO: 6.1 10E9/L (ref 4–11)

## 2019-08-26 PROCEDURE — 85379 FIBRIN DEGRADATION QUANT: CPT | Performed by: EMERGENCY MEDICINE

## 2019-08-26 PROCEDURE — 96361 HYDRATE IV INFUSION ADD-ON: CPT

## 2019-08-26 PROCEDURE — 25000125 ZZHC RX 250: Performed by: EMERGENCY MEDICINE

## 2019-08-26 PROCEDURE — 25000128 H RX IP 250 OP 636: Performed by: EMERGENCY MEDICINE

## 2019-08-26 PROCEDURE — 85025 COMPLETE CBC W/AUTO DIFF WBC: CPT | Performed by: EMERGENCY MEDICINE

## 2019-08-26 PROCEDURE — 80053 COMPREHEN METABOLIC PANEL: CPT | Performed by: EMERGENCY MEDICINE

## 2019-08-26 PROCEDURE — 84484 ASSAY OF TROPONIN QUANT: CPT | Performed by: EMERGENCY MEDICINE

## 2019-08-26 PROCEDURE — 71046 X-RAY EXAM CHEST 2 VIEWS: CPT

## 2019-08-26 PROCEDURE — 76705 ECHO EXAM OF ABDOMEN: CPT

## 2019-08-26 PROCEDURE — 25000132 ZZH RX MED GY IP 250 OP 250 PS 637: Performed by: EMERGENCY MEDICINE

## 2019-08-26 PROCEDURE — 93005 ELECTROCARDIOGRAM TRACING: CPT

## 2019-08-26 PROCEDURE — 96374 THER/PROPH/DIAG INJ IV PUSH: CPT

## 2019-08-26 PROCEDURE — 99285 EMERGENCY DEPT VISIT HI MDM: CPT | Mod: 25

## 2019-08-26 RX ADMIN — SODIUM CHLORIDE 1000 ML: 9 INJECTION, SOLUTION INTRAVENOUS at 11:43

## 2019-08-26 RX ADMIN — FAMOTIDINE 20 MG: 10 INJECTION, SOLUTION INTRAVENOUS at 11:44

## 2019-08-26 RX ADMIN — LIDOCAINE HYDROCHLORIDE 30 ML: 20 SOLUTION ORAL; TOPICAL at 11:44

## 2019-08-26 ASSESSMENT — ENCOUNTER SYMPTOMS
ABDOMINAL PAIN: 1
FEVER: 1

## 2019-08-26 NOTE — ED PROVIDER NOTES
"  History     Chief Complaint:  Chest Pain    HPI   Jeff Serra is a 44 year old female who presents with history of DVT, HTN, chronic pain, and PE who presents for evaluation of mid sternal chest pain. The patient reports waking up this morning at around 0600 with chest pain. The pain had gotten progressively worse while she was at work today. She states that the pain is exacerbated with deep breaths. The patient also endorses feeling feverish as well as epigastric abdominal pain. She states that she notices this abdominal pain after eating and also describes it as a \"fullness.\" She also notes leg swelling but is currently being treated for this. She denies any history of stomach ulcers or gall stones.      Of note, the patient was seen on 6/25 for evaluation of chest pain and workup came back negative. She also had a echo stress test done which came back normal.     Cardiac/PE/DVT Risk Factors:  History of hypertension - positive   History of hyperlipidemia - negative   History of diabetes - negative   History of smoking - negative   Personal history of PE/DVT - positive   Family history of PE/DVT - negative   Family history of heart complications - negative   Recent travel - negative   Recent surgery - negative   Other immobilizations - negative   Cancer - negative       Allergies:  NKDA     Medications:    Neurontin  Claritin  Reglan  Toprol  Zofran  Feldene  K-Natali  Deltasone  Senna S  Maxzide  Ergo     Past Medical History:    Chronic low back pain  Chronic neck pain   Continuous opioid dependence  HTN  Kidney stone  PE  Right leg DVT     Past Surgical History:    Hysterectomy  Laparoscopy diagnostic  Laparotomy mini, tubal ligation  Lithotripsy     Family History:    No past pertinent family history.     Social History:  The patient presented alone.  Smoking Status: Negative  Smokeless Tobacco: Negative  Alcohol Use: Positive, occasional  Drug Use: Negative  Marital Status:   [2]     Review of " Systems   Constitutional: Positive for fever (subjective).   Cardiovascular: Positive for chest pain.   Gastrointestinal: Positive for abdominal pain.   All other systems reviewed and are negative.      Physical Exam     Patient Vitals for the past 24 hrs:   BP Temp Temp src Pulse Heart Rate Resp SpO2   08/26/19 1249 -- -- -- -- -- -- 100 %   08/26/19 1248 -- -- -- -- -- -- 100 %   08/26/19 1247 (!) 148/92 -- -- 84 -- -- --   08/26/19 1201 -- -- -- -- 81 18 100 %   08/26/19 1200 (!) 141/93 -- -- 81 82 17 100 %   08/26/19 1159 -- -- -- -- 81 11 100 %   08/26/19 1124 (!) 152/101 98.3  F (36.8  C) Oral 92 -- 18 100 %       Physical Exam  General:          Patient is alert and interactive when I enter the room  Head:              The scalp, face, and head appear normal  Eyes:               The pupils are equal, round, and reactive to light                          Conjunctivae and sclerae are normal  ENT:                External acoustic canals are normal                          The oropharynx is normal without erythema.                           Uvula is in the midline  Neck:              Normal range of motion  CV:                  Regular rate. S1/S2. No murmurs.                           Peripheral pulses including radial pulses are symmetric  Resp:              Lungs are clear without wheezes or rales. No distress  GI:                   Abdomen is soft, no rigidity, guarding, or rebound                          No distension.    She has generalized tenderness in the upper quadrants.            MS:                  Normal tone. Joints grossly normal without effusions.                           No asymmetric leg swelling, calf or thigh tenderness.                            Normal motor assessment of all extremities.                          Chest wall is tender to palpation.    Skin:               No rash or lesions noted. Normal capillary refill noted  Neuro:            Speech is normal and fluent. Face is  symmetric.                           Moving all extremities well.   Psych:            Awake. Alert.  Normal affect.  Appropriate interactions.  Lymph:           No anterior cervical lymphadenopathy noted    Emergency Department Course   ECG:  Indication: Chest Pain  Time: 1124  Vent. Rate 89 bpm. OR interval 130. QRS duration 74. QT/QTc 360/438. P-R-T axis 38 25 10.  Normal sinus rhythm. Normal ECG. Read time: 1124    Imaging:  Radiographic findings were communicated with the patient who voiced understanding of the findings.  XR Chest 2 views:   No acute cardiopulmonary disease. As per radiology.     US Abdomen, Limited:  Fatty infiltration of the liver. No gallstones or bile  duct dilatation. Pancreas partially obscured by overlying bowel gas as per radiology.     Laboratory:  CBC: WBC: 6.1, HGB: 13.3, PLT: 167  CMP: Glucose 91, ALT 57 (H), o/w WNL (Creatinine: 0.68)      1137 Troponin: <0.015    D Dimer: 0.3    Interventions:  1143 NS 1L IV  1144 Pepcid 20 mg IV   GI Cocktail (Maalox/Mylanta and viscous Lidocaine), 30 mL suspension, PO     Emergency Department Course:  Nursing notes and vitals reviewed. (1124) I performed an exam of the patient as documented above.      IV inserted. Medicine administered as documented above. Blood drawn. This was sent to the lab for further testing, results above.     The patient was sent for a XR Chest and US Abdomen while in the emergency department, findings above.      (1324) I rechecked the patient and discussed the results of her workup thus far.      Findings and plan explained to the Patient. Patient discharged home with instructions regarding supportive care, medications, and reasons to return. The importance of close follow-up was reviewed.     I personally reviewed the laboratory and imaging results with the Patient and answered all related questions prior to discharge.       Impression & Plan    Medical Decision Making:  Jeff Serra is a 44 year old female who  presents for evaluation of chest pain.  This pain has lasted for >6  hours now. Initial laboratory and imaging tests have come back normal.  There has been no progression of symptoms or other new concerning symptoms.       A broad differential was of course considered.  Exceedingly low risk for unstable angina at this point and will therefore not admit formally and administer heparin. D-dimer is negative and despite hx is not high risk at thsi point so no indication for CT PE protocol.     Abd is fairly benign; normal labs and ultrasound. Mild relief with GI cocktail. I would not CT abdomen at this point given risks/benefits.     Discussed HEART score with patient and shared decision making regarding disposition and further workup was done. .        Critical Care time:  none    Diagnosis:    ICD-10-CM    1. Acute chest pain R07.9    2. Acute abdominal pain R10.9      Disposition:  discharged to home    Scribe Disclosure:  IFrancoise, am serving as a scribe on 8/26/2019 at 11:45 AM to personally document services performed by Eren Kirk MD based on my observations and the provider's statements to me.     Francoise Torres  8/26/2019   [facility] EMERGENCY DEPARTMENT       Eren Kirk MD  08/26/19 1159

## 2019-08-26 NOTE — ED AVS SNAPSHOT
[facility] Emergency Department  [address]  [facility] [address]  Phone:  [phone]  Fax:  [phone]                                    Jeff Serra   MRN: [id number]    Department:  [facility] Emergency Department   Date of Visit:  8/26/2019           After Visit Summary Signature Page    I have received my discharge instructions, and my questions have been answered. I have discussed any challenges I see with this plan with the nurse or doctor.    ..........................................................................................................................................  Patient/Patient Representative Signature      ..........................................................................................................................................  Patient Representative Print Name and Relationship to Patient    ..................................................               ................................................  Date                                   Time    ..........................................................................................................................................  Reviewed by Signature/Title    ...................................................              ..............................................  Date                                               Time          22EPIC Rev 08/18

## 2019-08-26 NOTE — ED TRIAGE NOTES
Chest pain started this AM while driving. Pain 8/0 currently in upper mid sternum area. Radiates into neck and is worse with deep breaths and exertion. Denies nausea or vomiting. Complains of dizziness as well, pain after eating and feels full.

## 2019-08-27 LAB — INTERPRETATION ECG - MUSE: NORMAL

## 2019-08-28 DIAGNOSIS — G89.29 CHRONIC MIDLINE LOW BACK PAIN WITHOUT SCIATICA: ICD-10-CM

## 2019-08-28 DIAGNOSIS — R10.84 ABDOMINAL PAIN, GENERALIZED: Primary | ICD-10-CM

## 2019-08-28 DIAGNOSIS — G89.4 CHRONIC PAIN SYNDROME: ICD-10-CM

## 2019-08-28 DIAGNOSIS — M54.50 CHRONIC MIDLINE LOW BACK PAIN WITHOUT SCIATICA: ICD-10-CM

## 2019-08-28 RX ORDER — OXYCODONE HYDROCHLORIDE 15 MG/1
15 TABLET ORAL 3 TIMES DAILY PRN
Qty: 90 TABLET | Refills: 0 | Status: SHIPPED | OUTPATIENT
Start: 2019-08-28 | End: 2019-09-25

## 2019-09-11 ENCOUNTER — APPOINTMENT (OUTPATIENT)
Dept: ULTRASOUND IMAGING | Facility: CLINIC | Age: 44
End: 2019-09-11
Attending: EMERGENCY MEDICINE
Payer: COMMERCIAL

## 2019-09-11 ENCOUNTER — APPOINTMENT (OUTPATIENT)
Dept: CT IMAGING | Facility: CLINIC | Age: 44
End: 2019-09-11
Attending: EMERGENCY MEDICINE
Payer: COMMERCIAL

## 2019-09-11 ENCOUNTER — HOSPITAL ENCOUNTER (EMERGENCY)
Facility: CLINIC | Age: 44
Discharge: HOME | End: 2019-09-11
Attending: EMERGENCY MEDICINE | Admitting: EMERGENCY MEDICINE
Payer: COMMERCIAL

## 2019-09-11 VITALS
TEMPERATURE: 98 F | DIASTOLIC BLOOD PRESSURE: 90 MMHG | SYSTOLIC BLOOD PRESSURE: 130 MMHG | BODY MASS INDEX: 33.09 KG/M2 | HEART RATE: 103 BPM | WEIGHT: 186.8 LBS | RESPIRATION RATE: 18 BRPM | OXYGEN SATURATION: 98 %

## 2019-09-11 DIAGNOSIS — G89.29 CHRONIC ABDOMINAL PAIN: ICD-10-CM

## 2019-09-11 DIAGNOSIS — N83.201 CYST OF RIGHT OVARY: ICD-10-CM

## 2019-09-11 DIAGNOSIS — R10.9 CHRONIC ABDOMINAL PAIN: ICD-10-CM

## 2019-09-11 LAB
ALBUMIN SERPL-MCNC: 4.1 G/DL (ref 3.4–5)
ALP SERPL-CCNC: 85 U/L (ref 40–150)
ALT SERPL W P-5'-P-CCNC: 49 U/L (ref 0–50)
ANION GAP SERPL CALCULATED.3IONS-SCNC: 6 MMOL/L (ref 3–14)
AST SERPL W P-5'-P-CCNC: 27 U/L (ref 0–45)
BASOPHILS # BLD AUTO: 0 10E9/L (ref 0–0.2)
BASOPHILS NFR BLD AUTO: 0.3 %
BILIRUB SERPL-MCNC: 1.3 MG/DL (ref 0.2–1.3)
BUN SERPL-MCNC: 7 MG/DL (ref 7–30)
CALCIUM SERPL-MCNC: 8.9 MG/DL (ref 8.5–10.1)
CHLORIDE SERPL-SCNC: 102 MMOL/L (ref 94–109)
CO2 SERPL-SCNC: 29 MMOL/L (ref 20–32)
CREAT BLD-MCNC: 0.7 MG/DL (ref 0.52–1.04)
CREAT SERPL-MCNC: 0.66 MG/DL (ref 0.52–1.04)
DIFFERENTIAL METHOD BLD: NORMAL
EOSINOPHIL # BLD AUTO: 0 10E9/L (ref 0–0.7)
EOSINOPHIL NFR BLD AUTO: 0.5 %
ERYTHROCYTE [DISTWIDTH] IN BLOOD BY AUTOMATED COUNT: 14.5 % (ref 10–15)
GFR SERPL CREATININE-BSD FRML MDRD: >90 ML/MIN/{1.73_M2}
GFR SERPL CREATININE-BSD FRML MDRD: >90 ML/MIN/{1.73_M2}
GLUCOSE SERPL-MCNC: 84 MG/DL (ref 70–99)
HCT VFR BLD AUTO: 41.6 % (ref 35–47)
HGB BLD-MCNC: 14.2 G/DL (ref 11.7–15.7)
IMM GRANULOCYTES # BLD: 0 10E9/L (ref 0–0.4)
IMM GRANULOCYTES NFR BLD: 0.1 %
LACTATE BLD-SCNC: 0.9 MMOL/L (ref 0.7–2)
LIPASE SERPL-CCNC: 95 U/L (ref 73–393)
LYMPHOCYTES # BLD AUTO: 1.9 10E9/L (ref 0.8–5.3)
LYMPHOCYTES NFR BLD AUTO: 24.4 %
MCH RBC QN AUTO: 29.2 PG (ref 26.5–33)
MCHC RBC AUTO-ENTMCNC: 34.1 G/DL (ref 31.5–36.5)
MCV RBC AUTO: 86 FL (ref 78–100)
MONOCYTES # BLD AUTO: 0.6 10E9/L (ref 0–1.3)
MONOCYTES NFR BLD AUTO: 7.5 %
NEUTROPHILS # BLD AUTO: 5.3 10E9/L (ref 1.6–8.3)
NEUTROPHILS NFR BLD AUTO: 67.2 %
NRBC # BLD AUTO: 0 10*3/UL
NRBC BLD AUTO-RTO: 0 /100
PLATELET # BLD AUTO: 180 10E9/L (ref 150–450)
POTASSIUM SERPL-SCNC: 3.4 MMOL/L (ref 3.4–5.3)
PROT SERPL-MCNC: 8.3 G/DL (ref 6.8–8.8)
RBC # BLD AUTO: 4.86 10E12/L (ref 3.8–5.2)
SODIUM SERPL-SCNC: 137 MMOL/L (ref 133–144)
WBC # BLD AUTO: 7.8 10E9/L (ref 4–11)

## 2019-09-11 PROCEDURE — 99285 EMERGENCY DEPT VISIT HI MDM: CPT | Mod: 25 | Performed by: EMERGENCY MEDICINE

## 2019-09-11 PROCEDURE — 76830 TRANSVAGINAL US NON-OB: CPT

## 2019-09-11 PROCEDURE — 99284 EMERGENCY DEPT VISIT MOD MDM: CPT | Mod: GC | Performed by: EMERGENCY MEDICINE

## 2019-09-11 PROCEDURE — 25000128 H RX IP 250 OP 636: Performed by: EMERGENCY MEDICINE

## 2019-09-11 PROCEDURE — 74177 CT ABD & PELVIS W/CONTRAST: CPT

## 2019-09-11 PROCEDURE — 82565 ASSAY OF CREATININE: CPT

## 2019-09-11 PROCEDURE — 80053 COMPREHEN METABOLIC PANEL: CPT | Performed by: EMERGENCY MEDICINE

## 2019-09-11 PROCEDURE — 83605 ASSAY OF LACTIC ACID: CPT | Performed by: EMERGENCY MEDICINE

## 2019-09-11 PROCEDURE — 25000125 ZZHC RX 250: Performed by: EMERGENCY MEDICINE

## 2019-09-11 PROCEDURE — 85025 COMPLETE CBC W/AUTO DIFF WBC: CPT | Performed by: EMERGENCY MEDICINE

## 2019-09-11 PROCEDURE — 83690 ASSAY OF LIPASE: CPT | Performed by: EMERGENCY MEDICINE

## 2019-09-11 RX ORDER — IOPAMIDOL 755 MG/ML
100 INJECTION, SOLUTION INTRAVASCULAR ONCE
Status: COMPLETED | OUTPATIENT
Start: 2019-09-11 | End: 2019-09-11

## 2019-09-11 RX ADMIN — SODIUM CHLORIDE 63 ML: 9 INJECTION, SOLUTION INTRAVENOUS at 12:56

## 2019-09-11 RX ADMIN — IOPAMIDOL 91 ML: 755 INJECTION, SOLUTION INTRAVENOUS at 12:55

## 2019-09-11 ASSESSMENT — ENCOUNTER SYMPTOMS
WHEEZING: 0
DIARRHEA: 0
PALPITATIONS: 0
DIZZINESS: 0
FLANK PAIN: 0
SHORTNESS OF BREATH: 0
APPETITE CHANGE: 0
HEADACHES: 0
DIFFICULTY URINATING: 0
WEAKNESS: 0
ABDOMINAL PAIN: 1
NAUSEA: 0
CONSTIPATION: 1
HEMATURIA: 0
FEVER: 0
BLOOD IN STOOL: 0
DYSURIA: 0
CHILLS: 0
COUGH: 0
VOMITING: 0

## 2019-09-11 NOTE — ED AVS SNAPSHOT
[facility], [place], Emergency Department  [address]  [facility] [address]  Phone:  [phone]  Fax:  [phone]                                    Jeff Serra   MRN: [id number]    Department:  [facility], Emergency Department   Date of Visit:  9/11/2019           After Visit Summary Signature Page    I have received my discharge instructions, and my questions have been answered. I have discussed any challenges I see with this plan with the nurse or doctor.    ..........................................................................................................................................  Patient/Patient Representative Signature      ..........................................................................................................................................  Patient Representative Print Name and Relationship to Patient    ..................................................               ................................................  Date                                   Time    ..........................................................................................................................................  Reviewed by Signature/Title    ...................................................              ..............................................  Date                                               Time          22EPIC Rev 08/18

## 2019-09-11 NOTE — DISCHARGE INSTRUCTIONS
Please make an appointment to follow up with OB/Gyn--[facility] (phone: [phone]) in 7-10 days. You have cysts on the right ovary.   Take ibuprofen or Tylenol as needed for pain.  Heat to the abdomen may help with your pain.

## 2019-09-11 NOTE — ED PROVIDER NOTES
"  History     Chief Complaint   Patient presents with     Abdominal Pain     having abd ongoing [id number], pt states her abd is full \"and she hasn't ate anuthing\" \"It hurts reall bad\"     HPI  Jeff Serra is a 44 year old female who presents with abdominal pain for 3 weeks. LUQ pain is described as dull/crampy, while RLQ pain is burning. Never had this pain before. States she feels full and bloated after she eats any type of food. Sitting forward makes the pain worse. Tried ibuprofen 800mg q4hr with no relief of pain. History of hysterectomy and kidney stone removal. Denies history of pancreatitis, GB problems, or stomach ulcers. Denies N/V/D, urinary complaints, flank pain, heartburn, burping. Subjective fever/chills and constipation for which she takes daily miralax and senna. Reports 1-2 BM a day, that vary from normal to hard. Was seen in ED on 7/20 and 8/26 for abdominal pain. US and CT ab/pelvis showed constipation, fatty liver, and corpus luteal cyst of R ovary. Patient denies heavy alcohol use.     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 Per , patient is currently taking 15mg oxycodone, prescribed from two different prescribers. Most recently, on 9/1/she had her usual dose of 90 oxycodone 15 mg prescribed. She reports they are trying to wean her off of it.     I have reviewed the Medications, Allergies, Past Medical and Surgical History, and Social History in the Epic system.    Review of Systems   Constitutional: Negative for appetite change, chills and fever.   Respiratory: Negative for cough, shortness of breath and wheezing.    Cardiovascular: Negative for chest pain and palpitations.   Gastrointestinal: Positive for abdominal pain and constipation. Negative for blood in stool, diarrhea, nausea and vomiting.        (+) Bloating     Genitourinary: Negative for difficulty urinating, dysuria, flank pain, hematuria, pelvic pain, vaginal bleeding and vaginal discharge. "   Skin: Negative for rash.   Neurological: Negative for dizziness, weakness and headaches.       Physical Exam   BP: (!) 137/103  Pulse: 80  Temp: 98  F (36.7  C)  Resp: 20  Weight: 84.7 kg (186 lb 12.8 oz)  SpO2: 100 %      Physical Exam   Constitutional: She is oriented to person, place, and time. She appears well-developed and well-nourished. No distress.   Facial hair noted on neck under chin.   HENT:   Head: Normocephalic and atraumatic.   Eyes: Pupils are equal, round, and reactive to light.   Neck: Normal range of motion. Neck supple.   Cardiovascular: Regular rhythm and intact distal pulses. Tachycardia present.   Pulmonary/Chest: Effort normal and breath sounds normal. No respiratory distress. She has no wheezes. She has no rales.   Abdominal: Soft. She exhibits no distension. Bowel sounds are increased. There is tenderness in the right lower quadrant and left upper quadrant. There is no rigidity, no rebound, no guarding, no CVA tenderness, no tenderness at McBurney's point and negative Jacobs's sign.   Musculoskeletal: She exhibits no tenderness.   Neurological: She is alert and oriented to person, place, and time.   Skin: Skin is warm and dry. No rash noted. No erythema.   Psychiatric: She has a normal mood and affect.       ED Course         Results for orders placed or performed during the hospital encounter of 09/11/19   CT Abdomen Pelvis w Contrast    Narrative    CT ABDOMEN/PELVIS WITH CONTRAST September 11, 2019 at 1259 hours    HISTORY: 44-year-old woman with abdominal pain, now with bloating and  cramping with left-sided abdominal pain and right-sided burning.    COMPARISON: July 20, 2019.    TECHNIQUE: Axial and coronal CT images obtained from the lung bases  through the abdomen and pelvis after the uneventful administration of  Isovue-370 intravenous contrast given for a total of 91 mL. Radiation  dose for this scan was reduced using automated exposure control,  adjustment of the mA and/or kV  according to patient size, or iterative  reconstruction technique.     FINDINGS: The visible lung bases are clear. Heart size is normal. The  liver, gallbladder, spleen, pancreas, and adrenal glands are  unremarkable. Both kidneys are normally perfused. No hydronephrosis or  nephrolithiasis. Broad-based umbilical hernia predominantly with fat  and loops of bowel near the base of the hernia, unchanged from  previous exam. No intraperitoneal fluid or air. Bladder is mildly  distended and unremarkable. Uterus is not identified. Septated cystic  collection noted in the right ovary measuring 3.2 x 2.3 cm density of  20 Hounsfield units. Previously, this area was 1.7 x 1.7 cm with  peripheral enhancement. Left adnexa is unchanged. Scattered colonic  diverticuli. No dilated loops of bowel. Normal-size appendix is  thought to be visible near midline. No surrounding inflammatory change  or enlargement. No acute osseous abnormality. No inguinal or  retroperitoneal lymphadenopathy.      Impression    IMPRESSION:  1. Septated hypodensity in the right ovary is 3.2 x 2.3 cm, a cystic  collection, previously suggestive of a corpus luteal cyst at 1.7 x 1.7  cm. If concern as cause for pain, recommend pelvic ultrasound.  2. Otherwise, no definitive cause for patient's pain identified.    GUERA BARTLETT MD   US Pelvic Complete with Transvaginal    Narrative    ULTRASOUND PELVIS WITH TRANSVAGINAL IMAGING  9/11/2019 2:44 PM     HISTORY: Pelvic pain, ovarian cyst on the right.    COMPARISON: May 30, 2018    FINDINGS: Endovaginal sonography was added to the transabdominal  scans.    Uterus is surgically absent. The right ovary demonstrates a septated  cyst versus 2 cysts adjacent to one another measuring 2.4 and 2.1 cm.  The left ovary demonstrates a small follicle. No adnexal masses are  present. No free pelvic fluid is present.      Impression    IMPRESSION: Two simple cysts adjacent to one another versus a septated  cyst in the right  ovary. No free fluid.   CBC with platelets differential   Result Value Ref Range    WBC 7.8 4.0 - 11.0 10e9/L    RBC Count 4.86 3.8 - 5.2 10e12/L    Hemoglobin 14.2 11.7 - 15.7 g/dL    Hematocrit 41.6 35.0 - 47.0 %    MCV 86 78 - 100 fl    MCH 29.2 26.5 - 33.0 pg    MCHC 34.1 31.5 - 36.5 g/dL    RDW 14.5 10.0 - 15.0 %    Platelet Count 180 150 - 450 10e9/L    Diff Method Automated Method     % Neutrophils 67.2 %    % Lymphocytes 24.4 %    % Monocytes 7.5 %    % Eosinophils 0.5 %    % Basophils 0.3 %    % Immature Granulocytes 0.1 %    Nucleated RBCs 0 0 /100    Absolute Neutrophil 5.3 1.6 - 8.3 10e9/L    Absolute Lymphocytes 1.9 0.8 - 5.3 10e9/L    Absolute Monocytes 0.6 0.0 - 1.3 10e9/L    Absolute Eosinophils 0.0 0.0 - 0.7 10e9/L    Absolute Basophils 0.0 0.0 - 0.2 10e9/L    Abs Immature Granulocytes 0.0 0 - 0.4 10e9/L    Absolute Nucleated RBC 0.0    Comprehensive metabolic panel   Result Value Ref Range    Sodium 137 133 - 144 mmol/L    Potassium 3.4 3.4 - 5.3 mmol/L    Chloride 102 94 - 109 mmol/L    Carbon Dioxide 29 20 - 32 mmol/L    Anion Gap 6 3 - 14 mmol/L    Glucose 84 70 - 99 mg/dL    Urea Nitrogen 7 7 - 30 mg/dL    Creatinine 0.66 0.52 - 1.04 mg/dL    GFR Estimate >90 >60 mL/min/[1.73_m2]    GFR Estimate If Black >90 >60 mL/min/[1.73_m2]    Calcium 8.9 8.5 - 10.1 mg/dL    Bilirubin Total 1.3 0.2 - 1.3 mg/dL    Albumin 4.1 3.4 - 5.0 g/dL    Protein Total 8.3 6.8 - 8.8 g/dL    Alkaline Phosphatase 85 40 - 150 U/L    ALT 49 0 - 50 U/L    AST 27 0 - 45 U/L   Lipase   Result Value Ref Range    Lipase 95 73 - 393 U/L   Lactic acid whole blood   Result Value Ref Range    Lactic Acid 0.9 0.7 - 2.0 mmol/L   Creatinine POCT   Result Value Ref Range    Creatinine 0.7 0.52 - 1.04 mg/dL    GFR Estimate >90 >60 mL/min/[1.73_m2]    GFR Estimate If Black >90 >60 mL/min/[1.73_m2]      Labs Ordered and Resulted from Time of ED Arrival Up to the Time of Departure from the ED   CBC WITH PLATELETS DIFFERENTIAL    COMPREHENSIVE METABOLIC PANEL   LIPASE   LACTIC ACID WHOLE BLOOD   CREATININE POCT   UA MACROSCOPIC WITH REFLEX TO MICRO AND CULTURE   ISTAT CREATININE NURSING POCT            Assessments & Plan (with Medical Decision Making)   I have reviewed the nursing notes.    Jeff Serra is a 44 year old female who presents with worsening  LUQ and RLQ abdominal pain and bloating for 3 weeks. Unclear etiology. Will check lactic, CBC, CMP, lipase, UA and get ab/pelvis CT.     Per chart review, patient had abdominal US in August that showed fatty infiltration of the liver. No gallstones or bile duct dilatation. Pancreas partially obscured by overlying bowel gas. Had ab/pelvis CT 7/20/19 which showed possible mild colonic constipation. No bowel obstruction, diverticulitis or appendicitis. No urinary tract calculi or hydronephrosis. Corpus luteal cyst right ovary with trace amount of free pelvic  fluid.     Labs today WNL. Ab/pelvis CT showed R corpus luteal cyst, will obtain pelvic US to evaluate further. Patient has pain in RLQ, which would be consistent with a R ovarian cyst, however I would expect the pain to be intermittent.    Pelvic US shows two simple cysts adjacent to one another versus a septated cyst in the right ovary. No free fluid. Recommend follow up with OB/GYN for ovarian cyst. Due to patient's facial hair, larger body habitus, and cysts on ovaries would consider PCOS as a possible diagnosis.      I have reviewed the findings, diagnosis, plan and need for follow up with the patient.    New Prescriptions    No medications on file       Final diagnoses:   Cyst of right ovary   Chronic abdominal pain       9/11/2019   [facility], [place], EMERGENCY DEPARTMENT    Jakob Rodriguez DO  ATTENDING NOTE: The patient was seen with Dr. Rodriguez, resident. I was personally and directly involved in patient care including obtaining the history, performing the physical exam, ordering and reviewing of laboratory tests, and  "decision-making process. Plan of care was discussed with the patient. Jeff Serra is a 44 year old female with a h/o chronic opiate use, chronic pain and constipation, s/p hysterectomy, who presents with abdominal pain for 3 weeks.  The patient states she feels full and bloated after eating.  The patient has left upper quadrant abdominal pain which she describes as cramping and right lower abdominal pain which she describes as burning.  She has been taking senna and MiraLAX and has 1-2 bowel movements a day.  She denies fevers.  She denies nausea or vomiting.  The patient is on 15 mg of oxycodone several times a day.  BP (!) 137/103   Pulse 80   Temp 98  F (36.7  C) (Oral)   Resp 20   Wt 84.7 kg (186 lb 12.8 oz)   LMP 09/12/2016 (Exact Date)   SpO2 100%   BMI 33.09 kg/m     Physical Exam   Constitutional:   well nourished, well developed, appears uncomfortable  HENT:   Head: Normocephalic and atraumatic. Facial hair noted under chin  Cardiovascular: regular rate and rhythm without murmurs or gallops  Pulmonary/Chest: Clear to auscultation bilaterally, with no wheezes or retractions. No respiratory distress.  GI: Soft with good bowel sounds.  Tender LUQ, tender RLQ, non-distended, with no guarding, no rebound, no peritoneal signs.   Back:  No bony or CVA tenderness   Musculoskeletal:  no edema   Skin: Skin is warm and dry. No rash noted.   Neurological: alert and oriented to person, place, and time. Nonfocal exam  Psychiatric:  normal mood and affect.     Emergency Department course:  The patient was seen and examined at 1109 am.     Review of the \" [place] prescription drug monitoring program\" shows that the patient has received 441 narcotic tablets and 180 sedative tablets over the past year.  She has 15 prescriptions from 2 providers and 3 pharmacies.  Most recently, on 9/1/she had her usual dose of 90 oxycodone 15 mg prescribed.  This is intended to be a 30-day supply.    Right upper quadrant " ultrasound done August 26, 2019 showed fatty infiltration of the liver but no gallstones or bile duct dilatation.     Laboratory studies today show an unremarkable CBC and CMP.  Lactate is normal at 0.9.  Lipase is normal at 95.  CT of the abdomen and pelvis done today shows IMPRESSION:  1. Septated hypodensity in the right ovary is 3.2 x 2.3 cm, a cystic  collection, previously suggestive of a corpus luteal cyst at 1.7 x 1.7  cm. If concern as cause for pain, recommend pelvic ultrasound.  2. Otherwise, no definitive cause for patient's pain identified.    Pelvic ultrasound shows :   IMPRESSION: Two simple cysts adjacent to one another versus a septated  cyst in the right ovary. No free fluid.    The patient is a 44-year-old female with a history of chronic abdominal pain who presents with worsening abdominal pain for the past 3 weeks.  The patient has right-sided ovarian cysts.  The remainder of her studies are unremarkable. PCOS is in the differential.  We believe she is safe to be discharged home.  She was given the clinic number to the [facility] for follow-up.  We recommend taking ibuprofen or Tylenol as needed for pain.  Hot packs to the abdomen may also help with her pain.      This note was created in part by the use of Dragon voice recognition dictation system. Inadvertent grammatical errors and typographical errors may still exist.  MD Joleen Delacruz Alda L, MD  09/11/19 [id number]

## 2019-09-12 ENCOUNTER — OFFICE VISIT (OUTPATIENT)
Dept: OBGYN | Facility: CLINIC | Age: 44
End: 2019-09-12
Payer: COMMERCIAL

## 2019-09-12 VITALS
SYSTOLIC BLOOD PRESSURE: 123 MMHG | WEIGHT: 188.3 LBS | OXYGEN SATURATION: 99 % | HEART RATE: 103 BPM | BODY MASS INDEX: 33.36 KG/M2 | DIASTOLIC BLOOD PRESSURE: 82 MMHG | TEMPERATURE: 97.6 F

## 2019-09-12 DIAGNOSIS — N83.202 CYSTS OF BOTH OVARIES: ICD-10-CM

## 2019-09-12 DIAGNOSIS — N83.201 CYSTS OF BOTH OVARIES: ICD-10-CM

## 2019-09-12 DIAGNOSIS — R10.31 RLQ ABDOMINAL PAIN: ICD-10-CM

## 2019-09-12 DIAGNOSIS — R10.12 LUQ ABDOMINAL PAIN: Primary | ICD-10-CM

## 2019-09-12 PROCEDURE — 99214 OFFICE O/P EST MOD 30 MIN: CPT | Performed by: OBSTETRICS & GYNECOLOGY

## 2019-09-12 NOTE — PROGRESS NOTES
ULTRASOUND PELVIS WITH TRANSVAGINAL IMAGING  9/11/2019 2:44 PM      HISTORY: Pelvic pain, ovarian cyst on the right.     COMPARISON: May 30, 2018     FINDINGS: Endovaginal sonography was added to the transabdominal  scans.     Uterus is surgically absent. The right ovary demonstrates a septated  cyst versus 2 cysts adjacent to one another measuring 2.4 and 2.1 cm.  The left ovary demonstrates a small follicle. No adnexal masses are  present. No free pelvic fluid is present.                                                                      IMPRESSION: Two simple cysts adjacent to one another versus a septated  cyst in the right ovary. No free fluid.

## 2019-09-13 ENCOUNTER — OFFICE VISIT (OUTPATIENT)
Dept: FAMILY MEDICINE | Facility: CLINIC | Age: 44
End: 2019-09-13
Payer: COMMERCIAL

## 2019-09-13 VITALS
SYSTOLIC BLOOD PRESSURE: 128 MMHG | HEART RATE: 101 BPM | OXYGEN SATURATION: 100 % | HEIGHT: 63 IN | RESPIRATION RATE: 20 BRPM | TEMPERATURE: 98.1 F | DIASTOLIC BLOOD PRESSURE: 83 MMHG | BODY MASS INDEX: 34.02 KG/M2 | WEIGHT: 192 LBS

## 2019-09-13 DIAGNOSIS — R10.84 ABDOMINAL PAIN, GENERALIZED: Primary | ICD-10-CM

## 2019-09-13 DIAGNOSIS — R35.0 URINARY FREQUENCY: ICD-10-CM

## 2019-09-13 DIAGNOSIS — R31.9 HEMATURIA, UNSPECIFIED TYPE: ICD-10-CM

## 2019-09-13 LAB
ALBUMIN UR-MCNC: NEGATIVE MG/DL
APPEARANCE UR: CLEAR
BILIRUB UR QL STRIP: NEGATIVE
COLOR UR AUTO: YELLOW
GLUCOSE UR STRIP-MCNC: NEGATIVE MG/DL
HGB UR QL STRIP: ABNORMAL
KETONES UR STRIP-MCNC: NEGATIVE MG/DL
LEUKOCYTE ESTERASE UR QL STRIP: NEGATIVE
NITRATE UR QL: NEGATIVE
NON-SQ EPI CELLS #/AREA URNS LPF: NORMAL /LPF
PH UR STRIP: 7 PH (ref 5–7)
RBC #/AREA URNS AUTO: NORMAL /HPF
SOURCE: ABNORMAL
SP GR UR STRIP: 1.02 (ref 1–1.03)
UROBILINOGEN UR STRIP-ACNC: 1 EU/DL (ref 0.2–1)
WBC #/AREA URNS AUTO: NORMAL /HPF

## 2019-09-13 PROCEDURE — 81001 URINALYSIS AUTO W/SCOPE: CPT | Performed by: NURSE PRACTITIONER

## 2019-09-13 PROCEDURE — 87086 URINE CULTURE/COLONY COUNT: CPT | Performed by: NURSE PRACTITIONER

## 2019-09-13 PROCEDURE — 99214 OFFICE O/P EST MOD 30 MIN: CPT | Performed by: NURSE PRACTITIONER

## 2019-09-13 RX ORDER — GABAPENTIN 100 MG/1
100 CAPSULE ORAL DAILY
Qty: 30 CAPSULE | Refills: 0 | Status: SHIPPED | OUTPATIENT
Start: 2019-09-13 | End: 2020-08-12

## 2019-09-13 RX ORDER — METOCLOPRAMIDE 5 MG/1
5 TABLET ORAL 3 TIMES DAILY PRN
Qty: 30 TABLET | Refills: 0 | Status: SHIPPED | OUTPATIENT
Start: 2019-09-13 | End: 2019-10-21

## 2019-09-13 ASSESSMENT — MIFFLIN-ST. JEOR: SCORE: 1480.04

## 2019-09-13 ASSESSMENT — PAIN SCALES - GENERAL: PAINLEVEL: EXTREME PAIN (8)

## 2019-09-13 NOTE — PROGRESS NOTES
Subjective     Jeff Serra is a 44 year old female who presents to clinic today for the following health issues:    HPI   ED/UC Followup:    [facility]   Date of visit: 9-5-19  Reason for visit: Abdominal pain  Current Status: the pain is getting worse now     Patient has been to the emergency room multiple times in the past month and a half for abdominal pain.  Her last visit was on 9/11/2019, and ultrasound shows double cysts near the right ovary.  CT of her abdomen and pelvis was done and other than a possible cyst by the right ovary nothing else was noted to be the cause of her pain.  Ultrasound showed 2 cysts by the right ovary, surgically removed uterus.      Currently: Stomach is really bloated. If she eats a small amount, her stomach gets tight and bloated. Was taking miralax, now stools are looser. She has 2 cysts on her right ovaries. She had a hysterectomy-prior to that she was having cysts on the right side. When she doesn't eat has some pain but worsens with food. Some nausea, no vomiting. Taking iburpofen still 800 mg few times a day. Stopped taking oxycodone and gabapentin for a few days, thought the pain was related to constipation.  She did not notice a big difference once she stopped those medications for her abdominal pain.  Was urinating okay but today feels 'different'. Is taking her BP medication at night, today her blood pressure was noted to be elevated.  Likely related to pain also.        No smoke  Alcohol at times      Patient Active Problem List   Diagnosis     CARDIOVASCULAR SCREENING; LDL GOAL LESS THAN 160     Chronic neck pain     Chronic low back pain     Hypertension goal BP (blood pressure) < 140/90     Hypokalemia     History of pulmonary embolism     Closed fracture of right fibula     Allergic rhinitis     Chronic pain syndrome     Dysmenorrhea     Pelvic pain in female     Hx of renal calculi     Chronic midline low back pain without sciatica     Excessive or  frequent menstruation     S/P hysterectomy     Vitamin D deficiency     Constipation, unspecified constipation type     Past Surgical History:   Procedure Laterality Date     HYSTERECTOMY, PAP NO LONGER INDICATED  11/17/2017     LAPAROSCOPY DIAGNOSTIC (GYN) N/A 4/12/2016    Procedure: LAPAROSCOPY DIAGNOSTIC (GYN);  Surgeon: Margarito Walker MD;  Location: [facility] OR     LAPAROTOMY MINI, TUBAL LIGATION (POST PARTUM), COMBINED  2005     LITHOTRIPSY  2011       Social History     Tobacco Use     Smoking status: Never Smoker     Smokeless tobacco: Never Used   Substance Use Topics     Alcohol use: Yes     Alcohol/week: 0.0 oz     Comment: Occasional     Family History   Problem Relation Age of Onset     Heart Disease Other      Breast Cancer Maternal Grandmother         50s      Breast Cancer Paternal Grandmother         50s      Diabetes Maternal Uncle      Colon Cancer No family hx of          Current Outpatient Medications   Medication Sig Dispense Refill     gabapentin (NEURONTIN) 100 MG capsule Take 1 capsule (100 mg) by mouth daily 30 capsule 0     gabapentin (NEURONTIN) 300 MG capsule Take 1 capsule (300 mg) by mouth At Bedtime 90 capsule 0     loratadine (CLARITIN) 10 MG tablet Take 1 tablet (10 mg) by mouth daily as needed for allergies 90 tablet 3     metoclopramide (REGLAN) 5 MG tablet Take 1 tablet (5 mg) by mouth 3 times daily as needed (nausea/stomach pain) 30 tablet 0     metoprolol succinate ER (TOPROL-XL) 100 MG 24 hr tablet Take 1 tablet (100 mg) by mouth daily 90 tablet 1     oxyCODONE IR (ROXICODONE) 15 MG tablet Take 1 tablet (15 mg) by mouth 3 times daily as needed for moderate to severe pain 90 tablet 0     piroxicam (FELDENE) 20 MG capsule TAKE ONE CAPSULE BY MOUTH AT BEDTIME 30 capsule 5     SENNA-docusate sodium (SENNA S) 8.6-50 MG tablet Take 1 tablet by mouth At Bedtime 30 tablet 1     triamterene-HCTZ (MAXZIDE-25) 37.5-25 MG tablet Take 1 tablet by mouth daily 30 tablet 2     Allergies  "  Allergen Reactions     Contrast Dye Nausea and Vomiting     States she sometimes has vomited after receiving iv contrast dye         Reviewed and updated as needed this visit by Provider  Tobacco  Allergies  Meds  Problems  Med Hx  Surg Hx  Fam Hx         Review of Systems   ROS COMP: Constitutional, HEENT, cardiovascular, pulmonary, gi and gu systems are negative, except as otherwise noted.      Objective    /83 (BP Location: Right arm, Patient Position: Sitting, Cuff Size: Adult Large)   Pulse 101   Temp 98.1  F (36.7  C) (Oral)   Resp 20   Ht 1.6 m (5' 3\")   Wt 87.1 kg (192 lb)   LMP 09/12/2016 (Exact Date)   SpO2 100%   BMI 34.01 kg/m    Body mass index is 34.01 kg/m .  Physical Exam   GENERAL: healthy, alert and no distress  EYES: Eyes grossly normal to inspection, PERRL and conjunctivae and sclerae normal  HENT: ear canals and TM's normal, nose and mouth without ulcers or lesions  NECK: no adenopathy, no asymmetry, masses, or scars and thyroid normal to palpation  RESP: lungs clear to auscultation - no rales, rhonchi or wheezes  CV: regular rate and rhythm, normal S1 S2, no S3 or S4, no murmur, click or rub  ABDOMEN: soft, significantly tender throughout entire abdomen more RLQ than anywhere else, no hepatosplenomegaly, no masses and bowel sounds hyperactive  MS: no gross musculoskeletal defects noted, no edema    Diagnostic Test Results:  Labs reviewed in Epic  No results found for this or any previous visit (from the past 24 hour(s)).        Assessment & Plan     1. Abdominal pain, generalized  Trial increasing gabapentin with an extra 100 mg capsule in the morning.  Trial Reglan to help with GI motility, patient has follow-up with OB/GYN next week, she should call to see Dr. Brumfield has openings same day for Monday or Tuesday.  Thinking this is likely related to her right ovarian cysts.  Do not feel further imaging at this time is needed as she had extensive work-up in the emergency room " "2 days prior  - metoclopramide (REGLAN) 5 MG tablet; Take 1 tablet (5 mg) by mouth 3 times daily as needed (nausea/stomach pain)  Dispense: 30 tablet; Refill: 0  - gabapentin (NEURONTIN) 100 MG capsule; Take 1 capsule (100 mg) by mouth daily  Dispense: 30 capsule; Refill: 0  - Urine Microscopic    2. Urinary frequency  Urine came back slight blood in the urine, culture is negative  - *UA reflex to Microscopc and Culture ([place] and [facility] (except [place] and [place])    3. Hematuria, unspecified type  As above  - Urine Culture Aerobic Bacterial     BMI:   Estimated body mass index is 34.01 kg/m  as calculated from the following:    Height as of this encounter: 1.6 m (5' 3\").    Weight as of this encounter: 87.1 kg (192 lb).   Weight management plan: Did not discuss at this time but need to in the future      Return in about 1 week (around 9/20/2019), or if symptoms worsen or fail to improve.    HARLEY Thomas, NP-C  [facility]    This chart was documented by provider using a voice activated software called Dragon in addition to manual typing. There may be vocabulary errors or other grammatical errors due to this.           "

## 2019-09-14 LAB
BACTERIA SPEC CULT: NORMAL
SPECIMEN SOURCE: NORMAL

## 2019-09-18 ENCOUNTER — OFFICE VISIT (OUTPATIENT)
Dept: OBGYN | Facility: CLINIC | Age: 44
End: 2019-09-18
Payer: COMMERCIAL

## 2019-09-18 VITALS
SYSTOLIC BLOOD PRESSURE: 136 MMHG | HEART RATE: 97 BPM | BODY MASS INDEX: 33.66 KG/M2 | DIASTOLIC BLOOD PRESSURE: 84 MMHG | TEMPERATURE: 97.2 F | WEIGHT: 190 LBS

## 2019-09-18 DIAGNOSIS — R10.2 PELVIC PAIN IN FEMALE: Primary | ICD-10-CM

## 2019-09-18 DIAGNOSIS — R30.0 DYSURIA: ICD-10-CM

## 2019-09-18 DIAGNOSIS — N83.299 FUNCTIONAL OVARIAN CYSTS: ICD-10-CM

## 2019-09-18 LAB
ALBUMIN UR-MCNC: NEGATIVE MG/DL
APPEARANCE UR: CLEAR
BACTERIA #/AREA URNS HPF: ABNORMAL /HPF
BILIRUB UR QL STRIP: NEGATIVE
CAOX CRY #/AREA URNS HPF: ABNORMAL /HPF
COLOR UR AUTO: YELLOW
GLUCOSE UR STRIP-MCNC: NEGATIVE MG/DL
HGB UR QL STRIP: NEGATIVE
KETONES UR STRIP-MCNC: NEGATIVE MG/DL
LEUKOCYTE ESTERASE UR QL STRIP: ABNORMAL
NITRATE UR QL: NEGATIVE
NON-SQ EPI CELLS #/AREA URNS LPF: ABNORMAL /LPF
PH UR STRIP: 7.5 PH (ref 5–7)
RBC #/AREA URNS AUTO: ABNORMAL /HPF
SOURCE: ABNORMAL
SP GR UR STRIP: 1.01 (ref 1–1.03)
SPECIMEN SOURCE: ABNORMAL
UROBILINOGEN UR STRIP-ACNC: 1 EU/DL (ref 0.2–1)
WBC #/AREA URNS AUTO: ABNORMAL /HPF
WET PREP SPEC: ABNORMAL

## 2019-09-18 PROCEDURE — 87210 SMEAR WET MOUNT SALINE/INK: CPT | Performed by: OBSTETRICS & GYNECOLOGY

## 2019-09-18 PROCEDURE — 87086 URINE CULTURE/COLONY COUNT: CPT | Performed by: OBSTETRICS & GYNECOLOGY

## 2019-09-18 PROCEDURE — 81001 URINALYSIS AUTO W/SCOPE: CPT | Performed by: OBSTETRICS & GYNECOLOGY

## 2019-09-18 PROCEDURE — 99214 OFFICE O/P EST MOD 30 MIN: CPT | Performed by: OBSTETRICS & GYNECOLOGY

## 2019-09-18 PROCEDURE — 87591 N.GONORRHOEAE DNA AMP PROB: CPT | Performed by: OBSTETRICS & GYNECOLOGY

## 2019-09-18 PROCEDURE — 87491 CHLMYD TRACH DNA AMP PROBE: CPT | Performed by: OBSTETRICS & GYNECOLOGY

## 2019-09-18 NOTE — NURSING NOTE
"Chief Complaint   Patient presents with     Follow Up     hospital cyst       Initial /84 (BP Location: Right arm, Patient Position: Sitting, Cuff Size: Adult Regular)   Pulse 97   Temp 97.2  F (36.2  C) (Oral)   Wt 86.2 kg (190 lb)   LMP 2016 (Exact Date)   BMI 33.66 kg/m   Estimated body mass index is 33.66 kg/m  as calculated from the following:    Height as of 19: 1.6 m (5' 3\").    Weight as of this encounter: 86.2 kg (190 lb).  BP completed using cuff size: regular    Questioned patient about current smoking habits.  Pt. has never smoked.          The following HM Due: NONE      The following patient reported/Care Every where data was sent to:  P ABSTRACT QUALITY INITIATIVES [[id number]]  FLORENCE Gray MA           "

## 2019-09-18 NOTE — PATIENT INSTRUCTIONS
Patient Education     Abdominal Pain    Abdominal pain is pain in the stomach or belly area. Everyone has this pain from time to time. In many cases it goes away on its own. But abdominal pain can sometimes be due to a serious problem, such as appendicitis. So it s important to know when to seek help.  Causes of abdominal pain  There are many possible causes of abdominal pain. Common causes in adults include:    Constipation, diarrhea, or gas    Stomach acid flowing back up into the esophagus (acid reflux or heartburn)    Severe acid reflux, called GERD (gastroesophageal reflux disease)    A sore in the lining of the stomach or small intestine (peptic ulcer)    Inflammation of the gallbladder, liver, or pancreas    Gallstones or kidney stones    Appendicitis     Intestinal blockage     An internal organ pushing through a muscle or other tissue (hernia)    Urinary tract infections    In women, menstrual cramps, fibroids, or endometriosis    Inflammation or infection of the intestines  Diagnosing the cause of abdominal pain  Your healthcare provider will do a physical exam help find the cause of your pain. If needed, tests will be ordered. Belly pain has many possible causes. So it can be hard to find the reason for your pain. Giving details about your pain can help. Tell your provider where and when you feel the pain, and what makes it better or worse. Also let your provider know if you have other symptoms such as:    Fever    Tiredness    Upset stomach (nausea)    Vomiting    Changes in bathroom habits  Treating abdominal pain  Some causes of pain need emergency medical treatment right away. These include appendicitis or a bowel blockage. Other problems can be treated with rest, fluids, or medicines. Your healthcare provider can give you specific instructions for treatment or self-care based on what is causing your pain.  If you have vomiting or diarrhea, sip water or other clear fluids. When you are ready to eat  solid foods again, start with small amounts of easy-to-digest, low-fat foods. These include apple sauce, toast, or crackers.   When to seek medical care  Call 911 or go to the hospital right away if you:    Can t pass stool and are vomiting    Are vomiting blood or have bloody diarrhea or black, tarry diarrhea    Have chest, neck, or shoulder pain    Feel like you might pass out    Have pain in your shoulder blades with nausea    Have sudden, severe belly pain    Have new, severe pain unlike any you have felt before    Have a belly that is rigid, hard, and tender to touch  Call your healthcare provider if you have:    Pain for more than 5 days    Bloating for more than 2 days    Diarrhea for more than 5 days    A fever of 100.4 F (38 C) or higher, or as directed by your healthcare provider    Pain that gets worse    Weight loss for no reason    Continued lack of appetite    Blood in your stool  How to prevent abdominal pain  Here are some tips to help prevent abdominal pain:    Eat smaller amounts of food at one time.    Avoid greasy, fried, or other high-fat foods.    Avoid foods that give you gas.    Exercise regularly.    Drink plenty of fluids.  To help prevent GERD symptoms:    Quit smoking.    Reduce alcohol and certain foods that increase stomach acid.    Avoid aspirin and over-the-counter pain and fever medicines (NSAIDS or nonsteroidal anti-inflammatory drugs), if possible    Lose extra weight.    Finish eating at least 2 hours before you go to bed or lie down.    Raise the head of your bed.  Date Last Reviewed: 7/1/2016 2000-2018 [employer]. [address] [place] [address], [place] [address]. All rights reserved. This information is not intended as a substitute for professional medical care. Always follow your healthcare professional's instructions.           Patient Education     Understanding Ovarian Cysts  An ovarian cyst is a fluid-filled sac that forms on or inside an ovary. The ovaries are a  pair of small, oval-shaped organs in the lower part of a woman s belly (abdomen). About once a month, one of the ovaries releases an egg. The ovaries also make the hormones estrogen and progesterone. These hormones are part of pregnancy, the menstrual cycle, and breast growth.  Ovarian cysts are very common in women of all ages. Young girls can also get them, but this is less common. There are different types of ovarian cysts. They can occur for various reasons, and they may need different treatments. A cyst can vary in size from half an inch to more than 4 inches.  Types of ovarian cysts  There are different types of ovarian cysts:  Functional cyst  This is the most common type of ovarian cyst. They only occur in women who haven t gone through menopause. There are 2 types of functional cysts:    Follicular cyst. This cyst happens when an egg isn t released and it keeps growing inside the ovary.    Corpus luteum cyst. This type of cyst occurs when the sac around the egg doesn t dissolve after the egg is released.  Endometrioma  This is a cyst filled with old blood and tissue from the lining of the uterus. They are often called chocolate cysts because of their dark color. They can happen in women with endometriosis.  Dermoid cyst  This cyst develops from ovarian cells and eggs. They may have hair, skin, or fat in them. These cysts are common in women of childbearing age.  What causes ovarian cysts?  Cysts can also be caused by:    Polycystic ovary syndrome (PCOS), a condition that causes multiple cysts on the ovaries    Pregnancy    Severe pelvic infection, such as chlamydia    Noncancerous growths    Cancer (rare)    Using fertility medicine to cause ovulation, such as clomiphene  Symptoms of an ovarian cyst  Many women don t have any symptoms from the cyst. In women with symptoms, the most common is pain or pressure in your lower belly on the side of the cyst. This pain may be dull or sharp, and it may come and go.  A cyst that breaks open (ruptures) may lead to sudden, sharp pain.  Other symptoms of an ovarian cyst can include:    Pain in the lower back or thighs    Trouble emptying your bladder fully    Pain during sex    Weight gain    Pain during your period    Breast tenderness    Abnormal vaginal bleeding (rare)  Diagnosing an ovarian cyst  Your primary care doctor or an obstetrics and gynecology (OB/GYN) doctor may diagnose the condition. Your doctor will ask about your health history and your symptoms. You will also have a physical exam. This will likely include a pelvic exam. During the pelvic exam, your doctor may feel the swelling on your ovary. In women with no symptoms, this is often the first sign of a cyst.  If your doctor thinks you may have an ovarian cyst, you may need tests. These can help your doctor learn the type of cyst. Tests can also help rule out other problems, such as an ectopic pregnancy. The tests may include:    Ultrasound. This test uses sound waves to view the size, shape, and location of the cyst. The test can also show if the growth is solid or filled with fluid.    MRI. This uses large magnets and a computer to create a detailed picture of the area.    Pregnancy test. This is done to check if pregnancy may be the cause of the cyst.    Blood tests. These check for hormone problems and cancer. They also check if the cyst is bleeding.    Biopsy. This is a test where a tiny piece of the ovary is taken. The piece is examined in a lab for cancer cells. This may be done if an ultrasound shows a certain type of growth on the ovary.  Date Last Reviewed: 8/1/2017 2000-2018 [employer]. [address] [place] [address], [place] [address]. All rights reserved. This information is not intended as a substitute for professional medical care. Always follow your healthcare professional's instructions.
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
## 2019-09-19 ENCOUNTER — TELEPHONE (OUTPATIENT)
Dept: OBGYN | Facility: CLINIC | Age: 44
End: 2019-09-19

## 2019-09-19 DIAGNOSIS — B96.89 BACTERIAL VAGINOSIS: ICD-10-CM

## 2019-09-19 DIAGNOSIS — A59.9 TRICHOMONAS INFECTION: Primary | ICD-10-CM

## 2019-09-19 DIAGNOSIS — N76.0 BACTERIAL VAGINOSIS: ICD-10-CM

## 2019-09-19 LAB
BACTERIA SPEC CULT: NORMAL
C TRACH DNA SPEC QL NAA+PROBE: NEGATIVE
N GONORRHOEA DNA SPEC QL NAA+PROBE: NEGATIVE
SPECIMEN SOURCE: NORMAL

## 2019-09-19 RX ORDER — METRONIDAZOLE 500 MG/1
500 TABLET ORAL 2 TIMES DAILY
Qty: 14 TABLET | Refills: 0 | Status: SHIPPED | OUTPATIENT
Start: 2019-09-19 | End: 2019-10-11

## 2019-09-19 NOTE — TELEPHONE ENCOUNTER
Called patient with results of labs.  Dirty UA and UC negative for infection.  Positive wet prep for BV and trichomonas.  Flagyl 500mg BID for 7 days ordered to patient pharmacy.  Patient states that she understands the diagnosis and she is in agreement with the treatment plan.     Merle Gupta MD
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
## 2019-09-25 ENCOUNTER — MYC REFILL (OUTPATIENT)
Dept: FAMILY MEDICINE | Facility: CLINIC | Age: 44
End: 2019-09-25

## 2019-09-25 DIAGNOSIS — G89.4 CHRONIC PAIN SYNDROME: ICD-10-CM

## 2019-09-25 DIAGNOSIS — M54.50 CHRONIC MIDLINE LOW BACK PAIN WITHOUT SCIATICA: ICD-10-CM

## 2019-09-25 DIAGNOSIS — G89.29 CHRONIC MIDLINE LOW BACK PAIN WITHOUT SCIATICA: ICD-10-CM

## 2019-09-25 NOTE — TELEPHONE ENCOUNTER
Requested Prescriptions   Pending Prescriptions Disp Refills     oxyCODONE IR (ROXICODONE) 15 MG tablet 90 tablet 0     Sig: Take 1 tablet (15 mg) by mouth 3 times daily as needed for moderate to severe pain       There is no refill protocol information for this order          oxyCODONE IR (ROXICODONE) 15 MG tablet      Last Written Prescription Date:  8/28/19  Last Fill Quantity: 90,   # refills: 0  Last Office Visit: 9/13/19  Future Office visit:    Next 5 appointments (look out 90 days)    Oct 04, 2019  4:20 PM CDT  [facility] [address] [place] [id number] [phone]   Oct 09, 2019  3:45 PM CDT  Office Visit with Margarito Walker MD  [facility] ([facility]) [id number] [place] [id number] [phone]           Routing refill request to provider for review/approval because:  Drug not on the [facility], [facility] or [facility] refill protocol or controlled substance

## 2019-09-27 RX ORDER — OXYCODONE HYDROCHLORIDE 15 MG/1
15 TABLET ORAL 3 TIMES DAILY PRN
Qty: 90 TABLET | Refills: 0 | Status: SHIPPED | OUTPATIENT
Start: 2019-09-27 | End: 2019-10-21

## 2019-09-27 NOTE — TELEPHONE ENCOUNTER
Prescription signed. Please place prescription at  and notify patient. Will need urine drug screen when picks up prescription

## 2019-09-27 NOTE — TELEPHONE ENCOUNTER
Rx placed at .  Pt informed via [employer].  Will need urine drug screen when picks up prescription   Camelia VALENTIN)(M)

## 2019-09-27 NOTE — TELEPHONE ENCOUNTER
Controlled Substance Refill Request for oxyCODONE IR (ROXICODONE) 15 MG tablet  Problem List Complete:  Yes  Overview Addendum 8/28/2019  1:49 PM by Patricia Loredo MD   Patient is followed by PATRICIA LOREDO for ongoing prescription of pain medication.  All refills should be approved by this provider, or covering partner.     Medication(s): oxycodone 15 three times daily = 67.5 MED  Narcan n/a     UDS (May '19) showed unexpected benzo  Plan - monthly UDS for a while - any further fail will nullify contract     Clinic visit frequency required: Q 3 months      Controlled substance agreement on file: Yes       Date(s): 8/28/19     Pain Clinic evaluation in the past: No     DIRE Total Score(s):    8/31/2015   Total Score 16         Last MNP website verification:  done on 6/5/19   [email or web address]           checked in past 3 months? No  RX [place] reviewed:  reviewed- no concerns today    [place]:  [email or web address]    Routing refill request to provider for review/approval because:  Drug not on the [facility] refill protocol   Lindsey Ureña RN

## 2019-09-29 ENCOUNTER — HEALTH MAINTENANCE LETTER (OUTPATIENT)
Age: 44
End: 2019-09-29

## 2019-09-30 DIAGNOSIS — G89.29 CHRONIC MIDLINE LOW BACK PAIN WITHOUT SCIATICA: ICD-10-CM

## 2019-09-30 DIAGNOSIS — G89.4 CHRONIC PAIN SYNDROME: ICD-10-CM

## 2019-09-30 DIAGNOSIS — M54.50 CHRONIC MIDLINE LOW BACK PAIN WITHOUT SCIATICA: ICD-10-CM

## 2019-09-30 PROCEDURE — 99000 SPECIMEN HANDLING OFFICE-LAB: CPT | Performed by: PHYSICIAN ASSISTANT

## 2019-09-30 PROCEDURE — 80307 DRUG TEST PRSMV CHEM ANLYZR: CPT | Mod: 90 | Performed by: PHYSICIAN ASSISTANT

## 2019-10-04 ENCOUNTER — OFFICE VISIT (OUTPATIENT)
Dept: FAMILY MEDICINE | Facility: CLINIC | Age: 44
End: 2019-10-04
Payer: COMMERCIAL

## 2019-10-04 VITALS
WEIGHT: 188 LBS | SYSTOLIC BLOOD PRESSURE: 143 MMHG | HEIGHT: 63 IN | TEMPERATURE: 98.1 F | HEART RATE: 104 BPM | BODY MASS INDEX: 33.31 KG/M2 | OXYGEN SATURATION: 98 % | DIASTOLIC BLOOD PRESSURE: 100 MMHG

## 2019-10-04 DIAGNOSIS — R10.84 ABDOMINAL PAIN, GENERALIZED: Primary | ICD-10-CM

## 2019-10-04 DIAGNOSIS — G89.4 CHRONIC PAIN SYNDROME: ICD-10-CM

## 2019-10-04 DIAGNOSIS — N83.209 CYST OF OVARY, UNSPECIFIED LATERALITY: ICD-10-CM

## 2019-10-04 LAB — COMPREHEN DRUG ANALYSIS UR: NORMAL

## 2019-10-04 PROCEDURE — 99214 OFFICE O/P EST MOD 30 MIN: CPT | Performed by: FAMILY MEDICINE

## 2019-10-04 RX ORDER — DICYCLOMINE HCL 20 MG
20 TABLET ORAL 4 TIMES DAILY PRN
Qty: 60 TABLET | Refills: 1 | Status: SHIPPED | OUTPATIENT
Start: 2019-10-04 | End: 2020-10-06

## 2019-10-04 ASSESSMENT — MIFFLIN-ST. JEOR: SCORE: 1461.89

## 2019-10-04 NOTE — PROGRESS NOTES
"Subjective     Jeff Serra is a 44 year old female who presents to clinic today for the following health issues:    HPI   ABDOMINAL and FLANK   PAIN     Onset: Couple weeks     Description:   Character: Sharp, Burning and Cramping  Location: right lower quadrant left lower quadrant pelvic region  Radiation: Back    Intensity: severe    Progression of Symptoms:  worsening    Accompanying Signs & Symptoms:  Fever/Chills?: YES- patient felt warm earlier today   Gas/Bloating: YES- bloating   Nausea: YES  Vomitting: no   Diarrhea?: no   Constipation:no   Dysuria or Hematuria: no    History:   Trauma: YES- hysterectomy   Previous similar pain: no    Previous tests done: CT, Upper Endoscopy and ultrasound     Precipitating factors:   Does the pain change with:     Food: YES- patient feels pain when she eats too much      BM: no     Urination: no     Alleviating factors:  None     Therapies Tried and outcome: Reglan, Laxative powder and drink to flush bowels with no relief    LMP:  not applicable     SUBJECTIVE:  Here today with abdominal symptoms as above.  The patient is well-known to me and has been seen extensively through the ER and clinic to help sort out this issue.  Patient has known ovarian cysts as demonstrated by ultrasound.  She recently saw Dr. Gupta who felt that these were functional cyst at most and likely not the cause of her symptomatology.  But the patient does note that some of her pain is reminiscent of her dysmenorrhea she had prior to her hysterectomy.  So we discussed the fact that there can still be hormonal variations as long as she is premenopausal and still has her ovaries.  But much of her symptomatology seems to be triggered via GI stimulation as well, specifically eating.  She is on chronic pain medication but does not have trouble with constipation and use of laxatives has not really changed anything.  We discussed that long-term use of narcotics can cause \"functional\" intestinal " "obstruction symptoms.  We also discussed IBS as a general concept.  Was given a trial of Reglan a few weeks ago without any success.    Review of systems otherwise negative.  Past medical, family, and social history reviewed and updated in chart.    OBJECTIVE:  BP (!) 143/100 (BP Location: Right arm, Patient Position: Chair, Cuff Size: Adult Large)   Pulse 104   Temp 98.1  F (36.7  C) (Oral)   Ht 1.6 m (5' 3\")   Wt 85.3 kg (188 lb)   LMP 09/12/2016 (Exact Date)   SpO2 98%   Breastfeeding? No   BMI 33.30 kg/m    Alert, pleasant, upbeat, and in no apparent discomfort.  S1 and S2 normal, no murmurs, clicks, gallops or rubs. Regular rate and rhythm. Chest is clear; no wheezes or rales. No edema or JVD.   The abdomen is soft without tenderness, guarding, mass, rebound or organomegaly. Bowel sounds are normal. No CVA tenderness or inguinal adenopathy noted.  Past labs reviewed with the patient.   Reviewed imaging    ASSESSMENT / PLAN:  (R10.84) Abdominal pain, generalized  (primary encounter diagnosis)  Comment: I had a long discussion with the patient about the difficulty in sorting out abdominal pain in general.  We work under the assumption that this may be intestinal in its basis we can try a couple of things.  Dicyclomine might help as symptom relief from intestinal cramping that might point toward IBS or other functional disorders.  But I think is worth scheduling with GI knowing that it will take a few weeks to get in.  What I am wondering is whether or not she has developed a functional bowel disorder possibly related to her long-term narcotic usage.  Discussed with patient that it is difficult to pinpoint exact causes of functional GI issues.  Plan: GASTROENTEROLOGY ADULT REF CONSULT ONLY,         dicyclomine (BENTYL) 20 MG tablet,             (G89.4) Chronic pain syndrome  Comment: Stable and monitored.  Up-to-date on monitoring.  Plan:     (N83.209) Cyst of ovary, unspecified laterality  Comment: We " also discussed that it is less likely that her cysts are causing this symptomatology.  But if the GI work-up is fruitless we could try suppressing hormones with Prometrium for a couple of weeks and see if that makes any difference.  Based upon that we could decide on long-term options.  Plan: progesterone (PROMETRIUM) 100 MG capsule            Follow up contact me in 2 weeks  MOISES Loredo MD    (Chart documentation completed in part with Dragon voice-recognition software.  Even though reviewed some grammatical, spelling, and word errors may remain.)

## 2019-10-04 NOTE — PATIENT INSTRUCTIONS
We do not know yet whether the pain you are having is from the intestines, or the ovarian cysts, or both.      Plan to help sort this out:    1) call to set up an appointment with GI specialists   - (it'll take a few weeks anyway - easier to cancel than schedule)    2) try using dicyclomine 3-4 times daily to help with abdominal cramping   - this would specifically relate to intestinal cramping / IBS    3) if this is not working in 2 weeks, try using Prometrium once daily to suppress any hormonal changes.  Should see results within the first couple of weeks

## 2019-10-09 ENCOUNTER — OFFICE VISIT (OUTPATIENT)
Dept: OBGYN | Facility: CLINIC | Age: 44
End: 2019-10-09
Payer: COMMERCIAL

## 2019-10-09 VITALS
DIASTOLIC BLOOD PRESSURE: 88 MMHG | OXYGEN SATURATION: 99 % | SYSTOLIC BLOOD PRESSURE: 135 MMHG | WEIGHT: 189 LBS | BODY MASS INDEX: 33.48 KG/M2 | HEART RATE: 99 BPM

## 2019-10-09 DIAGNOSIS — B96.89 BACTERIAL VAGINOSIS: ICD-10-CM

## 2019-10-09 DIAGNOSIS — R10.84 ABDOMINAL PAIN, GENERALIZED: ICD-10-CM

## 2019-10-09 DIAGNOSIS — N76.0 BACTERIAL VAGINOSIS: ICD-10-CM

## 2019-10-09 DIAGNOSIS — N83.201 RIGHT OVARIAN CYST: Primary | ICD-10-CM

## 2019-10-09 LAB
CANCER AG125 SERPL-ACNC: 8 U/ML (ref 0–30)
SPECIMEN SOURCE: NORMAL
WET PREP SPEC: NORMAL

## 2019-10-09 PROCEDURE — 99214 OFFICE O/P EST MOD 30 MIN: CPT | Performed by: OBSTETRICS & GYNECOLOGY

## 2019-10-09 PROCEDURE — 87210 SMEAR WET MOUNT SALINE/INK: CPT | Performed by: OBSTETRICS & GYNECOLOGY

## 2019-10-09 PROCEDURE — 86304 IMMUNOASSAY TUMOR CA 125: CPT | Performed by: OBSTETRICS & GYNECOLOGY

## 2019-10-09 PROCEDURE — 36415 COLL VENOUS BLD VENIPUNCTURE: CPT | Performed by: OBSTETRICS & GYNECOLOGY
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
## 2019-10-11 ENCOUNTER — ANESTHESIA EVENT (OUTPATIENT)
Dept: SURGERY | Facility: AMBULATORY SURGERY CENTER | Age: 44
End: 2019-10-11

## 2019-10-11 ENCOUNTER — OFFICE VISIT (OUTPATIENT)
Dept: FAMILY MEDICINE | Facility: CLINIC | Age: 44
End: 2019-10-11
Payer: COMMERCIAL

## 2019-10-11 VITALS
DIASTOLIC BLOOD PRESSURE: 84 MMHG | BODY MASS INDEX: 34.2 KG/M2 | HEIGHT: 63 IN | WEIGHT: 193 LBS | SYSTOLIC BLOOD PRESSURE: 138 MMHG | TEMPERATURE: 98.5 F | HEART RATE: 107 BPM | OXYGEN SATURATION: 97 %

## 2019-10-11 DIAGNOSIS — Z86.711 HISTORY OF PULMONARY EMBOLISM: ICD-10-CM

## 2019-10-11 DIAGNOSIS — R10.2 PELVIC PAIN IN FEMALE: ICD-10-CM

## 2019-10-11 DIAGNOSIS — N83.201 CYST OF RIGHT OVARY: ICD-10-CM

## 2019-10-11 DIAGNOSIS — I10 HYPERTENSION GOAL BP (BLOOD PRESSURE) < 140/90: ICD-10-CM

## 2019-10-11 DIAGNOSIS — Z01.818 PREOP GENERAL PHYSICAL EXAM: Primary | ICD-10-CM

## 2019-10-11 DIAGNOSIS — Z79.891 LONG-TERM CURRENT USE OF OPIATE ANALGESIC: ICD-10-CM

## 2019-10-11 DIAGNOSIS — R10.84 ABDOMINAL PAIN, GENERALIZED: ICD-10-CM

## 2019-10-11 DIAGNOSIS — G89.4 CHRONIC PAIN SYNDROME: ICD-10-CM

## 2019-10-11 LAB
CREAT SERPL-MCNC: 0.61 MG/DL (ref 0.52–1.04)
GFR SERPL CREATININE-BSD FRML MDRD: >90 ML/MIN/{1.73_M2}
POTASSIUM SERPL-SCNC: 3.6 MMOL/L (ref 3.4–5.3)

## 2019-10-11 PROCEDURE — 84132 ASSAY OF SERUM POTASSIUM: CPT | Performed by: FAMILY MEDICINE

## 2019-10-11 PROCEDURE — 99214 OFFICE O/P EST MOD 30 MIN: CPT | Performed by: FAMILY MEDICINE

## 2019-10-11 PROCEDURE — 82565 ASSAY OF CREATININE: CPT | Performed by: FAMILY MEDICINE

## 2019-10-11 PROCEDURE — 36415 COLL VENOUS BLD VENIPUNCTURE: CPT | Performed by: FAMILY MEDICINE

## 2019-10-11 SDOH — HEALTH STABILITY: MENTAL HEALTH: HOW OFTEN DO YOU HAVE A DRINK CONTAINING ALCOHOL?: MONTHLY OR LESS

## 2019-10-11 ASSESSMENT — MIFFLIN-ST. JEOR: SCORE: 1484.57

## 2019-10-11 ASSESSMENT — PAIN SCALES - GENERAL: PAINLEVEL: EXTREME PAIN (8)

## 2019-10-11 NOTE — PATIENT INSTRUCTIONS
Before Your Surgery      Call your surgeon if there is any change in your health. This includes signs of a cold or flu (such as a sore throat, runny nose, cough, rash or fever).    Do not smoke, drink alcohol or take over the counter medicine (unless your surgeon or primary care doctor tells you to) for the 24 hours before and after surgery.    If you take prescribed drugs: Follow your doctor s orders about which medicines to take and which to stop until after surgery.    Eating and drinking prior to surgery: follow the instructions from your surgeon    Take a shower or bath the night before surgery. Use the soap your surgeon gave you to gently clean your skin. If you do not have soap from your surgeon, use your regular soap. Do not shave or scrub the surgery site.  Wear clean pajamas and have clean sheets on your bed.     At [facility], we strive to deliver an exceptional experience to you, every time we see you.  If you receive a survey in the mail, please send us back your thoughts. We really do value your feedback.    Your care team:                            Family Medicine Internal Medicine   MD Gerhard Arce MD Shantel Branch-Fleming, MD Katya Georgiev PA-C Megan Hill, APRYEISON Altamirano MD Pediatrics   KATE Vaz, MD Jimena Lewis APRN CNP   MD Paulette Rojas MD Deborah Mielke, MD Kim Thein, APRN [facility]      Clinic hours: Monday - Thursday 7 am-7 pm; Fridays 7 am-5 pm.   Urgent care: Monday - Friday 11 am-9 pm; Saturday and Sunday 9 am-5 pm.  Pharmacy : Monday -Thursday 8 am-8 pm; Friday 8 am-6 pm; Saturday and Sunday 9 am-5 pm.     Clinic: [phone]   Pharmacy: [phone]
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
## 2019-10-14 ENCOUNTER — ANESTHESIA (OUTPATIENT)
Dept: SURGERY | Facility: AMBULATORY SURGERY CENTER | Age: 44
End: 2019-10-14
Payer: COMMERCIAL

## 2019-10-14 ENCOUNTER — SURGERY (OUTPATIENT)
Age: 44
End: 2019-10-14
Payer: COMMERCIAL

## 2019-10-14 ENCOUNTER — HOSPITAL ENCOUNTER (OUTPATIENT)
Facility: AMBULATORY SURGERY CENTER | Age: 44
Discharge: HOME | End: 2019-10-14
Attending: INTERNAL MEDICINE | Admitting: INTERNAL MEDICINE
Payer: COMMERCIAL

## 2019-10-14 VITALS — HEART RATE: 96 BPM

## 2019-10-14 VITALS
TEMPERATURE: 97.2 F | SYSTOLIC BLOOD PRESSURE: 152 MMHG | RESPIRATION RATE: 16 BRPM | OXYGEN SATURATION: 100 % | DIASTOLIC BLOOD PRESSURE: 90 MMHG

## 2019-10-14 LAB
COLONOSCOPY: NORMAL
UPPER GI ENDOSCOPY: NORMAL

## 2019-10-14 PROCEDURE — G8907 PT DOC NO EVENTS ON DISCHARG: HCPCS

## 2019-10-14 PROCEDURE — 45380 COLONOSCOPY AND BIOPSY: CPT

## 2019-10-14 PROCEDURE — 45380 COLONOSCOPY AND BIOPSY: CPT | Performed by: INTERNAL MEDICINE

## 2019-10-14 PROCEDURE — G8918 PT W/O PREOP ORDER IV AB PRO: HCPCS

## 2019-10-14 PROCEDURE — 43239 EGD BIOPSY SINGLE/MULTIPLE: CPT

## 2019-10-14 PROCEDURE — 88305 TISSUE EXAM BY PATHOLOGIST: CPT | Performed by: INTERNAL MEDICINE

## 2019-10-14 PROCEDURE — 43239 EGD BIOPSY SINGLE/MULTIPLE: CPT | Mod: 51 | Performed by: INTERNAL MEDICINE

## 2019-10-14 RX ORDER — PROPOFOL 10 MG/ML
INJECTION, EMULSION INTRAVENOUS PRN
Status: DISCONTINUED | OUTPATIENT
Start: 2019-10-14 | End: 2019-10-14

## 2019-10-14 RX ORDER — ONDANSETRON 4 MG/1
4 TABLET, ORALLY DISINTEGRATING ORAL EVERY 30 MIN PRN
Status: DISCONTINUED | OUTPATIENT
Start: 2019-10-14 | End: 2019-10-15 | Stop reason: HOSPADM

## 2019-10-14 RX ORDER — HYDROMORPHONE HYDROCHLORIDE 1 MG/ML
.3-.5 INJECTION, SOLUTION INTRAMUSCULAR; INTRAVENOUS; SUBCUTANEOUS EVERY 10 MIN PRN
Status: DISCONTINUED | OUTPATIENT
Start: 2019-10-14 | End: 2019-10-15 | Stop reason: HOSPADM

## 2019-10-14 RX ORDER — FLUMAZENIL 0.1 MG/ML
0.2 INJECTION, SOLUTION INTRAVENOUS
Status: DISCONTINUED | OUTPATIENT
Start: 2019-10-14 | End: 2019-10-15 | Stop reason: HOSPADM

## 2019-10-14 RX ORDER — MEPERIDINE HYDROCHLORIDE 25 MG/ML
12.5 INJECTION INTRAMUSCULAR; INTRAVENOUS; SUBCUTANEOUS
Status: DISCONTINUED | OUTPATIENT
Start: 2019-10-14 | End: 2019-10-15 | Stop reason: HOSPADM

## 2019-10-14 RX ORDER — SODIUM CHLORIDE, SODIUM LACTATE, POTASSIUM CHLORIDE, CALCIUM CHLORIDE 600; 310; 30; 20 MG/100ML; MG/100ML; MG/100ML; MG/100ML
INJECTION, SOLUTION INTRAVENOUS CONTINUOUS PRN
Status: DISCONTINUED | OUTPATIENT
Start: 2019-10-14 | End: 2019-10-14

## 2019-10-14 RX ORDER — LIDOCAINE 40 MG/G
CREAM TOPICAL
Status: DISCONTINUED | OUTPATIENT
Start: 2019-10-14 | End: 2019-10-15 | Stop reason: HOSPADM

## 2019-10-14 RX ORDER — FENTANYL CITRATE 50 UG/ML
25-50 INJECTION, SOLUTION INTRAMUSCULAR; INTRAVENOUS
Status: DISCONTINUED | OUTPATIENT
Start: 2019-10-14 | End: 2019-10-15 | Stop reason: HOSPADM

## 2019-10-14 RX ORDER — LIDOCAINE HYDROCHLORIDE 20 MG/ML
INJECTION, SOLUTION INFILTRATION; PERINEURAL PRN
Status: DISCONTINUED | OUTPATIENT
Start: 2019-10-14 | End: 2019-10-14

## 2019-10-14 RX ORDER — ONDANSETRON 2 MG/ML
4 INJECTION INTRAMUSCULAR; INTRAVENOUS EVERY 30 MIN PRN
Status: DISCONTINUED | OUTPATIENT
Start: 2019-10-14 | End: 2019-10-15 | Stop reason: HOSPADM

## 2019-10-14 RX ORDER — OXYCODONE HYDROCHLORIDE 5 MG/1
5-10 TABLET ORAL EVERY 4 HOURS PRN
Status: DISCONTINUED | OUTPATIENT
Start: 2019-10-14 | End: 2019-10-15 | Stop reason: HOSPADM

## 2019-10-14 RX ORDER — NALOXONE HYDROCHLORIDE 0.4 MG/ML
.1-.4 INJECTION, SOLUTION INTRAMUSCULAR; INTRAVENOUS; SUBCUTANEOUS
Status: DISCONTINUED | OUTPATIENT
Start: 2019-10-14 | End: 2019-10-15 | Stop reason: HOSPADM

## 2019-10-14 RX ORDER — KETOROLAC TROMETHAMINE 30 MG/ML
30 INJECTION, SOLUTION INTRAMUSCULAR; INTRAVENOUS EVERY 6 HOURS PRN
Status: DISCONTINUED | OUTPATIENT
Start: 2019-10-14 | End: 2019-10-15 | Stop reason: HOSPADM

## 2019-10-14 RX ORDER — ALBUTEROL SULFATE 0.83 MG/ML
2.5 SOLUTION RESPIRATORY (INHALATION) EVERY 4 HOURS PRN
Status: DISCONTINUED | OUTPATIENT
Start: 2019-10-14 | End: 2019-10-15 | Stop reason: HOSPADM

## 2019-10-14 RX ORDER — ONDANSETRON 2 MG/ML
4 INJECTION INTRAMUSCULAR; INTRAVENOUS
Status: COMPLETED | OUTPATIENT
Start: 2019-10-14 | End: 2019-10-14

## 2019-10-14 RX ORDER — SODIUM CHLORIDE, SODIUM LACTATE, POTASSIUM CHLORIDE, CALCIUM CHLORIDE 600; 310; 30; 20 MG/100ML; MG/100ML; MG/100ML; MG/100ML
INJECTION, SOLUTION INTRAVENOUS CONTINUOUS
Status: DISCONTINUED | OUTPATIENT
Start: 2019-10-14 | End: 2019-10-15 | Stop reason: HOSPADM

## 2019-10-14 RX ORDER — ONDANSETRON 2 MG/ML
4 INJECTION INTRAMUSCULAR; INTRAVENOUS EVERY 6 HOURS PRN
Status: DISCONTINUED | OUTPATIENT
Start: 2019-10-14 | End: 2019-10-15 | Stop reason: HOSPADM

## 2019-10-14 RX ORDER — ONDANSETRON 4 MG/1
4 TABLET, ORALLY DISINTEGRATING ORAL EVERY 6 HOURS PRN
Status: DISCONTINUED | OUTPATIENT
Start: 2019-10-14 | End: 2019-10-15 | Stop reason: HOSPADM

## 2019-10-14 RX ORDER — PROPOFOL 10 MG/ML
INJECTION, EMULSION INTRAVENOUS CONTINUOUS PRN
Status: DISCONTINUED | OUTPATIENT
Start: 2019-10-14 | End: 2019-10-14

## 2019-10-14 RX ORDER — DEXAMETHASONE SODIUM PHOSPHATE 4 MG/ML
4 INJECTION, SOLUTION INTRA-ARTICULAR; INTRALESIONAL; INTRAMUSCULAR; INTRAVENOUS; SOFT TISSUE EVERY 10 MIN PRN
Status: DISCONTINUED | OUTPATIENT
Start: 2019-10-14 | End: 2019-10-15 | Stop reason: HOSPADM

## 2019-10-14 RX ADMIN — SODIUM CHLORIDE, SODIUM LACTATE, POTASSIUM CHLORIDE, CALCIUM CHLORIDE: 600; 310; 30; 20 INJECTION, SOLUTION INTRAVENOUS at 13:17

## 2019-10-14 RX ADMIN — PROPOFOL 120 MG: 10 INJECTION, EMULSION INTRAVENOUS at 13:21

## 2019-10-14 RX ADMIN — ONDANSETRON 4 MG: 2 INJECTION INTRAMUSCULAR; INTRAVENOUS at 13:23

## 2019-10-14 RX ADMIN — PROPOFOL 150 MCG/KG/MIN: 10 INJECTION, EMULSION INTRAVENOUS at 13:21

## 2019-10-14 RX ADMIN — LIDOCAINE HYDROCHLORIDE 100 MG: 20 INJECTION, SOLUTION INFILTRATION; PERINEURAL at 13:21

## 2019-10-14 NOTE — DISCHARGE INSTRUCTIONS
[facility]  Same-Day Surgery   Adult Discharge Orders & Instructions   For 24 hours after surgery  1. Get plenty of rest.  A responsible adult must stay with you for at least 24 hours after you leave the hospital.   2. Do not drive or use heavy equipment.  If you have weakness or tingling, don't drive or use heavy equipment until this feeling goes away.  3. Do not drink alcohol.  4. Avoid strenuous or risky activities.  Ask for help when climbing stairs.   5. You may feel lightheaded.  IF so, sit for a few minutes before standing.  Have someone help you get up.   6. If you have nausea (feel sick to your stomach): Drink only clear liquids such as apple juice, ginger ale, broth or 7-Up.  Rest may also help.  Be sure to drink enough fluids.  Move to a regular diet as you feel able.  7. You may have a slight fever. Call the doctor if your fever is over 100 F (37.7 C) (taken under the tongue) or lasts longer than 24 hours.  8. You may have a dry mouth, a sore throat, muscle aches or trouble sleeping.  These should go away after 24 hours.  9. Do not make important or legal decisions.   Call your doctor for any of the followin.  Signs of infection (fever, growing tenderness at the surgery site, a large amount of drainage or bleeding, severe pain, foul-smelling drainage, redness, swelling).    2. It has been over 8 to 10 hours since surgery and you are still not able to urinate (pass water).

## 2019-10-14 NOTE — PROGRESS NOTES
Patient is having the procedure at [facility], they are able to view our Epic for all pre-op information.  Luis Eduardo Stinson,  For [employer] [facility]

## 2019-10-14 NOTE — ANESTHESIA POSTPROCEDURE EVALUATION
Anesthesia POST Procedure Evaluation    Patient: Jeff Serra   MRN:     [id number] Gender:   female   Age:    44 year old :      1975        Preoperative Diagnosis: * No pre-op diagnosis entered *   Procedure(s):  COLONOSCOPY, WITH CO2 INSUFFLATION  ESOPHAGOGASTRODUODENOSCOPY, WITH CO2 INSUFFLATION  Esophagogastroduodenoscopy, With Biopsy  Colonoscopy, With Polypectomy And Biopsy   Postop Comments: No value filed.       Anesthesia Type:  Not documented  MAC    Reportable Event: NO     PAIN: Uncomplicated   Sign Out status: Comfortable, Well controlled pain     PONV: No PONV   Sign Out status:  No Nausea or Vomiting     Neuro/Psych: Uneventful perioperative course   Sign Out Status: Preoperative baseline; Age appropriate mentation     Airway/Resp.: Uneventful perioperative course   Sign Out Status: Non labored breathing, age appropriate RR; Resp. Status within EXPECTED Parameters     CV: Uneventful perioperative course   Sign Out status: Appropriate BP and perfusion indices; Appropriate HR/Rhythm     Disposition:   Sign Out in:  PACU  Disposition:  Phase II; Home  Recovery Course: Uneventful  Follow-Up: Not required           Last Anesthesia Record Vitals:  CRNA VITALS  10/14/2019 1330 - 10/14/2019 1430      10/14/2019             Pulse:  106    SpO2:  99 %    Resp Rate (observed):  16    EKG:  Sinus rhythm          Last PACU Vitals:  Vitals Value Taken Time   BP     Temp     Pulse 96 10/14/2019  1:55 PM   Resp     SpO2     Temp src Skin 10/14/2019  2:00 PM   NIBP 107/74 10/14/2019  1:55 PM   Pulse 106 10/14/2019  2:00 PM   SpO2 99 % 10/14/2019  2:00 PM   Resp     Temp     Ht Rate 97 10/14/2019  1:57 PM   Temp 2           Electronically Signed By: Michael Benoit MD, 2019, 3:18 PM

## 2019-10-14 NOTE — ANESTHESIA CARE TRANSFER NOTE
Patient: Jeff Serra    Procedure(s):  COLONOSCOPY, WITH CO2 INSUFFLATION  ESOPHAGOGASTRODUODENOSCOPY, WITH CO2 INSUFFLATION  Esophagogastroduodenoscopy, With Biopsy  Colonoscopy, With Polypectomy And Biopsy    Diagnosis: * No pre-op diagnosis entered *  Diagnosis Additional Information: No value filed.    Anesthesia Type:   MAC     Note:  Airway :Room Air  Patient transferred to:Phase II  Handoff Report: Identifed the Patient, Identified the Reponsible Provider, Reviewed the pertinent medical history, Discussed the surgical course, Reviewed Intra-OP anesthesia mangement and issues during anesthesia, Set expectations for post-procedure period and Allowed opportunity for questions and acknowledgement of understanding      Vitals: (Last set prior to Anesthesia Care Transfer)    CRNA VITALS  10/14/2019 1330 - 10/14/2019 1401      10/14/2019             Pulse:  106    SpO2:  99 %    Resp Rate (observed):  16    EKG:  Sinus rhythm                Electronically Signed By: HARLEY Da Silva CRNA  October 14, 2019  2:01 PM

## 2019-10-16 LAB — COPATH REPORT: NORMAL

## 2019-10-21 ENCOUNTER — OFFICE VISIT (OUTPATIENT)
Dept: FAMILY MEDICINE | Facility: CLINIC | Age: 44
End: 2019-10-21
Payer: COMMERCIAL

## 2019-10-21 VITALS
HEIGHT: 63 IN | DIASTOLIC BLOOD PRESSURE: 88 MMHG | WEIGHT: 188 LBS | BODY MASS INDEX: 33.31 KG/M2 | TEMPERATURE: 98.1 F | SYSTOLIC BLOOD PRESSURE: 136 MMHG | OXYGEN SATURATION: 98 % | HEART RATE: 89 BPM

## 2019-10-21 DIAGNOSIS — G89.29 CHRONIC MIDLINE LOW BACK PAIN WITHOUT SCIATICA: ICD-10-CM

## 2019-10-21 DIAGNOSIS — M54.50 CHRONIC MIDLINE LOW BACK PAIN WITHOUT SCIATICA: ICD-10-CM

## 2019-10-21 DIAGNOSIS — I10 HYPERTENSION GOAL BP (BLOOD PRESSURE) < 140/90: ICD-10-CM

## 2019-10-21 DIAGNOSIS — G89.4 CHRONIC PAIN SYNDROME: ICD-10-CM

## 2019-10-21 DIAGNOSIS — K29.70 GASTRITIS WITHOUT BLEEDING, UNSPECIFIED CHRONICITY, UNSPECIFIED GASTRITIS TYPE: Primary | ICD-10-CM

## 2019-10-21 PROCEDURE — 99214 OFFICE O/P EST MOD 30 MIN: CPT | Performed by: FAMILY MEDICINE

## 2019-10-21 RX ORDER — OXYCODONE HYDROCHLORIDE 15 MG/1
15 TABLET ORAL 3 TIMES DAILY PRN
Qty: 90 TABLET | Refills: 0 | Status: SHIPPED | OUTPATIENT
Start: 2019-10-25 | End: 2019-11-26

## 2019-10-21 RX ORDER — TRIAMTERENE/HYDROCHLOROTHIAZID 37.5-25 MG
1 TABLET ORAL DAILY
Qty: 90 TABLET | Refills: 1 | Status: SHIPPED | OUTPATIENT
Start: 2019-10-21 | End: 2020-06-18

## 2019-10-21 ASSESSMENT — MIFFLIN-ST. JEOR: SCORE: 1461.89

## 2019-10-21 NOTE — PROGRESS NOTES
"Subjective     Jeff Serra is a 44 year old female who presents to clinic today for the following health issues:    HPI     Patient would also like to discuss results of Colonoscopy and Endoscopy.    Hypertension Follow-up      Do you check your blood pressure regularly outside of the clinic? Yes     Are you following a low salt diet? Yes    Are your blood pressures ever more than 140 on the top number (systolic) OR more   than 90 on the bottom number (diastolic), for example 140/90? Yes      How many servings of fruits and vegetables do you eat daily?  2-3    On average, how many sweetened beverages do you drink each day (soda, juice, sweet tea, etc)?   0-1    How many days per week do you miss taking your medication? 0    SUBJECTIVE:  Here today in follow-up of recent endoscopy and colonoscopy for some chronic abdominal symptomatology.  Hyperplastic polyp was found on colonoscopy and is recommended that she have this repeated in 5 years.  Endoscopy did not necessarily show any specific reason for her pain.  There was some reactive gastritis seen in some intestinal metaplasia in her stomach.  Suggested that she start on omeprazole and plan to repeat endoscopy in 3 years.  Prescription for omeprazole has not yet been called in and we discussed what this medication does and how it might help.  Dietary adjustments were also discussed and these are paramount to keeping symptoms under control.  Trial of Reglan really did not do much patient is no longer taking that.  Has only used occasional Bentyl not sure if it is helping or not.  Has some FMLA forms she is tending to miss a few days a month with flareups of abdominal symptomatology    Review of systems otherwise negative.  Past medical, family, and social history reviewed and updated in chart.    OBJECTIVE:  /88 (BP Location: Right arm, Patient Position: Chair, Cuff Size: Adult Large)   Pulse 89   Temp 98.1  F (36.7  C) (Oral)   Ht 1.6 m (5' 3\")   " Wt 85.3 kg (188 lb)   LMP 09/12/2016 (Exact Date)   SpO2 98%   Breastfeeding? No   BMI 33.30 kg/m    Alert, pleasant, upbeat, and in no apparent discomfort.  S1 and S2 normal, no murmurs, clicks, gallops or rubs. Regular rate and rhythm. Chest is clear; no wheezes or rales. No edema or JVD.  The abdomen is soft without tenderness, guarding, mass, rebound or organomegaly. Bowel sounds are normal. No CVA tenderness or inguinal adenopathy noted.  Past labs reviewed with the patient.     ASSESSMENT / PLAN:  (K29.70) Gastritis without bleeding, unspecified chronicity, unspecified gastritis type  (primary encounter diagnosis)  Comment: We will go ahead and get her started on scheduled omeprazole.  Addressed FMLA forms which I will fill out for her  Plan: omeprazole (PRILOSEC) 20 MG DR capsule            (G89.4) Chronic pain syndrome  Comment: Stable and monitored.  Refilled  Plan: oxyCODONE IR (ROXICODONE) 15 MG tablet            (M54.5,  G89.29) Chronic midline low back pain without sciatica  Comment:   Plan: oxyCODONE IR (ROXICODONE) 15 MG tablet            (I10) Hypertension goal BP (blood pressure) < 140/90  Comment: Decent control on current regimen.  Will continue to follow blood pressure and lab work  Plan:     Follow up 3 months  MOISES Loredo MD    (Chart documentation completed in part with Dragon voice-recognition software.  Even though reviewed some grammatical, spelling, and word errors may remain.)

## 2019-10-28 ENCOUNTER — TELEPHONE (OUTPATIENT)
Dept: FAMILY MEDICINE | Facility: CLINIC | Age: 44
End: 2019-10-28

## 2019-10-28 NOTE — TELEPHONE ENCOUNTER
This writer attempted to contact 1 on 10/28/19      Reason for call FMLA paperwork and left detailed message.      If patient calls back:   Relay message that paperwork is available for pick-up at [place] , (read verbatim), document that pt called and close encounter        Francoise Rhoades MA

## 2019-11-26 ENCOUNTER — MYC REFILL (OUTPATIENT)
Dept: FAMILY MEDICINE | Facility: CLINIC | Age: 44
End: 2019-11-26

## 2019-11-26 DIAGNOSIS — G89.4 CHRONIC PAIN SYNDROME: ICD-10-CM

## 2019-11-26 DIAGNOSIS — G89.29 CHRONIC MIDLINE LOW BACK PAIN WITHOUT SCIATICA: ICD-10-CM

## 2019-11-26 DIAGNOSIS — M54.50 CHRONIC MIDLINE LOW BACK PAIN WITHOUT SCIATICA: ICD-10-CM

## 2019-11-26 NOTE — TELEPHONE ENCOUNTER
Requested Prescriptions   Pending Prescriptions Disp Refills     oxyCODONE IR (ROXICODONE) 15 MG tablet  Last Written Prescription Date:  10/25/19  Last Fill Quantity: 90 tablet,  # refills: 0   Last office visit: 10/21/2019 with prescribing provider:  Dr. Loredo   Future Office Visit:        Routing refill request to provider for review/approval because:  Drug not on the [facility], P or [facility] refill protocol or controlled substance   90 tablet 0     Sig: Take 1 tablet (15 mg) by mouth 3 times daily as needed for moderate to severe pain       There is no refill protocol information for this order

## 2019-11-27 RX ORDER — OXYCODONE HYDROCHLORIDE 15 MG/1
15 TABLET ORAL 3 TIMES DAILY PRN
Qty: 90 TABLET | Refills: 0 | Status: SHIPPED | OUTPATIENT
Start: 2019-11-27 | End: 2019-12-28

## 2019-11-27 NOTE — TELEPHONE ENCOUNTER
Controlled Substance Refill Request for Oxycodone  Problem List Complete:  Yes  Chronic pain syndrome   Problem Detail     Noted:  12/1/2015   Priority:  Medium   Overview Addendum 9/27/2019 10:31 AM by Lindsey Ureña RN   Patient is followed by PATRICIA RAYMOND for ongoing prescription of pain medication.  All refills should be approved by this provider, or covering partner.     Medication(s): oxycodone 15 three times daily = 67.5 MED  Narcan n/a     UDS (May '19) showed unexpected benzo  Plan - monthly UDS for a while - any further fail will nullify contract     Clinic visit frequency required: Q 3 months      Controlled substance agreement on file: Yes       Date(s): 8/28/19     Pain Clinic evaluation in the past: No     DIRE Total Score(s):    8/31/2015   Total Score 16         Last [facility] website verification:  done on 9/27/19   [email or web address]      checked in past 3 months?  Yes 9/27/19   Last Written Prescription Date:  10/25/19  Last Fill Quantity: 90 tablets  # refills: 0   Last office visit: 10/21/2019 with prescribing provider:  10/21/19   Future Office Visit:  none  [place] reviewed:  not reviewed/not due - last done on 9/27/19    [place] profile:  [email or web address]        Herminia Rutherford RN, BSN, PHN

## 2019-12-26 ENCOUNTER — MYC REFILL (OUTPATIENT)
Dept: FAMILY MEDICINE | Facility: CLINIC | Age: 44
End: 2019-12-26

## 2019-12-26 DIAGNOSIS — G89.29 CHRONIC MIDLINE LOW BACK PAIN WITHOUT SCIATICA: ICD-10-CM

## 2019-12-26 DIAGNOSIS — G89.4 CHRONIC PAIN SYNDROME: ICD-10-CM

## 2019-12-26 DIAGNOSIS — M54.50 CHRONIC MIDLINE LOW BACK PAIN WITHOUT SCIATICA: ICD-10-CM

## 2019-12-26 RX ORDER — OXYCODONE HYDROCHLORIDE 15 MG/1
15 TABLET ORAL 3 TIMES DAILY PRN
Qty: 90 TABLET | Refills: 0 | Status: CANCELLED | OUTPATIENT
Start: 2019-12-26

## 2019-12-26 NOTE — TELEPHONE ENCOUNTER
Requested Prescriptions   Pending Prescriptions Disp Refills     gabapentin (NEURONTIN) 300 MG capsule 90 capsule 0     Sig: Take 1 capsule (300 mg) by mouth At Bedtime       There is no refill protocol information for this order        oxyCODONE IR (ROXICODONE) 15 MG tablet 90 tablet 0     Sig: Take 1 tablet (15 mg) by mouth 3 times daily as needed for moderate to severe pain       There is no refill protocol information for this order          gabapentin (NEURONTIN) 300 MG capsule      Last Written Prescription Date:  9/13/19  Last Fill Quantity: 30,   # refills: 0  Last Office Visit: 10/21/19  Future Office visit:       Routing refill request to provider for review/approval because:  Drug not on the [facility] or  [place] refill protocol or controlled substance      oxyCODONE IR (ROXICODONE) 15 MG tablet     Last Written Prescription Date:  11/27/19  Last Fill Quantity: 90,   # refills: 0  Last Office Visit: 10/21/19  Future Office visit:       Routing refill request to provider for review/approval because:  Drug not on the [facility], P or  [facility] refill protocol or controlled substance

## 2019-12-27 NOTE — TELEPHONE ENCOUNTER
Controlled Substance Refill Request for Oxycodone  Problem List Complete:  Yes  Chronic pain syndrome   Problem Detail      Noted:  12/1/2015   Priority:  Medium   Overview Addendum 9/27/2019 10:31 AM by Lindsey Ureña, RN   Patient is followed by PATRICIA RAYMOND for ongoing prescription of pain medication.  All refills should be approved by this provider, or covering partner.     Medication(s): oxycodone 15 three times daily = 67.5 MED  Narcan n/a     UDS (May '19) showed unexpected benzo  Plan - monthly UDS for a while - any further fail will nullify contract     Clinic visit frequency required: Q 3 months      Controlled substance agreement on file: Yes       Date(s): 8/28/19     Pain Clinic evaluation in the past: No     DIRE Total Score(s):    8/31/2015   Total Score 16         Last [facility] website verification:  done on 9/27/19   [email or web address]       checked in past 3 months?  Yes 9/27/19   Routing refill request to provider for review/approval because:  Drug not on the [facility] refill protocol - Oxycodone; Gabapentin  Lindsey Ureña RN  [facility]

## 2019-12-28 RX ORDER — OXYCODONE HYDROCHLORIDE 15 MG/1
15 TABLET ORAL 3 TIMES DAILY PRN
Qty: 90 TABLET | Refills: 0 | Status: SHIPPED | OUTPATIENT
Start: 2019-12-28 | End: 2020-01-22

## 2019-12-28 RX ORDER — GABAPENTIN 300 MG/1
300 CAPSULE ORAL AT BEDTIME
Qty: 90 CAPSULE | Refills: 5 | Status: SHIPPED | OUTPATIENT
Start: 2019-12-28 | End: 2020-02-21

## 2020-01-20 ENCOUNTER — HOSPITAL ENCOUNTER (EMERGENCY)
Facility: CLINIC | Age: 45
Discharge: HOME | End: 2020-01-20
Attending: PHYSICIAN ASSISTANT | Admitting: PHYSICIAN ASSISTANT
Payer: COMMERCIAL

## 2020-01-20 ENCOUNTER — APPOINTMENT (OUTPATIENT)
Dept: CT IMAGING | Facility: CLINIC | Age: 45
End: 2020-01-20
Attending: PHYSICIAN ASSISTANT
Payer: COMMERCIAL

## 2020-01-20 ENCOUNTER — APPOINTMENT (OUTPATIENT)
Dept: ULTRASOUND IMAGING | Facility: CLINIC | Age: 45
End: 2020-01-20
Attending: PHYSICIAN ASSISTANT
Payer: COMMERCIAL

## 2020-01-20 VITALS
TEMPERATURE: 98.5 F | HEART RATE: 96 BPM | OXYGEN SATURATION: 100 % | RESPIRATION RATE: 20 BRPM | SYSTOLIC BLOOD PRESSURE: 141 MMHG | DIASTOLIC BLOOD PRESSURE: 89 MMHG

## 2020-01-20 DIAGNOSIS — R10.9 FLANK PAIN: ICD-10-CM

## 2020-01-20 LAB
ALBUMIN SERPL-MCNC: 3.9 G/DL (ref 3.4–5)
ALBUMIN UR-MCNC: NEGATIVE MG/DL
ALP SERPL-CCNC: 69 U/L (ref 40–150)
ALT SERPL W P-5'-P-CCNC: 40 U/L (ref 0–50)
ANION GAP SERPL CALCULATED.3IONS-SCNC: 5 MMOL/L (ref 3–14)
APPEARANCE UR: CLEAR
AST SERPL W P-5'-P-CCNC: 25 U/L (ref 0–45)
BASOPHILS # BLD AUTO: 0 10E9/L (ref 0–0.2)
BASOPHILS NFR BLD AUTO: 0.5 %
BILIRUB SERPL-MCNC: 1 MG/DL (ref 0.2–1.3)
BILIRUB UR QL STRIP: NEGATIVE
BUN SERPL-MCNC: 7 MG/DL (ref 7–30)
CALCIUM SERPL-MCNC: 9.1 MG/DL (ref 8.5–10.1)
CHLORIDE SERPL-SCNC: 102 MMOL/L (ref 94–109)
CO2 SERPL-SCNC: 31 MMOL/L (ref 20–32)
COLOR UR AUTO: NORMAL
CREAT SERPL-MCNC: 0.65 MG/DL (ref 0.52–1.04)
DIFFERENTIAL METHOD BLD: NORMAL
EOSINOPHIL # BLD AUTO: 0.1 10E9/L (ref 0–0.7)
EOSINOPHIL NFR BLD AUTO: 0.6 %
ERYTHROCYTE [DISTWIDTH] IN BLOOD BY AUTOMATED COUNT: 14.3 % (ref 10–15)
GFR SERPL CREATININE-BSD FRML MDRD: >90 ML/MIN/{1.73_M2}
GLUCOSE SERPL-MCNC: 99 MG/DL (ref 70–99)
GLUCOSE UR STRIP-MCNC: NEGATIVE MG/DL
HCT VFR BLD AUTO: 40.4 % (ref 35–47)
HGB BLD-MCNC: 13 G/DL (ref 11.7–15.7)
HGB UR QL STRIP: NEGATIVE
IMM GRANULOCYTES # BLD: 0 10E9/L (ref 0–0.4)
IMM GRANULOCYTES NFR BLD: 0.5 %
KETONES UR STRIP-MCNC: NEGATIVE MG/DL
LEUKOCYTE ESTERASE UR QL STRIP: NEGATIVE
LYMPHOCYTES # BLD AUTO: 1.6 10E9/L (ref 0.8–5.3)
LYMPHOCYTES NFR BLD AUTO: 20.5 %
MCH RBC QN AUTO: 28.4 PG (ref 26.5–33)
MCHC RBC AUTO-ENTMCNC: 32.2 G/DL (ref 31.5–36.5)
MCV RBC AUTO: 88 FL (ref 78–100)
MONOCYTES # BLD AUTO: 0.6 10E9/L (ref 0–1.3)
MONOCYTES NFR BLD AUTO: 7.3 %
NEUTROPHILS # BLD AUTO: 5.5 10E9/L (ref 1.6–8.3)
NEUTROPHILS NFR BLD AUTO: 70.6 %
NITRATE UR QL: NEGATIVE
NRBC # BLD AUTO: 0 10*3/UL
NRBC BLD AUTO-RTO: 0 /100
PH UR STRIP: 7 PH (ref 5–7)
PLATELET # BLD AUTO: 201 10E9/L (ref 150–450)
POTASSIUM SERPL-SCNC: 3.2 MMOL/L (ref 3.4–5.3)
PROT SERPL-MCNC: 7.8 G/DL (ref 6.8–8.8)
RBC # BLD AUTO: 4.57 10E12/L (ref 3.8–5.2)
RBC #/AREA URNS AUTO: <1 /HPF (ref 0–2)
SODIUM SERPL-SCNC: 138 MMOL/L (ref 133–144)
SOURCE: NORMAL
SP GR UR STRIP: 1.01 (ref 1–1.03)
SQUAMOUS #/AREA URNS AUTO: 1 /HPF (ref 0–1)
UROBILINOGEN UR STRIP-MCNC: NORMAL MG/DL (ref 0–2)
WBC # BLD AUTO: 7.8 10E9/L (ref 4–11)
WBC #/AREA URNS AUTO: 0 /HPF (ref 0–5)

## 2020-01-20 PROCEDURE — 80053 COMPREHEN METABOLIC PANEL: CPT | Performed by: PHYSICIAN ASSISTANT

## 2020-01-20 PROCEDURE — 25000128 H RX IP 250 OP 636: Performed by: PHYSICIAN ASSISTANT

## 2020-01-20 PROCEDURE — 96361 HYDRATE IV INFUSION ADD-ON: CPT

## 2020-01-20 PROCEDURE — 81001 URINALYSIS AUTO W/SCOPE: CPT | Performed by: PHYSICIAN ASSISTANT

## 2020-01-20 PROCEDURE — 81025 URINE PREGNANCY TEST: CPT | Performed by: PHYSICIAN ASSISTANT

## 2020-01-20 PROCEDURE — 85025 COMPLETE CBC W/AUTO DIFF WBC: CPT | Performed by: PHYSICIAN ASSISTANT

## 2020-01-20 PROCEDURE — 96374 THER/PROPH/DIAG INJ IV PUSH: CPT

## 2020-01-20 PROCEDURE — 74176 CT ABD & PELVIS W/O CONTRAST: CPT

## 2020-01-20 PROCEDURE — 25800030 ZZH RX IP 258 OP 636: Performed by: PHYSICIAN ASSISTANT

## 2020-01-20 PROCEDURE — 76705 ECHO EXAM OF ABDOMEN: CPT

## 2020-01-20 PROCEDURE — 99285 EMERGENCY DEPT VISIT HI MDM: CPT | Mod: 25

## 2020-01-20 RX ORDER — KETOROLAC TROMETHAMINE 15 MG/ML
15 INJECTION, SOLUTION INTRAMUSCULAR; INTRAVENOUS ONCE
Status: COMPLETED | OUTPATIENT
Start: 2020-01-20 | End: 2020-01-20

## 2020-01-20 RX ADMIN — SODIUM CHLORIDE 1000 ML: 9 INJECTION, SOLUTION INTRAVENOUS at 15:32

## 2020-01-20 RX ADMIN — KETOROLAC TROMETHAMINE 15 MG: 15 INJECTION, SOLUTION INTRAMUSCULAR; INTRAVENOUS at 15:36

## 2020-01-20 ASSESSMENT — ENCOUNTER SYMPTOMS
SHORTNESS OF BREATH: 0
FREQUENCY: 0
FEVER: 0
FLANK PAIN: 1
NAUSEA: 1
COUGH: 0
DIFFICULTY URINATING: 0
VOMITING: 0
DYSURIA: 0
APPETITE CHANGE: 0
HEMATURIA: 0

## 2020-01-20 NOTE — ED AVS SNAPSHOT
[facility] Emergency Department  [address]  [facility] [address]  Phone:  [phone]  Fax:  [phone]                                    Jeff Serra   MRN: [id number]    Department:  [facility] Emergency Department   Date of Visit:  1/20/2020           After Visit Summary Signature Page    I have received my discharge instructions, and my questions have been answered. I have discussed any challenges I see with this plan with the nurse or doctor.    ..........................................................................................................................................  Patient/Patient Representative Signature      ..........................................................................................................................................  Patient Representative Print Name and Relationship to Patient    ..................................................               ................................................  Date                                   Time    ..........................................................................................................................................  Reviewed by Signature/Title    ...................................................              ..............................................  Date                                               Time          22EPIC Rev 08/18

## 2020-01-20 NOTE — ED PROVIDER NOTES
"  History   Chief Complaint:  Flank Pain & Leg Pain    HPI   Jeff Serra is a 45 year old female with a complex medical history notable for DVT and kidney stones who presents with abdominal pain and leg pain. The patient reports that three weeks ago she developed sharp, intermittent pain in her right flank. She has had this pain every day since then but states it became constant today and she developed nausea with it. She also states that today she developed pain and paresthesias in her anterior right thigh and right ankle. The patient notes her left leg has \"given out\" intermittently as well since yesterday. She has tried Tylenol and ibuprofen for the pain most recently this morning. The patient denies vomiting, urinary symptoms, appetite changes, cough, fever, and shortness of breath. She has had similar back pain in the past with kidney stones but states her current abdominal pain is more severe. The patient has had a hysterectomy but no other abdominal surgeries.    Allergies:  Contrast Dye    Medications:   Bentyl  Gabapentin  Prilosec  Progesterone   Senna S  Maxzide    Past Medical History:    Chronic midline low back pain without sciatica  Chronic neck pain  Chronic pain syndrome  Continuous opioid dependence  Hypertension  Kidney stone  PE  Right leg DVT  Ovarian cyst  Vitamin D deficiency  Allergic rhinitis    Past Surgical History:    Hysterectomy  Laparotomy mini, tubal ligation (post partum)  Lithotripsy     Family History:    Hypertension    Social History:  Smoking status: Never smoker  Alcohol use: Yes    Review of Systems   Constitutional: Negative for appetite change and fever.   Respiratory: Negative for cough and shortness of breath.    Gastrointestinal: Positive for nausea. Negative for vomiting.   Genitourinary: Positive for flank pain (right). Negative for decreased urine volume, difficulty urinating, dysuria, enuresis, frequency, hematuria and urgency.   Musculoskeletal:        " Positive for pain in left thigh, right ankle.   Neurological:        Positive for paresthesias in left thigh, right ankle.   All other systems reviewed and are negative.       Physical Exam     Patient Vitals for the past 24 hrs:   BP Temp Temp src Pulse Resp SpO2   01/20/20 1700 -- -- -- -- -- 100 %   01/20/20 1645 (!) 141/89 -- -- -- -- --   01/20/20 1600 (!) 146/88 -- -- 96 -- 99 %   01/20/20 1455 (!) 161/97 98.5  F (36.9  C) Temporal 110 20 99 %     Physical Exam  Constitutional: comfortable and well appearing.   Head: No external signs of trauma noted to head or face.   Eyes: Pupils are equal, round, and reactive to light. Conjunctiva normal. No scleral icterus.   ENT: MMM. Normal voice.   Neck: normal ROM.    Cardiovascular: Normal rate, regular rhythm, and intact distal pulses.    Respiratory: Effort normal. No respiratory distress. Lungs clear to auscultation bilaterally.   GI: Soft. Mild right sided abdominal tenderness without rebound or guarding. No CVA tenderness.   Musculoskeletal: No deformities appreciated. Normal ROM. No edema noted.  No lumbar spine or paraspinal muscle tenderness.   Neurological: Alert and Oriented x 3. Speech normal. Moves all extremities equally. 5/5 strength of bilateral lower extremities, including hip flexion, knee flex/ext, plantarflexion/dorsiflexion. Sensation intact to light touch in bilateral lower extremities.   Psychiatric: Appropriate mood, affect, and behavior.   Skin: Skin is warm and dry. no jaundice.       Emergency Department Course   Imaging:  Radiographic findings were communicated with the patient who voiced understanding of the findings.    Abd/Pelvis CT w/o Contrast - Stone Protocol:  1. Diffuse fatty infiltration of the liver and probable hepatomegaly.   2. No other cause for right flank pain is identified. No urinary  calculi or evidence for urinary obstruction.  3. Scattered colonic diverticulosis, without convincing evidence for  diverticulitis.   As read  by Radiology.    US Abdomen Limited (RUQ):  1. Normal gallbladder.  2. Fatty liver with focal sparing adjacent to the gallbladder.  As read by Radiology.    Laboratory:  Laboratory findings were communicated with the patient who voiced understanding of the findings.    CBC: WNL (WBC 7.8, HGB 13.0, )  CMP: Potassium 3.2 (L) o/w WNL (Creatinine 0.65)    UA with Microscopic: negative    Interventions:  1532 NS 1L IV Bolus  1536 Toradol 15 mg IV     Emergency Department Course:  Past medical records, nursing notes, and vitals reviewed.   1509 I performed an exam of the patient and obtained history, as documented above.    IV inserted and blood drawn. The patient was sent for a CT of the abdomen/pelvis while in the emergency department, findings above.     1612 I rechecked the patient. Explained findings to the patient.    The patient provided a urine sample here in the emergency department. This was sent for laboratory testing, findings above. The patient was sent for a RUQ abdomen ultrasound while in the emergency department, findings above.    1719 I rechecked the patient. Explained new findings to the patient and discussed plan.    Findings and plan explained to the patient. Patient discharged home with instructions regarding supportive care, medications, and reasons to return. The importance of close follow-up was reviewed.    Impression & Plan    Medical Decision Makin year old female presenting with right flank pain and left leg pain/giving out. She is afebrile and well appearing on arrival. A broad differential diagnosis was considered, including but not limited to renal colic, pyelonephritis, appendicitis, cholecystitis, lumbar radiculopathy, among others. Urinalysis does not reveal any evidence of infection or blood. Labs are unrevealing. CT abd/pelvis did not reveal any evidence of urinary tract stone, appendicitis, or diverticulitis. RUQ ultrasound showed a normal gallbladder. There are no rashes  "or skin lesions to suggest shingles. She has not had any cough, fever, or SOB to suggest pneumonia and I do not feel she requires CXR imaging at this time. The exact cause of her pain is not entirely clear at this time, though serious etiologies have been excluded. She is also reporting left leg pain and \"giving out\". She denies any back pain and she has normal strength and sensation of her lower extremities. I do not feel she requires any imaging or further evaluation of this at this time. She is ambulatory here and is appropriate for discharge home with follow-up with PCP. She was instructed to return to the ED for any new or worsening symptoms, including worsening pain, fever, vomiting, leg weakness, or other concerning symptoms.      Diagnosis:    ICD-10-CM   1. Flank pain R10.9        Disposition:  Discharged to home.        1/20/2020   Jasen Castaneda, am serving as a scribe at 3:26 PM on 1/20/2020 to document services personally performed by Zabrina Calvert PA-C based on my observations and the provider's statements to me.      Zabrina Calvert PA-C  01/20/20 1904    "

## 2020-01-20 NOTE — ED TRIAGE NOTES
"Arrives with right flank/side pain as well as left leg pain. Flank pain started 3 weeks ago, leg pain started yesterday and she states the leg feels weak and \"goes out\" on her. Alert and oriented, ABCs intact.  "

## 2020-01-22 ENCOUNTER — MYC MEDICAL ADVICE (OUTPATIENT)
Dept: FAMILY MEDICINE | Facility: CLINIC | Age: 45
End: 2020-01-22

## 2020-01-22 DIAGNOSIS — G89.29 CHRONIC MIDLINE LOW BACK PAIN WITHOUT SCIATICA: ICD-10-CM

## 2020-01-22 DIAGNOSIS — M25.562 LEFT KNEE PAIN, UNSPECIFIED CHRONICITY: Primary | ICD-10-CM

## 2020-01-22 DIAGNOSIS — G89.4 CHRONIC PAIN SYNDROME: ICD-10-CM

## 2020-01-22 DIAGNOSIS — M54.50 CHRONIC MIDLINE LOW BACK PAIN WITHOUT SCIATICA: ICD-10-CM

## 2020-01-22 RX ORDER — OXYCODONE HYDROCHLORIDE 15 MG/1
15 TABLET ORAL 3 TIMES DAILY PRN
Qty: 90 TABLET | Refills: 0 | Status: SHIPPED | OUTPATIENT
Start: 2020-01-22 | End: 2020-02-21

## 2020-01-23 ENCOUNTER — HOSPITAL ENCOUNTER (OUTPATIENT)
Dept: MRI IMAGING | Facility: CLINIC | Age: 45
Discharge: HOME | End: 2020-01-23
Attending: FAMILY MEDICINE | Admitting: FAMILY MEDICINE
Payer: COMMERCIAL

## 2020-01-23 DIAGNOSIS — M25.562 LEFT KNEE PAIN, UNSPECIFIED CHRONICITY: ICD-10-CM

## 2020-01-23 PROCEDURE — 73721 MRI JNT OF LWR EXTRE W/O DYE: CPT | Mod: LT

## 2020-01-24 ENCOUNTER — MYC MEDICAL ADVICE (OUTPATIENT)
Dept: FAMILY MEDICINE | Facility: CLINIC | Age: 45
End: 2020-01-24

## 2020-01-24 DIAGNOSIS — M25.562 LEFT KNEE PAIN, UNSPECIFIED CHRONICITY: Primary | ICD-10-CM

## 2020-01-24 NOTE — RESULT ENCOUNTER NOTE
Jeff,  Looks like there is a partial tear of the ligament on the outside of your knee.  And a little wear and tear in the cartilage.  Offhand I do not necessarily think this needs a surgery - probable just physical therapy.  But let me know if you want me to set up a meeting with orthopedics.  MOISES Loredo M.D.

## 2020-01-28 ENCOUNTER — ANCILLARY PROCEDURE (OUTPATIENT)
Dept: GENERAL RADIOLOGY | Facility: CLINIC | Age: 45
End: 2020-01-28
Attending: ORTHOPAEDIC SURGERY
Payer: COMMERCIAL

## 2020-01-28 ENCOUNTER — OFFICE VISIT (OUTPATIENT)
Dept: ORTHOPEDICS | Facility: CLINIC | Age: 45
End: 2020-01-28
Payer: COMMERCIAL

## 2020-01-28 VITALS
HEIGHT: 63 IN | WEIGHT: 194.8 LBS | SYSTOLIC BLOOD PRESSURE: 108 MMHG | DIASTOLIC BLOOD PRESSURE: 82 MMHG | BODY MASS INDEX: 34.52 KG/M2

## 2020-01-28 DIAGNOSIS — M25.562 ACUTE PAIN OF LEFT KNEE: ICD-10-CM

## 2020-01-28 DIAGNOSIS — M23.8X2 CHONDRAL DEFECT OF LEFT PATELLA: Primary | ICD-10-CM

## 2020-01-28 PROCEDURE — 73562 X-RAY EXAM OF KNEE 3: CPT

## 2020-01-28 PROCEDURE — 99203 OFFICE O/P NEW LOW 30 MIN: CPT | Mod: 25 | Performed by: ORTHOPAEDIC SURGERY

## 2020-01-28 PROCEDURE — 20610 DRAIN/INJ JOINT/BURSA W/O US: CPT | Mod: LT | Performed by: ORTHOPAEDIC SURGERY

## 2020-01-28 RX ORDER — METHYLPREDNISOLONE ACETATE 40 MG/ML
80 INJECTION, SUSPENSION INTRA-ARTICULAR; INTRALESIONAL; INTRAMUSCULAR; SOFT TISSUE
Status: DISCONTINUED | OUTPATIENT
Start: 2020-01-28 | End: 2021-07-01

## 2020-01-28 RX ADMIN — METHYLPREDNISOLONE ACETATE 80 MG: 40 INJECTION, SUSPENSION INTRA-ARTICULAR; INTRALESIONAL; INTRAMUSCULAR; SOFT TISSUE at 15:43

## 2020-01-28 ASSESSMENT — MIFFLIN-ST. JEOR: SCORE: 1487.74

## 2020-01-28 NOTE — PROGRESS NOTES
CHIEF COMPLAINT: Left knee pain    DIAGNOSIS: Left knee patellar chondral defect.    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 Symptoms started on 01/21/2020. There was a precipitating event, patient slipped on ice without a fall. The pain is located to the anterior knee. Worst pain is rated a 10 of 10, and current pain is rated at 7 of 10. Symptoms are worsened by prolonged standing, walking, and twisting. Symptoms are improved with rest and activity avoidance. Patient has tried ibuprofen, Tylenol, and RICE with minimal relief. Associated symptoms include sharp anterior knee pain, constant throbbing, locking sensation, instability, and tingling of lateral lower leg. Notably, the patient has had no significant previous injury history. No other concerns or complaints at this time.    PAST MEDICAL HISTORY:  1. Hypertension  2. History of DVT / PE  3. Chronic low back pain and chronic neck pain    CURRENT MEDICATIONS:  Current Outpatient Medications   Medication     aspirin (ASA) 325 MG EC tablet     order for DME     dicyclomine (BENTYL) 20 MG tablet     gabapentin (NEURONTIN) 100 MG capsule     gabapentin (NEURONTIN) 300 MG capsule     loratadine (CLARITIN) 10 MG tablet     omeprazole (PRILOSEC) 20 MG DR capsule     oxyCODONE IR (ROXICODONE) 15 MG tablet     progesterone (PROMETRIUM) 100 MG capsule     SENNA-docusate sodium (SENNA S) 8.6-50 MG tablet     triamterene-HCTZ (MAXZIDE-25) 37.5-25 MG tablet     No current facility-administered medications for this visit.      ALLERGIES:   Allergies   Allergen Reactions     Contrast Dye Nausea and Vomiting     States she sometimes has vomited after receiving iv contrast dye     PAST SURGICAL HISTORY:  1. Hysterectomy    FAMILY HISTORY: No known family history of bleeding, clotting, or anesthesia related complications.    SOCIAL HISTORY:  "Patient is  and livers at home with her  in [place], [place]. She works in medical collections.  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    TOXIC HABITS:  I spoke with [place] today regarding tobacco use and they informed me that they do not use any tobacco products..    REVIEW OF SYSTEMS:  General: Denies fevers, chills, or night sweats.  Skin: Denies rashes or lesions.  Head: Denies headache or dizziness.  Eyes: Denies vision changes or eye pain.  Ears: Denies ear pain or decreased hearing.  Nose: Denies nose bleeding or sinus pain.  Mouth & Throat: Denies bleeding gums or sore throat.  Neck: Denies neck pain or stiffness.  Respiratory: Denies cough, wheezing, sob, or hemoptysis.  Cardiovascular: Hypertension.  Denies chest pain, chest pressure, or palpitations.   Gastrointestinal: Abdominal pain.  Denies nausea, vomiting, diarrhea, or constipation.  Genitourinary: Denies difficulty or pain with urination.   Musculoskeletal: As noted above in the HPI, otherwise normal.  Neuro: Dizziness and numbness and tingling.  Denies paralysis, weakness, or speech difficulty.  Lymphatics: Denies lymphadenopathy.  Psych: Denies anxiety, sadness, or irritability.    PHYSICAL EXAM:  Patient is 5' 3\" and weighs 194 lbs 12.8 oz. /82   Ht 1.6 m (5' 3\")   Wt 88.4 kg (194 lb 12.8 oz)   LMP 09/12/2016 (Exact Date)   BMI 34.51 kg/m    Constitutional: Well-developed, well-nourished, healthy appearing female.  Psychiatric:  Oriented to person, place and time.  Mood and affect congruent.  Skin: Warm, dry, and without rashes.  HEENT: Normocephalic, atraumatic.  Cardiac: Well perfused extremities, strong 2+ peripheral pulses. No edema.   Pulmonary: Non-labored respirations on room air without audible wheezes.   Abdomen: Soft, nontender.  Musculoskeletal: Patient ambulates with a slow, left antalgic gait. No joint, bone or muscle abnormalities.  Small left knee effusion.  Active range of motion of the knees is 0 to 135 on the right, " compared to 7 to 120 on the left.  Positive patellofemoral crepitus on the left.  Positive patellar grind test on the left.  Tenderness about the patella on the left.  All of these are normal on the right.  No significant joint line tenderness bilaterally.  No tenderness over the medial collateral ligament or lateral collateral ligament complex bilateral knees. No varus or valgus instability in full extension or 30 degrees of flexion, posterior drawer or posterolateral instability bilaterally.  The neurovascular exam is intact and skin is normal.     IMAGING:   Bilateral PA Barajas, standing lateral, and bilateral sunrise views of the knees taken radiographically today were personally reviewed by me demonstrate no clear osseous abnormalities or significant arthrosis.    MRI scan of the left knee was also personally reviewed by me.  No ligamentous tear on my read.  Intact ACL, PCL, MCL, and LCL.  Intact medial and lateral menisci.  Intact chondral surfaces about the medial and lateral compartments.  There is a significant central patellar ridge fissure and associated subchondral edema consistent with an acute central patellar chondral injury.  Trace effusion.    IMPRESSION: 45 year old-year-old female, with left knee acute central patellar ridge cartilage injury.     PLAN:   I had a long discussion with the patient today.  At the present time she has an acute left knee injury.  She is very sore.  She has an antalgic gait pattern.  I recommended a period of crutches which should be weaned as able.  She is already taking significant pain medications including ibuprofen, Tylenol, and chronic narcotics.  She is getting Percocet prescribed from her primary care provider.  I would recommend that she weans from this as soon as possible given the known problems of having chronic narcotic dependence for chronic conditions such as neck or back pain.  Regardless, given the acute inflammation in her left knee I discussed the  risks, benefits, and alternatives to a left knee corticosteroid injection.  She wished to proceed.  Signed informed consent was obtained.  Left knee injection procedure note below.  I do not recommend any bracing.  She does not have knee instability.  I do recommend a course of physical therapy to work on increasing flexibility, quadriceps and core strength, particularly the VMO strength, and to consider patellar taping techniques.  She should also work on icing and activity modification.  She should return to clinic for repeat evaluation with me in approximately 3 months to monitor her progress.  She may call and cancel if she is feeling well at that time.    At the conclusion of the office visit, Jeff verbally acknowledged that I answered all of her questions satisfactorily.    Large Joint Injection/Arthocentesis: L knee joint  Date/Time: 1/28/2020 3:43 PM  Performed by: Eric Carlton MD  Authorized by: Eric Carlton MD     Indications:  Pain  Needle Size:  22 G  Guidance: landmark guided    Approach:  Anterolateral  Location:  Knee      Medications:  80 mg methylPREDNISolone 40 MG/ML  Medications comment:  8mL of Lidocaine was injected into the left knee.  NDC: 0984-0314-65  Lot: [id number]  Exp: 03/31/2021  Outcome:  Tolerated well, no immediate complications  Procedure discussed: discussed risks, benefits, and alternatives    Consent Given by:  Patient  Timeout: timeout called immediately prior to procedure    Prep: patient was prepped and draped in usual sterile fashion

## 2020-01-28 NOTE — LETTER
1/28/2020         RE: Jeff Serra  [id number] [facility] [address]        Dear Colleague,    Thank you for referring your patient, Jeff Serra, to the [facility]ERY. Please see a copy of my visit note below.    CHIEF COMPLAINT: Left knee pain    DIAGNOSIS: Left knee patellar chondral defect.    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 Symptoms started on 01/21/2020. There was a precipitating event, patient slipped on ice without a fall. The pain is located to the anterior knee. Worst pain is rated a 10 of 10, and current pain is rated at 7 of 10. Symptoms are worsened by prolonged standing, walking, and twisting. Symptoms are improved with rest and activity avoidance. Patient has tried ibuprofen, Tylenol, and RICE with minimal relief. Associated symptoms include sharp anterior knee pain, constant throbbing, locking sensation, instability, and tingling of lateral lower leg. Notably, the patient has had no significant previous injury history. No other concerns or complaints at this time.    PAST MEDICAL HISTORY:  1. Hypertension  2. History of DVT / PE  3. Chronic low back pain and chronic neck pain    CURRENT MEDICATIONS:  Current Outpatient Medications   Medication     aspirin (ASA) 325 MG EC tablet     order for DME     dicyclomine (BENTYL) 20 MG tablet     gabapentin (NEURONTIN) 100 MG capsule     gabapentin (NEURONTIN) 300 MG capsule     loratadine (CLARITIN) 10 MG tablet     omeprazole (PRILOSEC) 20 MG DR capsule     oxyCODONE IR (ROXICODONE) 15 MG tablet     progesterone (PROMETRIUM) 100 MG capsule     SENNA-docusate sodium (SENNA S) 8.6-50 MG tablet     triamterene-HCTZ (MAXZIDE-25) 37.5-25 MG tablet     No current facility-administered medications for this visit.      ALLERGIES:   Allergies   Allergen Reactions     Contrast  "Dye Nausea and Vomiting     States she sometimes has vomited after receiving iv contrast dye     PAST SURGICAL HISTORY:  1. Hysterectomy    FAMILY HISTORY: No known family history of bleeding, clotting, or anesthesia related complications.    SOCIAL HISTORY: Patient is  and livers at home with her  in [place], [place]. She works in medical collections.  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    TOXIC HABITS:  I spoke with Jeff today regarding tobacco use and they informed me that they do not use any tobacco products..    REVIEW OF SYSTEMS:  General: Denies fevers, chills, or night sweats.  Skin: Denies rashes or lesions.  Head: Denies headache or dizziness.  Eyes: Denies vision changes or eye pain.  Ears: Denies ear pain or decreased hearing.  Nose: Denies nose bleeding or sinus pain.  Mouth & Throat: Denies bleeding gums or sore throat.  Neck: Denies neck pain or stiffness.  Respiratory: Denies cough, wheezing, sob, or hemoptysis.  Cardiovascular: Hypertension.  Denies chest pain, chest pressure, or palpitations.   Gastrointestinal: Abdominal pain.  Denies nausea, vomiting, diarrhea, or constipation.  Genitourinary: Denies difficulty or pain with urination.   Musculoskeletal: As noted above in the HPI, otherwise normal.  Neuro: Dizziness and numbness and tingling.  Denies paralysis, weakness, or speech difficulty.  Lymphatics: Denies lymphadenopathy.  Psych: Denies anxiety, sadness, or irritability.    PHYSICAL EXAM:  Patient is 5' 3\" and weighs 194 lbs 12.8 oz. /82   Ht 1.6 m (5' 3\")   Wt 88.4 kg (194 lb 12.8 oz)   LMP 09/12/2016 (Exact Date)   BMI 34.51 kg/m     Constitutional: Well-developed, well-nourished, healthy appearing female.  Psychiatric:  Oriented to person, place and time.  Mood and affect congruent.  Skin: Warm, dry, and without rashes.  HEENT: Normocephalic, atraumatic.  Cardiac: Well perfused extremities, strong 2+ peripheral pulses. No edema.   Pulmonary: Non-labored respirations on " room air without audible wheezes.   Abdomen: Soft, nontender.  Musculoskeletal: Patient ambulates with a slow, left antalgic gait. No joint, bone or muscle abnormalities.  Small left knee effusion.  Active range of motion of the knees is 0 to 135 on the right, compared to 7 to 120 on the left.  Positive patellofemoral crepitus on the left.  Positive patellar grind test on the left.  Tenderness about the patella on the left.  All of these are normal on the right.  No significant joint line tenderness bilaterally.  No tenderness over the medial collateral ligament or lateral collateral ligament complex bilateral knees. No varus or valgus instability in full extension or 30 degrees of flexion, posterior drawer or posterolateral instability bilaterally.  The neurovascular exam is intact and skin is normal.     IMAGING:   Bilateral PA Barajas, standing lateral, and bilateral sunrise views of the knees taken radiographically today were personally reviewed by me demonstrate no clear osseous abnormalities or significant arthrosis.    MRI scan of the left knee was also personally reviewed by me.  No ligamentous tear on my read.  Intact ACL, PCL, MCL, and LCL.  Intact medial and lateral menisci.  Intact chondral surfaces about the medial and lateral compartments.  There is a significant central patellar ridge fissure and associated subchondral edema consistent with an acute central patellar chondral injury.  Trace effusion.    IMPRESSION: 45 year old-year-old female, with left knee acute central patellar ridge cartilage injury.     PLAN:   I had a long discussion with the patient today.  At the present time she has an acute left knee injury.  She is very sore.  She has an antalgic gait pattern.  I recommended a period of crutches which should be weaned as able.  She is already taking significant pain medications including ibuprofen, Tylenol, and chronic narcotics.  She is getting Percocet prescribed from her primary care  provider.  I would recommend that she weans from this as soon as possible given the known problems of having chronic narcotic dependence for chronic conditions such as neck or back pain.  Regardless, given the acute inflammation in her left knee I discussed the risks, benefits, and alternatives to a left knee corticosteroid injection.  She wished to proceed.  Signed informed consent was obtained.  Left knee injection procedure note below.  I do not recommend any bracing.  She does not have knee instability.  I do recommend a course of physical therapy to work on increasing flexibility, quadriceps and core strength, particularly the VMO strength, and to consider patellar taping techniques.  She should also work on icing and activity modification.  She should return to clinic for repeat evaluation with me in approximately 3 months to monitor her progress.  She may call and cancel if she is feeling well at that time.    At the conclusion of the office visit, Jeff verbally acknowledged that I answered all of her questions satisfactorily.    Large Joint Injection/Arthocentesis: L knee joint  Date/Time: 1/28/2020 3:43 PM  Performed by: Eric Carlton MD  Authorized by: Eric Carlton MD     Indications:  Pain  Needle Size:  22 G  Guidance: landmark guided    Approach:  Anterolateral  Location:  Knee      Medications:  80 mg methylPREDNISolone 40 MG/ML  Medications comment:  8mL of Lidocaine was injected into the left knee.  NDC: 4298-0278-39  Lot: [id number]  Exp: 03/31/2021  Outcome:  Tolerated well, no immediate complications  Procedure discussed: discussed risks, benefits, and alternatives    Consent Given by:  Patient  Timeout: timeout called immediately prior to procedure    Prep: patient was prepped and draped in usual sterile fashion          Again, thank you for allowing me to participate in the care of your patient.        Sincerely,        Eric Carlton MD

## 2020-01-28 NOTE — PATIENT INSTRUCTIONS
We addressed the following today:    1. Chondral defect of left patella    2. Acute pain of left knee        Corticosteroid injection of the left knee was performed today in clinic.    Order has been placed for physical therapy with the [facility] - [phone]    Order has been placed for Aspirin, available for  at your local pharmacy. Begin regimen as discussed with Dr. Carlton.    Follow up in 3-4 months (sooner if needed). Call clinic triage number [[phone],pick option 2, then option 3] at any time with questions or concerns.

## 2020-02-04 ENCOUNTER — OFFICE VISIT (OUTPATIENT)
Dept: FAMILY MEDICINE | Facility: CLINIC | Age: 45
End: 2020-02-04
Payer: COMMERCIAL

## 2020-02-04 VITALS
SYSTOLIC BLOOD PRESSURE: 137 MMHG | HEART RATE: 113 BPM | HEIGHT: 63 IN | DIASTOLIC BLOOD PRESSURE: 89 MMHG | OXYGEN SATURATION: 97 % | WEIGHT: 194 LBS | TEMPERATURE: 98 F | BODY MASS INDEX: 34.38 KG/M2

## 2020-02-04 DIAGNOSIS — J06.9 UPPER RESPIRATORY TRACT INFECTION, UNSPECIFIED TYPE: Primary | ICD-10-CM

## 2020-02-04 DIAGNOSIS — M23.8X2 CHONDRAL DEFECT OF LEFT PATELLA: ICD-10-CM

## 2020-02-04 PROCEDURE — 99214 OFFICE O/P EST MOD 30 MIN: CPT | Performed by: FAMILY MEDICINE

## 2020-02-04 RX ORDER — CODEINE PHOSPHATE AND GUAIFENESIN 10; 100 MG/5ML; MG/5ML
1-2 SOLUTION ORAL EVERY 4 HOURS PRN
Qty: 240 ML | Refills: 0 | Status: SHIPPED | OUTPATIENT
Start: 2020-02-04 | End: 2020-04-08

## 2020-02-04 RX ORDER — AZITHROMYCIN 250 MG/1
TABLET, FILM COATED ORAL
Qty: 6 TABLET | Refills: 0 | Status: SHIPPED | OUTPATIENT
Start: 2020-02-04 | End: 2020-04-08

## 2020-02-04 ASSESSMENT — MIFFLIN-ST. JEOR: SCORE: 1484.11

## 2020-02-04 NOTE — PROGRESS NOTES
"Subjective     Hienwaqar Serra is a 45 year old female who presents to clinic today for the following health issues:    HPI     Patient presents for Follow-Up with Left Knee MRI results.     Acute Illness   Acute illness concerns: Cough  Onset: 1 week     Fever: YES- patient has felt warm     Chills/Sweats: no    Headache (location?): YES    Sinus Pressure:no    Conjunctivitis:  no    Ear Pain: no    Rhinorrhea: no     Congestion: YES- chest congestion     Sore Throat: YES- earlier but no longer      Cough: YES-productive of clear sputum    Wheeze: YES- worsens at night     Decreased Appetite: YES    Nausea: no     Vomiting: no     Diarrhea:  YES    Dysuria/Freq.: no     Fatigue/Achiness: YES    Sick/Strep Exposure: YES-       Therapies Tried and outcome: Mucinex and robitussin with no relief     SUBJECTIVE:  Here today with the above symptomatology.  Originally scheduled to discuss follow-up of her left knee.  MRI confirmed a significant cartilage defect under the patella.  Saw sports medicine and had a cortisone injection done.  Took about a week but has started to take effect.  Patient had contacted me earlier about a short leave of absence from work that I think is reasonable and has paperwork for this.  In the interval she came down with illness symptoms as above.  Very similar to her  whom I saw her recently for this.  Mild fever but no real chills or sweats.  Cough primarily of a clearish to slightly yellowish sputum.  Very painful and hacking.    Review of systems otherwise negative.  Past medical, family, and social history reviewed and updated in chart.    OBJECTIVE:  /89 (BP Location: Right arm, Patient Position: Chair, Cuff Size: Adult Large)   Pulse 113   Temp 98  F (36.7  C) (Oral)   Ht 1.6 m (5' 3\")   Wt 88 kg (194 lb)   LMP 09/12/2016 (Exact Date)   SpO2 97%   Breastfeeding No   BMI 34.37 kg/m    Alert, pleasant, upbeat, and in no apparent discomfort.  Ears normal. " Throat and pharynx normal. Neck supple. No adenopathy or masses in the neck or supraclavicular regions. Sinuses non tender.  S1 and S2 normal, no murmurs, clicks, gallops or rubs. Regular rate and rhythm. Chest is clear; no wheezes or rales. No edema or JVD.  Past labs reviewed with the patient.   Reviewed imaging    ASSESSMENT / PLAN:  (J06.9) Upper respiratory tract infection, unspecified type  (primary encounter diagnosis)  Comment: Discussed mechanism of action of the proposed medication, as well as potential effects, both good and bad.  Patient expressed understanding and agreed with treatment.   Plan: azithromycin (ZITHROMAX) 250 MG tablet,         guaiFENesin-codeine (CHERATUSSIN AC) 100-10         MG/5ML solution            (M23.8X2) Chondral defect of left patella  Comment: Off work for another week or 2 and then plan to return to full duty.  FMLA filled out.  Plan:     Follow up 1 to 2 weeks if needed prior to the end date, otherwise 3 months  S. Jae Loredo MD    (Chart documentation completed in part with Dragon voice-recognition software.  Even though reviewed some grammatical, spelling, and word errors may remain.)

## 2020-02-19 ENCOUNTER — MYC MEDICAL ADVICE (OUTPATIENT)
Dept: FAMILY MEDICINE | Facility: CLINIC | Age: 45
End: 2020-02-19

## 2020-02-19 NOTE — LETTER
[address] [address]  Phone: [phone]    February 19, 2020        Jeff Serra  [address] [facility] [address]          To whom it may concern:    RE: Jeff Serra    Okay to to return to work 2/24/2020 without restriction.    Please contact me for questions or concerns.      Sincerely,        Jordyn Loredo MD

## 2020-02-21 ENCOUNTER — MYC REFILL (OUTPATIENT)
Dept: FAMILY MEDICINE | Facility: CLINIC | Age: 45
End: 2020-02-21

## 2020-02-21 DIAGNOSIS — G89.4 CHRONIC PAIN SYNDROME: ICD-10-CM

## 2020-02-21 DIAGNOSIS — G89.29 CHRONIC MIDLINE LOW BACK PAIN WITHOUT SCIATICA: ICD-10-CM

## 2020-02-21 DIAGNOSIS — M54.50 CHRONIC MIDLINE LOW BACK PAIN WITHOUT SCIATICA: ICD-10-CM

## 2020-02-24 RX ORDER — GABAPENTIN 300 MG/1
300 CAPSULE ORAL AT BEDTIME
Qty: 90 CAPSULE | Refills: 5 | Status: SHIPPED | OUTPATIENT
Start: 2020-02-24 | End: 2020-03-20

## 2020-02-24 RX ORDER — OXYCODONE HYDROCHLORIDE 15 MG/1
15 TABLET ORAL 3 TIMES DAILY PRN
Qty: 90 TABLET | Refills: 0 | Status: SHIPPED | OUTPATIENT
Start: 2020-02-24 | End: 2020-03-20

## 2020-02-24 NOTE — TELEPHONE ENCOUNTER
Routing refill request to provider for review/approval because: Gabapentin   Drug not on the [facility] refill protocol       Routing refill request to provider for review/approval because: oxycodone   Drug not on the [facility] refill protocol     Controlled Substance Refill Request for oxyCODONE IR (ROXICODONE) 15 MG tablet  Problem List Complete:  Yes   checked in past 3 months?  Yes 2/24/20  Last Written Prescription Date:  1/22/20   Last Fill Quantity: 90,  # refills: 0   Last office visit: 2/4/2020 with prescribing provider:  Facundo   Future Office Visit:   Next 5 appointments (look out 90 days)    Apr 28, 2020  1:45 PM CDT  Return Visit with Eric Carlton MD  [facility] ([facility]) [address] [address] [address]  [phone]         [place] reviewed:  reviewed- no concerns    [place]  [email or web address]

## 2020-02-24 NOTE — TELEPHONE ENCOUNTER
Requested Prescriptions   Pending Prescriptions Disp Refills     gabapentin (NEURONTIN) 300 MG capsule 90 capsule 5     Sig: Take 1 capsule (300 mg) by mouth At Bedtime       There is no refill protocol information for this order        oxyCODONE IR (ROXICODONE) 15 MG tablet 90 tablet 0     Sig: Take 1 tablet (15 mg) by mouth 3 times daily as needed for moderate to severe pain       There is no refill protocol information for this order        gabapentin (NEURONTIN) 300 MG capsule      Last Written Prescription Date:  12/28/19  Last Fill Quantity: 90,   # refills: 5  Last Office Visit: 2/4/2020  Future Office visit:    Next 5 appointments (look out 90 days)    Apr 28, 2020  1:45 PM CDT  Return Visit with Eric Carlton MD  [facility] ([facility]) [address] [place] [address]  [phone]           Routing refill request to provider for review/approval because:  Drug not on the G, P or  Health refill protocol or controlled substance    oxyCODONE IR (ROXICODONE) 15 MG tablet      Last Written Prescription Date:  1/22/2020  Last Fill Quantity: 90,   # refills: 0  Last Office Visit: 2/4/2020  Future Office visit:    Next 5 appointments (look out 90 days)    Apr 28, 2020  1:45 PM CDT  Return Visit with Eric Carlton MD  [facility] ([facility]) [id number] [place] [address]  [phone]           Routing refill request to provider for review/approval because:  Drug not on the [facility], [facility] or [facility] refill protocol or controlled substance

## 2020-03-20 ENCOUNTER — MYC REFILL (OUTPATIENT)
Dept: FAMILY MEDICINE | Facility: CLINIC | Age: 45
End: 2020-03-20

## 2020-03-20 DIAGNOSIS — M54.50 CHRONIC MIDLINE LOW BACK PAIN WITHOUT SCIATICA: ICD-10-CM

## 2020-03-20 DIAGNOSIS — G89.29 CHRONIC MIDLINE LOW BACK PAIN WITHOUT SCIATICA: ICD-10-CM

## 2020-03-20 DIAGNOSIS — G89.4 CHRONIC PAIN SYNDROME: ICD-10-CM

## 2020-03-23 RX ORDER — GABAPENTIN 300 MG/1
300 CAPSULE ORAL AT BEDTIME
Qty: 90 CAPSULE | Refills: 5 | Status: SHIPPED | OUTPATIENT
Start: 2020-03-23 | End: 2020-05-26

## 2020-03-23 RX ORDER — OXYCODONE HYDROCHLORIDE 15 MG/1
15 TABLET ORAL 3 TIMES DAILY PRN
Qty: 90 TABLET | Refills: 0 | Status: SHIPPED | OUTPATIENT
Start: 2020-03-23 | End: 2020-04-20

## 2020-03-23 NOTE — TELEPHONE ENCOUNTER
Requested Prescriptions   Pending Prescriptions Disp Refills     gabapentin (NEURONTIN) 300 MG capsule 90 capsule 5     Sig: Take 1 capsule (300 mg) by mouth At Bedtime       There is no refill protocol information for this order        oxyCODONE IR (ROXICODONE) 15 MG tablet 90 tablet 0     Sig: Take 1 tablet (15 mg) by mouth 3 times daily as needed for moderate to severe pain       There is no refill protocol information for this order        gabapentin (NEURONTIN) 300 MG capsule      Last Written Prescription Date:  2/24/2020  Last Fill Quantity: 90,   # refills: 5  Last Office Visit: 2/4/2020  Future Office visit:    Next 5 appointments (look out 90 days)    Apr 28, 2020  1:45 PM CDT  Return Visit with Eric Carlton MD  [facility] SURGERY ([facility]) [id number] [place] [address]  [phone]           Routing refill request to provider for review/approval because:  Drug not on the G, P or  Health refill protocol or controlled substance    oxyCODONE IR (ROXICODONE) 15 MG tablet      Last Written Prescription Date:  2/24/2020  Last Fill Quantity: 90,   # refills: 0  Last Office Visit: 2/4/2020  Future Office visit:    Next 5 appointments (look out 90 days)    Apr 28, 2020  1:45 PM CDT  Return Visit with Eric Carlton MD  [facility] ([facility]) [id number] [place] [address]  [phone]           Routing refill request to provider for review/approval because:  Drug not on the [facility], [facility] or [facility] refill protocol or controlled substance

## 2020-04-08 ENCOUNTER — VIRTUAL VISIT (OUTPATIENT)
Dept: FAMILY MEDICINE | Facility: CLINIC | Age: 45
End: 2020-04-08
Payer: COMMERCIAL

## 2020-04-08 ENCOUNTER — NURSE TRIAGE (OUTPATIENT)
Dept: NURSING | Facility: CLINIC | Age: 45
End: 2020-04-08

## 2020-04-08 VITALS — DIASTOLIC BLOOD PRESSURE: 100 MMHG | SYSTOLIC BLOOD PRESSURE: 140 MMHG

## 2020-04-08 DIAGNOSIS — J30.1 SEASONAL ALLERGIC RHINITIS DUE TO POLLEN: ICD-10-CM

## 2020-04-08 DIAGNOSIS — I10 HYPERTENSION GOAL BP (BLOOD PRESSURE) < 140/90: Primary | ICD-10-CM

## 2020-04-08 PROCEDURE — 99441 ZZC PHYSICIAN TELEPHONE EVALUATION 5-10 MIN: CPT | Performed by: FAMILY MEDICINE

## 2020-04-08 RX ORDER — LOSARTAN POTASSIUM 50 MG/1
50 TABLET ORAL DAILY
Qty: 30 TABLET | Refills: 0 | Status: SHIPPED | OUTPATIENT
Start: 2020-04-08 | End: 2020-05-06

## 2020-04-08 RX ORDER — LORATADINE 10 MG/1
10 TABLET ORAL DAILY PRN
Qty: 90 TABLET | Refills: 0 | Status: SHIPPED | OUTPATIENT
Start: 2020-04-08 | End: 2020-09-01

## 2020-04-08 NOTE — PROGRESS NOTES
"Subjective     Jeff Serra is a 45 year old female who is being evaluated via a billable telephone visit.      The patient has been notified of following:     \"This telephone visit will be conducted via a call between you and your physician/provider. We have found that certain health care needs can be provided without the need for a physical exam.  This service lets us provide the care you need with a short phone conversation.  If a prescription is necessary we can send it directly to your pharmacy.  If lab work is needed we can place an order for that and you can then stop by our lab to have the test done at a later time.    Telephone visits are billed at different rates depending on your insurance coverage. During this emergency period, for some insurers they may be billed the same as an in-person visit.  Please reach out to your insurance provider with any questions.    If during the course of the call the physician/provider feels a telephone visit is not appropriate, you will not be charged for this service.\"    Patient has given verbal consent for Telephone visit?  Yes    Jeff Serra complains of   Chief Complaint   Patient presents with     Hypertension       ALLERGIES  Contrast dye     Hypertension Follow-up      Do you check your blood pressure regularly outside of the clinic? Yes     Are you following a low salt diet? Yes    Are your blood pressures ever more than 140 on the top number (systolic) OR more   than 90 on the bottom number (diastolic), for example 140/90? Yes  Noted higher BP over the last few days.   170/103, denies headache, no dizziness.  Mild tingling in left arm yesterday and in back of neck.  No SOB.  No arm symptoms today.  No swelling in ankles.  Knee pain unrelated.  Denies palpitations.  Review of chart indicates she has taking a variety of blood pressure medications in the past months.  In discussion with the patient she does report a cough from lisinopril previously.  " Denies any other side effects that she recalls.  Review of her recent blood pressures over the last 6 months in the office reveal blood pressures frequently in the 140-150 range here as well.    Allergies acting up -  Has been taking allergy pills for last few days - allegra.  Does not work as well as her previous allergy medication - claritin.  Requesting a refill.          How many servings of fruits and vegetables do you eat daily?  0-1    On average, how many sweetened beverages do you drink each day (Examples: soda, juice, sweet tea, etc.  Do NOT count diet or artificially sweetened beverages)?   0    How many days per week do you exercise enough to make your heart beat faster? 3 or less    How many minutes a day do you exercise enough to make your heart beat faster? 9 or less    How many days per week do you miss taking your medication? 0             BP Readings from Last 3 Encounters:   04/08/20 (!) 140/100   02/04/20 137/89   01/28/20 108/82    Wt Readings from Last 3 Encounters:   02/04/20 88 kg (194 lb)   01/28/20 88.4 kg (194 lb 12.8 oz)   10/21/19 85.3 kg (188 lb)                    Reviewed and updated as needed this visit by Provider  Tobacco  Allergies  Meds  Problems  Med Hx  Surg Hx  Fam Hx         Review of Systems   ROS COMP: Constitutional, HEENT, cardiovascular, pulmonary, gi and gu systems are negative, except as otherwise noted.       Objective   Reported vitals:  BP (!) 140/100 (BP Location: Right arm, Patient Position: Sitting)   LMP 09/12/2016 (Exact Date)    healthy, alert and no distress  Psych: Alert and oriented times 3; coherent speech, normal   rate and volume, able to articulate logical thoughts, able   to abstract reason, no tangential thoughts, no hallucinations   or delusions  Her affect is full, no anxiety or depression noted.     Diagnostic Test Results:  Labs reviewed in Epic        Assessment/Plan:  1. Hypertension goal BP (blood pressure) < 140/90  Continue Maxide as  "previous.  Add losartan 50 mg in the morning.  Follow-up with blood pressure update in 10 days to 2 weeks.  Notify the office sooner if side effects or other symptoms occur.  - losartan (COZAAR) 50 MG tablet; Take 1 tablet (50 mg) by mouth daily  Dispense: 30 tablet; Refill: 0    2. Seasonal allergic rhinitis due to pollen  Refill of her previously used Claritin as given.  Instructed not to use \"D\" type products due to risk of elevating blood pressure.  - loratadine (CLARITIN) 10 MG tablet; Take 1 tablet (10 mg) by mouth daily as needed for allergies  Dispense: 90 tablet; Refill: 0    Return in about 2 weeks (around 4/22/2020) for Blood pressure update.      Phone call duration:  9 minutes    Christine Feliz MD      [phone]  "

## 2020-04-08 NOTE — TELEPHONE ENCOUNTER
"\"My blood pressure has been up for the past few days.\" Patient reporting blood pressure reading this morning on home machine is 172/103. Denies current symptoms.   Reporting baseline readings ranging 122-130/88.  Stating she is taking blood pressure medication as described.  Telephone Visit scheduled for 740 a.m. today.    Caller verbalized understanding. Denies further questions.    Eugenie Levine RN  [facility]      COVID 19 Nurse Triage Plan/Patient Instructions    Please be aware that novel coronavirus (COVID-19) may be circulating in the community. If you develop symptoms such as fever, cough, or SOB or if you have concerns about the presence of another infection including coronavirus (COVID-19), please contact your health care provider or visit [email or web address].     Disposition/Instructions    Patient to have scheduled Telephone Visit with a provider. Follow System Ambulatory Workflow for COVID 19.     The clinic staff will assist you to schedule an appointment to complete the Telephone Visit with a provider during normal clinic hours.       Call Back If: Your symptoms worsen before you are able to complete your Telephone Visit with a provider.            Reason for Disposition    Systolic BP  >= 160 OR Diastolic >= 100    Additional Information    Negative: Difficult to awaken or acting confused (e.g., disoriented, slurred speech)    Negative: Severe difficulty breathing (e.g., struggling for each breath, speaks in single words)    Negative: [1] Weakness of the face, arm or leg on one side of the body AND [2] new onset    Negative: [1] Numbness (i.e., loss of sensation) of the face, arm or leg on one side of the body AND [2] new onset    Negative: [1] Chest pain lasts > 5 minutes AND [2] history of heart disease  (i.e., heart attack, bypass surgery, angina, angioplasty, CHF)    Negative: [1] Chest pain AND [2] took nitrogylcerin AND [3] pain was not relieved    Negative: Sounds like a " life-threatening emergency to the triager    Negative: [1] Systolic BP  >= 160 OR Diastolic >= 100 AND [2] cardiac or neurologic symptoms (e.g., chest pain, difficulty breathing, unsteady gait, blurred vision)    Negative: [1] Pregnant > 20 weeks AND [2] new hand or face swelling    Negative: [1] Pregnant > 20 weeks AND [2] BP Systolic BP  >= 140 OR Diastolic >= 90    Negative: [1] Systolic BP  >= 200 OR Diastolic >= 120  AND [2] having NO cardiac or neurologic symptoms    Negative: [1] Postpartum < 6 weeks AND [2] BP Systolic BP  >= 140 OR Diastolic >= 90    Negative: [1] Systolic BP  >= 180 OR Diastolic >= 110 AND [2] missed most recent dose of blood pressure medication    Negative: Systolic BP  >= 180 OR Diastolic >= 110    Negative: Ran out of BP medications    Protocols used: HIGH BLOOD PRESSURE-A-AH

## 2020-04-20 ENCOUNTER — MYC REFILL (OUTPATIENT)
Dept: FAMILY MEDICINE | Facility: CLINIC | Age: 45
End: 2020-04-20

## 2020-04-20 DIAGNOSIS — M54.50 CHRONIC MIDLINE LOW BACK PAIN WITHOUT SCIATICA: ICD-10-CM

## 2020-04-20 DIAGNOSIS — G89.29 CHRONIC MIDLINE LOW BACK PAIN WITHOUT SCIATICA: ICD-10-CM

## 2020-04-20 DIAGNOSIS — G89.4 CHRONIC PAIN SYNDROME: ICD-10-CM

## 2020-04-20 RX ORDER — GABAPENTIN 300 MG/1
300 CAPSULE ORAL AT BEDTIME
Qty: 90 CAPSULE | Refills: 5 | Status: CANCELLED | OUTPATIENT
Start: 2020-04-20

## 2020-04-23 RX ORDER — OXYCODONE HYDROCHLORIDE 15 MG/1
15 TABLET ORAL 3 TIMES DAILY PRN
Qty: 90 TABLET | Refills: 0 | Status: SHIPPED | OUTPATIENT
Start: 2020-04-23 | End: 2020-05-26

## 2020-04-23 NOTE — TELEPHONE ENCOUNTER
Controlled Substance Refill Request for Oxycodone  Problem List Complete:  Yes  Overview Addendum 2/24/2020  4:09 PM by Denise Noland RN    Patient is followed by PATRICIA RAYMOND for ongoing prescription of pain medication.  All refills should be approved by this provider, or covering partner.     Medication(s): oxycodone 15 three times daily = 67.5 MED  Narcan n/a     UDS (May '19) showed unexpected benzo  Plan - monthly UDS for a while - any further fail will nullify contract     Clinic visit frequency required: Q 3 months      Controlled substance agreement on file: Yes       Date(s): 8/28/19     Pain Clinic evaluation in the past: No     DIRE Total Score(s):    8/31/2015   Total Score 16         [place] [facility] [place]:  done on 9/27/19, 2/24/20     Latricia Zapien RN, [facility] Triage

## 2020-04-27 ENCOUNTER — VIRTUAL VISIT (OUTPATIENT)
Dept: ORTHOPEDICS | Facility: CLINIC | Age: 45
End: 2020-04-27
Payer: COMMERCIAL

## 2020-04-27 VITALS — WEIGHT: 187 LBS | BODY MASS INDEX: 33.13 KG/M2 | HEIGHT: 63 IN

## 2020-04-27 DIAGNOSIS — M23.8X2 CHONDRAL DEFECT OF LEFT PATELLA: Primary | ICD-10-CM

## 2020-04-27 PROCEDURE — 99213 OFFICE O/P EST LOW 20 MIN: CPT | Mod: 95 | Performed by: ORTHOPAEDIC SURGERY

## 2020-04-27 ASSESSMENT — MIFFLIN-ST. JEOR: SCORE: 1452.36

## 2020-04-27 NOTE — PROGRESS NOTES
"Jeff Serra is a 45 year old female who is being evaluated via a billable telephone visit.      The patient has been notified of following:     \"This telephone visit will be conducted via a call between you and your physician/provider. We have found that certain health care needs can be provided without the need for a physical exam.  This service lets us provide the care you need with a short phone conversation.  If a prescription is necessary we can send it directly to your pharmacy.  If lab work is needed we can place an order for that and you can then stop by our lab to have the test done at a later time.    Telephone visits are billed at different rates depending on your insurance coverage. During this emergency period, for some insurers they may be billed the same as an in-person visit.  Please reach out to your insurance provider with any questions.    If during the course of the call the physician/provider feels a telephone visit is not appropriate, you will not be charged for this service.\"    Patient has given verbal consent for Telephone visit?  Yes    How would you like to obtain your AVS? Mail a copy     CHIEF COMPLAINT: Left knee pain    DIAGNOSIS: Left knee patellar chondral defect.    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 Symptoms started on 01/21/2020. There was a precipitating event, patient slipped on ice without a fall.  Since her last office visit on 1/28/20 patient reports worsening of pain in the left knee, no subsequent injury or trauma to the knee. The pain is located to the anterior knee. Worst pain is rated a 8 of 10, and current pain is rated at 8 of 10. Symptoms are worsened by prolonged standing, walking, and twisting, increased pain with ascending stairs. Symptoms are improved with rest and activity avoidance. Patient has tried left knee " intra-articular corticosteroid injection on 1/28/20 with mild relief lasting 1 month.  She reports she started physical therapy, but had to discontinue due to increasing knee pain. Patient stated she has not yet started Physical Therapy.  Additional treatments include ibuprofen, Tylenol, and RICE with minimal relief. Associated symptoms include stiffness when sitting for prolong periods of time, sharp anterior knee pain, instability, and intermittent tingling of the left knee. Notably, the knee is not giving out on her anymore. Still pretty significant pain overall. Hot packs, brace, etc have been ineffective.    REVIEW OF SYSTEMS:  General: Denies fevers, chills, or night sweats.   Skin: Denies rashes or lesions.  Head: Denies headache or dizziness.  Eyes: Denies vision changes or eye pain.  Ears: Denies ear pain or decreased hearing.  Nose: Denies nose bleeding or sinus pain.  Mouth & Throat: Denies bleeding gums or sore throat.  Neck: Denies neck pain or stiffness.  Respiratory: Denies cough, wheezing, sob, or hemoptysis.  Cardiovascular: Hypertension.  Denies chest pain, chest pressure, or palpitations.   Gastrointestinal: Abdominal pain.  Denies nausea, vomiting, diarrhea, or constipation.  Genitourinary: Denies difficulty or pain with urination.   Musculoskeletal: As noted above in the HPI, otherwise normal.  Neuro: Dizziness and numbness and tingling.  Denies paralysis, weakness, or speech difficulty.  Lymphatics: Denies lymphadenopathy.  Psych: Denies anxiety, sadness, or irritability.    PHYSICAL EXAM:  Deferred due to telephone visit.    IMAGING:   No new imaging obtained. Previous imaging reviewed.     Bilateral PA Barajas, standing lateral, and bilateral sunrise views of the knees taken radiographically today were personally reviewed by me demonstrate no clear osseous abnormalities or significant arthrosis.    MRI scan of the left knee was also personally reviewed by me.  No ligamentous tear on my  read.  Intact ACL, PCL, MCL, and LCL.  Intact medial and lateral menisci.  Intact chondral surfaces about the medial and lateral compartments.  There is a significant central patellar ridge fissure and associated subchondral edema consistent with an acute central patellar chondral injury.  Trace effusion.    IMPRESSION: 45 year old-year-old female, with left knee acute central patellar ridge cartilage injury.     PLAN:   I had a long discussion with the patient today.  At the present time Jeff has not yet tried any physical therapy.  I explained in detail that her diagnosis would greatly benefit from weight loss and physical therapy focusing on quadriceps and core strengthening.  This particular etiology does not respond well to arthroscopic surgery.  Return to clinic in approximately 3 months to evaluate progress with physical therapy and weight loss.  May consider repeat injection at that time if indicated.    At the conclusion of the office visit, Jeff verbally acknowledged that I answered all of her questions satisfactorily.    Phone call duration: 12 minutes    Eric Carlton MD

## 2020-04-27 NOTE — PATIENT INSTRUCTIONS
Follow up in 3 months with Dr. Carlton.   Call [phone] to schedule.     Physical therapy orders placed with JACKIE.     Call my office with any questions or concerns,  [phone], option 2, option 3.

## 2020-04-29 ENCOUNTER — VIRTUAL VISIT (OUTPATIENT)
Dept: PHYSICAL THERAPY | Facility: CLINIC | Age: 45
End: 2020-04-29
Attending: ORTHOPAEDIC SURGERY
Payer: COMMERCIAL

## 2020-04-29 DIAGNOSIS — M25.562 KNEE PAIN, LEFT: ICD-10-CM

## 2020-04-29 DIAGNOSIS — M23.8X2 CHONDRAL DEFECT OF LEFT PATELLA: ICD-10-CM

## 2020-04-29 PROCEDURE — 97161 PT EVAL LOW COMPLEX 20 MIN: CPT | Mod: 95 | Performed by: PHYSICAL THERAPIST

## 2020-04-29 PROCEDURE — 97110 THERAPEUTIC EXERCISES: CPT | Mod: 95 | Performed by: PHYSICAL THERAPIST

## 2020-04-29 NOTE — PROGRESS NOTES
"Physical Therapy Virtual Initial Visit      The patient has been notified of following:     \"This virtual visit will be conducted between you and your provider. We have found that certain health care needs can be provided without the need for physical presence.  This service lets us provide the care you need with a virtual visit.\"    Due to external, as well as internal [facility] management of the COVID-19 Virus, Jeff Serra was not seen in our clinic.  As a substitution, we implemented a virtual visit to manage this patient's condition utilizing the [employer] virtual visit platform via the patient s existing code.  The provider, Anabela Ace, reviewed the patient's chart, PTRx prescription, and spoke with the patient to determine the following telemedicine visit is appropriate and effective for the patient's care.    The following type of visit was completed:   Video Visit:  The [employer] platform uses a synchronous HIPAA compliant video stream for this patient encounter.         Subjective:  The history is provided by the patient.   Patient Health History  Jeff Serra being seen for L knee.     Problem began: 1/21/2020.   Problem occurred: slip and twist on ice   Pain is reported as 8/10 on pain scale.  General health as reported by patient is good.  Pertinent medical history includes: high blood pressure, chemical dependency and overweight.   Red flags:  None as reported by patient.  Medical allergies: other. Other medical allergies details: contrast dye.   Surgeries include:  Other. Other surgery history details: hysterectomy.    Current medications:  Pain medication.    Current occupation is .   Primary job tasks include:  Computer work and prolonged sitting.                  Therapist Generated HPI Evaluation  Problem details: Patient states she slipped on ice, twisting her L knee on 1/21/2020. Reports onset of anteromedial L knee pain, with occasional medial swelling. MRI " indicates patellar chondral injury. Pain increases with walking, navigating stairs and prolonged sitting (>20 minutes), improves with heat, rest and pain relieving cream. Initially reports L LE buckling; however, this has improved. To date, has received 1 steroid injection with approximately 3 weeks of relief. .         Type of problem:  Left knee.    This is a new condition.  Condition occurred with:  A fall/slip and a twist.  Where condition occurred: at home.  Patient reports pain:  Medial and anterior.  Pain is described as sharp and is intermittent.  Pain is the same all the time.  Since onset symptoms are gradually worsening.  Associated symptoms:  Edema, loss of strength and loss of motion/stiffness. Symptoms are exacerbated by ascending stairs, descending stairs, walking, bending/squatting and activity  and relieved by rest, heat and other (pain relieving cream).  Special tests included:  MRI and x-ray (see epic).    Restrictions due to condition include:  Working in normal job without restrictions.                    Objective:      Gait:    Gait Type:  Antalgic     Deviations:  Hip:  Trendelenberg LKnee:  Knee extension decr LGeneral Deviations:  Stance time decr    Flexibility/Screens:       Lower Extremity:  Decreased left lower extremity flexibility:Hamstrings                                                      Knee Evaluation:  ROM:  Arom wnl knee: lacks terminal knee extension secondary to pain, no restriction with flexion.  Strength wnl knee: SLR: ext lag, difficult determining degree d/t poor video quality.          Strength:         Quad Set Left: Poor    Pain:           Edema:  Edema of the knee: per patient report, medial swelling present.    Mobility Testing:  Mobility testing: video quality not clear enough to assess patellar positioning.            Functional Testing:          Quad:    Single Leg Squat:  Left:       Right:        Bilateral Leg Squat:  Partial, painful  Significant loss of  control and excessive anterior knee excursion      Proprioception:   Stork Balance Test: Left:  Unable  Right:   % of Uninvolved:         General   ROS    PTRx Content from today's visit:    Exercise Name: Virtual Visit Tips for Patients    Exercise Name: Isometric Quad - Reps: 10 left leg - Sessions: 2, Notes: Hold 5 seconds    Exercise Name: Seated Hamstring Stretch - Reps: 2 - Sessions: 2, Notes: sitting  hold 30 seconds  left leg straight    Exercise Name: Hip Flexion Straight Leg Raise, Sets: 1-2 - Reps: 10 left leg - Sessions: every other day    Ice 10 minutes as needed for pain    Assessment/Plan:     Patient is a 45 year old female with left side knee complaints.    Patient has the following significant findings with corresponding treatment plan.                Diagnosis 1:  L knee pain  Pain -  hot/cold therapy, manual therapy, splint/taping/bracing/orthotics, education and home program  Decreased ROM/flexibility - manual therapy and therapeutic exercise  Impaired balance - neuro re-education and therapeutic activities  Decreased proprioception - neuro re-education and therapeutic activities  Edema - cold therapy  Impaired gait - gait training  Impaired muscle performance - neuro re-education  Decreased function - therapeutic activities    Therapy Evaluation Codes:   1) History comprised of:   Personal factors that impact the plan of care:      Time since onset of symptoms and poor quality of camera during virtual visits.    Comorbidity factors that impact the plan of care are:      Chemical Dependency, High blood pressure and Overweight.     Medications impacting care: Pain.  2) Examination of Body Systems comprised of:   Body structures and functions that impact the plan of care:      Knee.   Activity limitations that impact the plan of care are:      Sitting, Squatting/kneeling, Stairs and Walking.  3) Clinical presentation characteristics are:   Stable/Uncomplicated.  4) Decision-Making    Low  complexity using standardized patient assessment instrument and/or measureable assessment of functional outcome.  Cumulative Therapy Evaluation is: Low complexity.    Previous and current functional limitations:  (See Goal Flow Sheet for this information)    Short term and Long term goals: (See Goal Flow Sheet for this information)     Communication ability:  Patient appears to be able to clearly communicate and understand verbal and written communication and follow directions correctly.  Treatment Explanation - The following has been discussed with the patient:   RX ordered/plan of care  Anticipated outcomes  Possible risks and side effects  This patient would benefit from PT intervention to resume normal activities.   Rehab potential is good.    Frequency:  2 X a month, once daily  Duration:  for 12 weeks  Discharge Plan:  Achieve all LTG.  Independent in home treatment program.  Reach maximal therapeutic benefit.    Please refer to the daily flowsheet for treatment today, total treatment time and time spent performing 1:1 timed codes.       Virtual visit contact time    Time of service began: 3:56 PM  Time of service ended: 4:46 PM  Total Time for set up, visit, and documentation: 60 minutes    Payor: EUGENIO / Plan: EUGENIO MA / Product Type: HMO /     Procedure Code/s   Therapeutic Exercise (68007): 15 minutes  Evaluation: 35 minutes    I have reviewed the note as documented above.  This accurately captures the substance of my conversation with the patient.  Provider location: [place], [place] ([facility]/State)  Patient location: home    ___________________________________________________

## 2020-05-03 DIAGNOSIS — I10 HYPERTENSION GOAL BP (BLOOD PRESSURE) < 140/90: ICD-10-CM

## 2020-05-06 RX ORDER — LOSARTAN POTASSIUM 50 MG/1
50 TABLET ORAL DAILY
Qty: 30 TABLET | Refills: 0 | Status: SHIPPED | OUTPATIENT
Start: 2020-05-06 | End: 2020-06-18

## 2020-05-06 NOTE — TELEPHONE ENCOUNTER
Routing refill request to provider for review/approval because:  Labs not current:  potassium  Failed BP    Latricia Zapien RN, [facility] Triage

## 2020-05-06 NOTE — TELEPHONE ENCOUNTER
Please call patient re;  Lab appointment and BP check due after start of recent losartan medication - could do at BP or [facility].    PARDEEP

## 2020-05-11 ENCOUNTER — MYC MEDICAL ADVICE (OUTPATIENT)
Dept: FAMILY MEDICINE | Facility: CLINIC | Age: 45
End: 2020-05-11

## 2020-05-11 ENCOUNTER — APPOINTMENT (OUTPATIENT)
Dept: GENERAL RADIOLOGY | Facility: CLINIC | Age: 45
End: 2020-05-11
Attending: NURSE PRACTITIONER
Payer: COMMERCIAL

## 2020-05-11 ENCOUNTER — HOSPITAL ENCOUNTER (EMERGENCY)
Facility: CLINIC | Age: 45
Discharge: HOME | End: 2020-05-11
Attending: NURSE PRACTITIONER | Admitting: NURSE PRACTITIONER
Payer: COMMERCIAL

## 2020-05-11 VITALS
BODY MASS INDEX: 32.96 KG/M2 | RESPIRATION RATE: 20 BRPM | DIASTOLIC BLOOD PRESSURE: 104 MMHG | TEMPERATURE: 98.3 F | OXYGEN SATURATION: 99 % | WEIGHT: 186 LBS | HEART RATE: 86 BPM | SYSTOLIC BLOOD PRESSURE: 150 MMHG | HEIGHT: 63 IN

## 2020-05-11 DIAGNOSIS — R06.02 SOB (SHORTNESS OF BREATH): ICD-10-CM

## 2020-05-11 LAB
ANION GAP SERPL CALCULATED.3IONS-SCNC: 6 MMOL/L (ref 3–14)
BUN SERPL-MCNC: 5 MG/DL (ref 7–30)
CALCIUM SERPL-MCNC: 8.3 MG/DL (ref 8.5–10.1)
CHLORIDE SERPL-SCNC: 105 MMOL/L (ref 94–109)
CO2 SERPL-SCNC: 29 MMOL/L (ref 20–32)
CREAT SERPL-MCNC: 0.68 MG/DL (ref 0.52–1.04)
D DIMER PPP FEU-MCNC: 0.3 UG/ML FEU (ref 0–0.5)
ERYTHROCYTE [DISTWIDTH] IN BLOOD BY AUTOMATED COUNT: 14.1 % (ref 10–15)
GFR SERPL CREATININE-BSD FRML MDRD: >90 ML/MIN/{1.73_M2}
GLUCOSE SERPL-MCNC: 92 MG/DL (ref 70–99)
HCT VFR BLD AUTO: 39 % (ref 35–47)
HGB BLD-MCNC: 12.5 G/DL (ref 11.7–15.7)
INTERPRETATION ECG - MUSE: NORMAL
MCH RBC QN AUTO: 28.7 PG (ref 26.5–33)
MCHC RBC AUTO-ENTMCNC: 32.1 G/DL (ref 31.5–36.5)
MCV RBC AUTO: 89 FL (ref 78–100)
PLATELET # BLD AUTO: 193 10E9/L (ref 150–450)
POTASSIUM SERPL-SCNC: 3.2 MMOL/L (ref 3.4–5.3)
RBC # BLD AUTO: 4.36 10E12/L (ref 3.8–5.2)
SODIUM SERPL-SCNC: 140 MMOL/L (ref 133–144)
TROPONIN I SERPL-MCNC: <0.015 UG/L (ref 0–0.04)
WBC # BLD AUTO: 6.7 10E9/L (ref 4–11)

## 2020-05-11 PROCEDURE — 71045 X-RAY EXAM CHEST 1 VIEW: CPT

## 2020-05-11 PROCEDURE — 84484 ASSAY OF TROPONIN QUANT: CPT | Performed by: NURSE PRACTITIONER

## 2020-05-11 PROCEDURE — 85379 FIBRIN DEGRADATION QUANT: CPT | Performed by: NURSE PRACTITIONER

## 2020-05-11 PROCEDURE — 99285 EMERGENCY DEPT VISIT HI MDM: CPT | Mod: 25

## 2020-05-11 PROCEDURE — 87635 SARS-COV-2 COVID-19 AMP PRB: CPT | Performed by: NURSE PRACTITIONER

## 2020-05-11 PROCEDURE — 93005 ELECTROCARDIOGRAM TRACING: CPT

## 2020-05-11 PROCEDURE — 85027 COMPLETE CBC AUTOMATED: CPT | Performed by: NURSE PRACTITIONER

## 2020-05-11 PROCEDURE — 80048 BASIC METABOLIC PNL TOTAL CA: CPT | Performed by: NURSE PRACTITIONER

## 2020-05-11 ASSESSMENT — ENCOUNTER SYMPTOMS
CHEST TIGHTNESS: 1
DYSURIA: 0
FEVER: 0
SHORTNESS OF BREATH: 1
COUGH: 0
HEADACHES: 0
ARTHRALGIAS: 1

## 2020-05-11 ASSESSMENT — MIFFLIN-ST. JEOR: SCORE: 1447.82

## 2020-05-11 NOTE — ED TRIAGE NOTES
Shortness of breath for one week, now worsening nuno with exertion or talking.  Patient did have a PE 1 year ago after and ankle injury.  She reports her left knee has been painful.  ABCs intact.  Patient is alert and oriented x3.

## 2020-05-11 NOTE — ED PROVIDER NOTES
History     Chief Complaint:  Shortness of Breath      HPI   Jeff Serra is a 45 year old female with a history of pulmonary embolism (following ankle surgery), right leg DVT, and continuous opioid dependence with chronic pain syndrome who presents with shortness of breath. She has been off of her anticoagulation for at least one year.  She comes in today because she has been short of breath x1 week with some associated chest tightness. Her shortness of breath is worse with exertion. She denies fever, chest pain, and cough. Additionally, over the last year, she endorses left sided knee pain and notes that she has a known meniscus tear on that side. The pain is over the knee joint line, not behind it.     PE/DVT Risk Factors:   Hx of PE/DVT:                        positive   Hx of clotting disorder:            negative   Hx of cancer:                          negative   Tobacco use:                          negative   Hormone therapy:                   negative   Pregnancy:                              negative   Prolonged immobilization:       negative   Recent surgery:                       negative   Recent travel:                          negative   Familial Hx of PE/DVT:           negative     Allergies:  Contrast Dye      Medications:    Bentyl   Gabapentin   Loratadine   Omeprazole   Oxycodone    Prometrium   Senna S   Maxizde-25     Past Medical History:    Chronic low back pain   Chronic neck pain   Continuous opioid dependence   Hypertension  Kidney stone   Pulmonary embolism   Right leg DVT  Chronic pain syndrome     Past Surgical History:    Colonoscopy x3   Combined EGD combined with CO2 insufflation   EGD combined   Hysterectomy   Laparoscopy diagnostic   Laparotomy mini, tubal ligation (postpartum) combined   Lithotripsy   Upper GI endoscopy     Family History:    Father - hypertension     Social History:  The patient was alone.  Smoking Status: Never  Smokeless Tobacco: Never  Alcohol Use:  "yes    Marital Status:        Review of Systems   Constitutional: Negative for fever.   Respiratory: Positive for chest tightness and shortness of breath. Negative for cough.    Cardiovascular: Negative for chest pain and leg swelling.   Genitourinary: Negative for dysuria.   Musculoskeletal: Positive for arthralgias.   Neurological: Negative for headaches.   All other systems reviewed and are negative.      Physical Exam     Patient Vitals for the past 24 hrs:   BP Temp Temp src Pulse Heart Rate Resp SpO2 Height Weight   05/11/20 1035 -- -- -- -- 84 -- 100 % -- --   05/11/20 1030 (!) 137/90 -- -- 84 87 -- 99 % -- --   05/11/20 1025 -- -- -- -- 77 -- 98 % -- --   05/11/20 1020 -- -- -- -- 84 -- 100 % -- --   05/11/20 1015 (!) 143/105 -- -- 86 81 -- 100 % -- --   05/11/20 1010 -- -- -- -- 83 -- 99 % -- --   05/11/20 1005 -- -- -- -- 82 -- 100 % -- --   05/11/20 1000 (!) 144/94 -- -- 81 86 -- 99 % -- --   05/11/20 0955 -- -- -- -- 85 -- 100 % -- --   05/11/20 0950 (!) 144/96 -- -- -- 83 -- 100 % -- --   05/11/20 0945 (!) 144/96 -- -- 81 82 -- 99 % -- --   05/11/20 0940 -- -- -- -- 85 -- 100 % -- --   05/11/20 0935 (!) 140/93 -- -- 85 84 -- 99 % -- --   05/11/20 0930 -- -- -- -- 86 -- -- -- --   05/11/20 0905 (!) 153/96 98.3  F (36.8  C) Oral 91 -- 20 100 % 1.6 m (5' 3\") 84.4 kg (186 lb)       Physical Exam  General: Alert, No obvious discomfort, well kept, obese  Eyes: PERRL, conjunctivae pink no scleral icterus or conjunctival injection  ENT:   Moist mucus membranes, posterior oropharynx clear without erythema or exudates, No lymphadenopathy, Normal voice  Resp:  Lungs clear to auscultation bilaterally, no crackles/rubs/wheezes. Good air movement  CV:  Normal rate and rhythm, no murmurs/rubs/gallops  GI:  Abdomen soft and non-distended.  Normoactive BS.  No tenderness, guarding or rebound, No masses  Skin:  Warm, dry.  No rashes or petechiae  Musculoskeletal: No peripheral edema or calf tenderness, Normal " gross ROM   Neuro: Alert and oriented to person/place/time, normal sensation  Psychiatric: Normal affect, cooperative, good eye contact    Emergency Department Course     ECG:  Indication: Shortness of breath   Completed at 0917.  Read by Dr. Mascorro.   Sinus rhythm   Normal ECG   Rate 92 bpm. MT interval 132. QRS duration 82. QT/QTc 372/460. P-R-T axes 37 17 15.    Imaging:  Radiology findings were communicated with the patient who voiced understanding of the findings.    XR Chest Port 1 View  IMPRESSION: Chest is negative and unchanged. Lungs clear.  Report per radiology     Laboratory:  Laboratory findings were communicated with the patient who voiced understanding of the findings.    CBC: AWNL. (WBC 6.7, HGB 12.5, )   CMP: Potassium 3.2 (L), BUN 5 (L), Calcium 8.3 (L) o/w WNL (Creatinine 0.68)     D-Dimer: 0.3    Troponin (Collected 0926): <0.015    COVID-19 Virus (Coronavirus), PCR NP Swab: pending       Emergency Department Course:  Past medical records, nursing notes, and vitals reviewed.    0920 I performed an exam of the patient as documented above.     EKG obtained in the ED, see results above.    IV was inserted and blood was drawn for laboratory testing, results above.  The patient's nose was swabbed and this sample was sent for COVID testing, findings above.   The patient was sent for a CXR while in the emergency department, results above.     1020 I rechecked the patient and discussed the results of her workup thus far.     1215 Patient rechecked and updated.      Findings and plan explained to the Patient. Patient discharged home with instructions regarding supportive care, medications, and reasons to return. The importance of close follow-up was reviewed.     I personally reviewed the laboratory and imaging results with the Patient and answered all related questions prior to discharge.     Impression & Plan     Covid-19  Jeff Serra was evaluated during a global COVID-19 pandemic, which  necessitated consideration that the patient might be at risk for infection with the SARS-CoV-2 virus that causes COVID-19.   Applicable protocols for evaluation were followed during the patient's care.   COVID-19 was considered as part of the patient's evaluation. The plan for testing is:  a test was obtained during this visit.    Medical Decision Making:  Jeff Serra is a 45 year old female who presents today for evaluation of shortness of breath.  She has a history of DVT but has been off anticoagulation for 1 year.  She did not have fever.  Her vital signs were otherwise stable.  Her examination showed no significant physical findings.  Laboratory studies were noncontributory.  She has a negative troponin and nonacute EKG.  She also has a negative d-dimer.  Chest x-ray showed no acute process.  At this point in time it is possible that this would represent COVID-19 however there is no other symptoms associated with this.  No specific cause is evident at this point in time.  She appears to be safe and appropriate for outpatient management follow-up and is discharged home.      Diagnosis:    ICD-10-CM    1. SOB (shortness of breath)  R06.02        Disposition:  Discharged to home.    Scribe Disclosure:  Clau MOORE, am serving as a scribe at 9:16 AM on 5/11/2020 to document services personally performed by Michael Crowe APRN CNP based on my observations and the provider's statements to me.   5/11/2020   [facility] EMERGENCY DEPARTMENT       Michael Crowe APRN CNP  05/11/20 9046

## 2020-05-11 NOTE — TELEPHONE ENCOUNTER
E-visit or virtual visit is recommended for follow up from ED visit earlier today, as well as patient's requests for MRI and/or orthopedics referral.  Patient also asking about COVID-19 test results. Swab done today when was in ED. Test is still pending, according to review of ED notes.    Yessi Alvarez RN  [facility]

## 2020-05-11 NOTE — ED AVS SNAPSHOT
[facility] Emergency Department  [address]  [facility] [address]  Phone:  [phone]  Fax:  [phone]                                    Jeff Serra   MRN: [id number]    Department:  [facility] Emergency Department   Date of Visit:  5/11/2020           After Visit Summary Signature Page    I have received my discharge instructions, and my questions have been answered. I have discussed any challenges I see with this plan with the nurse or doctor.    ..........................................................................................................................................  Patient/Patient Representative Signature      ..........................................................................................................................................  Patient Representative Print Name and Relationship to Patient    ..................................................               ................................................  Date                                   Time    ..........................................................................................................................................  Reviewed by Signature/Title    ...................................................              ..............................................  Date                                               Time          22EPIC Rev 08/18

## 2020-05-11 NOTE — DISCHARGE INSTRUCTIONS
You may continue to take your regular medications.  Covid test is pending.  Keep isolated until test results, should be <24 hours.  If positive follow standard isolation recommendations.  Return to ED if you develop worsening symptoms otherwise follow up with PCP.

## 2020-05-12 ENCOUNTER — PATIENT OUTREACH (OUTPATIENT)
Dept: CARE COORDINATION | Facility: CLINIC | Age: 45
End: 2020-05-12

## 2020-05-12 DIAGNOSIS — Z20.822 SUSPECTED COVID-19 VIRUS INFECTION: Primary | ICD-10-CM

## 2020-05-12 LAB
SARS-COV-2 RNA SPEC QL NAA+PROBE: NOT DETECTED
SPECIMEN SOURCE: NORMAL

## 2020-05-12 NOTE — PROGRESS NOTES
Clinic Care Coordination Contact  [facility]/Voicemail    Referral Source: IP Report    Clinical Data: Care Coordinator Outreach    Outreach attempted x 1.  Left message on patient's voicemail with call back information and requested return call.    Patient seen at [facility] ED on 5/11/2020 for shortness of breath x 1 week with some chest tightness. Patient has history of right leg DVT following surgery.     Patient was tested for suspected covid-19. Results are pending.     Plan: Care Coordinator will send care coordination introduction letter with care coordinator contact information and explanation of care coordination services via Snapstream. Care Coordinator will try to reach patient again in 1-2 business days.    Ruby Emerson, RN, [facility] - Primary Care Clinic RN Coordinator  [facility]   5/12/2020    10:04 AM  [phone]

## 2020-05-12 NOTE — LETTER
[place] CARE COORDINATION  [address] [facility] [address]    May 12, 2020    Jeff Serra  [address] [address] [address]      Dear Jeff,    I am a clinic care coordinator who works with Jordyn Loredo MD at [facility]. I recently tried to call and was unable to reach you. Below is a description of clinic care coordination and how I can further assist you.      The clinic care coordination team is made up of a registered nurse,  and community health worker who understand the health care system. The goal of clinic care coordination is to help you manage your health and improve access to the health care system in the most efficient manner. The team can assist you in meeting your health care goals by providing education, coordinating services, strengthening the communication among your providers and supporting you with any resource needs.    Please feel free to contact me at [phone] with any questions or concerns. We are focused on providing you with the highest-quality healthcare experience possible and that all starts with you.     Sincerely,     Ruby Emerson RN, [facility] - Primary Care Clinic RN Coordinator  [facility]   [phone]  Instructions for Patients  Your symptoms show that you may have coronavirus (COVID-19). This illness can cause fever, cough and trouble breathing. Many people get a mild case and get better on their own. Some people can get very sick.     Not all patients are tested for COVID-19. If you need to be tested, your care team will let you know. You were tested and will get a call in 3-5 days with the results.    How can I protect others?    Without a test, we can t know for sure that you have COVID-19. For safety, it s very important to follow these rules.    Stay home and away from others (self-isolate) until:    You ve had no fever--and no medicine that reduces fever--for 3  full days (72 hours). And      Your other symptoms have resolved (gotten better). For example, your cough or breathing has improved. And     At least 10 days have passed since your symptoms started.    During this time:    Stay in your own room (and use your own bathroom), if you can.    Stay away from others in your home. No hugging, kissing or shaking hands.    No visitors.    Don t go to work, school or anywhere else.     Clean  high touch  surfaces often (doorknobs, counters, handles, etc.). Use a household cleaning spray or wipes.    Cover your mouth and nose with a mask, tissue or washcloth to avoid spreading germs.    Wash your hands and face often. Use soap and water.    For more tips, go to https://www.cdc.gov/coronavirus/2019-ncov/downloads/10Things.pdf.    How can I take care of myself?    1. Get lots of rest. Drink extra fluids (unless a doctor has told you not to).     2. Take Tylenol (acetaminophen) for fever or pain. If you have liver or kidney problems, ask your family doctor if it's okay to take Tylenol.     Adults can take either:     650 mg (two 325 mg pills) every 4 to 6 hours, or     1,000 mg (two 500 mg pills) every 8 hours as needed.     Note: Don't take more than 3,000 mg in one day.   Acetaminophen is found in many medicines (both prescribed and over-the-counter medicines). Read all labels to be sure you don't take too much.   For children, check the Tylenol bottle for the right dose. The dose is based on  the child's age or weight.    3. If you have other health problems (like cancer, heart failure, an organ transplant or severe kidney disease): Call your specialty clinic if you don't feel better in the next 2 days.    4. Know when to call 911: If your breathing is so bad that it keeps you from doing normal activities, call 911 or go to the emergency room. Tell them that you've been staying home and may have COVID-19.      Get Well Loop Information  We know it can be scary to hear that you  might have COVID-19. Our team can help track your symptoms and make sure you are doing ok over the next two weeks using a program called [employer] to keep in touch. When you receive an email from [employer], please consider enrolling in our monitoring program. There is no cost to you for monitoring. Here is a URL where you can learn more: [email or web address]      Thank you for taking steps to prevent the spread of this virus.  o Limit your contact with others.  o Wear a simple mask to cover your cough.  o Wash your hands well and often.  o If you need medical care, go to [email or web address] or contact your health care provider.     For more about COVID-19 and caring for yourself at home, visit the CDC website at https://www.cdc.gov/coronavirus/2019-ncov/about/steps-when-sick.html.     To learn about care at [facility], please go to [email or web address].     Below are the COVID-19 hotlines at the [facility] ([facility]). Interpreters are available.     For health questions: Call 704-824-2938 or 1-710.243.7895 (7 a.m. to 7 p.m.)    For questions about schools and childcare: Call 346-630-9362 or 1-901.820.7981 (7 a.m. to 7 p.m.)

## 2020-05-14 ENCOUNTER — PATIENT OUTREACH (OUTPATIENT)
Dept: CARE COORDINATION | Facility: CLINIC | Age: 45
End: 2020-05-14

## 2020-05-14 NOTE — PROGRESS NOTES
Clinic Care Coordination Contact  [facility]/Voicemail       Clinical Data: Care Coordinator Outreach    Outreach attempted x 2.  Left message on patient's voicemail with call back information and requested return call.    Patient's covid-19 test was negative.    Plan:  Letter was already sent with covid-19 information and care coordination introduction letter.     . Care Coordinator will do no further outreaches at this time.    Ruby Emerson RN, [facility] - Primary Care Clinic RN Coordinator  [facility]   5/14/2020    8:50 AM  [phone]

## 2020-05-26 ENCOUNTER — MYC REFILL (OUTPATIENT)
Dept: FAMILY MEDICINE | Facility: CLINIC | Age: 45
End: 2020-05-26

## 2020-05-26 DIAGNOSIS — M54.50 CHRONIC MIDLINE LOW BACK PAIN WITHOUT SCIATICA: ICD-10-CM

## 2020-05-26 DIAGNOSIS — G89.4 CHRONIC PAIN SYNDROME: ICD-10-CM

## 2020-05-26 DIAGNOSIS — G89.29 CHRONIC MIDLINE LOW BACK PAIN WITHOUT SCIATICA: ICD-10-CM

## 2020-05-28 ENCOUNTER — MYC MEDICAL ADVICE (OUTPATIENT)
Dept: FAMILY MEDICINE | Facility: CLINIC | Age: 45
End: 2020-05-28

## 2020-05-28 RX ORDER — OXYCODONE HYDROCHLORIDE 15 MG/1
15 TABLET ORAL 3 TIMES DAILY PRN
Qty: 90 TABLET | Refills: 0 | Status: SHIPPED | OUTPATIENT
Start: 2020-05-28 | End: 2020-06-25

## 2020-05-28 RX ORDER — GABAPENTIN 300 MG/1
300 CAPSULE ORAL AT BEDTIME
Qty: 90 CAPSULE | Refills: 5 | Status: SHIPPED | OUTPATIENT
Start: 2020-05-28 | End: 2021-06-25

## 2020-05-28 NOTE — TELEPHONE ENCOUNTER
For gabapentin:  Routing refill request to provider for review/approval because:  Drug not on the [facility] refill protocol     Controlled Substance Refill Request for Oxycodone  Problem List Complete:  Yes  Chronic pain syndrome   Problem Detail     Noted:  12/1/2015    Priority:  Medium    Overview Addendum 5/28/2020 10:10 AM by Herminia Rutherford RN    Patient is followed by PATRICIA RAYMOND for ongoing prescription of pain medication.  All refills should be approved by this provider, or covering partner.     Medication(s): oxycodone 15 three times daily = 67.5 MED  Narcan n/a     UDS (May '19) showed unexpected benzo  Plan - monthly UDS for a while - any further fail will nullify contract     Clinic visit frequency required: Q 3 months      Controlled substance agreement on file: Yes       Date(s): 8/28/19     Pain Clinic evaluation in the past: No     DIRE Total Score(s):    8/31/2015   Total Score 16         Last MNP website verification:  05/28/20    [email or web address]    checked in past 3 months?  Yes 5/28/20   Last Written Prescription Date:  4/23/20  Last Fill Quantity: 90 tablets  # refills: 0   Last office visit: 2/4/2020 with prescribing provider:  2/4/20   Future Office Visit:  None  RX monitoring program ([place]) reviewed:  reviewed- no concerns    [place] profile:  [email or web address]          Herminia Rutherford RN, BSN, PHN

## 2020-06-02 ENCOUNTER — APPOINTMENT (OUTPATIENT)
Dept: GENERAL RADIOLOGY | Facility: CLINIC | Age: 45
End: 2020-06-02
Attending: EMERGENCY MEDICINE
Payer: COMMERCIAL

## 2020-06-02 ENCOUNTER — HOSPITAL ENCOUNTER (EMERGENCY)
Facility: CLINIC | Age: 45
Discharge: HOME | End: 2020-06-02
Attending: EMERGENCY MEDICINE | Admitting: EMERGENCY MEDICINE
Payer: COMMERCIAL

## 2020-06-02 VITALS
RESPIRATION RATE: 18 BRPM | OXYGEN SATURATION: 100 % | SYSTOLIC BLOOD PRESSURE: 142 MMHG | DIASTOLIC BLOOD PRESSURE: 94 MMHG | TEMPERATURE: 97.9 F

## 2020-06-02 DIAGNOSIS — R10.84 ABDOMINAL PAIN, GENERALIZED: ICD-10-CM

## 2020-06-02 DIAGNOSIS — J06.9 VIRAL URI WITH COUGH: ICD-10-CM

## 2020-06-02 LAB
ALBUMIN SERPL-MCNC: 3.7 G/DL (ref 3.4–5)
ALBUMIN UR-MCNC: NEGATIVE MG/DL
ALP SERPL-CCNC: 63 U/L (ref 40–150)
ALT SERPL W P-5'-P-CCNC: 71 U/L (ref 0–50)
ANION GAP SERPL CALCULATED.3IONS-SCNC: 8 MMOL/L (ref 3–14)
APPEARANCE UR: CLEAR
AST SERPL W P-5'-P-CCNC: 34 U/L (ref 0–45)
BASOPHILS # BLD AUTO: 0 10E9/L (ref 0–0.2)
BASOPHILS NFR BLD AUTO: 0.4 %
BILIRUB SERPL-MCNC: 0.6 MG/DL (ref 0.2–1.3)
BILIRUB UR QL STRIP: NEGATIVE
BUN SERPL-MCNC: 7 MG/DL (ref 7–30)
CALCIUM SERPL-MCNC: 9.2 MG/DL (ref 8.5–10.1)
CHLORIDE SERPL-SCNC: 103 MMOL/L (ref 94–109)
CO2 SERPL-SCNC: 26 MMOL/L (ref 20–32)
COLOR UR AUTO: NORMAL
CREAT SERPL-MCNC: 0.66 MG/DL (ref 0.52–1.04)
DIFFERENTIAL METHOD BLD: NORMAL
EOSINOPHIL # BLD AUTO: 0.1 10E9/L (ref 0–0.7)
EOSINOPHIL NFR BLD AUTO: 0.7 %
ERYTHROCYTE [DISTWIDTH] IN BLOOD BY AUTOMATED COUNT: 14.6 % (ref 10–15)
GFR SERPL CREATININE-BSD FRML MDRD: >90 ML/MIN/{1.73_M2}
GLUCOSE SERPL-MCNC: 98 MG/DL (ref 70–99)
GLUCOSE UR STRIP-MCNC: NEGATIVE MG/DL
HCT VFR BLD AUTO: 39.8 % (ref 35–47)
HGB BLD-MCNC: 12.7 G/DL (ref 11.7–15.7)
HGB UR QL STRIP: NEGATIVE
IMM GRANULOCYTES # BLD: 0 10E9/L (ref 0–0.4)
IMM GRANULOCYTES NFR BLD: 0.3 %
KETONES UR STRIP-MCNC: NEGATIVE MG/DL
LEUKOCYTE ESTERASE UR QL STRIP: NEGATIVE
LIPASE SERPL-CCNC: 89 U/L (ref 73–393)
LYMPHOCYTES # BLD AUTO: 2 10E9/L (ref 0.8–5.3)
LYMPHOCYTES NFR BLD AUTO: 26.4 %
MCH RBC QN AUTO: 28.5 PG (ref 26.5–33)
MCHC RBC AUTO-ENTMCNC: 31.9 G/DL (ref 31.5–36.5)
MCV RBC AUTO: 89 FL (ref 78–100)
MONOCYTES # BLD AUTO: 0.7 10E9/L (ref 0–1.3)
MONOCYTES NFR BLD AUTO: 9.5 %
NEUTROPHILS # BLD AUTO: 4.7 10E9/L (ref 1.6–8.3)
NEUTROPHILS NFR BLD AUTO: 62.7 %
NITRATE UR QL: NEGATIVE
NRBC # BLD AUTO: 0 10*3/UL
NRBC BLD AUTO-RTO: 0 /100
PH UR STRIP: 6.5 PH (ref 5–7)
PLATELET # BLD AUTO: 195 10E9/L (ref 150–450)
POTASSIUM SERPL-SCNC: 3.5 MMOL/L (ref 3.4–5.3)
PROT SERPL-MCNC: 7.9 G/DL (ref 6.8–8.8)
RBC # BLD AUTO: 4.45 10E12/L (ref 3.8–5.2)
SARS-COV-2 RNA SPEC QL NAA+PROBE: NOT DETECTED
SODIUM SERPL-SCNC: 137 MMOL/L (ref 133–144)
SOURCE: NORMAL
SP GR UR STRIP: 1.01 (ref 1–1.03)
SPECIMEN SOURCE: NORMAL
UROBILINOGEN UR STRIP-MCNC: NORMAL MG/DL (ref 0–2)
WBC # BLD AUTO: 7.4 10E9/L (ref 4–11)

## 2020-06-02 PROCEDURE — 71045 X-RAY EXAM CHEST 1 VIEW: CPT

## 2020-06-02 PROCEDURE — 81003 URINALYSIS AUTO W/O SCOPE: CPT | Performed by: EMERGENCY MEDICINE

## 2020-06-02 PROCEDURE — 83690 ASSAY OF LIPASE: CPT | Performed by: EMERGENCY MEDICINE

## 2020-06-02 PROCEDURE — 25800030 ZZH RX IP 258 OP 636: Performed by: EMERGENCY MEDICINE

## 2020-06-02 PROCEDURE — 96361 HYDRATE IV INFUSION ADD-ON: CPT

## 2020-06-02 PROCEDURE — 85025 COMPLETE CBC W/AUTO DIFF WBC: CPT | Performed by: EMERGENCY MEDICINE

## 2020-06-02 PROCEDURE — 80053 COMPREHEN METABOLIC PANEL: CPT | Performed by: EMERGENCY MEDICINE

## 2020-06-02 PROCEDURE — 96360 HYDRATION IV INFUSION INIT: CPT

## 2020-06-02 PROCEDURE — C9803 HOPD COVID-19 SPEC COLLECT: HCPCS

## 2020-06-02 PROCEDURE — 99284 EMERGENCY DEPT VISIT MOD MDM: CPT | Mod: 25

## 2020-06-02 PROCEDURE — 87635 SARS-COV-2 COVID-19 AMP PRB: CPT | Performed by: EMERGENCY MEDICINE

## 2020-06-02 RX ORDER — SODIUM CHLORIDE 9 MG/ML
1000 INJECTION, SOLUTION INTRAVENOUS CONTINUOUS
Status: DISCONTINUED | OUTPATIENT
Start: 2020-06-02 | End: 2020-06-02 | Stop reason: HOSPADM

## 2020-06-02 RX ADMIN — SODIUM CHLORIDE 1000 ML: 9 INJECTION, SOLUTION INTRAVENOUS at 09:04

## 2020-06-02 NOTE — ED AVS SNAPSHOT
[facility] Emergency Department  [address]  [facility] [address]  Phone:  [phone]  Fax:  [phone]                                    Jeff Serra   MRN: [id number]    Department:  [facility] Emergency Department   Date of Visit:  6/2/2020           After Visit Summary Signature Page    I have received my discharge instructions, and my questions have been answered. I have discussed any challenges I see with this plan with the nurse or doctor.    ..........................................................................................................................................  Patient/Patient Representative Signature      ..........................................................................................................................................  Patient Representative Print Name and Relationship to Patient    ..................................................               ................................................  Date                                   Time    ..........................................................................................................................................  Reviewed by Signature/Title    ...................................................              ..............................................  Date                                               Time          22EPIC Rev 08/18

## 2020-06-02 NOTE — DISCHARGE INSTRUCTIONS
Due to your exposure history we did test you for COVID.  Your lab work chest x-ray and urine test are all normal we do not feel you need imaging of the abdomen today.  If you develop severe increasing abdominal pain vomiting unable to tolerate fluids or fever these return to the emergency room for reassessment.  The hospital call you if your COVID test is positive.  Please shelter at home until test is resulted.    Discharge Instructions  COVID-19    Your Provider has determined that you should practice self-isolation and self-monitoring in order to protect yourself and your community from COVID-19, which is the disease caused by a new coronavirus. The virus spreads from person to person primarily by droplets when an infected person coughs or sneezes and the droplet either lands on another person or that other person touches a surface with the droplet on it. Diagnosis of COVID-19 can be made with a test but many times the test is unavailable or not necessary. There is no specific treatment or medicine for the disease.    Symptoms of COVID-19  Many people have no symptoms or mild symptoms.  Symptoms may usually appear 4 to 5 days (up to 14 days) after contact with another ill person. Some people will get severe symptoms and pneumonia. Usual symptoms are:     Fever    Cough   Trouble breathing   Less common symptoms are: Headache, body aches, sore    throat, sneezing, diarrhea.    How to Care for Yourself    Stay home.  Most people will recover from illness with mild symptoms.  Isolation by staying home is the best method to prevent the spread of the illness. Do not go to work or school. Have a friend or relative do your shopping. Do not use public transportation (bus, train) or ridesharing (Lyft, Uber).    How long should I stay home?  If you have symptoms of a respiratory disease (fever, cough), you should stay home for at least 7 days, and for 3 days with no fever and improvement of respiratory symptoms--whichever  is longer. (Your fever should be gone for 3 days without using fever-reducing medicine.)    For example, if you have a fever and coughing for 4 days, you need to stay home 3 more days with no fever for a total of 7 days. Or, if you have a fever and coughing for 5 days, you need to stay home 3 more days with no fever for a total of 8 days.    Separate yourself from other people in your home.?As much as possible, you should stay in one room and away from other people in your home. Also, use a separate bathroom, if possible. Avoid handling pets or other animals while sick.     Wear a facemask if you need to be around other people and cover your mouth and nose with a tissue when you cough or sneeze.     Avoid sharing personal household items. You should not share dishes, drinking glasses, forks/knives/spoons, towels, or bedding with other people in your home. After using these items, they should be washed with soap and water. Clean parts of your home that are touched often (doorknobs, faucets, countertops, etc.) daily.     Wash your hands often with soap and water for at least 20 seconds or use an alcohol-based hand  containing at least 60% alcohol.     Avoid touching your face.    Treat your symptoms: Take Acetaminophen (Tylenol) to treat body aches and fever as needed for comfort. Ibuprofen (Advil or Motrin) can be used as well if you still have symptoms after taking Tylenol.  Drink fluids. Rest.    Watch for worsening symptoms, shortness of breath, or difficulty   Breathing.    Employers/workplaces are being asked by the Centers for Disease Control (CDC) to not request notes/documentation for you to return to work or prove that you were ill. You may choose to show your employer this paperwork.    Return to the Emergency Department if:    If you are developing worsening breathing, shortness of breath, or feel worse you should seek medical attention.  If you are uncertain, contact your health care  provider/clinic. If you need emergency medical attention, call 911 and tell them you have been ill.

## 2020-06-02 NOTE — ED TRIAGE NOTES
Patient presents with cough, abdominal pain that started this morning. Patient states she felt feverish this morning. Patient was in the general vicinity of her brother-in-law who just tested positive for COVID. ABCDs intact, alert and oriented x 4.

## 2020-06-02 NOTE — LETTER
June 2, 2020      To Whom It May Concern:      Jeff Serra was seen in our Emergency Department today, 06/02/20.  I expect her condition to improve over the next 2 days.  She may return to work/school when improved.    Sincerely,        Felix Cagle MD

## 2020-06-02 NOTE — ED PROVIDER NOTES
History   Chief Complaint:  Fever and Cough     HPI   Jeff Serra is a 45 year old female who presents with multiple complaints.  Patient's had some ongoing right flank and abdominal pain.  Patient has a history of recurrent urinary tract infections and wonders about this.  She may have had some kidney stones in the past as well.  Patient states she is been symptomatic for 4 days but then this morning developed a fever and a cough.  States her brother-in-law's been tested positive for COVID.  Patient herself has been tested once earlier in May which is a negative result.  Patient has had a slight cough and subjective fever at home and presents the emergency room for further assessment.    Allergies:  Contrast Dye    Medications:   Bentyl  Gabapentin  Loratadine  Losartan  Omeprazole  Progesterone  Senna  Maxzide   Oxycodone    Past Medical History:    Chronic low back pain  Chronic neck pain   Continuous opioid dependence   Hypertension  Kidney stone   Pulmonary embolism   Right leg DVT  Chondral defect of left patella  Cyst of ovary   Vitamin D deficiency      Past Surgical History:    Combined EKG  Colonoscopy x3  Hysterectomy  Laparotomy mini with tubal ligation  Lithotripsy      Family History:    Father: hypertension  Maternal grandmother: breast cancer   Paternal grandmother: breast cancer   Maternal aunt: myocardial infarct     Social History:  Smoking Status: Never Smoker  Smokeless Tobacco: Never Used  Alcohol Use: Yes  Drug Use: No  PCP: Jordyn Loredo     Review of Systems  Positive for subjective fever positive for right flank pain negative for vomiting negative for gross hematuria or dysuria.  Positive for subjective shortness of breath negative for chest pain.  Positive for cough all the systems negative except as above    Physical Exam     Patient Vitals for the past 24 hrs:   BP Temp Temp src Heart Rate Resp SpO2   06/02/20 0827 (!) 142/94 97.9  F (36.6  C) Oral 104 18 100 %        Physical Exam  Vitals signs reviewed.   Constitutional:       Appearance: She is obese.   HENT:      Mouth/Throat:      Mouth: Mucous membranes are moist.   Eyes:      General: No scleral icterus.     Pupils: Pupils are equal, round, and reactive to light.   Neck:      Musculoskeletal: Normal range of motion.   Cardiovascular:      Rate and Rhythm: Normal rate and regular rhythm.   Pulmonary:      Effort: Pulmonary effort is normal.      Breath sounds: Normal breath sounds.   Abdominal:      General: Abdomen is flat.      Palpations: Abdomen is soft.   Skin:     General: Skin is warm.      Capillary Refill: Capillary refill takes less than 2 seconds.   Neurological:      General: No focal deficit present.      Mental Status: She is alert.   Psychiatric:         Mood and Affect: Mood normal.         Emergency Department Course   Imaging:  Radiology findings were communicated with the patient who voiced understanding of the findings.    XR Chest Port 1 View  IMPRESSION: No acute disease.   Reading per radiology     Laboratory:  Laboratory findings were communicated with the patient who voiced understanding of the findings.    Symptomatic COVID19 Virus PCR by nasopharyngeal swab pending    CBC: WBC 7.4, HGB 12.7,   CMP: ALT 71(H) o/w WNL (Creatinine 0.66)  Lipase: 89    UA with microscopic: WNL     Interventions:  0904 NS 1000 ml IV    Emergency Department Course:  Nursing notes and vitals reviewed.    0835 I performed an exam of the patient as documented above.     IV was inserted and blood was drawn for laboratory testing, results above.    The patient provided a urine sample here in the emergency department. This was sent for laboratory testing, findings above.    COVID19 Virus PCR obtained, results above.       The patient received a portable Chest Xray while in the emergency department, results above.      1100 I rechecked the patient. Explained findings to the Patient.    Findings and plan explained  to the Patient. Patient discharged home with instructions regarding supportive care, medications, and reasons to return. The importance of close follow-up was reviewed.    Impression & Plan    Covid-19  Jeff Serra was evaluated during a global COVID-19 pandemic, which necessitated consideration that the patient might be at risk for infection with the SARS-CoV-2 virus that causes COVID-19.   Applicable protocols for evaluation were followed during the patient's care.   COVID-19 was considered as part of the patient's evaluation. The plan for testing is:  a test was obtained during this visit.    Medical Decision Making:  Jeff Serra is a 45 year old female who presents to the emergency department today for evaluation of cough and shortness of breath as well as flank pain.  Patient is 45 and otherwise healthy.  As patient is well-appearing does not seem writhing in pain.  Doubt kidney stones urinalysis is normal.  Patient seems concerned about CO VID.  Patient has been exposed chest x-ray is normal sats are normal COPD ID testing was offered and is pending.  Patient is offered reassurance and follow-up suspect flank pain may be related to coughing.  No need for antibiotics at this time no fever and x-ray is normal follow-up with primary care as recommended.    Diagnosis:    ICD-10-CM    1. Viral URI with cough  J06.9     B97.89    2. Abdominal pain, generalized  R10.84        Disposition:   discharged to home    Scribe Disclosure:  Donna MOORE, am serving as a scribe at 8:36 AM on 6/2/2020 to document services personally performed by Felix Cagle MD based on my observations and the provider's statements to me.   [facility] EMERGENCY DEPARTMENT       Felix Cagle MD  06/03/20 0923

## 2020-06-15 ENCOUNTER — TELEPHONE (OUTPATIENT)
Dept: FAMILY MEDICINE | Facility: CLINIC | Age: 45
End: 2020-06-15

## 2020-06-15 NOTE — TELEPHONE ENCOUNTER
.Reason for Call:  Other appointment    Detailed comments: Patient called and she's wondering if she can schedule a face to face visit with  for hospital follow up. Patient stated that her insurance does not cover phone visit. Please call back in regard to her hospital follow up.     Phone Number Patient can be reached at: Cell number on file:    Telephone Information:   Mobile [phone]       Best Time: any    Can we leave a detailed message on this number? YES    Call taken on 6/15/2020 at 12:21 PM by Deepthi Sanchez

## 2020-06-17 ENCOUNTER — MYC MEDICAL ADVICE (OUTPATIENT)
Dept: FAMILY MEDICINE | Facility: CLINIC | Age: 45
End: 2020-06-17

## 2020-06-17 ENCOUNTER — OFFICE VISIT (OUTPATIENT)
Dept: OPTOMETRY | Facility: CLINIC | Age: 45
End: 2020-06-17
Payer: COMMERCIAL

## 2020-06-17 DIAGNOSIS — H52.203 ASTIGMATISM OF BOTH EYES, UNSPECIFIED TYPE: Primary | ICD-10-CM

## 2020-06-17 DIAGNOSIS — R25.3 EYELID TWITCH: ICD-10-CM

## 2020-06-17 DIAGNOSIS — H52.202 MYOPIA OF LEFT EYE WITH ASTIGMATISM: ICD-10-CM

## 2020-06-17 DIAGNOSIS — H52.12 MYOPIA OF LEFT EYE WITH ASTIGMATISM: ICD-10-CM

## 2020-06-17 DIAGNOSIS — H52.4 PRESBYOPIA: ICD-10-CM

## 2020-06-17 PROCEDURE — 92015 DETERMINE REFRACTIVE STATE: CPT | Performed by: OPTOMETRIST

## 2020-06-17 PROCEDURE — 92004 COMPRE OPH EXAM NEW PT 1/>: CPT | Performed by: OPTOMETRIST

## 2020-06-17 RX ORDER — POLYETHYLENE GLYCOL 400 2.5 MG/ML
1 SOLUTION/ DROPS OPHTHALMIC 2 TIMES DAILY PRN
Qty: 1 BOTTLE | Refills: 4 | Status: SHIPPED | OUTPATIENT
Start: 2020-06-17 | End: 2021-03-31

## 2020-06-17 ASSESSMENT — EXTERNAL EXAM - RIGHT EYE: OD_EXAM: NORMAL

## 2020-06-17 ASSESSMENT — VISUAL ACUITY
OS_SC: 20/30
OD_SC+: -2
OS_SC+: -2
METHOD: SNELLEN - LINEAR
OD_SC: 20/30

## 2020-06-17 ASSESSMENT — TONOMETRY
IOP_METHOD: APPLANATION
OD_IOP_MMHG: 16
OS_IOP_MMHG: 16

## 2020-06-17 ASSESSMENT — REFRACTION_MANIFEST
OS_ADD: +1.75
OD_SPHERE: -0.75
METHOD_AUTOREFRACTION: 1
OS_SPHERE: -1.25
OD_ADD: +1.75
OS_CYLINDER: +0.50
OS_AXIS: 096
OD_CYLINDER: +0.75
OD_AXIS: 082

## 2020-06-17 ASSESSMENT — CUP TO DISC RATIO
OS_RATIO: 0.5
OD_RATIO: 0.5

## 2020-06-17 ASSESSMENT — EXTERNAL EXAM - LEFT EYE: OS_EXAM: NORMAL

## 2020-06-17 ASSESSMENT — SLIT LAMP EXAM - LIDS
COMMENTS: NORMAL
COMMENTS: NORMAL

## 2020-06-17 NOTE — PATIENT INSTRUCTIONS
Myokymia is a common symptom characterized by sudden , involuntary twitching of the eyelid muscles, generally around one eye.   It is caused by a misfiring of the neurons supplying the eyelid muscles. The most common cause of this annoying twitching generally results from loss of sleep, fatigue, stress and anxiety.  Other influences may include over- indulgent use of alcohol, smoking, caffeine and energy drinks. Eye irritation or a foreign body in the eye can also be triggers.    These spontaneous and annoying muscle spasms are a frequently reported symptom, however typically they are of no medical concern and resolve without medical treatment.  Most cases usually resolve within three weeks.  If eyelid twitching occurs with frequency, obtaining adequate rest and reducing caffeine intake is the easiest cure to try first.  A secondary remedy is the temporary use of either topical or oral antihistamines such as zaditor or alaway drops.  (Caution that oral antihistamines can cause drowsiness.)  Other reported treatments include the use of cold packs over the affected eye.     In extreme cases, where the twitches do not resolve despite a change in rest and / or lifestyle habits, Botox injections have been documented to stop persistent twitching episodes.      Slight astigmatism  Astigmatism is a common type of refractive error. It is a condition in which the human eye does not focus light evenly onto the retina, the light-sensitive tissue at the back of the eye.  Astigmatism in the eye means that the cornea is oval like a football instead of spherical like a basketball. Most astigmatic corneas have two curves - a steeper curve and a flatter curve. This causes light to focus on more than one point in the eye, resulting in blurred vision at distance or near.    Nearsighted in L eye  Loss of focus up close due to aging

## 2020-06-17 NOTE — LETTER
"    6/17/2020         RE: Jeff Serra  [address] [facility] [address]        Dear Colleague,    [facility] you [facility] your [facility], [facility]. Please see a copy of my visit note below.    Chief Complaint   Patient presents with     Eye Problem Left Eye      Eye Problem Left Eye   HPI    Eye Problem Left Eye      Laterality:  left eye     Onset:  gradual     Onset:  4 months ago     Severity:  mild     Frequency:  constantly     Associated symptoms:  Negative for eye pain and trauma   Comments      Os eye has been twitching for 4-5 months and is getting worse. Vision just a little blurry in os eye.           Gets worse as the day goes on   She is getting good sleep  No change in caffeine patterns  Does not use any correction , eyes feel dry   Has a broken fibula and is on pain pills  Having knee surgery  Works on computer all day      Medical, surgical and family histories reviewed and updated 6/17/2020.       OBJECTIVE: See Ophthalmology exam    ASSESSMENT:    ICD-10-CM    1. Astigmatism of both eyes, unspecified type  H52.203    2. Myopia of left eye with astigmatism  H52.12     H52.202    3. Presbyopia  H52.4    4. Eyelid twitch  R25.3     mild dry eye, may be secondary to pain medication / and or prolonged computer use uncorrected    PLAN:   Patient education   Did full eye exam since she had never had one and was having eye strain   Single vision computer  Her wd is 17\"         Alla Soto OD     Again, thank you for allowing me to participate in the care of your patient.        Sincerely,        Alla Soto, OD    "

## 2020-06-17 NOTE — PROGRESS NOTES
"Chief Complaint   Patient presents with     Eye Problem Left Eye      Eye Problem Left Eye   HPI    Eye Problem Left Eye      Laterality:  left eye     Onset:  gradual     Onset:  4 months ago     Severity:  mild     Frequency:  constantly     Associated symptoms:  Negative for eye pain and trauma   Comments      Os eye has been twitching for 4-5 months and is getting worse. Vision just a little blurry in os eye.           Gets worse as the day goes on   She is getting good sleep  No change in caffeine patterns  Does not use any correction , eyes feel dry   Has a broken fibula and is on pain pills  Having knee surgery  Works on computer all day      Medical, surgical and family histories reviewed and updated 6/17/2020.       OBJECTIVE: See Ophthalmology exam    ASSESSMENT:    ICD-10-CM    1. Astigmatism of both eyes, unspecified type  H52.203    2. Myopia of left eye with astigmatism  H52.12     H52.202    3. Presbyopia  H52.4    4. Eyelid twitch  R25.3     mild dry eye, may be secondary to pain medication / and or prolonged computer use uncorrected    PLAN:   Patient education   Did full eye exam since she had never had one and was having eye strain   Single vision computer  Her wd is 17\"         Alla Soto OD   "

## 2020-06-18 ENCOUNTER — MYC MEDICAL ADVICE (OUTPATIENT)
Dept: FAMILY MEDICINE | Facility: CLINIC | Age: 45
End: 2020-06-18

## 2020-06-18 ENCOUNTER — OFFICE VISIT (OUTPATIENT)
Dept: FAMILY MEDICINE | Facility: CLINIC | Age: 45
End: 2020-06-18
Payer: COMMERCIAL

## 2020-06-18 VITALS
RESPIRATION RATE: 20 BRPM | BODY MASS INDEX: 34.37 KG/M2 | DIASTOLIC BLOOD PRESSURE: 80 MMHG | WEIGHT: 194 LBS | TEMPERATURE: 98.3 F | HEART RATE: 104 BPM | SYSTOLIC BLOOD PRESSURE: 118 MMHG

## 2020-06-18 DIAGNOSIS — I10 HYPERTENSION GOAL BP (BLOOD PRESSURE) < 140/90: ICD-10-CM

## 2020-06-18 DIAGNOSIS — Z01.818 PREOP GENERAL PHYSICAL EXAM: Primary | ICD-10-CM

## 2020-06-18 DIAGNOSIS — S82.892A ANKLE FRACTURE, LEFT, CLOSED, INITIAL ENCOUNTER: ICD-10-CM

## 2020-06-18 PROCEDURE — 99214 OFFICE O/P EST MOD 30 MIN: CPT | Performed by: FAMILY MEDICINE

## 2020-06-18 RX ORDER — TRIAMTERENE/HYDROCHLOROTHIAZID 37.5-25 MG
1 TABLET ORAL DAILY
Qty: 90 TABLET | Refills: 1 | Status: SHIPPED | OUTPATIENT
Start: 2020-06-18 | End: 2020-12-17

## 2020-06-18 RX ORDER — LOSARTAN POTASSIUM 50 MG/1
50 TABLET ORAL DAILY
Qty: 90 TABLET | Refills: 1 | Status: SHIPPED | OUTPATIENT
Start: 2020-06-18 | End: 2020-12-18

## 2020-06-18 NOTE — LETTER
[address] [address]  Phone: [phone]    June 19, 2020        Jeff Serra  [address] [facility] [address]          To whom it may concern:    RE: Jeff Serra    Patient had sustained a fall on 6/13/2020 and fractured her Left fibula .  She has been advised to stay off from work till 6/18/2020 when she is scheduled to see orthopedics for further advice .    Please contact me for questions or concerns.      Sincerely,        MD RENAN
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
## 2020-06-25 ENCOUNTER — MYC REFILL (OUTPATIENT)
Dept: FAMILY MEDICINE | Facility: CLINIC | Age: 45
End: 2020-06-25

## 2020-06-25 DIAGNOSIS — G89.4 CHRONIC PAIN SYNDROME: ICD-10-CM

## 2020-06-25 DIAGNOSIS — M54.50 CHRONIC MIDLINE LOW BACK PAIN WITHOUT SCIATICA: ICD-10-CM

## 2020-06-25 DIAGNOSIS — G89.29 CHRONIC MIDLINE LOW BACK PAIN WITHOUT SCIATICA: ICD-10-CM

## 2020-06-26 RX ORDER — GABAPENTIN 300 MG/1
300 CAPSULE ORAL AT BEDTIME
Qty: 90 CAPSULE | Refills: 5
Start: 2020-06-26

## 2020-06-26 RX ORDER — OXYCODONE HYDROCHLORIDE 15 MG/1
15 TABLET ORAL 3 TIMES DAILY PRN
Qty: 90 TABLET | Refills: 0 | Status: SHIPPED | OUTPATIENT
Start: 2020-06-26 | End: 2020-07-27

## 2020-06-26 NOTE — TELEPHONE ENCOUNTER
For gabapentin:  90 day supply with 5 refills sent on 5/28/20. Should have refills on file at pharmacy.    Controlled Substance Refill Request for Oxycodone  Problem List Complete:  Yes   Chronic pain syndrome   Problem Detail     Noted:  12/1/2015    Priority:  Medium    Overview Addendum 5/28/2020 10:10 AM by Herminia Rutherford RN    Patient is followed by PATRICIA RAYMOND for ongoing prescription of pain medication.  All refills should be approved by this provider, or covering partner.     Medication(s): oxycodone 15 three times daily = 67.5 MED  Narcan n/a     UDS (May '19) showed unexpected benzo  Plan - monthly UDS for a while - any further fail will nullify contract     Clinic visit frequency required: Q 3 months      Controlled substance agreement on file: Yes       Date(s): 8/28/19     Pain Clinic evaluation in the past: No     DIRE Total Score(s):    8/31/2015   Total Score 16         Last [facility] website verification:  05/28/20    [email or web address]       checked in past 3 months?  Yes 5/28/20   [place] reviewed:  not reviewed/not due - last done on 5/28/20  [place] profile:  [email or web address]  Last Written Prescription Date:  5/28/20  Last Fill Quantity: 90 tablets  # refills: 0   Last office visit: 2/4/2020 with prescribing provider:  2/4/20   Future Office Visit:  None            Herminia Rutherford RN, BSN, PHN

## 2020-07-17 ENCOUNTER — APPOINTMENT (OUTPATIENT)
Dept: OPTOMETRY | Facility: CLINIC | Age: 45
End: 2020-07-17
Payer: COMMERCIAL

## 2020-07-17 PROCEDURE — 92340 FIT SPECTACLES MONOFOCAL: CPT | Performed by: OPTOMETRIST

## 2020-07-22 ENCOUNTER — MYC REFILL (OUTPATIENT)
Dept: FAMILY MEDICINE | Facility: CLINIC | Age: 45
End: 2020-07-22

## 2020-07-22 DIAGNOSIS — G89.29 CHRONIC MIDLINE LOW BACK PAIN WITHOUT SCIATICA: ICD-10-CM

## 2020-07-22 DIAGNOSIS — M54.50 CHRONIC MIDLINE LOW BACK PAIN WITHOUT SCIATICA: ICD-10-CM

## 2020-07-22 DIAGNOSIS — G89.4 CHRONIC PAIN SYNDROME: ICD-10-CM

## 2020-07-22 RX ORDER — OXYCODONE HYDROCHLORIDE 15 MG/1
15 TABLET ORAL 3 TIMES DAILY PRN
Qty: 90 TABLET | Refills: 0 | Status: CANCELLED | OUTPATIENT
Start: 2020-07-22

## 2020-07-27 ENCOUNTER — TELEPHONE (OUTPATIENT)
Dept: FAMILY MEDICINE | Facility: CLINIC | Age: 45
End: 2020-07-27

## 2020-07-27 DIAGNOSIS — M54.50 CHRONIC MIDLINE LOW BACK PAIN WITHOUT SCIATICA: ICD-10-CM

## 2020-07-27 DIAGNOSIS — G89.4 CHRONIC PAIN SYNDROME: ICD-10-CM

## 2020-07-27 DIAGNOSIS — G89.29 CHRONIC MIDLINE LOW BACK PAIN WITHOUT SCIATICA: ICD-10-CM

## 2020-07-27 RX ORDER — OXYCODONE HYDROCHLORIDE 15 MG/1
15 TABLET ORAL 3 TIMES DAILY PRN
Qty: 90 TABLET | Refills: 0 | Status: SHIPPED | OUTPATIENT
Start: 2020-07-27 | End: 2020-08-12

## 2020-07-27 NOTE — TELEPHONE ENCOUNTER
Controlled Substance Refill Request for Oxycodone  Problem List Complete:  Yes  Chronic pain syndrome   Problem Detail     Noted:  12/1/2015    Priority:  Medium    Overview Addendum 5/28/2020 10:10 AM by Herminia Rutherford RN    Patient is followed by PATRICIA RAYMOND for ongoing prescription of pain medication.  All refills should be approved by this provider, or covering partner.     Medication(s): oxycodone 15 three times daily = 67.5 MED  Narcan n/a     UDS (May '19) showed unexpected benzo  Plan - monthly UDS for a while - any further fail will nullify contract     Clinic visit frequency required: Q 3 months      Controlled substance agreement on file: Yes       Date(s): 8/28/19     Pain Clinic evaluation in the past: No     DIRE Total Score(s):    8/31/2015   Total Score 16         Last MNP website verification:  05/28/20    [email or web address]    checked in past 3 months?  Yes 5/28/20   RX monitoring program ([place]) reviewed:  not reviewed/not due - last done on 5/28/20  MNPMP profile:  [email or web address]  Last Written Prescription Date:  6/26/20  Last Fill Quantity: 90 tablets  # refills: 0   Last office visit: 2/4/2020 with prescribing provider:  2/4/20   Future Office Visit:  None        Herminia Rutherford RN, BSN, PHN

## 2020-07-27 NOTE — TELEPHONE ENCOUNTER
Per last refill of this med, pcp had recommended visit prior to next fill.   Please schedule virtual med check with pcp and then route back to pool for sundar refill (no refill without appt scheduled)

## 2020-07-27 NOTE — TELEPHONE ENCOUNTER
.Reason for call:  Medication   If this is a refill request, has the caller requested the refill from the pharmacy already? No  Will the patient be using a [facility]? No  Name of the pharmacy and phone number for the current request: [facility]    Name of the medication requested: oxycodone     Other request: fill script. Pt is requesting that she get a sundar refill due to her not being able to talk to  until August 13th. Please call pt back with what she can do to get a refill    Phone number to reach patient:  Home number on file [phone] (home)    Best Time:  anytime    Can we leave a detailed message on this number?  YES    Travel screening: Negative

## 2020-08-12 ENCOUNTER — VIRTUAL VISIT (OUTPATIENT)
Dept: FAMILY MEDICINE | Facility: CLINIC | Age: 45
End: 2020-08-12
Payer: COMMERCIAL

## 2020-08-12 DIAGNOSIS — G89.29 CHRONIC MIDLINE LOW BACK PAIN WITHOUT SCIATICA: ICD-10-CM

## 2020-08-12 DIAGNOSIS — M54.50 CHRONIC MIDLINE LOW BACK PAIN WITHOUT SCIATICA: ICD-10-CM

## 2020-08-12 DIAGNOSIS — N39.0 URINARY TRACT INFECTION WITHOUT HEMATURIA, SITE UNSPECIFIED: ICD-10-CM

## 2020-08-12 DIAGNOSIS — K29.70 GASTRITIS WITHOUT BLEEDING, UNSPECIFIED CHRONICITY, UNSPECIFIED GASTRITIS TYPE: ICD-10-CM

## 2020-08-12 DIAGNOSIS — G89.4 CHRONIC PAIN SYNDROME: Primary | ICD-10-CM

## 2020-08-12 PROCEDURE — 99214 OFFICE O/P EST MOD 30 MIN: CPT | Mod: 95 | Performed by: FAMILY MEDICINE

## 2020-08-12 RX ORDER — CEPHALEXIN 500 MG/1
500 CAPSULE ORAL 3 TIMES DAILY
Qty: 15 CAPSULE | Refills: 0 | Status: SHIPPED | OUTPATIENT
Start: 2020-08-12 | End: 2020-08-17

## 2020-08-12 RX ORDER — OMEPRAZOLE 40 MG/1
40 CAPSULE, DELAYED RELEASE ORAL DAILY
Qty: 90 CAPSULE | Refills: 1 | Status: SHIPPED | OUTPATIENT
Start: 2020-08-12 | End: 2021-01-11

## 2020-08-12 RX ORDER — OXYCODONE HYDROCHLORIDE 15 MG/1
15 TABLET ORAL 3 TIMES DAILY PRN
Qty: 90 TABLET | Refills: 0 | Status: SHIPPED | OUTPATIENT
Start: 2020-08-12 | End: 2020-09-16

## 2020-08-12 ASSESSMENT — PAIN SCALES - GENERAL: PAINLEVEL: SEVERE PAIN (7)

## 2020-08-12 NOTE — LETTER
[facility]  08/12/20    Patient: Jeff Serra  YOB: 1975  Medical Record Number: [id number]                                                                  Opioid / Opioid Plus Controlled Substance Agreement    I understand that my care provider has prescribed an opioid (narcotic) controlled substance to help manage my condition(s). I am taking this medicine to help me function or work. I know this is strong medicine, and that it can cause serious side effects. Opioid medicine can be sedating, addicting and may cause a dependency on the drug. They can affect my ability to drive or think, and cause depression. They need to be taken exactly as prescribed. Combining opioids with certain medicines or chemicals (such as cocaine, sedatives and tranquilizers, sleeping pills, meth) can be dangerous or even fatal. Also, if I stop opioids suddenly, I may have severe withdrawal symptoms. Last, I understand that opioids do not work for all types of pain nor for all patients. If not helpful, I may be asked to stop them.        The risks, benefits, and side effects of these medicine(s) were explained to me. I agree that:    1. I will take part in other treatments as advised by my care team. This may be psychiatry or counseling, physical therapy, behavioral therapy, group treatment or a referral to a pain clinic. I will reduce or stop my medicine when my care team tells me to do so.  2. I will take my medicines as prescribed. I will not change the dose or schedule unless my care team tells me to. There will be no refills if I  run out early.   I may be contactedwithout warning and asked to complete a urine drug test or pill count at any time.   3. I will keep all my appointments, and understand this is part of the monitoring of opioids. My care team may require an office visit for EVERY opioid/controlled substance refill. If I miss appointments or don t follow instructions, my care team  may stop my medicine.  4. I will not ask other providers to prescribe controlled substances, and I will not accept controlled substances from other people. If I need another prescribed controlled substance for a new reason, I will tell my care team within 1 business day.  5. I will use one pharmacy to fill all of my controlled substance prescriptions, and it is up to me to make sure that I do not run out of my medicines on weekends or holidays. If my care team is willing to refill my opioid prescription without a visit, I must request refills only during office hours, refills may take up to 3 days to process, and it may take up to 5 to 7 days for my medicine to be mailed and ready at my pharmacy. Prescriptions will not be mailed anywhere except my pharmacy.        [id number]  Rev 12/18         Registration to scan to EHR                             Page 1 of 2               Controlled Substance Agreement Opioid        [facility]  08/12/20  Patient: Jeff Serra  YOB: 1975  Medical Record Number: [id number]                                                                  6. I am responsible for my prescriptions. If the medicine/prescription is lost or stolen, it will not be replaced. I also agree not to share controlled substance medicines with anyone.  7. I agree to not use ANY illegal or recreational drugs. This includes marijuana, cocaine, bath salts or other drugs. I agree not to use alcohol unless my care team says I may.          I agree to give urine samples whenever asked. If I don t give a urine sample, the care team may stop my medicine.    8. If I enroll in the [place] Medical Marijuana program, I will tell my care team. I will also sign an agreement to share my medical records with my care team.   9. I will bring in my list of medicines (or my medicine bottles) each time I come to the clinic.   10. I will tell my care team right away if I become pregnant or have a new  medical problem treated outside of my regular clinic.  11. I understand that this medicine can affect my thinking and judgment. It may be unsafe for me to drive, use machinery and do dangerous tasks. I will not do any of these things until I know how the medicine affects me. If my dose changes, I will wait to see how it affects me. I will contact my care team if I have concerns about medicine side effects.    I understand that if I do not follow any of the conditions above, my prescriptions or treatment may be stopped.      I agree that my provider, clinic care team, and pharmacy may work with any city, state or federal law enforcement agency that investigates the misuse, sale, or other diversion of my controlled medicine. I will allow my provider to discuss my care with or share a copy of this agreement with any other treating provider, pharmacy or emergency room where I receive care. I agree to give up (waive) any right of privacy or confidentiality with respect to these consents.     I have read this agreement and have asked questions about anything I did not understand.      ________________________________________________________________________  Patient signature - Date/Time -  Jeff Serra                                      ________________________________________________________________________  Witness signature                                                            ________________________________________________________________________  Provider signature - Jordyn Loredo MD      [id number]  Rev 12/18         Registration to scan to EHR                         Page 2 of 2                   Controlled Substance Agreement Opioid           Page 1 of 2  Opioid Pain Medicines (also known as Narcotics)  What You Need to Know    What are opioids?   Opioids are pain medicines that must be prescribed by a doctor.  They are also known as narcotics.    Examples are:     morphine (MS Contin,  Adriana)    oxycodone (Oxycontin)    oxycodone and acetaminophen (Percocet)    hydrocodone and acetaminophen (Vicodin, Norco)     fentanyl patch (Duragesic)     hydromorphone (Dilaudid)     methadone     What do opioids do well?   Opioids are best for short-term pain after a surgery or injury. They also work well for cancer pain. Unlike other pain medicines, they do not cause liver or kidney failure or ulcers. They may help some people with long-lasting (chronic) pain.     What do opioids NOT do well?   Opioids never get rid of pain entirely, and they do not work well for most patients with chronic pain. Opioids do not reduce swelling, one of the causes of pain. They also don t work well for nerve pain.                           For informational purposes only.  Not to replace the advice of your care provider.  Copyright 201 [facility]. All right reserved. [employer] 159566-Hy[employer] 02/18.      Page 2 of 2    Risks and side effects   Talk to your doctor before you start or decide to keep taking one of these medicines. Side effects include:    Lowering your breathing rate enough to cause death    Overdose, including death, especially if taking higher than prescribed doses    Long-term opioid use    Worse depression symptoms; less pleasure in things you usually enjoy    Feeling tired or sluggish    Slower thoughts or cloudy thinking    Being more sensitive to pain over time; pain is harder to control    Trouble sleeping or restless sleep    Changes in hormone levels (for example, less testosterone)    Changes in sex drive or ability to have sex    Constipation    Unsafe driving    Itching and sweating    Feeling dizzy    Nausea, vomiting and dry mouth    What else should I know about opioids?  When someone takes opioids for too long or too often, they become dependent. This means that if you stop or reduce the medicine too quickly, you will have withdrawal symptoms.    Dependence is not the same as addiction.  Addiction is when people keep using a substance that harms their body, their mind or their relations with others. If you have a history of drug or alcohol abuse, taking opioids can cause a relapse.    Over time, opioids don t work as well. Most people will need higher and higher doses. The higher the dose, the more serious the side effects. We don t know the long-term effects of opioids.      Prescribed opioids aren't the best way to manage chronic pain    Other ways to manage pain include:      Ibuprofen or acetaminophen.  You should always try this first.      Treat health problems that may be causing pain.      acupuncture or massage, deep breathing, meditation, visual imagery, aromatherapy.      Use heat or ice at the pain site      Physical therapy and exercise      Stop smoking      See a counselor or therapist                                                  People who have used opioids for a long time may have a lower quality of life, worse depression, higher levels of pain and more visits to doctors.    Never share your opioids with others. Be sure to store opioids in a secure place, locked if possible.Young children can easily swallow them and overdose.     You can overdose on opioids.  Signs of overdose include decrease or loss of consciousness, slowed breathing, trouble waking and blue lips.  If someone is worried about overdose, they should call 911.    If you are at risk for overdose, you may get naloxone (Narcan, a medicine that reverses the effects of opioids.  If you overdose, a friend or family member can give you Narcan while waiting for the ambulance.  They need to know the signs of overdose and how to give Narcan.    While you're taking opioids:    Don't use alcohol or street drugs. Taking them together can cause death.    Don't take any of these medicines unless your doctor says its okay.  Taking these with opioids can cause death.    Benzodiazepines (such as lorazepam         or  diazepam)    Muscle relaxers (such as cyclobenzaprine)    sleeping pills    other opioids    Safe disposal of opioids  Find your area drug take-back program, your pharmacy mail-back program, buy a special disposal bag (such as Deterra) from your pharmacy or flush them down the toilet.  Use the guidelines at:  www.fda.gov/drugs/resourcesforyou

## 2020-08-12 NOTE — PROGRESS NOTES
"Jeff Serra is a 45 year old female who is being evaluated via a billable telephone visit.      The patient has been notified of following:     \"This telephone visit will be conducted via a call between you and your physician/provider. We have found that certain health care needs can be provided without the need for a physical exam.  This service lets us provide the care you need with a short phone conversation.  If a prescription is necessary we can send it directly to your pharmacy.  If lab work is needed we can place an order for that and you can then stop by our lab to have the test done at a later time.    Telephone visits are billed at different rates depending on your insurance coverage. During this emergency period, for some insurers they may be billed the same as an in-person visit.  Please reach out to your insurance provider with any questions.    If during the course of the call the physician/provider feels a telephone visit is not appropriate, you will not be charged for this service.\"    Patient has given verbal consent for Telephone visit?  Yes    What phone number would you like to be contacted at? [phone]    How would you like to obtain your AVS? Paty Rivera     Jeff Serra is a 45 year old female who presents via phone visit today for the following health issues:    HPI    Chronic/Recurring Back Pain Follow Up      Where is your back pain located? (Select all that apply) middle of back both and neck     How would you describe your back pain?  dull ache    Where does your back pain spread? Lower back right     Since your last clinic visit for back pain, how has your pain changed? unchanged    Does your back pain interfere with your job? YES    Since your last visit, have you tried any new treatment? No       How many servings of fruits and vegetables do you eat daily?  2-3    On average, how many sweetened beverages do you drink each day (Examples: soda, juice, sweet " tea, etc.  Do NOT count diet or artificially sweetened beverages)?   0    How many days per week do you exercise enough to make your heart beat faster? 3 or less    How many minutes a day do you exercise enough to make your heart beat faster? 9 or less    How many days per week do you miss taking your medication? 0    Spoke with patient via phone in follow-up of chronic pain issues.  Patient well-known to me and has been on a stable dosage for some time.  Has been dealing with the a sequela of an ankle fracture that required internal fixation earlier this year.  Still quite sore but she is trying to walk on this more and more.  Wondering about the possibility of a urinary infection.  A little bit of increase in frequency but no significant dysuria or discharge, but she is having some lower back aching and in the past that has indicated the onset of urinary infection for the patient.  Was previously on omeprazole for reflux but stopped this a number of months ago.  But now she is having worsened symptoms.  No nausea or dysphasia.    Reviewed and updated as needed this visit by Provider         Review of Systems   Constitutional, HEENT, cardiovascular, pulmonary, gi and gu systems are negative, except as otherwise noted.       Objective   Vitals - Patient Reported  Pain Score: Severe Pain (7)  Pain Loc: Upper Leg      Vitals:  No vitals were obtained today due to virtual visit.    healthy, alert and no distress  PSYCH: Alert and oriented times 3; coherent speech, normal   rate and volume, able to articulate logical thoughts, able   to abstract reason, no tangential thoughts, no hallucinations   or delusions  Her affect is normal  RESP: No cough, no audible wheezing, able to talk in full sentences  Remainder of exam unable to be completed due to telephone visits    Diagnostic Test Results:  Labs reviewed in Epic        Assessment & Plan     1. Chronic pain syndrome  Stable on current regimen.  I will send her a  "controlled substance agreement to sign and send back.  Urine drug screen next available time  - oxyCODONE IR (ROXICODONE) 15 MG tablet; Take 1 tablet (15 mg) by mouth 3 times daily as needed for moderate to severe pain  Dispense: 90 tablet; Refill: 0    2. Chronic midline low back pain without sciatica  As above  - oxyCODONE IR (ROXICODONE) 15 MG tablet; Take 1 tablet (15 mg) by mouth 3 times daily as needed for moderate to severe pain  Dispense: 90 tablet; Refill: 0    3. Gastritis without bleeding, unspecified chronicity, unspecified gastritis type  Did well on omeprazole but we will increase this to 40 mg daily  - omeprazole (PRILOSEC) 40 MG DR capsule; Take 1 capsule (40 mg) by mouth daily  Dispense: 90 capsule; Refill: 1    4. Urinary tract infection without hematuria, site unspecified  Discussed mechanism of action of the proposed medication, as well as potential effects, both good and bad.  Patient expressed understanding and agreed with treatment.   - cephALEXin (KEFLEX) 500 MG capsule; Take 1 capsule (500 mg) by mouth 3 times daily for 5 days  Dispense: 15 capsule; Refill: 0     BMI:   Estimated body mass index is 34.37 kg/m  as calculated from the following:    Height as of 5/11/20: 1.6 m (5' 3\").    Weight as of 6/18/20: 88 kg (194 lb).   Weight management plan: Discussed healthy diet and exercise guidelines        See Patient Instructions    Return in about 3 months (around 11/12/2020) for Follow up on pain, Can call for refills.    Jordyn Loredo MD  [facility]    12 minutes            "

## 2020-08-12 NOTE — Clinical Note
Please mail a copy of controlled substance agreement (letters) to patient along with a stamped, return envelope.  Once it has been returned please send it to me for cosignature.  MOISES Loredo M.D.

## 2020-08-12 NOTE — PATIENT INSTRUCTIONS
At [facility], we strive to deliver an exceptional experience to you, every time we see you. If you receive a survey, please complete it as we do value your feedback.  If you have MyChart, you can expect to receive results automatically within 24 hours of their completion.  Your provider will send a note interpreting your results as well.   If you do not have MyChart, you should receive your results in about a week by mail.    Your care team:     Family Medicine   KATE Hays APRN CNP S. Matthew Hockett, MD Pamela Kolacz, MD Angela Wermerskirchen, MD    Internal Medicine  Ravinder Staton MD     Clinic hours: Monday - Wednesday 7 am-7 pm   Thursdays and Fridays 7 am-5 pm.     [facility]: Monday - Friday 11 am-9 pm,   Saturday and Sunday 9 am-5 pm.     [facility]: Monday - Thursday 8 am - 7 pm; Friday 8 am - 6 pm    Clinic: [phone]   [facility]: [phone]     Use [email or web address] for 24/7 diagnosis and treatment of dozens of conditions.

## 2020-08-13 ENCOUNTER — MYC MEDICAL ADVICE (OUTPATIENT)
Dept: FAMILY MEDICINE | Facility: CLINIC | Age: 45
End: 2020-08-13

## 2020-08-17 NOTE — TELEPHONE ENCOUNTER
I do not see a fax # on the 1 page form. Placed form in [email or web address] basket on 2nd floor.  Marcia Escamilla MA  [facility]  2nd Floor  Primary Care

## 2020-09-01 ENCOUNTER — MYC REFILL (OUTPATIENT)
Dept: FAMILY MEDICINE | Facility: CLINIC | Age: 45
End: 2020-09-01

## 2020-09-01 DIAGNOSIS — J30.1 SEASONAL ALLERGIC RHINITIS DUE TO POLLEN: ICD-10-CM

## 2020-09-03 RX ORDER — LORATADINE 10 MG/1
10 TABLET ORAL DAILY PRN
Qty: 90 TABLET | Refills: 1 | Status: SHIPPED | OUTPATIENT
Start: 2020-09-03 | End: 2021-08-25

## 2020-09-16 ENCOUNTER — MYC REFILL (OUTPATIENT)
Dept: FAMILY MEDICINE | Facility: CLINIC | Age: 45
End: 2020-09-16

## 2020-09-16 DIAGNOSIS — G89.4 CHRONIC PAIN SYNDROME: ICD-10-CM

## 2020-09-16 DIAGNOSIS — G89.29 CHRONIC MIDLINE LOW BACK PAIN WITHOUT SCIATICA: ICD-10-CM

## 2020-09-16 DIAGNOSIS — M54.50 CHRONIC MIDLINE LOW BACK PAIN WITHOUT SCIATICA: ICD-10-CM

## 2020-09-17 RX ORDER — OXYCODONE HYDROCHLORIDE 15 MG/1
15 TABLET ORAL 3 TIMES DAILY PRN
Qty: 90 TABLET | Refills: 0 | Status: SHIPPED | OUTPATIENT
Start: 2020-09-17 | End: 2020-10-14

## 2020-09-17 NOTE — TELEPHONE ENCOUNTER
Controlled Substance Refill Request for oxyCodone  Problem List Complete:  Yes  Overview Addendum 9/17/2020 10:37 AM by Latricia Zapien RN    Patient is followed by PATRICIA RAYMOND for ongoing prescription of pain medication.  All refills should be approved by this provider, or covering partner.     Medication(s): oxycodone 15 three times daily = 67.5 MED  Narcan n/a     UDS (May '19) showed unexpected benzo  Plan - monthly UDS for a while - any further fail will nullify contract     Clinic visit frequency required: Q 3 months      Controlled substance agreement on file: Yes       Date(s): 8/28/19     Pain Clinic evaluation in the past: No     DIRE Total Score(s):    8/31/2015   Total Score 16         Last [facility] website verification:  09/17/2020       RX [place] ([place] reviewed:  reviewed- no concerns    [place] profile:  [email or web address]      Latricia Zapien RN, [facility] Triage

## 2020-10-02 DIAGNOSIS — R10.84 ABDOMINAL PAIN, GENERALIZED: ICD-10-CM

## 2020-10-06 RX ORDER — DICYCLOMINE HCL 20 MG
TABLET ORAL
Qty: 60 TABLET | Refills: 1 | Status: SHIPPED | OUTPATIENT
Start: 2020-10-06 | End: 2021-08-18

## 2020-10-06 NOTE — TELEPHONE ENCOUNTER
Prescription approved per [facility] Refill [facility].  Lindsey Ureña RN  [facility]

## 2020-10-14 ENCOUNTER — APPOINTMENT (OUTPATIENT)
Dept: ULTRASOUND IMAGING | Facility: CLINIC | Age: 45
End: 2020-10-14
Attending: EMERGENCY MEDICINE
Payer: COMMERCIAL

## 2020-10-14 ENCOUNTER — HOSPITAL ENCOUNTER (EMERGENCY)
Facility: CLINIC | Age: 45
Discharge: HOME | End: 2020-10-14
Attending: EMERGENCY MEDICINE | Admitting: EMERGENCY MEDICINE
Payer: COMMERCIAL

## 2020-10-14 ENCOUNTER — APPOINTMENT (OUTPATIENT)
Dept: CT IMAGING | Facility: CLINIC | Age: 45
End: 2020-10-14
Attending: EMERGENCY MEDICINE
Payer: COMMERCIAL

## 2020-10-14 ENCOUNTER — MYC REFILL (OUTPATIENT)
Dept: FAMILY MEDICINE | Facility: CLINIC | Age: 45
End: 2020-10-14

## 2020-10-14 VITALS
SYSTOLIC BLOOD PRESSURE: 146 MMHG | DIASTOLIC BLOOD PRESSURE: 99 MMHG | HEART RATE: 81 BPM | RESPIRATION RATE: 27 BRPM | TEMPERATURE: 97 F | OXYGEN SATURATION: 100 %

## 2020-10-14 DIAGNOSIS — M54.50 CHRONIC MIDLINE LOW BACK PAIN WITHOUT SCIATICA: ICD-10-CM

## 2020-10-14 DIAGNOSIS — I10 HYPERTENSION, UNSPECIFIED TYPE: ICD-10-CM

## 2020-10-14 DIAGNOSIS — R07.89 ATYPICAL CHEST PAIN: ICD-10-CM

## 2020-10-14 DIAGNOSIS — G89.29 CHRONIC MIDLINE LOW BACK PAIN WITHOUT SCIATICA: ICD-10-CM

## 2020-10-14 DIAGNOSIS — G89.4 CHRONIC PAIN SYNDROME: ICD-10-CM

## 2020-10-14 DIAGNOSIS — M79.89 LEFT LEG SWELLING: ICD-10-CM

## 2020-10-14 LAB
ANION GAP SERPL CALCULATED.3IONS-SCNC: 6 MMOL/L (ref 3–14)
BASOPHILS # BLD AUTO: 0 10E9/L (ref 0–0.2)
BASOPHILS NFR BLD AUTO: 0.5 %
BUN SERPL-MCNC: 6 MG/DL (ref 7–30)
CALCIUM SERPL-MCNC: 7.9 MG/DL (ref 8.5–10.1)
CHLORIDE SERPL-SCNC: 105 MMOL/L (ref 94–109)
CO2 SERPL-SCNC: 29 MMOL/L (ref 20–32)
CREAT SERPL-MCNC: 0.7 MG/DL (ref 0.52–1.04)
DIFFERENTIAL METHOD BLD: NORMAL
EOSINOPHIL # BLD AUTO: 0.1 10E9/L (ref 0–0.7)
EOSINOPHIL NFR BLD AUTO: 1.3 %
ERYTHROCYTE [DISTWIDTH] IN BLOOD BY AUTOMATED COUNT: 14.7 % (ref 10–15)
GFR SERPL CREATININE-BSD FRML MDRD: >90 ML/MIN/{1.73_M2}
GLUCOSE SERPL-MCNC: 88 MG/DL (ref 70–99)
HCT VFR BLD AUTO: 39.7 % (ref 35–47)
HGB BLD-MCNC: 12.7 G/DL (ref 11.7–15.7)
IMM GRANULOCYTES # BLD: 0 10E9/L (ref 0–0.4)
IMM GRANULOCYTES NFR BLD: 0.3 %
INTERPRETATION ECG - MUSE: NORMAL
LYMPHOCYTES # BLD AUTO: 2.1 10E9/L (ref 0.8–5.3)
LYMPHOCYTES NFR BLD AUTO: 32.6 %
MCH RBC QN AUTO: 28.2 PG (ref 26.5–33)
MCHC RBC AUTO-ENTMCNC: 32 G/DL (ref 31.5–36.5)
MCV RBC AUTO: 88 FL (ref 78–100)
MONOCYTES # BLD AUTO: 0.5 10E9/L (ref 0–1.3)
MONOCYTES NFR BLD AUTO: 8 %
NEUTROPHILS # BLD AUTO: 3.7 10E9/L (ref 1.6–8.3)
NEUTROPHILS NFR BLD AUTO: 57.3 %
NRBC # BLD AUTO: 0 10*3/UL
NRBC BLD AUTO-RTO: 0 /100
PLATELET # BLD AUTO: 205 10E9/L (ref 150–450)
POTASSIUM SERPL-SCNC: 3.4 MMOL/L (ref 3.4–5.3)
RBC # BLD AUTO: 4.51 10E12/L (ref 3.8–5.2)
SODIUM SERPL-SCNC: 140 MMOL/L (ref 133–144)
TROPONIN I BLD-MCNC: 0 UG/L (ref 0–0.08)
TROPONIN I SERPL-MCNC: <0.015 UG/L (ref 0–0.04)
WBC # BLD AUTO: 6.4 10E9/L (ref 4–11)

## 2020-10-14 PROCEDURE — 93005 ELECTROCARDIOGRAM TRACING: CPT

## 2020-10-14 PROCEDURE — 250N000009 HC RX 250: Performed by: EMERGENCY MEDICINE

## 2020-10-14 PROCEDURE — 250N000013 HC RX MED GY IP 250 OP 250 PS 637: Performed by: EMERGENCY MEDICINE

## 2020-10-14 PROCEDURE — 85025 COMPLETE CBC W/AUTO DIFF WBC: CPT | Performed by: EMERGENCY MEDICINE

## 2020-10-14 PROCEDURE — 99285 EMERGENCY DEPT VISIT HI MDM: CPT | Mod: 25

## 2020-10-14 PROCEDURE — 96374 THER/PROPH/DIAG INJ IV PUSH: CPT | Mod: 59

## 2020-10-14 PROCEDURE — 250N000011 HC RX IP 250 OP 636: Performed by: EMERGENCY MEDICINE

## 2020-10-14 PROCEDURE — 96375 TX/PRO/DX INJ NEW DRUG ADDON: CPT

## 2020-10-14 PROCEDURE — 84484 ASSAY OF TROPONIN QUANT: CPT

## 2020-10-14 PROCEDURE — 93971 EXTREMITY STUDY: CPT | Mod: LT

## 2020-10-14 PROCEDURE — 80048 BASIC METABOLIC PNL TOTAL CA: CPT | Performed by: EMERGENCY MEDICINE

## 2020-10-14 PROCEDURE — 84484 ASSAY OF TROPONIN QUANT: CPT | Performed by: EMERGENCY MEDICINE

## 2020-10-14 PROCEDURE — 71275 CT ANGIOGRAPHY CHEST: CPT

## 2020-10-14 RX ORDER — ACETAMINOPHEN 500 MG
1000 TABLET ORAL ONCE
Status: COMPLETED | OUTPATIENT
Start: 2020-10-14 | End: 2020-10-14

## 2020-10-14 RX ORDER — ONDANSETRON 2 MG/ML
4 INJECTION INTRAMUSCULAR; INTRAVENOUS
Status: COMPLETED | OUTPATIENT
Start: 2020-10-14 | End: 2020-10-14

## 2020-10-14 RX ORDER — IOPAMIDOL 755 MG/ML
500 INJECTION, SOLUTION INTRAVASCULAR ONCE
Status: COMPLETED | OUTPATIENT
Start: 2020-10-14 | End: 2020-10-14

## 2020-10-14 RX ORDER — KETOROLAC TROMETHAMINE 15 MG/ML
15 INJECTION, SOLUTION INTRAMUSCULAR; INTRAVENOUS ONCE
Status: COMPLETED | OUTPATIENT
Start: 2020-10-14 | End: 2020-10-14

## 2020-10-14 RX ADMIN — IOPAMIDOL 66 ML: 755 INJECTION, SOLUTION INTRAVENOUS at 08:11

## 2020-10-14 RX ADMIN — ACETAMINOPHEN 1000 MG: 500 TABLET, FILM COATED ORAL at 07:35

## 2020-10-14 RX ADMIN — SODIUM CHLORIDE 85 ML: 9 INJECTION, SOLUTION INTRAVENOUS at 08:11

## 2020-10-14 RX ADMIN — KETOROLAC TROMETHAMINE 15 MG: 15 INJECTION, SOLUTION INTRAMUSCULAR; INTRAVENOUS at 09:20

## 2020-10-14 RX ADMIN — ONDANSETRON 4 MG: 2 INJECTION INTRAMUSCULAR; INTRAVENOUS at 07:36

## 2020-10-14 ASSESSMENT — ENCOUNTER SYMPTOMS
MYALGIAS: 1
CHEST TIGHTNESS: 1
CHILLS: 0
FEVER: 0
COLOR CHANGE: 0
NUMBNESS: 0
DIZZINESS: 0
DIAPHORESIS: 0
SHORTNESS OF BREATH: 0
COUGH: 0
NAUSEA: 0

## 2020-10-14 NOTE — LETTER
October 14, 2020      To Whom It May Concern:      Jeff Serra was seen in our Emergency Department today, 10/14/20.  I expect her condition to improve over the next three  days.  She may return to work/school when improved.    Sincerely,

## 2020-10-14 NOTE — ED AVS SNAPSHOT
[facility]  [address]  [facility] [address]  Phone: [phone]  Fax: [phone]                                    Jeff Serra   MRN: [id number]    Department: [facility]   Date of Visit: 10/14/2020           After Visit Summary Signature Page    I have received my discharge instructions, and my questions have been answered. I have discussed any challenges I see with this plan with the nurse or doctor.    ..........................................................................................................................................  Patient/Patient Representative Signature      ..........................................................................................................................................  Patient Representative Print Name and Relationship to Patient    ..................................................               ................................................  Date                                   Time    ..........................................................................................................................................  Reviewed by Signature/Title    ...................................................              ..............................................  Date                                               Time          22EPIC Rev 08/18

## 2020-10-14 NOTE — ED TRIAGE NOTES
Pt presents to ED due to midsternal chest pain.  Pt also concerned that she had high BP readings at home of 170 systolic.  Denies recent BP med changes or missing doses.  Also hx of PE, is not on blood thinners at this time.  Denies SOB.  ABCs intact

## 2020-10-14 NOTE — ED PROVIDER NOTES
History     Chief Complaint:  Chest Pain    HPI   Jeff Serra is a 45 year old female with a remote history of DVT and pulmonary embolism after surgery who presents with concerns for right-sided chest tightness since last night that she describes as similar to when she was diagnosed with a pulmonary embolism.  She notes the pain came on randomly without any trigger.  She notes associated elevated blood pressure this morning which concerned her as she took her normal nightly meds.  She denies associated shortness of breath, dizziness, nausea, or sweating.  She denies cough, congestion, fever or chills.  She notes she had surgery on her left lower extremity 3 months ago and was briefly in a cast and then in a boot which was just recently removed.  She notes mild swelling of the leg and mild pain in her medial thigh.  She denies any numbness or tingling.  She denies any color change.  She notes she was on blood thinning medications for the pulmonary embolism for 3 months.    Allergies:  Contrast dye (nausea)    Medications:    Bentyl   Gabapentin   Cozaar  Omeprazole   Senna  Triamterene-hydrochlorothiazide     Past Medical History:    Chondral defect of left patella  Cyst of ovary  Vitamin D deficiency  Chronic midline low back pain without sciatica  Renal calculi  Chronic pain syndrome  Pulmonary embolism  Hypertension  Chronic neck pain  Continuous opioid dependence   Right leg DVT    Past Surgical History:    EGD, combined x2  Hysterectomy   Laparotomy mini, tubal ligation, combined  Lithotripsy    Family History:    Hypertension     Social History:  Smoking status- never smoker  Alcohol use- yes, occasional  Drug use- no   Marital Status:        Review of Systems   Constitutional: Negative for chills, diaphoresis and fever.   HENT: Negative for congestion.    Respiratory: Positive for chest tightness. Negative for cough and shortness of breath.    Cardiovascular: Positive for chest pain.    Gastrointestinal: Negative for nausea.   Musculoskeletal: Positive for myalgias.   Skin: Negative for color change.   Neurological: Negative for dizziness and numbness.   All other systems reviewed and are negative.    Physical Exam     Patient Vitals for the past 24 hrs:   BP Temp Temp src Pulse Resp SpO2   10/14/20 0750 -- -- -- -- 21 98 %   10/14/20 0740 -- -- -- 84 21 97 %   10/14/20 0735 -- -- -- 78 20 99 %   10/14/20 0725 -- -- -- 79 18 100 %   10/14/20 0710 (!) 141/90 -- -- 82 -- 99 %   10/14/20 0658 (!) 146/109 97  F (36.1  C) Temporal 87 18 99 %     Physical Exam  General: Adult female sitting upright  Eyes: PERRL, Conjunctive within normal limits  ENT: Moist mucous membranes, oropharynx clear.   CV: Normal S1S2, no murmur, rub or gallop. Regular rate and rhythm  Resp: Clear to auscultation bilaterally, no wheezes, rales or rhonchi. Normal respiratory effort.  GI: Abdomen is soft, nontender and nondistended. No palpable masses. No rebound or guarding.  MSK: No pitting edema. Mild tenderness in the left medial thigh without palpable cord. Mild tenderness over the right sternochondral border without palpable edema or crepitus. Normal active range of motion of the extremities.  Skin: Warm and dry. No rashes or lesions or ecchymoses on visible skin.  Neuro: Alert and oriented. Responds appropriately to all questions and commands. No focal findings appreciated. Normal muscle tone.  Psych: Normal mood and affect. Pleasant.    Emergency Department Course     ECG (07:10:55):  Rate 87 bpm. UT interval 138. QRS duration 74. QT/QTc 378/454. P-R-T axes 48 28 13. Sinus rhythm. Normal ECG. When compared with ECG of 11-MAY-2020 09:17, no significant change was found. Interpreted at 7011 by Sarah Domingo MD.     Imaging:  Radiology findings were communicated with the patient who voiced understanding of the findings.    CT Chest Pulmonary Embolism w contrast   1.  No evidence for pulmonary artery embolism.  Thoracic aorta is  grossly of normal appearance.  2.  Heart is upper normal size.  3.  Probable diffuse fatty infiltration of the liver.  4.  No other significant abnormalities.  Reading per radiology     US Lower extremity Venous duplex left  1.  No deep venous thrombosis in the left lower extremity.  Reading per radiology    Laboratory:  Laboratory findings were communicated with the patient who voiced understanding of the findings.    CBC: WNL (WBC 6.4, HGB 12.7, )  BMP: Urea Nitrogen: 6 (L), Calcium: 7.9 (L), o/w WNL (Creatinine: 0.70)    Troponin (Collected 0727): <0.015      Interventions:  0735 Tylenol 1000 mg PO    0736 Zofran 4 mg IV     0920 Toradol 15mg IV    Emergency Department Course:  Past medical records, nursing notes, and vitals reviewed.    0700 I performed an exam of the patient as documented above.     0712 EKG obtained in the ED, see results above.     0728 IV was inserted and blood was drawn for laboratory testing, results above.    0756 The patient was sent for a CT chest and US lower extremity while in the emergency department, results above.     0917 I rechecked the patient and discussed the results of the ED workup thus far.    Findings and plan explained to the Patient. She feels comfortable with plan. Patient discharged home with instructions regarding supportive care, medications, and reasons to return. The importance of close follow-up was reviewed.     Impression & Plan       Medical Decision Making:  Jeff Serra is a 45 year old female who presents with chest pain. The work up in the ER is negative. The differential diagnosis of chest pain is broad and includes life threatening etiologies such as acute coronary syndrome, myocardial infarction, pulmonary embolism, acute aortic dissection, amongst others. Other causes may include pneumonia, pneumothorax, chest wall source, pericarditis, pleurisy, esophageal spasm, etc. No serious etiology for the chest pain were detected  today during this visit. Workup included thorought testing for PE/DVT given her history and was fortunately negative.  Her symptoms would be atypical for ACS and her symptoms were long in duration with a negative troponin no signs of acute ischemia or injury on ECG.  Using reasonable clinical judgment there does not appear to be acute life-threatening pathology present on today's evaluation.  Close follow up with primary care is indicated should the pain continue, as further work up may be performed; this was made clear to the patient, who understands.   Diagnosis:    ICD-10-CM   1. Atypical chest pain  R07.89   2. Left leg swelling  M79.89   3. Hypertension, unspecified type  I10     Disposition:  Discharged to home.    Scribe Disclosure:  I, Radha Hennessy, am serving as a scribe at 7:01 AM on 10/14/2020 to document services personally performed by Sarah Domingo MD based on my observations and the provider's statements to me.        Sarah Domingo MD  10/14/20 3608

## 2020-10-16 RX ORDER — OXYCODONE HYDROCHLORIDE 15 MG/1
15 TABLET ORAL 3 TIMES DAILY PRN
Qty: 90 TABLET | Refills: 0 | Status: SHIPPED | OUTPATIENT
Start: 2020-10-16 | End: 2020-11-12

## 2020-10-16 NOTE — TELEPHONE ENCOUNTER
Controlled Substance Refill Request for Oxycodone  Problem List Complete:  Yes  Chronic pain syndrome  Problem Detail    Noted:  12/1/2015   Priority:  Medium   Overview Addendum 9/17/2020 10:37 AM by Latricia Zapien RN   Patient is followed by PATRICIA RAYMOND for ongoing prescription of pain medication.  All refills should be approved by this provider, or covering partner.     Medication(s): oxycodone 15 three times daily = 67.5 MED  Narcan n/a     UDS (May '19) showed unexpected benzo  Plan - monthly UDS for a while - any further fail will nullify contract     Clinic visit frequency required: Q 3 months      Controlled substance agreement on file: Yes       Date(s): 8/28/19     Pain Clinic evaluation in the past: No     DIRE Total Score(s):    8/31/2015   Total Score 16         Last MNP website verification:  09/17/2020   [email or web address]    checked in past 3 months?  Yes 9/17/20  RX monitoring program ([place]) reviewed:  not reviewed/not due - last done on 9/17/20  [place] profile:  [email or web address]  Last Written Prescription Date:  9/17/20  Last Fill Quantity: 90 tablets  # refills: 0   Last office visit: 10/11/2019 with prescribing provider:  8/12/20 Virtual visit   Future Office Visit:  None        Herminia Rutherford RN, BSN, PHN

## 2020-10-16 NOTE — TELEPHONE ENCOUNTER
Oxycodone filled according to problem list overview.  Due for follow up with Dr. Loredo approximately 11/12/2020 - notification sent to patient

## 2020-11-18 PROBLEM — M25.562 KNEE PAIN, LEFT: Status: RESOLVED | Noted: 2020-04-29 | Resolved: 2020-11-18

## 2020-12-08 ENCOUNTER — MYC REFILL (OUTPATIENT)
Dept: FAMILY MEDICINE | Facility: CLINIC | Age: 45
End: 2020-12-08

## 2020-12-08 DIAGNOSIS — M54.50 CHRONIC MIDLINE LOW BACK PAIN WITHOUT SCIATICA: ICD-10-CM

## 2020-12-08 DIAGNOSIS — G89.29 CHRONIC MIDLINE LOW BACK PAIN WITHOUT SCIATICA: ICD-10-CM

## 2020-12-08 DIAGNOSIS — G89.4 CHRONIC PAIN SYNDROME: ICD-10-CM

## 2020-12-10 RX ORDER — OXYCODONE HYDROCHLORIDE 15 MG/1
15 TABLET ORAL 3 TIMES DAILY PRN
Qty: 90 TABLET | Refills: 0 | Status: SHIPPED | OUTPATIENT
Start: 2020-12-11 | End: 2021-01-11

## 2020-12-11 NOTE — TELEPHONE ENCOUNTER
Routing refill request to provider for review/approval because:  Drug not on the [facility] refill protocol     Controlled Substance Refill Request for Oxycodone  Problem List Complete:  Yes   checked in past 3 months?  Yes  9/17/20       Patient is followed by PATRICIA RAYMOND for ongoing prescription of pain medication.  All refills should be approved by this provider, or covering partner.     Medication(s): oxycodone 15 three times daily = 67.5 MED  Narcan n/a     UDS (May '19) showed unexpected benzo  Plan - monthly UDS for a while - any further fail will nullify contract     Clinic visit frequency required: Q 3 months      Controlled substance agreement on file: Yes       Date(s): 8/28/19     Pain Clinic evaluation in the past: No     DIRE Total Score(s):    8/31/2015   Total Score 16         Last MNP website verification:  09/17/2020      RX monitoring program ([place]) reviewed:  not reviewed/not due - last done on 9/17/20    [place] profile:  [email or web address]

## 2020-12-16 DIAGNOSIS — I10 HYPERTENSION GOAL BP (BLOOD PRESSURE) < 140/90: ICD-10-CM

## 2020-12-17 RX ORDER — TRIAMTERENE/HYDROCHLOROTHIAZID 37.5-25 MG
1 TABLET ORAL DAILY
Qty: 30 TABLET | Refills: 0 | Status: SHIPPED | OUTPATIENT
Start: 2020-12-17 | End: 2021-01-11

## 2020-12-17 NOTE — TELEPHONE ENCOUNTER
Routing refill request to provider for review/approval because:  Elevated BP    Ruthann Myers RN   [facility] -- Triage Nurse

## 2020-12-17 NOTE — TELEPHONE ENCOUNTER
Routing refill request to provider for review/approval because:  Radha given x1 and patient did not follow up, please advise  Philly Albert RN, BSN  Message handled by CLINIC NURSE.

## 2020-12-18 RX ORDER — LOSARTAN POTASSIUM 50 MG/1
50 TABLET ORAL DAILY
Qty: 30 TABLET | Refills: 0 | Status: SHIPPED | OUTPATIENT
Start: 2020-12-18 | End: 2021-01-11

## 2021-01-07 ENCOUNTER — MYC REFILL (OUTPATIENT)
Dept: FAMILY MEDICINE | Facility: CLINIC | Age: 46
End: 2021-01-07

## 2021-01-07 DIAGNOSIS — G89.29 CHRONIC MIDLINE LOW BACK PAIN WITHOUT SCIATICA: ICD-10-CM

## 2021-01-07 DIAGNOSIS — G89.4 CHRONIC PAIN SYNDROME: ICD-10-CM

## 2021-01-07 DIAGNOSIS — M54.50 CHRONIC MIDLINE LOW BACK PAIN WITHOUT SCIATICA: ICD-10-CM

## 2021-01-07 RX ORDER — OXYCODONE HYDROCHLORIDE 15 MG/1
15 TABLET ORAL 3 TIMES DAILY PRN
Qty: 90 TABLET | Refills: 0 | Status: CANCELLED | OUTPATIENT
Start: 2021-01-07

## 2021-01-11 ENCOUNTER — VIRTUAL VISIT (OUTPATIENT)
Dept: FAMILY MEDICINE | Facility: CLINIC | Age: 46
End: 2021-01-11
Payer: COMMERCIAL

## 2021-01-11 DIAGNOSIS — G89.4 CHRONIC PAIN SYNDROME: ICD-10-CM

## 2021-01-11 DIAGNOSIS — G89.29 CHRONIC MIDLINE LOW BACK PAIN WITHOUT SCIATICA: ICD-10-CM

## 2021-01-11 DIAGNOSIS — K29.70 GASTRITIS WITHOUT BLEEDING, UNSPECIFIED CHRONICITY, UNSPECIFIED GASTRITIS TYPE: ICD-10-CM

## 2021-01-11 DIAGNOSIS — M54.50 CHRONIC MIDLINE LOW BACK PAIN WITHOUT SCIATICA: ICD-10-CM

## 2021-01-11 DIAGNOSIS — M25.562 PAIN IN BOTH KNEES, UNSPECIFIED CHRONICITY: Primary | ICD-10-CM

## 2021-01-11 DIAGNOSIS — M25.561 PAIN IN BOTH KNEES, UNSPECIFIED CHRONICITY: Primary | ICD-10-CM

## 2021-01-11 DIAGNOSIS — I10 HYPERTENSION GOAL BP (BLOOD PRESSURE) < 140/90: ICD-10-CM

## 2021-01-11 PROCEDURE — 99214 OFFICE O/P EST MOD 30 MIN: CPT | Mod: 95 | Performed by: FAMILY MEDICINE

## 2021-01-11 RX ORDER — OXYCODONE HYDROCHLORIDE 15 MG/1
15 TABLET ORAL 3 TIMES DAILY PRN
Qty: 90 TABLET | Refills: 0 | Status: SHIPPED | OUTPATIENT
Start: 2021-01-11 | End: 2021-02-10

## 2021-01-11 RX ORDER — OMEPRAZOLE 40 MG/1
40 CAPSULE, DELAYED RELEASE ORAL DAILY
Qty: 90 CAPSULE | Refills: 1 | Status: SHIPPED | OUTPATIENT
Start: 2021-01-11 | End: 2021-08-23

## 2021-01-11 RX ORDER — LOSARTAN POTASSIUM 50 MG/1
50 TABLET ORAL DAILY
Qty: 90 TABLET | Refills: 0 | Status: SHIPPED | OUTPATIENT
Start: 2021-01-11 | End: 2021-03-19

## 2021-01-11 RX ORDER — TRIAMTERENE/HYDROCHLOROTHIAZID 37.5-25 MG
1 TABLET ORAL DAILY
Qty: 90 TABLET | Refills: 0 | Status: SHIPPED | OUTPATIENT
Start: 2021-01-11 | End: 2021-03-19

## 2021-01-11 NOTE — PROGRESS NOTES
"Jeff is a 45 year old who is being evaluated via a billable telephone visit.      What phone number would you like to be contacted at? cell  How would you like to obtain your AVS? MyChart  Assessment & Plan     Pain in both knees, unspecified chronicity  Patient experiencing pain and a little bit of swelling in both knees.  Discussed with her that I do not know if it is \"directly\" related to her previous ankle fracture that needed surgery but presumably that puts a lot of abnormal stress.  So we discussed options including medication, therapy, cortisone injection and patient would like to get into see an orthopedic specialist help direct treatment.  I think this is certainly reasonable.  - Orthopedic & Spine  Referral; Future    Chronic pain syndrome  Stable on current regimen for chronic back pain.   database reviewed.  Up-to-date on controlled substance agreement and will obtain urine drug screen next available time    Chronic midline low back pain without sciatica  As above    Hypertension goal BP (blood pressure) < 140/90  Numbers have been well controlled on current regimen.  Will continue same.  Had BMP in June and we will plan to recheck in about 3 months.    Gastritis without bleeding, unspecified chronicity, unspecified gastritis type  Stable on current regimen.  Continue same plan and routine follow-up.        See Patient Instructions    Return in about 3 months (around 4/11/2021) for Follow up on Pain, can call for refills.    Jordyn Loredo MD  [facility]    Subjective     HPI     Telephone visit with patient today regarding ongoing knee pain for the past couple of months.  She says that ever since recovery from her ankle surgery she has had a lot of pain.  We also discussed her chronic back pain, reflux, hypertension.  Doing well.  Reports no interval health concerns.  Patient reports no side effects from medications, and desires no change in therapy. "     Review of Systems   Constitutional, HEENT, cardiovascular, pulmonary, gi and gu systems are negative, except as otherwise noted.      Objective           Vitals:  No vitals were obtained today due to virtual visit.    Physical Exam   healthy, alert and no distress  PSYCH: Alert and oriented times 3; coherent speech, normal   rate and volume, able to articulate logical thoughts, able   to abstract reason, no tangential thoughts, no hallucinations   or delusions  Her affect is normal  RESP: No cough, no audible wheezing, able to talk in full sentences  Remainder of exam unable to be completed due to telephone visits    Past labs reviewed with the patient.             Phone call duration: 12 minutes

## 2021-01-14 ENCOUNTER — HEALTH MAINTENANCE LETTER (OUTPATIENT)
Age: 46
End: 2021-01-14

## 2021-02-04 ENCOUNTER — MYC MEDICAL ADVICE (OUTPATIENT)
Dept: FAMILY MEDICINE | Facility: CLINIC | Age: 46
End: 2021-02-04

## 2021-02-04 ENCOUNTER — MYC REFILL (OUTPATIENT)
Dept: FAMILY MEDICINE | Facility: CLINIC | Age: 46
End: 2021-02-04

## 2021-02-04 DIAGNOSIS — M54.50 CHRONIC MIDLINE LOW BACK PAIN WITHOUT SCIATICA: ICD-10-CM

## 2021-02-04 DIAGNOSIS — G89.4 CHRONIC PAIN SYNDROME: ICD-10-CM

## 2021-02-04 DIAGNOSIS — G89.29 CHRONIC MIDLINE LOW BACK PAIN WITHOUT SCIATICA: ICD-10-CM

## 2021-02-05 RX ORDER — OXYCODONE HYDROCHLORIDE 15 MG/1
15 TABLET ORAL 3 TIMES DAILY PRN
Qty: 90 TABLET | Refills: 0 | OUTPATIENT
Start: 2021-02-05

## 2021-02-05 NOTE — TELEPHONE ENCOUNTER
Prescription was filled for oxycodone on 1/11/21- too early for refill - PCP can address upon return to clinic 1/10/21 - I will notify patient via [employer] and route back to Dr. Loredo to address upon return to clinic

## 2021-02-05 NOTE — TELEPHONE ENCOUNTER
Controlled Substance Refill Request for Oxycodone  Problem List Complete:  Yes  Chronic pain syndrome  Problem Detail    Noted:  12/1/2015   Priority:  Medium   Overview Addendum 9/17/2020 10:37 AM by Latricia Zapien RN   Patient is followed by PATRICIA RAYMOND for ongoing prescription of pain medication.  All refills should be approved by this provider, or covering partner.     Medication(s): oxycodone 15 three times daily = 67.5 MED  Narcan n/a     UDS (May '19) showed unexpected benzo  Plan - monthly UDS for a while - any further fail will nullify contract     Clinic visit frequency required: Q 3 months      Controlled substance agreement on file: Yes       Date(s): 8/28/19     Pain Clinic evaluation in the past: No     DIRE Total Score(s):    8/31/2015   Total Score 16         Last MNP website verification:  09/17/2020   [email or web address]    checked in past 3 months?  No- not working appropriately. Last checked on 9/17/20  Last Written Prescription Date:  1/11/21  Last Fill Quantity: 90 tablets  # refills: 0   Last office visit: 10/11/2019 with prescribing provider:  1/11/21   Future Office Visit:  None  RX monitoring program ([place]MP) reviewed: Unable to run  today due to technical difficulties  MNPMP profile:  [email or web address]      Herminia Rutherford RN, BSN, PHN

## 2021-02-09 NOTE — TELEPHONE ENCOUNTER
Completed form faxed to [phone] and [phone].  form Sent to [employer].    MARY Gates.

## 2021-02-10 RX ORDER — OXYCODONE HYDROCHLORIDE 15 MG/1
15 TABLET ORAL 3 TIMES DAILY PRN
Qty: 90 TABLET | Refills: 0 | Status: SHIPPED | OUTPATIENT
Start: 2021-02-10 | End: 2021-03-08

## 2021-02-10 NOTE — TELEPHONE ENCOUNTER
Pt called asking on status of this refill.  Pt states she will be out of medication tomorrow 2/11/21.    MARY Gates.

## 2021-02-25 ENCOUNTER — ANCILLARY PROCEDURE (OUTPATIENT)
Dept: GENERAL RADIOLOGY | Facility: CLINIC | Age: 46
End: 2021-02-25
Attending: STUDENT IN AN ORGANIZED HEALTH CARE EDUCATION/TRAINING PROGRAM
Payer: COMMERCIAL

## 2021-02-25 ENCOUNTER — OFFICE VISIT (OUTPATIENT)
Dept: ORTHOPEDICS | Facility: CLINIC | Age: 46
End: 2021-02-25
Payer: COMMERCIAL

## 2021-02-25 VITALS
DIASTOLIC BLOOD PRESSURE: 98 MMHG | WEIGHT: 195 LBS | HEIGHT: 63 IN | BODY MASS INDEX: 34.55 KG/M2 | SYSTOLIC BLOOD PRESSURE: 144 MMHG

## 2021-02-25 DIAGNOSIS — M25.561 CHRONIC PAIN OF BOTH KNEES: ICD-10-CM

## 2021-02-25 DIAGNOSIS — M25.561 CHRONIC PAIN OF BOTH KNEES: Primary | ICD-10-CM

## 2021-02-25 DIAGNOSIS — G89.29 CHRONIC PAIN OF BOTH KNEES: ICD-10-CM

## 2021-02-25 DIAGNOSIS — G89.29 CHRONIC PAIN OF BOTH KNEES: Primary | ICD-10-CM

## 2021-02-25 DIAGNOSIS — M25.562 CHRONIC PAIN OF BOTH KNEES: ICD-10-CM

## 2021-02-25 DIAGNOSIS — M25.562 CHRONIC PAIN OF BOTH KNEES: Primary | ICD-10-CM

## 2021-02-25 DIAGNOSIS — M23.8X2 CHONDRAL DEFECT OF LEFT PATELLA: ICD-10-CM

## 2021-02-25 PROCEDURE — 73562 X-RAY EXAM OF KNEE 3: CPT | Mod: LT | Performed by: RADIOLOGY

## 2021-02-25 PROCEDURE — 99214 OFFICE O/P EST MOD 30 MIN: CPT | Mod: 25 | Performed by: STUDENT IN AN ORGANIZED HEALTH CARE EDUCATION/TRAINING PROGRAM

## 2021-02-25 PROCEDURE — 20610 DRAIN/INJ JOINT/BURSA W/O US: CPT | Mod: LT | Performed by: STUDENT IN AN ORGANIZED HEALTH CARE EDUCATION/TRAINING PROGRAM

## 2021-02-25 RX ORDER — DICLOFENAC SODIUM 75 MG/1
75 TABLET, DELAYED RELEASE ORAL 2 TIMES DAILY PRN
Qty: 60 TABLET | Refills: 1 | Status: SHIPPED | OUTPATIENT
Start: 2021-02-25 | End: 2021-08-18

## 2021-02-25 RX ORDER — ROPIVACAINE HYDROCHLORIDE 5 MG/ML
3 INJECTION, SOLUTION EPIDURAL; INFILTRATION; PERINEURAL
Status: DISCONTINUED | OUTPATIENT
Start: 2021-02-25 | End: 2022-05-12

## 2021-02-25 RX ORDER — TRIAMCINOLONE ACETONIDE 40 MG/ML
40 INJECTION, SUSPENSION INTRA-ARTICULAR; INTRAMUSCULAR
Status: DISCONTINUED | OUTPATIENT
Start: 2021-02-25 | End: 2022-05-12

## 2021-02-25 RX ADMIN — ROPIVACAINE HYDROCHLORIDE 3 ML: 5 INJECTION, SOLUTION EPIDURAL; INFILTRATION; PERINEURAL at 15:28

## 2021-02-25 RX ADMIN — TRIAMCINOLONE ACETONIDE 40 MG: 40 INJECTION, SUSPENSION INTRA-ARTICULAR; INTRAMUSCULAR at 15:28

## 2021-02-25 ASSESSMENT — MIFFLIN-ST. JEOR: SCORE: 1483.64

## 2021-02-25 NOTE — PROGRESS NOTES
"ASSESSMENT & PLAN    1. Chronic pain of both knees  2. Chondral defect of left patella  - XR Knee Bilateral 3 vw; Future  - PHYSICAL THERAPY REFERRAL (Internal); Future  - diclofenac (VOLTAREN) 75 MG EC tablet; Take 1 tablet (75 mg) by mouth 2 times daily as needed for moderate pain  Dispense: 60 tablet; Refill: 1    Bilateral knee pain, known chondral defect of left patella.  Evaluated for this last year, however physical therapy was cut short by COVID-19 pandemic.  Right knee seems more compensatory, possible early OA/degenerative meniscal injury but will proceed with nonoperative management.  Encouraged her to reestablish physical therapy as the most important part of her treatment.  Also discussed symptomatic management, patient elects to pursue intra-articular CSI today of the more painful left knee which she tolerated well. We will also try oral Voltaren for symptom relief.  Patient is on anticoagulation with Xarelto, however was already taking OTC NSAIDs regularly and tolerating without issue, and did not get any relief with topical Voltaren.  We did discuss the role of weight loss in treatment of chronic knee pain as well, and encouraged patient to follow-up with PCP if she would like to discuss available support resources.  Patient will follow up with me in clinic or via MyChart in 1-2 months with update on progress, sooner as needed.    Felix Rae MD  [facility] [place]    -----  Chief Complaint   Patient presents with     Left Knee - Pain     Right Knee - Pain       SUBJECTIVE  Donanayeliwaqar Serra is a/an 46 year old female who is seen in consultation at the request of  Jordyn Loredo M.D. for evaluation of  Bilateral knee pain.     The patient is seen by themselves.    Date of Onset: Left knee - \"many years\" with pain worsening over over the last few months since a left ankle injury in May / June 2020. Pain in right knee also began after left ankle " "injury in May / June 2020, no acute injury to right leg but feels like she was compensating/limping.  Location of Pain: bilateral anterior knees  Worsened by: knee extension, walking, going up and down stairs, twisting / pivoting  Better with: rest / activity avoidance, elevation  Treatments tried: rest/activity avoidance, Tylenol, physical therapy (2020, 1 session prior to COVID-19 pandemic and ankle fracture), CSI x1 with Dr. Carlton in 2020 with two months of relief, chronic oxycodone   Associated symptoms: swelling in bilateral knees, locking / catching (describes pain limiting extension, however no clear actual locking described)    Orthopedic/Surgical history: YES - left ankle ORIF - Dr. Maninder Oropeza at [facility] Date: 6/22/20 with chronic residual ankle pain.  Also has chronic low back pain.  Social History/Occupation: working from home    No family history pertinent to patient's problem today.      OBJECTIVE:  BP (!) 144/98   Ht 1.6 m (5' 3\")   Wt 88.5 kg (195 lb)   LMP 09/12/2016 (Exact Date)   BMI 34.54 kg/m     General: healthy, alert and in no distress  HEENT: no scleral icterus or conjunctival erythema  Skin: no visible suspicious lesions or rashes.   Resp: normal respiratory effort without conversational dyspnea   Psych: normal mood and affect  Gait: antalgic    MSK:   Right knee without deformity.  Possible trace effusion.  Range of motion -5 to 120 degrees, pain at end flexion.  Extensor mechanism intact.  Tender over distal hamstrings and popliteal space.  Nontender joint line, patellar facets, quad and patellar tendons, tibial plateaus.  Negative Lachman, stable varus and valgus stress    Left knee without deformity.  No effusion.  Range of motion 0 to 100 degrees, pain at end flexion and extension.  Extensor mechanism intact, active extension painful.  Tender over medial and lateral joint lines, patellar tendon.  Nontender patellar facets, tibial plateaus.  Negative Lachman, stable varus and " valgus stress    CMS intact distally bilateral lower extremities    RADIOLOGY:  Xr Knee Bilateral 3 Vw    Result Date: 2/25/2021  XR KNEE BILATERAL 3 VW 2/25/2021 2:56 PM HISTORY: Chronic pain of both knees; Chronic pain of both knees; Chronic pain of both knees     IMPRESSION: Right patellar mild spurring at the quadriceps tendon attachment. Otherwise unremarkable bilateral knee radiographs. HAROON LOPEZ MD       LEFT KNEE THREE VIEWS AND RIGHT KNEE TWO VIEWS 1/28/2020 2:47 PM     HISTORY: Acute pain of left knee.     COMPARISON: None.     FINDINGS: No fracture or osseous lesion is seen. The joint spaces are  well preserved. No soft tissue pathology is seen.                                                                       IMPRESSION: Unremarkable examination.      EMILIA KHANNA MD      MR KNEE LEFT WITHOUT CONTRAST January 23, 2020 12:57 PM     HISTORY: Knee instability. Mechanical knee symptoms: locking,  catching, snapping, crepitus. Left knee pain, unspecified chronicity.     TECHNIQUE: Sagittal proton density and T2, coronal T1, and coronal and  transverse fat suppressed T2 weighted images.     COMPARISON: None.     FINDINGS:   Medial Meniscus: No tear, displaced fragment, or extrusion.        Lateral Meniscus: No tear, displaced fragment, or extrusion.        Anterior Cruciate Ligament: Intact.      Posterior Cruciate Ligament: Intact.      Medial Collateral Ligament: Intact.     Lateral Collateral Ligament Complex, Popliteus Tendon: The lateral  collateral ligament appears very thinned proximally likely the result  of partial tear. There does appear to be a thin strip of intact  fibers. Iliotibial band and biceps femoris tendon are intact.  Popliteus tendon and muscle are intact.     Osseous Structures and Cartilaginous Surfaces: Medial and lateral  compartment articular cartilage is intact. There is a cartilage  fissure in the central patella with associated subchondral edema.  Femoral trochlear  articular cartilage is intact.     Extensor Mechanism: The quadriceps and infrapatellar tendons are  intact. The medial and lateral patellar retinacula appear  unremarkable.     Joint Space: Trace joint effusion. No definite articular bodies are  demonstrated.     Additional Findings: No semimembranosus-tibial collateral ligament or  pes anserine bursitis. No Baker's cyst.                                                                      IMPRESSION:    1. The lateral collateral ligament appears very thinned proximally  likely result of partial tearing. There does appear to be a thin strip  of intact fibers proximally. Iliotibial band, biceps tendon, and  popliteus tendon are intact.  2. Small cartilage fissure in the central patella with associated  subchondral edema.  3. Trace joint effusion.     PORTIA DELGADO MD    Large Joint Injection/Arthocentesis: L knee joint    Date/Time: 2/25/2021 3:28 PM  Performed by: Felix Rae MD  Authorized by: Felix Rae MD     Indications:  Pain and osteoarthritis  Needle Size:  22 G  Guidance: landmark guided    Approach:  Anterolateral  Location:  Knee      Medications:  40 mg triamcinolone 40 MG/ML; 3 mL ropivacaine 5 MG/ML  Medications comment:  4mL ropivacaine was administered  Outcome:  Tolerated well, no immediate complications  Procedure discussed: discussed risks, benefits, and alternatives    Consent Given by:  Patient  Timeout: timeout called immediately prior to procedure    Prep: patient was prepped and draped in usual sterile fashion            35 minutes spent on the date of the encounter doing chart review, history and exam, documentation and further activities as noted above excluding procedure time

## 2021-02-25 NOTE — LETTER
2/25/2021         RE: Jeff Serra  [id number] [facility] [id number]        Dear Colleague,    Thank you for referring your patient, Jeff Serra, to the [facility]. Please see a copy of my visit note below.    ASSESSMENT & PLAN    1. Chronic pain of both knees  2. Chondral defect of left patella  - XR Knee Bilateral 3 vw; Future  - PHYSICAL THERAPY REFERRAL (Internal); Future  - diclofenac (VOLTAREN) 75 MG EC tablet; Take 1 tablet (75 mg) by mouth 2 times daily as needed for moderate pain  Dispense: 60 tablet; Refill: 1    Bilateral knee pain, known chondral defect of left patella.  Evaluated for this last year, however physical therapy was cut short by COVID-19 pandemic.  Right knee seems more compensatory, possible early OA/degenerative meniscal injury but will proceed with nonoperative management.  Encouraged her to reestablish physical therapy as the most important part of her treatment.  Also discussed symptomatic management, patient elects to pursue intra-articular CSI today of the more painful left knee which she tolerated well. We will also try oral Voltaren for symptom relief.  Patient is on anticoagulation with Xarelto, however was already taking OTC NSAIDs regularly and tolerating without issue, and did not get any relief with topical Voltaren.  We did discuss the role of weight loss in treatment of chronic knee pain as well, and encouraged patient to follow-up with PCP if she would like to discuss available support resources.  Patient will follow up with me in clinic or via MyChart in 1-2 months with update on progress, sooner as needed.    Felix Rae MD  [facility]    -----  Chief Complaint   Patient presents with     Left Knee - Pain     Right Knee - Pain       SUBJECTIVE  Jeff Serra is a/an 46 year old female who is seen in consultation at the request of  Jordyn Rowe  "Facundo HOLCOMB for evaluation of  Bilateral knee pain.     The patient is seen by themselves.    Date of Onset: Left knee - \"many years\" with pain worsening over over the last few months since a left ankle injury in May / June 2020. Pain in right knee also began after left ankle injury in May / June 2020, no acute injury to right leg but feels like she was compensating/limping.  Location of Pain: bilateral anterior knees  Worsened by: knee extension, walking, going up and down stairs, twisting / pivoting  Better with: rest / activity avoidance, elevation  Treatments tried: rest/activity avoidance, Tylenol, physical therapy (2020, 1 session prior to COVID-19 pandemic and ankle fracture), CSI x1 with Dr. Carlton in 2020 with two months of relief, chronic oxycodone   Associated symptoms: swelling in bilateral knees, locking / catching (describes pain limiting extension, however no clear actual locking described)    Orthopedic/Surgical history: YES - left ankle ORIF - Dr. Maninder Oropeza at [facility] Date: 6/22/20 with chronic residual ankle pain.  Also has chronic low back pain.  Social History/Occupation: working from home    No family history pertinent to patient's problem today.      OBJECTIVE:  BP (!) 144/98   Ht 1.6 m (5' 3\")   Wt 88.5 kg (195 lb)   LMP 09/12/2016 (Exact Date)   BMI 34.54 kg/m     General: healthy, alert and in no distress  HEENT: no scleral icterus or conjunctival erythema  Skin: no visible suspicious lesions or rashes.   Resp: normal respiratory effort without conversational dyspnea   Psych: normal mood and affect  Gait: antalgic    MSK:   Right knee without deformity.  Possible trace effusion.  Range of motion -5 to 120 degrees, pain at end flexion.  Extensor mechanism intact.  Tender over distal hamstrings and popliteal space.  Nontender joint line, patellar facets, quad and patellar tendons, tibial plateaus.  Negative Lachman, stable varus and valgus stress    Left knee without deformity.  No " effusion.  Range of motion 0 to 100 degrees, pain at end flexion and extension.  Extensor mechanism intact, active extension painful.  Tender over medial and lateral joint lines, patellar tendon.  Nontender patellar facets, tibial plateaus.  Negative Lachman, stable varus and valgus stress    CMS intact distally bilateral lower extremities    RADIOLOGY:  Xr Knee Bilateral 3 Vw    Result Date: 2/25/2021  XR KNEE BILATERAL 3 VW 2/25/2021 2:56 PM HISTORY: Chronic pain of both knees; Chronic pain of both knees; Chronic pain of both knees     IMPRESSION: Right patellar mild spurring at the quadriceps tendon attachment. Otherwise unremarkable bilateral knee radiographs. HAROON LOPEZ MD       LEFT KNEE THREE VIEWS AND RIGHT KNEE TWO VIEWS 1/28/2020 2:47 PM     HISTORY: Acute pain of left knee.     COMPARISON: None.     FINDINGS: No fracture or osseous lesion is seen. The joint spaces are  well preserved. No soft tissue pathology is seen.                                                                       IMPRESSION: Unremarkable examination.      EMILIA KHANNA MD      MR KNEE LEFT WITHOUT CONTRAST January 23, 2020 12:57 PM     HISTORY: Knee instability. Mechanical knee symptoms: locking,  catching, snapping, crepitus. Left knee pain, unspecified chronicity.     TECHNIQUE: Sagittal proton density and T2, coronal T1, and coronal and  transverse fat suppressed T2 weighted images.     COMPARISON: None.     FINDINGS:   Medial Meniscus: No tear, displaced fragment, or extrusion.        Lateral Meniscus: No tear, displaced fragment, or extrusion.        Anterior Cruciate Ligament: Intact.      Posterior Cruciate Ligament: Intact.      Medial Collateral Ligament: Intact.     Lateral Collateral Ligament Complex, Popliteus Tendon: The lateral  collateral ligament appears very thinned proximally likely the result  of partial tear. There does appear to be a thin strip of intact  fibers. Iliotibial band and biceps femoris  tendon are intact.  Popliteus tendon and muscle are intact.     Osseous Structures and Cartilaginous Surfaces: Medial and lateral  compartment articular cartilage is intact. There is a cartilage  fissure in the central patella with associated subchondral edema.  Femoral trochlear articular cartilage is intact.     Extensor Mechanism: The quadriceps and infrapatellar tendons are  intact. The medial and lateral patellar retinacula appear  unremarkable.     Joint Space: Trace joint effusion. No definite articular bodies are  demonstrated.     Additional Findings: No semimembranosus-tibial collateral ligament or  pes anserine bursitis. No Baker's cyst.                                                                      IMPRESSION:    1. The lateral collateral ligament appears very thinned proximally  likely result of partial tearing. There does appear to be a thin strip  of intact fibers proximally. Iliotibial band, biceps tendon, and  popliteus tendon are intact.  2. Small cartilage fissure in the central patella with associated  subchondral edema.  3. Trace joint effusion.     PORTIA DELGADO MD    Large Joint Injection/Arthocentesis: L knee joint    Date/Time: 2/25/2021 3:28 PM  Performed by: Felix Rae MD  Authorized by: Felix Rae MD     Indications:  Pain and osteoarthritis  Needle Size:  22 G  Guidance: landmark guided    Approach:  Anterolateral  Location:  Knee      Medications:  40 mg triamcinolone 40 MG/ML; 3 mL ropivacaine 5 MG/ML  Medications comment:  4mL ropivacaine was administered  Outcome:  Tolerated well, no immediate complications  Procedure discussed: discussed risks, benefits, and alternatives    Consent Given by:  Patient  Timeout: timeout called immediately prior to procedure    Prep: patient was prepped and draped in usual sterile fashion            35 minutes spent on the date of the encounter doing chart review, history and exam, documentation and further activities as  noted above excluding procedure time        Again, thank you for allowing me to participate in the care of your patient.        Sincerely,        Felix Rae MD

## 2021-03-03 ENCOUNTER — HOSPITAL ENCOUNTER (EMERGENCY)
Facility: CLINIC | Age: 46
Discharge: HOME | End: 2021-03-03
Attending: PHYSICIAN ASSISTANT | Admitting: PHYSICIAN ASSISTANT
Payer: COMMERCIAL

## 2021-03-03 ENCOUNTER — APPOINTMENT (OUTPATIENT)
Dept: ULTRASOUND IMAGING | Facility: CLINIC | Age: 46
End: 2021-03-03
Attending: PHYSICIAN ASSISTANT
Payer: COMMERCIAL

## 2021-03-03 VITALS
RESPIRATION RATE: 16 BRPM | DIASTOLIC BLOOD PRESSURE: 81 MMHG | OXYGEN SATURATION: 98 % | SYSTOLIC BLOOD PRESSURE: 131 MMHG | HEART RATE: 97 BPM | TEMPERATURE: 97 F

## 2021-03-03 DIAGNOSIS — R10.13 EPIGASTRIC PAIN: ICD-10-CM

## 2021-03-03 DIAGNOSIS — K76.0 FATTY LIVER DISEASE, NONALCOHOLIC: ICD-10-CM

## 2021-03-03 DIAGNOSIS — R10.11 RUQ ABDOMINAL PAIN: ICD-10-CM

## 2021-03-03 LAB
ALBUMIN SERPL-MCNC: 3.4 G/DL (ref 3.4–5)
ALBUMIN UR-MCNC: 10 MG/DL
ALP SERPL-CCNC: 64 U/L (ref 40–150)
ALT SERPL W P-5'-P-CCNC: 44 U/L (ref 0–50)
ANION GAP SERPL CALCULATED.3IONS-SCNC: 5 MMOL/L (ref 3–14)
APPEARANCE UR: CLEAR
AST SERPL W P-5'-P-CCNC: 19 U/L (ref 0–45)
BASOPHILS # BLD AUTO: 0 10E9/L (ref 0–0.2)
BASOPHILS NFR BLD AUTO: 0.3 %
BILIRUB SERPL-MCNC: 1 MG/DL (ref 0.2–1.3)
BILIRUB UR QL STRIP: NEGATIVE
BUN SERPL-MCNC: 11 MG/DL (ref 7–30)
CALCIUM SERPL-MCNC: 8.6 MG/DL (ref 8.5–10.1)
CHLORIDE SERPL-SCNC: 103 MMOL/L (ref 94–109)
CO2 SERPL-SCNC: 29 MMOL/L (ref 20–32)
COLOR UR AUTO: YELLOW
CREAT SERPL-MCNC: 0.7 MG/DL (ref 0.52–1.04)
DIFFERENTIAL METHOD BLD: ABNORMAL
EOSINOPHIL # BLD AUTO: 0.1 10E9/L (ref 0–0.7)
EOSINOPHIL NFR BLD AUTO: 0.7 %
ERYTHROCYTE [DISTWIDTH] IN BLOOD BY AUTOMATED COUNT: 14.6 % (ref 10–15)
GFR SERPL CREATININE-BSD FRML MDRD: >90 ML/MIN/{1.73_M2}
GLUCOSE SERPL-MCNC: 88 MG/DL (ref 70–99)
GLUCOSE UR STRIP-MCNC: NEGATIVE MG/DL
HCT VFR BLD AUTO: 44.7 % (ref 35–47)
HGB BLD-MCNC: 14 G/DL (ref 11.7–15.7)
HGB UR QL STRIP: NEGATIVE
IMM GRANULOCYTES # BLD: 0 10E9/L (ref 0–0.4)
IMM GRANULOCYTES NFR BLD: 0.4 %
KETONES UR STRIP-MCNC: NEGATIVE MG/DL
LEUKOCYTE ESTERASE UR QL STRIP: NEGATIVE
LIPASE SERPL-CCNC: 120 U/L (ref 73–393)
LYMPHOCYTES # BLD AUTO: 3.2 10E9/L (ref 0.8–5.3)
LYMPHOCYTES NFR BLD AUTO: 29.7 %
MCH RBC QN AUTO: 28.1 PG (ref 26.5–33)
MCHC RBC AUTO-ENTMCNC: 31.3 G/DL (ref 31.5–36.5)
MCV RBC AUTO: 90 FL (ref 78–100)
MONOCYTES # BLD AUTO: 0.8 10E9/L (ref 0–1.3)
MONOCYTES NFR BLD AUTO: 7.1 %
MUCOUS THREADS #/AREA URNS LPF: PRESENT /LPF
NEUTROPHILS # BLD AUTO: 6.7 10E9/L (ref 1.6–8.3)
NEUTROPHILS NFR BLD AUTO: 61.8 %
NITRATE UR QL: NEGATIVE
NRBC # BLD AUTO: 0 10*3/UL
NRBC BLD AUTO-RTO: 0 /100
PH UR STRIP: 6 PH (ref 5–7)
PLATELET # BLD AUTO: 209 10E9/L (ref 150–450)
POTASSIUM SERPL-SCNC: 3.6 MMOL/L (ref 3.4–5.3)
PROT SERPL-MCNC: 7.4 G/DL (ref 6.8–8.8)
RBC # BLD AUTO: 4.98 10E12/L (ref 3.8–5.2)
RBC #/AREA URNS AUTO: 1 /HPF (ref 0–2)
SODIUM SERPL-SCNC: 137 MMOL/L (ref 133–144)
SOURCE: ABNORMAL
SP GR UR STRIP: 1.03 (ref 1–1.03)
SQUAMOUS #/AREA URNS AUTO: 1 /HPF (ref 0–1)
TROPONIN I SERPL-MCNC: <0.015 UG/L (ref 0–0.04)
UROBILINOGEN UR STRIP-MCNC: NORMAL MG/DL (ref 0–2)
WBC # BLD AUTO: 10.8 10E9/L (ref 4–11)
WBC #/AREA URNS AUTO: 3 /HPF (ref 0–5)

## 2021-03-03 PROCEDURE — 250N000009 HC RX 250: Performed by: PHYSICIAN ASSISTANT

## 2021-03-03 PROCEDURE — 84484 ASSAY OF TROPONIN QUANT: CPT | Performed by: EMERGENCY MEDICINE

## 2021-03-03 PROCEDURE — 80053 COMPREHEN METABOLIC PANEL: CPT | Performed by: EMERGENCY MEDICINE

## 2021-03-03 PROCEDURE — 96374 THER/PROPH/DIAG INJ IV PUSH: CPT

## 2021-03-03 PROCEDURE — 85025 COMPLETE CBC W/AUTO DIFF WBC: CPT | Performed by: EMERGENCY MEDICINE

## 2021-03-03 PROCEDURE — 83690 ASSAY OF LIPASE: CPT | Performed by: EMERGENCY MEDICINE

## 2021-03-03 PROCEDURE — 81001 URINALYSIS AUTO W/SCOPE: CPT | Performed by: EMERGENCY MEDICINE

## 2021-03-03 PROCEDURE — 250N000013 HC RX MED GY IP 250 OP 250 PS 637: Performed by: PHYSICIAN ASSISTANT

## 2021-03-03 PROCEDURE — 99285 EMERGENCY DEPT VISIT HI MDM: CPT | Mod: 25

## 2021-03-03 PROCEDURE — 76705 ECHO EXAM OF ABDOMEN: CPT

## 2021-03-03 PROCEDURE — 250N000011 HC RX IP 250 OP 636: Performed by: PHYSICIAN ASSISTANT

## 2021-03-03 RX ORDER — SUCRALFATE ORAL 1 G/10ML
1 SUSPENSION ORAL 4 TIMES DAILY PRN
Qty: 420 ML | Refills: 0 | Status: SHIPPED | OUTPATIENT
Start: 2021-03-03 | End: 2022-05-09

## 2021-03-03 RX ORDER — FAMOTIDINE 20 MG/1
20 TABLET, FILM COATED ORAL 2 TIMES DAILY
Qty: 10 TABLET | Refills: 0 | Status: SHIPPED | OUTPATIENT
Start: 2021-03-03 | End: 2021-03-08

## 2021-03-03 RX ORDER — KETOROLAC TROMETHAMINE 15 MG/ML
15 INJECTION, SOLUTION INTRAMUSCULAR; INTRAVENOUS ONCE
Status: COMPLETED | OUTPATIENT
Start: 2021-03-03 | End: 2021-03-03

## 2021-03-03 RX ADMIN — KETOROLAC TROMETHAMINE 15 MG: 15 INJECTION, SOLUTION INTRAMUSCULAR; INTRAVENOUS at 11:55

## 2021-03-03 RX ADMIN — LIDOCAINE HYDROCHLORIDE 30 ML: 20 SOLUTION ORAL; TOPICAL at 13:43

## 2021-03-03 ASSESSMENT — ENCOUNTER SYMPTOMS
HEMATURIA: 0
DYSURIA: 0
DIARRHEA: 0
FEVER: 0
BACK PAIN: 1
SHORTNESS OF BREATH: 0
FREQUENCY: 0
BLOOD IN STOOL: 0
NAUSEA: 0
ABDOMINAL PAIN: 1
DIFFICULTY URINATING: 0

## 2021-03-03 NOTE — ED PROVIDER NOTES
History   Chief Complaint:  Abdominal Pain     HPI   Jeff Serra is a 46 year old female with history of kidney stones, hypertension, PE, DVT, and ovarian cysts who presents with abdominal pain. Patient states that approximately 1100 yesterday she had onset upper abdominal pain which radiated to her back today with increased diffuse pain but is also more prominent on her right upper side. She noticed her onset of abdominal pain occurred after her  served her barbeque. Denies fever, nausea, diarrhea, blood in stools, hematuria, or other urinary symptoms. Denies chest pain or shortness of breath. Denies vaginal bleed or vaginal discharge. She states she has a history of kidney stones. She is no longer taking her Xarelto as she is recovered from surgery and this was prophylactic from past history of DVTs. She denies a history of abdominal surgeries other than a hysterectomy but still maintains her ovaries. She has not taken any pain medication prior to arrival.     Review of Systems   Constitutional: Negative for fever.   Respiratory: Negative for shortness of breath.    Cardiovascular: Negative for chest pain.   Gastrointestinal: Positive for abdominal pain. Negative for blood in stool, diarrhea and nausea.   Genitourinary: Negative for difficulty urinating, dysuria, frequency, hematuria, urgency, vaginal bleeding and vaginal discharge.   Musculoskeletal: Positive for back pain.   All other systems reviewed and are negative.      Allergies:  Contrast Dye    Medications:  Voltaren   Dicyclomine   Gabapentin   Claritin   Losartan   Omeprazole   Oxycodone   Progesterone   Senna   Maxzide     Past Medical History:    Chronic Low Back Pain   Chronic Neck Pain   Contentious Opioid Dependence   Hypertension   Kidney Stone   PE   Right Leg DVT  Gastritis   Chondral Defect of Left Patella   Cyst of Ovary     Past Surgical History:    Colonoscopy   EGD   Hysterectomy   Laparotomy Mini Tubal Ligation    Lithotripsy      Family History:    Father - Hypertension    Social History:  Arrives unaccompanied.   .     Physical Exam     Patient Vitals for the past 24 hrs:   BP Temp Pulse Resp SpO2   03/03/21 1348 -- -- -- 16 --   03/03/21 1330 131/81 -- 97 -- 98 %   03/03/21 1048 129/86 97  F (36.1  C) 100 18 97 %       Physical Exam  General: Comfortable. Not ill appearing.  Head:  Scalp is NC/AT  Eyes:  Conjunctiva normal, PERRL  ENT:  The external nose and ears are normal.   Neck:  Normal range of motion without rigidity.  CV:  Regular rate and rhythm    No pathologic murmur, rubs, or gallops.  Resp:  Breath sounds are clear bilaterally.  No crackles, wheezes, rhonchi, stridor.    Non-labored, no retractions or accessory muscle use  Abdomen: Abdomen is soft, no distension, Minimal RUQ tenderness otherwise non-tender.  no masses. No peritoneal signs. No CVA tenderness.  MS:  No lower extremity edema or asymmetric calf swelling. Normal ROM in all joints without effusions.    No midline cervical, thoracic, or lumbar tenderness  Skin:  Warm and dry, No rash or lesions noted. 2+ peripheral pulses in all extremities  Neuro: Alert and oriented x3.  5/5 strength BL in UE and LE, normal sensation.  Cranial nerves 2-12 intact.    Psych: Awake. Alert. Normal affect. Appropriate interactions.      Emergency Department Course     Imaging:  US Abdomen Limited:  1.  Diffuse fatty infiltration of the liver.  2.  Otherwise unremarkable right upper quadrant ultrasound.  Reading per radiology    Laboratory:  CBC: WBC 10.8, HGB 14.0,    CMP: AWNL (Creatinine: 0.70)    Troponin (Resulted 1211): <0.015    Lipase: 120    UA with Microscopic: Protein Albumin: 10 (A), Mucous: Present (A)    Emergency Department Course:    Reviewed:  I reviewed nursing notes, past medical history and care everywhere    Assessments:  1116 I obtained history and examined the patient as noted above.   1315 I rechecked the patient and explained  findings.     Interventions:  1155 Toradol 15 mg IV   1343 GI Cocktail (Maalox/Mylanta and viscous Lidocaine), 30 mL suspension, PO     Disposition:  The patient was discharged to home.       Impression & Plan   Medical Decision Makin-year-old female presents with right upper quadrant epigastric abdominal pain radiating to the back.  Broad differential considered.  Right upper quadrant ultrasound with fatty liver but no evidence of cholecystitis, common duct dilation.  LFTs, white blood cell count, lipase normal.  No chest pain or shortness of breath, troponin undetectable and doubt referred pain from ACS, PE, dissection.  Abdominal examination otherwise benign no lower abdominal tenderness, no evidence of appendicitis, diverticulitis, bowel obstruction etc.  Urinalysis clear not suggestive of pyelonephritis and no blood and doubt kidney stone.  No midline spinal tenderness or evidence of more concerning back pain pathology such as fracture, spinal cord compression, infectious process.    Felt improved following symptomatic treatment here and desires discharged home.  Suspect gastritis versus peptic ulcer disease most likely.  No evidence of GI bleed.  Symptomatic treatment at this time with close follow-up with PCP in 1 to 2 days if symptoms are persisting.  Return precautions for fever, shortness of breath, migration of pain to the lower abdomen, bloody stools, or other new or worsening symptoms.        Diagnosis:    ICD-10-CM    1. Epigastric pain  R10.13    2. RUQ abdominal pain  R10.11    3. Fatty liver disease, nonalcoholic  K76.0        Discharge Medications:  New Prescriptions    FAMOTIDINE (PEPCID) 20 MG TABLET    Take 1 tablet (20 mg) by mouth 2 times daily for 5 days    SUCRALFATE (CARAFATE) 1 GM/10ML SUSPENSION    Take 10 mLs (1 g) by mouth 4 times daily as needed (Pain)       Scribe Disclosure:  Wesley MOORE, am serving as a scribe at 11:16 AM on 3/3/2021 to document services personally  performed by Harrison Olivo PA-C based on my observations and the provider's statements to me.            Harrison Olivo PA-C  03/03/21 1800

## 2021-03-03 NOTE — DISCHARGE INSTRUCTIONS
Discharge Instructions  Abdominal Pain    Abdominal pain (belly pain) can be caused by many things. Your evaluation today does not show the exact cause for your pain. Your provider today has decided that it is unlikely your pain is due to a life threatening problem, or a problem requiring surgery or hospital admission. Sometimes those problems cannot be found right away, so it is very important that you follow up as directed.  Sometimes only the changes which occur over time allow the cause of your pain to be found.    Generally, every Emergency Department visit should have a follow-up clinic visit with either a primary or a specialty clinic/provider. Please follow-up as instructed by your emergency provider today. With abdominal pain, we often recommend very close follow-up, such as the following day.    ADULTS:  Return to the Emergency Department right away if:    You get an oral temperature above 102oF or as directed by your provider.  You have blood in your stools. This may be bright red or appear as black, tarry stools.    You keep vomiting (throwing up) or cannot drink liquids.  You see blood when you vomit.   You cannot have a bowel movement or you cannot pass gas.  Your stomach gets bloated or bigger.  Your skin or the whites of your eyes look yellow.  You faint.  You have bloody, frequent or painful urination (peeing).  You have new symptoms or anything that worries you.    CHILDREN:  Return to the Emergency Department right away if your child has any of the above-listed symptoms or the following:    Pushes your hand away or screams/cries when his/her belly is touched.  You notice your child is very fussy or weak.  Your child is very tired and is too tired to eat or drink.  Your child is dehydrated.  Signs of dehydration can be:  Significant change in the amount of wet diapers/urine.  Your infant or child starts to have dry mouth and lips, or no saliva (spit) or tears.    PREGNANT WOMEN:  Return to the  Emergency Department right away if you have any of the above-listed symptoms or the following:    You have bleeding, leaking fluid or passing tissue from the vagina.  You have worse pain or cramping, or pain in your shoulder or back.  You have vomiting that will not stop.  You have a temperature of 100oF or more.  Your baby is not moving as much as usual.  You faint.  You get a bad headache with or without eye problems and abdominal pain.  You have a seizure.  You have unusual discharge from your vagina and abdominal pain.    Abdominal pain is pretty common during pregnancy.  Your pain may or may not be related to your pregnancy. You should follow-up closely with your OB provider so they can evaluate you and your baby.  Until you follow-up with your regular provider, do the following:     Avoid sex and do not put anything in your vagina.  Drink clear fluids.  Only take medications approved by your provider.    MORE INFORMATION:    Appendicitis:  A possible cause of abdominal pain in any person who still has their appendix is acute appendicitis. Appendicitis is often hard to diagnose.  Testing does not always rule out early appendicitis or other causes of abdominal pain. Close follow-up with your provider and re-evaluations may be needed to figure out the reason for your abdominal pain.    Follow-up:  It is very important that you make an appointment with your clinic and go to the appointment.  If you do not follow-up with your primary provider, it may result in missing an important development which could result in permanent injury or disability and/or lasting pain.  If there is any problem keeping your appointment, call your provider or return to the Emergency Department.    Medications:  Take your medications as directed by your provider today.  Before using over-the-counter medications, ask your provider and make sure to take the medications as directed.  If you have any questions about medications, ask your  "provider.    Diet:  Resume your normal diet as much as possible, but do not eat fried, fatty or spicy foods while you have pain.  Do not drink alcohol or have caffeine.  Do not smoke tobacco.    Probiotics: If you have been given an antibiotic, you may want to also take a probiotic pill or eat yogurt with live cultures. Probiotics have \"good bacteria\" to help your intestines stay healthy. Studies have shown that probiotics help prevent diarrhea (loose stools) and other intestine problems (including C. diff infection) when you take antibiotics. You can buy these without a prescription in the pharmacy section of the store.     If you were given a prescription for medicine here today, be sure to read all of the information (including the package insert) that comes with your prescription.  This will include important information about the medicine, its side effects, and any warnings that you need to know about.  The pharmacist who fills the prescription can provide more information and answer questions you may have about the medicine.  If you have questions or concerns that the pharmacist cannot address, please call or return to the Emergency Department.       Remember that you can always come back to the Emergency Department if you are not able to see your regular provider in the amount of time listed above, if you get any new symptoms, or if there is anything that worries you.  Discharge Instructions  Incidental Findings    An incidental finding is something unexpected that was found while you were being treated and is felt to not be related to the reason that you came to the Emergency Department.  While this finding is not an emergency, you need to follow up with your primary provider (or occasionally a specialist) to determine if anything should be done about it.    These findings can come from:  Checking your vital signs (example: high blood pressure).  Taking your history (example: unexplained weight loss).  The " physical exam (example: a heart murmur).  Laboratory study (example: anemia or low blood count).  X-rays/ultrasound/CT or other imaging (example: an unexplained mass).    Generally, every Emergency Department visit should have a follow-up clinic visit with either a primary or a specialty clinic/provider. Please follow-up as instructed by your emergency provider today.    Return to the Emergency Department if:  Your condition worsens.  You develop unexpected pain.  You now develop new symptoms or have new concerns.  If you were given a prescription for medicine here today, be sure to read all of the information (including the package insert) that comes with your prescription.  This will include important information about the medicine, its side effects, and any warnings that you need to know about.  The pharmacist who fills the prescription can provide more information and answer questions you may have about the medicine.  If you have questions or concerns that the pharmacist cannot address, please call or return to the Emergency Department.   Remember that you can always come back to the Emergency Department if you are not able to see your regular provider in the amount of time listed above, if you get any new symptoms, or if there is anything that worries you.

## 2021-03-03 NOTE — ED NOTES
Gastrointestinal - Last Bowel Movement: 03/02/21  Gastrointestinal Comment: pt comes in with abd pain, DENIES N/V/D SINCE YESTERDAY LATE MORNING. LAST BM WAS YESTERDAY AND WAS NORMAL.

## 2021-03-03 NOTE — ED TRIAGE NOTES
Pt reports generalized abdominal pain since 11am yesterday.  Denies n/v/d or urinary symptoms. ABCs intact

## 2021-03-08 ENCOUNTER — MYC REFILL (OUTPATIENT)
Dept: FAMILY MEDICINE | Facility: CLINIC | Age: 46
End: 2021-03-08

## 2021-03-08 DIAGNOSIS — G89.29 CHRONIC MIDLINE LOW BACK PAIN WITHOUT SCIATICA: ICD-10-CM

## 2021-03-08 DIAGNOSIS — M54.50 CHRONIC MIDLINE LOW BACK PAIN WITHOUT SCIATICA: ICD-10-CM

## 2021-03-08 DIAGNOSIS — G89.4 CHRONIC PAIN SYNDROME: ICD-10-CM

## 2021-03-08 NOTE — TELEPHONE ENCOUNTER
Routing refill request to provider for review/approval because:  Drug not on the [facility] refill protocol     Laura HAYWARDN, RN

## 2021-03-09 RX ORDER — OXYCODONE HYDROCHLORIDE 15 MG/1
15 TABLET ORAL 3 TIMES DAILY PRN
Qty: 90 TABLET | Refills: 0 | Status: SHIPPED | OUTPATIENT
Start: 2021-03-09 | End: 2021-03-19

## 2021-03-14 ENCOUNTER — HEALTH MAINTENANCE LETTER (OUTPATIENT)
Age: 46
End: 2021-03-14

## 2021-03-19 ENCOUNTER — OFFICE VISIT (OUTPATIENT)
Dept: FAMILY MEDICINE | Facility: CLINIC | Age: 46
End: 2021-03-19
Payer: COMMERCIAL

## 2021-03-19 VITALS
HEIGHT: 63 IN | SYSTOLIC BLOOD PRESSURE: 109 MMHG | HEART RATE: 98 BPM | DIASTOLIC BLOOD PRESSURE: 74 MMHG | BODY MASS INDEX: 34.55 KG/M2 | WEIGHT: 195 LBS | TEMPERATURE: 98.2 F | OXYGEN SATURATION: 96 %

## 2021-03-19 DIAGNOSIS — I10 HYPERTENSION GOAL BP (BLOOD PRESSURE) < 140/90: ICD-10-CM

## 2021-03-19 DIAGNOSIS — Z87.81 S/P ORIF (OPEN REDUCTION INTERNAL FIXATION) FRACTURE: ICD-10-CM

## 2021-03-19 DIAGNOSIS — Z00.00 ROUTINE GENERAL MEDICAL EXAMINATION AT A HEALTH CARE FACILITY: Primary | ICD-10-CM

## 2021-03-19 DIAGNOSIS — G89.4 CHRONIC PAIN SYNDROME: ICD-10-CM

## 2021-03-19 DIAGNOSIS — G89.29 CHRONIC MIDLINE LOW BACK PAIN WITHOUT SCIATICA: ICD-10-CM

## 2021-03-19 DIAGNOSIS — M54.50 CHRONIC MIDLINE LOW BACK PAIN WITHOUT SCIATICA: ICD-10-CM

## 2021-03-19 DIAGNOSIS — Z98.890 S/P ORIF (OPEN REDUCTION INTERNAL FIXATION) FRACTURE: ICD-10-CM

## 2021-03-19 PROCEDURE — 99214 OFFICE O/P EST MOD 30 MIN: CPT | Performed by: FAMILY MEDICINE

## 2021-03-19 RX ORDER — OXYCODONE HYDROCHLORIDE 15 MG/1
15 TABLET ORAL 3 TIMES DAILY PRN
Qty: 90 TABLET | Refills: 0 | Status: SHIPPED | OUTPATIENT
Start: 2021-03-26 | End: 2021-04-20

## 2021-03-19 RX ORDER — LOSARTAN POTASSIUM 50 MG/1
50 TABLET ORAL DAILY
Qty: 90 TABLET | Refills: 0 | Status: SHIPPED | OUTPATIENT
Start: 2021-03-19 | End: 2021-03-19

## 2021-03-19 RX ORDER — TRIAMTERENE/HYDROCHLOROTHIAZID 37.5-25 MG
1 TABLET ORAL DAILY
Qty: 90 TABLET | Refills: 1 | Status: SHIPPED | OUTPATIENT
Start: 2021-03-19 | End: 2021-11-01

## 2021-03-19 RX ORDER — LOSARTAN POTASSIUM 50 MG/1
50 TABLET ORAL DAILY
Qty: 90 TABLET | Refills: 1 | Status: SHIPPED | OUTPATIENT
Start: 2021-03-19 | End: 2021-11-01

## 2021-03-19 ASSESSMENT — MIFFLIN-ST. JEOR: SCORE: 1483.64

## 2021-03-19 NOTE — LETTER
Opioid / Opioid Plus Controlled Substance Agreement    This is an agreement between you and your provider about the safe and appropriate use of controlled substance/opioids prescribed by your care team. Controlled substances are medicines that can cause physical and mental dependence (abuse).    There are strict laws about having and using these medicines. We here at [facility] are committing to working with you in your efforts to get better. To support you in this work, we ll help you schedule regular office appointments for medicine refills. If we must cancel or change your appointment for any reason, we ll make sure you have enough medicine to last until your next appointment.     As a Provider, I will:    Listen carefully to your concerns and treat you with respect.     Recommend a treatment plan that I believe is in your best interest. This plan may involve therapies other than opioid pain medication.     Talk with you often about the possible benefits, and the risk of harm of any medicine that we prescribe for you.     Provide a plan on how to taper (discontinue or go off) using this medicine if the decision is made to stop its use.    As a Patient, I understand that opioid(s):     Are a controlled substance prescribed by my care team to help me function or work and manage my condition(s).     Are strong medicines and can cause serious side effects such as:    Drowsiness, which can seriously affect my driving ability    A lower breathing rate, enough to cause death    Harm to my thinking ability     Depression     Abuse of and addiction to this medicine    Need to be taken exactly as prescribed. Combining opioids with certain medicines or chemicals (such as illegal drugs, sedatives, sleeping pills, and benzodiazepines) can be dangerous or even fatal. If I stop opioids suddenly, I may have severe withdrawal symptoms.    Do not work for all types of pain nor for all patients. If they re not helpful, I may  be asked to stop them.        The risks, benefits and side effects of these medicine(s) were explained to me. I agree that:  1. I will take part in other treatments as advised by my care team. This may be psychiatry or counseling, physical therapy, behavioral therapy, group treatment or a referral to a specialist.     2. I will keep all my appointments. I understand that this is part of the monitoring of opioids. My care team may require an office visit for EVERY opioid/controlled substance refill. If I miss appointments or don t follow instructions, my care team may stop my medicine.    3. I will take my medicines as prescribed. I will not change the dose or schedule unless my care team tells me to. There will be no refills if I run out early.     4. I may be asked to come to the clinic and complete a urine drug test or complete a pill count at any time. If I don t give a urine sample or participate in a pill count, the care team may stop my medicine.    5. I will only receive prescriptions from this clinic for chronic pain. If I am treated by another provider for acute pain issues, I will tell them that I am taking opioid pain medication for chronic pain and that I have a treatment agreement with this provider. I will inform my [facility] care team within one business day if I am given a prescription for any pain medication by another healthcare provider. My [facility] care team can contact other providers and pharmacists about my use of any medicines.    6. It is up to me to make sure that I don t run out of my medicines on weekends or holidays. If my care team is willing to refill my opioid prescription without a visit, I must request refills only during office hours. Refills may take up to 3 business days to process. I will use one pharmacy to fill all my opioid and other controlled substance prescriptions. I will notify the clinic about any changes to my insurance or medication  availability.    7. I am responsible for my prescriptions. If the medicine/prescription is lost, stolen or destroyed, it will not be replaced. I also agree not to share controlled substance medicines with anyone.    8. I am aware I should not use any illegal or recreational drugs. I also agree not to drink alcohol unless my care team says I can.       9. If I enroll in the [place] [facility] program, I will tell my care team prior to my next refill.     10. I will tell my care team right away if I become pregnant, have a new medical problem treated outside of my regular clinic, or have a change in my medications.    11. I understand that this medicine can affect my thinking, judgment and reaction time. Alcohol and drugs affect the brain and body, which can affect the safety of my driving. Being under the influence of alcohol or drugs can affect my decision-making, behaviors, personal safety, and the safety of others. Driving while impaired (DWI) can occur if a person is driving, operating, or in physical control of a car, motorcycle, boat, snowmobile, ATV, motorbike, off-road vehicle, or any other motor vehicle ([place] Statute 169A.20). I understand the risk if I choose to drive or operate any vehicle or machinery.    I understand that if I do not follow any of the conditions above, my prescriptions or treatment may be stopped or changed.          Opioids  What You Need to Know    What are opioids?   Opioids are pain medicines that must be prescribed by a doctor. They are also known as narcotics.     Examples are:   1. morphine (MS Contin, Adriana)  2. oxycodone (Oxycontin)  3. oxycodone and acetaminophen (Percocet)  4. hydrocodone and acetaminophen (Vicodin, Norco)   5. fentanyl patch (Duragesic)   6. hydromorphone (Dilaudid)   7. methadone  8. codeine (Tylenol #3)     What do opioids do well?   Opioids are best for severe short-term pain such as after a surgery or injury. They may work well for cancer pain.  They may help some people with long-lasting (chronic) pain.     What do opioids NOT do well?   Opioids never get rid of pain entirely, and they don t work well for most patients with chronic pain. Opioids don t reduce swelling, one of the causes of pain.                                    Other ways to manage chronic pain and improve function include:       Treat the health problem that may be causing pain    Anti-inflammation medicines, which reduce swelling and tenderness, such as ibuprofen (Advil, Motrin) or naproxen (Aleve)    Acetaminophen (Tylenol)    Antidepressants and anti-seizure medicines, especially for nerve pain    Topical treatments such as patches or creams    Injections or nerve blocks    Chiropractic or osteopathic treatment    Acupuncture, massage, deep breathing, meditation, visual imagery, aromatherapy    Use heat or ice at the pain site    Physical therapy     Exercise    Stop smoking    Take part in therapy       Risks and side effects     Talk to your doctor before you start or decide to keep taking opioids. Possible side effects include:      Lowering your breathing rate enough to cause death    Overdose, including death, especially if taking higher than prescribed doses    Worse depression symptoms; less pleasure in things you usually enjoy    Feeling tired or sluggish    Slower thoughts or cloudy thinking    Being more sensitive to pain over time; pain is harder to control    Trouble sleeping or restless sleep    Changes in hormone levels (for example, less testosterone)    Changes in sex drive or ability to have sex    Constipation    Unsafe driving    Itching and sweating    Dizziness    Nausea, throwing up and dry mouth    What else should I know about opioids?    Opioids may lead to dependence, tolerance, or addiction.      Dependence means that if you stop or reduce the medicine too quickly, you will have withdrawal symptoms. These include loose poop (diarrhea), jitters, flu-like  symptoms, nervousness and tremors. Dependence is not the same as addiction.                       Tolerance means needing higher doses over time to get the same effect. This may increase the chance of serious side effects.      Addiction is when people improperly use a substance that harms their body, their mind or their relations with others. Use of opiates can cause a relapse of addiction if you have a history of drug or alcohol abuse.      People who have used opioids for a long time may have a lower quality of life, worse depression, higher levels of pain and more visits to doctors.    You can overdose on opioids. Take these steps to lower your risk of overdose:    1. Recognize the signs:  Signs of overdose include decrease or loss of consciousness (blackout), slowed breathing, trouble waking up and blue lips. If someone is worried about overdose, they should call 911.    2. Talk to your doctor about Narcan (naloxone).   If you are at risk for overdose, you may be given a prescription for Narcan. This medicine very quickly reverses the effects of opioids.   If you overdose, a friend or family member can give you Narcan while waiting for the ambulance. They need to know the signs of overdose and how to give Narcan.     3. Don't use alcohol or street drugs.   Taking them with opioids can cause death.    4. Do not take any of these medicines unless your doctor says it s OK. Taking these with opioids can cause death:    Benzodiazepines, such as lorazepam (Ativan), alprazolam (Xanax) or diazepam (Valium)    Muscle relaxers, such as cyclobenzaprine (Flexeril)    Sleeping pills like zolpidem (Ambien)     Other opioids      How to keep you and other people safe while taking opioids:    1. Never share your opioids with others.  Opioid medicines are regulated by the Drug Enforcement Agency ([employer]). Selling or sharing medications is a criminal act.    2. Be sure to store opioids in a secure place, locked up if possible.  Young children can easily swallow them and overdose.    3. When you are traveling with your medicines, keep them in the original bottles. If you use a pill box, be sure you also carry a copy of your medicine list from your clinic or pharmacy.    4. Safe disposal of opioids    Most pharmacies have places to get rid of medicine, called disposal kiosks. Medicine disposal options are also available in every [facility]. Search your county and  medication disposal  to find more options. You can find more details at:  [email or web address]     I agree that my provider, clinic care team, and pharmacy may work with any city, state or federal law enforcement agency that investigates the misuse, sale, or other diversion of my controlled medicine. I will allow my provider to discuss my care with, or share a copy of, this agreement with any other treating provider, pharmacy or emergency room where I receive care.    I have read this agreement and have asked questions about anything I did not understand.    _______________________________________________________  Patient Signature - Jeff Serra _____________________                   Date     _______________________________________________________  Provider Signature - Jordyn Loredo MD   _____________________                   Date     _______________________________________________________  Witness Signature (required if provider not present while patient signing)   _____________________                   Date

## 2021-03-19 NOTE — PROGRESS NOTES
"   SUBJECTIVE:   CC: Jeff Serra is an 46 year old woman who presents for preventive health visit.     {Split Bill scripting  The purpose of this visit is to discuss your medical history and prevent health problems before you are sick. You may be responsible for a co-pay, coinsurance, or deductible if your visit today includes services such as checking on a sore throat, having an x-ray or lab test, or treating and evaluating a new or existing condition :114141}  Patient has been advised of split billing requirements and indicates understanding: {Yes and No:781627}  Healthy Habits:    Do you get at least three servings of calcium containing foods daily (dairy, green leafy vegetables, etc.)? { :126914::\"yes\"}    Amount of exercise or daily activities, outside of work: { :455180}    Problems taking medications regularly { :222053::\"No\"}    Medication side effects: { :464169::\"No\"}    Have you had an eye exam in the past two years? { :765066}    Do you see a dentist twice per year? { :070529}    Do you have sleep apnea, excessive snoring or daytime drowsiness?{ :366853}  {Outside tests to abstract? :745427}    {additional problems to add (Optional):480883}    Today's PHQ-2 Score:   PHQ-2 ( 1999 Pf[employer]) 6/18/2020 6/10/2019   Q1: Little interest or pleasure in doing things 0 1   Q2: Feeling down, depressed or hopeless 0 0   PHQ-2 Score 0 1     {PHQ-2 LOOK IN ASSESSMENTS (Optional) :856320}  Abuse: Current or Past(Physical, Sexual or Emotional)- {YES/NO/NA:360650}  Do you feel safe in your environment? {YES/NO/NA:539655}    Have you ever done Advance Care Planning? (For example, a Health Directive, POLST, or a discussion with a medical provider or your loved ones about your wishes): { :804657}    Social History     Tobacco Use     Smoking status: Never Smoker     Smokeless tobacco: Never Used   Substance Use Topics     Alcohol use: Yes     Alcohol/week: 0.0 standard drinks     Frequency: Monthly or less     " "Comment: Occasional     If you drink alcohol do you typically have >3 drinks per day or >7 drinks per week? {ETOH :755186}                     Reviewed orders with patient.  Reviewed health maintenance and updated orders accordingly - {Yes/No:303496::\"Yes\"}  {Chronicprobdata (Optional):641444}    Breast CA Risk Screening:  No flowsheet data found.  {Pull in link for FHS-7 answers if completed after opening note (Optional) :875391}  {If any of the questions to the BCRA (FHS-7) are answered yes, consider ordering referral for genetic counseling (Optional) :738599::\"click delete button to remove this line now\"}  {AMB Mammogram Decision Support (Optional) :754961}  Pertinent mammograms are reviewed under the imaging tab.    Pertinent mammograms are reviewed under the imaging tab.  History of abnormal Pap smear: {PAP HX:746762}  PAP / HPV Latest Ref Rng & Units 7/12/2016   PAP - NIL   HPV 16 DNA NEG Negative   HPV 18 DNA NEG Negative   OTHER HR HPV NEG Negative     Reviewed and updated as needed this visit by clinical staff                 Reviewed and updated as needed this visit by Provider                {HISTORY OPTIONS (Optional):933401}    ROS:  { :039917}    OBJECTIVE:   LMP 09/12/2016 (Exact Date)   EXAM:  {Exam Choices:780460}    {Diagnostic Test Results (Optional):408602::\"Diagnostic Test Results:\",\"Labs reviewed in Epic\"}    ASSESSMENT/PLAN:   {Diag Picklist:834336}    Patient has been advised of split billing requirements and indicates understanding: {YES / NO:870997::\"Yes\"}  COUNSELING:   {FEMALE COUNSELING MESSAGES:960666::\"Reviewed preventive health counseling, as reflected in patient instructions\"}    Estimated body mass index is 34.54 kg/m  as calculated from the following:    Height as of 2/25/21: 1.6 m (5' 3\").    Weight as of 2/25/21: 88.5 kg (195 lb).    {Weight Management Plan (ACO) Complete if BMI is abnormal-  Ages 18-64  BMI >24.9.  Age 65+ with BMI <23 or >30 (Optional):293512}    She reports " that she has never smoked. She has never used smokeless tobacco.      Counseling Resources:  ATP IV Guidelines  Pooled Cohorts Equation Calculator  Breast Cancer Risk Calculator  BRCA-Related Cancer Risk Assessment: FHS-7 Tool  FRAX Risk Assessment  ICSI Preventive Guidelines  Dietary Guidelines for Americans, 2010  USDA's MyPlate  ASA Prophylaxis  Lung CA Screening    Jordyn Loredo MD  [facility]
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
## 2021-03-31 ENCOUNTER — OFFICE VISIT (OUTPATIENT)
Dept: OPTOMETRY | Facility: CLINIC | Age: 46
End: 2021-03-31
Payer: COMMERCIAL

## 2021-03-31 DIAGNOSIS — H52.203 MYOPIA OF BOTH EYES WITH ASTIGMATISM: Primary | ICD-10-CM

## 2021-03-31 DIAGNOSIS — H04.129 DRY EYE: ICD-10-CM

## 2021-03-31 DIAGNOSIS — H52.4 PRESBYOPIA: ICD-10-CM

## 2021-03-31 DIAGNOSIS — H52.13 MYOPIA OF BOTH EYES WITH ASTIGMATISM: Primary | ICD-10-CM

## 2021-03-31 PROCEDURE — 92015 DETERMINE REFRACTIVE STATE: CPT | Performed by: OPTOMETRIST

## 2021-03-31 PROCEDURE — 92004 COMPRE OPH EXAM NEW PT 1/>: CPT | Performed by: OPTOMETRIST

## 2021-03-31 RX ORDER — POLYETHYLENE GLYCOL 400 2.5 MG/ML
1 SOLUTION/ DROPS OPHTHALMIC 2 TIMES DAILY PRN
Qty: 1 BOTTLE | Refills: 6 | Status: SHIPPED | OUTPATIENT
Start: 2021-03-31 | End: 2022-05-09

## 2021-03-31 ASSESSMENT — REFRACTION_MANIFEST
METHOD_AUTOREFRACTION: 1
OS_AXIS: 103
OD_ADD: +1.50
OS_CYLINDER: +0.50
OS_ADD: +1.50
OS_CYLINDER: +0.25
OD_CYLINDER: +1.00
OS_SPHERE: -1.00
OD_AXIS: 082
OD_SPHERE: -1.00
OD_AXIS: 084
OS_SPHERE: -1.00
OD_SPHERE: -1.50
OD_CYLINDER: +0.75
OS_AXIS: 079

## 2021-03-31 ASSESSMENT — VISUAL ACUITY
OS_SC: 20/40
OS_SC+: -2
METHOD: SNELLEN - LINEAR
OS_SC: 20/30
OD_SC: 20/40
OD_SC: 20/60
OD_SC+: -1

## 2021-03-31 ASSESSMENT — CUP TO DISC RATIO
OS_RATIO: 0.5
OD_RATIO: 0.5

## 2021-03-31 ASSESSMENT — CONF VISUAL FIELD
METHOD: COUNTING FINGERS
OS_NORMAL: 1
OD_NORMAL: 1

## 2021-03-31 ASSESSMENT — SLIT LAMP EXAM - LIDS
COMMENTS: NORMAL
COMMENTS: NORMAL

## 2021-03-31 ASSESSMENT — EXTERNAL EXAM - LEFT EYE: OS_EXAM: NORMAL

## 2021-03-31 ASSESSMENT — EXTERNAL EXAM - RIGHT EYE: OD_EXAM: NORMAL

## 2021-03-31 ASSESSMENT — TONOMETRY
IOP_METHOD: APPLANATION
OD_IOP_MMHG: 19
OS_IOP_MMHG: 19

## 2021-03-31 NOTE — PROGRESS NOTES
Chief Complaint   Patient presents with     Annual Eye Exam      Blur at distance, especially while driving at night.  Blur at near.  No other concerns at this time.    Last Eye Exam: NA  Dilated Previously: Yes. Signs and symptoms of dilation were discussed. Patient consents to dilation today.    What are you currently using to see?  readers       Distance Vision Acuity: Noticed gradual change in both eyes    Near Vision Acuity: Not satisfied     Eye Comfort: good when uses drops  Do you use eye drops? : Yes- 2 times a day. Blink      Viridiana Kearns CPO          Medical, surgical and family histories reviewed and updated 3/31/2021.       OBJECTIVE: See Ophthalmology exam    ASSESSMENT:    ICD-10-CM    1. Myopia of both eyes with astigmatism  H52.13 REFRACTION    H52.203 EYE EXAM (SIMPLE-NONBILLABLE)   2. Presbyopia  H52.4    3. Dry eye  H04.129 polyethylene glycol 400 (BLINK TEARS) 0.25 % SOLN ophthalmic solution      PLAN:   Consider bifocal this time  Artificial tears as needed     Alla Soto OD

## 2021-03-31 NOTE — LETTER
3/31/2021         RE: Jeff Serra  [id number]  [place] [address]        Dear Colleague,    Thank you for referring your patient, Jeff Serra, to the [facility]. Please see a copy of my visit note below.    Chief Complaint   Patient presents with     Annual Eye Exam      Blur at distance, especially while driving at night.  Blur at near.  No other concerns at this time.    Last Eye Exam: NA  Dilated Previously: Yes. Signs and symptoms of dilation were discussed. Patient consents to dilation today.    What are you currently using to see?  readers       Distance Vision Acuity: Noticed gradual change in both eyes    Near Vision Acuity: Not satisfied     Eye Comfort: good when uses drops  Do you use eye drops? : Yes- 2 times a day. Blink      Viridiana Kearns CPO          Medical, surgical and family histories reviewed and updated 3/31/2021.       OBJECTIVE: See Ophthalmology exam    ASSESSMENT:    ICD-10-CM    1. Myopia of both eyes with astigmatism  H52.13 REFRACTION    H52.203 EYE EXAM (SIMPLE-NONBILLABLE)   2. Presbyopia  H52.4    3. Dry eye  H04.129 polyethylene glycol 400 (BLINK TEARS) 0.25 % SOLN ophthalmic solution      PLAN:   Consider bifocal this time  Artificial tears as needed     Alla Soto OD             Again, thank you for allowing me to participate in the care of your patient.        Sincerely,        Alla Soto, OD

## 2021-04-16 ENCOUNTER — APPOINTMENT (OUTPATIENT)
Dept: OPTOMETRY | Facility: CLINIC | Age: 46
End: 2021-04-16
Payer: COMMERCIAL

## 2021-04-16 PROCEDURE — 92341 FIT SPECTACLES BIFOCAL: CPT | Performed by: OPTOMETRIST

## 2021-04-19 ENCOUNTER — MYC REFILL (OUTPATIENT)
Dept: FAMILY MEDICINE | Facility: CLINIC | Age: 46
End: 2021-04-19

## 2021-04-19 DIAGNOSIS — G89.4 CHRONIC PAIN SYNDROME: ICD-10-CM

## 2021-04-19 DIAGNOSIS — G89.29 CHRONIC MIDLINE LOW BACK PAIN WITHOUT SCIATICA: ICD-10-CM

## 2021-04-19 DIAGNOSIS — M54.50 CHRONIC MIDLINE LOW BACK PAIN WITHOUT SCIATICA: ICD-10-CM

## 2021-04-19 RX ORDER — OXYCODONE HYDROCHLORIDE 15 MG/1
15 TABLET ORAL 3 TIMES DAILY PRN
Qty: 90 TABLET | Refills: 0 | Status: CANCELLED | OUTPATIENT
Start: 2021-04-19

## 2021-04-30 ENCOUNTER — MYC MEDICAL ADVICE (OUTPATIENT)
Dept: FAMILY MEDICINE | Facility: CLINIC | Age: 46
End: 2021-04-30

## 2021-05-03 ENCOUNTER — VIRTUAL VISIT (OUTPATIENT)
Dept: FAMILY MEDICINE | Facility: CLINIC | Age: 46
End: 2021-05-03
Payer: COMMERCIAL

## 2021-05-03 VITALS — BODY MASS INDEX: 34.02 KG/M2 | HEIGHT: 63 IN | WEIGHT: 192 LBS

## 2021-05-03 DIAGNOSIS — E66.811 CLASS 1 OBESITY DUE TO EXCESS CALORIES WITH SERIOUS COMORBIDITY AND BODY MASS INDEX (BMI) OF 34.0 TO 34.9 IN ADULT: ICD-10-CM

## 2021-05-03 DIAGNOSIS — Z11.52 ENCOUNTER FOR SCREENING FOR COVID-19: Primary | ICD-10-CM

## 2021-05-03 DIAGNOSIS — E66.09 CLASS 1 OBESITY DUE TO EXCESS CALORIES WITH SERIOUS COMORBIDITY AND BODY MASS INDEX (BMI) OF 34.0 TO 34.9 IN ADULT: ICD-10-CM

## 2021-05-03 PROCEDURE — 99213 OFFICE O/P EST LOW 20 MIN: CPT | Mod: 95 | Performed by: FAMILY MEDICINE

## 2021-05-03 ASSESSMENT — MIFFLIN-ST. JEOR: SCORE: 1470.04

## 2021-05-03 NOTE — PROGRESS NOTES
"Jeff is a 46 year old who is being evaluated via a billable video visit.      How would you like to obtain your AVS? MyChart  If the video visit is dropped, the invitation should be resent by: Send to e-mail at: [email or web address]  Will anyone else be joining your video visit? No    Video Start Time: 1:22 pm    Assessment & Plan     Encounter for screening for COVID-19  Screening testing for travel.  Order placed.  Has appointment scheduled.    - Asymptomatic COVID-19 Virus (Coronavirus) by PCR; Future    Class 1 obesity due to excess calories with serious comorbidity and body mass index (BMI) of 34.0 to 34.9 in adult  Referral for medical weight management clinic.  Continued activity.    - COMPREHENSIVE WEIGHT MANAGEMENT             BMI:   Estimated body mass index is 34.01 kg/m  as calculated from the following:    Height as of this encounter: 1.6 m (5' 3\").    Weight as of this encounter: 87.1 kg (192 lb).   Weight management plan: Patient referred to endocrine and/or weight management specialty    Patient Instructions   Order in place for the COVID testing for travel for you.      Referral to Weight Management clinic as requested.      Return in about 1 month (around 6/3/2021) for chronic health problems, Physical Exam with Dr. Loredo.    Christine Feliz MD  [facility]    Nicole Aguilar is a 46 year old who presents for the following health issues  accompanied by her spouse:    HPI     Needs covid test for trip - leaving Thursday - has appointment for testing scheduled tomorrow.  Gone for 5 days.  Dina Rico.  Has had first of the COVID vaccines.      Obesity:  Discussed with Dr. Loredo regarding weight loss clinic referral. Interested in medication management.  Reports limited activity until recent related to orthopedic concerns.  Moving better now after surgery.        Review of Systems   Constitutional, HEENT, cardiovascular, pulmonary, gi and gu systems are " negative, except as otherwise noted.      Objective           Vitals:  No vitals were obtained today due to virtual visit.    Physical Exam   GENERAL: alert, no distress and obese  EYES: Eyes grossly normal to inspection.  No discharge or erythema, or obvious scleral/conjunctival abnormalities.  RESP: No audible wheeze, cough, or visible cyanosis.  No visible retractions or increased work of breathing.    SKIN: Visible skin clear. No significant rash, abnormal pigmentation or lesions.  NEURO: Cranial nerves grossly intact.  Mentation and speech appropriate for age.  PSYCH: Mentation appears normal, affect normal/bright, judgement and insight intact, normal speech and appearance well-groomed.                Video-Visit Details    Type of service:  Video Visit    Video End Time:1:30 pm    Originating Location (pt. Location): Home    Distant Location (provider location):  [facility]     Platform used for Video Visit: [employer]

## 2021-05-03 NOTE — PATIENT INSTRUCTIONS
Order in place for the COVID testing for travel for you.      Referral to Weight Management clinic as requested.

## 2021-05-04 DIAGNOSIS — Z11.52 ENCOUNTER FOR SCREENING FOR COVID-19: ICD-10-CM

## 2021-05-04 LAB
SARS-COV-2 RNA RESP QL NAA+PROBE: NORMAL
SPECIMEN SOURCE: NORMAL

## 2021-05-04 PROCEDURE — U0003 INFECTIOUS AGENT DETECTION BY NUCLEIC ACID (DNA OR RNA); SEVERE ACUTE RESPIRATORY SYNDROME CORONAVIRUS 2 (SARS-COV-2) (CORONAVIRUS DISEASE [COVID-19]), AMPLIFIED PROBE TECHNIQUE, MAKING USE OF HIGH THROUGHPUT TECHNOLOGIES AS DESCRIBED BY CMS-2020-01-R: HCPCS | Performed by: FAMILY MEDICINE

## 2021-05-04 PROCEDURE — U0005 INFEC AGEN DETEC AMPLI PROBE: HCPCS | Performed by: FAMILY MEDICINE

## 2021-05-05 LAB
LABORATORY COMMENT REPORT: NORMAL
SARS-COV-2 RNA RESP QL NAA+PROBE: NEGATIVE
SPECIMEN SOURCE: NORMAL

## 2021-06-01 ENCOUNTER — MYC REFILL (OUTPATIENT)
Dept: FAMILY MEDICINE | Facility: CLINIC | Age: 46
End: 2021-06-01

## 2021-06-01 DIAGNOSIS — G89.29 CHRONIC MIDLINE LOW BACK PAIN WITHOUT SCIATICA: ICD-10-CM

## 2021-06-01 DIAGNOSIS — M54.50 CHRONIC MIDLINE LOW BACK PAIN WITHOUT SCIATICA: ICD-10-CM

## 2021-06-01 DIAGNOSIS — G89.4 CHRONIC PAIN SYNDROME: ICD-10-CM

## 2021-06-02 RX ORDER — OXYCODONE HYDROCHLORIDE 15 MG/1
15 TABLET ORAL 3 TIMES DAILY PRN
Qty: 90 TABLET | Refills: 0 | Status: SHIPPED | OUTPATIENT
Start: 2021-06-02 | End: 2021-07-06

## 2021-06-02 NOTE — TELEPHONE ENCOUNTER
Routing refill request to provider for review/approval because:  Drug not on the [facility] refill protocol   Jaqueline Johnson BSN, RN

## 2021-06-14 ENCOUNTER — HOSPITAL ENCOUNTER (EMERGENCY)
Facility: CLINIC | Age: 46
Discharge: HOME | End: 2021-06-14
Attending: EMERGENCY MEDICINE | Admitting: EMERGENCY MEDICINE
Payer: COMMERCIAL

## 2021-06-14 ENCOUNTER — APPOINTMENT (OUTPATIENT)
Dept: ULTRASOUND IMAGING | Facility: CLINIC | Age: 46
End: 2021-06-14
Attending: EMERGENCY MEDICINE
Payer: COMMERCIAL

## 2021-06-14 VITALS
HEART RATE: 82 BPM | SYSTOLIC BLOOD PRESSURE: 144 MMHG | RESPIRATION RATE: 16 BRPM | OXYGEN SATURATION: 98 % | TEMPERATURE: 98.3 F | BODY MASS INDEX: 31.89 KG/M2 | HEIGHT: 63 IN | WEIGHT: 180 LBS | DIASTOLIC BLOOD PRESSURE: 85 MMHG

## 2021-06-14 DIAGNOSIS — R60.9 DEPENDENT EDEMA: ICD-10-CM

## 2021-06-14 LAB
ALBUMIN SERPL-MCNC: 3.7 G/DL (ref 3.4–5)
ALBUMIN UR-MCNC: NEGATIVE MG/DL
ALP SERPL-CCNC: 63 U/L (ref 40–150)
ALT SERPL W P-5'-P-CCNC: 32 U/L (ref 0–50)
ANION GAP SERPL CALCULATED.3IONS-SCNC: 4 MMOL/L (ref 3–14)
APPEARANCE UR: CLEAR
AST SERPL W P-5'-P-CCNC: 14 U/L (ref 0–45)
BASOPHILS # BLD AUTO: 0 10E9/L (ref 0–0.2)
BASOPHILS NFR BLD AUTO: 0.6 %
BILIRUB SERPL-MCNC: 0.9 MG/DL (ref 0.2–1.3)
BILIRUB UR QL STRIP: NEGATIVE
BUN SERPL-MCNC: 7 MG/DL (ref 7–30)
CALCIUM SERPL-MCNC: 8.7 MG/DL (ref 8.5–10.1)
CHLORIDE SERPL-SCNC: 104 MMOL/L (ref 94–109)
CO2 SERPL-SCNC: 30 MMOL/L (ref 20–32)
COLOR UR AUTO: ABNORMAL
CREAT SERPL-MCNC: 0.82 MG/DL (ref 0.52–1.04)
DIFFERENTIAL METHOD BLD: NORMAL
EOSINOPHIL # BLD AUTO: 0.1 10E9/L (ref 0–0.7)
EOSINOPHIL NFR BLD AUTO: 1.1 %
ERYTHROCYTE [DISTWIDTH] IN BLOOD BY AUTOMATED COUNT: 14.6 % (ref 10–15)
GFR SERPL CREATININE-BSD FRML MDRD: 84 ML/MIN/{1.73_M2}
GLUCOSE SERPL-MCNC: 87 MG/DL (ref 70–99)
GLUCOSE UR STRIP-MCNC: NEGATIVE MG/DL
HCT VFR BLD AUTO: 38.3 % (ref 35–47)
HGB BLD-MCNC: 12.6 G/DL (ref 11.7–15.7)
HGB UR QL STRIP: NEGATIVE
IMM GRANULOCYTES # BLD: 0 10E9/L (ref 0–0.4)
IMM GRANULOCYTES NFR BLD: 0.3 %
KETONES UR STRIP-MCNC: NEGATIVE MG/DL
LEUKOCYTE ESTERASE UR QL STRIP: NEGATIVE
LYMPHOCYTES # BLD AUTO: 1.8 10E9/L (ref 0.8–5.3)
LYMPHOCYTES NFR BLD AUTO: 28 %
MCH RBC QN AUTO: 29.4 PG (ref 26.5–33)
MCHC RBC AUTO-ENTMCNC: 32.9 G/DL (ref 31.5–36.5)
MCV RBC AUTO: 90 FL (ref 78–100)
MONOCYTES # BLD AUTO: 0.5 10E9/L (ref 0–1.3)
MONOCYTES NFR BLD AUTO: 8.1 %
MUCOUS THREADS #/AREA URNS LPF: PRESENT /LPF
NEUTROPHILS # BLD AUTO: 4.1 10E9/L (ref 1.6–8.3)
NEUTROPHILS NFR BLD AUTO: 61.9 %
NITRATE UR QL: NEGATIVE
NRBC # BLD AUTO: 0 10*3/UL
NRBC BLD AUTO-RTO: 0 /100
NT-PROBNP SERPL-MCNC: 17 PG/ML (ref 0–450)
PH UR STRIP: 7 PH (ref 5–7)
PLATELET # BLD AUTO: 176 10E9/L (ref 150–450)
POTASSIUM SERPL-SCNC: 3.7 MMOL/L (ref 3.4–5.3)
PROT SERPL-MCNC: 7.6 G/DL (ref 6.8–8.8)
RBC # BLD AUTO: 4.28 10E12/L (ref 3.8–5.2)
RBC #/AREA URNS AUTO: 1 /HPF (ref 0–2)
SODIUM SERPL-SCNC: 138 MMOL/L (ref 133–144)
SOURCE: ABNORMAL
SP GR UR STRIP: 1.01 (ref 1–1.03)
SQUAMOUS #/AREA URNS AUTO: 1 /HPF (ref 0–1)
TSH SERPL DL<=0.005 MIU/L-ACNC: 1.17 MU/L (ref 0.4–4)
UROBILINOGEN UR STRIP-MCNC: NORMAL MG/DL (ref 0–2)
WBC # BLD AUTO: 6.6 10E9/L (ref 4–11)
WBC #/AREA URNS AUTO: 1 /HPF (ref 0–5)

## 2021-06-14 PROCEDURE — 93970 EXTREMITY STUDY: CPT

## 2021-06-14 PROCEDURE — 250N000013 HC RX MED GY IP 250 OP 250 PS 637: Performed by: EMERGENCY MEDICINE

## 2021-06-14 PROCEDURE — 81001 URINALYSIS AUTO W/SCOPE: CPT | Performed by: EMERGENCY MEDICINE

## 2021-06-14 PROCEDURE — 84443 ASSAY THYROID STIM HORMONE: CPT | Performed by: EMERGENCY MEDICINE

## 2021-06-14 PROCEDURE — 85025 COMPLETE CBC W/AUTO DIFF WBC: CPT | Performed by: EMERGENCY MEDICINE

## 2021-06-14 PROCEDURE — 83880 ASSAY OF NATRIURETIC PEPTIDE: CPT | Performed by: EMERGENCY MEDICINE

## 2021-06-14 PROCEDURE — 80053 COMPREHEN METABOLIC PANEL: CPT | Performed by: EMERGENCY MEDICINE

## 2021-06-14 PROCEDURE — 99284 EMERGENCY DEPT VISIT MOD MDM: CPT | Mod: 25

## 2021-06-14 RX ORDER — OXYCODONE HYDROCHLORIDE 5 MG/1
5 TABLET ORAL ONCE
Status: COMPLETED | OUTPATIENT
Start: 2021-06-14 | End: 2021-06-14

## 2021-06-14 RX ORDER — ACETAMINOPHEN 500 MG
1000 TABLET ORAL ONCE
Status: COMPLETED | OUTPATIENT
Start: 2021-06-14 | End: 2021-06-14

## 2021-06-14 RX ORDER — FUROSEMIDE 20 MG
20 TABLET ORAL DAILY
Qty: 7 TABLET | Refills: 0 | Status: SHIPPED | OUTPATIENT
Start: 2021-06-14 | End: 2021-08-09

## 2021-06-14 RX ADMIN — ACETAMINOPHEN 1000 MG: 500 TABLET, FILM COATED ORAL at 08:14

## 2021-06-14 RX ADMIN — OXYCODONE HYDROCHLORIDE 5 MG: 5 TABLET ORAL at 08:14

## 2021-06-14 ASSESSMENT — ENCOUNTER SYMPTOMS
SHORTNESS OF BREATH: 1
NUMBNESS: 1
ABDOMINAL PAIN: 0
FREQUENCY: 1
DYSURIA: 1
BACK PAIN: 1

## 2021-06-14 ASSESSMENT — MIFFLIN-ST. JEOR: SCORE: 1415.6

## 2021-06-14 NOTE — ED PROVIDER NOTES
History   Chief Complaint:  Leg Swelling     HPI   Jeff Serra is a 46 year old female with history of PE/DVT, hypertension, chronic low back pain and a kidney stone who presents with leg swelling. For a year, the patient has been experiencing bilateral leg swelling that has been worsening recently. She notes the swelling in her left foot is worse and extends up to her knee. She states her pain is 10/10 and is exacerbated by ambulation. Her pain is the worst in the anterior part of her left lower extremity. Her left leg swelling decreases slightly throughout the night. She has numbness in her toes and states she becomes more short of breath when she lays down. Additionally, she has been experiencing dysuria, urinary frequency and minimal back pain, but she denies any abdominal pain or chest pain. Of note, she has not had any recent falls and has not recently started any new medications. She denies any tobacco or alcohol use. She was on anticoagulants for a short period following DVT diagnosis, but she is not currently on any blood thinners.    Review of Systems   Respiratory: Positive for shortness of breath.    Cardiovascular: Positive for leg swelling. Negative for chest pain.   Gastrointestinal: Negative for abdominal pain.   Genitourinary: Positive for dysuria and frequency.   Musculoskeletal: Positive for back pain.        Leg pain   Neurological: Positive for numbness (In left toes).   All other systems reviewed and are negative.    Allergies:  Contrast Dye    Medications:  Voltaren  Bentyl  Neurontin  Claritin  Cozaar  Prilosec  Oxycodone  Progesterone  Carafate  Maxzide    Past Medical History:    Chronic low back pain  Chronic neck pain  Continuous opioid dependence  Hypertension  Kidney stone  PE  Right leg DVT  Hypertension  Hypokalemia  Closed fracture of right fibula  Allergic rhinitis  Renal calculi  Chondral defect of left patella  Cyst of ovary  Vitamin D deficiency  Gastritis without  "bleeding  Kidney disease  Migraine  Major depressive disorder    Past Surgical History:    Colonoscopy x3  EGD, combined x2  Hysterectomy  Laparoscopy diagnostic  Laparotomy mini, tubal ligation, combined  Lithotripsy  Upper GI endoscpy    Family History:    Father: Hypertension, Back problems  Daughter: MS  Mother: Back problems, Hypertension    Social History:  The patient was unaccompanied to the ED.  No tobacco or alcohol use.    Physical Exam     Patient Vitals for the past 24 hrs:   BP Temp Temp src Pulse Resp SpO2 Height Weight   06/14/21 0930 (!) 144/85 -- -- 82 16 98 % -- --   06/14/21 0900 (!) 149/94 -- -- 82 16 98 % -- --   06/14/21 0723 (!) 137/96 98.3  F (36.8  C) Oral 86 18 99 % 1.6 m (5' 3\") 81.6 kg (180 lb)       Physical Exam  Constitutional: Well developed, nontox appearance  Head: Atraumatic.    Neck:  no stridor  Eyes: no scleral icterus  Cardiovascular: RRR, 2+ bilat radial, DP pulses  Pulmonary/Chest: nml resp effort,  Abdominal: ND, soft, NT, no rebound or guarding   Back: No L-spine tenderness  Ext: Warm, well perfused, bilateral lower extremity edema left mildly worse than right  Neurological: A&O, symmetric facies, moves ext x4  Skin: Skin is warm and dry.   Psychiatric: Behavior is normal. Thought content normal.   Nursing note and vitals reviewed.    Emergency Department Course   Imaging:  US Lower Extremity Venous Duplex Bilateral  1. No evidence of deep venous thrombosis in the right lower extremity.  2. No evidence of deep venous thrombosis in the left lower extremity.  Reading per radiology    Laboratory:  UA with Microscopic: Mucous Urine Present (A) o/w Negative    CBC: WBC 6.6, HGB 12.6,   CMP: WNL (Creatinine 0.82)    Nt probnp inpatient: 17    TSH with Free T4 Reflex: 1.17    Emergency Department Course:  Reviewed:  I reviewed nursing notes, vitals, past medical history and care everywhere    Assessments:  0737 I obtained history and examined the patient as noted above. "     0928 I rechecked and updated the patient regarding the imaging results, laboratory results and the plan for care.    Interventions:  814 Oxycodone 5mg PO  08 Tylenol 1,000mg PO    Disposition:  The patient was discharged to home.     Impression & Plan   Medical Decision Makin year old female presenting w/ bilateral lower extremity edema     DDx includes dependent edema, electrolyte abnormality, hypoproteinemia, CHF exacerbation, renal dysfunction, volume overload although less likely, DVT.  Labs and imaging unremarkable.  Presentation seems most consistent with dependent edema likely secondary to history of fractures and surgery.  I discussed options with the patient including compression hose versus a short trial of furosemide.  She elected to trial furosemide with short-term follow-up with PCP in 1 week or less.  At this time I feel the pt is safe for discharge.  Recommendations given regarding follow up with PCP and return to the emergency department as needed for new or worsening symptoms.  Patient counseled on all results, disposition and diagnosis.  They are understanding and agreeable to plan. Patient discharged in stable condition.      Diagnosis:    ICD-10-CM    1. Dependent edema  R60.9        Discharge Medications:  Discharge Medication List as of 2021  9:44 AM      START taking these medications    Details   furosemide (LASIX) 20 MG tablet Take 1 tablet (20 mg) by mouth daily for 7 days, Disp-7 tablet, R-0, Local Print             Scribe Disclosure:  I, Ceasar Rm, am serving as a scribe at 7:33 AM on 2021 to document services personally performed by Anupam Gallo MD based on my observations and the provider's statements to me.      Anupam Gallo MD  [phone]

## 2021-06-14 NOTE — DISCHARGE INSTRUCTIONS
Please follow-up with your primary care physician next week.    Please return to the emergency department as needed for new or worsening symptoms including chest pain, severe shortness of breath, fever greater than 100.4  F, vomiting unable to keep anything down, any other concerning symptoms.

## 2021-06-24 DIAGNOSIS — G89.4 CHRONIC PAIN SYNDROME: ICD-10-CM

## 2021-06-24 DIAGNOSIS — M54.50 CHRONIC MIDLINE LOW BACK PAIN WITHOUT SCIATICA: ICD-10-CM

## 2021-06-24 DIAGNOSIS — G89.29 CHRONIC MIDLINE LOW BACK PAIN WITHOUT SCIATICA: ICD-10-CM

## 2021-06-24 NOTE — TELEPHONE ENCOUNTER
Routing refill request to provider for review/approval because:  Drug not on the [facility] refill protocol     Latricia HAYWARDN, RN

## 2021-06-25 RX ORDER — GABAPENTIN 300 MG/1
CAPSULE ORAL
Qty: 90 CAPSULE | Refills: 5 | Status: SHIPPED | OUTPATIENT
Start: 2021-06-25 | End: 2021-11-01

## 2021-06-30 ENCOUNTER — TELEPHONE (OUTPATIENT)
Dept: SURGERY | Facility: CLINIC | Age: 46
End: 2021-06-30

## 2021-06-30 NOTE — TELEPHONE ENCOUNTER
Left voicemail message for patient reminding them to complete the new patient questionnaire before their appointment.    Shantell Ochoa MA

## 2021-07-01 ENCOUNTER — OFFICE VISIT (OUTPATIENT)
Dept: SURGERY | Facility: CLINIC | Age: 46
End: 2021-07-01
Payer: COMMERCIAL

## 2021-07-01 VITALS
HEART RATE: 106 BPM | WEIGHT: 196.6 LBS | OXYGEN SATURATION: 98 % | BODY MASS INDEX: 37.12 KG/M2 | SYSTOLIC BLOOD PRESSURE: 112 MMHG | DIASTOLIC BLOOD PRESSURE: 78 MMHG | HEIGHT: 61 IN

## 2021-07-01 DIAGNOSIS — E66.01 CLASS 2 SEVERE OBESITY DUE TO EXCESS CALORIES WITH SERIOUS COMORBIDITY AND BODY MASS INDEX (BMI) OF 37.0 TO 37.9 IN ADULT (H): Primary | ICD-10-CM

## 2021-07-01 DIAGNOSIS — I10 HYPERTENSION GOAL BP (BLOOD PRESSURE) < 140/90: ICD-10-CM

## 2021-07-01 DIAGNOSIS — Z13.0 SCREENING FOR ENDOCRINE, NUTRITIONAL, METABOLIC AND IMMUNITY DISORDER: ICD-10-CM

## 2021-07-01 DIAGNOSIS — E66.812 CLASS 2 SEVERE OBESITY DUE TO EXCESS CALORIES WITH SERIOUS COMORBIDITY AND BODY MASS INDEX (BMI) OF 37.0 TO 37.9 IN ADULT (H): Primary | ICD-10-CM

## 2021-07-01 DIAGNOSIS — Z86.711 HISTORY OF PULMONARY EMBOLISM: ICD-10-CM

## 2021-07-01 DIAGNOSIS — K42.9 UMBILICAL HERNIA WITHOUT OBSTRUCTION AND WITHOUT GANGRENE: ICD-10-CM

## 2021-07-01 DIAGNOSIS — R60.0 BILATERAL LEG EDEMA: ICD-10-CM

## 2021-07-01 DIAGNOSIS — Z13.21 SCREENING FOR ENDOCRINE, NUTRITIONAL, METABOLIC AND IMMUNITY DISORDER: ICD-10-CM

## 2021-07-01 DIAGNOSIS — R06.83 SNORING: ICD-10-CM

## 2021-07-01 DIAGNOSIS — L68.0 HIRSUTISM: ICD-10-CM

## 2021-07-01 DIAGNOSIS — E55.9 VITAMIN D DEFICIENCY: ICD-10-CM

## 2021-07-01 DIAGNOSIS — Z13.29 SCREENING FOR ENDOCRINE, NUTRITIONAL, METABOLIC AND IMMUNITY DISORDER: ICD-10-CM

## 2021-07-01 DIAGNOSIS — Z13.228 SCREENING FOR ENDOCRINE, NUTRITIONAL, METABOLIC AND IMMUNITY DISORDER: ICD-10-CM

## 2021-07-01 PROCEDURE — 99417 PROLNG OP E/M EACH 15 MIN: CPT | Performed by: PHYSICIAN ASSISTANT

## 2021-07-01 PROCEDURE — 99205 OFFICE O/P NEW HI 60 MIN: CPT | Performed by: PHYSICIAN ASSISTANT

## 2021-07-01 ASSESSMENT — MIFFLIN-ST. JEOR: SCORE: 1459.15
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
## 2021-07-01 NOTE — LETTER
Jeff Serra  July 1, 2021        Bariatric Task List      Required Weight loss:    Lose 5 lbs prior to surgery.  Goal Weight: 191 lbs  Tasks:  Have preoperative laboratory tests drawn.     Psychological Evaluation with MMPI and clearance for weight loss surgery.    Achieve clearance from dietitian to see surgeon.    A total of 6 structured dietitian weight loss visits are required by your insurance.    Plan to get off NSAIDS (Ibuprofen, Aleve) prior to surgery to decrease risk of ulcer formation and/or perforation following bariatric surgery.    Sleep Consult

## 2021-07-01 NOTE — PATIENT INSTRUCTIONS
Here is the website for the bariatric online information session.  Near the bottom you will see a turquoise box that says [facility].  Please click this to review. We will discuss at our follow up visit.    [email or web address]      Plan:   Please refer to your task list created today at your appointment.  Labs have been ordered in the system for you. You can have these drawn at any [facility] lab.  Please call [phone] set up an appointment.  Your results will be posted on [facility] as soon as they're complete.  After all are resulted, I will review and comment to you.  Make appointment to return to clinic in 1 month to see the dietician and Latricia Lund PA-C.  _____________________________________________________________________  In general before being submitted for insurance approval, you will need the following:  -Clearance by the Psychologist  -Clearance by the dietitian  -Structured weight loss visits if mandated by your insurance carrier  -Surgeon consult  -Use CPAP for at least one month before surgery if you have sleep apnea. Make sure to bring your CPAP or BiPAP to the hospital at the time of surgery.  -You will need to be tobacco free for 3 months before surgery and remain a non-smoker thereafter. If you are currently smoking or have recently quit, your urine will be evaluated for tobacco metabolites pre-operatively.  -If you have diabetes, you will need to have an A1C less than or equal to 8.0 within 3 months of surgery.  -You will need an exercise plan which includes MOVE, ie., walking and MUSCLE, ie.,calisthenics, bands, weight, machines, etc...  _____________________________________________________________________  Remember that after your bariatric surgery, vitamin supplementation is a lifelong need.  You will take:    B-12 1000mcg or higher sublingual (under the tongue) daily or by injection 1-2X /month  Vitamin D3 5000U daily   Multivitamin  containing 18mg of iron twice a day  Calcium citrate 1 or 2 daily  Thiamine 100 mg weekly   Vitron C once daily if you are a menstruating female  To keep your weight off and your vitamin levels up, follow-up is important.  Your labs will be monitored every 3 months for the first year and yearly thereafter.  To avoid ulcers in your stomach avoid tobacco forever, alcohol in excess, caffeine in excess and anti-inflammatories (NSAIDS)  (Aspirin, Ibuprofen, Naproxen and similar medications). Tylenol is fine.  If you are told by your physician take Aspirin to protect your heart or for another reason, make sure to take omeprazole or similar medication (protonix, nexium, prevacid) to protect your stomach.  Remember that alcohol affects you differently after bariatric surgery. If you have even ONE drink DO NOT DRIVE.

## 2021-07-07 ENCOUNTER — HOSPITAL ENCOUNTER (OUTPATIENT)
Dept: LAB | Facility: CLINIC | Age: 46
Discharge: HOME | End: 2021-07-07
Attending: PHYSICIAN ASSISTANT | Admitting: PHYSICIAN ASSISTANT
Payer: COMMERCIAL

## 2021-07-07 DIAGNOSIS — Z13.228 SCREENING FOR ENDOCRINE, NUTRITIONAL, METABOLIC AND IMMUNITY DISORDER: ICD-10-CM

## 2021-07-07 DIAGNOSIS — E66.812 CLASS 2 SEVERE OBESITY DUE TO EXCESS CALORIES WITH SERIOUS COMORBIDITY AND BODY MASS INDEX (BMI) OF 37.0 TO 37.9 IN ADULT (H): ICD-10-CM

## 2021-07-07 DIAGNOSIS — E66.01 CLASS 2 SEVERE OBESITY DUE TO EXCESS CALORIES WITH SERIOUS COMORBIDITY AND BODY MASS INDEX (BMI) OF 37.0 TO 37.9 IN ADULT (H): ICD-10-CM

## 2021-07-07 DIAGNOSIS — Z13.0 SCREENING FOR ENDOCRINE, NUTRITIONAL, METABOLIC AND IMMUNITY DISORDER: ICD-10-CM

## 2021-07-07 DIAGNOSIS — Z13.21 SCREENING FOR ENDOCRINE, NUTRITIONAL, METABOLIC AND IMMUNITY DISORDER: ICD-10-CM

## 2021-07-07 DIAGNOSIS — E55.9 VITAMIN D DEFICIENCY: ICD-10-CM

## 2021-07-07 DIAGNOSIS — Z13.29 SCREENING FOR ENDOCRINE, NUTRITIONAL, METABOLIC AND IMMUNITY DISORDER: ICD-10-CM

## 2021-07-07 LAB — HBA1C MFR BLD: 5.3 % (ref 0–5.6)

## 2021-07-07 PROCEDURE — 36415 COLL VENOUS BLD VENIPUNCTURE: CPT | Performed by: PHYSICIAN ASSISTANT

## 2021-07-07 PROCEDURE — 82306 VITAMIN D 25 HYDROXY: CPT | Performed by: PHYSICIAN ASSISTANT

## 2021-07-07 PROCEDURE — 83036 HEMOGLOBIN GLYCOSYLATED A1C: CPT | Performed by: PHYSICIAN ASSISTANT

## 2021-07-08 ENCOUNTER — VIRTUAL VISIT (OUTPATIENT)
Dept: SURGERY | Facility: CLINIC | Age: 46
End: 2021-07-08
Payer: COMMERCIAL

## 2021-07-08 VITALS — BODY MASS INDEX: 37.08 KG/M2 | HEIGHT: 61 IN | WEIGHT: 196.4 LBS

## 2021-07-08 DIAGNOSIS — E66.01 CLASS 2 SEVERE OBESITY DUE TO EXCESS CALORIES WITH SERIOUS COMORBIDITY AND BODY MASS INDEX (BMI) OF 37.0 TO 37.9 IN ADULT (H): ICD-10-CM

## 2021-07-08 DIAGNOSIS — E66.812 CLASS 2 SEVERE OBESITY DUE TO EXCESS CALORIES WITH SERIOUS COMORBIDITY AND BODY MASS INDEX (BMI) OF 37.0 TO 37.9 IN ADULT (H): ICD-10-CM

## 2021-07-08 LAB — DEPRECATED CALCIDIOL+CALCIFEROL SERPL-MC: 10 UG/L (ref 20–75)

## 2021-07-08 PROCEDURE — 97802 MEDICAL NUTRITION INDIV IN: CPT | Mod: 95 | Performed by: DIETITIAN, REGISTERED

## 2021-07-08 ASSESSMENT — MIFFLIN-ST. JEOR: SCORE: 1458.24

## 2021-07-08 NOTE — PROGRESS NOTES
"Jeff is a 46 year old who is being evaluated via a billable video visit.      How would you like to obtain your AVS? MyChart  Will anyone else be joining your video visit? No      Video Start Time: 3:00pm    Video-Visit Details    Type of service:  Video Visit    Video End Time: 3:51pm    Originating Location (pt. Location): Home    Distant Location (provider location):  [facility] [place]     Platform used for Video Visit: [employer]     [facility] Bariatric Nutrition Consultation Note    Reason For Visit: Nutrition Assessment    Jeff Serra is a 46 year old presenting today for new bariatric nutrition consult.  Pt is interested in laparoscopic (undecided)  Patient is accompanied by self.    No flowsheet data found.    ANTHROPOMETRICS:  Estimated body mass index is 37.11 kg/m  as calculated from the following:    Height as of this encounter: 1.549 m (5' 1\").    Weight as of this encounter: 89.1 kg (196 lb 6.4 oz).    Required weight loss goal pre-op: 5 lbs from initial consult weight (goal weight 191.4 lbs or less before surgery)      Diet Recall Review with Patient 6/30/2021   Do you typically eat breakfast? Yes   If you do eat breakfast, what types of food do you eat? EGGS, CAAL, BREAD, PANCAKES, Romansh TOAST   Do you typically eat lunch? No   Do you typically eat supper? Yes   If you do eat supper, what types of food do you typically eat? CHICKEN, SPAGHETTI, MAC AND CHEESE, RICE,   Do you typically eat snacks? Yes   If you do snack, what types of food do you typically eat? COOKIES, CHIPS, CANDY, HONEY BUNS   Do you like vegetables?  Yes   Do you drink water? Yes   How many glasses of juice do you drink in a typical day? 5   How many of glasses of milk do you drink in a typical day? 2   If you do drink milk, what type? Whole   How many 8oz glasses of sugar containing drinks such as Raj-Aid/sweet tea do you drink in a day? 5   How many cans/bottles of sugar pop/soda/tea/sports " drinks do you drink in a day? 3   How many cans/bottles of diet pop/soda/tea or sports drink do you drink in a day? 3   How often do you have a drink of alcohol? Monthly or Less   If you do drink, how many drinks might you have in a day? 1 or 2         ADDITIONAL INFORMATION:  Pt has been considering surgery for ~10 months. Aunt and Mother have both had bariatric surgery. Pt had many appropriate questions today.      NUTRITION DIAGNOSIS:  Obesity r/t long history of self-monitoring deficit and excessive energy intake aeb BMI >30 kg/m2.    INTERVENTION:  Intervention Provided/Education Provided on post-op diet guidelines, vitamins/minerals essential post-operatively, GI anatomy of bariatric surgeries, ways to help prepare for post-op diet guidelines pre-operatively, portion/calorie-control, mindful eating.  Provided pt with list of goals RD contact information.      Questions Reviewed With Patient 6/30/2021   How ready are you to make changes regarding your weight? Number 1 = Not ready at all to make changes up to 10 = very ready. 10   How confident are you that you can change? 1 = Not confident that you will be successful making changes up to 10 = very confident. 10       Patient Understanding: good  Expected Compliance: good    GOALS:  Begin taking multivitamin,  calcium and vitamin D per guidelines  Take 20-30 minutes to eat meals  Separate fluids and meals by 30 minute    Follow-Up:   Recommend 2-3 follow up visits to assist with lifestyle changes or per insurance.  **Pt to verify insurance requirements    Time spent with patient: 51 minutes.    Pam Fernandez RD, LD  Clinical Dietitian

## 2021-07-08 NOTE — PATIENT INSTRUCTIONS
Ramy Aguilar!    It was great chatting with you today! Here are some links to the information we discussed:      Vitamins/Minerals:  [email or web address]     Diet Guidelines:  [email or web address]         Here are the goals we discussed for this first month:  1. Begin taking a daily multivitamin , calcium, and vitamin D supplement - the doses/requirements for these are in the vitamin/mineral handout link above. The easiest schedule will be to take calcium 2-3x/ per day, and take everything else at bedtime.    2. Take 20-30 minutes to eat meals  3. Separate fluids and meals by 30 minutes           Your next visit can be scheduled via the call center at  and should be in one month. You also still need to follow up with Latricia, in person. Have a great week and let us know if any questions/concerns pop up!     Pam Fernandez RD, LD  Clinical Dietitian

## 2021-07-16 ENCOUNTER — VIRTUAL VISIT (OUTPATIENT)
Dept: FAMILY MEDICINE | Facility: CLINIC | Age: 46
End: 2021-07-16
Payer: COMMERCIAL

## 2021-07-16 DIAGNOSIS — F11.90 CHRONIC, CONTINUOUS USE OF OPIOIDS: Primary | ICD-10-CM

## 2021-07-16 DIAGNOSIS — G44.86 CERVICOGENIC HEADACHE: ICD-10-CM

## 2021-07-16 DIAGNOSIS — R05.3 CHRONIC COUGH: ICD-10-CM

## 2021-07-16 DIAGNOSIS — K21.9 GASTROESOPHAGEAL REFLUX DISEASE WITHOUT ESOPHAGITIS: ICD-10-CM

## 2021-07-16 DIAGNOSIS — M25.562 LEFT KNEE PAIN, UNSPECIFIED CHRONICITY: ICD-10-CM

## 2021-07-16 DIAGNOSIS — M54.50 CHRONIC MIDLINE LOW BACK PAIN WITHOUT SCIATICA: ICD-10-CM

## 2021-07-16 DIAGNOSIS — G89.29 CHRONIC MIDLINE LOW BACK PAIN WITHOUT SCIATICA: ICD-10-CM

## 2021-07-16 PROCEDURE — 99214 OFFICE O/P EST MOD 30 MIN: CPT | Mod: 95 | Performed by: FAMILY MEDICINE

## 2021-07-16 RX ORDER — FAMOTIDINE 20 MG/1
20 TABLET, FILM COATED ORAL AT BEDTIME
Qty: 30 TABLET | Refills: 0 | Status: SHIPPED | OUTPATIENT
Start: 2021-07-16 | End: 2021-08-18

## 2021-07-16 RX ORDER — OXYCODONE HYDROCHLORIDE 15 MG/1
15 TABLET ORAL 3 TIMES DAILY PRN
Qty: 90 TABLET | Refills: 0 | Status: SHIPPED | OUTPATIENT
Start: 2021-08-03 | End: 2021-09-03

## 2021-07-16 RX ORDER — CYCLOBENZAPRINE HCL 5 MG
5 TABLET ORAL 3 TIMES DAILY PRN
Qty: 30 TABLET | Refills: 0 | Status: SHIPPED | OUTPATIENT
Start: 2021-07-16 | End: 2021-11-01

## 2021-07-16 NOTE — PROGRESS NOTES
Jeff is a 46 year old who is being evaluated via a billable video visit.      How would you like to obtain your AVS? MyChart  If the video visit is dropped, the invitation should be resent by: Text to cell phone: 1  Will anyone else be joining your video visit? No      Video Start Time: 1043    Assessment & Plan     Chronic, continuous use of opioids  Stable ongoing use.  Up-to-date on controlled substance agreement and  database monitoring.  We will set up urine drug screen with next lab visit.  - Drug Abuse Screen Panel 13, Urine (Pain Care Package) - lab collect; Future  - oxyCODONE IR (ROXICODONE) 15 MG tablet; Take 1 tablet (15 mg) by mouth 3 times daily as needed for pain Needs follow up visit for any further refill.    Left knee pain, unspecified chronicity  Ongoing left knee pain for years but seems to be worsening recently.  Occasional swelling.  X-ray was unremarkable from last year but we will set up an MRI to look at internal structures.  - MR Knee Left w/o Contrast; Future    Cervicogenic headache  This is been creeping up over a few months and seems to be coming from her neck area.  Not sure if it is from working on the computer but we discussed some stretches and exercises and use of Flexeril especially at bedtime for a few weeks.  - cyclobenzaprine (FLEXERIL) 5 MG tablet; Take 1 tablet (5 mg) by mouth 3 times daily as needed (neck pain / headache)    Chronic midline low back pain without sciatica  As above  - oxyCODONE IR (ROXICODONE) 15 MG tablet; Take 1 tablet (15 mg) by mouth 3 times daily as needed for pain Needs follow up visit for any further refill.    Chronic cough  Does not seem to be allergy related as antihistamines did not really seem to help.  So we will try adding a nighttime dosage of Pepcid to her daily omeprazole.  - famotidine (PEPCID) 20 MG tablet; Take 1 tablet (20 mg) by mouth At Bedtime    Gastroesophageal reflux disease without esophagitis  As above  - famotidine  (PEPCID) 20 MG tablet; Take 1 tablet (20 mg) by mouth At Bedtime       See Patient Instructions    Return in about 3 months (around 10/16/2021) for Follow up on Pain, in office.    Jordyn Loredo MD  [facility]    Nicole Aguilar is a 46 year old who presents for the following health issues     HPI     Video visit with patient today in general to follow-up on pain but to talk about a few new issues as well.  Left knee pain times months.  Ongoing cough for months as well.  Starting to have more more headaches that seem to come from her neck.    Review of Systems   Constitutional, HEENT, cardiovascular, pulmonary, gi and gu systems are negative, except as otherwise noted.      Objective           Vitals:  No vitals were obtained today due to virtual visit.    Physical Exam   GENERAL: Healthy, alert and no distress  EYES: Eyes grossly normal to inspection.  No discharge or erythema, or obvious scleral/conjunctival abnormalities.  RESP: No audible wheeze, cough, or visible cyanosis.  No visible retractions or increased work of breathing.    SKIN: Visible skin clear. No significant rash, abnormal pigmentation or lesions.  NEURO: Cranial nerves grossly intact.  Mentation and speech appropriate for age.  PSYCH: Mentation appears normal, affect normal/bright, judgement and insight intact, normal speech and appearance well-groomed.    Past labs reviewed with the patient.   Reviewed previous imaging.            Video-Visit Details    Type of service:  Video Visit    Video End Time:1051    Originating Location (pt. Location): Home    Distant Location (provider location):  [facility]     Platform used for Video Visit: [employer]

## 2021-07-24 ENCOUNTER — HOSPITAL ENCOUNTER (OUTPATIENT)
Dept: MRI IMAGING | Facility: CLINIC | Age: 46
Discharge: HOME | End: 2021-07-24
Attending: FAMILY MEDICINE | Admitting: FAMILY MEDICINE
Payer: COMMERCIAL

## 2021-07-24 DIAGNOSIS — M25.562 LEFT KNEE PAIN, UNSPECIFIED CHRONICITY: ICD-10-CM

## 2021-07-24 PROCEDURE — 73721 MRI JNT OF LWR EXTRE W/O DYE: CPT | Mod: LT

## 2021-07-24 PROCEDURE — 73721 MRI JNT OF LWR EXTRE W/O DYE: CPT | Mod: 26 | Performed by: RADIOLOGY

## 2021-07-26 DIAGNOSIS — M25.562 LEFT KNEE PAIN, UNSPECIFIED CHRONICITY: Primary | ICD-10-CM

## 2021-07-26 NOTE — RESULT ENCOUNTER NOTE
Ramy Aguilar   Your left knee MRI shows the meniscus and ACL are intact, but over the patella (knee cap) there is a fissure with significant swelling (edema). There is also more edema superficially near a tendon that is consistent with mild prepatellar tendon bursitis (inflammation). I would recommend follow up with Orthopedics as it sounds like your pain has worsened over time. I will place a referral and they will call you in a few days to set up an appointment.     Dr. Loredo is out of the office this week and I am one of the providers helping to cover for him.     Please call if you have further questions.   Thank you,  HARLEY Thomas, NP-C

## 2021-08-09 ASSESSMENT — ENCOUNTER SYMPTOMS
PALPITATIONS: 0
CHILLS: 0
NECK PAIN: 1
SYNCOPE: 0
POLYPHAGIA: 0
MYALGIAS: 1
WEIGHT GAIN: 1
LIGHT-HEADEDNESS: 0
FEVER: 0
HYPOTENSION: 0
MUSCLE CRAMPS: 1
DECREASED APPETITE: 0
POLYDIPSIA: 1
ALTERED TEMPERATURE REGULATION: 1
MUSCLE WEAKNESS: 1
LEG PAIN: 1
EXERCISE INTOLERANCE: 0
HALLUCINATIONS: 0
FATIGUE: 0
WEIGHT LOSS: 0
ORTHOPNEA: 0
NIGHT SWEATS: 1
BACK PAIN: 1
HYPERTENSION: 1
JOINT SWELLING: 1
ARTHRALGIAS: 1
INCREASED ENERGY: 1
STIFFNESS: 1
SLEEP DISTURBANCES DUE TO BREATHING: 0

## 2021-08-09 ASSESSMENT — SLEEP AND FATIGUE QUESTIONNAIRES
HOW LIKELY ARE YOU TO NOD OFF OR FALL ASLEEP WHILE SITTING INACTIVE IN A PUBLIC PLACE: WOULD NEVER DOZE
HOW LIKELY ARE YOU TO NOD OFF OR FALL ASLEEP WHILE WATCHING TV: HIGH CHANCE OF DOZING
HOW LIKELY ARE YOU TO NOD OFF OR FALL ASLEEP WHEN YOU ARE A PASSENGER IN A CAR FOR AN HOUR WITHOUT A BREAK: MODERATE CHANCE OF DOZING
HOW LIKELY ARE YOU TO NOD OFF OR FALL ASLEEP WHILE SITTING QUIETLY AFTER LUNCH WITHOUT ALCOHOL: SLIGHT CHANCE OF DOZING
HOW LIKELY ARE YOU TO NOD OFF OR FALL ASLEEP WHILE LYING DOWN TO REST IN THE AFTERNOON WHEN CIRCUMSTANCES PERMIT: MODERATE CHANCE OF DOZING
HOW LIKELY ARE YOU TO NOD OFF OR FALL ASLEEP IN A CAR, WHILE STOPPED FOR A FEW MINUTES IN TRAFFIC: WOULD NEVER DOZE
HOW LIKELY ARE YOU TO NOD OFF OR FALL ASLEEP WHILE SITTING AND READING: HIGH CHANCE OF DOZING
HOW LIKELY ARE YOU TO NOD OFF OR FALL ASLEEP WHILE SITTING AND TALKING TO SOMEONE: WOULD NEVER DOZE
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
## 2021-08-09 NOTE — PATIENT INSTRUCTIONS
"      MY TREATMENT INFORMATION FOR SLEEP APNEA-  Jeff Serra    PROVIDER: Krista Dubose PA-C    Am I having a sleep study at a sleep center?  --->Due to insurance clearance delays, you will be contacted within 2-4 weeks to schedule    Frequently asked questions:  1. What is Obstructive Sleep Apnea (CATALINO)? CATALINO is the most common type of sleep apnea. Apnea means, \"without breath.\"  Apnea is most often caused by narrowing or collapse of the upper airway as muscles relax during sleep.   Almost everyone has occasional apneas. Most people with sleep apnea have had brief interruptions at night frequently for many years.  The severity of sleep apnea is related to how frequent and severe the events are.   2. What are the consequences of CATALINO? Symptoms include: feeling sleepy during the day, snoring loudly, gasping or stopping of breathing, trouble sleeping, and occasionally morning headaches or heartburn at night.  Sleepiness can be serious and even increase the risk of falling asleep while driving. Other health consequences may include development of high blood pressure and other cardiovascular disease in persons who are susceptible. Untreated CATALINO  can contribute to heart disease, stroke and diabetes.   3. What are the treatment options? In most situations, sleep apnea is a lifelong disease that must be managed with daily therapy. Medications are not effective for sleep apnea and surgery is generally not considered until other therapies have been tried. Your treatment is your choice . Continuous Positive Airway (CPAP) works right away and is the therapy that is effective in nearly everyone. An oral device to hold your jaw forward is usually the next most reliable option. Other options include postioning devices (to keep you off your back), weight loss, and surgery including a tongue pacing device. There is more detail about some of these options below.  4. Are my sleep studies covered by insurance? Although we " will request verification of coverage, we advise you also check in advance of the study to ensure there is coverage.    Important tips for those choosing CPAP and similar devices   Know your equipment:  CPAP is continuous positive airway pressure that prevents obstructive sleep apnea by keeping the throat from collapsing while you are sleeping. In most cases, the device is  smart  and can slowly self-adjusts if your throat collapses and keeps a record every day of how well you are treated-this information is available to you and your care team.  BPAP is bilevel positive airway pressure that keeps your throat open and also assists each breath with a pressure boost to maintain adequate breathing.  Special kinds of BPAP are used in patients who have inadequate breathing from lung or heart disease. In most cases, the device is  smart  and can slowly self-adjusts to assist breathing. Like CPAP, the device keeps a record of how well you are treated.  Your mask is your connection to the device. You get to choose what feels most comfortable and the staff will help to make sure if fits. Here: are some examples of the different masks that are available:       Key points to remember on your journey with sleep apnea:  1. Sleep study.  PAP devices often need to be adjusted during a sleep study to show that they are effective and adjusted right.  2. Good tips to remember: Try wearing just the mask during a quiet time during the day so your body adapts to wearing it. A humidifier is recommended for comfort in most cases to prevent drying of your nose and throat. Allergy medication from your provider may help you if you are having nasal congestion.  3. Getting settled-in. It takes more than one night for most of us to get used to wearing a mask. Try wearing just the mask during a quiet time during the day so your body adapts to wearing it. A humidifier is recommended for comfort in most cases. Our team will work with you carefully  on the first day and will be in contact within 4 days and again at 2 and 4 weeks for advice and remote device adjustments. Your therapy is evaluated by the device each day.   4. Use it every night. The more you are able to sleep naturally for 7-8 hours, the more likely you will have good sleep and to prevent health risks or symptoms from sleep apnea. Even if you use it 4 hours it helps. Occasionally all of us are unable to use a medical therapy, in sleep apnea, it is not dangerous to miss one night.   5. Communicate. Call our skilled team on the number provided on the first day if your visit for problems that make it difficult to wear the device. Over 2 out of 3 patients can learn to wear the device long-term with help from our team. Remember to call our team or your sleep providers if you are unable to wear the device as we may have other solutions for those who cannot adapt to mask CPAP therapy. It is recommended that you sleep your sleep provider within the first 3 months and yearly after that if you are not having problems.   6. Use it for your health. We encourage use of CPAP masks during daytime quiet periods to allow your face and brain to adapt to the sensation of CPAP so that it will be a more natural sensation to awaken to at night or during naps. This can be very useful during the first few weeks or months of adapting to CPAP though it does not help medically to wear CPAP during wakefulness and  should not be used as a strategy just to meet guidelines.  7. Take care of your equipment. Make sure you clean your mask and tubing using directions every day and that your filter and mask are replaced as recommended or if they are not working.     BESIDES CPAP, WHAT OTHER THERAPIES ARE THERE?    Positioning Device  Positioning devices are generally used when sleep apnea is mild and only occurs on your back.This example shows a pillow that straps around the waist. It may be appropriate for those whose sleep study  shows milder sleep apnea that occurs primarily when lying flat on one's back. Preliminary studies have shown benefit but effectiveness at home may need to be verified by a home sleep test. These devices are generally not covered by medical insurance.  Examples of devices that maintain sleeping on the back to prevent snoring and mild sleep apnea.    Belt type body positioner  http://[employer]/    Electronic reminder  http://nightshifttherapy.com/  http://[email or web address]au/      Oral Appliance  What is oral appliance therapy?  An oral appliance device fits on your teeth at night like a retainer used after having braces. The device is made by a specialized dentist and requires several visits over 1-2 months before a manufactured device is made to fit your teeth and is adjusted to prevent your sleep apnea. Once an oral device is working properly, snoring should be improved. A home sleep test may be recommended at that time if to determine whether the sleep apnea is adequately treated.       Some things to remember:  -Oral devices are often, but not always, covered by your medical insurance. Be sure to check with your insurance provider.   -If you are referred for oral therapy, you will be given a list of specialized dentists to consider or you may choose to visit the Web site of the American Academy of Dental Sleep Medicine  -Oral devices are less likely to work if you have severe sleep apnea or are extremely overweight.     More detailed information  An oral appliance is a small acrylic device that fits over the upper and lower teeth  (similar to a retainer or a mouth guard). This device slightly moves jaw forward, which moves the base of the tongue forward, opens the airway, improves breathing for effective treat snoring and obstructive sleep apnea in perhaps 7 out of 10 people .  The best working devices are custom-made by a dental device  after a mold is made of the teeth 1, 2, 3.  When is an oral  appliance indicated?  Oral appliance therapy is recommended as a first-line treatment for patients with primary snoring, mild sleep apnea, and for patients with moderate sleep apnea who prefer appliance therapy to use of CPAP4, 5. Severity of sleep apnea is determined by sleep testing and is based on the number of respiratory events per hour of sleep.   How successful is oral appliance therapy?  The success rate of oral appliance therapy in patients with mild sleep apnea is 75-80% while in patients with moderate sleep apnea it is 50-70%. The chance of success in patients with severe sleep apnea is 40-50%. The research also shows that oral appliances have a beneficial effect on the cardiovascular health of CATALINO patients at the same magnitude as CPAP therapy7.  Oral appliances should be a second-line treatment in cases of severe sleep apnea, but if not completely successful then a combination therapy utilizing CPAP plus oral appliance therapy may be effective. Oral appliances tend to be effective in a broad range of patients although studies show that the patients who have the highest success are females, younger patients, those with milder disease, and less severe obesity. 3, 6.   Finding a dentist that practices dental sleep medicine  Specific training is available through the American Academy of Dental Sleep Medicine for dentists interested in working in the field of sleep. To find a dentist who is educated in the field of sleep and the use of oral appliances, near you, visit the Web site of the American Academy of Dental Sleep Medicine.    References  1. Nikki et al. Objectively measured vs self-reported compliance during oral appliance therapy for sleep-disordered breathing. Chest 2013; 144(5): 0074-2230.  2. Nunu et al. Objective measurement of compliance during oral appliance therapy for sleep-disordered breathing. Thorax 2013; 68(1): 91-96.  3. Monique et al. Mandibular advancement devices in  620 men and women with CATALINO and snoring: tolerability and predictors of treatment success. Chest 2004; 125: 4546-0268.  4. Darvin et al. Oral appliances for snoring and CATALINO: a review. Sleep 2006; 29: 244-262.  5. Tobin et al. Oral appliance treatment for CATALINO: an update. J Clin Sleep Med 2014; 10(2): 215-227.  6. Luiz et al. Predictors of OSAH treatment outcome. J Dent Res 2007; 86: 9797-5643.      Weight Loss:    Weight loss is a long-term strategy that may improve sleep apnea in some patients.    Weight management is a personal decision and the decision should be based on your interest and the potential benefits.  If you are interested in exploring weight loss strategies, the following discussion covers the impact on weight loss on sleep apnea and the approaches that may be successful.    Being overweight does not necessarily mean you will have health consequences.  Those who have BMI over 35 or over 27 with existing medical conditions carries greater risk.   Weight loss decreases severity of sleep apnea in most people with obesity. For those with mild obesity who have developed snoring with weight gain, even 15-30 pound weight loss can improve and occasionally eliminate sleep apnea.  Structured and life-long dietary and health habits are necessary to lose weight and keep healthier weight levels.     Though there may be significant health benefits from weight loss, long-term weight loss is very difficult to achieve- studies show success with dietary management in less than 10% of people. In addition, substantial weight loss may require years of dietary control and may be difficult if patients have severe obesity. In these cases, surgical management may be considered.  Finally, older individuals who have tolerated obesity without health complications may be less likely to benefit from weight loss strategies.        Your BMI is There is no height or weight on file to calculate BMI.  Weight management is a  personal decision.  If you are interested in exploring weight loss strategies, the following discussion covers the approaches that may be successful. Body mass index (BMI) is one way to tell whether you are at a healthy weight, overweight, or obese. It measures your weight in relation to your height.  A BMI of 18.5 to 24.9 is in the healthy range. A person with a BMI of 25 to 29.9 is considered overweight, and someone with a BMI of 30 or greater is considered obese. More than two-thirds of American adults are considered overweight or obese.  Being overweight or obese increases the risk for further weight gain. Excess weight may lead to heart disease and diabetes.  Creating and following plans for healthy eating and physical activity may help you improve your health.  Weight control is part of healthy lifestyle and includes exercise, emotional health, and healthy eating habits. Careful eating habits lifelong are the mainstay of weight control. Though there are significant health benefits from weight loss, long-term weight loss with diet alone may be very difficult to achieve- studies show long-term success with dietary management in less than 10% of people. Attaining a healthy weight may be especially difficult to achieve in those with severe obesity. In some cases, medications, devices and surgical management might be considered.  What can you do?  If you are overweight or obese and are interested in methods for weight loss, you should discuss this with your provider.     Consider reducing daily calorie intake by 500 calories.     Keep a food journal.     Avoiding skipping meals, consider cutting portions instead.    Diet combined with exercise helps maintain muscle while optimizing fat loss. Strength training is particularly important for building and maintaining muscle mass. Exercise helps reduce stress, increase energy, and improves fitness. Increasing exercise without diet control, however, may not burn enough  calories to loose weight.       Start walking three days a week 10-20 minutes at a time    Work towards walking thirty minutes five days a week     Eventually, increase the speed of your walking for 1-2 minutes at time    In addition, we recommend that you review healthy lifestyles and methods for weight loss available through the National Institutes of Health patient information sites:  http://win.niddk.nih.gov/publications/index.htm    And look into health and wellness programs that may be available through your health insurance provider, employer, local community center, or harshal club.    Weight management plan: Patient was referred to their PCP to discuss a diet and exercise plan.  Surgery:    Surgery for obstructive sleep apnea is considered generally only when other therapies fail to work. Surgery may be discussed with you if you are having a difficult time tolerating CPAP and or when there is an abnormal structure that requires surgical correction.  Nose and throat surgeries often enlarge the airway to prevent collapse.  Most of these surgeries create pain for 1-2 weeks and up to half of the most common surgeries are not effective throughout life.  You should carefully discuss the benefits and drawbacks to surgery with your sleep provider and surgeon to determine if it is the best solution for you.   More information  Surgery for CATALINO is directed at areas that are responsible for narrowing or complete obstruction of the airway during sleep.  There are a wide range of procedures available to enlarge and/or stabilize the airway to prevent blockage of breathing in the three major areas where it can occur: the palate, tongue, and nasal regions.  Successful surgical treatment depends on the accurate identification of the factors responsible for obstructive sleep apnea in each person.  A personalized approach is required because there is no single treatment that works well for everyone.  Because of anatomic  variation, consultation with an examination by a sleep surgeon is a critical first step in determining what surgical options are best for each patient.  In some cases, examination during sedation may be recommended in order to guide the selection of procedures.  Patients will be counseled about risks and benefits as well as the typical recovery course after surgery. Surgery is typically not a cure for a person s CATALINO.  However, surgery will often significantly improve one s CATALINO severity (termed  success rate ).  Even in the absence of a cure, surgery will decrease the cardiovascular risk associated with OSA7; improve overall quality of life8 (sleepiness, functionality, sleep quality, etc).      Palate Procedures:  Patients with CATALINO often have narrowing of their airway in the region of their tonsils and uvula.  The goals of palate procedures are to widen the airway in this region as well as to help the tissues resist collapse.  Modern palate procedure techniques focus on tissue conservation and soft tissue rearrangement, rather than tissue removal.  Often the uvula is preserved in this procedure. Residual sleep apnea is common in patient after pharyngoplasty with an average reduction in sleep apnea events of 33%2.      Tongue Procedures:  ExamWhile patients are awake, the muscles that surround the throat are active and keep this region open for breathing. These muscles relax during sleep, allowing the tongue and other structures to collapse and block breathing.  There are several different tongue procedures available.  Selection of a tongue base procedure depends on characteristics seen on physical exam.  Generally, procedures are aimed at removing bulky tissues in this area or preventing the back of the tongue from falling back during sleep.  Success rates for tongue surgery range from 50-62%3.    Hypoglossal Nerve Stimulation:  Hypoglossal nerve stimulation has recently received approval from the [place] Food  and Drug Administration for the treatment of obstructive sleep apnea.  This is based on research showing that the system was safe and effective in treating sleep apnea6.  Results showed that the median AHI score decreased 68%, from 29.3 to 9.0. This therapy uses an implant system that senses breathing patterns and delivers mild stimulation to airway muscles, which keeps the airway open during sleep.  The system consists of three fully implanted components: a small generator (similar in size to a pacemaker), a breathing sensor, and a stimulation lead.  Using a small handheld remote, a patient turns the therapy on before bed and off upon awakening.    Candidates for this device must be greater than 22 years of age, have moderate to severe CATALINO (AHI between 20-65), BMI less than 32, have tried CPAP/oral appliance without success, and have appropriate upper airway anatomy (determined by a sleep endoscopy performed by Dr. Young).    Hypoglossal Nerve Stimulation Pathway:    The sleep surgeon s office will work with the patient through the insurance prior-authorization process (including communications and appeals).    Nasal Procedures:  Nasal obstruction can interfere with nasal breathing during the day and night.  Studies have shown that relief of nasal obstruction can improve the ability of some patients to tolerate positive airway pressure therapy for obstructive sleep apnea1.  Treatment options include medications such as nasal saline, topical corticosteroid and antihistamine sprays, and oral medications such as antihistamines or decongestants. Non-surgical treatments can include external nasal dilators for selected patients. If these are not successful by themselves, surgery can improve the nasal airway either alone or in combination with these other options.      Combination Procedures:  Combination of surgical procedures and other treatments may be recommended, particularly if patients have more than one area of  narrowing or persistent positional disease.  The success rate of combination surgery ranges from 66-80%2,3.    References  1. Cat PALOMINO. The Role of the Nose in Snoring and Obstructive Sleep Apnoea: An Update.  Eur Arch Otorhinolaryngol. 2011; 268: 1365-73.  2.  Rosio SM; Sana JA; Giovanna JR; Pallanch JF; Pamela MB; Lisa SG; Lesley WHEELER. Surgical modifications of the upper airway for obstructive sleep apnea in adults: a systematic review and meta-analysis. SLEEP 2010;33(10):1161-4989. Roxy ZENG. Hypopharyngeal surgery in obstructive sleep apnea: an evidence-based medicine review.  Arch Otolaryngol Head Neck Surg. 2006 Feb;132(2):206-13.  3. Luis Eduardo YH1, Ousmane Y, Yan INDRA. The efficacy of anatomically based multilevel surgery for obstructive sleep apnea. Otolaryngol Head Neck Surg. 2003 Oct;129(4):327-35.  4. Roxy ZENG, Goldberg A. Hypopharyngeal Surgery in Obstructive Sleep Apnea: An Evidence-Based Medicine Review. Arch Otolaryngol Head Neck Surg. 2006 Feb;132(2):206-13.  5. Toshia PJ et al. Upper-Airway Stimulation for Obstructive Sleep Apnea.  N Engl J Med. 2014 Jan 9;370(2):139-49.  6. Alfredo Y et al. Increased Incidence of Cardiovascular Disease in Middle-aged Men with Obstructive Sleep Apnea. Am J Respir Crit Care Med; 2002 166: 159-165  7. Arciniega EM et al. Studying Life Effects and Effectiveness of Palatopharyngoplasty (SLEEP) study: Subjective Outcomes of Isolated Uvulopalatopharyngoplasty. Otolaryngol Head Neck Surg. 2011; 144: 623-631.    Drive Safe... Drive Alive     Sleep health profoundly affects your health, mood, and your safety.  Thirty three percent of the population (one in three of us) is not getting enough sleep and many have a sleep disorder. Not getting enough sleep or having an untreated / undertreated sleep condition may make us sleepy without even knowing it. In fact, our driving could be dramatically impaired due to our sleep health. As your provider, here are some things I would like  you to know about driving:     Here are some warning signs for impairment and dangerous drowsy driving:              -Having been awake more than 16 hours               -Looking tired               -Eyelid drooping              -Head nodding (it could be too late at this point)              -Driving for more than 30 minutes     Some things you could do to make the driving safer if you are experiencing some drowsiness:              -Stop driving and rest              -Call for transportation              -Make sure your sleep disorder is adequately treated     Some things that have been shown NOT to work when experiencing drowsiness while driving:              -Turning on the radio              -Opening windows              -Eating any  distracting  /  entertaining  foods (e.g., sunflower seeds, candy, or any other)              -Talking on the phone      Your decision may not only impact your life, but also the life of others. Please, remember to drive safe for yourself and all of us.

## 2021-08-10 ENCOUNTER — VIRTUAL VISIT (OUTPATIENT)
Dept: SLEEP MEDICINE | Facility: CLINIC | Age: 46
End: 2021-08-10
Attending: PHYSICIAN ASSISTANT
Payer: COMMERCIAL

## 2021-08-10 DIAGNOSIS — G47.19 EXCESSIVE DAYTIME SLEEPINESS: ICD-10-CM

## 2021-08-10 DIAGNOSIS — R06.83 SNORING: Primary | ICD-10-CM

## 2021-08-10 DIAGNOSIS — I10 HYPERTENSION GOAL BP (BLOOD PRESSURE) < 140/90: ICD-10-CM

## 2021-08-10 DIAGNOSIS — E66.9 OBESITY (BMI 30-39.9): ICD-10-CM

## 2021-08-10 PROCEDURE — 99204 OFFICE O/P NEW MOD 45 MIN: CPT | Mod: 95 | Performed by: PHYSICIAN ASSISTANT

## 2021-08-12 ENCOUNTER — OFFICE VISIT (OUTPATIENT)
Dept: SURGERY | Facility: CLINIC | Age: 46
End: 2021-08-12
Payer: COMMERCIAL

## 2021-08-12 VITALS
WEIGHT: 198.4 LBS | BODY MASS INDEX: 37.46 KG/M2 | OXYGEN SATURATION: 97 % | SYSTOLIC BLOOD PRESSURE: 122 MMHG | HEART RATE: 88 BPM | HEIGHT: 61 IN | DIASTOLIC BLOOD PRESSURE: 76 MMHG

## 2021-08-12 DIAGNOSIS — E66.812 CLASS 2 SEVERE OBESITY DUE TO EXCESS CALORIES WITH SERIOUS COMORBIDITY AND BODY MASS INDEX (BMI) OF 37.0 TO 37.9 IN ADULT (H): Primary | ICD-10-CM

## 2021-08-12 DIAGNOSIS — Z71.89 ENCOUNTER FOR PRE-BARIATRIC SURGERY COUNSELING AND EDUCATION: ICD-10-CM

## 2021-08-12 DIAGNOSIS — E55.9 VITAMIN D DEFICIENCY: ICD-10-CM

## 2021-08-12 DIAGNOSIS — E66.01 CLASS 2 SEVERE OBESITY DUE TO EXCESS CALORIES WITH SERIOUS COMORBIDITY AND BODY MASS INDEX (BMI) OF 37.0 TO 37.9 IN ADULT (H): Primary | ICD-10-CM

## 2021-08-12 PROCEDURE — 99215 OFFICE O/P EST HI 40 MIN: CPT | Performed by: PHYSICIAN ASSISTANT

## 2021-08-12 ASSESSMENT — MIFFLIN-ST. JEOR: SCORE: 1467.32

## 2021-08-12 NOTE — LETTER
Jeff Serra  July 1, 2021        Bariatric Task List      Required Weight loss:    Lose 5 lbs prior to surgery.  Goal Weight: 191 lbs  Tasks:  Have preoperative laboratory tests drawn.     Psychological Evaluation with MMPI and clearance for weight loss surgery.    Achieve clearance from dietitian to see surgeon.    Plan to get off NSAIDS (Ibuprofen, Aleve) prior to surgery to decrease risk of ulcer formation and/or perforation following bariatric surgery.    Sleep Consult

## 2021-08-12 NOTE — PROGRESS NOTES
Bariatric Pre-Surgery Visit    CC: Obesity    HPI: I had the pleasure of seeing Jeff in the office today.  I saw the patient last month to evaluate obesity and consider her for possible weight loss surgery. She was to view the bariatric online information session at home prior to todays visit but forgot.   She was able to start many of her other tasklist items. Plan today is to review bariatric education:discuss the process to surgery and the post operative schedule, and available weight loss surgeries including risks and benefits.      Wt Readings from Last 4 Encounters:   08/12/21 198 lb 6.4 oz (90 kg)   07/08/21 196 lb 6.4 oz (89.1 kg)   07/01/21 196 lb 9.6 oz (89.2 kg)   06/14/21 180 lb (81.6 kg)        Labs Reviewed:  Hemoglobin A1C   Date Value Ref Range Status   07/07/2021 5.3 0 - 5.6 % Final     Comment:     Normal <5.7% Prediabetes 5.7-6.4%  Diabetes 6.5% or higher - adopted from ADA   consensus guidelines.       Vitamin D Deficiency screening   Date Value Ref Range Status   07/07/2021 10 (L) 20 - 75 ug/L Final     Comment:     Season, race, dietary intake, and treatment affect the concentration of   25-hydroxy-Vitamin D. Values may decrease during winter months and increase   during summer months. Values 20-29 ug/L may indicate Vitamin D insufficiency   and values <20 ug/L may indicate Vitamin D deficiency.  Vitamin D determination is routinely performed by an immunoassay specific for   25 hydroxyvitamin D3.  If an individual is on vitamin D2 (ergocalciferol)   supplementation, please specify 25 OH vitamin D2 and D3 level determination by   LCMSMS test [id number].       TSH   Date Value Ref Range Status   06/14/2021 1.17 0.40 - 4.00 mU/L Final     Sodium   Date Value Ref Range Status   06/14/2021 138 133 - 144 mmol/L Final     Potassium   Date Value Ref Range Status   06/14/2021 3.7 3.4 - 5.3 mmol/L Final     Chloride   Date Value Ref Range Status   06/14/2021 104 94 - 109 mmol/L Final     Carbon Dioxide    Date Value Ref Range Status   06/14/2021 30 20 - 32 mmol/L Final     Anion Gap   Date Value Ref Range Status   06/14/2021 4 3 - 14 mmol/L Final     Glucose   Date Value Ref Range Status   06/14/2021 87 70 - 99 mg/dL Final     Urea Nitrogen   Date Value Ref Range Status   06/14/2021 7 7 - 30 mg/dL Final     Creatinine   Date Value Ref Range Status   06/14/2021 0.82 0.52 - 1.04 mg/dL Final     GFR Estimate   Date Value Ref Range Status   06/14/2021 84 >60 mL/min/[1.73_m2] Final     Comment:     Non  GFR Calc  Starting 12/18/2018, serum creatinine based estimated GFR (eGFR) will be   calculated using the Chronic Kidney Disease Epidemiology Collaboration   (CKD-EPI) equation.       Calcium   Date Value Ref Range Status   06/14/2021 8.7 8.5 - 10.1 mg/dL Final     Bilirubin Total   Date Value Ref Range Status   06/14/2021 0.9 0.2 - 1.3 mg/dL Final     Alkaline Phosphatase   Date Value Ref Range Status   06/14/2021 63 40 - 150 U/L Final     ALT   Date Value Ref Range Status   06/14/2021 32 0 - 50 U/L Final     AST   Date Value Ref Range Status   06/14/2021 14 0 - 45 U/L Final     Cholesterol   Date Value Ref Range Status   03/19/2019 223 (H) <200 mg/dL Final     Comment:     Desirable:       <200 mg/dl     HDL Cholesterol   Date Value Ref Range Status   03/19/2019 72 >49 mg/dL Final     LDL Cholesterol Calculated   Date Value Ref Range Status   03/19/2019 136 (H) <100 mg/dL Final     Comment:     Above desirable:  100-129 mg/dl  Borderline High:  130-159 mg/dL  High:             160-189 mg/dL  Very high:       >189 mg/dl       Triglycerides   Date Value Ref Range Status   03/19/2019 74 <150 mg/dL Final     Comment:     Non Fasting     WBC   Date Value Ref Range Status   06/14/2021 6.6 4.0 - 11.0 10e9/L Final     Hemoglobin   Date Value Ref Range Status   06/14/2021 12.6 11.7 - 15.7 g/dL Final     Hematocrit   Date Value Ref Range Status   06/14/2021 38.3 35.0 - 47.0 % Final     MCV   Date Value Ref Range  "Status   06/14/2021 90 78 - 100 fl Final     Platelet Count   Date Value Ref Range Status   06/14/2021 176 150 - 450 10e9/L Final       PE:  /76   Pulse 88   Ht 5' 1\" (1.549 m)   Wt 198 lb 6.4 oz (90 kg)   LMP 09/12/2016 (Exact Date)   SpO2 97%   BMI 37.49 kg/m    GENERAL: Healthy, alert and no distress  EYES: Eyes grossly normal to inspection.  No discharge or erythema, or obvious scleral/conjunctival abnormalities.  RESP: No audible wheeze, cough, or visible cyanosis.  No visible retractions or increased work of breathing.    SKIN: Visible skin clear. No significant rash, abnormal pigmentation or lesions.  NEURO: Cranial nerves grossly intact.  Mentation and speech appropriate for age.  PSYCH: Mentation appears normal, affect normal/bright, judgement and insight intact, normal speech and appearance well-groomed.    Assessment:  Morbid Obesity  Bariatric Education  Vitamin D deficiency    Plan:  Reviewed tasklist  above. She completed an informational seminar and is a possible candidate for bariatric surgery. She will have to complete the following pre-requisites:    Lose 5 lbs prior to surgery.  Goal Weight: 191 lbs- pending    Have preoperative laboratory tests drawn. - completed She is taking 10,000 international unit(s) of vitamin D daily.  She will have her Vit D rechecked in 2 months.      Psychological Evaluation with MMPI and clearance for weight loss surgery.- pending starts in December    Achieve clearance from dietitian to see surgeon.- pending    Plan to get off NSAIDS (Ibuprofen, Aleve) prior to surgery to decrease risk of ulcer formation and/or perforation following bariatric surgery.-  Is only taking tylenol for knee pain.      Sleep Consult- in process    View bariatric online information session   (Pt will review at home and we will discuss at our follow up visit.) [email or web address] will do before next visit.     Today in the office " we discussed gastric sleeve surgery. Preoperative, perioperative, and postoperative processes, management, and follow up were addressed.  Risks and benefits were outlined including the risk of death, PE, DVT, ulcer, worsening GERD, N/V, stricture, hernia, wound infection, weight regain, and vitamin deficiencies. I emphasized exercise and activity along with appropriate food choice as the main foundation for weight loss with surgery providing surgical reinforcement of this.  A goal sheet and support group handout were given to the patient. Patient contract was signed.    Once the patient has completed their task list and there are no further recommendations, they will see the surgeon for consultation for bariatric surgery.  All questions were answered. Patient verbalizes understanding of the process to surgery and expectations for the postoperative period including the need for lifelong lifestyle changes, vitamin supplementation, and laboratory monitoring. She will see my again next month to review her tasklist and ask any questions after watching the online bariatric information session.  She would like to continue seeing me monthly until cleared to see surgeon.     Sincerely,     Latricia Lund PA-C      FOLLOW-UP:  Return to clinic in 1 month      50 minutes spent on the date of the encounter reviewing chart, labs, patient education, charting, and plan of care.

## 2021-08-18 ENCOUNTER — OFFICE VISIT (OUTPATIENT)
Dept: ORTHOPEDICS | Facility: CLINIC | Age: 46
End: 2021-08-18
Attending: NURSE PRACTITIONER
Payer: COMMERCIAL

## 2021-08-18 VITALS
WEIGHT: 198 LBS | SYSTOLIC BLOOD PRESSURE: 112 MMHG | HEIGHT: 61 IN | DIASTOLIC BLOOD PRESSURE: 78 MMHG | BODY MASS INDEX: 37.38 KG/M2

## 2021-08-18 DIAGNOSIS — M22.42 CHONDROMALACIA OF LEFT PATELLA: Primary | ICD-10-CM

## 2021-08-18 DIAGNOSIS — M25.562 CHRONIC PAIN OF LEFT KNEE: ICD-10-CM

## 2021-08-18 DIAGNOSIS — G89.29 CHRONIC PAIN OF LEFT KNEE: ICD-10-CM

## 2021-08-18 PROCEDURE — 99213 OFFICE O/P EST LOW 20 MIN: CPT | Mod: 25 | Performed by: FAMILY MEDICINE

## 2021-08-18 PROCEDURE — 20610 DRAIN/INJ JOINT/BURSA W/O US: CPT | Mod: LT | Performed by: FAMILY MEDICINE

## 2021-08-18 RX ORDER — LIDOCAINE HYDROCHLORIDE 10 MG/ML
4 INJECTION, SOLUTION INFILTRATION; PERINEURAL
Status: DISCONTINUED | OUTPATIENT
Start: 2021-08-18 | End: 2022-05-12

## 2021-08-18 RX ORDER — METHYLPREDNISOLONE ACETATE 80 MG/ML
80 INJECTION, SUSPENSION INTRA-ARTICULAR; INTRALESIONAL; INTRAMUSCULAR; SOFT TISSUE
Status: DISCONTINUED | OUTPATIENT
Start: 2021-08-18 | End: 2022-05-12

## 2021-08-18 RX ADMIN — LIDOCAINE HYDROCHLORIDE 4 ML: 10 INJECTION, SOLUTION INFILTRATION; PERINEURAL at 16:55

## 2021-08-18 RX ADMIN — METHYLPREDNISOLONE ACETATE 80 MG: 80 INJECTION, SUSPENSION INTRA-ARTICULAR; INTRALESIONAL; INTRAMUSCULAR; SOFT TISSUE at 16:55

## 2021-08-18 ASSESSMENT — MIFFLIN-ST. JEOR: SCORE: 1465.5

## 2021-08-18 NOTE — NURSING NOTE
Attempted;unsuccessful left message to call back to schedule sleep study and follow up appointment.           Saundra Knight RIO  [facility] [place]

## 2021-08-18 NOTE — PATIENT INSTRUCTIONS
Thank you for choosing [facility]    [facility]  [place] [address] [address], [address]th [place], [address] [place] [address]   [phone]       APPOINTMENTS: [phone]    CARE QUESTIONS: [phone], #3    BILLING QUESTIONS: [phone]    FAX NUMBER: [phone]          1. Left knee pain, unspecified chronicity          Physical Therapy orders have been placed with [facility].  You can call [phone] to schedule at your convenience.     Steroid injection of the left knee was performed today in clinic    - Would not soak in a hot tub, bath or swimming pool for 48 hours  - Ok to shower  - Ice today and only do your normal amounts of activity  - The lidocaine (what is giving you pain relief right now) will likely stop working in 1-2 hours.  You will then have pain again, similar to before you received the injection. The corticosteroid will not start working until approximately 1-2 weeks from now.  In a small percentage of people, cortisone can cause flushing/redness in the face. This usually lasts for 1-3 days and resolves. Cool compress and Ibuprofen/Tylenol can help if this happens.     details…

## 2021-08-18 NOTE — LETTER
8/18/2021         RE: Jeff Serra  [address]  [place] [address]        Dear Colleague,    Thank you for referring your patient, Jeff Serra, to the [facility] [place]. Please see a copy of my visit note below.    CHIEF COMPLAINT:  Pain of the Left Knee       HISTORY OF PRESENT ILLNESS  Ms. Serra is a pleasant 46 year old year old female who presents to clinic today with left knee pain.  Jeff explains that she has been dealing with left knee pain since breaking her fibula last year.     Onset: worsening since fibula fracture  Location: left knee  Quality:  aching, dull, stabbing and sharp  Duration: 1 years   Severity: 8/10 at worst  Timing:constant and intermittent episodes with activity   Modifying factors:  resting and non-use makes it better, movement and use makes it worse  Associated signs & symptoms: limited function and range of motion, swelling, feeling of instability.  Previous similar pain: No  Treatments to date: Left knee cortisone injection performed with Dr. Rae on 2/25/21, which provided 3-4 weeks of relief 30%    Additional history: as documented    Review of Systems:    Have you recently had a a fever, chills, weight loss? No    Do you have any vision problems? No    Do you have any chest pain or edema? No    Do you have any shortness of breath or wheezing?  No    Do you have stomach problems? No    Do you have any numbness or focal weakness? No    Do you have diabetes? No    Do you have problems with bleeding or clotting? No    Do you have an rashes or other skin lesions? No    MEDICAL HISTORY  Patient Active Problem List   Diagnosis     CARDIOVASCULAR SCREENING; LDL GOAL LESS THAN 160     Chronic neck pain     Chronic low back pain     Hypertension goal BP (blood pressure) < 140/90     Hypokalemia     History of pulmonary embolism     Closed fracture of right fibula     Allergic rhinitis     Chronic, continuous use of opioids      Pelvic pain in female     Hx of renal calculi     Chronic midline low back pain without sciatica     S/P hysterectomy     Vitamin D deficiency     Constipation, unspecified constipation type     Cyst of ovary, unspecified laterality     Abdominal pain, generalized     Chondral defect of left patella     Left knee pain, unspecified chronicity     Gastritis without bleeding, unspecified chronicity, unspecified gastritis type     Class 2 severe obesity due to excess calories with serious comorbidity and body mass index (BMI) of 37.0 to 37.9 in adult (H)     Snoring     Bilateral leg edema     Hirsutism     Umbilical hernia without obstruction and without gangrene       Current Outpatient Medications   Medication Sig Dispense Refill     cyclobenzaprine (FLEXERIL) 5 MG tablet Take 1 tablet (5 mg) by mouth 3 times daily as needed (neck pain / headache) 30 tablet 0     gabapentin (NEURONTIN) 300 MG capsule TAKE 1 CAPSULE(300 MG) BY MOUTH AT BEDTIME 90 capsule 5     loratadine (CLARITIN) 10 MG tablet Take 1 tablet (10 mg) by mouth daily as needed for allergies 90 tablet 1     losartan (COZAAR) 50 MG tablet Take 1 tablet (50 mg) by mouth daily 90 tablet 1     omeprazole (PRILOSEC) 40 MG DR capsule Take 1 capsule (40 mg) by mouth daily 90 capsule 1     oxyCODONE IR (ROXICODONE) 15 MG tablet Take 1 tablet (15 mg) by mouth 3 times daily as needed for pain Needs follow up visit for any further refill. 90 tablet 0     polyethylene glycol 400 (BLINK TEARS) 0.25 % SOLN ophthalmic solution Place 1 drop into both eyes 2 times daily as needed for dry eyes 1 Bottle 6     triamterene-HCTZ (MAXZIDE-25) 37.5-25 MG tablet Take 1 tablet by mouth daily 90 tablet 1     sucralfate (CARAFATE) 1 GM/10ML suspension Take 10 mLs (1 g) by mouth 4 times daily as needed (Pain) (Patient not taking: Reported on 8/18/2021) 420 mL 0       Allergies   Allergen Reactions     Contrast Dye Nausea and Vomiting     States she sometimes has vomited after  "receiving iv contrast dye       Family History   Problem Relation Age of Onset     Breast Cancer Maternal Grandmother         50s      Breast Cancer Paternal Grandmother         50s      Diabetes Maternal Uncle      Myocardial Infarction Maternal Uncle      Hypertension Father      Myocardial Infarction Maternal Aunt      Lung Cancer Paternal Aunt      Lung Cancer Cousin      Hypertension Mother      Asthma Son      Asthma Son      Asthma Son      Colon Cancer No family hx of      Cerebrovascular Disease No family hx of      Anesthesia Reaction No family hx of      Bleeding Disorder No family hx of      Thrombophilia No family hx of        Additional medical/Social/Surgical histories reviewed in [facility] and updated as appropriate.       PHYSICAL EXAM  /78   Ht 1.549 m (5' 1\")   Wt 89.8 kg (198 lb)   LMP 09/12/2016 (Exact Date)   BMI 37.41 kg/m      General  - normal appearance, in no obvious distress  HEENT  - conjunctivae not injected, moist mucous membranes  CV  - normal popliteal pulse  Pulm  - normal respiratory pattern, non-labored  Musculoskeletal -left knee  - stance: normal gait without limp, no obvious leg length discrepancy, pain with squat.  - inspection: no swelling or effusion, normal muscle tone, normal bone and joint alignment, no obvious deformity  - palpation: no joint line tenderness, patellar tendon non-tender, tender medial patellar facet  - ROM: 135 degrees flexion, -5 degrees extension, not painful, crepitus with weight-bearing flexion  - strength: 5/5 in flexion, 5/5 in extension  - neuro: no sensory or motor deficit  - special tests:  (-) Lachman  (-) anterior drawer  (-) Rocio  (-) Thessaly  (-) varus at 0 and 30 degrees flexion  (-) valgus at 0 and 30 degrees flexion  (-) patellar apprehension  Neuro  - no sensory or motor deficit, grossly normal coordination, normal muscle tone  Skin  - no ecchymosis, erythema, warmth, or induration, no obvious rash  Psych  - interactive, " appropriate, normal mood and affect    IMAGING :      MR   left  knee without contrast 7/26/2021 7:38 AM     Techniques: Multiplanar multisequence imaging of the   left  knee was  obtained without administration of intra-articular or intravenous  contrast using routing protocol.     History: Knee pain, chronic, negative x-ray (Age >= 5y); concern for  meniscal degeneration; Left knee pain, unspecified chronicity     Comparison: Radiographs dated 2/25/2021, MRI 1/23/2020     Findings:     MENISCI:  Medial meniscus: Intact.  Lateral meniscus: Mildly increased signal within the posterior root  attachment of the lateral meniscus, no evidence of tear.     LIGAMENTS  Cruciate ligaments: ACL and posterior cruciate ligament are intact.  Medial supporting structures: Deep and superficial portion of the  medial collateral ligament are preserved.      Lateral supporting structures: The iliotibial band, popliteus tendon,  biceps femoris tendon are intact and unremarkable. Redemonstration of  second proximal appearance of the lateral collateral ligament,  unchanged from prior.     EXTENSOR MECHANISM  Intact.     FLUID  No joint effusion. No substantial Baker's cyst.     OSSEOUS and ARTICULAR STRUCTURES  Bones: No fracture, contusion, or osseous lesion is seen.     Patellofemoral compartment: Progressed focal near full-thickness  cartilage fissure over the median ridge of the patella with associated  marrow edema deep to the cartilage abnormality. Edema superficial to  the patella and patellar tendon (series 7, image 19), consistent with  mild prepatellar/patella tendon bursitis,     Medial compartment: No hyaline cartilage disease.     Lateral compartment: No hyaline cartilage disease.     ANCILLARY FINDINGS  There is a small T1 and T2 hypointense 10 x 5 x 8 mm oval-shaped body  seen within the anteromedial soft tissues just proximal to the medial  collateral ligament, mild associated fat stranding and susceptibility  artifact,  similar in size and appearance when compared to prior exam                                                                      Impression:  1. Focal , near full-thickness cartilage fissure over the median ridge  of the patella with associated marrow edema deep to the cartilage  abnormality, this finding has progressed when compared to the prior  exam.  2. Edema superficial to the patella and patellar tendon, consistent  with mild prepatellar/patella tendon bursitis.  3. Small T1 and T2 hypointense oval-shaped lesion, located proximal  and medial to the medial collateral ligament, unchanged from prior, of  uncertain significance. Recommend clinical correlation for prior  history of surgery, injury.  4. ACL, medial collateral ligament, menisci are intact and  unremarkable. The lateral collateral ligament mildly thinned  proximally, likely sequela from prior injury, unchanged from previous  MRI.     JUTTA ELLERMANN, MD     ASSESSMENT & PLAN  Ms. Serra is a 46 year old year old female who presents to clinic today with ongoing pain of left knee.  Recent MRI revealing increasing edema of the patella as well as redemonstration of cartilage fissure at the medial patellar facet.    Diagnosis: Chondromalacia patella, patellar tendon bursitis    Treatment options discussed this time she like to repeat corticosteroid junction.  She did have pressure relief last time and I did agree to proceed.  She requires any additional injections I would like to turn towards hyaluronic acid injections as this may be a better long-term solution for her.  In addition of this I prescribed formal physical therapy.  She only had 2 visits previously and I think she would benefit from a full course.  Lastly we fitted her for a patellofemoral stabilizing brace.  This may provide relief when her knee is increasingly flared.    Activity modifications discussed and avoiding repetitive deep flexion, kneeling at this time.    It was a pleasure seeing  Jeff canchola.    PROCEDURE    Left Knee Injection - Intraarticular  The patient was informed of the risks and the benefits of the procedure and a written consent was signed.  The patient s left knee was prepped with chlorhexidine in sterile fashion.   40 mg of triamcinolone suspension was drawn up into a 5 mL syringe with 4 mL of 1% lidocaine.  Injection was performed using substerile technique.  A 1.5-inch 22-gauge needle was used to enter the lateral aspect of the left knee.  Injection performed successfully without difficulty.  There were no complications. The patient tolerated the procedure well. There was negligible bleeding.   The patient was instructed to ice the knee upon leaving clinic and refrain from overuse over the next 3 days.   The patient was instructed to call or go to the emergency room with any unusual pain, swelling, redness, or if otherwise concerned.  A follow up appointment will be scheduled to evaluate response to the injection, and to assess range of motion and pain.    Arturo Geiger DO, [facility]  Primary Care Sports Medicine    PROCEDURE    Left Knee Injection - Intraarticular  The patient was informed of the risks and the benefits of the procedure and a written consent was signed.  The patient s left knee was prepped with chlorhexidine in sterile fashion.   40 mg of triamcinolone suspension was drawn up into a 5 mL syringe with 4 mL of 1% lidocaine.  Injection was performed using substerile technique.  A 1.5-inch 22-gauge needle was used to enter the lateral aspect of the left knee.  Injection performed successfully without difficulty.  There were no complications. The patient tolerated the procedure well. There was negligible bleeding.   The patient was instructed to ice the knee upon leaving clinic and refrain from overuse over the next 3 days.   The patient was instructed to call or go to the emergency room with any unusual pain, swelling, redness, or if otherwise concerned.  A follow up  appointment will be scheduled to evaluate response to the injection, and to assess range of motion and pain.      Large Joint Injection/Arthocentesis: L knee joint    Date/Time: 8/18/2021 4:55 PM  Performed by: Arturo Geiger DO  Authorized by: Arturo Geiger DO     Indications:  Pain and osteoarthritis  Needle Size:  22 G  Guidance: landmark guided    Approach:  Anterolateral  Location:  Knee      Medications:  80 mg methylPREDNISolone 80 MG/ML; 4 mL lidocaine 1 %  Outcome:  Tolerated well, no immediate complications  Procedure discussed: discussed risks, benefits, and alternatives    Consent Given by:  Patient  Timeout: timeout called immediately prior to procedure    Prep: patient was prepped and draped in usual sterile fashion              Again, thank you for allowing me to participate in the care of your patient.        Sincerely,        Arturo Geiger DO

## 2021-08-21 DIAGNOSIS — K29.70 GASTRITIS WITHOUT BLEEDING, UNSPECIFIED CHRONICITY, UNSPECIFIED GASTRITIS TYPE: ICD-10-CM

## 2021-08-23 RX ORDER — OMEPRAZOLE 40 MG/1
CAPSULE, DELAYED RELEASE ORAL
Qty: 90 CAPSULE | Refills: 1 | Status: ON HOLD | OUTPATIENT
Start: 2021-08-23 | End: 2022-05-12

## 2021-08-23 NOTE — TELEPHONE ENCOUNTER
Patient overdue for office visit.   Health Maintenance Due   Topic Date Due     ANNUAL REVIEW OF HM ORDERS  Never done     ADVANCE CARE PLANNING  Never done     PREVENTIVE CARE VISIT  05/13/2014     CHAO ASSESSMENT  06/10/2020     PHQ-9  06/10/2020     MAMMO SCREENING  07/23/2020     URINE DRUG SCREEN  09/30/2020     PHQ-2  01/01/2021     Please advise on refill.  Genny Stevens RN

## 2021-08-30 ENCOUNTER — OFFICE VISIT (OUTPATIENT)
Dept: URGENT CARE | Facility: URGENT CARE | Age: 46
End: 2021-08-30
Payer: COMMERCIAL

## 2021-08-30 ENCOUNTER — ANCILLARY PROCEDURE (OUTPATIENT)
Dept: GENERAL RADIOLOGY | Facility: CLINIC | Age: 46
End: 2021-08-30
Attending: PHYSICIAN ASSISTANT
Payer: COMMERCIAL

## 2021-08-30 VITALS
DIASTOLIC BLOOD PRESSURE: 90 MMHG | SYSTOLIC BLOOD PRESSURE: 137 MMHG | TEMPERATURE: 97.8 F | WEIGHT: 202.7 LBS | OXYGEN SATURATION: 99 % | BODY MASS INDEX: 38.3 KG/M2 | HEART RATE: 102 BPM

## 2021-08-30 DIAGNOSIS — Z20.822 PERSON UNDER INVESTIGATION FOR COVID-19: ICD-10-CM

## 2021-08-30 DIAGNOSIS — R09.89 CHEST CONGESTION: ICD-10-CM

## 2021-08-30 DIAGNOSIS — J22 LRTI (LOWER RESPIRATORY TRACT INFECTION): ICD-10-CM

## 2021-08-30 DIAGNOSIS — R09.89 CHEST CONGESTION: Primary | ICD-10-CM

## 2021-08-30 LAB
ANION GAP SERPL CALCULATED.3IONS-SCNC: <1 MMOL/L (ref 3–14)
BUN SERPL-MCNC: 7 MG/DL (ref 7–30)
CALCIUM SERPL-MCNC: 8.7 MG/DL (ref 8.5–10.1)
CHLORIDE BLD-SCNC: 103 MMOL/L (ref 94–109)
CO2 SERPL-SCNC: 38 MMOL/L (ref 20–32)
CREAT SERPL-MCNC: 0.8 MG/DL (ref 0.52–1.04)
GFR SERPL CREATININE-BSD FRML MDRD: 87 ML/MIN/1.73M2
GLUCOSE BLD-MCNC: 118 MG/DL (ref 70–99)
POTASSIUM BLD-SCNC: 3.6 MMOL/L (ref 3.4–5.3)
SODIUM SERPL-SCNC: 134 MMOL/L (ref 133–144)

## 2021-08-30 PROCEDURE — 71046 X-RAY EXAM CHEST 2 VIEWS: CPT | Performed by: RADIOLOGY

## 2021-08-30 PROCEDURE — U0005 INFEC AGEN DETEC AMPLI PROBE: HCPCS | Performed by: PHYSICIAN ASSISTANT

## 2021-08-30 PROCEDURE — U0003 INFECTIOUS AGENT DETECTION BY NUCLEIC ACID (DNA OR RNA); SEVERE ACUTE RESPIRATORY SYNDROME CORONAVIRUS 2 (SARS-COV-2) (CORONAVIRUS DISEASE [COVID-19]), AMPLIFIED PROBE TECHNIQUE, MAKING USE OF HIGH THROUGHPUT TECHNOLOGIES AS DESCRIBED BY CMS-2020-01-R: HCPCS | Performed by: PHYSICIAN ASSISTANT

## 2021-08-30 PROCEDURE — 93000 ELECTROCARDIOGRAM COMPLETE: CPT | Performed by: PHYSICIAN ASSISTANT

## 2021-08-30 PROCEDURE — 36415 COLL VENOUS BLD VENIPUNCTURE: CPT | Performed by: PHYSICIAN ASSISTANT

## 2021-08-30 PROCEDURE — 99214 OFFICE O/P EST MOD 30 MIN: CPT | Performed by: PHYSICIAN ASSISTANT

## 2021-08-30 PROCEDURE — 80048 BASIC METABOLIC PNL TOTAL CA: CPT | Performed by: PHYSICIAN ASSISTANT

## 2021-08-30 RX ORDER — PREDNISONE 20 MG/1
40 TABLET ORAL DAILY
Qty: 10 TABLET | Refills: 0 | Status: SHIPPED | OUTPATIENT
Start: 2021-08-30 | End: 2021-09-04

## 2021-08-30 RX ORDER — ALBUTEROL SULFATE 90 UG/1
2 AEROSOL, METERED RESPIRATORY (INHALATION)
Qty: 18 G | Refills: 0 | Status: SHIPPED | OUTPATIENT
Start: 2021-08-30 | End: 2022-05-16

## 2021-08-30 RX ORDER — AZITHROMYCIN 250 MG/1
TABLET, FILM COATED ORAL
Qty: 6 TABLET | Refills: 0 | Status: SHIPPED | OUTPATIENT
Start: 2021-08-30 | End: 2021-09-04

## 2021-08-30 RX ORDER — CODEINE PHOSPHATE AND GUAIFENESIN 10; 100 MG/5ML; MG/5ML
1-2 SOLUTION ORAL
Qty: 60 ML | Refills: 0 | Status: SHIPPED | OUTPATIENT
Start: 2021-08-30 | End: 2021-11-01

## 2021-08-30 NOTE — LETTER
[facility]  [address]  [address]  [place] [address]  Phone: [phone]  Fax: [phone]    August 30, 2021        Jeff Serra  [address] [address]          To whom it may concern:    RE: Jeff Serra    Patient was seen and treated today at our clinic and missed work.  Please excuse absences 8/30 - 9/1/21.    Please contact me for questions or concerns.      Sincerely,        Robert Nieto PA-C

## 2021-08-31 ENCOUNTER — TELEPHONE (OUTPATIENT)
Dept: FAMILY MEDICINE | Facility: CLINIC | Age: 46
End: 2021-08-31

## 2021-08-31 ENCOUNTER — HOSPITAL ENCOUNTER (EMERGENCY)
Facility: CLINIC | Age: 46
Discharge: HOME | End: 2021-08-31
Attending: EMERGENCY MEDICINE | Admitting: EMERGENCY MEDICINE
Payer: COMMERCIAL

## 2021-08-31 ENCOUNTER — NURSE TRIAGE (OUTPATIENT)
Dept: FAMILY MEDICINE | Facility: CLINIC | Age: 46
End: 2021-08-31

## 2021-08-31 VITALS
RESPIRATION RATE: 20 BRPM | DIASTOLIC BLOOD PRESSURE: 94 MMHG | TEMPERATURE: 98.8 F | OXYGEN SATURATION: 97 % | HEART RATE: 90 BPM | SYSTOLIC BLOOD PRESSURE: 130 MMHG

## 2021-08-31 DIAGNOSIS — R09.1 PLEURISY: ICD-10-CM

## 2021-08-31 LAB
ALBUMIN UR-MCNC: NEGATIVE MG/DL
ANION GAP SERPL CALCULATED.3IONS-SCNC: 6 MMOL/L (ref 3–14)
APPEARANCE UR: CLEAR
BASOPHILS # BLD AUTO: 0 10E3/UL (ref 0–0.2)
BASOPHILS NFR BLD AUTO: 0 %
BILIRUB UR QL STRIP: NEGATIVE
BUN SERPL-MCNC: 6 MG/DL (ref 7–30)
CALCIUM SERPL-MCNC: 8.8 MG/DL (ref 8.5–10.1)
CHLORIDE BLD-SCNC: 106 MMOL/L (ref 94–109)
CO2 SERPL-SCNC: 27 MMOL/L (ref 20–32)
COLOR UR AUTO: ABNORMAL
CREAT SERPL-MCNC: 0.74 MG/DL (ref 0.52–1.04)
D DIMER PPP FEU-MCNC: 0.28 UG/ML FEU (ref 0–0.5)
EOSINOPHIL # BLD AUTO: 0 10E3/UL (ref 0–0.7)
EOSINOPHIL NFR BLD AUTO: 0 %
ERYTHROCYTE [DISTWIDTH] IN BLOOD BY AUTOMATED COUNT: 14.6 % (ref 10–15)
GFR SERPL CREATININE-BSD FRML MDRD: >90 ML/MIN/1.73M2
GLUCOSE BLD-MCNC: 104 MG/DL (ref 70–99)
GLUCOSE UR STRIP-MCNC: NEGATIVE MG/DL
HCT VFR BLD AUTO: 39.5 % (ref 35–47)
HGB BLD-MCNC: 13.1 G/DL (ref 11.7–15.7)
HGB UR QL STRIP: NEGATIVE
HOLD SPECIMEN: NORMAL
IMM GRANULOCYTES # BLD: 0.1 10E3/UL
IMM GRANULOCYTES NFR BLD: 1 %
KETONES UR STRIP-MCNC: NEGATIVE MG/DL
LEUKOCYTE ESTERASE UR QL STRIP: NEGATIVE
LYMPHOCYTES # BLD AUTO: 1.1 10E3/UL (ref 0.8–5.3)
LYMPHOCYTES NFR BLD AUTO: 12 %
MCH RBC QN AUTO: 28.9 PG (ref 26.5–33)
MCHC RBC AUTO-ENTMCNC: 33.2 G/DL (ref 31.5–36.5)
MCV RBC AUTO: 87 FL (ref 78–100)
MONOCYTES # BLD AUTO: 0.2 10E3/UL (ref 0–1.3)
MONOCYTES NFR BLD AUTO: 2 %
NEUTROPHILS # BLD AUTO: 8.1 10E3/UL (ref 1.6–8.3)
NEUTROPHILS NFR BLD AUTO: 85 %
NITRATE UR QL: NEGATIVE
NRBC # BLD AUTO: 0 10E3/UL
NRBC BLD AUTO-RTO: 0 /100
PH UR STRIP: 8 [PH] (ref 5–7)
PLATELET # BLD AUTO: 223 10E3/UL (ref 150–450)
POTASSIUM BLD-SCNC: 3.8 MMOL/L (ref 3.4–5.3)
RBC # BLD AUTO: 4.53 10E6/UL (ref 3.8–5.2)
RBC URINE: <1 /HPF
SARS-COV-2 RNA RESP QL NAA+PROBE: NEGATIVE
SODIUM SERPL-SCNC: 139 MMOL/L (ref 133–144)
SP GR UR STRIP: 1.01 (ref 1–1.03)
SQUAMOUS EPITHELIAL: 2 /HPF
TROPONIN I SERPL-MCNC: <0.015 UG/L (ref 0–0.04)
UROBILINOGEN UR STRIP-MCNC: NORMAL MG/DL
WBC # BLD AUTO: 9.6 10E3/UL (ref 4–11)
WBC URINE: <1 /HPF

## 2021-08-31 PROCEDURE — 85025 COMPLETE CBC W/AUTO DIFF WBC: CPT | Performed by: EMERGENCY MEDICINE

## 2021-08-31 PROCEDURE — 84484 ASSAY OF TROPONIN QUANT: CPT | Performed by: EMERGENCY MEDICINE

## 2021-08-31 PROCEDURE — 36415 COLL VENOUS BLD VENIPUNCTURE: CPT | Performed by: EMERGENCY MEDICINE

## 2021-08-31 PROCEDURE — 80048 BASIC METABOLIC PNL TOTAL CA: CPT | Performed by: EMERGENCY MEDICINE

## 2021-08-31 PROCEDURE — 250N000013 HC RX MED GY IP 250 OP 250 PS 637: Performed by: EMERGENCY MEDICINE

## 2021-08-31 PROCEDURE — 99284 EMERGENCY DEPT VISIT MOD MDM: CPT | Mod: 25

## 2021-08-31 PROCEDURE — 96374 THER/PROPH/DIAG INJ IV PUSH: CPT

## 2021-08-31 PROCEDURE — 250N000011 HC RX IP 250 OP 636: Performed by: EMERGENCY MEDICINE

## 2021-08-31 PROCEDURE — 81001 URINALYSIS AUTO W/SCOPE: CPT | Performed by: EMERGENCY MEDICINE

## 2021-08-31 PROCEDURE — 93005 ELECTROCARDIOGRAM TRACING: CPT

## 2021-08-31 PROCEDURE — 85379 FIBRIN DEGRADATION QUANT: CPT | Performed by: EMERGENCY MEDICINE

## 2021-08-31 RX ORDER — KETOROLAC TROMETHAMINE 15 MG/ML
15 INJECTION, SOLUTION INTRAMUSCULAR; INTRAVENOUS ONCE
Status: COMPLETED | OUTPATIENT
Start: 2021-08-31 | End: 2021-08-31

## 2021-08-31 RX ORDER — ACETAMINOPHEN 325 MG/1
975 TABLET ORAL ONCE
Status: COMPLETED | OUTPATIENT
Start: 2021-08-31 | End: 2021-08-31

## 2021-08-31 RX ADMIN — KETOROLAC TROMETHAMINE 15 MG: 15 INJECTION, SOLUTION INTRAMUSCULAR; INTRAVENOUS at 16:19

## 2021-08-31 RX ADMIN — ACETAMINOPHEN 975 MG: 325 TABLET, FILM COATED ORAL at 15:11

## 2021-08-31 ASSESSMENT — ENCOUNTER SYMPTOMS: FEVER: 0

## 2021-08-31 NOTE — ED PROVIDER NOTES
History   Chief Complaint:  Chest Pain       HPI   Jeff Serra is a 46 year old female with history of PE a few years ago after an ankle injury who presents with sharp midsternal and lower chest pain for the past 3 days. She notes pleuritic chest pain as well. The patient when to UC yesterday for similar symptoms, see work-up below, and prescribed antibiotics for bronchitis. Here, she mentions that her symptoms feels similar to when she had her PE. Denies fever. No smoking. She is Covid vaccinated.     Work-up from 08/30/21:  XR Chest, G/E 2 views:   PA and lateral views of the chest were obtained.  Cardiomediastinal silhouette is within normal limits. No suspicious  focal pulmonary opacities. No significant pleural effusion or  pneumothorax. As per radiology.    Laboratory:   BMP: Glucose 118 (H), Carbon Dioxide: 38 (H), Anion Gap: <1 (L) o/w WNL (Creatinine: 0.80)    Covid-19 PCR: Pending    Review of Systems   Constitutional: Negative for fever.   Cardiovascular: Positive for chest pain.   All other systems reviewed and are negative.    Allergies:  Contrast Dye    Medications:  Roxicodone  Atarax  Prilosec  Albuterol  Zithromax  Flexeril  Neurontin  Cozaar  Deltasone  Maxzide  Depo  Naropin  Lidocaine 1% injection    Past Medical History:    Continuous opioid dependence  Blood transfusion  Hypertension  Kidney stone  Pulmonary embolism  Right leg DVT  Hirsutism  Umbilical hernia  Gastritis  Cyst of ovary  Chondral defect of left patella  Chronic pain syndrome    Past Surgical History:    Biopsy  Colonoscopy x2  Colonoscopy with Co2 insufflation   EGD combined x2  Hysterectomy  Lithotripsy  Ankle surgery  Upper GI endoscopy  Tubal ligation  Cerclage of cervix  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    Physical Exam     Patient Vitals for  the past 24 hrs:   BP Temp Temp src Pulse Resp SpO2   08/31/21 1700 (!) 130/94 -- -- 90 -- 97 %   08/31/21 1650 127/89 -- -- -- -- 98 %   08/31/21 1630 127/87 -- -- 88 -- 96 %   08/31/21 1620 133/89 -- -- -- -- 98 %   08/31/21 1610 (!) 137/95 -- -- -- -- 99 %   08/31/21 1504 (!) 148/93 98.8  F (37.1  C) Oral 90 20 98 %       Physical Exam  General: Patient is alert and cooperative.  HENT:  Normal nose, oropharynx. Moist oral mucosa.  Eyes: EOMI. Normal conjunctiva.  Neck:  Normal range of motion and appearance.   Cardiovascular:  Normal rate, regular rhythm and normal heart sounds.   Pulmonary/Chest:  Effort normal. No wheezing or crackles.  Abdominal: Soft. No distension or tenderness.     Musculoskeletal: Normal range of motion. No edema or tenderness.   Neurological: oriented, normal strength, sensation, and coordination.   Skin: Warm and dry. No rash or bruising.   Psychiatric: Normal mood and affect. Normal behavior and judgement.      Emergency Department Course   ECG  ECG taken at 1420, ECG read at 1612  Normal sinus rhythm. Normal ECG   Rate 88 bpm. IN interval 140 ms. QRS duration 76 ms. QT/QTc 370/447 ms. P-R-T axes 33 26 10.     Laboratory:   CBC: WBC: 9.6, HGB: 13.1, PLT: 223    BMP: Glucose 104 (H), Urea Nitrogen: 6 (L), o/w WNL (Creatinine: 0.74)    Troponin (Collected 1952): <0.015    D-dimer: 0.28    UA: pH: 8.0 (H), Squamous Epithelial: 2 (H), o/w Negative    Emergency Department Course:    Reviewed:  I reviewed nursing notes, vitals, past medical history and care everywhere    Assessments:  1606 I obtained history and examined the patient as noted above.     1724 I rechecked the patient and explained findings.     Interventions:  1511 Tylenol 975 mg PO  1619 Toradol 15 mg IV    Disposition:  The patient was discharged to home.     Impression & Plan     Medical Decision Making:  Afebrile 46-year-old female with complaints of a 3-day history of chest congestion with nonproductive cough and sharp  pleuritic discomfort.  She was seen in outside urgent care yesterday where work-up included a negative chest x-ray and Covid testing with results pending.  She has been vaccinated and has had no fever.  Past medical history is notable for prior PE x1 following an ankle injury.  She has a normal physical exam.  Testing today here has been reassuring as well.  This is included an EKG depicting a sinus rhythm with no sign of pericarditis or ischemia.  She has a troponin and D-dimer both within normal limits.  No further imaging is warranted.  There is no concern for pulmonary embolism.  She had been prescribed azithromycin but based on this clinical picture, antibiotics are not clinically warranted and I have advised symptomatic treatment with over-the-counter NSAIDs and follow-up in the primary clinic setting if not gradually resolving.          Diagnosis:    ICD-10-CM    1. Pleurisy  R09.1      Scribe Disclosure:  I, Bairon Haile, am serving as a scribe at 4:03 PM on 8/31/2021 to document services personally performed by Felix Gao MD based on my observations and the provider's statements to me.          Felix Gao MD  08/31/21 [id number]

## 2021-08-31 NOTE — PATIENT INSTRUCTIONS
Follow up immediately with severe headache, chest pain, or shortness of breath    Rest, isolate for 10 days, hydrate, follow up if worsening or new symptoms  Household members to isolate until test results, if positive isolate for 2 weeks and follow up for testing if symptoms occur         Patient Education     Coronavirus Disease 2019 (COVID-19): Caring for Yourself or Others   If you or a household member have symptoms of COVID-19, follow the guidelines below. This will help you manage symptoms and keep the virus from spreading.  If you have symptoms of COVID-19    Stay home and contact your care team. They will tell you what to do.    Don t panic. Keep in mind that other illnesses can cause similar symptoms.    Stay away from work, school, and public places.    Limit physical contact with others in your home. Limit visitors. No kissing.  Clean surfaces you touch with disinfectant.  If you need to cough or sneeze, do it into a tissue. Then throw the tissue into the trash. If you don't have tissues, cough or sneeze into the bend of your elbow.  Don t share food or personal items with people in your household. This includes items like eating and drinking utensils, towels, and bedding.  Wear a cloth face mask around other people. During a public health emergency, medical face masks may be reserved for healthcare workers. You may need to make a cloth face mask of your own. You can do this using a bandana, T-shirt, or other cloth. The CDC has instructions on how to make a face mask. Wear the mask so that it covers both your nose and mouth.  If you need to go to a hospital or clinic, call ahead to let them know. Expect the care team to wear masks, gowns, gloves, and eye protection. You may need to come to a different entrance or wait in a separate room.  Follow all instructions from your care team.    If you ve been diagnosed with COVID-19    Stay home and away from others, including other people in your home. (This is  called self-isolation.) Don t leave home unless you need to get medical care. Don t go to work, school, or public places. Don t use buses, taxis, or other public transportation.    Follow all instructions from your care team.    If you need to go to a hospital or clinic, call ahead to let them know. Expect the care team to wear masks, gowns, gloves, and eye protection. You may need to come to a different entrance or wait in a separate room.    Wear a face mask over your nose and mouth. This is to protect others from your germs. If you can t wear a mask, your caregivers should wear one. You may need to make your own mask using a bandana, T-shirt, or other cloth. See the CDC s instructions on how to make a face mask.    Have no contact with pets and other animals.    Don t share food or personal items with people in your household. This includes items like eating and drinking utensils, towels, and bedding.    If you need to cough or sneeze, do it into a tissue. Then throw the tissue into the trash. If you don't have tissues, cough or sneeze into the bend of your elbow.    Wash your hands often.    Self-care at home   At this time, there is no medicine approved to prevent or treat COVID-19. The FDA has approved an antiviral medicine (called remdesivir) for people being treated in the hospital. This is for people 12 years and older who weigh more than about 88 pounds (40 kgs). In certain cases, it may also be used for people younger than 12 years or who weigh less than about 88 pounds (40 kgs)..  Currently, treatment is mostly aimed at helping your body while it fights the virus.    Getting extra rest.    Drink extra fluids 6 to 8 glasses of liquids each day), unless a doctor has told you not to. Ask your care team which fluids are best for you. Avoid fluids that contain caffeine or alcohol.    Taking over-the-counter (OTC) medicine to reduce pain and fever. Your care team will tell you which medicine to use.  If you ve  been in the hospital for COVID-19, follow your care team s instructions. They will tell you when to stop self-isolation. They may also have you change positions to help your breathing (such as lying on your belly).  If a test showed that you have COVID-19, you may be asked to donate plasma after you ve recovered. (This is called COVID-19 convalescent plasma donation.) The plasma may contain antibodies to help fight the virus in others. Experts don't know if the plasma will work well as a treatment. Research continues, and the FDA has approved it for emergency use in certain people with serious or life-threatening COVID-19. For more information, talk to your care team.  Caring for a sick person     Follow all instructions from the care team.    Wash your hands often.    Wear protective clothing as advised.    Make sure the sick person wears a mask. If they can't wear a mask, don t stay in the same room with them. If you must be in the same room, wear a face mask. Make sure the mask covers both the nose and mouth.    Keep track of the sick person s symptoms.    Clean surfaces often with disinfectant. This includes phones, kitchen counters, fridge door handles, bathroom surfaces, and others.    Don t let anyone share household items with the sick person. This includes eating and drinking tools, towels, sheets, or blankets.    Clean fabrics and laundry well.    Keep other people and pets away from the sick person.    When you can stop self-isolation  When you are sick with COVID-19, you should stay away from other people. This is called self-isolation. The rules for ending self-isolation depend on your health in general.  If you are normally healthy:  You can stop self-isolation when all 3 of these are true:    You ve had no fever--and no medicine that reduces fever--for at least 24 hours. And     Your symptoms are better, such as cough or trouble breathing. And     At least 10 days have passed since your symptoms first  started.  Talk with your care team before you leave home. They may tell you it s okay to leave, or they may give you different advice. You do not need to be re-tested.  If you have a weak immune system, or you ve had severe COVID-19:  Follow your care team s instructions. You may be asked to self-isolate for 10 days to 20 days after your symptoms first started. Your care team may want to re-test you for COVID-19.  Note: If you re being treated for cancer, have an immune disorder (such as HIV), or have had a transplant (organ or bone marrow), you may have a weak immune system.  If you've already had COVID-19 once:  If you had the virus over 3 months ago, and you ve been exposed again, treat it like you've never had COVID-19. Stay home, limit your contact with others, call your care team, and watch for symptoms.  If it s been less than 3 months since you had the virus, you re symptom-free, and you ve been exposed again: You don t need to self-isolate or be re-tested. But if you develop new symptoms that can t be linked to another illness, please self-isolate and contact your care team.  When you return to public settings  When you are well enough to go outside your home, follow the CDC s guidance on cloth face masks.    Anyone over age 2 should wear a face mask in public, especially when it's hard to socially distance. This includes public transit, public protests and marches, and crowded stores, bars, and restaurants.    Face masks are most likely to reduce the spread of COVID-19 when they are widely used by people who are out in the public.  Certain people should not wear a face covering. These include:    Children under 2 years old    Anyone with a health, developmental, or mental health condition that can be made worse by wearing a mask    Anyone who is unconscious or unable to remove the face covering without help. See the CDC's guidance on who should not wear a face mask.  When to call your care team  Call your  care team right away if a sick person has any of these:    Trouble breathing    Pain or pressure in chest  If a sick person has any of these, call 911:    Trouble breathing that gets worse    Pain or pressure in chest that gets worse    Blue tint to lips or face    Fast or irregular heartbeat    Confusion or trouble waking    Fainting or loss of consciousness    Coughing up blood  Going home from the hospital   If you have COVID-19 and were recently in the hospital:    Follow the instructions above for self-care and isolation.    Follow the hospital care team s instructions.    Ask questions if anything is unclear to you. Write down answers so you remember them.  Date last modified: 11/23/2020  [employer] last reviewed this educational content on 4/1/2020  This information has been modified by your health care provider with permission from the publisher.    4969-8374 [employer]. [address] [place] [id number]. All rights reserved. This information is not intended as a substitute for professional medical care. Always follow your healthcare professional's instructions.           Patient Education     Bronchitis, Antibiotic Treatment (Adult)    Bronchitis is an infection of the air passages (bronchial tubes) in your lungs. It often occurs when you have a cold. This illness is contagious during the first few days and is spread through the air by coughing and sneezing, or by direct contact (touching the sick person and then touching your own eyes, nose, or mouth).  Symptoms of bronchitis include cough with mucus (phlegm) and low-grade fever. Bronchitis usually lasts 7 to 14 days. Mild cases can be treated with simple home remedies. More severe infection is treated with an antibiotic.  Home care  Follow these guidelines when caring for yourself at home:    If your symptoms are severe, rest at home for the first 2 to 3 days. When you go back to your usual activities, don't let yourself get too  tired.    Don't smoke. Also stay away from secondhand smoke.    You may use over-the-counter medicines to control fever or pain, unless another medicine was prescribed. If you have chronic liver or kidney disease or have ever had a stomach ulcer or gastrointestinal bleeding, talk with your healthcare provider before using these medicines. Also talk to your provider if you are taking medicine to prevent blood clots. Aspirin should never be given to anyone younger than 18 who is ill with a viral infection or fever. It may cause severe liver or brain damage.    Your appetite may be low, so a light diet is fine. Stay well hydrated by drinking 6 to 8 glasses of fluids per day. This includes water, soft drinks, sports drinks, juices, tea, or soup. Extra fluids will help loosen mucus in your nose and lungs.    Over-the-counter cough, cold, and sore-throat medicines will not shorten the length of the illness, but they may be helpful to reduce your symptoms. Don't use decongestants if you have high blood pressure.    Finish all antibiotic medicine. Do this even if you are feeling better after only a few days.  Follow-up care  Follow up with your healthcare provider, or as advised. If you had an X-ray or ECG (electrocardiogram), a specialist will review it. You will be told of any new test results that may affect your care.  If you are age 65 or older, if you smoke, or if you have a chronic lung disease or condition that affects your immune system, ask your healthcare provider about getting a pneumococcal vaccine and a yearly flu shot (influenza vaccine).  When to seek medical advice  Call your healthcare provider right away if any of these occur:    Fever of 100.4 F (38 C) or higher, or as directed by your healthcare provider    Coughing up more sputum    Weakness, drowsiness, headache, facial pain, ear pain, or a stiff neck  Call 911  Call 911 if any of these occur.    Coughing up blood    Weakness, drowsiness, headache, or  stiff neck that get worse    Trouble breathing, wheezing, or pain with breathing  [employer] last reviewed this educational content on 6/1/2018 2000-2021 [employer]. All rights reserved. This information is not intended as a substitute for professional medical care. Always follow your healthcare professional's instructions.

## 2021-08-31 NOTE — TELEPHONE ENCOUNTER
"Patient called.  She states that she has chest pain that is 9/10, feels like a very tight pain. This is day 3 of pain, but feels worsened today.  It is painful to take a deep breath, she feels SOB. Patient was seen at the  last evening, needed to go to the ED for worsening concerns.  Patient has a Covid test pending. Patient has history of PE in 2014    Advised patient to call 911 now, her  is also with her and will ensure she calls.     Patient agrees to plan.    Reason for Disposition    Chest pain lasting longer than 5 minutes and ANY of the following:* Over 45 years old* Over 30 years old and at least one cardiac risk factor (e.g., diabetes, high blood pressure, high cholesterol, smoker, or strong family history of heart disease)* History of heart disease (i.e., angina, heart attack, heart failure, bypass surgery, takes nitroglycerin)* Pain is crushing, pressure-like, or heavy    Additional Information    Negative: Severe difficulty breathing (e.g., struggling for each breath, speaks in single words)    Negative: Passed out (i.e., fainted, collapsed and was not responding)    Negative: Difficult to awaken or acting confused (e.g., disoriented, slurred speech)    Negative: Shock suspected (e.g., cold/pale/clammy skin, too weak to stand, low BP, rapid pulse)    Answer Assessment - Initial Assessment Questions  1. LOCATION: \"Where does it hurt?\"        Entire chest  2. RADIATION: \"Does the pain go anywhere else?\" (e.g., into neck, jaw, arms, back)      N/a  3. ONSET: \"When did the chest pain begin?\" (Minutes, hours or days)       3 days,worsened last night  4. PATTERN \"Does the pain come and go, or has it been constant since it started?\"  \"Does it get worse with exertion?\"       no  5. DURATION: \"How long does it last\" (e.g., seconds, minutes, hours)      ongoing  6. SEVERITY: \"How bad is the pain?\"  (e.g., Scale 1-10; mild, moderate, or severe)     - MILD (1-3): doesn't interfere with normal activities    " "  - MODERATE (4-7): interferes with normal activities or awakens from sleep     - SEVERE (8-10): excruciating pain, unable to do any normal activities        9/10  7. CARDIAC RISK FACTORS: \"Do you have any history of heart problems or risk factors for heart disease?\" (e.g., angina, prior heart attack; diabetes, high blood pressure, high cholesterol, smoker, or strong family history of heart disease)        8. PULMONARY RISK FACTORS: \"Do you have any history of lung disease?\"  (e.g., blood clots in lung, asthma, emphysema, birth control pills)      Yes, Hx of PE in 2014  9. CAUSE: \"What do you think is causing the chest pain?\"      unknown  10. OTHER SYMPTOMS: \"Do you have any other symptoms?\" (e.g., dizziness, nausea, vomiting, sweating, fever, difficulty breathing, cough)        No, felt confused last evening  11. PREGNANCY: \"Is there any chance you are pregnant?\" \"When was your last menstrual period?\"        n/a    Protocols used: CHEST PAIN-A-[place]      Carlie Velazquez RN, [facility]    "
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
## 2021-08-31 NOTE — ED TRIAGE NOTES
Patient presents with chest pain and congestion x3 days. Went to  yesterday and was told it was bronchitis and that if things worsened to come to the ED. Started a course of abx prescribed to her today. Patient states the intensity in pain has worsened since yesterday.

## 2021-09-01 LAB
ATRIAL RATE - MUSE: 88 BPM
DIASTOLIC BLOOD PRESSURE - MUSE: NORMAL MMHG
INTERPRETATION ECG - MUSE: NORMAL
P AXIS - MUSE: 33 DEGREES
PR INTERVAL - MUSE: 140 MS
QRS DURATION - MUSE: 76 MS
QT - MUSE: 370 MS
QTC - MUSE: 447 MS
R AXIS - MUSE: 26 DEGREES
SYSTOLIC BLOOD PRESSURE - MUSE: NORMAL MMHG
T AXIS - MUSE: 10 DEGREES
VENTRICULAR RATE- MUSE: 88 BPM

## 2021-09-03 ENCOUNTER — MYC REFILL (OUTPATIENT)
Dept: FAMILY MEDICINE | Facility: CLINIC | Age: 46
End: 2021-09-03

## 2021-09-03 DIAGNOSIS — G89.29 CHRONIC MIDLINE LOW BACK PAIN WITHOUT SCIATICA: ICD-10-CM

## 2021-09-03 DIAGNOSIS — F11.90 CHRONIC, CONTINUOUS USE OF OPIOIDS: ICD-10-CM

## 2021-09-03 DIAGNOSIS — M54.50 CHRONIC MIDLINE LOW BACK PAIN WITHOUT SCIATICA: ICD-10-CM

## 2021-09-05 RX ORDER — OXYCODONE HYDROCHLORIDE 15 MG/1
15 TABLET ORAL 3 TIMES DAILY PRN
Qty: 90 TABLET | Refills: 0 | Status: SHIPPED | OUTPATIENT
Start: 2021-09-05 | End: 2021-10-05

## 2021-09-12 NOTE — PROGRESS NOTES
Bariatric Pre-Surgery Visit    CC: Obesity    HPI: I had the pleasure of seeing Jeff in the office today to go over bariatric education.  I saw the patient last month to evaluate obesity and consider her for possible weight loss surgery. Today we will review her tasklist and ask any questions after watching the online bariatric information session.     Wt Readings from Last 4 Encounters:   09/16/21 198 lb 3.2 oz (89.9 kg)   08/30/21 202 lb 11.2 oz (91.9 kg)   08/18/21 198 lb (89.8 kg)   08/12/21 198 lb 6.4 oz (90 kg)        Labs Reviewed:  Hemoglobin A1C   Date Value Ref Range Status   07/07/2021 5.3 0 - 5.6 % Final     Comment:     Normal <5.7% Prediabetes 5.7-6.4%  Diabetes 6.5% or higher - adopted from ADA   consensus guidelines.       Vitamin D Deficiency screening   Date Value Ref Range Status   07/07/2021 10 (L) 20 - 75 ug/L Final     Comment:     Season, race, dietary intake, and treatment affect the concentration of   25-hydroxy-Vitamin D. Values may decrease during winter months and increase   during summer months. Values 20-29 ug/L may indicate Vitamin D insufficiency   and values <20 ug/L may indicate Vitamin D deficiency.  Vitamin D determination is routinely performed by an immunoassay specific for   25 hydroxyvitamin D3.  If an individual is on vitamin D2 (ergocalciferol)   supplementation, please specify 25 OH vitamin D2 and D3 level determination by   LCMSMS test [id number].       TSH   Date Value Ref Range Status   06/14/2021 1.17 0.40 - 4.00 mU/L Final     Sodium   Date Value Ref Range Status   08/31/2021 139 133 - 144 mmol/L Final   06/14/2021 138 133 - 144 mmol/L Final     Potassium   Date Value Ref Range Status   08/31/2021 3.8 3.4 - 5.3 mmol/L Final   06/14/2021 3.7 3.4 - 5.3 mmol/L Final     Chloride   Date Value Ref Range Status   08/31/2021 106 94 - 109 mmol/L Final   06/14/2021 104 94 - 109 mmol/L Final     Carbon Dioxide   Date Value Ref Range Status   06/14/2021 30 20 - 32 mmol/L Final      Carbon Dioxide (CO2)   Date Value Ref Range Status   08/31/2021 27 20 - 32 mmol/L Final     Anion Gap   Date Value Ref Range Status   08/31/2021 6 3 - 14 mmol/L Final   06/14/2021 4 3 - 14 mmol/L Final     Glucose   Date Value Ref Range Status   08/31/2021 104 (H) 70 - 99 mg/dL Final   06/14/2021 87 70 - 99 mg/dL Final     Urea Nitrogen   Date Value Ref Range Status   08/31/2021 6 (L) 7 - 30 mg/dL Final   06/14/2021 7 7 - 30 mg/dL Final     Creatinine   Date Value Ref Range Status   08/31/2021 0.74 0.52 - 1.04 mg/dL Final   06/14/2021 0.82 0.52 - 1.04 mg/dL Final     GFR Estimate   Date Value Ref Range Status   08/31/2021 >90 >60 mL/min/1.73m2 Final     Comment:     As of July 11, 2021, eGFR is calculated by the CKD-EPI creatinine equation, without race adjustment. eGFR can be influenced by muscle mass, exercise, and diet. The reported eGFR is an estimation only and is only applicable if the renal function is stable.   06/14/2021 84 >60 mL/min/[1.73_m2] Final     Comment:     Non  GFR Calc  Starting 12/18/2018, serum creatinine based estimated GFR (eGFR) will be   calculated using the Chronic Kidney Disease Epidemiology Collaboration   (CKD-EPI) equation.       Calcium   Date Value Ref Range Status   08/31/2021 8.8 8.5 - 10.1 mg/dL Final   06/14/2021 8.7 8.5 - 10.1 mg/dL Final     Bilirubin Total   Date Value Ref Range Status   06/14/2021 0.9 0.2 - 1.3 mg/dL Final     Alkaline Phosphatase   Date Value Ref Range Status   06/14/2021 63 40 - 150 U/L Final     ALT   Date Value Ref Range Status   06/14/2021 32 0 - 50 U/L Final     AST   Date Value Ref Range Status   06/14/2021 14 0 - 45 U/L Final     Cholesterol   Date Value Ref Range Status   03/19/2019 223 (H) <200 mg/dL Final     Comment:     Desirable:       <200 mg/dl     HDL Cholesterol   Date Value Ref Range Status   03/19/2019 72 >49 mg/dL Final     LDL Cholesterol Calculated   Date Value Ref Range Status   03/19/2019 136 (H) <100 mg/dL Final  "    Comment:     Above desirable:  100-129 mg/dl  Borderline High:  130-159 mg/dL  High:             160-189 mg/dL  Very high:       >189 mg/dl       Triglycerides   Date Value Ref Range Status   03/19/2019 74 <150 mg/dL Final     Comment:     Non Fasting     WBC   Date Value Ref Range Status   06/14/2021 6.6 4.0 - 11.0 10e9/L Final     WBC Count   Date Value Ref Range Status   08/31/2021 9.6 4.0 - 11.0 10e3/uL Final     Hemoglobin   Date Value Ref Range Status   08/31/2021 13.1 11.7 - 15.7 g/dL Final   06/14/2021 12.6 11.7 - 15.7 g/dL Final     Hematocrit   Date Value Ref Range Status   08/31/2021 39.5 35.0 - 47.0 % Final   06/14/2021 38.3 35.0 - 47.0 % Final     MCV   Date Value Ref Range Status   08/31/2021 87 78 - 100 fL Final   06/14/2021 90 78 - 100 fl Final     Platelet Count   Date Value Ref Range Status   08/31/2021 223 150 - 450 10e3/uL Final   06/14/2021 176 150 - 450 10e9/L Final       PE:  /66   Pulse 105   Ht 5' 1\" (1.549 m)   Wt 198 lb 3.2 oz (89.9 kg)   LMP 09/12/2016 (Exact Date)   SpO2 97%   BMI 37.45 kg/m      GENERAL:  Good development and normal affect in no acute distress. Patient is alert and orientated x 3 and answers all questions appropriately.  HEENT: Head is normocephalic, nontraumatic. Pupils equal and round without conjunctival injection. Neck is supple without lymphadenopathy, thyroidmegaly, or mass. Trachea midline. Dentition WNL.   CARDIOVASCULAR:  Regular rate and rhythm without murmurs, rubs, or gallops.  RESPIRATORY: Lungs are clear to auscultation bilaterally with good breath sounds.  GASTROINTESTINAL: Abdomen is obese, nondistended, soft, nontender, without organomegaly or masses. No bruits.  LOWER EXTREMITIES: No LE edema bilaterally, no cyanosis, ulceration, or chronic venous stasis noted.  MUSCULOSKELETAL:  Moves all 4 extremities equal and strong. Has a normal gait.   NEUROLOGIC: Cranial nerves II-XII grossly intact.  SKIN: No intertriginous irritation or rash. "     Assessment:  Obesity  Bariatric Education  Vitamin D deficiency    Plan:  Reviewed tasklist    Lose 5 lbs prior to surgery.  Goal Weight: 191 lbs- pending    Have preoperative laboratory tests drawn. - completed She is taking 10,000 international unit(s) of vitamin D daily.  She will have her Vit D rechecked in 2 months.      Psychological Evaluation with MMPI and clearance for weight loss surgery.- pending starts in December    Achieve clearance from dietitian to see surgeon.- pending    Plan to get off NSAIDS (Ibuprofen, Aleve) prior to surgery to decrease risk of ulcer formation and/or perforation following bariatric surgery.-  Is only taking tylenol for knee pain.      Sleep Consult- in process, sleep study Oct 1.      View bariatric online information session   (Pt will review at home and we will discuss at our follow up visit.) [email or web address] will do before next visit. - completed     Once the patient has completed their task list and there are no further recommendations, they will see the surgeon for consultation for bariatric surgery.  All questions were answered. Patient verbalizes understanding of the process to surgery.  She will have her Vitamin D rechecked sometime in the next month.  She can continue to see myself and the dietician monthly until cleared to see the surgeon.     Sincerely,     Latricia Lund PA-C      FOLLOW-UP:  Return to clinic in 1 month.      20 minutes spent on the date of the encounter reviewing chart, labs, patient education, charting, and plan of care.

## 2021-09-16 ENCOUNTER — OFFICE VISIT (OUTPATIENT)
Dept: SURGERY | Facility: CLINIC | Age: 46
End: 2021-09-16
Payer: COMMERCIAL

## 2021-09-16 VITALS
SYSTOLIC BLOOD PRESSURE: 100 MMHG | DIASTOLIC BLOOD PRESSURE: 66 MMHG | HEART RATE: 105 BPM | HEIGHT: 61 IN | WEIGHT: 198.2 LBS | BODY MASS INDEX: 37.42 KG/M2 | OXYGEN SATURATION: 97 %

## 2021-09-16 DIAGNOSIS — E66.812 CLASS 2 SEVERE OBESITY DUE TO EXCESS CALORIES WITH SERIOUS COMORBIDITY AND BODY MASS INDEX (BMI) OF 37.0 TO 37.9 IN ADULT (H): ICD-10-CM

## 2021-09-16 DIAGNOSIS — E55.9 VITAMIN D DEFICIENCY: ICD-10-CM

## 2021-09-16 DIAGNOSIS — E66.01 CLASS 2 SEVERE OBESITY DUE TO EXCESS CALORIES WITH SERIOUS COMORBIDITY AND BODY MASS INDEX (BMI) OF 37.0 TO 37.9 IN ADULT (H): Primary | ICD-10-CM

## 2021-09-16 DIAGNOSIS — Z71.89 ENCOUNTER FOR PRE-BARIATRIC SURGERY COUNSELING AND EDUCATION: ICD-10-CM

## 2021-09-16 DIAGNOSIS — E66.01 CLASS 2 SEVERE OBESITY DUE TO EXCESS CALORIES WITH SERIOUS COMORBIDITY AND BODY MASS INDEX (BMI) OF 37.0 TO 37.9 IN ADULT (H): ICD-10-CM

## 2021-09-16 DIAGNOSIS — E66.812 CLASS 2 SEVERE OBESITY DUE TO EXCESS CALORIES WITH SERIOUS COMORBIDITY AND BODY MASS INDEX (BMI) OF 37.0 TO 37.9 IN ADULT (H): Primary | ICD-10-CM

## 2021-09-16 PROCEDURE — 99213 OFFICE O/P EST LOW 20 MIN: CPT | Performed by: PHYSICIAN ASSISTANT

## 2021-09-16 PROCEDURE — 97803 MED NUTRITION INDIV SUBSEQ: CPT | Performed by: DIETITIAN, REGISTERED

## 2021-09-16 ASSESSMENT — MIFFLIN-ST. JEOR: SCORE: 1466.41

## 2021-09-16 NOTE — PROGRESS NOTES
"PRE SURGICAL WEIGHT LOSS NUTRITION APPOINTMENT    Jeff Serra  1975  female  [id number]  46 year old    ASSESSMENT    Desired Surgical Procedure: gastric sleeve    REASON FOR VISIT:  Jeff Serra is a 46 year old year old female presents today for a pre-surgical weight loss follow-up appointment. Patient accompanied by self.    DIAGNOSIS:  Weight Status Obesity Grade II BMI 35-39.9    ANTHROPOMETRICS:  Height: 5'1\"  Initial Weight: 196 lbs      Current weight: 198.2 lbs   BMI: 37.4  kg/(m^2).    VITAMINS AND MINERALS:  Multivitamin with Minerals (am)  Calcium with Vitamin D (night) unsure of dose   30043 International units Vitamin D      NUTRITION HISTORY:  Breakfast: EGGS, CAAL, BREAD, PANCAKES, [place] TOAST; banana or yogurt or boiled egg (6:00 AM)  Lunch: trying to add [id number], tomatoes, chicken (boiled) -[employer]  (12:00 PM)  Supper: CHICKEN, SPAGHETTI, MAC AND CHEESE, RICE (6:00)  Snacks: COOKIES, CHIPS, CANDY, HONEY BUNS; adding fruit and yogurt (at work)  Fluids Consumed: juice-1 cup; water with Crystal Light  Eating slower: Yes  Chewing foods thoroughly: No  Take 20-30 minutes to consume each meal: No  Fluids and meals separate by at least 30 minutes: improving   Carbonation: pop on occasion   Caffeine: coffee with cream or tea   Additional Information: Patient has started to make diet and behavior changes.  Patient was concerned that her lab values were off due to starting to take multivitamins.  Patient is not exercising due to concern about breathing.     PHYSICAL ACTIVITY:  Type: none    DIAGNOSIS:  Previous Nutrition Diagnosis: Obesity related to long history of self- monitoring deficit and excessive energy intake evidenced by BMI of 37.1 kg/m2  No change, modified below    Previous goals:   Begin taking multivitamin,  calcium and vitamin D per guidelines-met   Take 20-30 minutes to eat meals  Separate fluids and meals by 30 minute    Current Nutrition Diagnosis: " Obesity related to long history of self-monitoring deficit and excessive energy intake as evidenced by BMI of 37.4 kg/m2.    INTERVENTION:  Nutrition Prescription: Recommended energy/nutrient modification.    GOALS:  3 food groups at breakfast  Avoid 30 minutes before, during and afternoon  Exercise 3 times per week for 10 minuteds    Intervention:  - Discussed progress towards previous goals.  - Reinforced importance of making behavior changes in preparation for bariatric surgery.   - Assessed learning needs and learning preferences       NUTRITION MONITORING AND EVALUATION:  Expected patient engagement good  Patient demonstrated good understanding.     Follow up: Continue to monitor patient closely regarding weight loss and diet.  # of visits needed: 1-2  Cleared by RD: No     TIME SPENT WITH PATIENT: 25 minutes    Jeff Boone, RD, LD  [facility] Outpatient Dietitian  [phone] (office phone)

## 2021-09-17 ENCOUNTER — OFFICE VISIT (OUTPATIENT)
Dept: URGENT CARE | Facility: URGENT CARE | Age: 46
End: 2021-09-17
Payer: COMMERCIAL

## 2021-09-17 VITALS
SYSTOLIC BLOOD PRESSURE: 117 MMHG | OXYGEN SATURATION: 98 % | TEMPERATURE: 98.8 F | RESPIRATION RATE: 20 BRPM | HEART RATE: 80 BPM | DIASTOLIC BLOOD PRESSURE: 81 MMHG

## 2021-09-17 DIAGNOSIS — Z20.822 SUSPECTED COVID-19 VIRUS INFECTION: ICD-10-CM

## 2021-09-17 DIAGNOSIS — J06.9 VIRAL URI: Primary | ICD-10-CM

## 2021-09-17 DIAGNOSIS — R07.0 THROAT PAIN: ICD-10-CM

## 2021-09-17 LAB
DEPRECATED S PYO AG THROAT QL EIA: NEGATIVE
GROUP A STREP BY PCR: NOT DETECTED

## 2021-09-17 PROCEDURE — U0003 INFECTIOUS AGENT DETECTION BY NUCLEIC ACID (DNA OR RNA); SEVERE ACUTE RESPIRATORY SYNDROME CORONAVIRUS 2 (SARS-COV-2) (CORONAVIRUS DISEASE [COVID-19]), AMPLIFIED PROBE TECHNIQUE, MAKING USE OF HIGH THROUGHPUT TECHNOLOGIES AS DESCRIBED BY CMS-2020-01-R: HCPCS | Performed by: PHYSICIAN ASSISTANT

## 2021-09-17 PROCEDURE — 99213 OFFICE O/P EST LOW 20 MIN: CPT | Performed by: PHYSICIAN ASSISTANT

## 2021-09-17 PROCEDURE — U0005 INFEC AGEN DETEC AMPLI PROBE: HCPCS | Performed by: PHYSICIAN ASSISTANT

## 2021-09-17 PROCEDURE — 87651 STREP A DNA AMP PROBE: CPT | Performed by: PHYSICIAN ASSISTANT

## 2021-09-17 NOTE — PATIENT INSTRUCTIONS
Patient Education     Viral Upper Respiratory Illness (Adult)    You have a viral upper respiratory illness (URI), which is another term for the common cold. This illness is contagious during the first few days. It is spread through the air by coughing and sneezing. It may also be spread by direct contact (touching the sick person and then touching your own eyes, nose, or mouth). Frequent handwashing will decrease risk of spread. Most viral illnesses go away within 7 to 10 days with rest and simple home remedies. Sometimes the illness may last for several weeks. Antibiotics will not kill a virus, and they are generally not prescribed for this condition.  Home care    If symptoms are severe, rest at home for the first 2 to 3 days. When you resume activity, don't let yourself get too tired.    Don't smoke. If you need help stopping, talk with your healthcare provider.    Avoid being exposed to cigarette smoke (yours or others ).    You may use acetaminophen or ibuprofen to control pain and fever, unless another medicine was prescribed. If you have chronic liver or kidney disease, have ever had a stomach ulcer or gastrointestinal bleeding, or are taking blood-thinning medicines, talk with your healthcare provider before using these medicines. Aspirin should never be given to anyone under 18 years of age who is ill with a viral infection or fever. It may cause severe liver or brain damage.    Your appetite may be poor, so a light diet is fine. Stay well hydrated by drinking 6 to 8 glasses of fluids per day (water, soft drinks, juices, tea, or soup). Extra fluids will help loosen secretions in the nose and lungs.    Over-the-counter cold medicines will not shorten the length of time you re sick, but they may be helpful for the following symptoms: cough, sore throat, and nasal and sinus congestion. If you take prescription medicines, ask your healthcare provider or pharmacist which over-the-counter medicines are safe to  use. (Note: Don't use decongestants if you have high blood pressure.)  Follow-up care  Follow up with your healthcare provider, or as advised.  When to seek medical advice  Call your healthcare provider right away if any of these occur:    Cough with lots of colored sputum (mucus)    Severe headache; face, neck, or ear pain    Difficulty swallowing due to throat pain    Fever of 100.4 F (38 C) or higher, or as directed by your healthcare provider  Call 911  Call 911 if any of these occur:    Chest pain, shortness of breath, wheezing, or difficulty breathing    Coughing up blood    Very severe pain with swallowing, especially if it goes along with a muffled voice   [employer] last reviewed this educational content on 6/1/2018 2000-2021 [employer]. All rights reserved. This information is not intended as a substitute for professional medical care. Always follow your healthcare professional's instructions.                 September 17, 2021 [facility]:      At this time, your cold symptoms appear to be caused by a virus. You may have a common cold virus. It is also possible you could have a Covid-19 virus.      A Covid-19 PCR test was done here today.  The result typically comes back in 1-2 days (watch your MyChart or call the urgent care clinic for your result in 1-2 days).      Please continue with home comfort care measures (including as needed Tylenol or Ibuprofen for sore throat and fever) and extra rest and fluids.      Please keep her home/self-isolate (no contact with others outside of home) until your Covid-19 test result is back (this typically takes 1-2 days).     Go to the emergency room if you develop any of the below:           Cough with lots of colored sputum (mucus)    Severe difficulty breathing     Severe headache; face, neck, or ear pain    Difficulty swallowing due to throat pain  Call 911  Call 911 if any of these occur:    Chest pain, shortness of breath, wheezing, or  difficulty breathing    Coughing up blood    Very severe pain with swallowing, especially if it goes along with a muffled voice       You should contact your work supervisor regarding their return to work policy when you have upper respiratory illness symptoms and while you are awaiting a Covid-19 test result.
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
## 2021-09-17 NOTE — LETTER
[facility]  [address]  [address]  [place] [address] [phone]          September 17, 2021    RE:  Jeff Serra                                                                                                                                                       [address] [address] [address]            To whom it may concern:    Jeff Serra and her son were both seen here today for evaluation of an acute medical illness. Please excuse her from missed work today.         Sincerely,        Aman Pagan PA-C

## 2021-09-18 ENCOUNTER — NURSE TRIAGE (OUTPATIENT)
Dept: NURSING | Facility: CLINIC | Age: 46
End: 2021-09-18

## 2021-09-18 LAB — SARS-COV-2 RNA RESP QL NAA+PROBE: NEGATIVE

## 2021-09-18 NOTE — TELEPHONE ENCOUNTER
Spouse and patient calling to request test results.     Strep tests are negative.  COVID is pending.      He verbalized understanding and had no further questions.     COVID 19 Nurse Triage Plan/Patient Instructions    Please be aware that novel coronavirus (COVID-19) may be circulating in the community. If you develop symptoms such as fever, cough, or SOB or if you have concerns about the presence of another infection including coronavirus (COVID-19), please contact your health care provider or visit [email or web address].     Disposition/Instructions    Home care recommended. Follow home care protocol based instructions.    Thank you for taking steps to prevent the spread of this virus.  o Limit your contact with others.  o Wear a simple mask to cover your cough.  o Wash your hands well and often.    Resources    [facility]: About COVID-19: [email or web address]    CDC: What to Do If You're Sick: www.cdc.gov/coronavirus/2019-ncov/about/steps-when-sick.html    CDC: Ending Home Isolation: www.cdc.gov/coronavirus/2019-ncov/hcp/disposition-in-home-patients.html     CDC: Caring for Someone: www.cdc.gov/coronavirus/2019-ncov/if-you-are-sick/care-for-someone.html     [facility]: Interim Guidance for Hospital Discharge to Home: www.health.[facility]/diseases/coronavirus/hcp/hospdischarge.pdf    [facility] clinical trials (COVID-19 research studies): [email or web address]     Below are the COVID-19 hotlines at the [facility] of Health ([facility]). Interpreters are available.   o For health questions: Call 263-580-0543 or 1-373.827.4332 (7 a.m. to 7 p.m.)  o For questions about schools and childcare: Call [phone] or 1-977.553.9739 (7 a.m. to 7 p.m.)                 Chen Francois RN/OLINDA

## 2021-09-18 NOTE — TELEPHONE ENCOUNTER
Reason for Disposition    Health Information question, no triage required and triager able to answer question    Additional Information    Negative: RN needs further essential information from caller in order to complete triage    Negative: Requesting regular office appointment    Negative: [1] Caller requesting NON-URGENT health information AND [2] PCP's office is the best resource    Protocols used: INFORMATION ONLY CALL - NO TRIAGE-A-
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
## 2021-10-01 ENCOUNTER — THERAPY VISIT (OUTPATIENT)
Dept: SLEEP MEDICINE | Facility: CLINIC | Age: 46
End: 2021-10-01
Payer: COMMERCIAL

## 2021-10-01 DIAGNOSIS — E66.9 OBESITY (BMI 30-39.9): ICD-10-CM

## 2021-10-01 DIAGNOSIS — G47.19 EXCESSIVE DAYTIME SLEEPINESS: ICD-10-CM

## 2021-10-01 DIAGNOSIS — I10 HYPERTENSION GOAL BP (BLOOD PRESSURE) < 140/90: ICD-10-CM

## 2021-10-01 DIAGNOSIS — R06.83 SNORING: ICD-10-CM

## 2021-10-01 PROCEDURE — 95810 POLYSOM 6/> YRS 4/> PARAM: CPT | Performed by: INTERNAL MEDICINE

## 2021-10-04 ENCOUNTER — MYC REFILL (OUTPATIENT)
Dept: FAMILY MEDICINE | Facility: CLINIC | Age: 46
End: 2021-10-04

## 2021-10-04 DIAGNOSIS — G89.29 CHRONIC MIDLINE LOW BACK PAIN WITHOUT SCIATICA: ICD-10-CM

## 2021-10-04 DIAGNOSIS — M54.50 CHRONIC MIDLINE LOW BACK PAIN WITHOUT SCIATICA: ICD-10-CM

## 2021-10-04 DIAGNOSIS — F11.90 CHRONIC, CONTINUOUS USE OF OPIOIDS: ICD-10-CM

## 2021-10-05 RX ORDER — OXYCODONE HYDROCHLORIDE 15 MG/1
15 TABLET ORAL 3 TIMES DAILY PRN
Qty: 90 TABLET | Refills: 0 | OUTPATIENT
Start: 2021-10-05

## 2021-10-05 NOTE — TELEPHONE ENCOUNTER
Refill denied to the pharmacy.   Patient overdue for UDS (ordered in July) and need pain follow up every 3 months.  So if she schedules I will send a refill

## 2021-10-05 NOTE — TELEPHONE ENCOUNTER
Routing refill request to provider for review/approval because:  Drug not on the [facility] refill protocol   Genny Stevens RN

## 2021-10-06 LAB — SLPCOMP: NORMAL

## 2021-10-06 NOTE — PROCEDURES
" SLEEP STUDY INTERPRETATION  DIAGNOSTIC POLYSOMNOGRAPHY REPORT      Patient: JORY NÚÑEZ  YOB: 1975  Study Date: 10/1/2021  MRN: [id number]  Referring Provider: -  Ordering Provider: -    Indications for Polysomnography: The patient is a 46 year old Female who is 5' 1\" and weighs 198.0 lbs. Her BMI is 37.9, [place] sleepiness scale 11 and neck circumference is 39CM cm. Relevant medical history includes obesity, HTN, history of PE & chronic pain. A diagnostic polysomnogram was performed to evaluate for sleep apnea.    Polysomnogram Data: A full night polysomnogram recorded the standard physiologic parameters including EEG, EOG, EMG, ECG, nasal and oral airflow. Respiratory parameters of chest and abdominal movements were recorded with respiratory inductance plethysmography. Oxygen saturation was recorded by pulse oximetry. Hypopnea scoring rule used: 1B 4%.    Sleep Architecture:   The total recording time of the polysomnogram was 529.5 minutes. The total sleep time was 463.0 minutes. Sleep latency was normal at 24.2 minutes without the use of a sleep aid (type of sleep aid). REM latency was 51.0 minutes. Arousal index was normal at 12.6 arousals per hour. Sleep efficiency was normal at 87.4%. Wake after sleep onset was 15.0 minutes. The patient spent 1.9% of total sleep time in Stage N1, 48.2% in Stage N2, 28.5% in Stage N3, and 21.4% in REM. Time in REM supine was 34.5 minutes.    Respiration: Mild obstructive sleep apnea.     Events ? The polysomnogram revealed a presence of 1 obstructive, - central, and 1 mixed apneas resulting in an apnea index of 0.3 events per hour. There were 45 obstructive hypopneas and - central hypopneas resulting in an obstructive hypopnea index of 5.8 and central hypopnea index of - events per hour. The combined apnea/hypopnea index was 6.1 events per hour (central apnea/hypopnea index was - events per hour). The REM AHI was 20.0 events per hour. The supine AHI was " 2.6 events per hour. The RERA index was 0.5 events per hour.  The RDI was 6.6 events per hour.    Snoring - was reported as loud/intermittent.    Respiratory rate and pattern - was notable for normal respiratory rate and pattern.    Sustained Sleep Associated Hypoventilation - Transcutaneous carbon dioxide monitoring was not used; however significant hypoventilation was not suggested by oximetry.    Sleep Associated Hypoxemia - (Greater than 5 minutes O2 sat at or below 88%) was not present. Baseline oxygen saturation was 95.1%. Lowest oxygen saturation was 86.8%. Time spent less than or equal to 88% was 0.3 minutes. Time spent less than or equal to 89% was 0.9 minutes.    Movement Activity: Mildly increased periodic limb movement activity without a significant degree of associated arousals.      Periodic Limb Activity - There were 127 PLMs during the entire study. The PLM index was 16.5 movements per hour. The PLM Arousal Index was 2.3 per hour.    REM EMG Activity - Excessive transient/sustained muscle activity was not present.    Nocturnal Behavior - Abnormal sleep related behaviors were not noted during/arising out of NREM / REM sleep.     Bruxism - None apparent.    Cardiac Summary:   The average pulse rate was 89.0 bpm. The minimum pulse rate was 73.9 bpm while the maximum pulse rate was 117.8 bpm.  Arrhythmias were not noted.    Assessment:     This sleep study shows a rather mild degree of obstructive sleep apnea, without significant hypoxemia.     There was a mildly increased frequency of periodic limb movements of sleep. However, majority of the movements were not associated with arousals.     Sleep architecture was normal with normal arousal frequency and adequate proportion of al sleep stages.      Recommendations:    Treatment is optional for patient s mild sleep apnea. Depending on clinical indications for treatment, following options can be considered.    If there is excessive daytime sleepiness or  other qualifying medical comorbidity, treatment could be empirically initiated with Auto?titrating PAP therapy with a range of 5 to 15 cmH2O. Recommend clinical follow up with sleep management team.    Patient may be a candidate for dental appliance through referral to Sleep Dentistry for the treatment of obstructive sleep apnea.    If devices are not acceptable or effective, patient may benefit from evaluation of possible surgical options. If she is interested, would recommend referral to specialized ENT-Sleep provider.    Suggest optimizing sleep schedule and avoiding sleep deprivation.    Weight management (if BMI > 30).    Pharmacologic therapy should be used for management of restless legs syndrome only if present and clinically indicated and not based on the presence of periodic limb movements alone.    Diagnostic Codes:   Obstructive Sleep Apnea G47.33    10/1/2021 [facility] Sleep Study (198.0 lbs) - AHI 6.1, RDI 6.6, Supine AHI 2.6, REM AHI 20.0, Low O2 86.8%, Time Spent ?88% 0.3 minutes / Time Spent ?89% 0.9 minutes.     _____________________________________   Electronically Signed By: Santos Aggarwal MD 10/06/2021

## 2021-10-19 NOTE — PATIENT INSTRUCTIONS
" SLEEP STUDY INTERPRETATION  DIAGNOSTIC POLYSOMNOGRAPHY REPORT        Patient: JORY NÚÑEZ  YOB: 1975  Study Date: 10/1/2021  MRN: [id number]  Referring Provider: -  Ordering Provider: -     Indications for Polysomnography: The patient is a 46 year old Female who is 5' 1\" and weighs 198.0 lbs. Her BMI is 37.9, [place] sleepiness scale 11 and neck circumference is 39CM cm. Relevant medical history includes obesity, HTN, history of PE & chronic pain. A diagnostic polysomnogram was performed to evaluate for sleep apnea.     Polysomnogram Data: A full night polysomnogram recorded the standard physiologic parameters including EEG, EOG, EMG, ECG, nasal and oral airflow. Respiratory parameters of chest and abdominal movements were recorded with respiratory inductance plethysmography. Oxygen saturation was recorded by pulse oximetry. Hypopnea scoring rule used: 1B 4%.     Sleep Architecture:   The total recording time of the polysomnogram was 529.5 minutes. The total sleep time was 463.0 minutes. Sleep latency was normal at 24.2 minutes without the use of a sleep aid (type of sleep aid). REM latency was 51.0 minutes. Arousal index was normal at 12.6 arousals per hour. Sleep efficiency was normal at 87.4%. Wake after sleep onset was 15.0 minutes. The patient spent 1.9% of total sleep time in Stage N1, 48.2% in Stage N2, 28.5% in Stage N3, and 21.4% in REM. Time in REM supine was 34.5 minutes.     Respiration: Mild obstructive sleep apnea.     Events ? The polysomnogram revealed a presence of 1 obstructive, - central, and 1 mixed apneas resulting in an apnea index of 0.3 events per hour. There were 45 obstructive hypopneas and - central hypopneas resulting in an obstructive hypopnea index of 5.8 and central hypopnea index of - events per hour. The combined apnea/hypopnea index was 6.1 events per hour (central apnea/hypopnea index was - events per hour). The REM AHI was 20.0 events per hour. The supine " AHI was 2.6 events per hour. The RERA index was 0.5 events per hour.  The RDI was 6.6 events per hour.    Snoring - was reported as loud/intermittent.    Respiratory rate and pattern - was notable for normal respiratory rate and pattern.    Sustained Sleep Associated Hypoventilation - Transcutaneous carbon dioxide monitoring was not used; however significant hypoventilation was not suggested by oximetry.    Sleep Associated Hypoxemia - (Greater than 5 minutes O2 sat at or below 88%) was not present. Baseline oxygen saturation was 95.1%. Lowest oxygen saturation was 86.8%. Time spent less than or equal to 88% was 0.3 minutes. Time spent less than or equal to 89% was 0.9 minutes.     Movement Activity: Mildly increased periodic limb movement activity without a significant degree of associated arousals.      Periodic Limb Activity - There were 127 PLMs during the entire study. The PLM index was 16.5 movements per hour. The PLM Arousal Index was 2.3 per hour.    REM EMG Activity - Excessive transient/sustained muscle activity was not present.    Nocturnal Behavior - Abnormal sleep related behaviors were not noted during/arising out of NREM / REM sleep.     Bruxism - None apparent.     Cardiac Summary:   The average pulse rate was 89.0 bpm. The minimum pulse rate was 73.9 bpm while the maximum pulse rate was 117.8 bpm.  Arrhythmias were not noted.     Assessment:     This sleep study shows a rather mild degree of obstructive sleep apnea, without significant hypoxemia.     There was a mildly increased frequency of periodic limb movements of sleep. However, majority of the movements were not associated with arousals.     Sleep architecture was normal with normal arousal frequency and adequate proportion of al sleep stages.       Recommendations:    Treatment is optional for patient s mild sleep apnea. Depending on clinical indications for treatment, following options can be considered.    If there is excessive daytime  sleepiness or other qualifying medical comorbidity, treatment could be empirically initiated with Auto?titrating PAP therapy with a range of 5 to 15 cmH2O. Recommend clinical follow up with sleep management team.    Patient may be a candidate for dental appliance through referral to Sleep Dentistry for the treatment of obstructive sleep apnea.    If devices are not acceptable or effective, patient may benefit from evaluation of possible surgical options. If she is interested, would recommend referral to specialized ENT-Sleep provider.    Suggest optimizing sleep schedule and avoiding sleep deprivation.    Weight management (if BMI > 30).    Pharmacologic therapy should be used for management of restless legs syndrome only if present and clinically indicated and not based on the presence of periodic limb movements alone.     Diagnostic Codes:   Obstructive Sleep Apnea G47.33     10/1/2021 [facility] Diagnostic Sleep Study (198.0 lbs) - AHI 6.1, RDI 6.6, Supine AHI 2.6, REM AHI 20.0, Low O2 86.8%, Time Spent ?88% 0.3 minutes / Time Spent ?89% 0.9 minutes.     _____________________________________   Electronically Signed By: Santos Aggarwal MD 10/06/2021       Your BMI is There is no height or weight on file to calculate BMI.  Weight management is a personal decision.  If you are interested in exploring weight loss strategies, the following discussion covers the approaches that may be successful. Body mass index (BMI) is one way to tell whether you are at a healthy weight, overweight, or obese. It measures your weight in relation to your height.  A BMI of 18.5 to 24.9 is in the healthy range. A person with a BMI of 25 to 29.9 is considered overweight, and someone with a BMI of 30 or greater is considered obese. More than two-thirds of American adults are considered overweight or obese.  Being overweight or obese increases the risk for further weight gain. Excess weight may lead to heart disease and diabetes.  Creating and  following plans for healthy eating and physical activity may help you improve your health.  Weight control is part of healthy lifestyle and includes exercise, emotional health, and healthy eating habits. Careful eating habits lifelong are the mainstay of weight control. Though there are significant health benefits from weight loss, long-term weight loss with diet alone may be very difficult to achieve- studies show long-term success with dietary management in less than 10% of people. Attaining a healthy weight may be especially difficult to achieve in those with severe obesity. In some cases, medications, devices and surgical management might be considered.  What can you do?  If you are overweight or obese and are interested in methods for weight loss, you should discuss this with your provider.     Consider reducing daily calorie intake by 500 calories.     Keep a food journal.     Avoiding skipping meals, consider cutting portions instead.    Diet combined with exercise helps maintain muscle while optimizing fat loss. Strength training is particularly important for building and maintaining muscle mass. Exercise helps reduce stress, increase energy, and improves fitness. Increasing exercise without diet control, however, may not burn enough calories to loose weight.       Start walking three days a week 10-20 minutes at a time    Work towards walking thirty minutes five days a week     Eventually, increase the speed of your walking for 1-2 minutes at time    In addition, we recommend that you review healthy lifestyles and methods for weight loss available through the National Institutes of Health patient information sites:  http://win.niddk.nih.gov/publications/index.htm    And look into health and wellness programs that may be available through your health insurance provider, employer, local community center, or harshal club.    Weight management plan: Patient was referred to their PCP to discuss a diet and exercise  plan.

## 2021-10-19 NOTE — PROGRESS NOTES
Jeff is a 46 year old who is being evaluated via a billable video visit.      How would you like to obtain your AVS? MyChart  If the video visit is dropped, the invitation should be resent by: Send to e-mail at: [email or web address]  Will anyone else be joining your video visit? No      Video-Visit Details    Type of service:  Video Visit    Video Start Time: 2:03PM    Video End Time:2:22PM    Originating Location (pt. Location): Home    Distant Location (provider location):  [facility] SLEEP [facility]     Platform used for Video Visit: [facility] [place]   Outpatient Sleep Medicine  Oct 20, 2021       Name: Jeff Serra MRN# [id number]   Age: 46 year old YOB: 1975            Assessment and Plan:   1. CATALINO (obstructive sleep apnea)  2. Hypertension goal BP (blood pressure) < 140/90  3. Excessive daytime sleepiness    During this visit, we reviewed the summary of the study including stage, position, event distribution, oximetry and heart rate. Very mild obstructive sleep apnea was identified with AHI 6.1 events per hour.  There was no significant hypoxemia with anil saturation 86.8% and 0.3 minutes spent less than or equal to 88%.  Sleep architecture showed all sleep stages present adequate proportion of stages, normal arousal index, and normal sleep efficiency.  There was a mildly increased number of periodic limb movements of sleep however not associated with arousal-PLM index 16.5, arousal index 2.3.  No cardiac abnormalities on monitoring.    Discussed that treatment of mild CATALINO is generally thought to be optional and from a sleep medicine perspective, I feel comfortable clearing her for bariatric surgery if she chooses not to treat given her rather mild degree of CATALINO and especially given no significant hypoxemia. However, we discussed that treatment would help improve her socially disruptive snoring and possibly her daytime sleepiness level.  "After discussion of treatment options today she is interested in starting treatment of CATALINO with PAP therapy.  Reviewed importance of nightly therapy for effective treatment of CATALINO, and she voiced understanding and agreement.  We also reviewed importance of using PAP during the entire sleep duration to achieve maximal benefits, but reviewed that a minimum of 4 hours per night was required.  She is confident that she can achieve these goals and is willing to start therapy as soon as possible. Prescription written to begin auto CPAP [id number]. Will be enrolled in [facility].   - Comprehensive DME    She will be seen back approximately 2 months after starting PAP therapy to ensure compliance and review treatment outcomes.  However, if she develops any difficulties with treatment she is instructed to contact us or DME company immediately to troubleshoot problems.         Chief Complaint      Chief Complaint   Patient presents with     Sleep Study          History of Present Illness:   Jeff Serra is a 46 year old female who presents to the clinic for results of recent sleep study completed on 10/1/2021. Relevant medical history includes obesity, HTN, history of PE & chronic pain. A diagnostic polysomnogram was performed to evaluate for sleep apnea.    Felt this was an \"average\" night of sleep for her.     Reviewed results of sleep study with patient as follows:  DIAGNOSTIC POLYSOMNOGRAPHY REPORT  Sleep Architecture:   The total recording time of the polysomnogram was 529.5 minutes. The total sleep time was 463.0 minutes. Sleep latency was normal at 24.2 minutes without the use of a sleep aid. REM latency was 51.0 minutes. Arousal index was normal at 12.6 arousals per hour. Sleep efficiency was normal at 87.4%. Wake after sleep onset was 15.0 minutes. The patient spent 1.9% of total sleep time in Stage N1, 48.2% in Stage N2, 28.5% in Stage N3, and 21.4% in REM. Time in REM supine was 34.5 minutes.     Respiration: Mild " obstructive sleep apnea.     Events ? The polysomnogram revealed a presence of 1 obstructive, - central, and 1 mixed apneas resulting in an apnea index of 0.3 events per hour. There were 45 obstructive hypopneas and - central hypopneas resulting in an obstructive hypopnea index of 5.8 and central hypopnea index of - events per hour. The combined apnea/hypopnea index was 6.1 events per hour (central apnea/hypopnea index was - events per hour). The REM AHI was 20.0 events per hour. The supine AHI was 2.6 events per hour. The RERA index was 0.5 events per hour.  The RDI was 6.6 events per hour.    Snoring - was reported as loud/intermittent.    Respiratory rate and pattern - was notable for normal respiratory rate and pattern.    Sustained Sleep Associated Hypoventilation - Transcutaneous carbon dioxide monitoring was not used; however significant hypoventilation was not suggested by oximetry.    Sleep Associated Hypoxemia - (Greater than 5 minutes O2 sat at or below 88%) was not present. Baseline oxygen saturation was 95.1%. Lowest oxygen saturation was 86.8%. Time spent less than or equal to 88% was 0.3 minutes. Time spent less than or equal to 89% was 0.9 minutes.     Movement Activity: Mildly increased periodic limb movement activity without a significant degree of associated arousals.      Periodic Limb Activity - There were 127 PLMs during the entire study. The PLM index was 16.5 movements per hour. The PLM Arousal Index was 2.3 per hour.    REM EMG Activity - Excessive transient/sustained muscle activity was not present.    Nocturnal Behavior - Abnormal sleep related behaviors were not noted during/arising out of NREM / REM sleep.     Bruxism - None apparent.     Cardiac Summary:   The average pulse rate was 89.0 bpm. The minimum pulse rate was 73.9 bpm while the maximum pulse rate was 117.8 bpm.  Arrhythmias were not noted.     Past medical/surgical history, family history, social history, medications and  allergies were reviewed.           Physical Examination:   LMP 09/12/2016 (Exact Date)   General appearance: Awake, alert, cooperative. Well groomed. Sitting comfortably in chair. In no apparent distress.  HEENT: Head: Normocephalic, atraumatic. Eyes:Conjunctiva clear. Sclera normal. Nose: External appearance without deformity.   Pulmonary:  Able to speak easily in full sentences. No cough or wheeze.   Skin:  No rashes or significant lesions on visible skin.   Neurologic: Alert, oriented x3.   Psychiatric: Mood euthymic. Affect congruent with full range and intensity.      CC:  Jordyn Loredo PA-C  Oct 20, 2021     [facility] [place]  [address] [place] [address]     [facility]  [address] [address]    Chart documentation was completed, in part, with [employer] voice-recognition software. Even though reviewed, some grammatical, spelling, and word errors may remain.    30 minutes spent on day of encounter doing chart review, history and exam, documentation, and further activities as noted above

## 2021-10-20 ENCOUNTER — VIRTUAL VISIT (OUTPATIENT)
Dept: SLEEP MEDICINE | Facility: CLINIC | Age: 46
End: 2021-10-20
Payer: COMMERCIAL

## 2021-10-20 DIAGNOSIS — G47.19 EXCESSIVE DAYTIME SLEEPINESS: ICD-10-CM

## 2021-10-20 DIAGNOSIS — G47.33 OSA (OBSTRUCTIVE SLEEP APNEA): Primary | ICD-10-CM

## 2021-10-20 DIAGNOSIS — I10 HYPERTENSION GOAL BP (BLOOD PRESSURE) < 140/90: ICD-10-CM

## 2021-10-20 PROCEDURE — 99214 OFFICE O/P EST MOD 30 MIN: CPT | Mod: 95 | Performed by: PHYSICIAN ASSISTANT

## 2021-10-21 NOTE — PROGRESS NOTES
Instructional letter for scheduling PAP follow up visit has been sent to patient via [employer].     Dari DUNBAR CMA, SLEEP MEDICINE, 10/21/2021 7:54 AM

## 2021-10-23 ENCOUNTER — HEALTH MAINTENANCE LETTER (OUTPATIENT)
Age: 46
End: 2021-10-23

## 2021-10-25 ENCOUNTER — TELEPHONE (OUTPATIENT)
Dept: SLEEP MEDICINE | Facility: CLINIC | Age: 46
End: 2021-10-25

## 2021-10-25 NOTE — TELEPHONE ENCOUNTER
PT STATED SHE WOULD LIKE TO USE ME.  TOLD HER ABOUT OUR WAIT LIST AND THAT I WOULD PUT HER ON IT BUT WE CURRENTLY HAVE NO MACHINES.  VERIFIED THAT PT WOULD LIKE TO GO TO  FOR HER SETUP.

## 2021-10-28 ENCOUNTER — VIRTUAL VISIT (OUTPATIENT)
Dept: SURGERY | Facility: CLINIC | Age: 46
End: 2021-10-28
Payer: COMMERCIAL

## 2021-10-28 VITALS — BODY MASS INDEX: 36.84 KG/M2 | HEIGHT: 61 IN | WEIGHT: 195.1 LBS

## 2021-10-28 DIAGNOSIS — E66.812 CLASS 2 SEVERE OBESITY DUE TO EXCESS CALORIES WITH SERIOUS COMORBIDITY AND BODY MASS INDEX (BMI) OF 37.0 TO 37.9 IN ADULT (H): ICD-10-CM

## 2021-10-28 DIAGNOSIS — E66.01 CLASS 2 SEVERE OBESITY DUE TO EXCESS CALORIES WITH SERIOUS COMORBIDITY AND BODY MASS INDEX (BMI) OF 36.0 TO 36.9 IN ADULT (H): ICD-10-CM

## 2021-10-28 DIAGNOSIS — E66.812 CLASS 2 SEVERE OBESITY DUE TO EXCESS CALORIES WITH SERIOUS COMORBIDITY AND BODY MASS INDEX (BMI) OF 36.0 TO 36.9 IN ADULT (H): ICD-10-CM

## 2021-10-28 DIAGNOSIS — E66.01 CLASS 2 SEVERE OBESITY DUE TO EXCESS CALORIES WITH SERIOUS COMORBIDITY AND BODY MASS INDEX (BMI) OF 37.0 TO 37.9 IN ADULT (H): ICD-10-CM

## 2021-10-28 DIAGNOSIS — Z71.89 ENCOUNTER FOR PRE-BARIATRIC SURGERY COUNSELING AND EDUCATION: ICD-10-CM

## 2021-10-28 DIAGNOSIS — E55.9 VITAMIN D DEFICIENCY: Primary | ICD-10-CM

## 2021-10-28 PROCEDURE — 99213 OFFICE O/P EST LOW 20 MIN: CPT | Mod: 95 | Performed by: PHYSICIAN ASSISTANT

## 2021-10-28 PROCEDURE — 97803 MED NUTRITION INDIV SUBSEQ: CPT | Mod: 95 | Performed by: DIETITIAN, REGISTERED

## 2021-10-28 ASSESSMENT — MIFFLIN-ST. JEOR: SCORE: 1452.35

## 2021-10-28 NOTE — PROGRESS NOTES
"Video-Visit Details    Type of service:  Video Visit    Video Start Time (time video started): 3:27    Video End Time (time video stopped): 3:46    Originating Location (pt. Location): Home    Distant Location (provider location):  [facility] SURGICAL WEIGHT LOSS CLINIC [place]     Mode of Communication:  Video Conference via [facility]    PRE SURGICAL WEIGHT LOSS NUTRITION APPOINTMENT    Jeff Serra  1975  female  [id number]  46 year old    ASSESSMENT    Desired Surgical Procedure: gastric sleeve    REASON FOR VISIT:  Jeff Serra is a 46 year old year old female presents today for a pre-surgical weight loss follow-up appointment. Patient accompanied by self.    DIAGNOSIS:  Weight Status Obesity Grade II BMI 35-39.9    ANTHROPOMETRICS:  Height: 5'1\"  Initial Weight: 196 lbs      Previous  weight: 198.2 lbs   Current weight: 195 lbs   BMI: 36.8 kg/(m^2).    VITAMINS AND MINERALS:  Multivitamin with Minerals (am)  400 mg Calcium with Vitamin D (night) (petites)  98401 International units Vitamin D     NUTRITION HISTORY:  Breakfast: EGGS, CAAL, BREAD, PANCAKES, [place]ese TOAST; banana or yogurt or boiled egg, banana + yogurt (6:00 AM)  Lunch: trying to add [id number]-[id number], tomatoes, chicken (boiled) -Subway or protein shake  (12:00 PM)  Supper: CHICKEN, SPAGHETTI, MAC AND CHEESE, RICE chicken/steak  (6:00)  Snacks: COOKIES, CHIPS, CANDY, HONEY BUNS; adding fruit and yogurt (at work)  Fluids Consumed:cran grape juice-1 cup; water with Crystal Light  Eating slower: Yes  Chewing foods thoroughly: Yes  Take 20-30 minutes to consume each meal: Yes  Fluids and meals separate by at least 30 minutes: Yes  Carbonation: no  Caffeine: no   Additional Information: Patient has been focused on making behavior and diet changes.  Patient eliminated liquids with her meals and started eating balanced breakfast.      PHYSICAL ACTIVITY:  Type: walking   Frequency: 1-2 (days per week)  Duration: 10 (minutes) "     DIAGNOSIS:  Previous Nutrition Diagnosis: Obesity related to long history of self- monitoring deficit and excessive energy intake evidenced by BMI of 37.4 kg/m2  No change, modified below    Previous goals:   3 food groups at breakfast-met  Avoid 30 minutes before, during and afternoon-met  Exercise 3 times per week for 10 minutes-improving     Current Nutrition Diagnosis: Obesity related to long history of self-monitoring deficit and excessive energy intake as evidenced by BMI of 36.8 kg/m2     INTERVENTION:  Nutrition Prescription: Recommended energy/nutrient modification.    GOALS:  Take 400 mg calcium 3 times per day (breakfast, lunch, dinner)  Exercise 3 times per week for 30 minutes  Continue practicing pre surgery diet guidelines     Intervention:  - Discussed progress towards previous goals.  - Reinforced importance of making behavior changes in preparation for bariatric surgery.   - Assessed learning needs and learning preferences    NUTRITION MONITORING AND EVALUATION:  Expected patient engagement: good  Patient demonstrated good understanding.     Follow up: Continue to monitor patient closely regarding weight loss and diet.  # of visits needed: 1-2 depending on weight loss   Cleared by RD: No     TIME SPENT WITH PATIENT: 19 minutes    Jeff Boone, RD, LD  [facility] Outpatient Dietitian/Weight Loss Clinic   [phone] (office phone)

## 2021-10-28 NOTE — PROGRESS NOTES
Jeff is a 46 year old who is being evaluated via a billable video visit.      If the video visit is dropped, the invitation should be resent by: Text to cell phone: [phone]  Will anyone else be joining your video visit? No      Video-Visit Details    Type of service:  Video Visit    Video Start Time: 3:06 PM    Video End Time: 3:30 PM    Originating Location (pt. Location): Home    Distant Location (provider location):  [facility] [place]     Platform used for Video Visit: Dipika        Bariatric Pre-Surgery Visit    CC: Obesity    HPI: I had the pleasure of seeing Jeff in the office today.  She is in the pre op process for weight loss surgery.  She will have her Vitamin D rechecked sometime in the next month. She continues to see myself and the dietician monthly until cleared to see the surgeon.  Today we will review her tasklist  Her vitamin D is low.  She is taking 10,000 Int Units for the last 2-3 months.  Lab ordered for recheck.     BARIATRIC METRICS:  Current Weight: 195 lb 1.6 oz (88.5 kg) (pt reported)  Body mass index is 36.86 kg/m .   Wt change since last visit (lbs): -3.1  Cumulative weight loss (lbs): 1.5      Wt Readings from Last 4 Encounters:   10/28/21 195 lb 1.6 oz (88.5 kg)   09/16/21 198 lb 3.2 oz (89.9 kg)   08/30/21 202 lb 11.2 oz (91.9 kg)   08/18/21 198 lb (89.8 kg)        Labs Reviewed:  Hemoglobin A1C   Date Value Ref Range Status   07/07/2021 5.3 0 - 5.6 % Final     Comment:     Normal <5.7% Prediabetes 5.7-6.4%  Diabetes 6.5% or higher - adopted from ADA   consensus guidelines.       Vitamin D Deficiency screening   Date Value Ref Range Status   07/07/2021 10 (L) 20 - 75 ug/L Final     Comment:     Season, race, dietary intake, and treatment affect the concentration of   25-hydroxy-Vitamin D. Values may decrease during winter months and increase   during summer months. Values 20-29 ug/L may indicate Vitamin D insufficiency   and values <20  ug/L may indicate Vitamin D deficiency.  Vitamin D determination is routinely performed by an immunoassay specific for   25 hydroxyvitamin D3.  If an individual is on vitamin D2 (ergocalciferol)   supplementation, please specify 25 OH vitamin D2 and D3 level determination by   LCMSMS test [id number].       TSH   Date Value Ref Range Status   06/14/2021 1.17 0.40 - 4.00 mU/L Final     Sodium   Date Value Ref Range Status   08/31/2021 139 133 - 144 mmol/L Final   06/14/2021 138 133 - 144 mmol/L Final     Potassium   Date Value Ref Range Status   08/31/2021 3.8 3.4 - 5.3 mmol/L Final   06/14/2021 3.7 3.4 - 5.3 mmol/L Final     Chloride   Date Value Ref Range Status   08/31/2021 106 94 - 109 mmol/L Final   06/14/2021 104 94 - 109 mmol/L Final     Carbon Dioxide   Date Value Ref Range Status   06/14/2021 30 20 - 32 mmol/L Final     Carbon Dioxide (CO2)   Date Value Ref Range Status   08/31/2021 27 20 - 32 mmol/L Final     Anion Gap   Date Value Ref Range Status   08/31/2021 6 3 - 14 mmol/L Final   06/14/2021 4 3 - 14 mmol/L Final     Glucose   Date Value Ref Range Status   08/31/2021 104 (H) 70 - 99 mg/dL Final   06/14/2021 87 70 - 99 mg/dL Final     Urea Nitrogen   Date Value Ref Range Status   08/31/2021 6 (L) 7 - 30 mg/dL Final   06/14/2021 7 7 - 30 mg/dL Final     Creatinine   Date Value Ref Range Status   08/31/2021 0.74 0.52 - 1.04 mg/dL Final   06/14/2021 0.82 0.52 - 1.04 mg/dL Final     GFR Estimate   Date Value Ref Range Status   08/31/2021 >90 >60 mL/min/1.73m2 Final     Comment:     As of July 11, 2021, eGFR is calculated by the CKD-EPI creatinine equation, without race adjustment. eGFR can be influenced by muscle mass, exercise, and diet. The reported eGFR is an estimation only and is only applicable if the renal function is stable.   06/14/2021 84 >60 mL/min/[1.73_m2] Final     Comment:     Non  GFR Calc  Starting 12/18/2018, serum creatinine based estimated GFR (eGFR) will be   calculated using  "the Chronic Kidney Disease Epidemiology Collaboration   (CKD-EPI) equation.       Calcium   Date Value Ref Range Status   08/31/2021 8.8 8.5 - 10.1 mg/dL Final   06/14/2021 8.7 8.5 - 10.1 mg/dL Final     Bilirubin Total   Date Value Ref Range Status   06/14/2021 0.9 0.2 - 1.3 mg/dL Final     Alkaline Phosphatase   Date Value Ref Range Status   06/14/2021 63 40 - 150 U/L Final     ALT   Date Value Ref Range Status   06/14/2021 32 0 - 50 U/L Final     AST   Date Value Ref Range Status   06/14/2021 14 0 - 45 U/L Final     Cholesterol   Date Value Ref Range Status   03/19/2019 223 (H) <200 mg/dL Final     Comment:     Desirable:       <200 mg/dl     HDL Cholesterol   Date Value Ref Range Status   03/19/2019 72 >49 mg/dL Final     LDL Cholesterol Calculated   Date Value Ref Range Status   03/19/2019 136 (H) <100 mg/dL Final     Comment:     Above desirable:  100-129 mg/dl  Borderline High:  130-159 mg/dL  High:             160-189 mg/dL  Very high:       >189 mg/dl       Triglycerides   Date Value Ref Range Status   03/19/2019 74 <150 mg/dL Final     Comment:     Non Fasting     WBC   Date Value Ref Range Status   06/14/2021 6.6 4.0 - 11.0 10e9/L Final     WBC Count   Date Value Ref Range Status   08/31/2021 9.6 4.0 - 11.0 10e3/uL Final     Hemoglobin   Date Value Ref Range Status   08/31/2021 13.1 11.7 - 15.7 g/dL Final   06/14/2021 12.6 11.7 - 15.7 g/dL Final     Hematocrit   Date Value Ref Range Status   08/31/2021 39.5 35.0 - 47.0 % Final   06/14/2021 38.3 35.0 - 47.0 % Final     MCV   Date Value Ref Range Status   08/31/2021 87 78 - 100 fL Final   06/14/2021 90 78 - 100 fl Final     Platelet Count   Date Value Ref Range Status   08/31/2021 223 150 - 450 10e3/uL Final   06/14/2021 176 150 - 450 10e9/L Final       PE:  Ht 5' 1\" (1.549 m)   Wt 195 lb 1.6 oz (88.5 kg)   LMP 09/12/2016 (Exact Date)   BMI 36.86 kg/m    GENERAL: Healthy, alert and no distress  EYES: Eyes grossly normal to inspection.  No discharge or " erythema, or obvious scleral/conjunctival abnormalities.  RESP: No audible wheeze, cough, or visible cyanosis.  No visible retractions or increased work of breathing.    SKIN: Visible skin clear. No significant rash, abnormal pigmentation or lesions.  NEURO: Cranial nerves grossly intact.  Mentation and speech appropriate for age.  PSYCH: Mentation appears normal, affect normal/bright, judgement and insight intact, normal speech and appearance well-groomed.      Assessment:  Obesity  Bariatric Education  Vitamin D deficiency    Plan:  Reviewed tasklist    Lose 5 lbs prior to surgery.  Goal Weight: 191 lbs- pending    Have preoperative laboratory tests drawn. - completed She is taking 10,000 international unit(s) of vitamin D daily.  She will have her Vit D rechecked in 2 months.      Psychological Evaluation with MMPI and clearance for weight loss surgery.- pending starts in December    Achieve clearance from dietitian to see surgeon.- pending    Plan to get off NSAIDS (Ibuprofen, Aleve) prior to surgery to decrease risk of ulcer formation and/or perforation following bariatric surgery.-  Is only taking tylenol for knee pain.      Sleep Consult- in process, sleep study Oct 1.- completed      View bariatric online information session   (Pt will review at home and we will discuss at our follow up visit.) [email or web address] will do before next visit. - completed     Once the patient has completed their task list and there are no further recommendations, they will see the surgeon for consultation for bariatric surgery.  All questions were answered. Patient verbalizes understanding of the process to surgery.  She will have her Vitamin D rechecked sometime in the next month.  She can continue to see myself and the dietician monthly until cleared to see the surgeon.     Sincerely,     Latricia Lund PA-C      FOLLOW-UP:  Return to clinic in 1 month.    20 minutes  spent on the date of the encounter reviewing chart, labs, patient education, charting, and plan of care.

## 2021-10-31 NOTE — PROGRESS NOTES
SUBJECTIVE:   CC: Jeff Serra is an 46 year old woman who presents for preventive health visit.       Patient has been advised of split billing requirements and indicates understanding: Yes  Healthy Habits:     Getting at least 3 servings of Calcium per day:  Yes    Bi-annual eye exam:  Yes    Dental care twice a year:  Yes    Sleep apnea or symptoms of sleep apnea:  Excessive snoring and Sleep apnea    Diet:  Regular (no restrictions)    Frequency of exercise:  None    Taking medications regularly:  Yes    Medication side effects:  None    PHQ-2 Total Score: 0    Additional concerns today:  No        Today's PHQ-2 Score:   PHQ-2 ( 1999 [employer]) 11/1/2021   Q1: Little interest or pleasure in doing things 0   Q2: Feeling down, depressed or hopeless 0   PHQ-2 Score 0   Q1: Little interest or pleasure in doing things Not at all   Q2: Feeling down, depressed or hopeless Not at all   PHQ-2 Score 0       Abuse: Current or Past (Physical, Sexual or Emotional) - No  Do you feel safe in your environment? Yes    Have you ever done Advance Care Planning? (For example, a Health Directive, POLST, or a discussion with a medical provider or your loved ones about your wishes):    Social History     Tobacco Use     Smoking status: Never Smoker     Smokeless tobacco: Never Used   Substance Use Topics     Alcohol use: Yes     Alcohol/week: 0.0 standard drinks     Comment: Only on occasions     If you drink alcohol do you typically have >3 drinks per day or >7 drinks per week? No    Alcohol Use 11/1/2021   Prescreen: >3 drinks/day or >7 drinks/week? No   Prescreen: >3 drinks/day or >7 drinks/week? -   No flowsheet data found.    Reviewed orders with patient.  Reviewed health maintenance and updated orders accordingly - Yes  Lab work is in process  Labs reviewed in EPIC  BP Readings from Last 3 Encounters:   11/01/21 134/82   09/17/21 117/81   09/16/21 100/66    Wt Readings from Last 3 Encounters:   11/01/21 92.1 kg (203 lb)    10/28/21 88.5 kg (195 lb 1.6 oz)   09/16/21 89.9 kg (198 lb 3.2 oz)                    Breast Cancer Screening:  Any new diagnosis of family breast, ovarian, or bowel cancer? No    FHS-7: No flowsheet data found.    Pertinent mammograms are reviewed under the imaging tab.    History of abnormal Pap smear: Status post benign hysterectomy. Health Maintenance and Surgical History updated.  PAP / HPV Latest Ref Rng & Units 7/12/2016   PAP (Historical) - NIL   HPV16 NEG Negative   HPV18 NEG Negative   HRHPV NEG Negative     Reviewed and updated as needed this visit by clinical staff  Tobacco  Allergies  Meds  Problems  Med Hx  Surg Hx  Fam Hx  Soc Hx          Reviewed and updated as needed this visit by Provider  Tobacco  Allergies  Meds  Problems  Med Hx  Surg Hx  Fam Hx         Here today for routine checkup and follow-up on chronic issues.  Has been having a lot more headaches that she relates to her neck issues.  These come and go about every other day.    Review of Systems   Constitutional: Negative for chills and fever.   HENT: Negative for congestion, ear pain, hearing loss and sore throat.    Eyes: Negative for pain and visual disturbance.   Respiratory: Positive for shortness of breath. Negative for cough.    Cardiovascular: Positive for peripheral edema. Negative for chest pain and palpitations.   Gastrointestinal: Positive for heartburn. Negative for abdominal pain, constipation, diarrhea, hematochezia and nausea.   Breasts:  Negative for tenderness, breast mass and discharge.   Genitourinary: Negative for dysuria, frequency, genital sores, hematuria, pelvic pain, urgency, vaginal bleeding and vaginal discharge.   Musculoskeletal: Positive for arthralgias and myalgias. Negative for joint swelling.   Skin: Negative for rash.   Neurological: Positive for headaches. Negative for dizziness, weakness and paresthesias.   Psychiatric/Behavioral: Negative for mood changes. The patient is not  "nervous/anxious.         OBJECTIVE:   /82   Pulse 80   Temp 97.2  F (36.2  C) (Tympanic)   Resp 18   Ht 1.575 m (5' 2\")   Wt 92.1 kg (203 lb)   LMP 09/12/2016 (Exact Date)   SpO2 98%   BMI 37.13 kg/m    Physical Exam  GENERAL: healthy, alert and no distress  EYES: Eyes grossly normal to inspection, PERRL and conjunctivae and sclerae normal  HENT: ear canals and TM's normal, nose and mouth without ulcers or lesions  NECK: no adenopathy, no asymmetry, masses, or scars and thyroid normal to palpation  RESP: lungs clear to auscultation - no rales, rhonchi or wheezes  CV: regular rate and rhythm, normal S1 S2, no S3 or S4, no murmur, click or rub, no peripheral edema and peripheral pulses strong  ABDOMEN: soft, nontender, no hepatosplenomegaly, no masses and bowel sounds normal  MS: no gross musculoskeletal defects noted, no edema  SKIN: no suspicious lesions or rashes  NEURO: Normal strength and tone, mentation intact and speech normal  PSYCH: mentation appears normal, affect normal/bright    Diagnostic Test Results:  Labs reviewed in Epic    ASSESSMENT/PLAN:   (Z00.00) Routine general medical examination at a health care facility  (primary encounter diagnosis)  Comment: Discussed routine health maintenance.  Due for mammogram screening.  Status post hysterectomy.  Plan:     (I10) Hypertension goal BP (blood pressure) < 140/90  Comment: Stable on current regimen.  Continue same plan and routine follow-up.   Plan: losartan (COZAAR) 50 MG tablet,         triamterene-HCTZ (MAXZIDE-25) 37.5-25 MG tablet            (G44.86) Cervicogenic headache  Comment: We are going to try increasing her gabapentin to 600 mg at bedtime and then adding a morning dose.  Do this over the next couple of weeks and if not helping then I think we can stop this medication.  Also will try Zanaflex in place of Flexeril.  Plan: gabapentin (NEURONTIN) 300 MG capsule,         tiZANidine (ZANAFLEX) 4 MG tablet            (M54.2,  G89.29) " "Chronic neck pain  Comment: As above  Plan: gabapentin (NEURONTIN) 300 MG capsule,         tiZANidine (ZANAFLEX) 4 MG tablet            (M54.50,  G89.29) Chronic midline low back pain without sciatica  Comment: As above  Plan: oxyCODONE IR (ROXICODONE) 15 MG tablet,         gabapentin (NEURONTIN) 300 MG capsule            (F11.90) Chronic, continuous use of opioids  Comment: Up-to-date on controlled substance agreement.   database reviewed.  Urine drug screen today.  Plan: oxyCODONE IR (ROXICODONE) 15 MG tablet, Drug         Abuse Screen Panel 13, Urine (Pain Care         Package) - lab collect            (Z13.220) Screening cholesterol level  Comment:   Plan: Lipid panel reflex to direct LDL Non-fasting            (Z12.31) Encounter for screening mammogram for breast cancer  Comment:   Plan: MA SCREENING DIGITAL BILAT - Future  (s+30)            (E55.9) Vitamin D deficiency  Comment:   Plan: Vitamin D Deficiency              Patient has been advised of split billing requirements and indicates understanding: Yes  COUNSELING:  Reviewed preventive health counseling, as reflected in patient instructions       Regular exercise       Healthy diet/nutrition       Vision screening    Estimated body mass index is 37.13 kg/m  as calculated from the following:    Height as of this encounter: 1.575 m (5' 2\").    Weight as of this encounter: 92.1 kg (203 lb).    Weight management plan: Discussed healthy diet and exercise guidelines    She reports that she has never smoked. She has never used smokeless tobacco.      Counseling Resources:  ATP IV Guidelines  Pooled Cohorts Equation Calculator  Breast Cancer Risk Calculator  BRCA-Related Cancer Risk Assessment: FHS-7 Tool  FRAX Risk Assessment  ICSI Preventive Guidelines  Dietary Guidelines for Americans, 2010  USDA's MyPlate  ASA Prophylaxis  Lung CA Screening    Jordyn Loredo MD  [facility]  Answers for HPI/ROS submitted by the patient on " 11/1/2021  If you checked off any problems, how difficult have these problems made it for you to do your work, take care of things at home, or get along with other people?: Not difficult at all  PHQ9 TOTAL SCORE: 2  CHAO 7 TOTAL SCORE: 1

## 2021-11-01 ENCOUNTER — OFFICE VISIT (OUTPATIENT)
Dept: FAMILY MEDICINE | Facility: CLINIC | Age: 46
End: 2021-11-01
Payer: COMMERCIAL

## 2021-11-01 VITALS
BODY MASS INDEX: 37.36 KG/M2 | WEIGHT: 203 LBS | OXYGEN SATURATION: 98 % | HEIGHT: 62 IN | TEMPERATURE: 97.2 F | HEART RATE: 80 BPM | SYSTOLIC BLOOD PRESSURE: 134 MMHG | RESPIRATION RATE: 18 BRPM | DIASTOLIC BLOOD PRESSURE: 82 MMHG

## 2021-11-01 DIAGNOSIS — G89.29 CHRONIC MIDLINE LOW BACK PAIN WITHOUT SCIATICA: ICD-10-CM

## 2021-11-01 DIAGNOSIS — F11.90 CHRONIC, CONTINUOUS USE OF OPIOIDS: ICD-10-CM

## 2021-11-01 DIAGNOSIS — G44.86 CERVICOGENIC HEADACHE: ICD-10-CM

## 2021-11-01 DIAGNOSIS — Z12.31 ENCOUNTER FOR SCREENING MAMMOGRAM FOR BREAST CANCER: ICD-10-CM

## 2021-11-01 DIAGNOSIS — Z13.220 SCREENING CHOLESTEROL LEVEL: ICD-10-CM

## 2021-11-01 DIAGNOSIS — E55.9 VITAMIN D DEFICIENCY: ICD-10-CM

## 2021-11-01 DIAGNOSIS — Z00.00 ROUTINE GENERAL MEDICAL EXAMINATION AT A HEALTH CARE FACILITY: Primary | ICD-10-CM

## 2021-11-01 DIAGNOSIS — G89.29 CHRONIC NECK PAIN: ICD-10-CM

## 2021-11-01 DIAGNOSIS — M54.2 CHRONIC NECK PAIN: ICD-10-CM

## 2021-11-01 DIAGNOSIS — M54.50 CHRONIC MIDLINE LOW BACK PAIN WITHOUT SCIATICA: ICD-10-CM

## 2021-11-01 DIAGNOSIS — I10 HYPERTENSION GOAL BP (BLOOD PRESSURE) < 140/90: ICD-10-CM

## 2021-11-01 PROCEDURE — 82306 VITAMIN D 25 HYDROXY: CPT | Performed by: FAMILY MEDICINE

## 2021-11-01 PROCEDURE — 80061 LIPID PANEL: CPT | Performed by: FAMILY MEDICINE

## 2021-11-01 PROCEDURE — 90686 IIV4 VACC NO PRSV 0.5 ML IM: CPT | Performed by: FAMILY MEDICINE

## 2021-11-01 PROCEDURE — 90471 IMMUNIZATION ADMIN: CPT | Performed by: FAMILY MEDICINE

## 2021-11-01 PROCEDURE — 99396 PREV VISIT EST AGE 40-64: CPT | Mod: 25 | Performed by: FAMILY MEDICINE

## 2021-11-01 PROCEDURE — 36415 COLL VENOUS BLD VENIPUNCTURE: CPT | Performed by: FAMILY MEDICINE

## 2021-11-01 PROCEDURE — 80306 DRUG TEST PRSMV INSTRMNT: CPT | Performed by: FAMILY MEDICINE

## 2021-11-01 RX ORDER — OXYCODONE HYDROCHLORIDE 15 MG/1
15 TABLET ORAL 3 TIMES DAILY PRN
Qty: 90 TABLET | Refills: 0 | Status: SHIPPED | OUTPATIENT
Start: 2021-11-01 | End: 2021-12-01

## 2021-11-01 RX ORDER — LOSARTAN POTASSIUM 50 MG/1
50 TABLET ORAL DAILY
Qty: 90 TABLET | Refills: 1 | Status: ON HOLD | OUTPATIENT
Start: 2021-11-01 | End: 2022-05-12

## 2021-11-01 RX ORDER — TRIAMTERENE/HYDROCHLOROTHIAZID 37.5-25 MG
1 TABLET ORAL DAILY
Qty: 90 TABLET | Refills: 1 | Status: ON HOLD | OUTPATIENT
Start: 2021-11-01 | End: 2022-05-12

## 2021-11-01 RX ORDER — GABAPENTIN 300 MG/1
CAPSULE ORAL
Qty: 270 CAPSULE | Refills: 1 | Status: SHIPPED | OUTPATIENT
Start: 2021-11-01 | End: 2022-05-09

## 2021-11-01 ASSESSMENT — ANXIETY QUESTIONNAIRES
GAD7 TOTAL SCORE: 1
6. BECOMING EASILY ANNOYED OR IRRITABLE: NOT AT ALL
2. NOT BEING ABLE TO STOP OR CONTROL WORRYING: NOT AT ALL
8. IF YOU CHECKED OFF ANY PROBLEMS, HOW DIFFICULT HAVE THESE MADE IT FOR YOU TO DO YOUR WORK, TAKE CARE OF THINGS AT HOME, OR GET ALONG WITH OTHER PEOPLE?: NOT DIFFICULT AT ALL
7. FEELING AFRAID AS IF SOMETHING AWFUL MIGHT HAPPEN: NOT AT ALL
1. FEELING NERVOUS, ANXIOUS, OR ON EDGE: NOT AT ALL
GAD7 TOTAL SCORE: 1
GAD7 TOTAL SCORE: 1
5. BEING SO RESTLESS THAT IT IS HARD TO SIT STILL: NOT AT ALL
4. TROUBLE RELAXING: NOT AT ALL
7. FEELING AFRAID AS IF SOMETHING AWFUL MIGHT HAPPEN: NOT AT ALL
3. WORRYING TOO MUCH ABOUT DIFFERENT THINGS: SEVERAL DAYS

## 2021-11-01 ASSESSMENT — ENCOUNTER SYMPTOMS
NERVOUS/ANXIOUS: 0
HEMATURIA: 0
PARESTHESIAS: 0
MYALGIAS: 1
EYE PAIN: 0
HEADACHES: 1
HEMATOCHEZIA: 0
FREQUENCY: 0
DIARRHEA: 0
CHILLS: 0
NAUSEA: 0
DYSURIA: 0
HEARTBURN: 1
ARTHRALGIAS: 1
WEAKNESS: 0
SORE THROAT: 0
BREAST MASS: 0
FEVER: 0
PALPITATIONS: 0
CONSTIPATION: 0
ABDOMINAL PAIN: 0
JOINT SWELLING: 0
SHORTNESS OF BREATH: 1
COUGH: 0
DIZZINESS: 0

## 2021-11-01 ASSESSMENT — MIFFLIN-ST. JEOR: SCORE: 1504.05

## 2021-11-01 ASSESSMENT — PATIENT HEALTH QUESTIONNAIRE - PHQ9
10. IF YOU CHECKED OFF ANY PROBLEMS, HOW DIFFICULT HAVE THESE PROBLEMS MADE IT FOR YOU TO DO YOUR WORK, TAKE CARE OF THINGS AT HOME, OR GET ALONG WITH OTHER PEOPLE: NOT DIFFICULT AT ALL
SUM OF ALL RESPONSES TO PHQ QUESTIONS 1-9: 2
SUM OF ALL RESPONSES TO PHQ QUESTIONS 1-9: 2

## 2021-11-02 LAB
AMPHETAMINES UR QL: NOT DETECTED
BARBITURATES UR QL SCN: NOT DETECTED
BENZODIAZ UR QL SCN: NOT DETECTED
BUPRENORPHINE UR QL: NOT DETECTED
CANNABINOIDS UR QL: NOT DETECTED
CHOLEST SERPL-MCNC: 216 MG/DL
COCAINE UR QL SCN: NOT DETECTED
D-METHAMPHET UR QL: NOT DETECTED
DEPRECATED CALCIDIOL+CALCIFEROL SERPL-MC: 29 UG/L (ref 20–75)
FASTING STATUS PATIENT QL REPORTED: NO
HDLC SERPL-MCNC: 63 MG/DL
LDLC SERPL CALC-MCNC: 126 MG/DL
METHADONE UR QL SCN: NOT DETECTED
NONHDLC SERPL-MCNC: 153 MG/DL
OPIATES UR QL SCN: NOT DETECTED
OXYCODONE UR QL SCN: DETECTED
PCP UR QL SCN: NOT DETECTED
PROPOXYPH UR QL: NOT DETECTED
TRICYCLICS UR QL SCN: NOT DETECTED
TRIGL SERPL-MCNC: 137 MG/DL

## 2021-11-02 ASSESSMENT — ANXIETY QUESTIONNAIRES: GAD7 TOTAL SCORE: 1

## 2021-11-22 ENCOUNTER — ANCILLARY PROCEDURE (OUTPATIENT)
Dept: MAMMOGRAPHY | Facility: CLINIC | Age: 46
End: 2021-11-22
Attending: FAMILY MEDICINE
Payer: COMMERCIAL

## 2021-11-22 DIAGNOSIS — Z12.31 ENCOUNTER FOR SCREENING MAMMOGRAM FOR BREAST CANCER: ICD-10-CM

## 2021-11-22 PROCEDURE — 77067 SCR MAMMO BI INCL CAD: CPT | Mod: TC | Performed by: RADIOLOGY

## 2021-11-22 PROCEDURE — 77063 BREAST TOMOSYNTHESIS BI: CPT | Mod: TC | Performed by: RADIOLOGY

## 2021-12-01 ENCOUNTER — MYC REFILL (OUTPATIENT)
Dept: FAMILY MEDICINE | Facility: CLINIC | Age: 46
End: 2021-12-01
Payer: COMMERCIAL

## 2021-12-01 DIAGNOSIS — F11.90 CHRONIC, CONTINUOUS USE OF OPIOIDS: ICD-10-CM

## 2021-12-01 DIAGNOSIS — G89.29 CHRONIC MIDLINE LOW BACK PAIN WITHOUT SCIATICA: ICD-10-CM

## 2021-12-01 DIAGNOSIS — M54.50 CHRONIC MIDLINE LOW BACK PAIN WITHOUT SCIATICA: ICD-10-CM

## 2021-12-01 RX ORDER — OXYCODONE HYDROCHLORIDE 15 MG/1
15 TABLET ORAL 3 TIMES DAILY PRN
Qty: 90 TABLET | Refills: 0 | Status: SHIPPED | OUTPATIENT
Start: 2021-12-01 | End: 2021-12-22

## 2021-12-06 ENCOUNTER — TELEPHONE (OUTPATIENT)
Dept: BEHAVIORAL HEALTH | Facility: CLINIC | Age: 46
End: 2021-12-06
Payer: COMMERCIAL

## 2021-12-08 ENCOUNTER — VIRTUAL VISIT (OUTPATIENT)
Dept: PSYCHOLOGY | Facility: CLINIC | Age: 46
End: 2021-12-08
Attending: PHYSICIAN ASSISTANT
Payer: COMMERCIAL

## 2021-12-08 DIAGNOSIS — E66.01 CLASS 2 SEVERE OBESITY DUE TO EXCESS CALORIES WITH SERIOUS COMORBIDITY AND BODY MASS INDEX (BMI) OF 37.0 TO 37.9 IN ADULT (H): ICD-10-CM

## 2021-12-08 DIAGNOSIS — E66.812 CLASS 2 SEVERE OBESITY DUE TO EXCESS CALORIES WITH SERIOUS COMORBIDITY AND BODY MASS INDEX (BMI) OF 37.0 TO 37.9 IN ADULT (H): ICD-10-CM

## 2021-12-08 PROCEDURE — 96156 HLTH BHV ASSMT/REASSESSMENT: CPT | Mod: 95 | Performed by: PSYCHOLOGIST

## 2021-12-08 ASSESSMENT — ANXIETY QUESTIONNAIRES
6. BECOMING EASILY ANNOYED OR IRRITABLE: NOT AT ALL
1. FEELING NERVOUS, ANXIOUS, OR ON EDGE: NOT AT ALL
GAD7 TOTAL SCORE: 2
7. FEELING AFRAID AS IF SOMETHING AWFUL MIGHT HAPPEN: NOT AT ALL
7. FEELING AFRAID AS IF SOMETHING AWFUL MIGHT HAPPEN: NOT AT ALL
5. BEING SO RESTLESS THAT IT IS HARD TO SIT STILL: NOT AT ALL
2. NOT BEING ABLE TO STOP OR CONTROL WORRYING: SEVERAL DAYS
3. WORRYING TOO MUCH ABOUT DIFFERENT THINGS: SEVERAL DAYS
GAD7 TOTAL SCORE: 2
4. TROUBLE RELAXING: NOT AT ALL
GAD7 TOTAL SCORE: 2

## 2021-12-08 ASSESSMENT — PATIENT HEALTH QUESTIONNAIRE - PHQ9
SUM OF ALL RESPONSES TO PHQ QUESTIONS 1-9: 2
10. IF YOU CHECKED OFF ANY PROBLEMS, HOW DIFFICULT HAVE THESE PROBLEMS MADE IT FOR YOU TO DO YOUR WORK, TAKE CARE OF THINGS AT HOME, OR GET ALONG WITH OTHER PEOPLE: SOMEWHAT DIFFICULT
SUM OF ALL RESPONSES TO PHQ QUESTIONS 1-9: 2
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
## 2021-12-09 ASSESSMENT — PATIENT HEALTH QUESTIONNAIRE - PHQ9: SUM OF ALL RESPONSES TO PHQ QUESTIONS 1-9: 2

## 2021-12-09 ASSESSMENT — ANXIETY QUESTIONNAIRES: GAD7 TOTAL SCORE: 2

## 2021-12-13 ENCOUNTER — VIRTUAL VISIT (OUTPATIENT)
Dept: PSYCHOLOGY | Facility: CLINIC | Age: 46
End: 2021-12-13
Attending: PHYSICIAN ASSISTANT
Payer: COMMERCIAL

## 2021-12-13 DIAGNOSIS — F43.10 POST-TRAUMATIC STRESS DISORDER, UNSPECIFIED: Primary | ICD-10-CM

## 2021-12-13 PROCEDURE — 90791 PSYCH DIAGNOSTIC EVALUATION: CPT | Mod: 95 | Performed by: PSYCHOLOGIST
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
## 2021-12-22 ENCOUNTER — OFFICE VISIT (OUTPATIENT)
Dept: FAMILY MEDICINE | Facility: CLINIC | Age: 46
End: 2021-12-22
Payer: COMMERCIAL

## 2021-12-22 VITALS
DIASTOLIC BLOOD PRESSURE: 80 MMHG | OXYGEN SATURATION: 99 % | WEIGHT: 204.38 LBS | SYSTOLIC BLOOD PRESSURE: 130 MMHG | RESPIRATION RATE: 18 BRPM | HEART RATE: 80 BPM | BODY MASS INDEX: 37.38 KG/M2

## 2021-12-22 DIAGNOSIS — G89.29 CHRONIC MIDLINE LOW BACK PAIN WITHOUT SCIATICA: ICD-10-CM

## 2021-12-22 DIAGNOSIS — M54.50 CHRONIC MIDLINE LOW BACK PAIN WITHOUT SCIATICA: ICD-10-CM

## 2021-12-22 DIAGNOSIS — F11.90 CHRONIC, CONTINUOUS USE OF OPIOIDS: ICD-10-CM

## 2021-12-22 DIAGNOSIS — M75.01 ADHESIVE CAPSULITIS OF RIGHT SHOULDER: Primary | ICD-10-CM

## 2021-12-22 PROCEDURE — 20610 DRAIN/INJ JOINT/BURSA W/O US: CPT | Mod: RT | Performed by: STUDENT IN AN ORGANIZED HEALTH CARE EDUCATION/TRAINING PROGRAM

## 2021-12-22 PROCEDURE — 99214 OFFICE O/P EST MOD 30 MIN: CPT | Mod: 25 | Performed by: FAMILY MEDICINE

## 2021-12-22 RX ORDER — OXYCODONE HYDROCHLORIDE 15 MG/1
15 TABLET ORAL 3 TIMES DAILY PRN
Qty: 90 TABLET | Refills: 0 | Status: SHIPPED | OUTPATIENT
Start: 2021-12-29 | End: 2022-01-19

## 2021-12-22 RX ORDER — CELECOXIB 200 MG/1
200 CAPSULE ORAL 2 TIMES DAILY
Qty: 30 CAPSULE | Refills: 1 | Status: SHIPPED | OUTPATIENT
Start: 2021-12-22 | End: 2022-05-05

## 2021-12-22 RX ORDER — TRIAMCINOLONE ACETONIDE 40 MG/ML
40 INJECTION, SUSPENSION INTRA-ARTICULAR; INTRAMUSCULAR ONCE
Status: COMPLETED | OUTPATIENT
Start: 2021-12-22 | End: 2022-01-03

## 2021-12-22 RX ADMIN — TRIAMCINOLONE ACETONIDE 40 MG: 40 INJECTION, SUSPENSION INTRA-ARTICULAR; INTRAMUSCULAR at 15:00

## 2021-12-22 RX ADMIN — TRIAMCINOLONE ACETONIDE 40 MG: 40 INJECTION, SUSPENSION INTRA-ARTICULAR; INTRAMUSCULAR at 15:01

## 2021-12-22 NOTE — PROGRESS NOTES
Assessment & Plan     Adhesive capsulitis of right shoulder  6 months of right shoulder pain and poor range of motion.  Patient says it started up after Covid vaccination.  Exam consistent with adhesive capsulitis.  No evidence of structural deficit.  We talked about ways to get this healed up and we will perform a cortisone injection today followed by regularly scheduled anti-inflammatories and range of motion activities.  Contact me in a couple of weeks with progress.  - celecoxib (CELEBREX) 200 MG capsule; Take 1 capsule (200 mg) by mouth 2 times daily  - DRAIN/INJECT LARGE JOINT/BURSA  - triamcinolone (KENALOG-40) injection 40 mg    Chronic, continuous use of opioids  Stable and up-to-date on routine monitoring  - oxyCODONE IR (ROXICODONE) 15 MG tablet; Take 1 tablet (15 mg) by mouth 3 times daily as needed for pain    Chronic midline low back pain without sciatica  As above  - oxyCODONE IR (ROXICODONE) 15 MG tablet; Take 1 tablet (15 mg) by mouth 3 times daily as needed for pain         See Patient Instructions    Return in about 2 weeks (around 1/5/2022) for Contact me with progress, MyChart message.    Jordyn Loredo MD  [facility]    Nicole Aguilar is a 46 year old who presents for the following health issues     HPI     Right arm pain x 6 month, no known injury, painful with movement. Pt states cannot sleep on right side. Tried Ibuprofen and Tylenol ineffective with pain. Pt states tried Gabapentin thinking it was a neuro pain which also did not help.     6 months of right shoulder pain and poor range of motion.  Not getting better.  As above.    Review of Systems   Constitutional, HEENT, cardiovascular, pulmonary, gi and gu systems are negative, except as otherwise noted.      Objective    /80 (BP Location: Left arm, Patient Position: Sitting, Cuff Size: Adult Large)   Pulse 80   Resp 18   Wt 92.7 kg (204 lb 6 oz)   LMP 09/12/2016 (Exact Date)   SpO2 99%    BMI 37.38 kg/m    Body mass index is 37.38 kg/m .  Physical Exam   Alert, pleasant, upbeat, and in no apparent discomfort.  Poor active range of motion through right shoulder but decent passive range of motion.  Pain with impingement but rotator cuff strong and stable.  Past labs reviewed with the patient.     Discussed risks and benefits of proposed procedure - patient agreed to proceed.   Right shoulder prepped sterile with betadine.  Skin anesthetized with 1% lido no epi.  Joint space injected with 5 cc of 4:1 mixture of lido 1% and kenalog 40  Bandaged   No complications

## 2021-12-22 NOTE — PROGRESS NOTES
{PROVIDER CHARTING PREFERENCE:731241}    Nicole Aguilar is a 46 year old who presents for the following health issues {ACCOMPANIED BY STATEMENT (Optional):793864}    HPI     {ACUTE SUPERLIST - extended history:924460}  {additonal problems for provider to add (Optional):552547}    Review of Systems   {ROS COMP (Optional):404853}      Objective    LMP 09/12/2016 (Exact Date)   There is no height or weight on file to calculate BMI.  Physical Exam   {Exam List (Optional):172579}    {Diagnostic Test Results (Optional):575688}    {AMBULATORY ATTESTATION (Optional):664021}

## 2022-01-03 PROCEDURE — 20610 DRAIN/INJ JOINT/BURSA W/O US: CPT | Mod: RT | Performed by: FAMILY MEDICINE

## 2022-01-03 RX ADMIN — TRIAMCINOLONE ACETONIDE 40 MG: 40 INJECTION, SUSPENSION INTRA-ARTICULAR; INTRAMUSCULAR at 14:43

## 2022-01-11 ENCOUNTER — VIRTUAL VISIT (OUTPATIENT)
Dept: PSYCHOLOGY | Facility: CLINIC | Age: 47
End: 2022-01-11
Payer: COMMERCIAL

## 2022-01-11 ENCOUNTER — FCC EXTENDED DOCUMENTATION (OUTPATIENT)
Dept: PSYCHOLOGY | Facility: CLINIC | Age: 47
End: 2022-01-11
Payer: COMMERCIAL

## 2022-01-11 DIAGNOSIS — E66.01 CLASS 2 SEVERE OBESITY DUE TO EXCESS CALORIES WITH SERIOUS COMORBIDITY AND BODY MASS INDEX (BMI) OF 37.0 TO 37.9 IN ADULT (H): ICD-10-CM

## 2022-01-11 DIAGNOSIS — F43.10 POST-TRAUMATIC STRESS DISORDER, UNSPECIFIED: Primary | ICD-10-CM

## 2022-01-11 DIAGNOSIS — E66.812 CLASS 2 SEVERE OBESITY DUE TO EXCESS CALORIES WITH SERIOUS COMORBIDITY AND BODY MASS INDEX (BMI) OF 37.0 TO 37.9 IN ADULT (H): ICD-10-CM

## 2022-01-11 PROCEDURE — 96130 PSYCL TST EVAL PHYS/QHP 1ST: CPT | Mod: 95 | Performed by: PSYCHOLOGIST

## 2022-01-11 PROCEDURE — 96131 PSYCL TST EVAL PHYS/QHP EA: CPT | Mod: 95 | Performed by: PSYCHOLOGIST
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
## 2022-01-19 DIAGNOSIS — Z11.52 ENCOUNTER FOR SCREENING FOR COVID-19: Primary | ICD-10-CM

## 2022-01-19 DIAGNOSIS — M54.50 CHRONIC MIDLINE LOW BACK PAIN WITHOUT SCIATICA: ICD-10-CM

## 2022-01-19 DIAGNOSIS — F11.90 CHRONIC, CONTINUOUS USE OF OPIOIDS: ICD-10-CM

## 2022-01-19 DIAGNOSIS — G89.29 CHRONIC MIDLINE LOW BACK PAIN WITHOUT SCIATICA: ICD-10-CM

## 2022-01-19 RX ORDER — OXYCODONE HYDROCHLORIDE 15 MG/1
15 TABLET ORAL 3 TIMES DAILY PRN
Qty: 90 TABLET | Refills: 0 | Status: SHIPPED | OUTPATIENT
Start: 2022-01-27 | End: 2022-03-02

## 2022-01-21 ENCOUNTER — VIRTUAL VISIT (OUTPATIENT)
Dept: SURGERY | Facility: CLINIC | Age: 47
End: 2022-01-21
Payer: COMMERCIAL

## 2022-01-21 VITALS — BODY MASS INDEX: 36.44 KG/M2 | WEIGHT: 198 LBS | HEIGHT: 62 IN

## 2022-01-21 DIAGNOSIS — E55.9 VITAMIN D DEFICIENCY: ICD-10-CM

## 2022-01-21 DIAGNOSIS — E66.812 CLASS 2 SEVERE OBESITY DUE TO EXCESS CALORIES WITH SERIOUS COMORBIDITY AND BODY MASS INDEX (BMI) OF 36.0 TO 36.9 IN ADULT (H): Primary | ICD-10-CM

## 2022-01-21 DIAGNOSIS — E66.01 CLASS 2 SEVERE OBESITY DUE TO EXCESS CALORIES WITH SERIOUS COMORBIDITY AND BODY MASS INDEX (BMI) OF 36.0 TO 36.9 IN ADULT (H): Primary | ICD-10-CM

## 2022-01-21 DIAGNOSIS — Z71.89 ENCOUNTER FOR PRE-BARIATRIC SURGERY COUNSELING AND EDUCATION: ICD-10-CM

## 2022-01-21 PROCEDURE — 99213 OFFICE O/P EST LOW 20 MIN: CPT | Mod: 95 | Performed by: PHYSICIAN ASSISTANT

## 2022-01-21 ASSESSMENT — MIFFLIN-ST. JEOR: SCORE: 1476.37

## 2022-01-21 NOTE — PROGRESS NOTES
Jeff is a 47 year old who is being evaluated via a billable video visit.      If the video visit is dropped, the invitation should be resent by: Text to cell phone: [phone]   Will anyone else be joining your video visit? No      Video-Visit Details    Type of service:  Video Visit    Video Start Time: 11:35 PM    Video End Time:12:00 PM    Originating Location (pt. Location): Home    Distant Location (provider location):  [facility]     Platform used for Video Visit: [employer]

## 2022-01-21 NOTE — PROGRESS NOTES
Bariatric Pre-Surgery Visit    CC: Obesity    HPI: I had the pleasure of seeing Jeff in the office today to check in on her progress to surgery.  I saw the patient in November to review bariatric education. She is planning on undergoing the sleeve procedure.      Wt Readings from Last 10 Encounters:   01/21/22 198 lb (89.8 kg)   12/22/21 204 lb 6 oz (92.7 kg)   11/01/21 203 lb (92.1 kg)   10/28/21 195 lb 1.6 oz (88.5 kg)   09/16/21 198 lb 3.2 oz (89.9 kg)   08/30/21 202 lb 11.2 oz (91.9 kg)   08/18/21 198 lb (89.8 kg)   08/12/21 198 lb 6.4 oz (90 kg)   07/08/21 196 lb 6.4 oz (89.1 kg)   07/01/21 196 lb 9.6 oz (89.2 kg)     Labs Reviewed:  Hemoglobin A1C POCT   Date Value Ref Range Status   07/07/2021 5.3 0 - 5.6 % Final     Comment:     Normal <5.7% Prediabetes 5.7-6.4%  Diabetes 6.5% or higher - adopted from ADA   consensus guidelines.       Vitamin D Deficiency screening   Date Value Ref Range Status   07/07/2021 10 (L) 20 - 75 ug/L Final     Comment:     Season, race, dietary intake, and treatment affect the concentration of   25-hydroxy-Vitamin D. Values may decrease during winter months and increase   during summer months. Values 20-29 ug/L may indicate Vitamin D insufficiency   and values <20 ug/L may indicate Vitamin D deficiency.  Vitamin D determination is routinely performed by an immunoassay specific for   25 hydroxyvitamin D3.  If an individual is on vitamin D2 (ergocalciferol)   supplementation, please specify 25 OH vitamin D2 and D3 level determination by   LCMSMS test [id number].       Vitamin D, Total (25-Hydroxy)   Date Value Ref Range Status   11/01/2021 29 20 - 75 ug/L Final     TSH   Date Value Ref Range Status   06/14/2021 1.17 0.40 - 4.00 mU/L Final     Sodium   Date Value Ref Range Status   08/31/2021 139 133 - 144 mmol/L Final   06/14/2021 138 133 - 144 mmol/L Final     Potassium   Date Value Ref Range Status   08/31/2021 3.8 3.4 - 5.3 mmol/L Final   06/14/2021 3.7 3.4 - 5.3 mmol/L Final      Chloride   Date Value Ref Range Status   08/31/2021 106 94 - 109 mmol/L Final   06/14/2021 104 94 - 109 mmol/L Final     Carbon Dioxide   Date Value Ref Range Status   06/14/2021 30 20 - 32 mmol/L Final     Carbon Dioxide (CO2)   Date Value Ref Range Status   08/31/2021 27 20 - 32 mmol/L Final     Anion Gap   Date Value Ref Range Status   08/31/2021 6 3 - 14 mmol/L Final   06/14/2021 4 3 - 14 mmol/L Final     Glucose   Date Value Ref Range Status   08/31/2021 104 (H) 70 - 99 mg/dL Final   06/14/2021 87 70 - 99 mg/dL Final     Urea Nitrogen   Date Value Ref Range Status   08/31/2021 6 (L) 7 - 30 mg/dL Final   06/14/2021 7 7 - 30 mg/dL Final     Creatinine   Date Value Ref Range Status   08/31/2021 0.74 0.52 - 1.04 mg/dL Final   06/14/2021 0.82 0.52 - 1.04 mg/dL Final     GFR Estimate   Date Value Ref Range Status   08/31/2021 >90 >60 mL/min/1.73m2 Final     Comment:     As of July 11, 2021, eGFR is calculated by the CKD-EPI creatinine equation, without race adjustment. eGFR can be influenced by muscle mass, exercise, and diet. The reported eGFR is an estimation only and is only applicable if the renal function is stable.   06/14/2021 84 >60 mL/min/[1.73_m2] Final     Comment:     Non  GFR Calc  Starting 12/18/2018, serum creatinine based estimated GFR (eGFR) will be   calculated using the Chronic Kidney Disease Epidemiology Collaboration   (CKD-EPI) equation.       Calcium   Date Value Ref Range Status   08/31/2021 8.8 8.5 - 10.1 mg/dL Final   06/14/2021 8.7 8.5 - 10.1 mg/dL Final     Bilirubin Total   Date Value Ref Range Status   06/14/2021 0.9 0.2 - 1.3 mg/dL Final     Alkaline Phosphatase   Date Value Ref Range Status   06/14/2021 63 40 - 150 U/L Final     ALT   Date Value Ref Range Status   06/14/2021 32 0 - 50 U/L Final     AST   Date Value Ref Range Status   06/14/2021 14 0 - 45 U/L Final     Cholesterol   Date Value Ref Range Status   11/01/2021 216 (H) <200 mg/dL Final   03/19/2019 223  "(H) <200 mg/dL Final     Comment:     Desirable:       <200 mg/dl     HDL Cholesterol   Date Value Ref Range Status   03/19/2019 72 >49 mg/dL Final     Direct Measure HDL   Date Value Ref Range Status   11/01/2021 63 >=50 mg/dL Final     LDL Cholesterol Calculated   Date Value Ref Range Status   11/01/2021 126 (H) <=100 mg/dL Final   03/19/2019 136 (H) <100 mg/dL Final     Comment:     Above desirable:  100-129 mg/dl  Borderline High:  130-159 mg/dL  High:             160-189 mg/dL  Very high:       >189 mg/dl       Triglycerides   Date Value Ref Range Status   11/01/2021 137 <150 mg/dL Final   03/19/2019 74 <150 mg/dL Final     Comment:     Non Fasting     WBC   Date Value Ref Range Status   06/14/2021 6.6 4.0 - 11.0 10e9/L Final     WBC Count   Date Value Ref Range Status   08/31/2021 9.6 4.0 - 11.0 10e3/uL Final     Hemoglobin   Date Value Ref Range Status   08/31/2021 13.1 11.7 - 15.7 g/dL Final   06/14/2021 12.6 11.7 - 15.7 g/dL Final     Hematocrit   Date Value Ref Range Status   08/31/2021 39.5 35.0 - 47.0 % Final   06/14/2021 38.3 35.0 - 47.0 % Final     MCV   Date Value Ref Range Status   08/31/2021 87 78 - 100 fL Final   06/14/2021 90 78 - 100 fl Final     Platelet Count   Date Value Ref Range Status   08/31/2021 223 150 - 450 10e3/uL Final   06/14/2021 176 150 - 450 10e9/L Final       PE:  Ht 5' 2\" (1.575 m)   Wt 198 lb (89.8 kg)   LMP 09/12/2016 (Exact Date)   BMI 36.21 kg/m      GENERAL:  Good development and normal affect in no acute distress. Patient is alert and orientated x 3 and answers all questions appropriately.  HEENT: Head is normocephalic, nontraumatic. Pupils equal and round without conjunctival injection. Neck is supple without lymphadenopathy, thyroidmegaly, or mass. Trachea midline. Dentition WNL.   CARDIOVASCULAR:  Regular rate and rhythm without murmurs, rubs, or gallops.  RESPIRATORY: Lungs are clear to auscultation bilaterally with good breath sounds.  GASTROINTESTINAL: Abdomen is " obese, nondistended, soft, nontender, without organomegaly or masses. No bruits.  LOWER EXTREMITIES: No LE edema bilaterally, no cyanosis, ulceration, or chronic venous stasis noted.  MUSCULOSKELETAL:  Moves all 4 extremities equal and strong. Has a normal gait.   NEUROLOGIC: Cranial nerves II-XII grossly intact.  SKIN: No intertriginous irritation or rash.     Assessment:  Obesity  Bariatric Education  Vitamin D deficiency    Plan:  Reviewed tasklist    Lose 5 lbs prior to surgery.  Goal Weight: 191 lbs- pending, weight today is 198 lbs down 5 lbs since November but up 2# since initial.     Have preoperative laboratory tests drawn. - completed She is taking 10,000 international unit(s) of vitamin D daily.      Psychological Evaluation with MMPI and clearance for weight loss surgery.- completed    Achieve clearance from dietitian to see surgeon.- pending    Plan to get off NSAIDS (Ibuprofen, Aleve) prior to surgery to decrease risk of ulcer formation and/or perforation following bariatric surgery.-  Is only taking tylenol for knee pain.  Completed    Sleep Consult- in process, completed      View bariatric online information session   (Pt will review at home and we will discuss at our follow up visit.) [email or web address] will do before next visit. - completed     Once the patient has completed their task list and there are no further recommendations, they will see the surgeon for consultation for bariatric surgery.  All questions were answered. Patient verbalizes understanding of the process to surgery.  She will continue 10,000 Int Units of Vitamin D until she has surgery and then back down to 5000 international unit(s).      She has a diet visit set for next week .Dietician clearance is her last task before seeing the surgeon.  Encouraged pt to continue working on weight loss to get closer to goal weight prior to surgery.     Sincerely,     Latricia Lund,  PA-C      FOLLOW-UP:  Return to clinic in 1 month.      30 minutes spent on the date of the encounter reviewing chart, labs, patient education, charting, and plan of care.

## 2022-01-27 ENCOUNTER — VIRTUAL VISIT (OUTPATIENT)
Dept: SURGERY | Facility: CLINIC | Age: 47
End: 2022-01-27
Payer: COMMERCIAL

## 2022-01-27 DIAGNOSIS — E66.812 CLASS 2 SEVERE OBESITY DUE TO EXCESS CALORIES WITH SERIOUS COMORBIDITY AND BODY MASS INDEX (BMI) OF 37.0 TO 37.9 IN ADULT (H): ICD-10-CM

## 2022-01-27 DIAGNOSIS — E66.01 CLASS 2 SEVERE OBESITY DUE TO EXCESS CALORIES WITH SERIOUS COMORBIDITY AND BODY MASS INDEX (BMI) OF 37.0 TO 37.9 IN ADULT (H): ICD-10-CM

## 2022-01-27 PROCEDURE — 97803 MED NUTRITION INDIV SUBSEQ: CPT | Mod: 95 | Performed by: DIETITIAN, REGISTERED

## 2022-01-27 NOTE — PROGRESS NOTES
"Video-Visit Details    Type of service:  Video Visit    Video Start Time (time video started): 1:01    Video End Time (time video stopped): 1:18    Originating Location (pt. Location): Home    Distant Location (provider location):  [facility] [place]     Mode of Communication:  Video Conference via [facility]    PRE SURGICAL WEIGHT LOSS NUTRITION APPOINTMENT    Jeff Serra  1975  female  [id number]  47 year old    ASSESSMENT    Desired Surgical Procedure: gastric sleeve    REASON FOR VISIT:  Jeff Serra is a 47 year old year old female presents today for a pre-surgical weight loss follow-up appointment. Patient accompanied by self.    DIAGNOSIS:  Weight Status Obesity Grade II BMI 35-39.9    ANTHROPOMETRICS:  Height: 5'1\"  Initial Weight: 196 lbs      Previous weight: 195 lbs   Current weight: 198 lbs   BMI: 36.2 kg/(m^2).    VITAMINS AND MINERALS:  Multivitamin with Minerals (am)  400 mg Calcium with Vitamin D 3 times per day (night) (petites)  10,000 International units Vitamin D     NUTRITION HISTORY:  Breakfast: banana, toast and boiled egg toast; Macedonian toast gilbert 2 times per (6:00 AM);   Lunch: [employer] salad cucumbers, tomatoes, chicken (boiled) with low calorie dressing or protein shake  (12:00 PM)  Supper: spaghetti, mac and cheese, chicken/steak  (6:00) chili   Snacks: eliminated   Beverages: gatorade; water; juice-fruit punch or orange juice   Eating slower: Yes  Chewing foods thoroughly: Yes  Take 20-30 minutes to consume each meal: Yes  Fluids and meals separate by at least 30 minutes: Yes  Carbonation: none  Caffeine: none  Additional Information: Patient continues following diet and behavior changes.  Patient established exercise regimen.  Per PA-[place] to adjust weight loss goal if following diet guidelines.     PHYSICAL ACTIVITY:  Type: total gym every other day and treadmill   Frequency: 4 (days per week)  Duration: 20 (minutes) "     DIAGNOSIS:  Previous Nutrition Diagnosis: Obesity related to long history of self- monitoring deficit and excessive energy intake evidenced by BMI of 36 kg/m2  No change, modified below    Previous goals:   Take 400 mg calcium 3 times per day (breakfast, lunch, dinner)-met   Exercise 3 times per week for 30 minutes-improving   Continue practicing pre surgery diet guidelines -met    Current Nutrition Diagnosis: Obesity related to long history of self-monitoring deficit and excessive energy intake as evidenced by BMI of 36.2 kg/m2.    INTERVENTION:  Nutrition Prescription: Recommended energy/nutrient modification.    GOALS:  Eliminate all liquid calories  Continue following pre surgery diet guidelines    Intervention:  - Discussed progress towards previous goals.  - Reinforced importance of making behavior changes in preparation for bariatric surgery.   - Assessed learning needs and learning preferences       NUTRITION MONITORING AND EVALUATION:  Expected patient engagement: good  Patient demonstrated good understanding.   Patient has met pre bariatric surgery diet requirements    Follow up: Continue to monitor patient closely regarding weight loss and diet.  # of visits needed: 0  Cleared by RD: Yes     TIME SPENT WITH PATIENT: 17 minutes    Jeff Boone, RD, LD  [facility] Outpatient Dietitian/[facility] Loss [facility]   [phone] (office phone)

## 2022-01-31 ENCOUNTER — TELEPHONE (OUTPATIENT)
Dept: SURGERY | Facility: CLINIC | Age: 47
End: 2022-01-31
Payer: COMMERCIAL

## 2022-02-09 ENCOUNTER — LAB (OUTPATIENT)
Dept: LAB | Facility: CLINIC | Age: 47
End: 2022-02-09
Attending: FAMILY MEDICINE
Payer: COMMERCIAL

## 2022-02-09 DIAGNOSIS — Z11.52 ENCOUNTER FOR SCREENING FOR COVID-19: ICD-10-CM

## 2022-02-09 PROCEDURE — U0003 INFECTIOUS AGENT DETECTION BY NUCLEIC ACID (DNA OR RNA); SEVERE ACUTE RESPIRATORY SYNDROME CORONAVIRUS 2 (SARS-COV-2) (CORONAVIRUS DISEASE [COVID-19]), AMPLIFIED PROBE TECHNIQUE, MAKING USE OF HIGH THROUGHPUT TECHNOLOGIES AS DESCRIBED BY CMS-2020-01-R: HCPCS

## 2022-02-09 PROCEDURE — U0005 INFEC AGEN DETEC AMPLI PROBE: HCPCS

## 2022-02-10 LAB — SARS-COV-2 RNA RESP QL NAA+PROBE: NEGATIVE

## 2022-02-10 NOTE — RESULT ENCOUNTER NOTE
Jeff,  Your Covid 19 test was negative.  Please MyChart or call if you have any concerns or questions.   Sincerely,  Carmenza Mccartney MD  (for Dr. Loredo who is out of the office today)

## 2022-02-23 ENCOUNTER — OFFICE VISIT (OUTPATIENT)
Dept: SURGERY | Facility: CLINIC | Age: 47
End: 2022-02-23
Payer: COMMERCIAL

## 2022-02-23 VITALS
HEART RATE: 80 BPM | RESPIRATION RATE: 16 BRPM | DIASTOLIC BLOOD PRESSURE: 78 MMHG | WEIGHT: 198 LBS | SYSTOLIC BLOOD PRESSURE: 118 MMHG | OXYGEN SATURATION: 98 % | HEIGHT: 62 IN | BODY MASS INDEX: 36.44 KG/M2

## 2022-02-23 DIAGNOSIS — E66.01 MORBID OBESITY (H): Primary | ICD-10-CM

## 2022-02-23 PROCEDURE — 99215 OFFICE O/P EST HI 40 MIN: CPT | Performed by: SURGERY

## 2022-02-24 ENCOUNTER — OFFICE VISIT (OUTPATIENT)
Dept: URGENT CARE | Facility: URGENT CARE | Age: 47
End: 2022-02-24
Payer: COMMERCIAL

## 2022-02-24 VITALS
SYSTOLIC BLOOD PRESSURE: 140 MMHG | TEMPERATURE: 98 F | RESPIRATION RATE: 20 BRPM | HEART RATE: 78 BPM | OXYGEN SATURATION: 98 % | DIASTOLIC BLOOD PRESSURE: 78 MMHG

## 2022-02-24 DIAGNOSIS — M62.838 NECK MUSCLE SPASM: Primary | ICD-10-CM

## 2022-02-24 DIAGNOSIS — G44.209 TENSION HEADACHE: ICD-10-CM

## 2022-02-24 PROCEDURE — 99213 OFFICE O/P EST LOW 20 MIN: CPT | Performed by: FAMILY MEDICINE
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
## 2022-02-24 NOTE — LETTER
February 24, 2022      Jeff Serra  [address] [place] [id number]        To Whom It May Concern:    Jeff Serra  was seen on 2/24.  Please excuse her from work 2/24-2/25 due to neck pain and tension headache.        Sincerely,        Remi Rincon MD

## 2022-02-24 NOTE — PROGRESS NOTES
"Dear Dr. Loredo, Jordyn Rowe,      I was asked to see the patient regarding obesity by the referring provider above.    I had the pleasure of meeting with your patient Jeff Serra in our weight loss surgery office.  This patient is a 47 year old female who has been undergoing our thorough preoperative screening process in anticipation of potential bariatric surgery.    At initial evaluation we recorded Jeff Serra's Height: 157.5 cm (5' 2\"), 91.9 Kg and 38 BMI.  The patient has been unsuccessful with other methods of permanent weight loss and suffers from multiple weight related medical conditions.  Due to lack of success in achieving weight loss through other methods, she is interested in undergoing bariatric surgery.    PREVIOUS WEIGHT LOSS ATTEMPTS:  No flowsheet data found.    CO-MORBIDITIES OF OBESITY INCLUDE:     6/30/2021   I have the following health issues associated with obesity: High Blood Pressure, Stress Incontinence, Hx of pulmonary embolism   I have the following symptoms associated with obesity: Knee Pain, Lower Extremity Swelling, Back Pain       VITALS:  /78   Pulse 80   Resp 16   Ht 1.575 m (5' 2\")   Wt 89.8 kg (198 lb)   LMP 09/12/2016 (Exact Date)   SpO2 98%   BMI 36.21 kg/m      PE:  GENERAL: Alert and oriented x3. NAD  HEENT exam: Sclerae not icteric. Hearing good. Head normocephalic and atraumatic.   CARDIOVASCULAR: No JVD  RESPIRATORY: Breathing unlabored  GASTROINTESTINAL: Obese  LOWER EXTREMITIES: no deformities  MUSCULOSKELETAL: Normal gait, Moves all 4 extremities equal and strong  NEUROLOGIC: no gross defect  SKIN: warm and dry to touch     In summary, she has undergone an appropriate medical evaluation, dietitian evaluation, as well as psychologic screening. The patient appears to be an appropriate candidate for bariatric surgery.    In the office today, I discussed the laparoscopic gastric sleeve surgery.  Risks, benefits and anticipated outcomes were " outlined including the risk of death, staple line leak (1-2%), PE, DVT, ulcer, worsening GERD, N/V, stricture, hernia, wound infection, weight regain, and vitamin deficiencies. This patient has a good chance of sustaining permanent weight loss due to this procedure.  This should also allow improvement if not resolution of his/her weight related medical conditions.    At present we are going to present your patient's file for prior authorization to insurance.  Pending prior authorization, I anticipate a surgical date in the near future.  We will keep you updated on any progress.  If you have questions regarding care please feel free to contact me.  Total time spent in the clinic was 60 minutes with greater than 50% in face-to-face consultation.        Sincerely,    Alex Morelos MD    Please route or send letter to:  Primary Care Provider (PCP) and Referring Provider

## 2022-02-24 NOTE — PATIENT INSTRUCTIONS
Okay to take tylenol 500 mg - 2 tablets (1000 mg) every 6-8 hours as needed, do not exceed 3000 mg in 24 hours.  Okay to take zanaflex 4 mg every 8 hours as needed for muscle spasm and tension headache        Patient Education     Tension Headache     A muscle tension headache is a very common cause of head pain. It s also called a stress headache. When some people are under stress, they tense the muscles of their shoulder, neck, and scalp without knowing it. If this tension lasts long enough, a headache can occur. A tension headache can be quite painful. It can last for hours or even days.  Home care  Follow these tips when caring for yourself at home:    Don t drive yourself home if you were given pain medicine for your headache. Instead, have someone else drive you home. Try to sleep when you get home. You should feel much better when you wake up.    Put heat on the back of your neck to help ease neck spasm.  How to prevent tension-type headaches    Figure out what is causing stress in your life. Learn new ways to handle your stress. Ideas include regular exercise, biofeedback, self-hypnosis, yoga, and meditation. Talk with your healthcare provider to find out more information about managing stress. Many books and digital media are also available on this subject.    Take time out at the first sign of a tension headache, if possible. Take yourself out of the stressful situation. Find a quiet, comfortable place to sit or lie down and let yourself relax. Heat and deep massage of the tight areas in the neck and shoulders may help ease muscle spasm. You may also get relief from a medicine such as acetaminophen, ibuprofen, naproxen, or a prescribed muscle relaxant.    Follow-up care  Follow up with your healthcare provider, or as advised. Talk with your provider if you have frequent headaches. He or she can figure out a treatment plan. Ask if you can have medicine to take at home the next time you get a bad headache.  This may keep you from having to visit the emergency department in the future. You may need to see a headache specialist (neurologist) if you continue to have headaches.  When to seek medical advice  Call your healthcare provider right away if any of these occur:    Your head pain gets worse during sexual intercourse or strenuous activity    Your head pain doesn t get better within 24 hours    You aren t able to keep liquids down (repeated vomiting)    Fever of 100.4 F (38 C) or higher, or as directed by your healthcare provider    Stiff neck    Extreme drowsiness, confusion, or fainting    Dizziness or dizziness with spinning sensation (vertigo)    Weakness in an arm or leg or one side of your face    You have trouble speaking    Your vision changes  [employer] last reviewed this educational content on 10/1/2019    2384-4254 [employer]. All rights reserved. This information is not intended as a substitute for professional medical care. Always follow your healthcare professional's instructions.           Patient Education     Neck Spasm   A spasm of the neck muscles can happen after a sudden awkward neck movement. Sleeping with your neck in a crooked position can also cause spasm. Some people respond to emotional stress by tensing the muscles of their neck, shoulders, and upper back. If neck spasm lasts long enough, it can cause a headache.  The treatment described below will usually help the pain to go away in 5 to 7 days. Pain that continues may need further evaluation or other types of treatment such as physical therapy.  Home care    Rest and relax the muscles. Use a comfortable pillow that supports the head and keeps the spine in a neutral position. The position of the head should not be tilted forward or backward. A rolled up towel may help for a custom fit.    Some people find relief with heat. Heat can be applied with either a warm shower or bath or a moist towel heated in the microwave and  massage. Others prefer cold packs. You can make an ice pack by filling a plastic bag that seals at the top with ice cubes or crushed ice and then wrapping it with a thin towel. Try both and use the method that feels best for 15 to 20 minutes, several times a day.    Whether using ice or heat, be careful that you don't injure your skin. Never put ice directly on the skin. Always wrap the ice in a towel or other type of cloth. This is very important, especially in people with poor skin sensation.    Try to reduce your stress level. Emotional stress can lead to neck muscle tension and get in the way of or delay the healing process.    You may use over-the-counter pain medicine to control pain, unless another medicine was prescribed. If you have chronic liver or kidney disease or ever had a stomach ulcer or gastrointestinal bleeding, talk with your healthcare provider before using these medicines.    Follow-up care  Follow up with your healthcare provider if your symptoms don't show signs of improvement after one week. Physical therapy or further tests may be needed.  If X-rays, CT scans, or MRI scans were taken, you will be told of any new findings that may affect your care.  Call 911  Call 911 if you have:    Sudden weakness or numbness in one or both arms or legs    Neck swelling, trouble with or painful swallowing    Trouble breathing    Chest pain  When to seek medical advice  Call your healthcare provider right away if any of these occur:    Pain becomes worse or spreads into one or both arms or legs    Increasing headache with nausea or vomiting    Fever of 100.4 F (38 C) or higher, or as directed by your healthcare provider    Rommel Helms last reviewed this educational content on 5/1/2018 2000-2021 [employer]. All rights reserved. This information is not intended as a substitute for professional medical care. Always follow your healthcare professional's instructions.           Patient  Education     Your Neck Muscles  The muscles in the neck and shoulders need to be strong to hold the neck and head in place. These muscles also help move the neck and shoulders. Your healthcare provider can recommend exercises to help stretch and strengthen your neck muscles.    [employer] last reviewed this educational content on 5/1/2018 2000-2021 [employer]. All rights reserved. This information is not intended as a substitute for professional medical care. Always follow your healthcare professional's instructions.

## 2022-03-02 ENCOUNTER — MYC REFILL (OUTPATIENT)
Dept: FAMILY MEDICINE | Facility: CLINIC | Age: 47
End: 2022-03-02
Payer: COMMERCIAL

## 2022-03-02 DIAGNOSIS — G89.29 CHRONIC MIDLINE LOW BACK PAIN WITHOUT SCIATICA: ICD-10-CM

## 2022-03-02 DIAGNOSIS — F11.90 CHRONIC, CONTINUOUS USE OF OPIOIDS: ICD-10-CM

## 2022-03-02 DIAGNOSIS — M54.50 CHRONIC MIDLINE LOW BACK PAIN WITHOUT SCIATICA: ICD-10-CM

## 2022-03-04 RX ORDER — OXYCODONE HYDROCHLORIDE 15 MG/1
15 TABLET ORAL 3 TIMES DAILY PRN
Qty: 90 TABLET | Refills: 0 | Status: SHIPPED | OUTPATIENT
Start: 2022-03-04 | End: 2022-04-01

## 2022-03-04 NOTE — TELEPHONE ENCOUNTER
Refilled x 1.  Needs visit (OFV or video visit) before any further refill.   Will be due for pain follow up

## 2022-03-07 ENCOUNTER — TELEPHONE (OUTPATIENT)
Dept: SURGERY | Facility: CLINIC | Age: 47
End: 2022-03-07
Payer: COMMERCIAL

## 2022-03-26 DIAGNOSIS — Z11.59 ENCOUNTER FOR SCREENING FOR OTHER VIRAL DISEASES: Primary | ICD-10-CM

## 2022-04-01 ENCOUNTER — MYC REFILL (OUTPATIENT)
Dept: FAMILY MEDICINE | Facility: CLINIC | Age: 47
End: 2022-04-01
Payer: COMMERCIAL

## 2022-04-01 DIAGNOSIS — M54.50 CHRONIC MIDLINE LOW BACK PAIN WITHOUT SCIATICA: ICD-10-CM

## 2022-04-01 DIAGNOSIS — F11.90 CHRONIC, CONTINUOUS USE OF OPIOIDS: ICD-10-CM

## 2022-04-01 DIAGNOSIS — G89.29 CHRONIC MIDLINE LOW BACK PAIN WITHOUT SCIATICA: ICD-10-CM

## 2022-04-01 NOTE — TELEPHONE ENCOUNTER
Not seen since 12/2021- no upcoming appointments scheduled   [place] and no fills outside of this office.  CSA3/25/21  UDS 11/1/21    Filled prescription for oxycodone 3/4/22- will need to wait for Dr. Loredo to approve or decline

## 2022-04-01 NOTE — TELEPHONE ENCOUNTER
Routing refill request to provider for review/approval because:  Drug not on the [facility] refill protocol.    Requested Prescriptions   Pending Prescriptions Disp Refills    oxyCODONE IR (ROXICODONE) 15 MG tablet 90 tablet 0     Sig: Take 1 tablet (15 mg) by mouth 3 times daily as needed for pain        There is no refill protocol information for this order         Judith Miles RN, BSN  [facility]

## 2022-04-02 RX ORDER — OXYCODONE HYDROCHLORIDE 15 MG/1
15 TABLET ORAL 3 TIMES DAILY PRN
Qty: 90 TABLET | Refills: 0 | Status: SHIPPED | OUTPATIENT
Start: 2022-04-03 | End: 2022-05-02

## 2022-04-11 NOTE — PROGRESS NOTES
Virtual bariatric pre op class completed.  Class power point and patient checklist information gone over with patient.     All questions were answered.  Quiz was completed.     Patient near goal weight of 191# at pre-op class. It was 193.5#.  Patient was advised to call if further questions.  Lovely Millard RN on 4/18/2022 at 8:27 AM

## 2022-04-15 ENCOUNTER — VIRTUAL VISIT (OUTPATIENT)
Dept: SURGERY | Facility: CLINIC | Age: 47
End: 2022-04-15
Payer: COMMERCIAL

## 2022-04-15 ENCOUNTER — TELEPHONE (OUTPATIENT)
Dept: SURGERY | Facility: CLINIC | Age: 47
End: 2022-04-15
Payer: COMMERCIAL

## 2022-04-15 DIAGNOSIS — E66.01 CLASS 2 SEVERE OBESITY DUE TO EXCESS CALORIES WITH SERIOUS COMORBIDITY AND BODY MASS INDEX (BMI) OF 37.0 TO 37.9 IN ADULT (H): Primary | ICD-10-CM

## 2022-04-15 DIAGNOSIS — E66.812 CLASS 2 SEVERE OBESITY DUE TO EXCESS CALORIES WITH SERIOUS COMORBIDITY AND BODY MASS INDEX (BMI) OF 37.0 TO 37.9 IN ADULT (H): Primary | ICD-10-CM

## 2022-04-15 DIAGNOSIS — Z71.89 ENCOUNTER FOR PRE-BARIATRIC SURGERY COUNSELING AND EDUCATION: ICD-10-CM

## 2022-04-15 PROCEDURE — 99207 PR NO CHARGE NURSE ONLY: CPT

## 2022-05-02 ENCOUNTER — MYC REFILL (OUTPATIENT)
Dept: FAMILY MEDICINE | Facility: CLINIC | Age: 47
End: 2022-05-02
Payer: COMMERCIAL

## 2022-05-02 DIAGNOSIS — G89.29 CHRONIC MIDLINE LOW BACK PAIN WITHOUT SCIATICA: ICD-10-CM

## 2022-05-02 DIAGNOSIS — M54.50 CHRONIC MIDLINE LOW BACK PAIN WITHOUT SCIATICA: ICD-10-CM

## 2022-05-02 DIAGNOSIS — F11.90 CHRONIC, CONTINUOUS USE OF OPIOIDS: ICD-10-CM

## 2022-05-03 RX ORDER — OXYCODONE HYDROCHLORIDE 15 MG/1
15 TABLET ORAL 3 TIMES DAILY PRN
Qty: 90 TABLET | Refills: 0 | Status: ON HOLD | OUTPATIENT
Start: 2022-05-03 | End: 2022-05-12

## 2022-05-05 ENCOUNTER — OFFICE VISIT (OUTPATIENT)
Dept: FAMILY MEDICINE | Facility: CLINIC | Age: 47
End: 2022-05-05
Payer: COMMERCIAL

## 2022-05-05 VITALS
HEIGHT: 63 IN | HEART RATE: 100 BPM | SYSTOLIC BLOOD PRESSURE: 125 MMHG | WEIGHT: 195.3 LBS | BODY MASS INDEX: 34.61 KG/M2 | DIASTOLIC BLOOD PRESSURE: 85 MMHG | OXYGEN SATURATION: 93 %

## 2022-05-05 DIAGNOSIS — E87.6 HYPOKALEMIA: Primary | ICD-10-CM

## 2022-05-05 DIAGNOSIS — Z01.818 PREOP GENERAL PHYSICAL EXAM: Primary | ICD-10-CM

## 2022-05-05 DIAGNOSIS — E66.01 CLASS 2 SEVERE OBESITY DUE TO EXCESS CALORIES WITH SERIOUS COMORBIDITY AND BODY MASS INDEX (BMI) OF 37.0 TO 37.9 IN ADULT (H): ICD-10-CM

## 2022-05-05 DIAGNOSIS — Z23 NEED FOR VACCINATION: ICD-10-CM

## 2022-05-05 DIAGNOSIS — E66.812 CLASS 2 SEVERE OBESITY DUE TO EXCESS CALORIES WITH SERIOUS COMORBIDITY AND BODY MASS INDEX (BMI) OF 37.0 TO 37.9 IN ADULT (H): ICD-10-CM

## 2022-05-05 PROBLEM — R10.84 ABDOMINAL PAIN, GENERALIZED: Status: RESOLVED | Noted: 2019-10-04 | Resolved: 2022-05-05

## 2022-05-05 PROBLEM — K29.70 GASTRITIS WITHOUT BLEEDING, UNSPECIFIED CHRONICITY, UNSPECIFIED GASTRITIS TYPE: Status: RESOLVED | Noted: 2021-01-11 | Resolved: 2022-05-05

## 2022-05-05 PROBLEM — K59.00 CONSTIPATION, UNSPECIFIED CONSTIPATION TYPE: Status: RESOLVED | Noted: 2019-07-30 | Resolved: 2022-05-05

## 2022-05-05 PROBLEM — N83.209 CYST OF OVARY, UNSPECIFIED LATERALITY: Status: RESOLVED | Noted: 2019-10-04 | Resolved: 2022-05-05

## 2022-05-05 LAB
ANION GAP SERPL CALCULATED.3IONS-SCNC: 6 MMOL/L (ref 3–14)
BUN SERPL-MCNC: 15 MG/DL (ref 7–30)
CALCIUM SERPL-MCNC: 9.7 MG/DL (ref 8.5–10.1)
CHLORIDE BLD-SCNC: 101 MMOL/L (ref 94–109)
CO2 SERPL-SCNC: 32 MMOL/L (ref 20–32)
CREAT SERPL-MCNC: 0.83 MG/DL (ref 0.52–1.04)
GFR SERPL CREATININE-BSD FRML MDRD: 86 ML/MIN/1.73M2
GLUCOSE BLD-MCNC: 94 MG/DL (ref 70–99)
HGB BLD-MCNC: 12.3 G/DL (ref 11.7–15.7)
POTASSIUM BLD-SCNC: 3.2 MMOL/L (ref 3.4–5.3)
SODIUM SERPL-SCNC: 139 MMOL/L (ref 133–144)

## 2022-05-05 PROCEDURE — 90471 IMMUNIZATION ADMIN: CPT | Performed by: FAMILY MEDICINE

## 2022-05-05 PROCEDURE — 85018 HEMOGLOBIN: CPT | Performed by: FAMILY MEDICINE

## 2022-05-05 PROCEDURE — 99214 OFFICE O/P EST MOD 30 MIN: CPT | Mod: 25 | Performed by: FAMILY MEDICINE

## 2022-05-05 PROCEDURE — 90715 TDAP VACCINE 7 YRS/> IM: CPT | Performed by: FAMILY MEDICINE

## 2022-05-05 PROCEDURE — 36415 COLL VENOUS BLD VENIPUNCTURE: CPT | Performed by: FAMILY MEDICINE

## 2022-05-05 PROCEDURE — 80048 BASIC METABOLIC PNL TOTAL CA: CPT | Performed by: FAMILY MEDICINE

## 2022-05-05 RX ORDER — POTASSIUM CHLORIDE 750 MG/1
10 TABLET, EXTENDED RELEASE ORAL 2 TIMES DAILY
Qty: 8 TABLET | Refills: 0 | Status: SHIPPED | OUTPATIENT
Start: 2022-05-05 | End: 2022-05-09

## 2022-05-05 SDOH — HEALTH STABILITY: PHYSICAL HEALTH: ON AVERAGE, HOW MANY DAYS PER WEEK DO YOU ENGAGE IN MODERATE TO STRENUOUS EXERCISE (LIKE A BRISK WALK)?: 3 DAYS

## 2022-05-05 SDOH — ECONOMIC STABILITY: INCOME INSECURITY: IN THE LAST 12 MONTHS, WAS THERE A TIME WHEN YOU WERE NOT ABLE TO PAY THE MORTGAGE OR RENT ON TIME?: NO

## 2022-05-05 SDOH — ECONOMIC STABILITY: FOOD INSECURITY: WITHIN THE PAST 12 MONTHS, YOU WORRIED THAT YOUR FOOD WOULD RUN OUT BEFORE YOU GOT MONEY TO BUY MORE.: NEVER TRUE

## 2022-05-05 SDOH — ECONOMIC STABILITY: INCOME INSECURITY: HOW HARD IS IT FOR YOU TO PAY FOR THE VERY BASICS LIKE FOOD, HOUSING, MEDICAL CARE, AND HEATING?: NOT HARD AT ALL

## 2022-05-05 SDOH — ECONOMIC STABILITY: TRANSPORTATION INSECURITY
IN THE PAST 12 MONTHS, HAS THE LACK OF TRANSPORTATION KEPT YOU FROM MEDICAL APPOINTMENTS OR FROM GETTING MEDICATIONS?: NO

## 2022-05-05 SDOH — ECONOMIC STABILITY: TRANSPORTATION INSECURITY
IN THE PAST 12 MONTHS, HAS LACK OF TRANSPORTATION KEPT YOU FROM MEETINGS, WORK, OR FROM GETTING THINGS NEEDED FOR DAILY LIVING?: NO

## 2022-05-05 SDOH — HEALTH STABILITY: PHYSICAL HEALTH: ON AVERAGE, HOW MANY MINUTES DO YOU ENGAGE IN EXERCISE AT THIS LEVEL?: 30 MIN

## 2022-05-05 SDOH — ECONOMIC STABILITY: FOOD INSECURITY: WITHIN THE PAST 12 MONTHS, THE FOOD YOU BOUGHT JUST DIDN'T LAST AND YOU DIDN'T HAVE MONEY TO GET MORE.: NEVER TRUE

## 2022-05-05 ASSESSMENT — PATIENT HEALTH QUESTIONNAIRE - PHQ9
SUM OF ALL RESPONSES TO PHQ QUESTIONS 1-9: 2
SUM OF ALL RESPONSES TO PHQ QUESTIONS 1-9: 2
10. IF YOU CHECKED OFF ANY PROBLEMS, HOW DIFFICULT HAVE THESE PROBLEMS MADE IT FOR YOU TO DO YOUR WORK, TAKE CARE OF THINGS AT HOME, OR GET ALONG WITH OTHER PEOPLE: SOMEWHAT DIFFICULT

## 2022-05-05 ASSESSMENT — ANXIETY QUESTIONNAIRES
3. WORRYING TOO MUCH ABOUT DIFFERENT THINGS: NOT AT ALL
GAD7 TOTAL SCORE: 0
6. BECOMING EASILY ANNOYED OR IRRITABLE: NOT AT ALL
4. TROUBLE RELAXING: NOT AT ALL
7. FEELING AFRAID AS IF SOMETHING AWFUL MIGHT HAPPEN: NOT AT ALL
GAD7 TOTAL SCORE: 0
GAD7 TOTAL SCORE: 0
8. IF YOU CHECKED OFF ANY PROBLEMS, HOW DIFFICULT HAVE THESE MADE IT FOR YOU TO DO YOUR WORK, TAKE CARE OF THINGS AT HOME, OR GET ALONG WITH OTHER PEOPLE?: NOT DIFFICULT AT ALL
5. BEING SO RESTLESS THAT IT IS HARD TO SIT STILL: NOT AT ALL
2. NOT BEING ABLE TO STOP OR CONTROL WORRYING: NOT AT ALL
7. FEELING AFRAID AS IF SOMETHING AWFUL MIGHT HAPPEN: NOT AT ALL
1. FEELING NERVOUS, ANXIOUS, OR ON EDGE: NOT AT ALL

## 2022-05-05 ASSESSMENT — LIFESTYLE VARIABLES
HOW OFTEN DO YOU HAVE SIX OR MORE DRINKS ON ONE OCCASION: NEVER
SKIP TO QUESTIONS 9-10: 1
HOW MANY STANDARD DRINKS CONTAINING ALCOHOL DO YOU HAVE ON A TYPICAL DAY: 1 OR 2
HOW OFTEN DO YOU HAVE A DRINK CONTAINING ALCOHOL: 2-4 TIMES A MONTH
AUDIT-C TOTAL SCORE: 2

## 2022-05-05 ASSESSMENT — SOCIAL DETERMINANTS OF HEALTH (SDOH)
HOW OFTEN DO YOU ATTEND CHURCH OR RELIGIOUS SERVICES?: MORE THAN 4 TIMES PER YEAR
HOW OFTEN DO YOU GET TOGETHER WITH FRIENDS OR RELATIVES?: TWICE A WEEK
DO YOU BELONG TO ANY CLUBS OR ORGANIZATIONS SUCH AS CHURCH GROUPS UNIONS, FRATERNAL OR ATHLETIC GROUPS, OR SCHOOL GROUPS?: NO
IN A TYPICAL WEEK, HOW MANY TIMES DO YOU TALK ON THE PHONE WITH FAMILY, FRIENDS, OR NEIGHBORS?: MORE THAN THREE TIMES A WEEK
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
## 2022-05-06 ASSESSMENT — ANXIETY QUESTIONNAIRES: GAD7 TOTAL SCORE: 0

## 2022-05-06 ASSESSMENT — PATIENT HEALTH QUESTIONNAIRE - PHQ9: SUM OF ALL RESPONSES TO PHQ QUESTIONS 1-9: 2

## 2022-05-09 ENCOUNTER — LAB (OUTPATIENT)
Dept: LAB | Facility: CLINIC | Age: 47
End: 2022-05-09
Payer: COMMERCIAL

## 2022-05-09 DIAGNOSIS — Z11.59 ENCOUNTER FOR SCREENING FOR OTHER VIRAL DISEASES: ICD-10-CM

## 2022-05-09 DIAGNOSIS — E87.6 HYPOKALEMIA: ICD-10-CM

## 2022-05-09 LAB — POTASSIUM BLD-SCNC: 3.9 MMOL/L (ref 3.4–5.3)

## 2022-05-09 PROCEDURE — U0005 INFEC AGEN DETEC AMPLI PROBE: HCPCS

## 2022-05-09 PROCEDURE — 36415 COLL VENOUS BLD VENIPUNCTURE: CPT

## 2022-05-09 PROCEDURE — U0003 INFECTIOUS AGENT DETECTION BY NUCLEIC ACID (DNA OR RNA); SEVERE ACUTE RESPIRATORY SYNDROME CORONAVIRUS 2 (SARS-COV-2) (CORONAVIRUS DISEASE [COVID-19]), AMPLIFIED PROBE TECHNIQUE, MAKING USE OF HIGH THROUGHPUT TECHNOLOGIES AS DESCRIBED BY CMS-2020-01-R: HCPCS

## 2022-05-09 PROCEDURE — 84132 ASSAY OF SERUM POTASSIUM: CPT

## 2022-05-09 RX ORDER — GABAPENTIN 300 MG/1
600 CAPSULE ORAL AT BEDTIME
COMMUNITY
End: 2023-09-19

## 2022-05-09 RX ORDER — GABAPENTIN 300 MG/1
300 CAPSULE ORAL EVERY MORNING
COMMUNITY
End: 2022-12-08

## 2022-05-10 ENCOUNTER — ANESTHESIA EVENT (OUTPATIENT)
Dept: SURGERY | Facility: CLINIC | Age: 47
End: 2022-05-10
Payer: COMMERCIAL

## 2022-05-10 LAB — SARS-COV-2 RNA RESP QL NAA+PROBE: NEGATIVE

## 2022-05-10 NOTE — PROGRESS NOTES
PTA medications updated by Medication Scribe prior to surgery via phone call with patient (last doses completed by Nurse)     Medication history sources: Patient, Surescripts and H&P  In the past week, patient estimated taking medication this percent of the time: Greater than 90%  Adherence assessment: N/A Not Observed    Significant changes made to the medication list:  None      Additional medication history information:   None    Medication reconciliation completed by provider prior to medication history? No    Time spent in this activity: 35 MINUTES    The information provided in this note is only as accurate as the sources available at the time of update(s)      Prior to Admission medications    Medication Sig Last Dose Taking? Auth Provider   albuterol (PROAIR HFA/PROVENTIL HFA/VENTOLIN HFA) 108 (90 Base) MCG/ACT inhaler Inhale 2 puffs into the lungs every 4 hours (while awake)  at PRN Yes Robert Nieto PA-C   gabapentin (NEURONTIN) 300 MG capsule Take 600 mg by mouth At Bedtime TAKE IN ADDITION TO AM DOSE FOR TOTAL 900MG DAILY.  (300MG X 2 = 600MG)  at PM Yes Reported, Patient   gabapentin (NEURONTIN) 300 MG capsule Take 300 mg by mouth every morning TAKE IN ADDITION TO PM DOSE FOR TOTAL 900MG DAILY.  at AM Yes Reported, Patient   loratadine (CLARITIN) 10 MG tablet TAKE 1 TABLET(10 MG) BY MOUTH DAILY AS NEEDED FOR ALLERGIES  at AM Yes Christine Feliz MD   losartan (COZAAR) 50 MG tablet Take 1 tablet (50 mg) by mouth daily  at AM Yes Jordyn Loredo MD   omeprazole (PRILOSEC) 40 MG DR capsule TAKE 1 CAPSULE(40 MG) BY MOUTH DAILY  at AM Yes Jordyn Loredo MD   oxyCODONE IR (ROXICODONE) 15 MG tablet Take 1 tablet (15 mg) by mouth 3 times daily as needed for pain Needs follow up visit for any further refill.  at PM Yes Jordyn Loredo MD   tiZANidine (ZANAFLEX) 4 MG tablet Take 1 tablet (4 mg) by mouth every 8 hours as needed for muscle spasms  at PRN Yes Remi Rincon MD    triamterene-HCTZ (MAXZIDE-25) 37.5-25 MG tablet Take 1 tablet by mouth daily  at AM Yes Jordyn Loredo MD

## 2022-05-11 ENCOUNTER — HOSPITAL ENCOUNTER (INPATIENT)
Facility: CLINIC | Age: 47
LOS: 1 days | Discharge: HOME | End: 2022-05-12
Attending: SURGERY | Admitting: SURGERY
Payer: COMMERCIAL

## 2022-05-11 ENCOUNTER — ANESTHESIA (OUTPATIENT)
Dept: SURGERY | Facility: CLINIC | Age: 47
End: 2022-05-11
Payer: COMMERCIAL

## 2022-05-11 DIAGNOSIS — E66.9 OBESITY (BMI 30-39.9): Primary | ICD-10-CM

## 2022-05-11 DIAGNOSIS — Z98.84 S/P LAPAROSCOPIC SLEEVE GASTRECTOMY: ICD-10-CM

## 2022-05-11 LAB
CREAT SERPL-MCNC: 0.72 MG/DL (ref 0.52–1.04)
GFR SERPL CREATININE-BSD FRML MDRD: >90 ML/MIN/1.73M2
POTASSIUM BLD-SCNC: 3.2 MMOL/L (ref 3.4–5.3)

## 2022-05-11 PROCEDURE — 258N000003 HC RX IP 258 OP 636: Performed by: ANESTHESIOLOGY

## 2022-05-11 PROCEDURE — 250N000011 HC RX IP 250 OP 636: Performed by: PHYSICIAN ASSISTANT

## 2022-05-11 PROCEDURE — 250N000011 HC RX IP 250 OP 636: Performed by: ANESTHESIOLOGY

## 2022-05-11 PROCEDURE — 250N000009 HC RX 250

## 2022-05-11 PROCEDURE — 250N000011 HC RX IP 250 OP 636: Performed by: SURGERY

## 2022-05-11 PROCEDURE — 258N000001 HC RX 258: Performed by: SURGERY

## 2022-05-11 PROCEDURE — 250N000009 HC RX 250: Performed by: SURGERY

## 2022-05-11 PROCEDURE — 370N000017 HC ANESTHESIA TECHNICAL FEE, PER MIN: Performed by: SURGERY

## 2022-05-11 PROCEDURE — 272N000001 HC OR GENERAL SUPPLY STERILE: Performed by: SURGERY

## 2022-05-11 PROCEDURE — 84132 ASSAY OF SERUM POTASSIUM: CPT | Performed by: ANESTHESIOLOGY

## 2022-05-11 PROCEDURE — 250N000025 HC SEVOFLURANE, PER MIN: Performed by: SURGERY

## 2022-05-11 PROCEDURE — 258N000003 HC RX IP 258 OP 636

## 2022-05-11 PROCEDURE — 43775 LAP SLEEVE GASTRECTOMY: CPT | Performed by: SURGERY

## 2022-05-11 PROCEDURE — 120N000001 HC R&B MED SURG/OB

## 2022-05-11 PROCEDURE — 250N000013 HC RX MED GY IP 250 OP 250 PS 637: Performed by: SURGERY

## 2022-05-11 PROCEDURE — 36415 COLL VENOUS BLD VENIPUNCTURE: CPT | Performed by: ANESTHESIOLOGY

## 2022-05-11 PROCEDURE — 250N000011 HC RX IP 250 OP 636

## 2022-05-11 PROCEDURE — 82565 ASSAY OF CREATININE: CPT | Performed by: PHYSICIAN ASSISTANT

## 2022-05-11 PROCEDURE — 710N000009 HC RECOVERY PHASE 1, LEVEL 1, PER MIN: Performed by: SURGERY

## 2022-05-11 PROCEDURE — 0WQF4ZZ REPAIR ABDOMINAL WALL, PERCUTANEOUS ENDOSCOPIC APPROACH: ICD-10-PCS | Performed by: SURGERY

## 2022-05-11 PROCEDURE — 258N000003 HC RX IP 258 OP 636: Performed by: PHYSICIAN ASSISTANT

## 2022-05-11 PROCEDURE — 88300 SURGICAL PATH GROSS: CPT | Mod: TC | Performed by: SURGERY

## 2022-05-11 PROCEDURE — 250N000013 HC RX MED GY IP 250 OP 250 PS 637: Performed by: PHYSICIAN ASSISTANT

## 2022-05-11 PROCEDURE — 250N000009 HC RX 250: Performed by: PHYSICIAN ASSISTANT

## 2022-05-11 PROCEDURE — 999N000141 HC STATISTIC PRE-PROCEDURE NURSING ASSESSMENT: Performed by: SURGERY

## 2022-05-11 PROCEDURE — 360N000077 HC SURGERY LEVEL 4, PER MIN: Performed by: SURGERY

## 2022-05-11 PROCEDURE — 43775 LAP SLEEVE GASTRECTOMY: CPT | Mod: AS | Performed by: PHYSICIAN ASSISTANT

## 2022-05-11 PROCEDURE — 0DB64Z3 EXCISION OF STOMACH, PERCUTANEOUS ENDOSCOPIC APPROACH, VERTICAL: ICD-10-PCS | Performed by: SURGERY

## 2022-05-11 RX ORDER — ENOXAPARIN SODIUM 100 MG/ML
40 INJECTION SUBCUTANEOUS EVERY 12 HOURS
Status: DISCONTINUED | OUTPATIENT
Start: 2022-05-12 | End: 2022-05-12 | Stop reason: HOSPADM

## 2022-05-11 RX ORDER — HYDROMORPHONE HCL IN WATER/PF 6 MG/30 ML
0.2 PATIENT CONTROLLED ANALGESIA SYRINGE INTRAVENOUS EVERY 5 MIN PRN
Status: DISCONTINUED | OUTPATIENT
Start: 2022-05-11 | End: 2022-05-11 | Stop reason: HOSPADM

## 2022-05-11 RX ORDER — DEXAMETHASONE SODIUM PHOSPHATE 4 MG/ML
INJECTION, SOLUTION INTRA-ARTICULAR; INTRALESIONAL; INTRAMUSCULAR; INTRAVENOUS; SOFT TISSUE PRN
Status: DISCONTINUED | OUTPATIENT
Start: 2022-05-11 | End: 2022-05-11

## 2022-05-11 RX ORDER — NALOXONE HYDROCHLORIDE 0.4 MG/ML
0.2 INJECTION, SOLUTION INTRAMUSCULAR; INTRAVENOUS; SUBCUTANEOUS
Status: DISCONTINUED | OUTPATIENT
Start: 2022-05-11 | End: 2022-05-12 | Stop reason: HOSPADM

## 2022-05-11 RX ORDER — GABAPENTIN 300 MG/1
600 CAPSULE ORAL AT BEDTIME
Status: DISCONTINUED | OUTPATIENT
Start: 2022-05-11 | End: 2022-05-12 | Stop reason: HOSPADM

## 2022-05-11 RX ORDER — OXYCODONE HYDROCHLORIDE 5 MG/1
5 TABLET ORAL
Status: DISCONTINUED | OUTPATIENT
Start: 2022-05-11 | End: 2022-05-12 | Stop reason: HOSPADM

## 2022-05-11 RX ORDER — FENTANYL CITRATE 50 UG/ML
INJECTION, SOLUTION INTRAMUSCULAR; INTRAVENOUS PRN
Status: DISCONTINUED | OUTPATIENT
Start: 2022-05-11 | End: 2022-05-11

## 2022-05-11 RX ORDER — LOSARTAN POTASSIUM 50 MG/1
50 TABLET ORAL DAILY
Status: DISCONTINUED | OUTPATIENT
Start: 2022-05-12 | End: 2022-05-12 | Stop reason: HOSPADM

## 2022-05-11 RX ORDER — PROCHLORPERAZINE MALEATE 10 MG
10 TABLET ORAL EVERY 6 HOURS PRN
Status: DISCONTINUED | OUTPATIENT
Start: 2022-05-11 | End: 2022-05-12 | Stop reason: HOSPADM

## 2022-05-11 RX ORDER — CEFAZOLIN SODIUM/WATER 2 G/20 ML
2 SYRINGE (ML) INTRAVENOUS SEE ADMIN INSTRUCTIONS
Status: DISCONTINUED | OUTPATIENT
Start: 2022-05-11 | End: 2022-05-11 | Stop reason: HOSPADM

## 2022-05-11 RX ORDER — LIDOCAINE HYDROCHLORIDE 20 MG/ML
INJECTION, SOLUTION INFILTRATION; PERINEURAL PRN
Status: DISCONTINUED | OUTPATIENT
Start: 2022-05-11 | End: 2022-05-11

## 2022-05-11 RX ORDER — SODIUM CHLORIDE, SODIUM LACTATE, POTASSIUM CHLORIDE, CALCIUM CHLORIDE 600; 310; 30; 20 MG/100ML; MG/100ML; MG/100ML; MG/100ML
INJECTION, SOLUTION INTRAVENOUS CONTINUOUS
Status: DISCONTINUED | OUTPATIENT
Start: 2022-05-11 | End: 2022-05-12 | Stop reason: HOSPADM

## 2022-05-11 RX ORDER — SCOLOPAMINE TRANSDERMAL SYSTEM 1 MG/1
1 PATCH, EXTENDED RELEASE TRANSDERMAL
Status: DISCONTINUED | OUTPATIENT
Start: 2022-05-11 | End: 2022-05-12 | Stop reason: HOSPADM

## 2022-05-11 RX ORDER — GABAPENTIN 300 MG/1
300 CAPSULE ORAL EVERY MORNING
Status: DISCONTINUED | OUTPATIENT
Start: 2022-05-12 | End: 2022-05-12 | Stop reason: HOSPADM

## 2022-05-11 RX ORDER — FENTANYL CITRATE 0.05 MG/ML
25 INJECTION, SOLUTION INTRAMUSCULAR; INTRAVENOUS EVERY 5 MIN PRN
Status: DISCONTINUED | OUTPATIENT
Start: 2022-05-11 | End: 2022-05-11 | Stop reason: HOSPADM

## 2022-05-11 RX ORDER — LABETALOL HYDROCHLORIDE 5 MG/ML
5-10 INJECTION, SOLUTION INTRAVENOUS
Status: DISCONTINUED | OUTPATIENT
Start: 2022-05-11 | End: 2022-05-12 | Stop reason: HOSPADM

## 2022-05-11 RX ORDER — ALBUTEROL SULFATE 90 UG/1
2 AEROSOL, METERED RESPIRATORY (INHALATION)
Status: DISCONTINUED | OUTPATIENT
Start: 2022-05-11 | End: 2022-05-12 | Stop reason: HOSPADM

## 2022-05-11 RX ORDER — SODIUM CHLORIDE, SODIUM LACTATE, POTASSIUM CHLORIDE, CALCIUM CHLORIDE 600; 310; 30; 20 MG/100ML; MG/100ML; MG/100ML; MG/100ML
INJECTION, SOLUTION INTRAVENOUS CONTINUOUS
Status: DISCONTINUED | OUTPATIENT
Start: 2022-05-11 | End: 2022-05-11 | Stop reason: HOSPADM

## 2022-05-11 RX ORDER — ENALAPRILAT 1.25 MG/ML
1.25 INJECTION INTRAVENOUS EVERY 6 HOURS PRN
Status: DISCONTINUED | OUTPATIENT
Start: 2022-05-11 | End: 2022-05-12 | Stop reason: HOSPADM

## 2022-05-11 RX ORDER — HEPARIN SODIUM 5000 [USP'U]/.5ML
5000 INJECTION, SOLUTION INTRAVENOUS; SUBCUTANEOUS
Status: COMPLETED | OUTPATIENT
Start: 2022-05-11 | End: 2022-05-11

## 2022-05-11 RX ORDER — ACETAMINOPHEN 325 MG/1
650 TABLET ORAL EVERY 4 HOURS PRN
Status: DISCONTINUED | OUTPATIENT
Start: 2022-05-11 | End: 2022-05-12 | Stop reason: HOSPADM

## 2022-05-11 RX ORDER — PROPOFOL 10 MG/ML
INJECTION, EMULSION INTRAVENOUS PRN
Status: DISCONTINUED | OUTPATIENT
Start: 2022-05-11 | End: 2022-05-11

## 2022-05-11 RX ORDER — DEXMEDETOMIDINE HYDROCHLORIDE 4 UG/ML
INJECTION, SOLUTION INTRAVENOUS PRN
Status: DISCONTINUED | OUTPATIENT
Start: 2022-05-11 | End: 2022-05-11

## 2022-05-11 RX ORDER — ACETAMINOPHEN 325 MG/1
975 TABLET ORAL ONCE
Status: COMPLETED | OUTPATIENT
Start: 2022-05-11 | End: 2022-05-11

## 2022-05-11 RX ORDER — OXYCODONE HYDROCHLORIDE 5 MG/1
10 TABLET ORAL
Status: DISCONTINUED | OUTPATIENT
Start: 2022-05-11 | End: 2022-05-12 | Stop reason: HOSPADM

## 2022-05-11 RX ORDER — ONDANSETRON 4 MG/1
4 TABLET, ORALLY DISINTEGRATING ORAL EVERY 6 HOURS PRN
Status: DISCONTINUED | OUTPATIENT
Start: 2022-05-11 | End: 2022-05-12 | Stop reason: HOSPADM

## 2022-05-11 RX ORDER — ONDANSETRON 2 MG/ML
4 INJECTION INTRAMUSCULAR; INTRAVENOUS
Status: COMPLETED | OUTPATIENT
Start: 2022-05-11 | End: 2022-05-11

## 2022-05-11 RX ORDER — BUPIVACAINE HYDROCHLORIDE AND EPINEPHRINE 2.5; 5 MG/ML; UG/ML
INJECTION, SOLUTION INFILTRATION; PERINEURAL PRN
Status: DISCONTINUED | OUTPATIENT
Start: 2022-05-11 | End: 2022-05-11 | Stop reason: HOSPADM

## 2022-05-11 RX ORDER — HYDROMORPHONE HCL IN WATER/PF 6 MG/30 ML
.2-.3 PATIENT CONTROLLED ANALGESIA SYRINGE INTRAVENOUS
Status: DISCONTINUED | OUTPATIENT
Start: 2022-05-11 | End: 2022-05-12 | Stop reason: HOSPADM

## 2022-05-11 RX ORDER — PROPOFOL 10 MG/ML
INJECTION, EMULSION INTRAVENOUS CONTINUOUS PRN
Status: DISCONTINUED | OUTPATIENT
Start: 2022-05-11 | End: 2022-05-11

## 2022-05-11 RX ORDER — ONDANSETRON 2 MG/ML
4 INJECTION INTRAMUSCULAR; INTRAVENOUS EVERY 30 MIN PRN
Status: DISCONTINUED | OUTPATIENT
Start: 2022-05-11 | End: 2022-05-11 | Stop reason: HOSPADM

## 2022-05-11 RX ORDER — ONDANSETRON 4 MG/1
4 TABLET, ORALLY DISINTEGRATING ORAL EVERY 30 MIN PRN
Status: DISCONTINUED | OUTPATIENT
Start: 2022-05-11 | End: 2022-05-11 | Stop reason: HOSPADM

## 2022-05-11 RX ORDER — LIDOCAINE 40 MG/G
CREAM TOPICAL
Status: DISCONTINUED | OUTPATIENT
Start: 2022-05-11 | End: 2022-05-12 | Stop reason: HOSPADM

## 2022-05-11 RX ORDER — OXYCODONE HYDROCHLORIDE 5 MG/1
5 TABLET ORAL EVERY 4 HOURS PRN
Status: DISCONTINUED | OUTPATIENT
Start: 2022-05-11 | End: 2022-05-11 | Stop reason: HOSPADM

## 2022-05-11 RX ORDER — DIPHENHYDRAMINE HCL 25 MG
25 CAPSULE ORAL EVERY 6 HOURS PRN
Status: DISCONTINUED | OUTPATIENT
Start: 2022-05-11 | End: 2022-05-12 | Stop reason: HOSPADM

## 2022-05-11 RX ORDER — KETOROLAC TROMETHAMINE 30 MG/ML
30 INJECTION, SOLUTION INTRAMUSCULAR; INTRAVENOUS EVERY 6 HOURS
Status: COMPLETED | OUTPATIENT
Start: 2022-05-11 | End: 2022-05-12

## 2022-05-11 RX ORDER — KETAMINE HYDROCHLORIDE 10 MG/ML
INJECTION INTRAMUSCULAR; INTRAVENOUS PRN
Status: DISCONTINUED | OUTPATIENT
Start: 2022-05-11 | End: 2022-05-11

## 2022-05-11 RX ORDER — NALOXONE HYDROCHLORIDE 0.4 MG/ML
0.4 INJECTION, SOLUTION INTRAMUSCULAR; INTRAVENOUS; SUBCUTANEOUS
Status: DISCONTINUED | OUTPATIENT
Start: 2022-05-11 | End: 2022-05-12 | Stop reason: HOSPADM

## 2022-05-11 RX ORDER — DIPHENHYDRAMINE HYDROCHLORIDE 50 MG/ML
25 INJECTION INTRAMUSCULAR; INTRAVENOUS EVERY 6 HOURS PRN
Status: DISCONTINUED | OUTPATIENT
Start: 2022-05-11 | End: 2022-05-12 | Stop reason: HOSPADM

## 2022-05-11 RX ORDER — SIMETHICONE 40MG/0.6ML
40 SUSPENSION, DROPS(FINAL DOSAGE FORM)(ML) ORAL EVERY 6 HOURS PRN
Status: DISCONTINUED | OUTPATIENT
Start: 2022-05-11 | End: 2022-05-12 | Stop reason: HOSPADM

## 2022-05-11 RX ORDER — ONDANSETRON 2 MG/ML
4 INJECTION INTRAMUSCULAR; INTRAVENOUS EVERY 6 HOURS PRN
Status: DISCONTINUED | OUTPATIENT
Start: 2022-05-11 | End: 2022-05-12 | Stop reason: HOSPADM

## 2022-05-11 RX ORDER — CEFAZOLIN SODIUM/WATER 2 G/20 ML
2 SYRINGE (ML) INTRAVENOUS
Status: COMPLETED | OUTPATIENT
Start: 2022-05-11 | End: 2022-05-11

## 2022-05-11 RX ORDER — ONDANSETRON 2 MG/ML
INJECTION INTRAMUSCULAR; INTRAVENOUS PRN
Status: DISCONTINUED | OUTPATIENT
Start: 2022-05-11 | End: 2022-05-11

## 2022-05-11 RX ADMIN — LIDOCAINE HYDROCHLORIDE 100 MG: 20 INJECTION, SOLUTION INFILTRATION; PERINEURAL at 07:39

## 2022-05-11 RX ADMIN — KETOROLAC TROMETHAMINE 30 MG: 30 INJECTION, SOLUTION INTRAMUSCULAR; INTRAVENOUS at 17:32

## 2022-05-11 RX ADMIN — FAMOTIDINE 20 MG: 10 INJECTION, SOLUTION INTRAVENOUS at 07:24

## 2022-05-11 RX ADMIN — HEPARIN SODIUM 5000 UNITS: 5000 INJECTION, SOLUTION INTRAVENOUS; SUBCUTANEOUS at 07:26

## 2022-05-11 RX ADMIN — HYDROMORPHONE HYDROCHLORIDE 0.2 MG: 0.2 INJECTION, SOLUTION INTRAMUSCULAR; INTRAVENOUS; SUBCUTANEOUS at 10:52

## 2022-05-11 RX ADMIN — SUGAMMADEX 200 MG: 100 INJECTION, SOLUTION INTRAVENOUS at 09:22

## 2022-05-11 RX ADMIN — ONDANSETRON 4 MG: 2 INJECTION INTRAMUSCULAR; INTRAVENOUS at 10:17

## 2022-05-11 RX ADMIN — HYDROMORPHONE HYDROCHLORIDE 0.2 MG: 0.2 INJECTION, SOLUTION INTRAMUSCULAR; INTRAVENOUS; SUBCUTANEOUS at 11:17

## 2022-05-11 RX ADMIN — GABAPENTIN 600 MG: 300 CAPSULE ORAL at 22:37

## 2022-05-11 RX ADMIN — PHENYLEPHRINE HYDROCHLORIDE 100 MCG: 10 INJECTION INTRAVENOUS at 07:45

## 2022-05-11 RX ADMIN — ROCURONIUM BROMIDE 10 MG: 50 INJECTION, SOLUTION INTRAVENOUS at 08:10

## 2022-05-11 RX ADMIN — PHENYLEPHRINE HYDROCHLORIDE 100 MCG: 10 INJECTION INTRAVENOUS at 08:09

## 2022-05-11 RX ADMIN — SODIUM CHLORIDE, POTASSIUM CHLORIDE, SODIUM LACTATE AND CALCIUM CHLORIDE: 600; 310; 30; 20 INJECTION, SOLUTION INTRAVENOUS at 12:04

## 2022-05-11 RX ADMIN — PROCHLORPERAZINE EDISYLATE 5 MG: 5 INJECTION INTRAMUSCULAR; INTRAVENOUS at 11:03

## 2022-05-11 RX ADMIN — PHENYLEPHRINE HYDROCHLORIDE 100 MCG: 10 INJECTION INTRAVENOUS at 08:38

## 2022-05-11 RX ADMIN — DEXMEDETOMIDINE HYDROCHLORIDE 0.5 MCG/KG/HR: 100 INJECTION, SOLUTION INTRAVENOUS at 08:24

## 2022-05-11 RX ADMIN — FENTANYL CITRATE 25 MCG: 50 INJECTION, SOLUTION INTRAMUSCULAR; INTRAVENOUS at 10:45

## 2022-05-11 RX ADMIN — PHENYLEPHRINE HYDROCHLORIDE 50 MCG: 10 INJECTION INTRAVENOUS at 07:54

## 2022-05-11 RX ADMIN — SCOPALAMINE 1 PATCH: 1 PATCH, EXTENDED RELEASE TRANSDERMAL at 13:11

## 2022-05-11 RX ADMIN — Medication 1 LOZENGE: at 17:32

## 2022-05-11 RX ADMIN — DEXMEDETOMIDINE HYDROCHLORIDE 8 MCG: 200 INJECTION INTRAVENOUS at 07:49

## 2022-05-11 RX ADMIN — Medication 2 G: at 07:30

## 2022-05-11 RX ADMIN — HYDROMORPHONE HYDROCHLORIDE 0.5 MG: 1 INJECTION, SOLUTION INTRAMUSCULAR; INTRAVENOUS; SUBCUTANEOUS at 07:55

## 2022-05-11 RX ADMIN — ROCURONIUM BROMIDE 50 MG: 50 INJECTION, SOLUTION INTRAVENOUS at 07:39

## 2022-05-11 RX ADMIN — OXYCODONE HYDROCHLORIDE 5 MG: 5 TABLET ORAL at 14:39

## 2022-05-11 RX ADMIN — DEXMEDETOMIDINE HYDROCHLORIDE 12 MCG: 200 INJECTION INTRAVENOUS at 07:47

## 2022-05-11 RX ADMIN — OXYCODONE HYDROCHLORIDE 10 MG: 5 TABLET ORAL at 20:40

## 2022-05-11 RX ADMIN — ONDANSETRON 4 MG: 2 INJECTION INTRAMUSCULAR; INTRAVENOUS at 09:07

## 2022-05-11 RX ADMIN — PHENYLEPHRINE HYDROCHLORIDE 150 MCG: 10 INJECTION INTRAVENOUS at 07:58

## 2022-05-11 RX ADMIN — MIDAZOLAM 2 MG: 1 INJECTION INTRAMUSCULAR; INTRAVENOUS at 07:31

## 2022-05-11 RX ADMIN — FENTANYL CITRATE 100 MCG: 50 INJECTION, SOLUTION INTRAMUSCULAR; INTRAVENOUS at 07:39

## 2022-05-11 RX ADMIN — ACETAMINOPHEN 975 MG: 325 TABLET ORAL at 07:23

## 2022-05-11 RX ADMIN — SODIUM CHLORIDE, POTASSIUM CHLORIDE, SODIUM LACTATE AND CALCIUM CHLORIDE: 600; 310; 30; 20 INJECTION, SOLUTION INTRAVENOUS at 07:26

## 2022-05-11 RX ADMIN — PROPOFOL 30 MCG/KG/MIN: 10 INJECTION, EMULSION INTRAVENOUS at 07:49

## 2022-05-11 RX ADMIN — ROCURONIUM BROMIDE 10 MG: 50 INJECTION, SOLUTION INTRAVENOUS at 08:53

## 2022-05-11 RX ADMIN — DEXAMETHASONE SODIUM PHOSPHATE 4 MG: 4 INJECTION, SOLUTION INTRA-ARTICULAR; INTRALESIONAL; INTRAMUSCULAR; INTRAVENOUS; SOFT TISSUE at 07:57

## 2022-05-11 RX ADMIN — SODIUM CHLORIDE, POTASSIUM CHLORIDE, SODIUM LACTATE AND CALCIUM CHLORIDE: 600; 310; 30; 20 INJECTION, SOLUTION INTRAVENOUS at 08:39

## 2022-05-11 RX ADMIN — PHENYLEPHRINE HYDROCHLORIDE 100 MCG: 10 INJECTION INTRAVENOUS at 08:19

## 2022-05-11 RX ADMIN — KETOROLAC TROMETHAMINE 30 MG: 30 INJECTION, SOLUTION INTRAMUSCULAR; INTRAVENOUS at 22:38

## 2022-05-11 RX ADMIN — KETOROLAC TROMETHAMINE 30 MG: 30 INJECTION, SOLUTION INTRAMUSCULAR; INTRAVENOUS at 11:29

## 2022-05-11 RX ADMIN — PROPOFOL 200 MG: 10 INJECTION, EMULSION INTRAVENOUS at 07:39

## 2022-05-11 RX ADMIN — FENTANYL CITRATE 25 MCG: 50 INJECTION, SOLUTION INTRAMUSCULAR; INTRAVENOUS at 10:33

## 2022-05-11 RX ADMIN — ONDANSETRON 4 MG: 2 INJECTION INTRAMUSCULAR; INTRAVENOUS at 07:25

## 2022-05-11 RX ADMIN — SODIUM CHLORIDE, POTASSIUM CHLORIDE, SODIUM LACTATE AND CALCIUM CHLORIDE: 600; 310; 30; 20 INJECTION, SOLUTION INTRAVENOUS at 17:34

## 2022-05-11 RX ADMIN — Medication 30 MG: at 07:39

## 2022-05-11 ASSESSMENT — ACTIVITIES OF DAILY LIVING (ADL)
ADLS_ACUITY_SCORE: 20
ADLS_ACUITY_SCORE: 20
ADLS_ACUITY_SCORE: 33
ADLS_ACUITY_SCORE: 20
ADLS_ACUITY_SCORE: 20
ADLS_ACUITY_SCORE: 18
ADLS_ACUITY_SCORE: 20
ADLS_ACUITY_SCORE: 18
ADLS_ACUITY_SCORE: 20
ADLS_ACUITY_SCORE: 35

## 2022-05-11 ASSESSMENT — ENCOUNTER SYMPTOMS: SEIZURES: 0
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
## 2022-05-11 NOTE — ANESTHESIA CARE TRANSFER NOTE
Patient: Jeff Serra    Procedure: Procedure(s):  LAPAROSCOPIC, GASTRECTOMY, SLEEVE  Laparoscopic herniorrhaphy umbilical  Laparoscopic lysis adhesions       Diagnosis: Morbid obesity (H) [E66.01]  Diagnosis Additional Information: No value filed.    Anesthesia Type:   General     Note:    Oropharynx: oropharynx clear of all foreign objects  Level of Consciousness: drowsy  Oxygen Supplementation: face mask  Level of Supplemental Oxygen (L/min / FiO2): 6  Independent Airway: airway patency satisfactory and stable  Dentition: dentition unchanged  Vital Signs Stable: post-procedure vital signs reviewed and stable  Report to RN Given: handoff report given  Patient transferred to: PACU    Handoff Report: Identifed the Patient, Identified the Reponsible Provider, Reviewed the pertinent medical history, Discussed the surgical course, Reviewed Intra-OP anesthesia mangement and issues during anesthesia, Set expectations for post-procedure period and Allowed opportunity for questions and acknowledgement of understanding      Vitals:  Vitals Value Taken Time   /70 05/11/22 0943   Temp     Pulse 87 05/11/22 0945   Resp 16 05/11/22 0945   SpO2 98 % 05/11/22 0945   Vitals shown include unvalidated device data.    Electronically Signed By: HARLEY Mari CRNA  May 11, 2022  9:46 AM

## 2022-05-11 NOTE — ANESTHESIA POSTPROCEDURE EVALUATION
Patient: Jeff Serra    Procedure: Procedure(s):  LAPAROSCOPIC, GASTRECTOMY, SLEEVE  Laparoscopic herniorrhaphy umbilical  Laparoscopic lysis adhesions       Anesthesia Type:  General    Note:     Postop Pain Control: Uneventful            Sign Out: Well controlled pain   PONV: No   Neuro/Psych: Uneventful            Sign Out: Acceptable/Baseline neuro status   Airway/Respiratory: Uneventful            Sign Out: Acceptable/Baseline resp. status   CV/Hemodynamics: Uneventful            Sign Out: Acceptable CV status; No obvious hypovolemia; No obvious fluid overload   Other NRE: NONE   DID A NON-ROUTINE EVENT OCCUR? No           Last vitals:  Vitals Value Taken Time   /82 05/11/22 1100   Temp 36.6  C (97.8  F) 05/11/22 1045   Pulse 92 05/11/22 1107   Resp 17 05/11/22 1107   SpO2 97 % 05/11/22 1107   Vitals shown include unvalidated device data.    Electronically Signed By: Edgardo Araujo MD  May 11, 2022  11:08 AM
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
## 2022-05-11 NOTE — INTERVAL H&P NOTE
"I have reviewed the surgical (or preoperative) H&P that is linked to this encounter, and examined the patient. There are no significant changes    Clinical Conditions Present on Arrival:  Clinically Significant Risk Factors Present on Admission                   # Obesity: Estimated body mass index is 34.93 kg/m  as calculated from the following:    Height as of this encounter: 1.575 m (5' 2\").    Weight as of this encounter: 86.6 kg (191 lb).       "

## 2022-05-11 NOTE — ANESTHESIA PROCEDURE NOTES
Airway       Patient location during procedure: OR       Procedure Start/Stop Times: 5/11/2022 7:41 AM  Staff -        Anesthesiologist:  Edgardo Araujo MD       CRNA: Shantell Hernandez APRN CRNA       Performed By: CRNA  Consent for Airway        Urgency: elective  Indications and Patient Condition       Indications for airway management: javier-procedural       Induction type:intravenous       Mask difficulty assessment: 2 - vent by mask + OA or adjuvant +/- NMBA    Final Airway Details       Final airway type: endotracheal airway       Successful airway: ETT - single  Endotracheal Airway Details        ETT size (mm): 7.0       Cuffed: yes       Successful intubation technique: video laryngoscopy       VL Blade Size: Fisher 3       Grade View of Cords: 1       Adjucts: stylet       Position: Right       Measured from: lips       Secured at (cm): 21       Bite block used: None    Post intubation assessment        Placement verified by: capnometry, equal breath sounds and chest rise        Number of attempts at approach: 1       Secured with: pink tape       Ease of procedure: easy       Dentition: Intact and Unchanged    Medication(s) Administered   Medication Administration Time: 5/11/2022 7:41 AM

## 2022-05-11 NOTE — PHARMACY-CONSULT NOTE
Bariatric Consult -  Laparoscopic sleeve gastrectomy    Medications evaluated as requested per Bariatric Consult.     No medication changes were needed based on the Bariatric Medication Management Policy.    The pharmacist will continue to follow as new medications are ordered.    Madeleine Guevara, AnthonyD, BCPS

## 2022-05-11 NOTE — PROGRESS NOTES
VSS. A/O. SBA. abdo incisions CDI. Voiding fine. Tolerating some bariatric clears, intermittent sight nausea.

## 2022-05-12 VITALS
DIASTOLIC BLOOD PRESSURE: 72 MMHG | TEMPERATURE: 98.9 F | RESPIRATION RATE: 16 BRPM | HEART RATE: 97 BPM | SYSTOLIC BLOOD PRESSURE: 109 MMHG | BODY MASS INDEX: 35.15 KG/M2 | WEIGHT: 191 LBS | HEIGHT: 62 IN | OXYGEN SATURATION: 93 %

## 2022-05-12 LAB
ANION GAP SERPL CALCULATED.3IONS-SCNC: 3 MMOL/L (ref 3–14)
CHLORIDE BLD-SCNC: 107 MMOL/L (ref 94–109)
CO2 SERPL-SCNC: 30 MMOL/L (ref 20–32)
GLUCOSE BLDC GLUCOMTR-MCNC: 100 MG/DL (ref 70–99)
HGB BLD-MCNC: 10.5 G/DL (ref 11.7–15.7)
PATH REPORT.COMMENTS IMP SPEC: NORMAL
PATH REPORT.COMMENTS IMP SPEC: NORMAL
PATH REPORT.FINAL DX SPEC: NORMAL
PATH REPORT.GROSS SPEC: NORMAL
PATH REPORT.RELEVANT HX SPEC: NORMAL
PHOTO IMAGE: NORMAL
PLATELET # BLD AUTO: 193 10E3/UL (ref 150–450)
POTASSIUM BLD-SCNC: 3.6 MMOL/L (ref 3.4–5.3)
POTASSIUM BLD-SCNC: 3.6 MMOL/L (ref 3.4–5.3)
SODIUM SERPL-SCNC: 140 MMOL/L (ref 133–144)

## 2022-05-12 PROCEDURE — 250N000011 HC RX IP 250 OP 636: Performed by: PHYSICIAN ASSISTANT

## 2022-05-12 PROCEDURE — 80051 ELECTROLYTE PANEL: CPT | Performed by: PHYSICIAN ASSISTANT

## 2022-05-12 PROCEDURE — 250N000013 HC RX MED GY IP 250 OP 250 PS 637: Performed by: PHYSICIAN ASSISTANT

## 2022-05-12 PROCEDURE — 36415 COLL VENOUS BLD VENIPUNCTURE: CPT | Performed by: PHYSICIAN ASSISTANT

## 2022-05-12 PROCEDURE — 84132 ASSAY OF SERUM POTASSIUM: CPT | Performed by: SURGERY

## 2022-05-12 PROCEDURE — 85049 AUTOMATED PLATELET COUNT: CPT

## 2022-05-12 PROCEDURE — 88300 SURGICAL PATH GROSS: CPT | Mod: 26 | Performed by: PATHOLOGY

## 2022-05-12 PROCEDURE — 250N000013 HC RX MED GY IP 250 OP 250 PS 637: Performed by: SURGERY

## 2022-05-12 PROCEDURE — 85018 HEMOGLOBIN: CPT | Performed by: PHYSICIAN ASSISTANT

## 2022-05-12 PROCEDURE — 258N000003 HC RX IP 258 OP 636: Performed by: PHYSICIAN ASSISTANT

## 2022-05-12 RX ORDER — OXYCODONE HYDROCHLORIDE 5 MG/1
5 TABLET ORAL
Qty: 15 TABLET | Refills: 0 | Status: SHIPPED | OUTPATIENT
Start: 2022-05-12 | End: 2022-06-01

## 2022-05-12 RX ORDER — ONDANSETRON 4 MG/1
4 TABLET, ORALLY DISINTEGRATING ORAL EVERY 6 HOURS PRN
Qty: 20 TABLET | Refills: 0 | Status: SHIPPED | OUTPATIENT
Start: 2022-05-12 | End: 2024-04-12

## 2022-05-12 RX ORDER — POTASSIUM CHLORIDE 20MEQ/15ML
40 LIQUID (ML) ORAL ONCE
Status: COMPLETED | OUTPATIENT
Start: 2022-05-12 | End: 2022-05-12

## 2022-05-12 RX ORDER — SIMETHICONE 40MG/0.6ML
40 SUSPENSION, DROPS(FINAL DOSAGE FORM)(ML) ORAL EVERY 6 HOURS PRN
Qty: 15 ML | Refills: 0 | Status: SHIPPED | OUTPATIENT
Start: 2022-05-12 | End: 2022-12-08

## 2022-05-12 RX ADMIN — OMEPRAZOLE 20 MG: 20 CAPSULE, DELAYED RELEASE ORAL at 07:05

## 2022-05-12 RX ADMIN — SODIUM CHLORIDE, POTASSIUM CHLORIDE, SODIUM LACTATE AND CALCIUM CHLORIDE: 600; 310; 30; 20 INJECTION, SOLUTION INTRAVENOUS at 10:07

## 2022-05-12 RX ADMIN — SODIUM CHLORIDE, POTASSIUM CHLORIDE, SODIUM LACTATE AND CALCIUM CHLORIDE: 600; 310; 30; 20 INJECTION, SOLUTION INTRAVENOUS at 01:23

## 2022-05-12 RX ADMIN — GABAPENTIN 300 MG: 300 CAPSULE ORAL at 09:18

## 2022-05-12 RX ADMIN — LOSARTAN POTASSIUM 50 MG: 50 TABLET, FILM COATED ORAL at 09:18

## 2022-05-12 RX ADMIN — POTASSIUM CHLORIDE 40 MEQ: 20 SOLUTION ORAL at 01:05

## 2022-05-12 RX ADMIN — KETOROLAC TROMETHAMINE 30 MG: 30 INJECTION, SOLUTION INTRAMUSCULAR; INTRAVENOUS at 04:59

## 2022-05-12 RX ADMIN — ENOXAPARIN SODIUM 40 MG: 40 INJECTION SUBCUTANEOUS at 09:18

## 2022-05-12 RX ADMIN — OXYCODONE HYDROCHLORIDE 5 MG: 5 TABLET ORAL at 07:05

## 2022-05-12 RX ADMIN — OXYCODONE HYDROCHLORIDE 5 MG: 5 TABLET ORAL at 01:06

## 2022-05-12 ASSESSMENT — ACTIVITIES OF DAILY LIVING (ADL)
ADLS_ACUITY_SCORE: 20

## 2022-05-12 NOTE — PLAN OF CARE
POD 0 Lap gastrectomy sleeve. Herniorrhaphy umbilical and lysis adhesions. Pt A&Ox4. VSS on RA. Abdominal incisions CDI. C/o of abdominal pain, managed with PRN oxycodone and scheduled toradol. Voiding adequately in BR. Tolerating sips of water. Denies nausea. Up SBA. Will cont to monitor.

## 2022-05-12 NOTE — PROGRESS NOTES
"[facility]  Bariatric Surgery Progress Note    Admission Date: 2022         Assessment and Plan:     Jeff Serra is a 47 year old female S/P Procedure(s):  LAPAROSCOPIC, GASTRECTOMY, SLEEVE  Laparoscopic herniorrhaphy umbilical  Laparoscopic lysis adhesions, 1 Day Post-Op.    - Continue bariatric clear liquid diet today.  - PO home meds restarted although antihypertensives held as patient is normotensive  - Daily Omeprazole  - Zofran for nausea prn  - Hgb pending, ok to start Lovenox  - Appreciate Pharmacy and Nutrition assistance  - Encourage ambulation and IS  - Plan for discharge to home later today if able to tolerate 500 ml of po liquids and pain controlled on PO medication.               Interval History:     Feels really good this morning  Pain controlled with oxycodone  UO adequate  Ambulating some.   Minimal nausea.  Tolerating bariatric clear liquids  Meds reviewed.                     Physical Exam:   Blood pressure 117/79, pulse 97, temperature 98.7  F (37.1  C), temperature source Oral, resp. rate 14, height 1.575 m (5' 2\"), weight 86.6 kg (191 lb), last menstrual period 2016, SpO2 95 %  Temperature Temp  Av.1  F (36.7  C)  Min: 97.2  F (36.2  C)  Max: 98.7  F (37.1  C)   I/O last 3 completed shifts:  In: 2180 [P.O.:300; I.V.:1880]  Out: 530 [Urine:525; Blood:5]  Constitutional:  Awake, alert, oriented, and in no apparent distress.   Lungs: No increased work of breathing, clear to auscultation bilaterally, no crackles or wheezing.   Cardiovascular: Regular rate and rhythm, and no murmur noted.   Abdomen: Soft, appropriately tender at incisions. Binder present.    Wounds: Clean, dry, and intact. No erythema or drainage.    Extremities: No edema or calf tenderness. +SCDs.          Data:     Results for orders placed or performed during the hospital encounter of 22 (from the past 24 hour(s))   Glucose by meter   Result Value Ref Range    GLUCOSE BY " METER POCT 100 (H) 70 - 99 mg/dL       Gerard Rodrigez PA-C  Office: [phone]  Pager: [phone]

## 2022-05-12 NOTE — PROGRESS NOTES
Pt. Stable for discharge.  AVS reviewed and sent with patient on discharge along with discharge medications.  IV out.   to provide transportation.

## 2022-05-12 NOTE — DISCHARGE SUMMARY
[facility] Discharge Summary    Jeff Serra MRN# [id number]   Age: 47 year old YOB: 1975     Date of Admission:  5/11/2022  Date of Discharge:  5/12/2022  Admitting Provider:  Alex Morelos MD  Discharging Service: Bariatric Surgery     Primary Provider: Jordyn Loredo  Primary Care Physician Phone Number: [phone]          Admission Diagnoses:   Principle Diagnosis: Morbid Obesity  Secondary Diagnoses: HTN, back pain associated with obesity         Discharge Diagnosis:     Morbid obesity          Procedures:   Procedure(s): Laparoscopic Sleeve Gastrectomy          Brief History of Illness:     Jeff Serra is a 47 year old-year-old female who underwent preoperative screening and thorough evaluation at the [facility] prior to bariatric surgery.  Surgical options were discussed. All questions were answered and she wished to proceed.          Hospital Course:     Jeff Serra's hospital course was unremarkable.  She recovered as anticipated and experienced no post-operative complications. Over the two day period She advanced per protocol and was tolerating a bariatric liquid diet appropriate for her post-operative status. Pain was controlled with with oral medications.  She was normotensive without HTN medications. Her two PTA antihypertensive meds will be held at discharge however she has a BP cuff at home and will check her BP.  She will resume losartan if SBP is typically over 140.  She will call or follow up in clinic with any questions. She was ambulating independently and remained afebrile. Criteria for discharge to home were met.  She verbalized understanding of all discharge instructions and felt comfortable with the discharge plan.  She was asked to call with any further questions or concerns.           Consultations:     Dietician          Image Results From This Hospital Stay:        None            Discharge Medications:     Current Discharge Medication List      START taking these medications    Details   ondansetron (ZOFRAN ODT) 4 MG ODT tab Take 1 tablet (4 mg) by mouth every 6 hours as needed for nausea or vomiting  Qty: 20 tablet, Refills: 0    Associated Diagnoses: S/P laparoscopic sleeve gastrectomy      simethicone (MYLICON) 40 MG/0.6ML suspension Take 0.6 mLs (40 mg) by mouth every 6 hours as needed for cramping  Qty: 15 mL, Refills: 0    Associated Diagnoses: S/P laparoscopic sleeve gastrectomy         CONTINUE these medications which have CHANGED    Details   omeprazole (PRILOSEC) 20 MG DR capsule Take 1 capsule (20 mg) by mouth every morning (before breakfast)  Qty: 90 capsule, Refills: 0    Associated Diagnoses: S/P laparoscopic sleeve gastrectomy      oxyCODONE (ROXICODONE) 5 MG tablet Take 1 tablet (5 mg) by mouth every 3 hours as needed  Qty: 15 tablet, Refills: 0    Associated Diagnoses: S/P laparoscopic sleeve gastrectomy         CONTINUE these medications which have NOT CHANGED    Details   albuterol (PROAIR HFA/PROVENTIL HFA/VENTOLIN HFA) 108 (90 Base) MCG/ACT inhaler Inhale 2 puffs into the lungs every 4 hours (while awake)  Qty: 18 g, Refills: 0    Comments: Pharmacy may dispense brand covered by insurance (Proair, or proventil or ventolin or generic albuterol inhaler)  Associated Diagnoses: Chest congestion; LRTI (lower respiratory tract infection)      !! gabapentin (NEURONTIN) 300 MG capsule Take 600 mg by mouth At Bedtime TAKE IN ADDITION TO AM DOSE FOR TOTAL 900MG DAILY.  (300MG X 2 = 600MG)      !! gabapentin (NEURONTIN) 300 MG capsule Take 300 mg by mouth every morning TAKE IN ADDITION TO PM DOSE FOR TOTAL 900MG DAILY.      loratadine (CLARITIN) 10 MG tablet TAKE 1 TABLET(10 MG) BY MOUTH DAILY AS NEEDED FOR ALLERGIES  Qty: 90 tablet, Refills: 3    Associated Diagnoses: Seasonal allergic rhinitis due to pollen      tiZANidine (ZANAFLEX) 4 MG tablet Take 1 tablet (4 mg) by mouth  "every 8 hours as needed for muscle spasms  Qty: 30 tablet, Refills: 0    Associated Diagnoses: Neck muscle spasm; Tension headache       !! - Potential duplicate medications found. Please discuss with provider.      STOP taking these medications       losartan (COZAAR) 50 MG tablet Comments:   Reason for Stopping:         triamterene-HCTZ (MAXZIDE-25) 37.5-25 MG tablet Comments:   Reason for Stopping:                    Condition on Discharge:      Discharge condition: Stable   Discharge vitals: Blood pressure 117/79, pulse 97, temperature 98.7  F (37.1  C), temperature source Oral, resp. rate 14, height 1.575 m (5' 2\"), weight 86.6 kg (191 lb), last menstrual period 09/12/2016, SpO2 95 %, not currently breastfeeding.           Discharge Disposition:     Discharged to home          Discharge Instructions and Follow-Up:        Jeff Serra was asked to follow up with the [facility] within one week.  She will return the following week for further counseling with a Registered Dietician.    After Care Instructions     Activity      Your activity upon discharge: Walk more than four times daily.  Resume normal daily activities as tolerated.         Diet      Follow this diet upon discharge: Follow post-surgical bariatric diet as instructed by [facility] dietician.                   Gerard Rodrigez PA-C  Office [phone]        "

## 2022-05-12 NOTE — PROGRESS NOTES
Problem: Obesity   POD:1  Surgery: gastric sleeve w/ lysis of adhesions  Vitals and oxygen: VSS on 1 L  Orientation: A & O x4  Activity Level: SBA  Diet: Clear bariatric   Incisions: Lap sites NAZARIO  Pain Management: Oxy PRN  RN managed electrolytes: Potassium replaced  Bowel: rounded, soft  Bowel sounds: hypoactive  Flatus: Not present  Drains/Tubes: PIV  IV fluids: LR @ 125 ml/hr    Pt put on ear pulse ox due to inaccurate capno respiration readings and long nails preventing finger pulse ox use. Potassium replaced overnight. Lab redraw in AM. Moderate pain controlled with oxy. Denies nausea. IV extravasion overnight, only IV fluids running. Ice pack applied and extremity elevated. New IV placed.

## 2022-05-12 NOTE — PROGRESS NOTES
Doing well  Discussed OR findings  tolerating oral intake and pain is well controlled  Home later today

## 2022-05-12 NOTE — CONSULTS
-NUTRITION EDUCATION    REASON FOR ASSESSMENT:  Bariatric Surgery Consult    CURRENT DIET:  Bariatric Clear Liquid    NUTRITION HISTORY:  Patient worked with clinical dietitian in [facility] prior to surgery to assist with lifestyle modification.    ANTHROPOMETRICS:   Ht: 62 inches  Wt: 86.6 kg  BMI: 34.93 kg/(m^2)  IBW: 50.1 kg  %IBW: 173%    ASSESSED NUTRITION NEEDS:  Energy Needs: 953-1212 kcals (11 - 14 kcal/kg ABW)                      Protein Needs: 50-75 g (1 - 1.5 gm/kg IBW)  Fluid Needs: 953-1212 mL (1mL/kcal/ABW)    Know patient will not meet assessed needs due to the restrictive nature of surgery.    NUTRITION DIAGNOSIS:   Food- and nutrition related knowledge deficit related to bariatric surgery as evidence by Laparoscopic Sleeve Gastrectomy surgery on 5/11/2022.    INTERVENTIONS:    Nutrition Prescription:    Recommended patient follow bariatric diet advancement for Laparoscopic Sleeve Gastrectomy    Implementation:    Nutrition Education (Content):  a) Reviewed Laparoscopic Sleeve Gastrectomy diet   b) Provided handouts:  Your Stage 2 Diet, Low-Fat Full Liquids and Supplements After Weight Loss Surgery    Nutrition Education (Application):  c) Discussed current eating habits and recommended alternative food choices  d) Patient verbalizes understanding of diet by listing appropriate foods for home    Anticipate good compliance    Diet Education - refer to Education Flowsheet    Goals:    Patient will follow bariatric diet advancement schedule    Patient will follow post-operative vitamin mineral schedule      Follow Up:    Patient to follow up in 2 weeks with RD in [facility] Clinic    Provided RD contact information for future questions      Pam Fernandez RD, LD  Clinical Dietitian

## 2022-05-12 NOTE — DISCHARGE INSTRUCTIONS
After Bariatric Surgery Discharge Instructions  [facility]     Note: Ask your nurse to order your medications from the pharmacy. Be sure you have your medications with you when you leave.    What should my diet be for the first 2 weeks after surgery?  Follow the bariatric diet the dietitian discussed with you.    How much fluid should I drink?  Strive for 48-64 ounces daily.  Carry a water bottle with you without a straw or sports top. Drink from it often.  Keep track of how much fluid you drink in a day.  Remember:  -Do not use straws, chew gum or suck on hard candies. They may cause painful gas.    -Sip, don't slurp when you drink.    -Practice small sips using a medicine cup for the first week postop.   -No ice or cold drinks. This could cause gas or spasms.   -No coffee, soda pop or drinks with caffeine. These may cause stomach pain.   -No alcohol. It is bad for your liver and will cause stomach pain.    How often should I do my deep breathing and coughing?  Use your incentive spirometer (small plastic breathing device) every hour while awake after you get home. Using the incentive spirometer helps you deep breath. Continuing to cough and deep breath will help prevent fevers and pneumonia.   If you do not have a fever after one week, you can stop using the incentive spirometer and discard it.     You can continue to take deep breaths without the incentive spirometer every one to two hours while awake for the month after surgery.    What kinds of activity can I do?  Get plenty of rest the first few days after surgery and try to balance rest and activity. You will need some time to recover - you may be more tired than you realize at first. You'll feel better and heal faster if you take good care of yourself.  For 4 weeks after surgery (Please review restrictions at your one week visit, they could change based on how well you are doing):  -Don't lift more than 20 pounds.    -Take 4-5 short walks every day.  -Don't jog, run, or do belly exercises.  Don't swim, bathe or use a hot tub until your cuts are healed (scabs are gone).  You may shower  Don't plan to fly or take a road trip within the first 1 to 3 months after surgery.  You could get a blood clot in your legs. If you must travel, get up and move around every hour for at least 5 minutes before continuing on your journey.  Your care team can help you decide when it's safe for you to travel.     What can I do for pain control?  You had major belly surgery that involves all layers of your belly muscles. Pain is expected, even for some as far out as 6-8 weeks after surgery. Moving, sneezing, coughing, and breathing will cause discomfort because these activities use your belly muscles.   Please see your after visit summary medication review for what pain medication will be continued, discontinued and newly started for you.    You can take opioid pain medicine if prescribed and if needed. Try to wean off from it as soon as you feel comfortable.   Do not drive while you are taking opioid pain medicine. This is dangerous.  You can take acetaminophen (Tylenol) between your prescribed pain medicine on a scheduled basis OR take it scheduled every 6 hours (check with your care team for specifics).  -Acetaminophen formulation options:    -Liquid   -Caplet (Cut caplet in half before taking)   -Do not take more than 3000 mg Tylenol in a 24 hour period.  -You may also take Tylenol for pain in place of the opioid pain medicine (check with your care team for specifics).   You can apply ice or heat to the affected area(s). Just remember to wrap the ice in something and limit icing sessions to 20 minutes. Excessive icing can irritate the skin or cause skin damage.  You can apply heat with a hot, wet towel or heating pad. Just like cold therapy, limit heat application to 20 minutes. Never sleep with a heating pad on. It could cause severe burns to  your skin.  Wear your binder to support your belly muscles if you have one.  Take this off a little more each day and try to be off completely by 2 weeks after surgery. If you don't need your binder for comfort or support, you don't need to wear it.   You may not be able to sleep in a comfortable position for a few weeks after surgery. This is normal. You may be more comfortable sleeping in a recliner or propped up with 3 pillows for the first couple of weeks after surgery.  Do not take NSAID's (Non-Steroidal Anti-Inflammatory Drugs) (examples: ibuprofen, Motrin, Advil, Aleve, and Naproxen), aspirin, or use pain patches with NSAID's. They will increase your risk of bleeding or getting an ulcer.    Please call the clinic for any of the following pain concerns, we would like to talk to you:  -pain that does not improve with rest  -pain that gets worse and worse  -pain that is not controlled by your pain medicine  -a sudden severe increase in pain    What medications will I need to take after surgery?  You may be discharged with the following types of medications: antacids, pain medications, anti-nausea medications, etc.  Please see your after visit summary medication review for what will be continued, discontinued and newly started.  It is important to reduce the amount of acid in your new stomach for a couple of months after surgery while it is still healing. We will prescribe an acid reducer or antacid. Take it as directed. This will help prevent ulcers, heartburn and acid reflux.  If you took an acid reducer before you had surgery, your care team will let you know what acid reducer you will take after surgery.   It is okay to swallow any medications smaller than   inch in size.   -If it is larger than   inch, it may need to be cut, crushed, or in a liquid form. Check with your care team about which way is most appropriate for you and your medications.    What should I know about my incisions (cuts)?  Your incisions  are covered with white steri strips or butterfly tape and have band aids or gauze over the top. If you have gauze or band aids, they can come off in the hospital.  Leave your steri strips on until they fall off on their own. If the steri strips don't fall off after 1 to 2 weeks, you can take them off. If they fall off earlier, replace them with clean band aids trying to avoid touching the incision itself.   If you have gauze covered by a clear dressing, remove 2-3 days after surgery or as directed by your care team.  You may shower in the hospital after surgery and can get your incision coverings wet.  Do not submerge in water (e.g. No baths, swimming pools, hot tubs) until your care team tells you it is okay and your incisions are completely healed.    Call the clinic if you have any of these signs or symptoms of infection:  -Redness around the site.  -Drainage that smells bad.  -Drainage that is thick yellow or green.  -An increase in pain around the incision site  -An increase in swelling around the incision site  -Heat or warmth around incision site   -Fever of 101.5  F (38.3  C) or higher when taken under the tongue.    -Chills    Will my urine or bowel movements change?   Your first bowel movements (stools) will likely be liquid. You may also notice old blood or a darker color (black or maroon color) in your bowel movements.  This is not unusual and usually goes away after the first week, if not sooner. You may not have a bowel movement for a week.   If you have not had a bowel movement for at least three days after your surgery date and are passing gas, you can use over the counter stool softeners.  Please stop taking the stool softeners and laxatives if your stools are loose.  Increasing fluids and activity as well as getting off narcotics will help prevent constipation.    Call the clinic if:   -You have stomach pain.   -You continue to have constipation.  -You have excessive bloating after walking and  "passing gas.    How can I prevent dehydration if I feel nauseated (sick to my stomach) and vomit (throw up)?   Vomiting is not normal after surgery. If you continue to have nausea and vomiting, call the clinic.   Nausea can be a sign of dehydration. That is why it is very important to track your fluids.  Do not nap more than one hour during the day. Set a timer to wake yourself up, if needed. Too much sleep will keep you from drinking enough fluid during the day and lead to dehydration.  No outside activity in hot, humid weather until you can drink 48 to 64 ounces of fluid in 24 hours. If you sweat a lot, your body may lose too much water.  Try to take a 1 ounce sip of water (one medicine cup) every 15 minutes.  Set a timer to remind yourself.    Call the clinic if you have any of these signs or symptoms of dehydration:  -Dark colored urine  -Urinating (pass water) less than 2-3 times per day  -Lack of energy  -Nausea  -Dizziness  -Headache    Call the clinic ANY TIME at [phone] if:  -Your pain medicine is not working.  -You have a fever ? 101.5 F.  -You have belly or left shoulder pain that gets worse and worse.  -You have a swollen leg with redness, warmth, or pain behind the knee or calf.  -You have chest pain   -You feel very short of breath.  -You have a sudden severe increase in heart rate.  -You have vomiting that gets worse and worse.  -You have constant nausea (feeling sick to your stomach) that does not go away with medication.  -You have trouble swallowing.  -You have an increasing feeling that \"something is not right\".  -You have hiccups that do not stop.  -You have any questions or concerns.    If you have to go to the Emergency Room, we prefer you go to the hospital that did your surgery. Please let them know that you had bariatric surgery and to notify your surgeon.    When should I go back to the clinic?  Follow up with your care team in 1-2 weeks.   If this appointment was not already made, " please call: [phone]

## 2022-05-13 ENCOUNTER — TELEPHONE (OUTPATIENT)
Dept: SURGERY | Facility: CLINIC | Age: 47
End: 2022-05-13
Payer: COMMERCIAL

## 2022-05-13 PROBLEM — K91.2 MALNUTRITION FOLLOWING GASTROINTESTINAL SURGERY: Status: ACTIVE | Noted: 2022-05-13

## 2022-05-13 PROBLEM — Z98.84 BARIATRIC SURGERY STATUS: Status: ACTIVE | Noted: 2022-05-13

## 2022-05-13 NOTE — TELEPHONE ENCOUNTER
Called pt to check on postop status.  Pt responses below:    Surgery Date: 5/11/22  Surgery Type: sleeve  Surgeon: NANI    Fluid Intake:   Pt first reports that even when she drinks small sips of water, she was feeling nauseated, she has not vomiting, but did vomit yesterday, she was discharged yesterday.  She said they sent her home with little med cups. Today she has drank:  4 oz of water total  2 oz of cranberry juice     She was sent her home with the zofran - taking 2x per day  Last took it 2 hours   The zofran helps   Not nauseated right now  Reports nausea is only when she sits up to drink and at the top of stomach - she feels acid reflux when she drinks   It lasts 20 minutes after she drinks     Took omeprazole this am    Not having heart burn right now    Last sip of water was  20 minutes ago    Asked pt to sit up and sip, discussed small sip, trickle down, pressure/burp back, repeat  Pt sipped an entire 1 oz med cup. She gets acid reflux but no nausea.   I advised for the next oz that she take 1/2 of 1 oz. She did not feel any reflux or nausea. Informed pt that I think she was taking too big of sips. Advised to drink 1/2 oz x2 every 15 minutes for the rest of the day. She is going to set an alarm for every 15 minutes. She thinks she can get in another 30 ounces over the next 7.5 hours before she goes to bed.     Discussed the improtance of getting 48 ounces daily. Pt verbalized understanding. She will aim to get 48 oz minimum over the weekend.     Pain Assessment:    Pain level: 5/10  Location: right side  Using oxycodone every 4 hours  Has not taken tizanidine   Tylenol 500mg 2 tablets TID  Ice: 20 mins on and off      Medications:  RX for PPI?  Omeprazole 20 mg daily  Do you understand how to take your medications postop?  yes  Reviewed postop med changes:  yes  START  ondansetron (ZOFRAN ODT) 4 MG ODT tab Take 1 tablet (4 mg) by mouth every 6 hours as needed for nausea or vomiting  Qty: 20 tablet,  Refills: 0     Associated Diagnoses: S/P laparoscopic sleeve gastrectomy       simethicone (MYLICON) 40 MG/0.6ML suspension Take 0.6 mLs (40 mg) by mouth every 6 hours as needed for cramping  Qty: 15 mL, Refills: 0     Associated Diagnoses: S/P laparoscopic sleeve gastrectomy       CONTINUE - BUT CHANGED  omeprazole (PRILOSEC) 20 MG DR capsule Take 1 capsule (20 mg) by mouth every morning (before breakfast)  Qty: 90 capsule, Refills: 0     Associated Diagnoses: S/P laparoscopic sleeve gastrectomy       oxyCODONE (ROXICODONE) 5 MG tablet Take 1 tablet (5 mg) by mouth every 3 hours as needed  Qty: 15 tablet, Refills: 0     Associated Diagnoses: S/P laparoscopic sleeve gastrectomy       STOP    losartan (COZAAR) 50 MG tablet Comments:   Reason for Stopping:            triamterene-HCTZ (MAXZIDE-25) 37.5-25 MG tablet      Last /78   - first time she checked today at 2:43 PM   - at 2:47 PM   - at 2:54 PM  Advised pt contact clinic if she experiences sustained eleated HR. Pt was moving around at first HR check.     Have you restarted all of the medications that weren't changed after surgery?  No - informed pt we advise she restarts all her home medications unless her pcp has told her not to.    CONTINUE these medications which have NOT CHANGED     Details   albuterol (PROAIR HFA/PROVENTIL HFA/VENTOLIN HFA) 108 (90 Base) MCG/ACT inhaler Inhale 2 puffs into the lungs every 4 hours (while awake)  Qty: 18 g, Refills: 0     Comments: Pharmacy may dispense brand covered by insurance (Proair, or proventil or ventolin or generic albuterol inhaler)  Associated Diagnoses: Chest congestion; LRTI (lower respiratory tract infection)       !! gabapentin (NEURONTIN) 300 MG capsule Take 600 mg by mouth At Bedtime TAKE IN ADDITION TO AM DOSE FOR TOTAL 900MG DAILY.  (300MG X 2 = 600MG)       !! gabapentin (NEURONTIN) 300 MG capsule Take 300 mg by mouth every morning TAKE IN ADDITION TO PM DOSE FOR TOTAL 900MG DAILY.        loratadine (CLARITIN) 10 MG tablet TAKE 1 TABLET(10 MG) BY MOUTH DAILY AS NEEDED FOR ALLERGIES  Qty: 90 tablet, Refills: 3     Associated Diagnoses: Seasonal allergic rhinitis due to pollen       tiZANidine (ZANAFLEX) 4 MG tablet Take 1 tablet (4 mg) by mouth every 8 hours as needed for muscle spasms  Qty: 30 tablet, Refills: 0     Associated Diagnoses: Neck muscle spasm; Tension headache         Do you have any questions about how to take your medications?  no    Diet: has only tried clears; discussed adding fulls when she can    Nausea: yes  When does it occur? Drinking too big of sip  Are you taking medications to help? zofran    Vomiting: not today    NSAIDs: no  Smoking: no    Fever: no    Incisions: has not looked,    BMs:  Last BM: prior to surgery  Color/consistency: na  Flatus: yes  Bloating/cramping:  no  Informed pt it is normal to have no stool for up to a week if passing gas and not having bloating/cramping.    Urinary:  Clear color    Sleeping/Rest: no issues    Activity: getting up and moving every hour    Activity caution:  Advised pt to be careful to avoid straining abdominal muscles during recovery. Is using abd binder.    Incentive Spirometer: not using at all - discussed using q 1 hr aiming for 2000ml    Other symptoms (CP/SOB/dizzy/rapid HR): no    Plan/Advice: 5/16 at 2:00 PM rn/PA-C  Advised pt to contact PCP and schedule hospital follow up visit within 1 week of discharge. Discuss medications or recommendations from discharging provider.  1 week followup appointment verified.   Use of incentive spirometer reinforced.  Call clinic with any questions or concerns.  Reviewed that clinic staff are available on call during nights and weekends for postoperative concerns.  Number given.    No further questions or concerns now. Pt agrees to call if having any questions or concerns.  Lovely Millard RN on 5/13/2022 at 2:53 PM

## 2022-05-13 NOTE — PROGRESS NOTES
[facility]   1 Week Surgical Follow-Up     PCP:  Jordyn Loredo    DOS: 05/11/22  Surgeon: LEL  Surgery Type: Sleeve     HISTORY OF PRESENT ILLNESS:  Jeff Serra returns today for her follow-up appointment status post bariatric surgery. Pt returns today for her follow-up appointment status post bariatric surgery.  [facility] hospital course was unremarkable.  Pt recovered as anticipated and experienced no post-operative complications.  On the date of discharge, the patient was discharged to home in stable condition and afebrile.    Her two PTA antihypertensive meds will be held at discharge however she has a BP cuff at home and will check her BP.  She will resume losartan if SBP is typically over 140. Was running 120/80's. And restarted then yesterday.    She is currently using 2 Oxycodone  and tylenol 1000 mg twice Daily for pain medication.  Refill Needed: No.  Patient's Pain Scale: 4. The pain is located on rt side of abd and epigastric region.  Occurring after drinking fluids. Causing her trouble with hydration.   Patient's current daily fluid intake in oz is: so far 14 oz today, 30 oz daily over the weekend, 40 oz friday.  Patient has started postoperative Vitamins: No.  Activity:   Activity: moving every hour around house, walked outside today 20  mins     REVIEW OF SYSTEMS:  GI:    Nausea: No  Vomiting: No  Diarrhea: No  Constipation: No  Last BM: prior to surgery  Dysphagia: No  GERD: Yes    CV/Pulmonary:   Chest Pain: No  Dizzy: No  Light Headed: No  SOB: No  Endo:  Diabetes: No  :    Contraception: hyst  Dysuria: No  Vascular:    LE Edema: No     Current Outpatient Medications   Medication     acetaminophen (TYLENOL) 500 MG tablet     gabapentin (NEURONTIN) 300 MG capsule     gabapentin (NEURONTIN) 300 MG capsule     hyoscyamine SL (LEVSIN/SL) 0.125 MG sublingual tablet     loratadine (CLARITIN) 10 MG tablet     losartan (COZAAR) 50 MG tablet     omeprazole (PRILOSEC) 20  MG DR capsule     oxyCODONE (ROXICODONE) 5 MG tablet     simethicone (MYLICON) 40 MG/0.6ML suspension     triamterene-HCTZ (MAXZIDE-25) 37.5-25 MG tablet     ondansetron (ZOFRAN ODT) 4 MG ODT tab     tiZANidine (ZANAFLEX) 4 MG tablet     No current facility-administered medications for this visit.      PHYSICAL EXAMINATION:    /75 (BP Location: Left arm, Patient Position: Sitting)   Pulse 84   Temp 98.1  F (36.7  C) (Oral)   Resp 17   Wt 188 lb 6.4 oz (85.5 kg)   LMP 09/12/2016 (Exact Date)   SpO2 100%   BMI 34.46 kg/m    GENERAL: No acute distress. Alert and oriented time 3.  HEART: Regular rate and rhythm. No murmurs, rubs or gallops  LUNGS:  Breathing unlabored. Clear to auscultation bilaterally.  ABDOMEN: Soft, incisions clean,dry, and intact. Tenderness WNL for post op.  EXTREMITIES: No lower extremity edema bilaterally. No calf swelling or tenderness. Negative Keiry's sign.  SKIN: No rashes.  PSYCHOLOGICAL: Stable. Pleasant     Assessment & Plan   Problem List Items Addressed This Visit     Hypertension goal BP (blood pressure) < 140/90     Discontinue Maxzide.  Can continue Cozaar but monitor BP.  If lightheadedness occurs stop Cozaar.             Relevant Medications    losartan (COZAAR) 50 MG tablet    Bariatric surgery status - Primary      5/11/2022 Gastric Sleve LEL           Malnutrition following gastrointestinal surgery     Continue taking recommended post-op vitamins.  Labs to be ordered per protocol at 3 mo po.                Other Visit Diagnoses     Primary hypertension        Relevant Medications    losartan (COZAAR) 50 MG tablet    Abdominal spasms        Relevant Medications    acetaminophen (TYLENOL) 500 MG tablet    hyoscyamine SL (LEVSIN/SL) 0.125 MG sublingual tablet         PLAN:  Patient Instructions:  Discontinue Maxzide.    Can continue Cozaar but monitor BP.  If lightheadedness occurs stop Cozaar.    Start Hycoasamine 0.125 mg every 4 hours for spasms.    Strive to drink  48 oz of fluid daily or more.   Can use Miralax or 4 oz of prune juice for constipation.  Try to decrease oxycodone use as soon as you can, this will help with constipation.    GENERAL POST OP GUIDELINES  Hydration:  Strive to drink 64 oz of fluid daily  Diet:   Advance diet per Dietitian at your 2 week appointment. Start recommended postoperative vitamins within in the first 6 weeks.  Movement:  Strive to move hourly while awake to decrease risk of developing a blood clot. Continue to do deep breathing exercises twice daily or use your spirometer.  Pain:  Can use tylenol 1000 mg scheduled three times a day for pain. If you have Narcotic pain medication take only as needed. Start to decrease use and stop by end of week 2.  Ice packs and heat are helpful for abdominal pain and muscle strains. Wear binder to support abdominal muscles and gradually wean off over the next few weeks.   Meds: Continue with recommended antacid medication for the next 3 months.   FOLLOW UP:  Return to clinic for 2 wk post op appointment and bring post op vitamins along.  Make follow up appointment to meet with psychologist at 1 and 3 months post operatively. Follow up with your primary care provider to discuss obesity related conditions by one month post op.   Call [phone] or [employer] with questions or concerns at any time.

## 2022-05-16 ENCOUNTER — TELEPHONE (OUTPATIENT)
Dept: FAMILY MEDICINE | Facility: CLINIC | Age: 47
End: 2022-05-16
Payer: COMMERCIAL

## 2022-05-16 ENCOUNTER — OFFICE VISIT (OUTPATIENT)
Dept: SURGERY | Facility: CLINIC | Age: 47
End: 2022-05-16
Payer: COMMERCIAL

## 2022-05-16 ENCOUNTER — OFFICE VISIT (OUTPATIENT)
Dept: SURGERY | Facility: CLINIC | Age: 47
End: 2022-05-16

## 2022-05-16 VITALS
RESPIRATION RATE: 17 BRPM | HEART RATE: 84 BPM | SYSTOLIC BLOOD PRESSURE: 114 MMHG | OXYGEN SATURATION: 100 % | WEIGHT: 188.4 LBS | TEMPERATURE: 98.1 F | DIASTOLIC BLOOD PRESSURE: 75 MMHG | BODY MASS INDEX: 34.46 KG/M2

## 2022-05-16 DIAGNOSIS — E66.09 CLASS 1 OBESITY DUE TO EXCESS CALORIES WITH BODY MASS INDEX (BMI) OF 34.0 TO 34.9 IN ADULT: ICD-10-CM

## 2022-05-16 DIAGNOSIS — Z98.84 BARIATRIC SURGERY STATUS: Primary | ICD-10-CM

## 2022-05-16 DIAGNOSIS — R10.9 ABDOMINAL SPASMS: ICD-10-CM

## 2022-05-16 DIAGNOSIS — K91.2 MALNUTRITION FOLLOWING GASTROINTESTINAL SURGERY: ICD-10-CM

## 2022-05-16 DIAGNOSIS — E66.811 CLASS 1 OBESITY DUE TO EXCESS CALORIES WITH BODY MASS INDEX (BMI) OF 34.0 TO 34.9 IN ADULT: ICD-10-CM

## 2022-05-16 DIAGNOSIS — I10 PRIMARY HYPERTENSION: ICD-10-CM

## 2022-05-16 DIAGNOSIS — I10 HYPERTENSION GOAL BP (BLOOD PRESSURE) < 140/90: ICD-10-CM

## 2022-05-16 PROCEDURE — 99024 POSTOP FOLLOW-UP VISIT: CPT | Performed by: PHYSICIAN ASSISTANT

## 2022-05-16 PROCEDURE — 99207 PR NO CHARGE NURSE ONLY: CPT

## 2022-05-16 RX ORDER — ACETAMINOPHEN 500 MG
1000 TABLET ORAL 2 TIMES DAILY PRN
COMMUNITY
End: 2024-04-12

## 2022-05-16 RX ORDER — TRIAMTERENE/HYDROCHLOROTHIAZID 37.5-25 MG
1 TABLET ORAL DAILY
COMMUNITY
End: 2022-06-13

## 2022-05-16 RX ORDER — LOSARTAN POTASSIUM 50 MG/1
50 TABLET ORAL DAILY
COMMUNITY
End: 2022-06-13

## 2022-05-16 ASSESSMENT — PAIN SCALES - GENERAL: PAINLEVEL: MODERATE PAIN (4)

## 2022-05-16 NOTE — PATIENT INSTRUCTIONS
Today you were seen for your 1 week post op.    Patient Instructions:  Discontinue Maxzide.    Can continue Cozaar but monitor BP.  If lightheadedness occurs stop Cozaar.    Start Hycoasamine 0.125 mg every 4 hours for spasms.    Strive to drink 48 oz of fluid daily or more.   Can use Miralax or 4 oz of prune juice for constipation.  Try to decrease oxycodone use as soon as you can, this will help with constipation.     Return to clinic: in 1 week for diet/nurse visit.  Bring post op vitamins along.  Call with questions or concerns at any time. [phone]    GENERAL POST OP GUIDELINES    2 most important things to remember for the 1st month is to DRINK and MOVE.     Drink-   Keep a water bottle with you throughout the day (Have 20 oz down by lunch, supper, bedtime)  Sip on fluids every 15 minutes or more  Goal 50-60 oz of fluid daily  YOU DO NOT HAVE TO SEPARATE FLUIDS WITH YOUR LIQUID MEALS. (A liquid is a liquid)    Move-   Move in house every hour while awake to decrease risk of developing a blood clot.  Activity: Start 5 minutes of walking to start and increase when able    Continue to do deep breathing exercises twice daily or use your spirometer to avoid pneumonia.  Wear binder to support abdominal muscles if desired and gradually wean off over the next few weeks.       Medications:   Continue with recommended antacid medication for the next 3 months.   Postoperative vitamins within in the first 6 weeks.     Start recommended vitamins in the first 6 weeks.   Introduce them 1 at a time.   Diet:    Continue on liquid diet  You'll advance your diet per Dietitian at your 2 week appointment.     Pain Management:  You can take Acetaminophen (Tylenol) for pain. Max amount 3000 mg per day.  1000 mg three times as needed daily or 650 mg as needed 4 times daily     Follow up:    Return to clinic at 2 weeks, 4 weeks, 3 mo, 6 mo, and annually  Make follow up appointment to meet with psychologist at 1 and 3 months post  operatively.   Follow up with your primary care provider to discuss obesity related conditions by one month post op.

## 2022-05-16 NOTE — PROGRESS NOTES
Patient seen by provider in clinic who completed assessment.      Bowel regimen sent to pt via .    Lovely Millard RN on 5/16/2022 at 3:07 PM

## 2022-05-16 NOTE — TELEPHONE ENCOUNTER
Completed form from [facility] faxed to [phone] and placed in abstraction bin for scanning into patients chart

## 2022-05-16 NOTE — ASSESSMENT & PLAN NOTE
Attempted to call patient, left VM for patient to return call.   Discontinue Maxzide.  Can continue Cozaar but monitor BP.  If lightheadedness occurs stop Cozaar.

## 2022-05-18 ENCOUNTER — TELEPHONE (OUTPATIENT)
Dept: SURGERY | Facility: CLINIC | Age: 47
End: 2022-05-18
Payer: COMMERCIAL

## 2022-05-18 NOTE — PROGRESS NOTES
"BARIATRIC PROGRESS NOTE - 2 Week Post Op  DATE OF VISIT: May 18, 2022    Jeff Serra  1975  female  [id number]  47 year old    ASSESSMENT:    REASON FOR VISIT:  Jeff Serra is a 47 year old year old female presents today for a 2 Week Post Op nutrition follow-up appointment. Patient is accompanied by self      DIAGNOSIS:  Status: post gastric sleeve surgery.   Obesity Grade I BMI 30-34.9    ANTHROPOMETRICS:  Height: 61\"   Initial weight: 196 lb    Current Weight: 186.3 lb    BMI: 34.07  kg/(m^2).    VITAMINS AND MINERALS:  Has not started any of her vitamins/ did not bring vitamin bottles (prefers to buy in a store [employer])     Was taking prior to surgery  1 Multivitamin with Minerals-Women's formula (am)  400 mg Calcium with Vitamin D 3 times per day (night) (petites)  10,000 International units Vitamin D  No Iron needed per clinic      NUTRITION HISTORY:  Tolerating diet: Bariatric full liquid diet  Fluids/water intake: 48 ounces/day (water, low sugar cran grape juice, protein drink 2X in the past 2 weeks)  Small bites: Yes  Portion size:   20-30 minute meals: Yes  Breakfast: 1/2 cup chicken broth  Lunch: 1/2 cup sugar free gelatin  Dinner: 1/2 cup yogurt or 1/2 cup chicken broth  Snacks: 1st week protein drink 2X  Nausea: one time-on chicken noodle soup  Vomiting: no  Constipation: no  Additional Information: Does not like sugar free pudding, cream soup. Does not tolerate lactose, but has not looked into lactose free protein drinks. She said she eats what her spouse make for her meals. Discussed use of plant based protein drink sand offered suggestions or use of protein water. Patient seemed surprised that she should be trying to include food that are rich in protein.      PHYSICAL ACTIVITY:  Type: walking indoors  Frequency: 7 days a week.  Duration: 15-20  minutes.    DIAGNOSIS:     Current Nutrition Diagnosis: Altered gastrointestinal function related to alternation in gastrointestinal " structure as evidenced by history of gastric sleeve.     INTERVENTION  Nutrition Prescription: Recommended bariatric puree diet.    Goals:  Start puree diet at day 14 post op./ Do not jump ahead on textures  GraduallyStart 2 MVI, 1678-3570 mg calcium with vitamin D, 500 mcg vitamin B12, vitamin D (per provider), and 100 mg thiamin (vitamin B-1) one time per week  Have at least 1 protein drink per day  Aim for 60 to 90 grams protein.  Continue 48 to 64 oz of fluid per day.    Implementation:  Discussed transition to puree diet.  Emphasized importance of adequate protein.  Reviewed required vitamins and mineral supplements.  Verbalizes fair-good understanding of surgery diet guidelines.  Assessed learning needs and learning preferences.      NUTRITION MONITORING AND EVALUATION:   Monitor diet tolerance and weight loss.      Anticipated Compliance: fair  Follow Up: Continue to monitor patient closely regarding weight loss and diet.  At 4 weeks Post Op    TIME SPENT WITH PATIENT: 26 minutes.  Mckay Marie RD, LD  [facility]

## 2022-05-18 NOTE — TELEPHONE ENCOUNTER
PLAN per D 5/16:  Patient Instructions:  Discontinue Maxzide.    Can continue Cozaar but monitor BP.  If lightheadedness occurs stop Cozaar.    Start Hycoasamine 0.125 mg every 4 hours for spasms.    Strive to drink 48 oz of fluid daily or more.   Can use Miralax or 4 oz of prune juice for constipation.  Try to decrease oxycodone use as soon as you can, this will help with constipation.      Called pt to see how she was doing.    Pt reports that her  just picked up hyoscyamine yesterday so she started that today and it is starting to kick in. She reports it was still hurting when she was drinking yesterday. She was only able to get 38 oz in yesterday (water and juice).      Today so far pt has gotten in 10 oz and says she is not having anymore discomfort. She thinks she can get to 48 oz today. She is also going to try some soup today - see how that goes.    Has not had bm yet - discussed limiting oxy. Pt reports she is doing that and only took 1 oxy yesterday. Has not needed to take any oxy today. Rates her discomfort 4/10 - has been using tylenol for pain. She has not tried miralax or prune juice. Her  is going to pick that up. Advised she take a look at [facility] msg from 5/16 with bowel regimen. Pt agreed.     Pt says she did stop taking the maxzide. She has not checked bp today - she is now checking it.  At 9:57 AM it was 115/82    Has not made appt with PCP yet - advised she do that today. Pt agreed.    Pt did not have any other questions or concerns.  She will call if she has not gotten 48 oz daily.    Lovely Millard RN on 5/18/2022 at 10:04 AM

## 2022-05-20 ENCOUNTER — TELEPHONE (OUTPATIENT)
Dept: SURGERY | Facility: CLINIC | Age: 47
End: 2022-05-20
Payer: COMMERCIAL

## 2022-05-20 NOTE — TELEPHONE ENCOUNTER
Called pt to see how she is doing with fluid intake. Started levsin a few days ago.    No answer  Left VM to call clinic, number provided    Lovely Millard RN on 5/20/2022 at 10:28 AM

## 2022-05-25 ENCOUNTER — OFFICE VISIT (OUTPATIENT)
Dept: SURGERY | Facility: CLINIC | Age: 47
End: 2022-05-25
Payer: COMMERCIAL

## 2022-05-25 VITALS
HEART RATE: 82 BPM | TEMPERATURE: 98.3 F | WEIGHT: 186.3 LBS | OXYGEN SATURATION: 100 % | SYSTOLIC BLOOD PRESSURE: 125 MMHG | DIASTOLIC BLOOD PRESSURE: 86 MMHG | BODY MASS INDEX: 34.07 KG/M2

## 2022-05-25 DIAGNOSIS — E66.9 OBESITY (BMI 30-39.9): ICD-10-CM

## 2022-05-25 DIAGNOSIS — E66.09 OTHER OBESITY DUE TO EXCESS CALORIES: ICD-10-CM

## 2022-05-25 DIAGNOSIS — Z98.84 BARIATRIC SURGERY STATUS: Primary | ICD-10-CM

## 2022-05-25 PROCEDURE — 97803 MED NUTRITION INDIV SUBSEQ: CPT | Performed by: DIETITIAN, REGISTERED

## 2022-05-25 PROCEDURE — 99207 PR NO CHARGE NURSE ONLY: CPT

## 2022-05-25 ASSESSMENT — PAIN SCALES - GENERAL: PAINLEVEL: MODERATE PAIN (4)

## 2022-05-25 NOTE — PROGRESS NOTES
[facility]  2 Week Surgical Follow-Up    HISTORY OF PRESENT ILLNESS:  The patient returns today for two week follow-up appointment status post gastric sleeve  The patient is accompanied by self.   The patient is drinking 48 oz of fluid daily, including water, sf juice.    Pain:    The patient is currently using Tylenol 1000mg BID and oxycodone 5 mg daily for pain medication.  The patient rates pain at a 4/10 (with activity) 2/10 (at rest)  on the pain scale. The pain is located in right abdomen.      Activity:  The patient is considered a fall risk:  No. Interventions to secure the patient's safety are in place.  What are you doing for physical activity?  walking  How many days per week?  everyday  How many minutes per day?  every hour, moving around  Review of Systems:  GI:    Nausea occurs rarely.  Occurred last evening when she tried chicken noodle soup. Alleviated by walking around.          Vomiting occurs never.    GERD occurs never.  Patient is currently taking Omeprazole 20 mg daily.           Diarrhea occurs never.  Patient's last bowel movement was sunday, with the color noted as brown.            Constipation occurs never.             CV/Pulmonary:   Dizziness occurs never.     Shortness of Breath occurs never.  Chest pain occurs never.    Vascular:    Leg swelling none.     Keiry's Negative     :  Form of birth control is hyst.    PHYSICAL EXAMINATION:    VITALS:  See Epic for VS.  LUNGS:  clear to auscultation  HEART:  Apical pulse strong, regular.  ABDOMEN:  Abdomen soft, non-tender. BS normal.   INCISIONS:  dry and intact.  Steristrips removed.  Adhesive remover given to patient.    MEDICATIONS/ALLERGIES REVIEWED:  Yes    ASSESSMENT:    1.  2 weeks status post gastric sleeve surgery.    PLAN:    Increase activity per physical therapist recommendations today.   Make follow up appointment to meet with psychologist and 1 month post operatively.   Follow up with your primary care  provider to obesity related conditions by one month post op.   Advance diet per Dietitian at your 2 week appointment.   Start recommended postoperative vitamins within in the first 6 weeks per Dietitian  Strive to drink 64 oz of fluid daily.  Wear binder to support abdominal muscles and gradually wean off over the next few weeks.   Continue to do deep breathing exercises twice daily or use your spirometer.   Return to clinic in 4 weeks for nutrition class.  Return to clinic postop month 3.  Call with questions or concerns at any time.    INTERVENTIONS:      Symptoms given re: signs and symptoms of infection and when to call clinic. Patient verbalized understanding.    Patient exercise/activity restrictions reviewed; exercise handout given.  Exercise plan postop is walking, home gym equipment.  Reviewed when patient can return to bathing and swimming.  Deferred vitamin timing to RD.  Reviewed signs/symptoms of DVT with patient.  Patient return to work date is 6/6/22.  Reviewed FMLA.  No further forms needed at this time. Pt to contact clinic if she does.    No further needs or questions at this time.  Patient agrees to call clinic with any questions or concerns prior to next appointment.    Lovely Millard RN on 5/25/2022 at 9:10 AM

## 2022-06-01 ENCOUNTER — TELEPHONE (OUTPATIENT)
Dept: SURGERY | Facility: CLINIC | Age: 47
End: 2022-06-01
Payer: COMMERCIAL

## 2022-06-01 NOTE — TELEPHONE ENCOUNTER
Called pt to see how she is doing. Pt reports she is getting at least 48 oz of fluid in daily. She is doing a little bit better with the food part - tolerating the purreed diet. Is tolerating SF ice cream, yogurt, pureed chili.  Getting 1 protein shake in per day.  Pt did not have any further questions or concerns. She knows to call if she does.    Lovely Millard RN on 6/1/2022 at 11:48 AM

## 2022-06-13 ENCOUNTER — VIRTUAL VISIT (OUTPATIENT)
Dept: FAMILY MEDICINE | Facility: CLINIC | Age: 47
End: 2022-06-13
Payer: COMMERCIAL

## 2022-06-13 DIAGNOSIS — Z98.84 BARIATRIC SURGERY STATUS: ICD-10-CM

## 2022-06-13 DIAGNOSIS — I10 HYPERTENSION GOAL BP (BLOOD PRESSURE) < 140/90: Primary | ICD-10-CM

## 2022-06-13 DIAGNOSIS — F11.90 CHRONIC, CONTINUOUS USE OF OPIOIDS: ICD-10-CM

## 2022-06-13 PROCEDURE — 99213 OFFICE O/P EST LOW 20 MIN: CPT | Mod: 95 | Performed by: FAMILY MEDICINE

## 2022-06-13 RX ORDER — LOSARTAN POTASSIUM 50 MG/1
50 TABLET ORAL DAILY
Qty: 90 TABLET | Refills: 1 | Status: SHIPPED | OUTPATIENT
Start: 2022-06-13 | End: 2023-01-18

## 2022-06-13 ASSESSMENT — ANXIETY QUESTIONNAIRES
GAD7 TOTAL SCORE: 0
1. FEELING NERVOUS, ANXIOUS, OR ON EDGE: NOT AT ALL
IF YOU CHECKED OFF ANY PROBLEMS ON THIS QUESTIONNAIRE, HOW DIFFICULT HAVE THESE PROBLEMS MADE IT FOR YOU TO DO YOUR WORK, TAKE CARE OF THINGS AT HOME, OR GET ALONG WITH OTHER PEOPLE: NOT DIFFICULT AT ALL
4. TROUBLE RELAXING: NOT AT ALL
GAD7 TOTAL SCORE: 0
5. BEING SO RESTLESS THAT IT IS HARD TO SIT STILL: NOT AT ALL
3. WORRYING TOO MUCH ABOUT DIFFERENT THINGS: NOT AT ALL
6. BECOMING EASILY ANNOYED OR IRRITABLE: NOT AT ALL
2. NOT BEING ABLE TO STOP OR CONTROL WORRYING: NOT AT ALL
7. FEELING AFRAID AS IF SOMETHING AWFUL MIGHT HAPPEN: NOT AT ALL

## 2022-06-13 NOTE — PROGRESS NOTES
"Jeff is a 47 year old who is being evaluated via a billable video visit.      How would you like to obtain your AVS? MyChart  If the video visit is dropped, the invitation should be resent by: Text to cell phone: [phone]  Will anyone else be joining your video visit? No      Video Start Time: 0818    Assessment & Plan     Hypertension goal BP (blood pressure) < 140/90  Need to restart blood pressure medications postoperatively for bariatric surgery.  She tells me diastolic is still running a bit high though systolic tends to be in the 120s or low one thirds.  I think we need caution with this as she is continuing to lose weight post surgery but also her nutritional intake is not real great right now.  Not having any swelling in her legs so I think we will hold off on the diuretic at this time and just restart losartan.  She does have a blood pressure cuff at home and will follow readings and contact me in 2 to 3 weeks with progress.  Then we can make further decisions over time.  - losartan (COZAAR) 50 MG tablet; Take 1 tablet (50 mg) by mouth daily Restarted 5/15/22    Bariatric surgery status  Continue to work with surgery and nutrition to try to increase her intake.  Has a nutrition appointment tomorrow.    Chronic, continuous use of opioids  Was sent a controlled substance agreement to mail back and she will do so.  Otherwise up-to-date on routine monitoring.         BMI:   Estimated body mass index is 34.07 kg/m  as calculated from the following:    Height as of 5/11/22: 1.575 m (5' 2\").    Weight as of 5/25/22: 84.5 kg (186 lb 4.8 oz).       See Patient Instructions    Return in about 3 weeks (around 7/4/2022) for Contact me with mariza [employer] message.    Jordyn Loredo MD  [facility]   Jeff is a 47 year old who presents for the following health issues     HPI      Hypertension Follow-up      Do you check your blood pressure regularly outside of the " clinic? Yes     Are you following a low salt diet? Yes    Are your blood pressures ever more than 140 on the top number (systolic) OR more   than 90 on the bottom number (diastolic), for example 140/90? No      How many servings of fruits and vegetables do you eat daily?  2-3    On average, how many sweetened beverages do you drink each day (Examples: soda, juice, sweet tea, etc.  Do NOT count diet or artificially sweetened beverages)?   0    How many days per week do you exercise enough to make your heart beat faster? 3 or less    How many minutes a day do you exercise enough to make your heart beat faster? 9 or less    How many days per week do you miss taking your medication? 0    Video visit with patient today to follow-up on hypertension status post bariatric surgery.  Still struggling to get a lot of nutritional intake.    Review of Systems   Constitutional, HEENT, cardiovascular, pulmonary, gi and gu systems are negative, except as otherwise noted.      Objective    Vitals - Patient Reported  Pain Score: Severe Pain (6)  Pain Loc: Abdomen        Physical Exam   GENERAL: Healthy, alert and no distress  EYES: Eyes grossly normal to inspection.  No discharge or erythema, or obvious scleral/conjunctival abnormalities.  RESP: No audible wheeze, cough, or visible cyanosis.  No visible retractions or increased work of breathing.    SKIN: Visible skin clear. No significant rash, abnormal pigmentation or lesions.  NEURO: Cranial nerves grossly intact.  Mentation and speech appropriate for age.  PSYCH: Mentation appears normal, affect normal/bright, judgement and insight intact, normal speech and appearance well-groomed.    Past labs reviewed with the patient.             Video-Visit Details    Type of service:  Video Visit    Video End Time:0825    Originating Location (pt. Location): Home    Distant Location (provider location):  [facility]     Platform used for Video Visit: Synt[employer]ia

## 2022-06-23 ENCOUNTER — TELEPHONE (OUTPATIENT)
Dept: FAMILY MEDICINE | Facility: CLINIC | Age: 47
End: 2022-06-23

## 2022-06-23 NOTE — TELEPHONE ENCOUNTER
Form received from [facility].  Placed on Dr. Loredo's desk for signature.

## 2022-06-29 ENCOUNTER — MYC REFILL (OUTPATIENT)
Dept: FAMILY MEDICINE | Facility: CLINIC | Age: 47
End: 2022-06-29

## 2022-06-29 DIAGNOSIS — G89.29 CHRONIC MIDLINE LOW BACK PAIN WITHOUT SCIATICA: ICD-10-CM

## 2022-06-29 DIAGNOSIS — M54.50 CHRONIC MIDLINE LOW BACK PAIN WITHOUT SCIATICA: ICD-10-CM

## 2022-06-29 DIAGNOSIS — F11.90 CHRONIC, CONTINUOUS USE OF OPIOIDS: ICD-10-CM

## 2022-06-29 RX ORDER — OXYCODONE HYDROCHLORIDE 15 MG/1
15 TABLET ORAL 3 TIMES DAILY PRN
Qty: 90 TABLET | Refills: 0 | Status: SHIPPED | OUTPATIENT
Start: 2022-06-29 | End: 2022-07-29

## 2022-07-29 ENCOUNTER — MYC REFILL (OUTPATIENT)
Dept: FAMILY MEDICINE | Facility: CLINIC | Age: 47
End: 2022-07-29

## 2022-07-29 DIAGNOSIS — F11.90 CHRONIC, CONTINUOUS USE OF OPIOIDS: ICD-10-CM

## 2022-07-29 DIAGNOSIS — M54.50 CHRONIC MIDLINE LOW BACK PAIN WITHOUT SCIATICA: ICD-10-CM

## 2022-07-29 DIAGNOSIS — G89.29 CHRONIC MIDLINE LOW BACK PAIN WITHOUT SCIATICA: ICD-10-CM

## 2022-07-29 RX ORDER — OXYCODONE HYDROCHLORIDE 15 MG/1
15 TABLET ORAL 3 TIMES DAILY PRN
Qty: 90 TABLET | Refills: 0 | Status: SHIPPED | OUTPATIENT
Start: 2022-07-29 | End: 2022-08-29

## 2022-08-05 ENCOUNTER — TELEPHONE (OUTPATIENT)
Dept: SURGERY | Facility: CLINIC | Age: 47
End: 2022-08-05

## 2022-08-10 NOTE — PROGRESS NOTES
"BARIATRIC FOLLOW UP      8/11/2022    HISTORY OF PRESENT ILLNESS: Pt presents today for her follow-up appointment status post laparoscopic gastric sleeve.     Assessment & Plan   Problem List Items Addressed This Visit     Class 1 obesity due to excess calories with serious comorbidity and body mass index (BMI) of 30.0 to 30.9 in adult     Patient was congratulated on wt loss success thus far. Healthy habits to assist with further weight loss were discussed.              Bariatric surgery status - Primary      5/11/2022 Gastric Sleve LEL    We reviewed the important post op bariatric recommendations:  -eating 3 meals daily  -eating protein first, getting >60gm protein daily  -eating slowly, chewing food well  -avoiding/limiting calorie containing beverages  -avoiding fluids 30 minutes before, during, and after meals  -limiting restaurant or cafeteria eating to twice a week or less    Jeff was reminded that, to avoid marginal ulcers she should avoid tobacco at all, alcohol in excess, caffeine, and NSAIDS (unless indicated for cardioprotection or othewise and opposed by a PPI).    She was encouraged to establish and maintain a consistent physical activity routine, 6-8 hours of restorative sleep each night and optimal stress management.    Jeff was reminded about the importance of life long vitamin supplementation and life long follow up.                 Postsurgical malabsorption     Reviewed recommended post-op vitamins and ordered today  Labs ordered per protocol.             Relevant Medications    Calcium Carb-Cholecalciferol (CALCIUM 600 + D) 600-400 MG-UNIT TABS per tablet    cholecalciferol 125 MCG (5000 UT) CAPS    tuberculin-allergy syringes 27G X 1/2\" 1 ML MISC    cyanocobalamin (CYANOCOBALAMIN) 1000 MCG/ML injection    Multiple Vitamins-Iron (QC DAILY MULTIVITAMINS/IRON) TABS    thiamine (B-1) 100 MG tablet    Other Relevant Orders    Vitamin A    Vitamin D Screen    Parathyroid Hormone Intact    " Iron and Iron Binding Capacity    Ferritin    Vitamin B12           PATIENT INSTRUCTIONS:  Start omeprazole 20 mg daily to help with heartburn  Continue your bariatric vitamins   Switch to 1000 mcg B12 injection monthly.  Have labs drawn  Start walking 3x a week on the treadmill    FOLLOW-UP:  Return to clinic in 3 months.     30 minutes spent on the date of the encounter doing chart review, history and exam, result review, counseling, developing plan of care, documentation, and further activities as noted      INTERVAL HX: Jeff is 3 months post op.    WEIGHT METRICS:  Body mass index is 30.27 kg/m .   Current Weight: 165 lb 8 oz (75.1 kg)  Last Visits Weight: 186 lb 6.4 oz (84.6 kg)  Initial Weight (lbs): 196.6 lbs  Cumulative weight loss (lbs): 31.1  Weight Loss Percentage: 15.82%    Wt Readings from Last 10 Encounters:   08/11/22 165 lb 8 oz (75.1 kg)   08/11/22 165 lb 8 oz (75.1 kg)   05/25/22 186 lb 4.8 oz (84.5 kg)   05/16/22 188 lb 6.4 oz (85.5 kg)   05/11/22 191 lb (86.6 kg)   05/05/22 195 lb 4.8 oz (88.6 kg)   02/23/22 198 lb (89.8 kg)   01/21/22 198 lb (89.8 kg)   12/22/21 204 lb 6 oz (92.7 kg)   11/01/21 203 lb (92.1 kg)        VITAMINS:  Patient is taking the following bariatric postoperative vitamins:  2 Multivitamin with Minerals (afternoon)  (pt unsure of dose)  mg Calcium With Vitamin D (AM + PM)  1000 International units Vitamin D (AM)    EXERCISE:  None currently. Will start walking      OBESITY RELATED CONDITIONS:  HTN- improved on Losartan    EATING HABITS:  How many meals do you eat per day?  3  Do you snack between meals?  No  Are you able to separate your meals and liquids by at least 30 minutes?  Yes  Are you able to avoid liquid calories?   Yes    SOCIAL HISTORY:  Pt denies smoking.  Pt denies alcohol use.  Avoids NSAIDS.      REVIEW OF SYSTEMS:     GI:  Nausea-  Yes, a lot when trying to eat certain foods like eggs, potatoes.  Is able to eat boiled chicken.  Can eat plain ground beef  "but too seasoned is tough.    Vomiting-  Yes, once a week depending on what she eat or if she eats too much.    Diarrhea-  No  Constipation-  No  Dysphagia-  No  Abd. Pain-  Yes, when she gets up in the morning on the right side.  It was more pronounced after surgery and has gotten better every week since.    Heartburn-  Yes, more than before surgery. Occurs following meals.  Is not taking omeprazole.  Only took in for 30 days then ran out.      SKIN:  Intertriginous irritation- No       PSYCH:  Mood ok.  Is taking some time getting used to the surgery.       LABS/IMAGING/MEDICAL RECORDS REVIEW:   Hemoglobin A1C   Date Value Ref Range Status   07/07/2021 5.3 0 - 5.6 % Final     Comment:     Normal <5.7% Prediabetes 5.7-6.4%  Diabetes 6.5% or higher - adopted from ADA   consensus guidelines.       Vitamin D, Total (25-Hydroxy)   Date Value Ref Range Status   11/01/2021 29 20 - 75 ug/L Final     Hemoglobin   Date Value Ref Range Status   05/12/2022 10.5 (L) 11.7 - 15.7 g/dL Final       PHYSICAL EXAMINATION:  /85   Pulse 85   Ht 5' 2\" (1.575 m)   Wt 165 lb 8 oz (75.1 kg)   LMP 09/12/2016 (Exact Date)   SpO2 96%   BMI 30.27 kg/m      GENERAL: Healthy, alert and no distress  EYES: Eyes grossly normal to inspection.  No discharge or erythema, or obvious scleral/conjunctival abnormalities.  RESP: No audible wheeze, cough, or visible cyanosis.  No visible retractions or increased work of breathing.    SKIN: Visible skin clear. No significant rash, abnormal pigmentation or lesions.  NEURO: Cranial nerves grossly intact.  Mentation and speech appropriate for age.  PSYCH: Mentation appears normal, affect normal/bright, judgement and insight intact, normal speech and appearance well-groomed.        "

## 2022-08-10 NOTE — PATIENT INSTRUCTIONS
"Ramy Aguilar!        Great chatting with you today! Here's a summary of your next diet stage (Stage 5). This will become your baseline diet, life-long.     Your Stage 5 Diet: Regular Foods  [email or web address]       Goals:    1. Aim for 600-800 calories  - Also: 60-90g protein and 10-15g fiber     2. Eat 3 food groups at each meal  - 1/2c protein, 1/4c vegetable/fruit, 1/4c fruit/starch  - I'll mail you a new sample meal plan    3.  Continue to eat slowly, chew well, and separate fluids and meals by 30 minutes     4. Limit \"snacks\" to milk or protein drinks   - Have up to 1 protein drink per day OR 2 cups of low-fat milk    5. Avoid caffeine, carbonation and alcohol            Plan on following up in 3 months. This can be scheduled via our call center at . Of course, reach out sooner with any questions or concerns. Have a great day!           Pam Fernandez RD, LD  Clinical Dietitian   "

## 2022-08-10 NOTE — PROGRESS NOTES
"NUTRITION POST OP APPOINTMENT  DATE OF VISIT: August 10, 2022    Jeff Serra  1975  female  [id number]  47 year old     ASSESSMENT:    REASON FOR VISIT:  Jeff is a 47 year old year old female presents today for 3 month PO nutrition follow-up appointment. Patient is accompanied by self.    DIAGNOSIS:  Status post gastric sleeve surgery.  Obesity Obesity Grade I BMI 30-34.9     ANTHROPOMETRICS:  Initial Weight: 196 lbs   Height: 5' 2\"    Current Weight: 165 lbs 8 oz     BMI: Body mass index is 30.27 kg/m .    VITAMINS AND MINERALS:  2 Multivitamin with Minerals (afternoon)  (pt unsure of dose)  mg Calcium With Vitamin D (AM + PM)  1000 International units Vitamin D (AM)    NUTRITION HISTORY:  Breakfast: skips  Lunch: salad (greens, mixed vegetables, low-fat dressing, chicken)   Supper: mashed potatoes  meat   Snacks: (morning + afternoons) bananas or yogurt   Fluids consumed: Water (64oz), juice (24oz; cran-grape)  Consuming liquid calories: Yes  Protein intake: 20-30 grams/day  Tolerate regular texture food: Yes  Any foods not tolerated details: Yes  If any food not tolerated: some meat, scrambled eggs, mashed potatoes   Portion size: 1/2-1 cup  Take 20-30 minutes to consume each meal: Yes   Eat protein foods first: No  Fluids and meals separate by at least 30 minutes: Yes  Chew foods thoroughly: Yes  Tolerating diet: Yes  Drinking high protein supplements: No  Consuming snacks per day: 2  Additional Information: Pt not seen since 2w post-op. States she became frustrated after last visit so cancelled her 4w. Reviewed all vitamin/mineral needs (to be prescribed). Reviewed appropriate meal patterns and structure. Discouraged juice and recommended add in protein supplementation or low-fat milk.         PHYSICAL ACTIVITY:  None      DIAGNOSIS:  Previous Nutrition Diagnosis: Altered gastrointestinal function related to alteration in gastrointestinal structure as evidenced by history of gastric sleeve " surgery.- no change    Previous goals:  Start puree diet at day 14 post op./ Do not jump ahead on textures - met  GraduallyStart 2 MVI, 5916-8227 mg calcium with vitamin D, 500 mcg vitamin B12, vitamin D (per provider), and 100 mg thiamin (vitamin B-1) one time per week - partially met  Have at least 1 protein drink per day - not met  Aim for 60 to 90 grams protein. - not met  Continue 48 to 64 oz of fluid per day. - met    Current Nutrition Diagnosis: Altered gastrointestinal function related to alteration in gastrointestinal structure as evidenced by history of gastric sleeve surgery.    INTERVENTION:   Nutrition Prescription: Eat 3 meals a day at regular intervals. Consume 60-90 grams of protein daily. Follow post-surgical vitamin and mineral protocol.  Assessed learning needs and learning preferences.    GOALS:  Take all post-op vitamins per guidelines  Include protein at each meal/include multiple food groups at each meal  Replace snacks with low-fat milk   Avoid more than 4oz of juice per day  Begin exercising       Follow-Up:   Recommend standard post-op follow up visits to assist with lifestyle changes or per insurance.  Implementation: Discussed progress toward previous goals; reinforced importance of following bariatric lifestyle changes.    NUTRITION MONITORING AND EVALUATION:  Anticipated compliance: fair  Verbalized fair-good understanding.    Follow up: Patient to follow up in 3 months (at 6 months post-op)    TIME SPENT WITH PATIENT:  25 minutes      Pam Fernandez RD, LD  Clinical Dietitian

## 2022-08-11 ENCOUNTER — OFFICE VISIT (OUTPATIENT)
Dept: SURGERY | Facility: CLINIC | Age: 47
End: 2022-08-11
Payer: COMMERCIAL

## 2022-08-11 VITALS
BODY MASS INDEX: 30.46 KG/M2 | WEIGHT: 165.5 LBS | OXYGEN SATURATION: 96 % | DIASTOLIC BLOOD PRESSURE: 85 MMHG | HEART RATE: 85 BPM | HEIGHT: 62 IN | SYSTOLIC BLOOD PRESSURE: 122 MMHG

## 2022-08-11 VITALS — HEIGHT: 62 IN | WEIGHT: 165.5 LBS | BODY MASS INDEX: 30.46 KG/M2

## 2022-08-11 DIAGNOSIS — E66.09 CLASS 1 OBESITY DUE TO EXCESS CALORIES WITH SERIOUS COMORBIDITY AND BODY MASS INDEX (BMI) OF 30.0 TO 30.9 IN ADULT: ICD-10-CM

## 2022-08-11 DIAGNOSIS — Z98.84 BARIATRIC SURGERY STATUS: ICD-10-CM

## 2022-08-11 DIAGNOSIS — K91.2 POSTSURGICAL MALABSORPTION: ICD-10-CM

## 2022-08-11 DIAGNOSIS — E66.811 CLASS 1 OBESITY DUE TO EXCESS CALORIES WITH SERIOUS COMORBIDITY AND BODY MASS INDEX (BMI) OF 30.0 TO 30.9 IN ADULT: ICD-10-CM

## 2022-08-11 DIAGNOSIS — E66.9 OBESITY (BMI 30-39.9): ICD-10-CM

## 2022-08-11 DIAGNOSIS — Z98.84 BARIATRIC SURGERY STATUS: Primary | ICD-10-CM

## 2022-08-11 PROCEDURE — 99214 OFFICE O/P EST MOD 30 MIN: CPT | Performed by: PHYSICIAN ASSISTANT

## 2022-08-11 PROCEDURE — 97803 MED NUTRITION INDIV SUBSEQ: CPT | Performed by: DIETITIAN, REGISTERED

## 2022-08-11 RX ORDER — MENTHOL 5.8 MG/1
2 LOZENGE ORAL DAILY
Qty: 180 TABLET | Refills: 3 | Status: SHIPPED | OUTPATIENT
Start: 2022-08-11

## 2022-08-11 RX ORDER — CYANOCOBALAMIN 1000 UG/ML
1 INJECTION, SOLUTION INTRAMUSCULAR; SUBCUTANEOUS
Qty: 3 ML | Refills: 3 | Status: SHIPPED | OUTPATIENT
Start: 2022-08-11

## 2022-08-11 RX ORDER — LANOLIN ALCOHOL/MO/W.PET/CERES
100 CREAM (GRAM) TOPICAL WEEKLY
Qty: 4 TABLET | Refills: 11 | Status: SHIPPED | OUTPATIENT
Start: 2022-08-11

## 2022-08-11 NOTE — ASSESSMENT & PLAN NOTE
5/11/2022 Gastric Artie YOBANYANGEL    We reviewed the important post op bariatric recommendations:  -eating 3 meals daily  -eating protein first, getting >60gm protein daily  -eating slowly, chewing food well  -avoiding/limiting calorie containing beverages  -avoiding fluids 30 minutes before, during, and after meals  -limiting restaurant or cafeteria eating to twice a week or less    Jeff was reminded that, to avoid marginal ulcers she should avoid tobacco at all, alcohol in excess, caffeine, and NSAIDS (unless indicated for cardioprotection or othewise and opposed by a PPI).    She was encouraged to establish and maintain a consistent physical activity routine, 6-8 hours of restorative sleep each night and optimal stress management.    Jeff was reminded about the importance of life long vitamin supplementation and life long follow up.

## 2022-08-11 NOTE — ASSESSMENT & PLAN NOTE
Patient was congratulated on wt loss success thus far. Healthy habits to assist with further weight loss were discussed.

## 2022-08-11 NOTE — PATIENT INSTRUCTIONS
"Nice to talk with you today. Thank you for allowing me the privilege of caring for you. We hope we provided you with the excellent service you deserve.     To ensure the quality of our services you may receive a patient satisfaction survey from an independent monitoring company.  The greatest compliment you can give is \"Likely to Recommend\"    Below is our plan we discussed.-  KATE Rome      Start omeprazole 20 mg daily to help with heartburn    Continue your bariatric vitamins   Switch to 1000 mcg B12 injection monthly.    Labs have been ordered in the system.You can have these drawn at any [facility] lab.  Please call [phone] set up an appointment.  Your results will be posted on [employer] as soon as they're complete.  After all are resulted, I will review and comment to you.    FOLLOW-UP:  Please call [phone] to schedule your next visit in 3 months.     Bariatric Post Op Guidelines  General:    To avoid marginal ulcers avoid all forms of tobacco, alcohol in excess, caffeine, and NSAIDS (unless indicated for cardioprotection or othewise and opposed by a PPI).    Exercise is key for continued weight loss and weight maintenance. Aim for 30-60 minutes of physical activity most days of the week. Include cardiovascular and strength training.    Continue lifelong vitamins supplementation and annual lab follow up.  All  patients should supplement with the following bariatric postoperative vitamins:  2 Complete multivitamins with minerals (at different times than calcium)  Vitamin D 5000 Int Units/125 mg daily   Calcium 600 mg twice daily or 500 mg hree times daily   Vitamin B12: 500 mcg sl daily or 1000 mcg Inj monthly  B complex daily or Thiamine 100 mg weekly  1 Iron/Vit C. Daily for females who menstruate and/or as directed    The bariatric team should be aware and evaluate all adverse gastrointestinal symptoms which can be a sign of complication. Inability to tolerate textured solid food (chicken, " steak, fish) may need to be evaluated by endoscopy.    There is a 10% increase of Alcohol Use Disorder in patients with bariatric surgery.   Most often occurring around 2 years post op.  Please call the clinic if you feel alcohol is interfering in your daily life.  We can help.     Follow up annually lifelong. Obesity is a chronic disease.  Weight gain can be expected. The goal of follow-up visits is to ensure adequate vitamin and protein absorption, evaluate food intake behavior, review exercise/activity level, and assist with weight regain.    Nutritional:  Eat 3 meals per day  (No snacks between meals.)  Do not skip meals.  This can cause overeating at the next meal and will prevent adequate protein and nutritional intake.    Aim for 60-80 grams of protein per day.  Always eat your protein first. This assists with optimal nutrition and helps you stay full longer.    Eat your protein first, and then follow with fiber.    Add fiber by including fruits, vegetables, whole grains, and beans.     Portions should be about 1 cup per meal. Use measuring cups to be accurate.  Continue to use saucer/salad plates, infant/toddler silverware to keep portion sizes small and take small bites.    Eat S-L-O-W-L-Y to make each meal last 20-30 minutes. Always stop eating when satisfied.    Aim for 64 oz. of calorie-free fluids daily.    Avoid drinking 30 min before, during, and 30 min after meal    Avoid high sugar and high fat foods to prevent high calorie intake. This will reduce your rate of weight loss and can cause weight regain.   Check nutrition labels for less than 10 grams of sugar and less than 10 grams of fat per serving.

## 2022-08-11 NOTE — PROGRESS NOTES
Vitamin B12 teach was completed with patient. All questions were answered. Instructional document given to pt.     Pt instructed to call clinic if she has any other questions or concerns.    Lovely Millard RN on 8/11/2022 at 11:40 AM

## 2022-08-16 ENCOUNTER — OFFICE VISIT (OUTPATIENT)
Dept: URGENT CARE | Facility: URGENT CARE | Age: 47
End: 2022-08-16
Payer: COMMERCIAL

## 2022-08-16 ENCOUNTER — ANCILLARY PROCEDURE (OUTPATIENT)
Dept: GENERAL RADIOLOGY | Facility: CLINIC | Age: 47
End: 2022-08-16
Attending: PHYSICIAN ASSISTANT
Payer: COMMERCIAL

## 2022-08-16 VITALS
OXYGEN SATURATION: 100 % | SYSTOLIC BLOOD PRESSURE: 130 MMHG | DIASTOLIC BLOOD PRESSURE: 88 MMHG | HEART RATE: 75 BPM | RESPIRATION RATE: 16 BRPM | TEMPERATURE: 97.6 F

## 2022-08-16 DIAGNOSIS — R10.9 RIGHT FLANK PAIN: ICD-10-CM

## 2022-08-16 DIAGNOSIS — R10.9 RIGHT FLANK PAIN: Primary | ICD-10-CM

## 2022-08-16 LAB
ALBUMIN UR-MCNC: ABNORMAL MG/DL
ANION GAP SERPL CALCULATED.3IONS-SCNC: 7 MMOL/L (ref 3–14)
APPEARANCE UR: CLEAR
BACTERIA #/AREA URNS HPF: ABNORMAL /HPF
BILIRUB UR QL STRIP: ABNORMAL
BUN SERPL-MCNC: 6 MG/DL (ref 7–30)
CALCIUM SERPL-MCNC: 9.2 MG/DL (ref 8.5–10.1)
CHLORIDE BLD-SCNC: 103 MMOL/L (ref 94–109)
CO2 SERPL-SCNC: 30 MMOL/L (ref 20–32)
COLOR UR AUTO: YELLOW
CREAT SERPL-MCNC: 0.5 MG/DL (ref 0.52–1.04)
GFR SERPL CREATININE-BSD FRML MDRD: >90 ML/MIN/1.73M2
GLUCOSE BLD-MCNC: 90 MG/DL (ref 70–99)
GLUCOSE UR STRIP-MCNC: NEGATIVE MG/DL
HGB UR QL STRIP: NEGATIVE
KETONES UR STRIP-MCNC: ABNORMAL MG/DL
LEUKOCYTE ESTERASE UR QL STRIP: NEGATIVE
MUCOUS THREADS #/AREA URNS LPF: PRESENT /LPF
NITRATE UR QL: NEGATIVE
PH UR STRIP: 6 [PH] (ref 5–7)
POTASSIUM BLD-SCNC: 3.6 MMOL/L (ref 3.4–5.3)
RBC #/AREA URNS AUTO: ABNORMAL /HPF
SODIUM SERPL-SCNC: 140 MMOL/L (ref 133–144)
SP GR UR STRIP: 1.02 (ref 1–1.03)
SQUAMOUS #/AREA URNS AUTO: ABNORMAL /LPF
UROBILINOGEN UR STRIP-ACNC: 1 E.U./DL
WBC # BLD AUTO: 6.5 10E3/UL (ref 4–11)
WBC #/AREA URNS AUTO: ABNORMAL /HPF

## 2022-08-16 PROCEDURE — 85048 AUTOMATED LEUKOCYTE COUNT: CPT | Performed by: PHYSICIAN ASSISTANT

## 2022-08-16 PROCEDURE — 74019 RADEX ABDOMEN 2 VIEWS: CPT | Mod: TC | Performed by: RADIOLOGY

## 2022-08-16 PROCEDURE — 36415 COLL VENOUS BLD VENIPUNCTURE: CPT | Performed by: PHYSICIAN ASSISTANT

## 2022-08-16 PROCEDURE — 80048 BASIC METABOLIC PNL TOTAL CA: CPT | Performed by: PHYSICIAN ASSISTANT

## 2022-08-16 PROCEDURE — 99214 OFFICE O/P EST MOD 30 MIN: CPT | Performed by: PHYSICIAN ASSISTANT

## 2022-08-16 PROCEDURE — 81001 URINALYSIS AUTO W/SCOPE: CPT | Performed by: PHYSICIAN ASSISTANT

## 2022-08-16 ASSESSMENT — PAIN SCALES - GENERAL: PAINLEVEL: EXTREME PAIN (8)
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
## 2022-08-29 ENCOUNTER — VIRTUAL VISIT (OUTPATIENT)
Dept: FAMILY MEDICINE | Facility: CLINIC | Age: 47
End: 2022-08-29
Payer: COMMERCIAL

## 2022-08-29 DIAGNOSIS — M54.50 CHRONIC MIDLINE LOW BACK PAIN WITHOUT SCIATICA: ICD-10-CM

## 2022-08-29 DIAGNOSIS — G89.29 CHRONIC MIDLINE LOW BACK PAIN WITHOUT SCIATICA: ICD-10-CM

## 2022-08-29 DIAGNOSIS — R10.9 RIGHT FLANK PAIN: Primary | ICD-10-CM

## 2022-08-29 DIAGNOSIS — F11.90 CHRONIC, CONTINUOUS USE OF OPIOIDS: ICD-10-CM

## 2022-08-29 DIAGNOSIS — Z98.84 BARIATRIC SURGERY STATUS: ICD-10-CM

## 2022-08-29 PROCEDURE — 99214 OFFICE O/P EST MOD 30 MIN: CPT | Mod: 95 | Performed by: FAMILY MEDICINE

## 2022-08-29 PROCEDURE — 96127 BRIEF EMOTIONAL/BEHAV ASSMT: CPT | Performed by: FAMILY MEDICINE

## 2022-08-29 RX ORDER — OXYCODONE HYDROCHLORIDE 15 MG/1
15 TABLET ORAL 3 TIMES DAILY PRN
Qty: 90 TABLET | Refills: 0 | Status: SHIPPED | OUTPATIENT
Start: 2022-08-29 | End: 2022-09-27

## 2022-08-29 RX ORDER — LIDOCAINE 4 G/G
1 PATCH TOPICAL EVERY 24 HOURS
Qty: 15 PATCH | Refills: 1 | Status: SHIPPED | OUTPATIENT
Start: 2022-08-29 | End: 2024-06-25

## 2022-08-29 ASSESSMENT — ANXIETY QUESTIONNAIRES
5. BEING SO RESTLESS THAT IT IS HARD TO SIT STILL: NOT AT ALL
2. NOT BEING ABLE TO STOP OR CONTROL WORRYING: NOT AT ALL
1. FEELING NERVOUS, ANXIOUS, OR ON EDGE: NOT AT ALL
4. TROUBLE RELAXING: NOT AT ALL
8. IF YOU CHECKED OFF ANY PROBLEMS, HOW DIFFICULT HAVE THESE MADE IT FOR YOU TO DO YOUR WORK, TAKE CARE OF THINGS AT HOME, OR GET ALONG WITH OTHER PEOPLE?: NOT DIFFICULT AT ALL
6. BECOMING EASILY ANNOYED OR IRRITABLE: NOT AT ALL
7. FEELING AFRAID AS IF SOMETHING AWFUL MIGHT HAPPEN: NOT AT ALL
3. WORRYING TOO MUCH ABOUT DIFFERENT THINGS: NOT AT ALL
GAD7 TOTAL SCORE: 0
7. FEELING AFRAID AS IF SOMETHING AWFUL MIGHT HAPPEN: NOT AT ALL
IF YOU CHECKED OFF ANY PROBLEMS ON THIS QUESTIONNAIRE, HOW DIFFICULT HAVE THESE PROBLEMS MADE IT FOR YOU TO DO YOUR WORK, TAKE CARE OF THINGS AT HOME, OR GET ALONG WITH OTHER PEOPLE: NOT DIFFICULT AT ALL
GAD7 TOTAL SCORE: 0
GAD7 TOTAL SCORE: 0

## 2022-08-29 NOTE — PROGRESS NOTES
"Answers for HPI/ROS submitted by the patient on 8/29/2022  CHAO 7 TOTAL SCORE: 0    Answers for HPI/ROS submitted by the patient on 8/29/2022  CHAO 7 TOTAL SCORE: 0    Jeff is a 47 year old who is being evaluated via a billable video visit.      How would you like to obtain your AVS? Annettaharverenice  If the video visit is dropped, the invitation should be resent by: Send to e-mail at: [email or web address]  Will anyone else be joining your video visit? No          Assessment & Plan     Right flank pain  Reviewed recent urgent care visit including lab work and imaging.  Right flank pain seems crampy but closer to the surface as it is palpable.  Worse at night.  Occasionally notices with twists.  She tells me her bariatric surgeon thought it was perhaps surgically related but that has been almost 3 months.  Does not seem to coincide with urinary or GI symptoms.  We discussed topical therapies and stretching.  - Lidocaine (LIDOCARE) 4 % Patch; Place 1 patch onto the skin every 24 hours To prevent lidocaine toxicity, patient should be patch free for 12 hrs daily.    Chronic, continuous use of opioids  Stable and up-to-date on routine monitoring.  Continue to follow.  Plan follow-up in 3 months  - oxyCODONE IR (ROXICODONE) 15 MG tablet; Take 1 tablet (15 mg) by mouth 3 times daily as needed for pain    Chronic midline low back pain without sciatica  As above  - oxyCODONE IR (ROXICODONE) 15 MG tablet; Take 1 tablet (15 mg) by mouth 3 times daily as needed for pain    Bariatric surgery status  Down to 30 to 40 pounds from peak.  Generally doing well overall.           BMI:   Estimated body mass index is 30.27 kg/m  as calculated from the following:    Height as of 8/11/22: 1.575 m (5' 2\").    Weight as of 8/11/22: 75.1 kg (165 lb 8 oz).       See Patient Instructions    Return in about 2 weeks (around 9/12/2022) for Contact me with Paty dawkins message.    Jordyn Loredo MD  [facility] BASS " ILIA Aguilar is a 47 year old, presenting for the following health issues:  ER F/U      HPI     ED/UC Followup:    Facility:  [facility]  Date of visit: 08/16/22  Reason for visit: RT flank pain  Current Status: Still having pain. Intermittent pain    Video visit with patient today in follow-up of flank pain symptoms as above.    Review of Systems   Constitutional, HEENT, cardiovascular, pulmonary, gi and gu systems are negative, except as otherwise noted.      Objective    Vitals - Patient Reported  Weight (Patient Reported): 73.5 kg (162 lb)  Pain Score: Severe Pain (6)        Physical Exam   GENERAL: Healthy, alert and no distress  EYES: Eyes grossly normal to inspection.  No discharge or erythema, or obvious scleral/conjunctival abnormalities.  RESP: No audible wheeze, cough, or visible cyanosis.  No visible retractions or increased work of breathing.    SKIN: Visible skin clear. No significant rash, abnormal pigmentation or lesions.  NEURO: Cranial nerves grossly intact.  Mentation and speech appropriate for age.  PSYCH: Mentation appears normal, affect normal/bright, judgement and insight intact, normal speech and appearance well-groomed.    Past labs reviewed with the patient.   Reviewed previous imaging.            Video-Visit Details    Video Start Time: 0820    Type of service:  Video Visit    Video End Time:0830    Originating Location (pt. Location): Home    Distant Location (provider location):  [facility]     Platform used for Video Visit: END[employer]ENX    .  ..

## 2022-09-27 DIAGNOSIS — G89.29 CHRONIC MIDLINE LOW BACK PAIN WITHOUT SCIATICA: ICD-10-CM

## 2022-09-27 DIAGNOSIS — F11.90 CHRONIC, CONTINUOUS USE OF OPIOIDS: ICD-10-CM

## 2022-09-27 DIAGNOSIS — M54.50 CHRONIC MIDLINE LOW BACK PAIN WITHOUT SCIATICA: ICD-10-CM

## 2022-09-27 RX ORDER — OXYCODONE HYDROCHLORIDE 15 MG/1
15 TABLET ORAL 3 TIMES DAILY PRN
Qty: 90 TABLET | Refills: 0 | Status: SHIPPED | OUTPATIENT
Start: 2022-09-27 | End: 2022-10-25

## 2022-10-10 ENCOUNTER — HEALTH MAINTENANCE LETTER (OUTPATIENT)
Age: 47
End: 2022-10-10

## 2022-10-25 ENCOUNTER — MYC REFILL (OUTPATIENT)
Dept: FAMILY MEDICINE | Facility: CLINIC | Age: 47
End: 2022-10-25

## 2022-10-25 DIAGNOSIS — F11.90 CHRONIC, CONTINUOUS USE OF OPIOIDS: ICD-10-CM

## 2022-10-25 DIAGNOSIS — M54.50 CHRONIC MIDLINE LOW BACK PAIN WITHOUT SCIATICA: ICD-10-CM

## 2022-10-25 DIAGNOSIS — G89.29 CHRONIC MIDLINE LOW BACK PAIN WITHOUT SCIATICA: ICD-10-CM

## 2022-10-26 RX ORDER — OXYCODONE HYDROCHLORIDE 15 MG/1
15 TABLET ORAL 3 TIMES DAILY PRN
Qty: 90 TABLET | Refills: 0 | Status: SHIPPED | OUTPATIENT
Start: 2022-10-26 | End: 2022-10-28

## 2022-10-26 NOTE — TELEPHONE ENCOUNTER
PDMP Review       Value Time User    State PDMP site checked  Yes 10/26/2022 10:02 AM Carmenza Mccartney MD        [place] on appt and care package requirements

## 2022-10-28 RX ORDER — OXYCODONE HYDROCHLORIDE 15 MG/1
15 TABLET ORAL 3 TIMES DAILY PRN
Qty: 90 TABLET | Refills: 0 | Status: SHIPPED | OUTPATIENT
Start: 2022-10-28 | End: 2022-11-25

## 2022-10-28 NOTE — TELEPHONE ENCOUNTER
Transmission to pharmacy failed for pt's oxycodone, please resend prescription for pt.   Cora Canela RN  [facility]

## 2022-11-21 DIAGNOSIS — G44.86 CERVICOGENIC HEADACHE: ICD-10-CM

## 2022-11-21 DIAGNOSIS — M54.2 CHRONIC NECK PAIN: ICD-10-CM

## 2022-11-21 DIAGNOSIS — M54.50 CHRONIC MIDLINE LOW BACK PAIN WITHOUT SCIATICA: ICD-10-CM

## 2022-11-21 DIAGNOSIS — G89.29 CHRONIC MIDLINE LOW BACK PAIN WITHOUT SCIATICA: ICD-10-CM

## 2022-11-21 DIAGNOSIS — G89.29 CHRONIC NECK PAIN: ICD-10-CM

## 2022-11-21 RX ORDER — GABAPENTIN 300 MG/1
CAPSULE ORAL
Qty: 270 CAPSULE | Refills: 1 | Status: SHIPPED | OUTPATIENT
Start: 2022-11-21 | End: 2022-12-08

## 2022-11-25 ENCOUNTER — MYC REFILL (OUTPATIENT)
Dept: FAMILY MEDICINE | Facility: CLINIC | Age: 47
End: 2022-11-25

## 2022-11-25 DIAGNOSIS — M54.50 CHRONIC MIDLINE LOW BACK PAIN WITHOUT SCIATICA: ICD-10-CM

## 2022-11-25 DIAGNOSIS — F11.90 CHRONIC, CONTINUOUS USE OF OPIOIDS: ICD-10-CM

## 2022-11-25 DIAGNOSIS — G89.29 CHRONIC MIDLINE LOW BACK PAIN WITHOUT SCIATICA: ICD-10-CM

## 2022-11-28 RX ORDER — OXYCODONE HYDROCHLORIDE 15 MG/1
15 TABLET ORAL 3 TIMES DAILY PRN
Qty: 90 TABLET | Refills: 0 | Status: SHIPPED | OUTPATIENT
Start: 2022-11-28 | End: 2022-12-26

## 2022-12-08 ENCOUNTER — VIRTUAL VISIT (OUTPATIENT)
Dept: FAMILY MEDICINE | Facility: CLINIC | Age: 47
End: 2022-12-08
Payer: COMMERCIAL

## 2022-12-08 DIAGNOSIS — F11.90 CHRONIC, CONTINUOUS USE OF OPIOIDS: ICD-10-CM

## 2022-12-08 DIAGNOSIS — R10.9 RIGHT FLANK PAIN: Primary | ICD-10-CM

## 2022-12-08 DIAGNOSIS — J06.9 UPPER RESPIRATORY TRACT INFECTION, UNSPECIFIED TYPE: ICD-10-CM

## 2022-12-08 PROCEDURE — 99214 OFFICE O/P EST MOD 30 MIN: CPT | Mod: 95 | Performed by: FAMILY MEDICINE

## 2022-12-08 RX ORDER — AZITHROMYCIN 250 MG/1
TABLET, FILM COATED ORAL
Qty: 6 TABLET | Refills: 0 | Status: SHIPPED | OUTPATIENT
Start: 2022-12-08 | End: 2023-01-18

## 2022-12-08 RX ORDER — HYDROCODONE BITARTRATE AND ACETAMINOPHEN 5; 325 MG/1; MG/1
1 TABLET ORAL EVERY 6 HOURS PRN
COMMUNITY
Start: 2022-04-19 | End: 2022-12-08

## 2022-12-08 ASSESSMENT — ANXIETY QUESTIONNAIRES
6. BECOMING EASILY ANNOYED OR IRRITABLE: NOT AT ALL
4. TROUBLE RELAXING: NOT AT ALL
GAD7 TOTAL SCORE: 0
IF YOU CHECKED OFF ANY PROBLEMS ON THIS QUESTIONNAIRE, HOW DIFFICULT HAVE THESE PROBLEMS MADE IT FOR YOU TO DO YOUR WORK, TAKE CARE OF THINGS AT HOME, OR GET ALONG WITH OTHER PEOPLE: NOT DIFFICULT AT ALL
7. FEELING AFRAID AS IF SOMETHING AWFUL MIGHT HAPPEN: NOT AT ALL
5. BEING SO RESTLESS THAT IT IS HARD TO SIT STILL: NOT AT ALL
3. WORRYING TOO MUCH ABOUT DIFFERENT THINGS: NOT AT ALL
GAD7 TOTAL SCORE: 0
2. NOT BEING ABLE TO STOP OR CONTROL WORRYING: NOT AT ALL
1. FEELING NERVOUS, ANXIOUS, OR ON EDGE: NOT AT ALL

## 2022-12-08 NOTE — PROGRESS NOTES
Jeff is a 47 year old who is being evaluated via a billable video visit.      How would you like to obtain your AVS? MyChart  If the video visit is dropped, the invitation should be resent by: Send to e-mail at: [email or web address]  Will anyone else be joining your video visit? No          Assessment & Plan     Right flank pain  Patient has had ongoing right flank pain for a number of months.  Does not seem to fit with any particular diagnosis.  Does not seem to involve GI or  symptoms.  She had bariatric sleeve performed in May and that may have been the time that this started up.  But she is not having any type of bulge or tender scar tissue.  So at this point I really do not know what to tell her as we do not have enough information to make a diagnosis.  We will start with a CT scan simply to take a look at the structure and anatomy paying close attention to surgical scar areas.  Follow-up based upon this.  - CT Abdomen Pelvis w/o Contrast; Future    Upper respiratory tract infection, unspecified type  10+ days of ongoing illness symptoms.  She does not feel sick any longer things seem to have settled into her chest with more of a cough.  - azithromycin (ZITHROMAX) 250 MG tablet; Two tabs today.  One tab daily x 4 days.    Chronic, continuous use of opioids  Due for urine drug screen with upcoming lab work.  Up-to-date on routine monitoring otherwise.  - Drug Abuse Screen Panel 13, Urine (Pain Care Package) - lab collect; Future         See Patient Instructions    Return in about 1 week (around 12/15/2022) for Based upon test results.    Jordyn Loredo MD  [facility]   Jeff is a 47 year old, presenting for the following health issues:  Follow Up      HPI     Chronic/Recurring Back Pain Follow Up      Where is your back pain located? (Select all that apply) low back left - chronic issue.    How would you describe your back pain?  shooting    Where does  your back pain spread? nowhere    Since your last clinic visit for back pain, how has your pain changed? unchanged    Does your back pain interfere with your job? YES    Since your last visit, have you tried any new treatment? No      How many servings of fruits and vegetables do you eat daily?  2-3    On average, how many sweetened beverages do you drink each day (Examples: soda, juice, sweet tea, etc.  Do NOT count diet or artificially sweetened beverages)?   0    How many days per week do you exercise enough to make your heart beat faster? 3 or less    How many minutes a day do you exercise enough to make your heart beat faster? 9 or less    How many days per week do you miss taking your medication? 0    Video visit with patient today to follow-up on right flank pain.  Also due for routine follow-up of chronic back pain that is well-known to me.  Has been dealing with illness symptoms for 10+ days.    Review of Systems   Constitutional, HEENT, cardiovascular, pulmonary, gi and gu systems are negative, except as otherwise noted.      Objective    Vitals - Patient Reported  Pain Score: Severe Pain (7)      Vitals:  No vitals were obtained today due to virtual visit.    Physical Exam   GENERAL: Healthy, alert and no distress  EYES: Eyes grossly normal to inspection.  No discharge or erythema, or obvious scleral/conjunctival abnormalities.  RESP: No audible wheeze, cough, or visible cyanosis.  No visible retractions or increased work of breathing.    SKIN: Visible skin clear. No significant rash, abnormal pigmentation or lesions.  NEURO: Cranial nerves grossly intact.  Mentation and speech appropriate for age.  PSYCH: Mentation appears normal, affect normal/bright, judgement and insight intact, normal speech and appearance well-groomed.    Past labs reviewed with the patient.             Video-Visit Details    Video Start Time: 0804    Type of service:  Video Visit    Video End Time:0810    Originating Location (pt.  Location): Home        Distant Location (provider location):  Off-site    Platform used for Video Visit: [employer]ika

## 2022-12-21 ENCOUNTER — IMMUNIZATION (OUTPATIENT)
Dept: PEDIATRICS | Facility: CLINIC | Age: 47
End: 2022-12-21
Payer: COMMERCIAL

## 2022-12-21 DIAGNOSIS — Z23 HIGH PRIORITY FOR 2019-NCOV VACCINE: ICD-10-CM

## 2022-12-21 DIAGNOSIS — Z23 NEED FOR PROPHYLACTIC VACCINATION AND INOCULATION AGAINST INFLUENZA: Primary | ICD-10-CM

## 2022-12-21 PROCEDURE — 0134A COVID-19 VACCINE BIVALENT BOOSTER 18+ (MODERNA): CPT | Performed by: NURSE PRACTITIONER

## 2022-12-21 PROCEDURE — 90686 IIV4 VACC NO PRSV 0.5 ML IM: CPT | Performed by: NURSE PRACTITIONER

## 2022-12-21 PROCEDURE — 91313 COVID-19 VACCINE BIVALENT BOOSTER 18+ (MODERNA): CPT | Performed by: NURSE PRACTITIONER

## 2022-12-21 PROCEDURE — 90471 IMMUNIZATION ADMIN: CPT | Performed by: NURSE PRACTITIONER

## 2022-12-27 ENCOUNTER — APPOINTMENT (OUTPATIENT)
Dept: CT IMAGING | Facility: CLINIC | Age: 47
End: 2022-12-27
Attending: PHYSICIAN ASSISTANT
Payer: COMMERCIAL

## 2022-12-27 ENCOUNTER — HOSPITAL ENCOUNTER (EMERGENCY)
Facility: CLINIC | Age: 47
Discharge: HOME | End: 2022-12-27
Attending: PHYSICIAN ASSISTANT | Admitting: PHYSICIAN ASSISTANT
Payer: COMMERCIAL

## 2022-12-27 VITALS
SYSTOLIC BLOOD PRESSURE: 119 MMHG | OXYGEN SATURATION: 98 % | DIASTOLIC BLOOD PRESSURE: 82 MMHG | HEART RATE: 97 BPM | TEMPERATURE: 97.2 F | RESPIRATION RATE: 16 BRPM

## 2022-12-27 DIAGNOSIS — M54.50 RIGHT-SIDED LOW BACK PAIN WITHOUT SCIATICA: ICD-10-CM

## 2022-12-27 DIAGNOSIS — R07.89 ATYPICAL CHEST PAIN: ICD-10-CM

## 2022-12-27 DIAGNOSIS — R42 LIGHTHEADEDNESS: ICD-10-CM

## 2022-12-27 LAB
ALBUMIN SERPL BCG-MCNC: 4.6 G/DL (ref 3.5–5.2)
ALBUMIN UR-MCNC: NEGATIVE MG/DL
ALP SERPL-CCNC: 73 U/L (ref 35–104)
ALT SERPL W P-5'-P-CCNC: 50 U/L (ref 10–35)
ANION GAP SERPL CALCULATED.3IONS-SCNC: 14 MMOL/L (ref 7–15)
APPEARANCE UR: CLEAR
AST SERPL W P-5'-P-CCNC: 23 U/L (ref 10–35)
BASOPHILS # BLD AUTO: 0.1 10E3/UL (ref 0–0.2)
BASOPHILS NFR BLD AUTO: 1 %
BILIRUB SERPL-MCNC: 1.1 MG/DL
BILIRUB UR QL STRIP: NEGATIVE
BUN SERPL-MCNC: 6.9 MG/DL (ref 6–20)
CALCIUM SERPL-MCNC: 9.8 MG/DL (ref 8.6–10)
CHLORIDE SERPL-SCNC: 98 MMOL/L (ref 98–107)
COLOR UR AUTO: ABNORMAL
CREAT SERPL-MCNC: 0.66 MG/DL (ref 0.51–0.95)
D DIMER PPP FEU-MCNC: 0.56 UG/ML FEU (ref 0–0.5)
DEPRECATED HCO3 PLAS-SCNC: 29 MMOL/L (ref 22–29)
EOSINOPHIL # BLD AUTO: 0.1 10E3/UL (ref 0–0.7)
EOSINOPHIL NFR BLD AUTO: 2 %
ERYTHROCYTE [DISTWIDTH] IN BLOOD BY AUTOMATED COUNT: 14.8 % (ref 10–15)
GFR SERPL CREATININE-BSD FRML MDRD: >90 ML/MIN/1.73M2
GLUCOSE SERPL-MCNC: 85 MG/DL (ref 70–99)
GLUCOSE UR STRIP-MCNC: NEGATIVE MG/DL
HCT VFR BLD AUTO: 43.6 % (ref 35–47)
HGB BLD-MCNC: 13.7 G/DL (ref 11.7–15.7)
HGB UR QL STRIP: NEGATIVE
IMM GRANULOCYTES # BLD: 0 10E3/UL
IMM GRANULOCYTES NFR BLD: 0 %
KETONES UR STRIP-MCNC: NEGATIVE MG/DL
LEUKOCYTE ESTERASE UR QL STRIP: NEGATIVE
LYMPHOCYTES # BLD AUTO: 2.8 10E3/UL (ref 0.8–5.3)
LYMPHOCYTES NFR BLD AUTO: 54 %
MCH RBC QN AUTO: 27.7 PG (ref 26.5–33)
MCHC RBC AUTO-ENTMCNC: 31.4 G/DL (ref 31.5–36.5)
MCV RBC AUTO: 88 FL (ref 78–100)
MONOCYTES # BLD AUTO: 0.3 10E3/UL (ref 0–1.3)
MONOCYTES NFR BLD AUTO: 6 %
NEUTROPHILS # BLD AUTO: 1.9 10E3/UL (ref 1.6–8.3)
NEUTROPHILS NFR BLD AUTO: 37 %
NITRATE UR QL: NEGATIVE
NRBC # BLD AUTO: 0 10E3/UL
NRBC BLD AUTO-RTO: 0 /100
PH UR STRIP: 8 [PH] (ref 5–7)
PLATELET # BLD AUTO: 182 10E3/UL (ref 150–450)
POTASSIUM SERPL-SCNC: 3.7 MMOL/L (ref 3.4–5.3)
PROT SERPL-MCNC: 7.9 G/DL (ref 6.4–8.3)
RBC # BLD AUTO: 4.95 10E6/UL (ref 3.8–5.2)
RBC URINE: 0 /HPF
SODIUM SERPL-SCNC: 141 MMOL/L (ref 136–145)
SP GR UR STRIP: 1.01 (ref 1–1.03)
SQUAMOUS EPITHELIAL: 3 /HPF
TROPONIN T SERPL HS-MCNC: <6 NG/L
TSH SERPL DL<=0.005 MIU/L-ACNC: 0.37 UIU/ML (ref 0.3–4.2)
UROBILINOGEN UR STRIP-MCNC: NORMAL MG/DL
WBC # BLD AUTO: 5.2 10E3/UL (ref 4–11)
WBC URINE: 0 /HPF

## 2022-12-27 PROCEDURE — 81001 URINALYSIS AUTO W/SCOPE: CPT | Performed by: PHYSICIAN ASSISTANT

## 2022-12-27 PROCEDURE — 93005 ELECTROCARDIOGRAM TRACING: CPT

## 2022-12-27 PROCEDURE — 99285 EMERGENCY DEPT VISIT HI MDM: CPT | Mod: 25

## 2022-12-27 PROCEDURE — 250N000011 HC RX IP 250 OP 636: Performed by: PHYSICIAN ASSISTANT

## 2022-12-27 PROCEDURE — 80053 COMPREHEN METABOLIC PANEL: CPT | Performed by: PHYSICIAN ASSISTANT

## 2022-12-27 PROCEDURE — 71275 CT ANGIOGRAPHY CHEST: CPT

## 2022-12-27 PROCEDURE — 84484 ASSAY OF TROPONIN QUANT: CPT | Performed by: PHYSICIAN ASSISTANT

## 2022-12-27 PROCEDURE — 96374 THER/PROPH/DIAG INJ IV PUSH: CPT | Mod: 59

## 2022-12-27 PROCEDURE — 82040 ASSAY OF SERUM ALBUMIN: CPT | Performed by: PHYSICIAN ASSISTANT

## 2022-12-27 PROCEDURE — 258N000003 HC RX IP 258 OP 636: Performed by: PHYSICIAN ASSISTANT

## 2022-12-27 PROCEDURE — 96361 HYDRATE IV INFUSION ADD-ON: CPT

## 2022-12-27 PROCEDURE — 85379 FIBRIN DEGRADATION QUANT: CPT | Performed by: PHYSICIAN ASSISTANT

## 2022-12-27 PROCEDURE — 84443 ASSAY THYROID STIM HORMONE: CPT | Performed by: PHYSICIAN ASSISTANT

## 2022-12-27 PROCEDURE — 36415 COLL VENOUS BLD VENIPUNCTURE: CPT | Performed by: PHYSICIAN ASSISTANT

## 2022-12-27 PROCEDURE — 250N000009 HC RX 250: Performed by: PHYSICIAN ASSISTANT

## 2022-12-27 PROCEDURE — 85004 AUTOMATED DIFF WBC COUNT: CPT | Performed by: PHYSICIAN ASSISTANT

## 2022-12-27 RX ORDER — ONDANSETRON 2 MG/ML
4 INJECTION INTRAMUSCULAR; INTRAVENOUS ONCE
Status: COMPLETED | OUTPATIENT
Start: 2022-12-27 | End: 2022-12-27

## 2022-12-27 RX ORDER — IOPAMIDOL 755 MG/ML
120 INJECTION, SOLUTION INTRAVASCULAR ONCE
Status: COMPLETED | OUTPATIENT
Start: 2022-12-27 | End: 2022-12-27

## 2022-12-27 RX ADMIN — SODIUM CHLORIDE 80 ML: 9 INJECTION, SOLUTION INTRAVENOUS at 16:03

## 2022-12-27 RX ADMIN — SODIUM CHLORIDE 1000 ML: 9 INJECTION, SOLUTION INTRAVENOUS at 14:58

## 2022-12-27 RX ADMIN — IOPAMIDOL 55 ML: 755 INJECTION, SOLUTION INTRAVENOUS at 16:03

## 2022-12-27 RX ADMIN — ONDANSETRON 4 MG: 2 INJECTION INTRAMUSCULAR; INTRAVENOUS at 15:43

## 2022-12-27 ASSESSMENT — ACTIVITIES OF DAILY LIVING (ADL): ADLS_ACUITY_SCORE: 35

## 2022-12-27 NOTE — ED TRIAGE NOTES
Dizziness and hot flashes for the past couple days, worse with position changes. Pt reports intermittent right flank pain, but denies urinary symptoms. VSS. ABCs intact. A&OX4.      Triage Assessment     Row Name 12/27/22 1115       Triage Assessment (Adult)    Airway WDL WDL       Respiratory WDL    Respiratory WDL WDL       Skin Circulation/Temperature WDL    Skin Circulation/Temperature WDL WDL       Cardiac WDL    Cardiac WDL WDL       Peripheral/Neurovascular WDL    Peripheral Neurovascular WDL WDL       Cognitive/Neuro/Behavioral WDL    Cognitive/Neuro/Behavioral WDL WDL
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
## 2022-12-27 NOTE — DISCHARGE INSTRUCTIONS
Discharge Instructions  Dizziness (Lightheaded)  Today you were seen for dizziness.  Dizziness can be caused by many things and it can be very difficult to determine the cause of dizziness.  At this time, your provider has found no signs that your dizziness is due to a serious or life-threatening condition. However, sometimes there is a serious problem that does not show up right away, and it is important for you to follow up with your regular provider as instructed.  Generally, every Emergency Department visit should have a follow-up clinic visit with either a primary or a specialty clinic/provider. Please follow-up as instructed by your emergency provider today.      Return to the Emergency Department if:    You pass out (fainting or falling out), especially during exercise.    You develop chest pain, chest pressure or difficulty breathing.  Your feel an irregular heartbeat.  You have excessive vaginal bleeding, or blood in your stool or vomit (throw up).  You have a high fever.  Your symptoms get worse or more frequent.    If when you begin to feel dizzy or lightheaded, it is important to sit down or lay down immediately to prevent injury from falling.  If you were given a prescription for medicine here today, be sure to read all of the information (including the package insert) that comes with your prescription.  This will include important information about the medicine, its side effects, and any warnings that you need to know about.  The pharmacist who fills the prescription can provide more information and answer questions you may have about the medicine.  If you have questions or concerns that the pharmacist cannot address, please call or return to the Emergency Department.   Remember that you can always come back to the Emergency Department if you are not able to see your regular provider in the amount of time listed above, if you get any new symptoms, or if there is anything that worries you.  Discharge  Instructions  Chest Pain    You have been seen today for chest pain or discomfort.  At this time, your provider has found no signs that your chest pain is due to a serious or life-threatening condition, (or you have declined more testing and/or admission to the hospital). However, sometimes there is a serious problem that does not show up right away. Your evaluation today may not be complete and you may need further testing and evaluation.     Generally, every Emergency Department visit should have a follow-up clinic visit with either a primary or a specialty clinic/provider. Please follow-up as instructed by your emergency provider today.  Return to the Emergency Department if:  Your chest pain changes, gets worse, starts to happen more often, or comes with less activity.  You are newly short of breath.  You get very weak or tired.  You pass out or faint.  You have any new symptoms, like fever, cough, numb legs, or you cough up blood.  You have anything else that worries you.    Until you follow-up with your regular provider, please do the following:  Take one aspirin daily unless you have an allergy or are told not to by your provider.  If a stress test appointment has been made, go to the appointment.  If you have questions, contact your regular provider.  Follow-up with your regular provider/clinic as directed; this is very important.    If you were given a prescription for medicine here today, be sure to read all of the information (including the package insert) that comes with your prescription.  This will include important information about the medicine, its side effects, and any warnings that you need to know about.  The pharmacist who fills the prescription can provide more information and answer questions you may have about the medicine.  If you have questions or concerns that the pharmacist cannot address, please call or return to the Emergency Department.       Remember that you can always come back to  the Emergency Department if you are not able to see your regular provider in the amount of time listed above, if you get any new symptoms, or if there is anything that worries you.  Discharge Instructions  Back Pain  You were seen today for back pain. Back pain can have many causes, but most will get better without surgery or other specific treatment. Sometimes there is a herniated ( slipped ) disc. We do not usually do MRI scans to look for these right away, since most herniated discs will get better on their own with time.  Today, we did not find any evidence that your back pain was caused by a serious condition. However, sometimes symptoms develop over time and cannot be found during an emergency visit, so it is very important that you follow up with your primary provider.  Generally, every Emergency Department visit should have a follow-up clinic visit with either a primary or a specialty clinic/provider. Please follow-up as instructed by your emergency provider today.    Return to the Emergency Department if:  You develop a fever with your back pain.   You have weakness or change in sensation in one or both legs.  You lose control of your bowels or bladder, or cannot empty your bladder (cannot pee).  Your pain gets much worse.     Follow-up with your provider:  Unless your pain has completely gone away, please make an appointment with your provider within one week. Most of the routine care for back pain is available in a clinic and not the Emergency Department. You may need further management of your back pain, such as more pain medication, imaging such as an X-ray or MRI, or physical therapy.    What can I do to help myself?  Remain Active -- People are often afraid that they will hurt their back further or delay recovery by remaining active, but this is one of the best things you can do for your back. In fact, staying in bed for a long time to rest is not recommended. Studies have shown that people with low  back pain recover faster when they remain active. Movement helps to bring blood flow to the muscles and relieve muscle spasms as well as preventing loss of muscle strength.  Heat -- Using a heating pad can help with low back pain during the first few weeks. Do not sleep with a heating pad, as you can be burned.   Pain medications - You may take a pain medication such as Tylenol  (acetaminophen), Advil , Motrin  (ibuprofen) or Aleve  (naproxen).  If you were given a prescription for medicine here today, be sure to read all of the information (including the package insert) that comes with your prescription.  This will include important information about the medicine, its side effects, and any warnings that you need to know about.  The pharmacist who fills the prescription can provide more information and answer questions you may have about the medicine.  If you have questions or concerns that the pharmacist cannot address, please call or return to the Emergency Department.   Remember that you can always come back to the Emergency Department if you are not able to see your regular provider in the amount of time listed above, if you get any new symptoms, or if there is anything that worries you.

## 2022-12-28 ENCOUNTER — LAB (OUTPATIENT)
Dept: LAB | Facility: CLINIC | Age: 47
End: 2022-12-28
Payer: COMMERCIAL

## 2022-12-28 DIAGNOSIS — F11.90 CHRONIC, CONTINUOUS USE OF OPIOIDS: ICD-10-CM

## 2022-12-28 LAB
AMPHETAMINES UR QL: NOT DETECTED
ATRIAL RATE - MUSE: 88 BPM
BARBITURATES UR QL SCN: NOT DETECTED
BENZODIAZ UR QL SCN: NOT DETECTED
BUPRENORPHINE UR QL: NOT DETECTED
CANNABINOIDS UR QL: NOT DETECTED
COCAINE UR QL SCN: NOT DETECTED
D-METHAMPHET UR QL: NOT DETECTED
DIASTOLIC BLOOD PRESSURE - MUSE: NORMAL MMHG
INTERPRETATION ECG - MUSE: NORMAL
METHADONE UR QL SCN: NOT DETECTED
OPIATES UR QL SCN: NOT DETECTED
OXYCODONE UR QL SCN: DETECTED
P AXIS - MUSE: 45 DEGREES
PCP UR QL SCN: NOT DETECTED
PR INTERVAL - MUSE: 132 MS
PROPOXYPH UR QL: NOT DETECTED
QRS DURATION - MUSE: 76 MS
QT - MUSE: 376 MS
QTC - MUSE: 454 MS
R AXIS - MUSE: 18 DEGREES
SYSTOLIC BLOOD PRESSURE - MUSE: NORMAL MMHG
T AXIS - MUSE: 17 DEGREES
TRICYCLICS UR QL SCN: NOT DETECTED
VENTRICULAR RATE- MUSE: 88 BPM

## 2022-12-28 PROCEDURE — 80306 DRUG TEST PRSMV INSTRMNT: CPT

## 2023-01-05 ENCOUNTER — HOSPITAL ENCOUNTER (OUTPATIENT)
Dept: CT IMAGING | Facility: CLINIC | Age: 48
Discharge: HOME | End: 2023-01-05
Attending: FAMILY MEDICINE | Admitting: FAMILY MEDICINE
Payer: COMMERCIAL

## 2023-01-05 DIAGNOSIS — R10.9 RIGHT FLANK PAIN: ICD-10-CM

## 2023-01-05 PROCEDURE — 74176 CT ABD & PELVIS W/O CONTRAST: CPT

## 2023-01-13 NOTE — RESULT ENCOUNTER NOTE
Jeff,  My apologies that this took so long to get a copy of the results.  But everything looks perfectly normal on the CT scan.  Nothing that would explain that discomfort at all.  But nothing bad either so that is good news overall.  MOISES Loredo M.D.

## 2023-01-18 ENCOUNTER — OFFICE VISIT (OUTPATIENT)
Dept: FAMILY MEDICINE | Facility: CLINIC | Age: 48
End: 2023-01-18
Payer: COMMERCIAL

## 2023-01-18 VITALS
HEIGHT: 62 IN | BODY MASS INDEX: 30.2 KG/M2 | TEMPERATURE: 97.2 F | DIASTOLIC BLOOD PRESSURE: 75 MMHG | WEIGHT: 164.1 LBS | OXYGEN SATURATION: 100 % | RESPIRATION RATE: 16 BRPM | HEART RATE: 91 BPM | SYSTOLIC BLOOD PRESSURE: 112 MMHG

## 2023-01-18 DIAGNOSIS — G89.29 CHRONIC NECK PAIN: ICD-10-CM

## 2023-01-18 DIAGNOSIS — I10 HYPERTENSION GOAL BP (BLOOD PRESSURE) < 140/90: Primary | ICD-10-CM

## 2023-01-18 DIAGNOSIS — J30.1 SEASONAL ALLERGIC RHINITIS DUE TO POLLEN: ICD-10-CM

## 2023-01-18 DIAGNOSIS — M54.50 CHRONIC MIDLINE LOW BACK PAIN WITHOUT SCIATICA: ICD-10-CM

## 2023-01-18 DIAGNOSIS — F11.90 CHRONIC, CONTINUOUS USE OF OPIOIDS: ICD-10-CM

## 2023-01-18 DIAGNOSIS — M54.2 CHRONIC NECK PAIN: ICD-10-CM

## 2023-01-18 DIAGNOSIS — Z98.84 S/P BARIATRIC SURGERY: ICD-10-CM

## 2023-01-18 DIAGNOSIS — G89.29 CHRONIC MIDLINE LOW BACK PAIN WITHOUT SCIATICA: ICD-10-CM

## 2023-01-18 DIAGNOSIS — G47.00 INSOMNIA, UNSPECIFIED TYPE: ICD-10-CM

## 2023-01-18 PROCEDURE — 99214 OFFICE O/P EST MOD 30 MIN: CPT | Performed by: FAMILY MEDICINE

## 2023-01-18 RX ORDER — LORATADINE 10 MG/1
10 TABLET ORAL DAILY
Qty: 90 TABLET | Refills: 3 | Status: SHIPPED | OUTPATIENT
Start: 2023-01-18 | End: 2024-02-01

## 2023-01-18 RX ORDER — ZOLPIDEM TARTRATE 10 MG/1
10 TABLET ORAL
Qty: 30 TABLET | Refills: 0 | Status: SHIPPED | OUTPATIENT
Start: 2023-01-18 | End: 2023-03-01

## 2023-01-18 RX ORDER — OXYCODONE HYDROCHLORIDE 15 MG/1
15 TABLET ORAL 3 TIMES DAILY PRN
Qty: 90 TABLET | Refills: 0 | Status: SHIPPED | OUTPATIENT
Start: 2023-01-25 | End: 2023-02-21

## 2023-01-18 RX ORDER — LOSARTAN POTASSIUM 50 MG/1
50 TABLET ORAL DAILY
Qty: 90 TABLET | Refills: 1 | Status: SHIPPED | OUTPATIENT
Start: 2023-01-18 | End: 2023-09-29

## 2023-01-18 ASSESSMENT — ANXIETY QUESTIONNAIRES
IF YOU CHECKED OFF ANY PROBLEMS ON THIS QUESTIONNAIRE, HOW DIFFICULT HAVE THESE PROBLEMS MADE IT FOR YOU TO DO YOUR WORK, TAKE CARE OF THINGS AT HOME, OR GET ALONG WITH OTHER PEOPLE: NOT DIFFICULT AT ALL
1. FEELING NERVOUS, ANXIOUS, OR ON EDGE: NOT AT ALL
GAD7 TOTAL SCORE: 0
3. WORRYING TOO MUCH ABOUT DIFFERENT THINGS: NOT AT ALL
7. FEELING AFRAID AS IF SOMETHING AWFUL MIGHT HAPPEN: NOT AT ALL
1. FEELING NERVOUS, ANXIOUS, OR ON EDGE: NOT AT ALL
7. FEELING AFRAID AS IF SOMETHING AWFUL MIGHT HAPPEN: NOT AT ALL
8. IF YOU CHECKED OFF ANY PROBLEMS, HOW DIFFICULT HAVE THESE MADE IT FOR YOU TO DO YOUR WORK, TAKE CARE OF THINGS AT HOME, OR GET ALONG WITH OTHER PEOPLE?: NOT DIFFICULT AT ALL
5. BEING SO RESTLESS THAT IT IS HARD TO SIT STILL: NOT AT ALL
6. BECOMING EASILY ANNOYED OR IRRITABLE: NOT AT ALL
IF YOU CHECKED OFF ANY PROBLEMS ON THIS QUESTIONNAIRE, HOW DIFFICULT HAVE THESE PROBLEMS MADE IT FOR YOU TO DO YOUR WORK, TAKE CARE OF THINGS AT HOME, OR GET ALONG WITH OTHER PEOPLE: NOT DIFFICULT AT ALL
5. BEING SO RESTLESS THAT IT IS HARD TO SIT STILL: NOT AT ALL
2. NOT BEING ABLE TO STOP OR CONTROL WORRYING: NOT AT ALL
2. NOT BEING ABLE TO STOP OR CONTROL WORRYING: NOT AT ALL
4. TROUBLE RELAXING: NOT AT ALL
7. FEELING AFRAID AS IF SOMETHING AWFUL MIGHT HAPPEN: NOT AT ALL
4. TROUBLE RELAXING: NOT AT ALL
3. WORRYING TOO MUCH ABOUT DIFFERENT THINGS: NOT AT ALL
GAD7 TOTAL SCORE: 0

## 2023-01-18 ASSESSMENT — ENCOUNTER SYMPTOMS: BACK PAIN: 1

## 2023-01-18 ASSESSMENT — PAIN SCALES - GENERAL: PAINLEVEL: SEVERE PAIN (6)

## 2023-01-18 NOTE — PATIENT INSTRUCTIONS
Blood pressure:    - Goal < 140/90    - Go ahead and stop that losartan for 2 to 3 weeks.  Monitor blood pressure periodically and if it goes above 140/90 we can always restart the medication.    - But if it stays normal then that is 1 less pill we need to worry about.

## 2023-01-18 NOTE — PROGRESS NOTES
Assessment & Plan     Hypertension goal BP (blood pressure) < 140/90  Blood pressure is fantastic.  She is down over 50 pounds since bariatric surgery and now the question is whether she needs treatment for blood pressure or not.  So she will hold off on her losartan for a couple of weeks and monitor blood pressure at home.  If it remains normal we can get rid of the medication altogether.  If it goes above 140/90 we can always restart this.  - losartan (COZAAR) 50 MG tablet; Take 1 tablet (50 mg) by mouth daily Restarted 5/15/22    Chronic neck pain  Stable and continuing to follow    Chronic midline low back pain without sciatica  Stable and continuing to follow  - oxyCODONE IR (ROXICODONE) 15 MG tablet; Take 1 tablet (15 mg) by mouth 3 times daily as needed for pain    Chronic, continuous use of opioids  Up-to-date on routine monitoring parameters.  No concerns for misuse or diversion.  - oxyCODONE IR (ROXICODONE) 15 MG tablet; Take 1 tablet (15 mg) by mouth 3 times daily as needed for pain    S/P bariatric surgery  Is down over 50 pounds and doing fantastic.  Still has a little bit of right flank or upper quadrant pain but CT scan did not reveal any abnormalities.    Insomnia, unspecified type  Has been having trouble with frequent wakening at night.  Discussed mechanism of action of the proposed medication, as well as potential effects, both good and bad.  Patient expressed understanding and agreed with treatment.    - zolpidem (AMBIEN) 10 MG tablet; Take 1 tablet (10 mg) by mouth nightly as needed for sleep    Seasonal allergic rhinitis due to pollen  Stable on current regimen.  Continue same plan and routine follow-up.   - loratadine (CLARITIN) 10 MG tablet; Take 1 tablet (10 mg) by mouth daily    Interval history reviewed with patient and in her chart     See Patient Instructions    Return in about 3 months (around 4/18/2023) for Follow up on Pain, In Office or Video, can call for refills.    Jordyn Rowe  "MD Facundo  [facility] RENO Aguilar is a 48 year old, presenting for the following health issues:  Hypertension and Back Pain      Back Pain     History of Present Illness       Back Pain:  She presents for follow up of back pain. Patient's back pain is a chronic problem.  Location of back pain:  Right lower back, right shoulder, right side of waist and other  Description of back pain: burning and cramping  Back pain spreads: right side of neck    Since patient first noticed back pain, pain is: always present, but gets better and worse  Does back pain interfere with her job:  Yes      Hypertension: She presents for follow up of hypertension.  She does check blood pressure  regularly outside of the clinic. Outside blood pressures have been over 140/90. She follows a low salt diet.    Today's CHAO-7 Score: 0         Here today to follow-up on back pain and hypertension as well as ongoing chronic issues.    Review of Systems   Musculoskeletal: Positive for back pain.      Constitutional, HEENT, cardiovascular, pulmonary, gi and gu systems are negative, except as otherwise noted.      Objective    /75 (BP Location: Right arm, Patient Position: Sitting, Cuff Size: Adult Large)   Pulse 91   Temp 97.2  F (36.2  C) (Temporal)   Resp 16   Ht 1.575 m (5' 2\")   Wt 74.4 kg (164 lb 1.6 oz)   LMP 09/12/2016 (Exact Date)   SpO2 100%   BMI 30.01 kg/m    Body mass index is 30.01 kg/m .  Physical Exam   Alert, pleasant, upbeat, and in no apparent discomfort.  S1 and S2 normal, no murmurs, clicks, gallops or rubs. Regular rate and rhythm. Chest is clear; no wheezes or rales. No edema or JVD.  Past labs reviewed with the patient.                     "

## 2023-02-21 ENCOUNTER — MYC REFILL (OUTPATIENT)
Dept: FAMILY MEDICINE | Facility: CLINIC | Age: 48
End: 2023-02-21
Payer: COMMERCIAL

## 2023-02-21 DIAGNOSIS — G89.29 CHRONIC MIDLINE LOW BACK PAIN WITHOUT SCIATICA: ICD-10-CM

## 2023-02-21 DIAGNOSIS — M54.50 CHRONIC MIDLINE LOW BACK PAIN WITHOUT SCIATICA: ICD-10-CM

## 2023-02-21 DIAGNOSIS — F11.90 CHRONIC, CONTINUOUS USE OF OPIOIDS: ICD-10-CM

## 2023-02-21 RX ORDER — OXYCODONE HYDROCHLORIDE 15 MG/1
15 TABLET ORAL 3 TIMES DAILY PRN
Qty: 90 TABLET | Refills: 0 | Status: SHIPPED | OUTPATIENT
Start: 2023-02-23 | End: 2023-03-17

## 2023-03-01 ENCOUNTER — MYC REFILL (OUTPATIENT)
Dept: FAMILY MEDICINE | Facility: CLINIC | Age: 48
End: 2023-03-01
Payer: COMMERCIAL

## 2023-03-01 DIAGNOSIS — G47.00 INSOMNIA, UNSPECIFIED TYPE: ICD-10-CM

## 2023-03-02 RX ORDER — ZOLPIDEM TARTRATE 10 MG/1
10 TABLET ORAL
Qty: 30 TABLET | Refills: 0 | Status: SHIPPED | OUTPATIENT
Start: 2023-03-02 | End: 2023-04-17

## 2023-03-07 ENCOUNTER — OFFICE VISIT (OUTPATIENT)
Dept: FAMILY MEDICINE | Facility: CLINIC | Age: 48
End: 2023-03-07
Payer: COMMERCIAL

## 2023-03-07 VITALS
BODY MASS INDEX: 28.97 KG/M2 | SYSTOLIC BLOOD PRESSURE: 130 MMHG | HEART RATE: 75 BPM | WEIGHT: 163.5 LBS | OXYGEN SATURATION: 100 % | DIASTOLIC BLOOD PRESSURE: 86 MMHG | HEIGHT: 63 IN | RESPIRATION RATE: 14 BRPM | TEMPERATURE: 98.4 F

## 2023-03-07 DIAGNOSIS — Z13.220 SCREENING CHOLESTEROL LEVEL: ICD-10-CM

## 2023-03-07 DIAGNOSIS — F11.90 CHRONIC, CONTINUOUS USE OF OPIOIDS: ICD-10-CM

## 2023-03-07 DIAGNOSIS — Z12.31 ENCOUNTER FOR SCREENING MAMMOGRAM FOR BREAST CANCER: ICD-10-CM

## 2023-03-07 DIAGNOSIS — I10 HYPERTENSION GOAL BP (BLOOD PRESSURE) < 140/90: ICD-10-CM

## 2023-03-07 DIAGNOSIS — Z00.00 ROUTINE GENERAL MEDICAL EXAMINATION AT A HEALTH CARE FACILITY: Primary | ICD-10-CM

## 2023-03-07 DIAGNOSIS — Z98.84 BARIATRIC SURGERY STATUS: ICD-10-CM

## 2023-03-07 DIAGNOSIS — R10.9 RIGHT FLANK PAIN: ICD-10-CM

## 2023-03-07 PROBLEM — E66.9 OBESITY (BMI 30-39.9): Status: RESOLVED | Noted: 2022-05-11 | Resolved: 2023-03-07

## 2023-03-07 PROBLEM — E66.09 CLASS 1 OBESITY DUE TO EXCESS CALORIES WITH SERIOUS COMORBIDITY AND BODY MASS INDEX (BMI) OF 30.0 TO 30.9 IN ADULT: Status: RESOLVED | Noted: 2021-07-01 | Resolved: 2023-03-07

## 2023-03-07 PROBLEM — E66.811 CLASS 1 OBESITY DUE TO EXCESS CALORIES WITH SERIOUS COMORBIDITY AND BODY MASS INDEX (BMI) OF 30.0 TO 30.9 IN ADULT: Status: RESOLVED | Noted: 2021-07-01 | Resolved: 2023-03-07

## 2023-03-07 LAB
ALBUMIN UR-MCNC: NEGATIVE MG/DL
ANION GAP SERPL CALCULATED.3IONS-SCNC: 6 MMOL/L (ref 3–14)
APPEARANCE UR: CLEAR
BILIRUB UR QL STRIP: NEGATIVE
BUN SERPL-MCNC: 11 MG/DL (ref 7–30)
CALCIUM SERPL-MCNC: 9.4 MG/DL (ref 8.5–10.1)
CHLORIDE BLD-SCNC: 104 MMOL/L (ref 94–109)
CHOLEST SERPL-MCNC: 202 MG/DL
CO2 SERPL-SCNC: 30 MMOL/L (ref 20–32)
COLOR UR AUTO: YELLOW
CREAT SERPL-MCNC: 0.72 MG/DL (ref 0.52–1.04)
FASTING STATUS PATIENT QL REPORTED: NO
GFR SERPL CREATININE-BSD FRML MDRD: >90 ML/MIN/1.73M2
GLUCOSE BLD-MCNC: 88 MG/DL (ref 70–99)
GLUCOSE UR STRIP-MCNC: NEGATIVE MG/DL
HDLC SERPL-MCNC: 89 MG/DL
HGB UR QL STRIP: NEGATIVE
KETONES UR STRIP-MCNC: NEGATIVE MG/DL
LDLC SERPL CALC-MCNC: 96 MG/DL
LEUKOCYTE ESTERASE UR QL STRIP: NEGATIVE
NITRATE UR QL: NEGATIVE
NONHDLC SERPL-MCNC: 113 MG/DL
PH UR STRIP: 6 [PH] (ref 5–7)
POTASSIUM BLD-SCNC: 3.5 MMOL/L (ref 3.4–5.3)
SODIUM SERPL-SCNC: 140 MMOL/L (ref 133–144)
SP GR UR STRIP: 1.02 (ref 1–1.03)
TRIGL SERPL-MCNC: 84 MG/DL
UROBILINOGEN UR STRIP-ACNC: 0.2 E.U./DL

## 2023-03-07 PROCEDURE — 80048 BASIC METABOLIC PNL TOTAL CA: CPT | Performed by: FAMILY MEDICINE

## 2023-03-07 PROCEDURE — 80061 LIPID PANEL: CPT | Performed by: FAMILY MEDICINE

## 2023-03-07 PROCEDURE — 81003 URINALYSIS AUTO W/O SCOPE: CPT | Performed by: FAMILY MEDICINE

## 2023-03-07 PROCEDURE — 36415 COLL VENOUS BLD VENIPUNCTURE: CPT | Performed by: FAMILY MEDICINE

## 2023-03-07 PROCEDURE — 99396 PREV VISIT EST AGE 40-64: CPT | Performed by: FAMILY MEDICINE

## 2023-03-07 PROCEDURE — 99214 OFFICE O/P EST MOD 30 MIN: CPT | Mod: 25 | Performed by: FAMILY MEDICINE

## 2023-03-07 RX ORDER — CEPHALEXIN 500 MG/1
500 CAPSULE ORAL 3 TIMES DAILY
Qty: 9 CAPSULE | Refills: 0 | Status: SHIPPED | OUTPATIENT
Start: 2023-03-07 | End: 2023-03-10

## 2023-03-07 RX ORDER — FLUCONAZOLE 150 MG/1
150 TABLET ORAL ONCE
Qty: 1 TABLET | Refills: 0 | Status: SHIPPED | OUTPATIENT
Start: 2023-03-07 | End: 2023-06-15

## 2023-03-07 ASSESSMENT — ANXIETY QUESTIONNAIRES
GAD7 TOTAL SCORE: 0
8. IF YOU CHECKED OFF ANY PROBLEMS, HOW DIFFICULT HAVE THESE MADE IT FOR YOU TO DO YOUR WORK, TAKE CARE OF THINGS AT HOME, OR GET ALONG WITH OTHER PEOPLE?: NOT DIFFICULT AT ALL
3. WORRYING TOO MUCH ABOUT DIFFERENT THINGS: NOT AT ALL
IF YOU CHECKED OFF ANY PROBLEMS ON THIS QUESTIONNAIRE, HOW DIFFICULT HAVE THESE PROBLEMS MADE IT FOR YOU TO DO YOUR WORK, TAKE CARE OF THINGS AT HOME, OR GET ALONG WITH OTHER PEOPLE: NOT DIFFICULT AT ALL
5. BEING SO RESTLESS THAT IT IS HARD TO SIT STILL: NOT AT ALL
6. BECOMING EASILY ANNOYED OR IRRITABLE: NOT AT ALL
7. FEELING AFRAID AS IF SOMETHING AWFUL MIGHT HAPPEN: NOT AT ALL
1. FEELING NERVOUS, ANXIOUS, OR ON EDGE: NOT AT ALL
2. NOT BEING ABLE TO STOP OR CONTROL WORRYING: NOT AT ALL
GAD7 TOTAL SCORE: 0
4. TROUBLE RELAXING: NOT AT ALL
7. FEELING AFRAID AS IF SOMETHING AWFUL MIGHT HAPPEN: NOT AT ALL

## 2023-03-07 ASSESSMENT — ENCOUNTER SYMPTOMS
FEVER: 0
COUGH: 1
ABDOMINAL PAIN: 0
MYALGIAS: 1
DIZZINESS: 0
HEARTBURN: 1
CONSTIPATION: 1
NERVOUS/ANXIOUS: 0
HEMATURIA: 0
HEMATOCHEZIA: 0
BREAST MASS: 0
SORE THROAT: 0
PARESTHESIAS: 0
EYE PAIN: 0
NAUSEA: 0
CHILLS: 0
SHORTNESS OF BREATH: 0
DYSURIA: 0
DIARRHEA: 0
HEADACHES: 1
FREQUENCY: 1
PALPITATIONS: 0
WEAKNESS: 0
JOINT SWELLING: 0
ARTHRALGIAS: 0

## 2023-03-07 ASSESSMENT — PAIN SCALES - GENERAL: PAINLEVEL: SEVERE PAIN (7)

## 2023-03-07 NOTE — PROGRESS NOTES
SUBJECTIVE:   CC: Jeff is an 48 year old who presents for preventive health visit.     Patient has been advised of split billing requirements and indicates understanding: Yes  Healthy Habits:     Getting at least 3 servings of Calcium per day:  Yes    Bi-annual eye exam:  NO    Dental care twice a year:  Yes    Sleep apnea or symptoms of sleep apnea:  None    Diet:  Regular (no restrictions)    Frequency of exercise:  2-3 days/week    Duration of exercise:  15-30 minutes    Taking medications regularly:  Yes    Medication side effects:  None    PHQ-2 Total Score: 1    Additional concerns today:  No        Today's PHQ-2 Score:   PHQ-2 ( 1999 Pf[employer]) 3/7/2023   Q1: Little interest or pleasure in doing things 1   Q2: Feeling down, depressed or hopeless 0   PHQ-2 Score 1   PHQ-2 Total Score (12-17 Years)- Positive if 3 or more points; Administer PHQ-A if positive -   Q1: Little interest or pleasure in doing things Several days   Q2: Feeling down, depressed or hopeless Not at all   PHQ-2 Score 1       Have you ever done Advance Care Planning? (For example, a Health Directive, POLST, or a discussion with a medical provider or your loved ones about your wishes): No, advance care planning information given to patient to review.  Patient declined advance care planning discussion at this time.    Social History     Tobacco Use     Smoking status: Never     Smokeless tobacco: Never   Substance Use Topics     Alcohol use: Not Currently         Alcohol Use 3/7/2023   Prescreen: >3 drinks/day or >7 drinks/week? No       Reviewed orders with patient.  Reviewed health maintenance and updated orders accordingly - Yes  Lab work is in process  Labs reviewed in EPIC  BP Readings from Last 3 Encounters:   03/07/23 130/86   01/18/23 112/75   12/27/22 119/82    Wt Readings from Last 3 Encounters:   03/07/23 74.2 kg (163 lb 8 oz)   01/18/23 74.4 kg (164 lb 1.6 oz)   08/11/22 75.1 kg (165 lb 8 oz)                    Breast Cancer  Screening:    FHS-7:   Breast CA Risk Assessment (FHS-7) 11/22/2021 5/5/2022 3/7/2023   Did any of your first-degree relatives have breast or ovarian cancer? No Yes Yes   Did any of your relatives have bilateral breast cancer? No Unknown No   Did any man in your family have breast cancer? No No No   Did any woman in your family have breast and ovarian cancer? No No No   Did any woman in your family have breast cancer before age 50 y? No No No   Do you have 2 or more relatives with breast and/or ovarian cancer? Yes No No   Do you have 2 or more relatives with breast and/or bowel cancer? No No No         Pertinent mammograms are reviewed under the imaging tab.    History of abnormal Pap smear: Status post benign hysterectomy. Health Maintenance and Surgical History updated.  PAP / HPV Latest Ref Rng & Units 7/12/2016   PAP (Historical) - NIL   HPV16 NEG Negative   HPV18 NEG Negative   HRHPV NEG Negative     Reviewed and updated as needed this visit by clinical staff   Tobacco  Allergies  Meds  Problems  Med Hx  Surg Hx  Fam Hx          Reviewed and updated as needed this visit by Provider   Tobacco  Allergies  Meds  Problems  Med Hx  Surg Hx  Fam Hx         Here today for routine checkup  Is complaining of some right flank pain that seems different than the known issue of back pain.  Chronic back pain has long been an issue and it is sometimes difficult to sort out.  Endorses a little bit of urinary frequency but no significant dysuria.  No GI symptoms associated with this.    Review of Systems   Constitutional: Negative for chills and fever.   HENT: Negative for congestion, ear pain, hearing loss and sore throat.    Eyes: Negative for pain and visual disturbance.   Respiratory: Positive for cough. Negative for shortness of breath.    Cardiovascular: Negative for chest pain, palpitations and peripheral edema.   Gastrointestinal: Positive for constipation and heartburn. Negative for abdominal pain,  "diarrhea, hematochezia and nausea.   Breasts:  Negative for tenderness, breast mass and discharge.   Genitourinary: Positive for frequency. Negative for dysuria, genital sores, hematuria, pelvic pain, urgency, vaginal bleeding and vaginal discharge.   Musculoskeletal: Positive for myalgias. Negative for arthralgias and joint swelling.   Skin: Negative for rash.   Neurological: Positive for headaches. Negative for dizziness, weakness and paresthesias.   Psychiatric/Behavioral: Negative for mood changes. The patient is not nervous/anxious.    Answers for HPI/ROS submitted by the patient on 3/7/2023  CHAO 7 TOTAL SCORE: 0         OBJECTIVE:   /86   Pulse 75   Temp 98.4  F (36.9  C) (Oral)   Resp 14   Ht 1.594 m (5' 2.75\")   Wt 74.2 kg (163 lb 8 oz)   LMP 09/12/2016 (Exact Date)   SpO2 100%   BMI 29.19 kg/m    Physical Exam  GENERAL: healthy, alert and no distress  EYES: Eyes grossly normal to inspection, PERRL and conjunctivae and sclerae normal  HENT: ear canals and TM's normal, nose and mouth without ulcers or lesions  NECK: no adenopathy, no asymmetry, masses, or scars and thyroid normal to palpation  RESP: lungs clear to auscultation - no rales, rhonchi or wheezes  BREAST: normal without masses, tenderness or nipple discharge and no palpable axillary masses or adenopathy  CV: regular rate and rhythm, normal S1 S2, no S3 or S4, no murmur, click or rub, no peripheral edema and peripheral pulses strong  ABDOMEN: soft, nontender, no hepatosplenomegaly, no masses and bowel sounds normal  MS: Good range of motion of joints overall.  She is tender in the right flank region but this may be more the edge of the muscles.  SKIN: no suspicious lesions or rashes  NEURO: Normal strength and tone, mentation intact and speech normal  PSYCH: mentation appears normal, affect normal/bright    Diagnostic Test Results:  Labs reviewed in Epic  Urinalysis bland    ASSESSMENT/PLAN:   (Z00.00) Routine general medical " "examination at a health care facility  (primary encounter diagnosis)  Comment: Routine health maintenance otherwise up-to-date  Plan:     (I10) Hypertension goal BP (blood pressure) < 140/90  Comment: Stable on current regimen.  Continue same plan and routine follow-up.   Plan: Basic metabolic panel, OFFICE/OUTPT         VISIT,EST,LEVL IV            (R10.9) Right flank pain  Comment: I do not know what to make of this.  Offhand it does not necessarily seem like a urinary infection but I am happy to try a few days of antibiotics to see if it changes at least the urinary portion.  Assuming it does not I cannot see anything that is overtly pathologic as a source of the pain.  But I did offer up sports medicine to be seen and she would be interested.  Plan: UA Macro with Reflex to Micro and Culture - lab        collect, OFFICE/OUTPT VISIT,EST,LEVL IV,         cephALEXin (KEFLEX) 500 MG capsule, fluconazole        (DIFLUCAN) 150 MG tablet, Spine          Referral            (F11.90) Chronic, continuous use of opioids  Comment: Up-to-date on routine monitoring and continue to follow  Plan: OFFICE/OUTPT VISIT,EST,LEVL IV            (Z98.84) Bariatric surgery status  Comment: Weight has stayed down  Plan:     (Z13.220) Screening cholesterol level  Comment:   Plan: Lipid panel reflex to direct LDL Non-fasting            (Z12.31) Encounter for screening mammogram for breast cancer  Comment:   Plan: MA SCREENING DIGITAL BILAT - Future  (s+30)              Patient has been advised of split billing requirements and indicates understanding: Yes      COUNSELING:  Reviewed preventive health counseling, as reflected in patient instructions       Regular exercise       Healthy diet/nutrition       Vision screening      BMI:   Estimated body mass index is 29.19 kg/m  as calculated from the following:    Height as of this encounter: 1.594 m (5' 2.75\").    Weight as of this encounter: 74.2 kg (163 lb 8 oz).         She reports " that she has never smoked. She has never used smokeless tobacco.          Jordyn Loredo MD  [facility]

## 2023-03-17 ENCOUNTER — MYC REFILL (OUTPATIENT)
Dept: FAMILY MEDICINE | Facility: CLINIC | Age: 48
End: 2023-03-17
Payer: COMMERCIAL

## 2023-03-17 DIAGNOSIS — G89.29 CHRONIC MIDLINE LOW BACK PAIN WITHOUT SCIATICA: ICD-10-CM

## 2023-03-17 DIAGNOSIS — F11.90 CHRONIC, CONTINUOUS USE OF OPIOIDS: ICD-10-CM

## 2023-03-17 DIAGNOSIS — M54.50 CHRONIC MIDLINE LOW BACK PAIN WITHOUT SCIATICA: ICD-10-CM

## 2023-03-17 RX ORDER — OXYCODONE HYDROCHLORIDE 15 MG/1
15 TABLET ORAL 3 TIMES DAILY PRN
Qty: 90 TABLET | Refills: 0 | Status: SHIPPED | OUTPATIENT
Start: 2023-03-24 | End: 2023-04-24

## 2023-03-21 ENCOUNTER — ANCILLARY PROCEDURE (OUTPATIENT)
Dept: MAMMOGRAPHY | Facility: CLINIC | Age: 48
End: 2023-03-21
Attending: FAMILY MEDICINE
Payer: COMMERCIAL

## 2023-03-21 DIAGNOSIS — Z12.31 ENCOUNTER FOR SCREENING MAMMOGRAM FOR BREAST CANCER: ICD-10-CM

## 2023-03-21 PROCEDURE — 77067 SCR MAMMO BI INCL CAD: CPT | Mod: TC | Performed by: STUDENT IN AN ORGANIZED HEALTH CARE EDUCATION/TRAINING PROGRAM

## 2023-03-21 PROCEDURE — 77063 BREAST TOMOSYNTHESIS BI: CPT | Mod: TC | Performed by: STUDENT IN AN ORGANIZED HEALTH CARE EDUCATION/TRAINING PROGRAM

## 2023-04-06 ENCOUNTER — OFFICE VISIT (OUTPATIENT)
Dept: URGENT CARE | Facility: URGENT CARE | Age: 48
End: 2023-04-06
Payer: COMMERCIAL

## 2023-04-06 ENCOUNTER — MYC MEDICAL ADVICE (OUTPATIENT)
Dept: FAMILY MEDICINE | Facility: CLINIC | Age: 48
End: 2023-04-06

## 2023-04-06 VITALS
TEMPERATURE: 97.2 F | DIASTOLIC BLOOD PRESSURE: 94 MMHG | OXYGEN SATURATION: 98 % | WEIGHT: 160 LBS | BODY MASS INDEX: 28.57 KG/M2 | SYSTOLIC BLOOD PRESSURE: 140 MMHG | HEART RATE: 84 BPM

## 2023-04-06 DIAGNOSIS — I10 BENIGN ESSENTIAL HYPERTENSION: ICD-10-CM

## 2023-04-06 DIAGNOSIS — R52 BODY ACHES: ICD-10-CM

## 2023-04-06 DIAGNOSIS — U07.1 INFECTION DUE TO 2019 NOVEL CORONAVIRUS: Primary | ICD-10-CM

## 2023-04-06 DIAGNOSIS — J10.1 INFLUENZA B: ICD-10-CM

## 2023-04-06 LAB
FLUAV AG SPEC QL IA: NEGATIVE
FLUBV AG SPEC QL IA: POSITIVE
SARS-COV-2 RNA RESP QL NAA+PROBE: NEGATIVE

## 2023-04-06 PROCEDURE — U0005 INFEC AGEN DETEC AMPLI PROBE: HCPCS | Performed by: NURSE PRACTITIONER

## 2023-04-06 PROCEDURE — 99213 OFFICE O/P EST LOW 20 MIN: CPT | Mod: CS | Performed by: NURSE PRACTITIONER

## 2023-04-06 PROCEDURE — U0003 INFECTIOUS AGENT DETECTION BY NUCLEIC ACID (DNA OR RNA); SEVERE ACUTE RESPIRATORY SYNDROME CORONAVIRUS 2 (SARS-COV-2) (CORONAVIRUS DISEASE [COVID-19]), AMPLIFIED PROBE TECHNIQUE, MAKING USE OF HIGH THROUGHPUT TECHNOLOGIES AS DESCRIBED BY CMS-2020-01-R: HCPCS | Performed by: NURSE PRACTITIONER

## 2023-04-06 PROCEDURE — 87804 INFLUENZA ASSAY W/OPTIC: CPT | Performed by: NURSE PRACTITIONER

## 2023-04-06 ASSESSMENT — ENCOUNTER SYMPTOMS
FEVER: 0
FATIGUE: 1
CHILLS: 0
MYALGIAS: 1
COUGH: 0

## 2023-04-06 ASSESSMENT — PAIN SCALES - GENERAL: PAINLEVEL: MILD PAIN (2)

## 2023-04-06 NOTE — LETTER
April 6, 2023      Jeff Serra  [address] [address] [facility] [address]        To Whom It May Concern:    Jeff Serra  was seen on 4/06/2023.  Please excuse her from 04/04/2023 until 4/10/2023 as long as she is feeling better and fever free. She should wear a mask until 4/16/2023 due to illness.         Sincerely,        HARLEY WESTBROOK CNP

## 2023-04-06 NOTE — PATIENT INSTRUCTIONS
Oxycodone - cut dose by 75% while taking paxlovid.   Zolpidem - do not take while taking paxlovid, restart 3 days after finishing paxlovid.   COVID-19 Outpatient Treatments  Your care team can help you find the best treatments for COVID-19. Talk to a health care provider or refer to the FDA medicine fact sheets below.  Important: You can't have Paxlovid or molnupiravir if you're starting the medicine more than 5 days after your symptoms have started.  Paxlovid: https://www.fda.gov/media/215770/download  Molnupiravir (Lagevrio): https://www.fda.gov/media/732980/download  Paxlovid (nimatrelvir and ritonavir)  How it works  Two medicines (nirmatrelvir and ritonavir) are taken together. They stop the virus from growing. Less amount of virus is easier for your body to fight.  Benefits  Lowers risk of a hospital stay or death from COVID-19.  How to take  Medicine comes in a daily container with both medicine tablets. Take by mouth twice daily (once in the morning, once at night) for 5 days.  The number of tablets to take varies by patient.  Don't chew or break capsules. Swallow whole.  When to take  Take as soon as possible after positive COVID-19 test result, and within 5 days of your first symptoms.  Who can take it  Patients must be 12 years or older, weigh at least 88 pounds, and have tested positive for COVID-19. Paxlovid is the preferred treatment for pregnant patients.  Possible side effects  Can cause altered sense of taste, diarrhea (loose, watery stools), high blood pressure, muscle aches.  Medicine conflicts  Some medicines may conflict with Paxlovid and may cause serious side effects.  Tell your care team about all the medicines you take, including prescription and over-the-counter medicines, vitamins, and herbal supplements.  Your care team will review your medicines to make sure that you can safely take Paxlovid.  Cautions  Paxlovid is not advised for patients with severe kidney or liver disease. If you  have kidney or liver problems, the dose may need to be changed.  If you're pregnant or breastfeeding, talk to your care team about your options.  If you take hormonal birth control (such as the Pill), then you or your partner should also use a non-hormonal form of birth control (such as a condom). Keep doing this for 1 menstrual cycle after your last dose of Paxlovid.  Molnupiravir (Lagevrio)  How it works  Stops the virus from growing. Less amount of virus is easier for your body to fight.  Benefits  Lowers risk of a hospital stay or death from COVID-19.  How to take  Take 4 capsules by mouth every 12 hours (4 in the morning and 4 at night) for 5 days. Don't chew or break capsules. Swallow whole.  When to take  Take as soon as possible after positive COVID-19 test result, and within 5 days of your first symptoms.  Who can take it  Patients must be 18 years or older and have tested positive for COVID-19.  Possible side effects  Diarrhea (loose, watery stools), nausea (feeling sick to your stomach), dizziness, headaches.  Medicine conflicts  Right now, there are no known conflicts with other drugs. But tell your care team about all medicines you take.  Cautions  This medicine is not advised for patients who are pregnant.  If you are someone who could become pregnant, use trusted birth control until 4 days after your last dose of molnupiravir.  If your partner could become pregnant, you should use trusted birth control until 3 months after your last dose of molnupiravir.  For informational purposes only. Not to replace the advice of your health care provider. Copyright   2022 [facility]. All rights reserved. Clinically reviewed by Shantell Cota, PharmD, BCACP. [email or web address] [id number] - REV 12/22.
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
## 2023-04-17 DIAGNOSIS — G47.00 INSOMNIA, UNSPECIFIED TYPE: ICD-10-CM

## 2023-04-17 RX ORDER — ZOLPIDEM TARTRATE 10 MG/1
TABLET ORAL
Qty: 30 TABLET | Refills: 0 | Status: SHIPPED | OUTPATIENT
Start: 2023-04-17 | End: 2023-05-18

## 2023-04-24 ENCOUNTER — MYC REFILL (OUTPATIENT)
Dept: FAMILY MEDICINE | Facility: CLINIC | Age: 48
End: 2023-04-24
Payer: COMMERCIAL

## 2023-04-24 DIAGNOSIS — M54.50 CHRONIC MIDLINE LOW BACK PAIN WITHOUT SCIATICA: ICD-10-CM

## 2023-04-24 DIAGNOSIS — G89.29 CHRONIC NECK PAIN: ICD-10-CM

## 2023-04-24 DIAGNOSIS — G44.86 CERVICOGENIC HEADACHE: ICD-10-CM

## 2023-04-24 DIAGNOSIS — M54.2 CHRONIC NECK PAIN: ICD-10-CM

## 2023-04-24 DIAGNOSIS — G89.29 CHRONIC MIDLINE LOW BACK PAIN WITHOUT SCIATICA: ICD-10-CM

## 2023-04-24 DIAGNOSIS — F11.90 CHRONIC, CONTINUOUS USE OF OPIOIDS: ICD-10-CM

## 2023-04-24 RX ORDER — OXYCODONE HYDROCHLORIDE 15 MG/1
15 TABLET ORAL 3 TIMES DAILY PRN
Qty: 90 TABLET | Refills: 0 | Status: SHIPPED | OUTPATIENT
Start: 2023-04-24 | End: 2023-05-23

## 2023-04-24 RX ORDER — GABAPENTIN 300 MG/1
CAPSULE ORAL
Qty: 270 CAPSULE | Refills: 1 | Status: SHIPPED | OUTPATIENT
Start: 2023-04-24 | End: 2023-09-19

## 2023-04-24 RX ORDER — GABAPENTIN 300 MG/1
600 CAPSULE ORAL AT BEDTIME
Status: CANCELLED | OUTPATIENT
Start: 2023-04-24

## 2023-05-18 ENCOUNTER — MYC REFILL (OUTPATIENT)
Dept: FAMILY MEDICINE | Facility: CLINIC | Age: 48
End: 2023-05-18
Payer: COMMERCIAL

## 2023-05-18 DIAGNOSIS — G47.00 INSOMNIA, UNSPECIFIED TYPE: ICD-10-CM

## 2023-05-18 RX ORDER — ZOLPIDEM TARTRATE 10 MG/1
10 TABLET ORAL
Qty: 30 TABLET | Refills: 2 | Status: SHIPPED | OUTPATIENT
Start: 2023-05-18 | End: 2023-09-05

## 2023-05-23 ENCOUNTER — MYC REFILL (OUTPATIENT)
Dept: FAMILY MEDICINE | Facility: CLINIC | Age: 48
End: 2023-05-23
Payer: COMMERCIAL

## 2023-05-23 DIAGNOSIS — F11.90 CHRONIC, CONTINUOUS USE OF OPIOIDS: ICD-10-CM

## 2023-05-23 DIAGNOSIS — M54.50 CHRONIC MIDLINE LOW BACK PAIN WITHOUT SCIATICA: ICD-10-CM

## 2023-05-23 DIAGNOSIS — G89.29 CHRONIC MIDLINE LOW BACK PAIN WITHOUT SCIATICA: ICD-10-CM

## 2023-05-23 RX ORDER — OXYCODONE HYDROCHLORIDE 15 MG/1
15 TABLET ORAL 3 TIMES DAILY PRN
Qty: 90 TABLET | Refills: 0 | Status: SHIPPED | OUTPATIENT
Start: 2023-05-23 | End: 2023-06-13

## 2023-06-13 ENCOUNTER — VIRTUAL VISIT (OUTPATIENT)
Dept: FAMILY MEDICINE | Facility: CLINIC | Age: 48
End: 2023-06-13
Payer: COMMERCIAL

## 2023-06-13 DIAGNOSIS — I10 HYPERTENSION GOAL BP (BLOOD PRESSURE) < 140/90: ICD-10-CM

## 2023-06-13 DIAGNOSIS — F11.90 CHRONIC, CONTINUOUS USE OF OPIOIDS: ICD-10-CM

## 2023-06-13 DIAGNOSIS — R05.1 ACUTE COUGH: Primary | ICD-10-CM

## 2023-06-13 DIAGNOSIS — G89.29 CHRONIC MIDLINE LOW BACK PAIN WITHOUT SCIATICA: ICD-10-CM

## 2023-06-13 DIAGNOSIS — M54.50 CHRONIC MIDLINE LOW BACK PAIN WITHOUT SCIATICA: ICD-10-CM

## 2023-06-13 PROCEDURE — 99442 PR PHYSICIAN TELEPHONE EVALUATION 11-20 MIN: CPT | Mod: 95 | Performed by: FAMILY MEDICINE

## 2023-06-13 RX ORDER — CODEINE PHOSPHATE AND GUAIFENESIN 10; 100 MG/5ML; MG/5ML
1-2 SOLUTION ORAL EVERY 4 HOURS PRN
Qty: 240 ML | Refills: 0 | Status: SHIPPED | OUTPATIENT
Start: 2023-06-13 | End: 2024-01-08

## 2023-06-13 RX ORDER — PREDNISONE 20 MG/1
40 TABLET ORAL DAILY
Qty: 10 TABLET | Refills: 0 | Status: SHIPPED | OUTPATIENT
Start: 2023-06-13 | End: 2023-06-26

## 2023-06-13 RX ORDER — AZITHROMYCIN 250 MG/1
TABLET, FILM COATED ORAL
Qty: 6 TABLET | Refills: 0 | Status: SHIPPED | OUTPATIENT
Start: 2023-06-13 | End: 2023-09-05

## 2023-06-13 RX ORDER — OXYCODONE HYDROCHLORIDE 15 MG/1
15 TABLET ORAL 3 TIMES DAILY PRN
Qty: 90 TABLET | Refills: 0 | Status: SHIPPED | OUTPATIENT
Start: 2023-06-21 | End: 2023-07-17

## 2023-06-13 ASSESSMENT — ANXIETY QUESTIONNAIRES
GAD7 TOTAL SCORE: 0
IF YOU CHECKED OFF ANY PROBLEMS ON THIS QUESTIONNAIRE, HOW DIFFICULT HAVE THESE PROBLEMS MADE IT FOR YOU TO DO YOUR WORK, TAKE CARE OF THINGS AT HOME, OR GET ALONG WITH OTHER PEOPLE: NOT DIFFICULT AT ALL
GAD7 TOTAL SCORE: 0
2. NOT BEING ABLE TO STOP OR CONTROL WORRYING: NOT AT ALL
3. WORRYING TOO MUCH ABOUT DIFFERENT THINGS: NOT AT ALL
7. FEELING AFRAID AS IF SOMETHING AWFUL MIGHT HAPPEN: NOT AT ALL
GAD7 TOTAL SCORE: 0
8. IF YOU CHECKED OFF ANY PROBLEMS, HOW DIFFICULT HAVE THESE MADE IT FOR YOU TO DO YOUR WORK, TAKE CARE OF THINGS AT HOME, OR GET ALONG WITH OTHER PEOPLE?: NOT DIFFICULT AT ALL
1. FEELING NERVOUS, ANXIOUS, OR ON EDGE: NOT AT ALL
4. TROUBLE RELAXING: NOT AT ALL
7. FEELING AFRAID AS IF SOMETHING AWFUL MIGHT HAPPEN: NOT AT ALL
5. BEING SO RESTLESS THAT IT IS HARD TO SIT STILL: NOT AT ALL
6. BECOMING EASILY ANNOYED OR IRRITABLE: NOT AT ALL

## 2023-06-13 ASSESSMENT — PATIENT HEALTH QUESTIONNAIRE - PHQ9
SUM OF ALL RESPONSES TO PHQ QUESTIONS 1-9: 2
SUM OF ALL RESPONSES TO PHQ QUESTIONS 1-9: 2
10. IF YOU CHECKED OFF ANY PROBLEMS, HOW DIFFICULT HAVE THESE PROBLEMS MADE IT FOR YOU TO DO YOUR WORK, TAKE CARE OF THINGS AT HOME, OR GET ALONG WITH OTHER PEOPLE: NOT DIFFICULT AT ALL

## 2023-06-13 NOTE — PROGRESS NOTES
Jeff is a 48 year old who is being evaluated via a billable telephone visit.      What phone number would you like to be contacted at?[phone]   How would you like to obtain your AVS? Mail a copy    Distant Location (provider location):  Off-site    Assessment & Plan     Acute cough  Illness symptoms a little over a week.  Lots of productive cough and chest tightness.  Not short of breath per se.  Perhaps feeling slightly feverish but not terribly ill.  We will treat as a course of bronchitis with antibiotics and steroids.  Cough medicine as needed.  Contact me if not improving in a week.  - azithromycin (ZITHROMAX) 250 MG tablet; Two tabs today.  One tab daily x 4 days.  - predniSONE (DELTASONE) 20 MG tablet; Take 2 tablets (40 mg) by mouth daily for 5 days  - guaiFENesin-codeine (CHERATUSSIN AC) 100-10 MG/5ML solution; Take 5-10 mLs by mouth every 4 hours as needed for cough    Hypertension goal BP (blood pressure) < 140/90  She said that since being sick blood pressures up a little bit, perhaps mid 140s.  I advised waiting till she is feeling better and then rechecking on blood pressure.  If we need to restart medication we will but it is likely related to the illness.    Chronic midline low back pain without sciatica  Stable and up-to-date on routine monitoring.  Plan follow-up in 3 months  - oxyCODONE IR (ROXICODONE) 15 MG tablet; Take 1 tablet (15 mg) by mouth 3 times daily as needed for pain    Chronic, continuous use of opioids  As above  - oxyCODONE IR (ROXICODONE) 15 MG tablet; Take 1 tablet (15 mg) by mouth 3 times daily as needed for pain         See Patient Instructions    Jordyn Loredo MD  [facility]   Jeff is a 48 year old, presenting for the following health issues:  URI         View : No data to display.              History of Present Illness       Reason for visit:  Cold    She eats 2-3 servings of fruits and vegetables daily.She consumes 0  sweetened beverage(s) daily.She exercises with enough effort to increase her heart rate 30 to 60 minutes per day.  She exercises with enough effort to increase her heart rate 3 or less days per week.   She is taking medications regularly.    Today's PHQ-9         PHQ-9 Total Score: 2    PHQ-9 Q9 Thoughts of better off dead/self-harm past 2 weeks :   Not at all    How difficult have these problems made it for you to do your work, take care of things at home, or get along with other people: Not difficult at all  Today's CHAO-7 Score: 0       Concern - cold   Onset: over a week   Description: really bad chest cold, tight, pain in side from coughing   Intensity: moderate  Progression of Symptoms:  same  Accompanying Signs & Symptoms: dry cough, soar throat   Previous history of similar problem: yes   Precipitating factors:        Worsened by: none   Alleviating factors:        Improved by: none   Therapies tried and outcome: cough drops and night quill     Visit patient today to talk about illness as above.      Review of Systems   Constitutional, HEENT, cardiovascular, pulmonary, gi and gu systems are negative, except as otherwise noted.      Objective    Vitals - Patient Reported  Systolic (Patient Reported): (!) 144  Diastolic (Patient Reported): (!) 104  Blood pressure taken with manual cuff (will exclude from quality measure): Yes  Pulse (Patient Reported): 95      Vitals:  No vitals were obtained today due to virtual visit.    Physical Exam   healthy, alert and no distress  PSYCH: Alert and oriented times 3; coherent speech, normal   rate and volume, able to articulate logical thoughts, able   to abstract reason, no tangential thoughts, no hallucinations   or delusions  Her affect is normal  RESP: No cough, no audible wheezing, able to talk in full sentences  Remainder of exam unable to be completed due to telephone visits    Past labs reviewed with the patient.             Phone call duration: 12 minutes

## 2023-06-22 ENCOUNTER — APPOINTMENT (OUTPATIENT)
Dept: GENERAL RADIOLOGY | Facility: CLINIC | Age: 48
End: 2023-06-22
Attending: PHYSICIAN ASSISTANT
Payer: COMMERCIAL

## 2023-06-22 ENCOUNTER — HOSPITAL ENCOUNTER (EMERGENCY)
Facility: CLINIC | Age: 48
Discharge: HOME | End: 2023-06-22
Attending: PHYSICIAN ASSISTANT | Admitting: PHYSICIAN ASSISTANT
Payer: COMMERCIAL

## 2023-06-22 VITALS
RESPIRATION RATE: 18 BRPM | SYSTOLIC BLOOD PRESSURE: 137 MMHG | OXYGEN SATURATION: 97 % | HEART RATE: 77 BPM | TEMPERATURE: 98.1 F | DIASTOLIC BLOOD PRESSURE: 92 MMHG

## 2023-06-22 DIAGNOSIS — R07.9 CHEST PAIN: ICD-10-CM

## 2023-06-22 LAB
ALBUMIN SERPL BCG-MCNC: 4 G/DL (ref 3.5–5.2)
ALP SERPL-CCNC: 78 U/L (ref 35–104)
ALT SERPL W P-5'-P-CCNC: 36 U/L (ref 0–50)
ANION GAP SERPL CALCULATED.3IONS-SCNC: 12 MMOL/L (ref 7–15)
AST SERPL W P-5'-P-CCNC: 14 U/L (ref 0–45)
BASOPHILS # BLD AUTO: 0 10E3/UL (ref 0–0.2)
BASOPHILS NFR BLD AUTO: 0 %
BILIRUB DIRECT SERPL-MCNC: <0.2 MG/DL (ref 0–0.3)
BILIRUB SERPL-MCNC: 1 MG/DL
BUN SERPL-MCNC: 7.5 MG/DL (ref 6–20)
CALCIUM SERPL-MCNC: 9.1 MG/DL (ref 8.6–10)
CHLORIDE SERPL-SCNC: 99 MMOL/L (ref 98–107)
CREAT SERPL-MCNC: 0.73 MG/DL (ref 0.51–0.95)
D DIMER PPP FEU-MCNC: 0.27 UG/ML FEU (ref 0–0.5)
DEPRECATED HCO3 PLAS-SCNC: 29 MMOL/L (ref 22–29)
EOSINOPHIL # BLD AUTO: 0.1 10E3/UL (ref 0–0.7)
EOSINOPHIL NFR BLD AUTO: 1 %
ERYTHROCYTE [DISTWIDTH] IN BLOOD BY AUTOMATED COUNT: 14.5 % (ref 10–15)
GFR SERPL CREATININE-BSD FRML MDRD: >90 ML/MIN/1.73M2
GLUCOSE SERPL-MCNC: 90 MG/DL (ref 70–99)
HCT VFR BLD AUTO: 39.2 % (ref 35–47)
HGB BLD-MCNC: 13.2 G/DL (ref 11.7–15.7)
HOLD SPECIMEN: NORMAL
HOLD SPECIMEN: NORMAL
IMM GRANULOCYTES # BLD: 0 10E3/UL
IMM GRANULOCYTES NFR BLD: 0 %
LIPASE SERPL-CCNC: 27 U/L (ref 13–60)
LYMPHOCYTES # BLD AUTO: 2.7 10E3/UL (ref 0.8–5.3)
LYMPHOCYTES NFR BLD AUTO: 35 %
MCH RBC QN AUTO: 28.5 PG (ref 26.5–33)
MCHC RBC AUTO-ENTMCNC: 33.7 G/DL (ref 31.5–36.5)
MCV RBC AUTO: 85 FL (ref 78–100)
MONOCYTES # BLD AUTO: 0.6 10E3/UL (ref 0–1.3)
MONOCYTES NFR BLD AUTO: 7 %
NEUTROPHILS # BLD AUTO: 4.5 10E3/UL (ref 1.6–8.3)
NEUTROPHILS NFR BLD AUTO: 57 %
NRBC # BLD AUTO: 0 10E3/UL
NRBC BLD AUTO-RTO: 0 /100
PLATELET # BLD AUTO: 194 10E3/UL (ref 150–450)
POTASSIUM SERPL-SCNC: 3.4 MMOL/L (ref 3.4–5.3)
PROT SERPL-MCNC: 7 G/DL (ref 6.4–8.3)
RBC # BLD AUTO: 4.63 10E6/UL (ref 3.8–5.2)
SODIUM SERPL-SCNC: 140 MMOL/L (ref 136–145)
TROPONIN T SERPL HS-MCNC: <6 NG/L
WBC # BLD AUTO: 7.9 10E3/UL (ref 4–11)

## 2023-06-22 PROCEDURE — 99285 EMERGENCY DEPT VISIT HI MDM: CPT | Mod: 25

## 2023-06-22 PROCEDURE — 84484 ASSAY OF TROPONIN QUANT: CPT | Performed by: PHYSICIAN ASSISTANT

## 2023-06-22 PROCEDURE — 250N000013 HC RX MED GY IP 250 OP 250 PS 637: Performed by: PHYSICIAN ASSISTANT

## 2023-06-22 PROCEDURE — 85025 COMPLETE CBC W/AUTO DIFF WBC: CPT | Performed by: PHYSICIAN ASSISTANT

## 2023-06-22 PROCEDURE — 83690 ASSAY OF LIPASE: CPT | Performed by: PHYSICIAN ASSISTANT

## 2023-06-22 PROCEDURE — 36415 COLL VENOUS BLD VENIPUNCTURE: CPT | Performed by: PHYSICIAN ASSISTANT

## 2023-06-22 PROCEDURE — 85379 FIBRIN DEGRADATION QUANT: CPT | Performed by: PHYSICIAN ASSISTANT

## 2023-06-22 PROCEDURE — 71046 X-RAY EXAM CHEST 2 VIEWS: CPT

## 2023-06-22 PROCEDURE — 93005 ELECTROCARDIOGRAM TRACING: CPT

## 2023-06-22 PROCEDURE — 82248 BILIRUBIN DIRECT: CPT | Performed by: PHYSICIAN ASSISTANT

## 2023-06-22 RX ORDER — MAGNESIUM HYDROXIDE/ALUMINUM HYDROXICE/SIMETHICONE 120; 1200; 1200 MG/30ML; MG/30ML; MG/30ML
15 SUSPENSION ORAL ONCE
Status: COMPLETED | OUTPATIENT
Start: 2023-06-22 | End: 2023-06-22

## 2023-06-22 RX ORDER — FAMOTIDINE 20 MG/1
20 TABLET, FILM COATED ORAL 2 TIMES DAILY
Qty: 28 TABLET | Refills: 0 | Status: SHIPPED | OUTPATIENT
Start: 2023-06-22 | End: 2023-07-06

## 2023-06-22 RX ORDER — NITROGLYCERIN 0.4 MG/1
0.4 TABLET SUBLINGUAL EVERY 5 MIN PRN
Status: DISCONTINUED | OUTPATIENT
Start: 2023-06-22 | End: 2023-06-22 | Stop reason: HOSPADM

## 2023-06-22 RX ADMIN — ALUMINUM HYDROXIDE, MAGNESIUM HYDROXIDE, AND DIMETHICONE 15 ML: 200; 20; 200 SUSPENSION ORAL at 09:48

## 2023-06-22 RX ADMIN — NITROGLYCERIN 0.4 MG: 0.4 TABLET SUBLINGUAL at 10:41

## 2023-06-22 ASSESSMENT — ACTIVITIES OF DAILY LIVING (ADL): ADLS_ACUITY_SCORE: 35

## 2023-06-22 NOTE — DISCHARGE INSTRUCTIONS
The cause of your symptoms is unclear at this time. There is no evidence for a life-threatening process at this time.   We will trial a course of Pepcid to see if this helps with your symptoms.  Follow up closely with a primary doctor with persistent symptoms.  Return to the emergency department with worsening symptoms.

## 2023-06-22 NOTE — ED PROVIDER NOTES
History     Chief Complaint:  Chest Pain       HPI   Jeff Serra is a 48 year old female with a history of hypertension, PE, and DVT who presents with chest pain. The patient reports having three days of chest pain that she describes as a tightness and soreness. The pain was initially intermittent, but has been constant for the past day. Exacerbation with deep breathing, but not with exertion or eating. No alleviating factors or radiation of the pain, but she does note having a brief sharp pain in her right chest wall last night. Endorses a mild cough and epigastric pain. Current chest pain 7/10. Took two ibuprofen around 0830 this morning. No fever, chills, shortness of breath, leg swelling, nausea, vomiting, or hemoptysis. She does have a personal history of blood clots, but is not currently anticoagulated. No personal history of heart disease.     Independent Historian:   None - Patient Only    Review of External Notes:   None    Medications:    Azithromycin   Gabapentin  Loratadine  Losartan  Omeprazole  Ondansetron  Oxycodone  Thiamine  Tizanidine  Zolpidem     Past Medical History:    PE  Hypertension  Continuous opioid dependence  Right leg DVT  Ovarian cyst  Kidney stones  Left patella chondral defect  Umbilical hernia  Obstructive sleep apnea  Postsurgical malabsorption  Cervicogenic headache  GERD  BPPV  Thrombosed hemorrhoids  Anemia   Chronic pain syndrome  Major depressive disorder    Past Surgical History:    Hysterectomy  Herniorrhaphy   Gastric sleeve  Laparoscopic lysis adhesions  Mini laparotomy with tubal ligation  Lithotripsy   Ankle surgery     Physical Exam     Patient Vitals for the past 24 hrs:   BP Temp Temp src Pulse Resp SpO2   06/22/23 1100 (!) 137/92 -- -- 77 -- 97 %   06/22/23 1050 (!) 134/93 -- -- 87 -- 96 %   06/22/23 1042 (!) 157/103 -- -- 84 -- --   06/22/23 0950 -- -- -- 77 -- --   06/22/23 0945 (!) 145/103 -- -- -- -- --   06/22/23 0913 (!) 155/108 98.1  F (36.7  C)  Temporal 88 18 100 %      Physical Exam  Constitutional: Pleasant. Cooperative.   Eyes: Pupils equally round and reactive  HENT: Head is normal in appearance. Oropharynx is normal with moist mucus membranes.  Cardiovascular: Regular rate and rhythm and without murmurs.  Respiratory: Normal respiratory effort, lungs are clear bilaterally.  GI: Mild epigastric TTP, otherwise non-tender, soft, non-distended. No guarding, rebound, or rigidity.  Musculoskeletal: No asymmetry of the lower extremities.  Skin: Normal, without rash.  Neurologic: Cranial nerves grossly intact, normal cognition, no focal deficits. Alert and oriented x 3.   Psychiatric: Normal affect.  Nursing notes and vital signs reviewed.    Emergency Department Course   ECG  ECG taken at 0921, ECG read at 0948  Normal sinus rhythm   No significant changes as compared to prior, dated 12/27/22.  Rate 84 bpm. MS interval 140 ms. QRS duration 76 ms. QT/QTc 364/430 ms. P-R-T axes 42 15 9.     Imaging:  Chest XR,  PA & LAT   Final Result   IMPRESSION: PA and lateral views of the chest were obtained.   Cardiomediastinal silhouette is within normal limits. No suspicious   focal pulmonary opacities. No significant pleural effusion or   pneumothorax.      ROCIO VILLANUEVA MD            SYSTEM ID:  [id number]         Report per radiology    Laboratory:  Labs Ordered and Resulted from Time of ED Arrival to Time of ED Departure   BASIC METABOLIC PANEL - Normal       Result Value    Sodium 140      Potassium 3.4      Chloride 99      Carbon Dioxide (CO2) 29      Anion Gap 12      Urea Nitrogen 7.5      Creatinine 0.73      Calcium 9.1      Glucose 90      GFR Estimate >90     TROPONIN T, HIGH SENSITIVITY - Normal    Troponin T, High Sensitivity <6     HEPATIC FUNCTION PANEL - Normal    Protein Total 7.0      Albumin 4.0      Bilirubin Total 1.0      Alkaline Phosphatase 78      AST 14      ALT 36      Bilirubin Direct <0.20     LIPASE - Normal    Lipase 27     D DIMER  QUANTITATIVE - Normal    D-Dimer Quantitative 0.27     CBC WITH PLATELETS AND DIFFERENTIAL    WBC Count 7.9      RBC Count 4.63      Hemoglobin 13.2      Hematocrit 39.2      MCV 85      MCH 28.5      MCHC 33.7      RDW 14.5      Platelet Count 194      % Neutrophils 57      % Lymphocytes 35      % Monocytes 7      % Eosinophils 1      % Basophils 0      % Immature Granulocytes 0      NRBCs per 100 WBC 0      Absolute Neutrophils 4.5      Absolute Lymphocytes 2.7      Absolute Monocytes 0.6      Absolute Eosinophils 0.1      Absolute Basophils 0.0      Absolute Immature Granulocytes 0.0      Absolute NRBCs 0.0          Emergency Department Course & Assessments:     Interventions:  Medications   nitroGLYcerin (NITROSTAT) sublingual tablet 0.4 mg (0.4 mg Sublingual $Given 6/22/23 1041)   alum & mag hydroxide-simethicone (MAALOX) suspension 15 mL (15 mLs Oral $Given 6/22/23 0915)      Assessments:  0934 I obtained history and examined the patient as noted above.   1021 I rechecked and updated the patient on lab findings.   1133 I rechecked and updated the patient on all findings. Discussed plan for discharge.     Independent Interpretation (X-rays, CTs, rhythm strip):  I personally evaluated the patient's CXR, no obvious PTX, PNA.    Consultations/Discussion of Management or Tests:  None      Social Determinants of Health affecting care:   None    Disposition:  The patient was discharged to home.     Impression & Plan      HEART Score  Background  Calculates the overall risk of adverse event in patient's presenting with chest pain.  Based on 5 criteria (each assigned 0-2 points) including suspiciousness of history, EKG, age, risk factors and troponin.    Data  48 year old female  has CARDIOVASCULAR SCREENING; LDL GOAL LESS THAN 160; Chronic neck pain; Hypertension goal BP (blood pressure) < 140/90; History of pulmonary embolism; Allergic rhinitis; Chronic, continuous use of opioids; Hx of renal calculi; Chronic midline  low back pain without sciatica; S/P hysterectomy; Vitamin D deficiency; Chondral defect of left patella; Left knee pain, unspecified chronicity; Snoring; Bilateral leg edema; Hirsutism; Umbilical hernia without obstruction and without gangrene; CATALINO (obstructive sleep apnea); Cervicogenic headache; Bariatric surgery status; and Postsurgical malabsorption on their problem list.   reports that she has never smoked. She has never used smokeless tobacco.  family history includes Asthma in her son, son, and son; Breast Cancer in her maternal grandmother and paternal grandmother; Diabetes in her maternal uncle; Hypertension in her father and mother; Lung Cancer in her cousin and paternal aunt; Myocardial Infarction in her maternal aunt and maternal uncle.  Lab Results   Component Value Date    TROPI <0.015 03/03/2021     Criteria   0-2 points for each of 5 items (maximum of 10 points):  Score 0- History slightly suspicious for coronary syndrome  Score 0- EKG Normal  Score 1- Age 45 to 65 years old  Score 1- One to 2 risk factors for atherosclerotic disease  Score 0- Within normal limits for troponin levels  Interpretation  Risk of adverse outcome  Heart Score: 2  Total Score 0-3- Adverse Outcome Risk 2.5% - Supports early discharge with appropriate follow-up    Medical Decision Making:  Jeff Serra is a 48 year old female who presents to the ED for evaluation of chest pain.  See HPI as above for additional details.  Vitals and physical exam as above.  Differential is broad include ACS, dissection, PE, pneumothorax, GERD, MSK, pneumonia, pericarditis, amongst others.  Work-up obtained as above.  Discussed with patient unclear etiology of her symptoms at this time.  No evidence for acute nefarious pathology identified based upon the above work-up.  Heart score of 2.  This may certainly represent intraluminal source of pain, will place patient on Pepcid to see if this helps with symptoms.  [place] patient was safe for  discharge to home. Discussed reasons to return. All questions answered. Patient discharged to home in stable condition.    Diagnosis:    ICD-10-CM    1. Chest pain  R07.9            Discharge Medications:  New Prescriptions    FAMOTIDINE (PEPCID) 20 MG TABLET    Take 1 tablet (20 mg) by mouth 2 times daily for 14 days      Scribe Disclosure:  I, Felecia Rust, am serving as a scribe at 9:32 AM on 6/22/2023 to document services personally performed by Jared Meredith PA-C based on my observations and the provider's statements to me.    6/22/2023   Jared Meredith PA-C     This record was created at least in part using electronic voice recognition software, so please excuse any typographical errors.       Jared Meredith PA-C  06/22/23 4741

## 2023-06-23 ENCOUNTER — MYC MEDICAL ADVICE (OUTPATIENT)
Dept: FAMILY MEDICINE | Facility: CLINIC | Age: 48
End: 2023-06-23
Payer: COMMERCIAL

## 2023-06-23 ENCOUNTER — TELEPHONE (OUTPATIENT)
Dept: FAMILY MEDICINE | Facility: CLINIC | Age: 48
End: 2023-06-23
Payer: COMMERCIAL

## 2023-06-23 LAB
ATRIAL RATE - MUSE: 84 BPM
DIASTOLIC BLOOD PRESSURE - MUSE: NORMAL MMHG
INTERPRETATION ECG - MUSE: NORMAL
P AXIS - MUSE: 42 DEGREES
PR INTERVAL - MUSE: 140 MS
QRS DURATION - MUSE: 76 MS
QT - MUSE: 364 MS
QTC - MUSE: 430 MS
R AXIS - MUSE: 15 DEGREES
SYSTOLIC BLOOD PRESSURE - MUSE: NORMAL MMHG
T AXIS - MUSE: 9 DEGREES
VENTRICULAR RATE- MUSE: 84 BPM

## 2023-06-23 NOTE — TELEPHONE ENCOUNTER
Forms/Letter Request    Type of form/letter: Dewayne Hill     Have you been seen for this request: N/A    Do we have the form/letter: Yes: placed in providers forms basket for review    When is form/letter needed by: ASAP    How would you like the form/letter returned: Fax  Back to pt at [phone]

## 2023-06-23 NOTE — TELEPHONE ENCOUNTER
RN called patient regarding [employer] messages sent today.    Patient is feeling okay, but still dealing with the stomach and chest issues. However, she is stable at this time. RN advised a hospital follow-up visit would be appropriate for further evaluation and to fill out FMLA forms. Patient in agreement with plan. Appointment scheduled for 6/26/2023. No further questions or concerns.    SHALINI Cruz  [facility] Primary Care Triage

## 2023-06-23 NOTE — TELEPHONE ENCOUNTER
See other MyChart message. Concerns addressed in that encounter. Patient has ED follow-up visit scheduled 6/26. No further action needed.    SHALINI Cruz  [facility] Primary Care Triage

## 2023-06-26 ENCOUNTER — OFFICE VISIT (OUTPATIENT)
Dept: FAMILY MEDICINE | Facility: CLINIC | Age: 48
End: 2023-06-26
Payer: COMMERCIAL

## 2023-06-26 VITALS
HEIGHT: 63 IN | HEART RATE: 94 BPM | RESPIRATION RATE: 15 BRPM | OXYGEN SATURATION: 99 % | TEMPERATURE: 98.4 F | SYSTOLIC BLOOD PRESSURE: 128 MMHG | WEIGHT: 165.9 LBS | DIASTOLIC BLOOD PRESSURE: 84 MMHG | BODY MASS INDEX: 29.39 KG/M2

## 2023-06-26 DIAGNOSIS — I10 HYPERTENSION GOAL BP (BLOOD PRESSURE) < 140/90: ICD-10-CM

## 2023-06-26 DIAGNOSIS — R05.1 ACUTE COUGH: ICD-10-CM

## 2023-06-26 DIAGNOSIS — F11.90 CHRONIC, CONTINUOUS USE OF OPIOIDS: ICD-10-CM

## 2023-06-26 DIAGNOSIS — R07.9 CHEST PAIN, UNSPECIFIED TYPE: Primary | ICD-10-CM

## 2023-06-26 PROCEDURE — 99214 OFFICE O/P EST MOD 30 MIN: CPT | Performed by: FAMILY MEDICINE

## 2023-06-26 RX ORDER — PREDNISONE 20 MG/1
40 TABLET ORAL DAILY
Qty: 14 TABLET | Refills: 0 | Status: SHIPPED | OUTPATIENT
Start: 2023-06-26 | End: 2023-07-03

## 2023-06-26 ASSESSMENT — ANXIETY QUESTIONNAIRES
IF YOU CHECKED OFF ANY PROBLEMS ON THIS QUESTIONNAIRE, HOW DIFFICULT HAVE THESE PROBLEMS MADE IT FOR YOU TO DO YOUR WORK, TAKE CARE OF THINGS AT HOME, OR GET ALONG WITH OTHER PEOPLE: SOMEWHAT DIFFICULT
1. FEELING NERVOUS, ANXIOUS, OR ON EDGE: NOT AT ALL
5. BEING SO RESTLESS THAT IT IS HARD TO SIT STILL: NOT AT ALL
GAD7 TOTAL SCORE: 2
6. BECOMING EASILY ANNOYED OR IRRITABLE: NOT AT ALL
7. FEELING AFRAID AS IF SOMETHING AWFUL MIGHT HAPPEN: NOT AT ALL
4. TROUBLE RELAXING: NOT AT ALL
3. WORRYING TOO MUCH ABOUT DIFFERENT THINGS: SEVERAL DAYS
2. NOT BEING ABLE TO STOP OR CONTROL WORRYING: SEVERAL DAYS
GAD7 TOTAL SCORE: 2

## 2023-06-26 ASSESSMENT — PAIN SCALES - GENERAL: PAINLEVEL: SEVERE PAIN (6)

## 2023-06-26 NOTE — PROGRESS NOTES
Assessment & Plan     Chest pain, unspecified type  Here today in follow-up of recent ER visit for chest pain.  Records reviewed with patient and in her chart.  Developed anterior chest pain that is not necessarily exertional.  EKG and lab work unremarkable.  Was given Pepcid which has helped a little with some seemingly unrelated stomach symptoms.  I did treat patient recently for an issue of cough with a course of antibiotics and steroids.  For the most part the cough has improved but she is still having a bit of chest congestion.  She is worried about the chest pain itself given her heart and smoking history.  So we will set up a stress echocardiogram to evaluate but I suspect this is more of a chest wall pain at this time likely related to her recent illness.  - Echocardiogram Exercise Stress; Future    Acute cough  We will extend the course of prednisone and see if this helps with discomfort and congestion  - predniSONE (DELTASONE) 20 MG tablet; Take 2 tablets (40 mg) by mouth daily for 7 days    Hypertension goal BP (blood pressure) < 140/90  Blood pressure well controlled on current regimen.  We will set up a stress test for evaluation.  - Echocardiogram Exercise Stress; Future    Chronic, continuous use of opioids  Up-to-date on urine drug screen.  Controlled substance agreement signed today.             MED REC REQUIRED  Post Medication Reconciliation Status:   See Patient Instructions    Jordyn Loredo MD  [facility]    Nicole Aguilar is a 48 year old, presenting for the following health issues:  Hospital F/U (ED visit follow up-chest pain, has not felt improvement)        6/26/2023     8:26 AM   Additional Questions   Roomed by Pam STEVENS   Accompanied by Self     Forms 6/26/2023   Any forms needing to be completed Yes         6/26/2023     8:26 AM   Patient Reported Additional Medications   Patient reports taking the following new medications FAMOTIDINE (PEPCID)  "20 MG TABLET     HPI       Hospital Follow-up Visit:    Hospital/Nursing Home/IP Rehab Facility: [facility]  Date of Admission: 06/22/23  Date of Discharge: 06/22/23  Reason(s) for Admission: Chest pain    Was your hospitalization related to COVID-19? No   Problems taking medications regularly:  None  Medication changes since discharge: Pepcid   Problems adhering to non-medication therapy:  None    Summary of hospitalization:  [facility] discharge summary reviewed  Diagnostic Tests/Treatments reviewed.  Follow up needed: none  Other Healthcare Providers Involved in Patient s Care:         None  Update since discharge: stable.         Plan of care communicated with patient             Here today in follow-up of ER visit for chest pain as above.      Review of Systems   Constitutional, HEENT, cardiovascular, pulmonary, gi and gu systems are negative, except as otherwise noted.      Objective    /84 (BP Location: Right arm, Patient Position: Sitting, Cuff Size: Adult Regular)   Pulse 94   Temp 98.4  F (36.9  C) (Oral)   Resp 15   Ht 1.6 m (5' 3\")   Wt 75.3 kg (165 lb 14.4 oz)   LMP 09/12/2016 (Exact Date)   SpO2 99%   BMI 29.39 kg/m    Body mass index is 29.39 kg/m .  Physical Exam   Alert, pleasant, upbeat, and in no apparent discomfort.  S1 and S2 normal, no murmurs, clicks, gallops or rubs. Regular rate and rhythm. Chest is clear; no wheezes or rales. No edema or JVD.  Mild nonspecific tenderness across anterior chest.  Not focal.  Past labs reviewed with the patient.                     "

## 2023-06-26 NOTE — LETTER
Opioid / Opioid Plus Controlled Substance Agreement    This is an agreement between you and your provider about the safe and appropriate use of controlled substance/opioids prescribed by your care team. Controlled substances are medicines that can cause physical and mental dependence (abuse).    There are strict laws about having and using these medicines. We here at [facility] are committing to working with you in your efforts to get better. To support you in this work, we ll help you schedule regular office appointments for medicine refills. If we must cancel or change your appointment for any reason, we ll make sure you have enough medicine to last until your next appointment.     As a Provider, I will:  Listen carefully to your concerns and treat you with respect.   Recommend a treatment plan that I believe is in your best interest. This plan may involve therapies other than opioid pain medication.   Talk with you often about the possible benefits, and the risk of harm of any medicine that we prescribe for you.   Provide a plan on how to taper (discontinue or go off) using this medicine if the decision is made to stop its use.    As a Patient, I understand that opioid(s):   Are a controlled substance prescribed by my care team to help me function or work and manage my condition(s).   Are strong medicines and can cause serious side effects such as:  Drowsiness, which can seriously affect my driving ability  A lower breathing rate, enough to cause death  Harm to my thinking ability   Depression   Abuse of and addiction to this medicine  Need to be taken exactly as prescribed. Combining opioids with certain medicines or chemicals (such as illegal drugs, sedatives, sleeping pills, and benzodiazepines) can be dangerous or even fatal. If I stop opioids suddenly, I may have severe withdrawal symptoms.  Do not work for all types of pain nor for all patients. If they re not helpful, I may be asked to stop  them.      The risks, benefits and side effects of these medicine(s) were explained to me. I agree that:  I will take part in other treatments as advised by my care team. This may be psychiatry or counseling, physical therapy, behavioral therapy, group treatment or a referral to a specialist.     I will keep all my appointments. I understand that this is part of the monitoring of opioids. My care team may require an office visit for EVERY opioid/controlled substance refill. If I miss appointments or don t follow instructions, my care team may stop my medicine.    I will take my medicines as prescribed. I will not change the dose or schedule unless my care team tells me to. There will be no refills if I run out early.     I may be asked to come to the clinic and complete a urine drug test or complete a pill count at any time. If I don t give a urine sample or participate in a pill count, the care team may stop my medicine.    I will only receive prescriptions from this clinic for chronic pain. If I am treated by another provider for acute pain issues, I will tell them that I am taking opioid pain medication for chronic pain and that I have a treatment agreement with this provider. I will inform my [facility] care team within one business day if I am given a prescription for any pain medication by another healthcare provider. My [facility] care team can contact other providers and pharmacists about my use of any medicines.    It is up to me to make sure that I don t run out of my medicines on weekends or holidays. If my care team is willing to refill my opioid prescription without a visit, I must request refills only during office hours. Refills may take up to 3 business days to process. I will use one pharmacy to fill all my opioid and other controlled substance prescriptions. I will notify the clinic about any changes to my insurance or medication availability.    I am responsible for my prescriptions.  If the medicine/prescription is lost, stolen or destroyed, it will not be replaced. I also agree not to share controlled substance medicines with anyone.    I am aware I should not use any illegal or recreational drugs. I agree not to drink alcohol unless my care team says I can.       If I enroll in the [place] [facility] Cann[facility] program, I will tell my care team prior to my next refill.     I will tell my care team right away if I become pregnant, have a new medical problem treated outside of my regular clinic, or have a change in my medications.    I understand that this medicine can affect my thinking, judgment and reaction time. Alcohol and drugs affect the brain and body, which can affect the safety of my driving. Being under the influence of alcohol or drugs can affect my decision-making, behaviors, personal safety, and the safety of others. Driving while impaired (DWI) can occur if a person is driving, operating, or in physical control of a car, motorcycle, boat, snowmobile, ATV, motorbike, off-road vehicle, or any other motor vehicle ([place] Statute 169A.20). I understand the risk if I choose to drive or operate any vehicle or machinery.    I understand that if I do not follow any of the conditions above, my prescriptions or treatment may be stopped or changed.          Opioids  What You Need to Know    What are opioids?   Opioids are pain medicines that must be prescribed by a doctor. They are also known as narcotics.     Examples are:   morphine (MS Contin, Adriana)  oxycodone (Oxycontin)  oxycodone and acetaminophen (Percocet)  hydrocodone and acetaminophen (Vicodin, Norco)   fentanyl patch (Duragesic)   hydromorphone (Dilaudid)   methadone  codeine (Tylenol #3)     What do opioids do well?   Opioids are best for severe short-term pain such as after a surgery or injury. They may work well for cancer pain. They may help some people with long-lasting (chronic) pain.     What do opioids NOT do well?   Opioids  never get rid of pain entirely, and they don t work well for most patients with chronic pain. Opioids don t reduce swelling, one of the causes of pain.                                    Other ways to manage chronic pain and improve function include:     Treat the health problem that may be causing pain  Anti-inflammation medicines, which reduce swelling and tenderness, such as ibuprofen (Advil, Motrin) or naproxen (Aleve)  Acetaminophen (Tylenol)  Antidepressants and anti-seizure medicines, especially for nerve pain  Topical treatments such as patches or creams  Injections or nerve blocks  Chiropractic or osteopathic treatment  Acupuncture, massage, deep breathing, meditation, visual imagery, aromatherapy  Use heat or ice at the pain site  Physical therapy   Exercise  Stop smoking  Take part in therapy       Risks and side effects     Talk to your doctor before you start or decide to keep taking opioids. Possible side effects include:    Lowering your breathing rate enough to cause death  Overdose, including death, especially if taking higher than prescribed doses  Worse depression symptoms; less pleasure in things you usually enjoy  Feeling tired or sluggish  Slower thoughts or cloudy thinking  Being more sensitive to pain over time; pain is harder to control  Trouble sleeping or restless sleep  Changes in hormone levels (for example, less testosterone)  Changes in sex drive or ability to have sex  Constipation  Unsafe driving  Itching and sweating  Dizziness  Nausea, throwing up and dry mouth    What else should I know about opioids?    Opioids may lead to dependence, tolerance, or addiction.    Dependence means that if you stop or reduce the medicine too quickly, you will have withdrawal symptoms. These include loose poop (diarrhea), jitters, flu-like symptoms, nervousness and tremors. Dependence is not the same as addiction.                     Tolerance means needing higher doses over time to get the same  effect. This may increase the chance of serious side effects.    Addiction is when people improperly use a substance that harms their body, their mind or their relations with others. Use of opiates can cause a relapse of addiction if you have a history of drug or alcohol abuse.    People who have used opioids for a long time may have a lower quality of life, worse depression, higher levels of pain and more visits to doctors.    You can overdose on opioids. Take these steps to lower your risk of overdose:    Recognize the signs:  Signs of overdose include decrease or loss of consciousness (blackout), slowed breathing, trouble waking up and blue lips. If someone is worried about overdose, they should call 911.    Talk to your doctor about Narcan (naloxone).   If you are at risk for overdose, you may be given a prescription for Narcan. This medicine very quickly reverses the effects of opioids.   If you overdose, a friend or family member can give you Narcan while waiting for the ambulance. They need to know the signs of overdose and how to give Narcan.     Don't use alcohol or street drugs.   Taking them with opioids can cause death.    Do not take any of these medicines unless your doctor says it s OK. Taking these with opioids can cause death:  Benzodiazepines, such as lorazepam (Ativan), alprazolam (Xanax) or diazepam (Valium)  Muscle relaxers, such as cyclobenzaprine (Flexeril)  Sleeping pills like zolpidem (Ambien)   Other opioids      How to keep you and other people safe while taking opioids:    Never share your opioids with others.  Opioid medicines are regulated by the Drug Enforcement Agency ([employer]). Selling or sharing medications is a criminal act.    2. Be sure to store opioids in a secure place, locked up if possible. Young children can easily swallow them and overdose.    3. When you are traveling with your medicines, keep them in the original bottles. If you use a pill box, be sure you also carry a copy  of your medicine list from your clinic or pharmacy.    4. Safe disposal of opioids    Most pharmacies have places to get rid of medicine, called disposal kiosks. Medicine disposal options are also available in every [facility]. Search your county and  medication disposal  to find more options. You can find more details at:  [email or web address]     I agree that my provider, clinic care team, and pharmacy may work with any city, state or federal law enforcement agency that investigates the misuse, sale, or other diversion of my controlled medicine. I will allow my provider to discuss my care with, or share a copy of, this agreement with any other treating provider, pharmacy or emergency room where I receive care.    I have read this agreement and have asked questions about anything I did not understand.    _______________________________________________________  Patient Signature - Jeff Serra _____________________                   Date     _______________________________________________________  Provider Signature - Jordyn Loredo MD   _____________________                   Date     _______________________________________________________  Witness Signature (required if provider not present while patient signing)   _____________________                   Date

## 2023-06-27 NOTE — TELEPHONE ENCOUNTER
Pt notified form complete and he will  at the font desk. There is a portion that needs to be complete still by pt.  Yun Soto CMA

## 2023-06-30 ENCOUNTER — TELEPHONE (OUTPATIENT)
Dept: FAMILY MEDICINE | Facility: CLINIC | Age: 48
End: 2023-06-30
Payer: COMMERCIAL

## 2023-06-30 NOTE — TELEPHONE ENCOUNTER
Forms/Letter Request    Type of form/letter: [facility] STD Claim #[id number]    Have you been seen for this request: N/A    Do we have the form/letter: Yes: Will place forms on providers desk for review/signature    When is form/letter needed by: asap    How would you like the form/letter returned: Fax : [phone]

## 2023-07-03 ENCOUNTER — TELEPHONE (OUTPATIENT)
Dept: FAMILY MEDICINE | Facility: CLINIC | Age: 48
End: 2023-07-03
Payer: COMMERCIAL

## 2023-07-03 NOTE — TELEPHONE ENCOUNTER
Forms/Letter Request    Type of form/letter: [employer] [facility]    Have you been seen for this request: N/A    Do we have the form/letter: Yes: Will place for  providers desk fp review/signature    When is form/letter needed by: asap    How would you like the form/letter returned: Fax : [phone]

## 2023-07-03 NOTE — TELEPHONE ENCOUNTER
Pcp out of office this week.   Forms will be addressed when he returns to the office.  Please let patient know there will be a delay

## 2023-07-10 ENCOUNTER — OFFICE VISIT (OUTPATIENT)
Dept: URGENT CARE | Facility: URGENT CARE | Age: 48
End: 2023-07-10
Payer: COMMERCIAL

## 2023-07-10 VITALS
RESPIRATION RATE: 14 BRPM | HEART RATE: 81 BPM | BODY MASS INDEX: 30.36 KG/M2 | WEIGHT: 165 LBS | HEIGHT: 62 IN | SYSTOLIC BLOOD PRESSURE: 149 MMHG | TEMPERATURE: 98.5 F | OXYGEN SATURATION: 98 % | DIASTOLIC BLOOD PRESSURE: 103 MMHG

## 2023-07-10 DIAGNOSIS — J02.9 ACUTE PHARYNGITIS, UNSPECIFIED ETIOLOGY: Primary | ICD-10-CM

## 2023-07-10 DIAGNOSIS — R07.0 THROAT PAIN: ICD-10-CM

## 2023-07-10 LAB
DEPRECATED S PYO AG THROAT QL EIA: NEGATIVE
GROUP A STREP BY PCR: NOT DETECTED

## 2023-07-10 PROCEDURE — 99213 OFFICE O/P EST LOW 20 MIN: CPT | Performed by: FAMILY MEDICINE

## 2023-07-10 PROCEDURE — 87651 STREP A DNA AMP PROBE: CPT | Performed by: FAMILY MEDICINE

## 2023-07-10 PROCEDURE — 87635 SARS-COV-2 COVID-19 AMP PRB: CPT | Performed by: FAMILY MEDICINE

## 2023-07-10 ASSESSMENT — PAIN SCALES - GENERAL
PAINLEVEL: SEVERE PAIN (7)
PAINLEVEL: SEVERE PAIN (6)

## 2023-07-10 NOTE — LETTER
July 10, 2023      Jeff Serra  [id number] [id number]        To Whom It May Concern:    Jeff Serra was seen in our clinic please excuse from missed work on 07/10/23 as we wait for her test results    Sincerely,        Ceasar Copeland MD

## 2023-07-10 NOTE — PATIENT INSTRUCTIONS
Tylenol every 4 hours and also use lozenges as needed for sore throat      Your strep and COVID PCR test will return in the next day if positive a team member will be calling  -You can also see the results on MyChart      If symptoms worsen return seek help right away
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
## 2023-07-11 ENCOUNTER — HOSPITAL ENCOUNTER (OUTPATIENT)
Dept: CARDIOLOGY | Facility: CLINIC | Age: 48
Discharge: HOME | End: 2023-07-11
Attending: FAMILY MEDICINE | Admitting: FAMILY MEDICINE
Payer: COMMERCIAL

## 2023-07-11 DIAGNOSIS — I10 HYPERTENSION GOAL BP (BLOOD PRESSURE) < 140/90: ICD-10-CM

## 2023-07-11 DIAGNOSIS — R07.9 CHEST PAIN, UNSPECIFIED TYPE: ICD-10-CM

## 2023-07-11 LAB — SARS-COV-2 RNA RESP QL NAA+PROBE: NEGATIVE

## 2023-07-11 PROCEDURE — 93350 STRESS TTE ONLY: CPT | Mod: 26 | Performed by: INTERNAL MEDICINE

## 2023-07-11 PROCEDURE — C8928 TTE W OR W/O FOL W/CON,STRES: HCPCS

## 2023-07-11 PROCEDURE — 255N000002 HC RX 255 OP 636: Performed by: FAMILY MEDICINE

## 2023-07-11 PROCEDURE — 93016 CV STRESS TEST SUPVJ ONLY: CPT | Performed by: INTERNAL MEDICINE

## 2023-07-11 PROCEDURE — 93018 CV STRESS TEST I&R ONLY: CPT | Performed by: INTERNAL MEDICINE

## 2023-07-11 PROCEDURE — 93321 DOPPLER ECHO F-UP/LMTD STD: CPT | Mod: 26 | Performed by: INTERNAL MEDICINE

## 2023-07-11 PROCEDURE — 93325 DOPPLER ECHO COLOR FLOW MAPG: CPT | Mod: 26 | Performed by: INTERNAL MEDICINE

## 2023-07-11 RX ADMIN — HUMAN ALBUMIN MICROSPHERES AND PERFLUTREN 3 ML: 10; .22 INJECTION, SOLUTION INTRAVENOUS at 14:29

## 2023-07-12 ENCOUNTER — TELEPHONE (OUTPATIENT)
Dept: FAMILY MEDICINE | Facility: CLINIC | Age: 48
End: 2023-07-12
Payer: COMMERCIAL

## 2023-07-12 ENCOUNTER — MYC MEDICAL ADVICE (OUTPATIENT)
Dept: FAMILY MEDICINE | Facility: CLINIC | Age: 48
End: 2023-07-12
Payer: COMMERCIAL

## 2023-07-12 NOTE — RESULT ENCOUNTER NOTE
Gretchen Aguilar, great news.  The stress test was totally normal.  So it does not tell us the source of the pain but obviously we are most worried about the heart and that looks just fine.  MOISES Loredo M.D.

## 2023-07-12 NOTE — TELEPHONE ENCOUNTER
Forms/Letter Request    Type of form/letter: Return to Work [employer]    Have you been seen for this request: N/A    Do we have the form/letter: Yes: Will place form on providers desk for review/signature    When is form/letter needed by: asap    How would you like the form/letter returned: Fax : [phone] or [phone]

## 2023-07-12 NOTE — LETTER
July 17, 2023      Jeff Serra  [id number] [address] [address]        Addendum to Attending Physician's Statement dated 6/30/2023:    1) time off 6/22 - 7/12/2023  2) return to work 7/13/2023          Sincerely,        Jordyn Loredo MD

## 2023-07-13 NOTE — TELEPHONE ENCOUNTER
Can I get a copy of the forms (from 6/30) so I can reference them for new [place] forms?  Just put it on my desk.

## 2023-07-14 ENCOUNTER — TELEPHONE (OUTPATIENT)
Dept: FAMILY MEDICINE | Facility: CLINIC | Age: 48
End: 2023-07-14
Payer: COMMERCIAL

## 2023-07-14 NOTE — TELEPHONE ENCOUNTER
What I need is a copy of the previous completed 1, not a blank 1.  I need something to reference and it should have been scanned into the system

## 2023-07-14 NOTE — TELEPHONE ENCOUNTER
Forms/Letter Request    Type of form/letter: [facility]    Have you been seen for this request: N/A    Do we have the form/letter: Yes: Will place form on providers desk for review/siganture    When is form/letter needed by: asap    How would you like the form/letter returned: Fax : [phone]

## 2023-07-17 ENCOUNTER — MYC REFILL (OUTPATIENT)
Dept: FAMILY MEDICINE | Facility: CLINIC | Age: 48
End: 2023-07-17
Payer: COMMERCIAL

## 2023-07-17 DIAGNOSIS — G89.29 CHRONIC MIDLINE LOW BACK PAIN WITHOUT SCIATICA: ICD-10-CM

## 2023-07-17 DIAGNOSIS — F11.90 CHRONIC, CONTINUOUS USE OF OPIOIDS: ICD-10-CM

## 2023-07-17 DIAGNOSIS — M54.50 CHRONIC MIDLINE LOW BACK PAIN WITHOUT SCIATICA: ICD-10-CM

## 2023-07-17 RX ORDER — OXYCODONE HYDROCHLORIDE 15 MG/1
15 TABLET ORAL 3 TIMES DAILY PRN
Qty: 90 TABLET | Refills: 0 | Status: SHIPPED | OUTPATIENT
Start: 2023-07-19 | End: 2023-08-17

## 2023-08-17 ENCOUNTER — MYC MEDICAL ADVICE (OUTPATIENT)
Dept: FAMILY MEDICINE | Facility: CLINIC | Age: 48
End: 2023-08-17
Payer: COMMERCIAL

## 2023-09-01 DIAGNOSIS — G47.00 INSOMNIA, UNSPECIFIED TYPE: ICD-10-CM

## 2023-09-05 RX ORDER — ZOLPIDEM TARTRATE 10 MG/1
TABLET ORAL
Qty: 30 TABLET | Refills: 2 | Status: SHIPPED | OUTPATIENT
Start: 2023-09-05 | End: 2023-11-06

## 2023-09-06 ENCOUNTER — IMMUNIZATION (OUTPATIENT)
Dept: FAMILY MEDICINE | Facility: CLINIC | Age: 48
End: 2023-09-06
Payer: COMMERCIAL

## 2023-09-06 DIAGNOSIS — Z23 ENCOUNTER FOR IMMUNIZATION: Primary | ICD-10-CM

## 2023-09-06 PROCEDURE — 90682 RIV4 VACC RECOMBINANT DNA IM: CPT

## 2023-09-06 PROCEDURE — 90471 IMMUNIZATION ADMIN: CPT

## 2023-09-06 PROCEDURE — 99207 PR NO CHARGE NURSE ONLY: CPT

## 2023-09-06 NOTE — PROGRESS NOTES
Prior to immunization administration, verified patients identity using patient s name and date of birth. Please see Immunization Activity for additional information.     Screening Questionnaire for Adult Immunization    Are you sick today?   No   Do you have allergies to medications, food, a vaccine component or latex?   No   Have you ever had a serious reaction after receiving a vaccination?   No   Do you have a long-term health problem with heart, lung, kidney, or metabolic disease (e.g., diabetes), asthma, a blood disorder, no spleen, complement component deficiency, a cochlear implant, or a spinal fluid leak?  Are you on long-term aspirin therapy?   No   Do you have cancer, leukemia, HIV/AIDS, or any other immune system problem?   No   Do you have a parent, brother, or sister with an immune system problem?   No   In the past 3 months, have you taken medications that affect  your immune system, such as prednisone, other steroids, or anticancer drugs; drugs for the treatment of rheumatoid arthritis, Crohn s disease, or psoriasis; or have you had radiation treatments?   No   Have you had a seizure, or a brain or other nervous system problem?   No   During the past year, have you received a transfusion of blood or blood    products, or been given immune (gamma) globulin or antiviral drug?   No   For women: Are you pregnant or is there a chance you could become       pregnant during the next month?   No   Have you received any vaccinations in the past 4 weeks?   No     Immunization questionnaire answers were all negative.    I have reviewed the following standing orders:   This patient is due and qualifies for the Influenza vaccine.    Click here for Influenza Vaccine Standing Order    I have reviewed the vaccines inclusion and exclusion criteria; No concerns regarding eligibility.     Patient instructed to remain in clinic for 15 minutes afterwards, and to report any adverse reactions.     Screening performed by  Raza Hess MA on 9/6/2023 at 4:12 PM.

## 2023-09-15 ENCOUNTER — MYC REFILL (OUTPATIENT)
Dept: FAMILY MEDICINE | Facility: CLINIC | Age: 48
End: 2023-09-15
Payer: COMMERCIAL

## 2023-09-15 DIAGNOSIS — M54.50 CHRONIC MIDLINE LOW BACK PAIN WITHOUT SCIATICA: ICD-10-CM

## 2023-09-15 DIAGNOSIS — G89.29 CHRONIC MIDLINE LOW BACK PAIN WITHOUT SCIATICA: ICD-10-CM

## 2023-09-15 DIAGNOSIS — F11.90 CHRONIC, CONTINUOUS USE OF OPIOIDS: ICD-10-CM

## 2023-09-15 RX ORDER — OXYCODONE HYDROCHLORIDE 15 MG/1
15 TABLET ORAL 3 TIMES DAILY PRN
Qty: 90 TABLET | Refills: 0 | Status: SHIPPED | OUTPATIENT
Start: 2023-09-15 | End: 2023-09-29

## 2023-09-15 NOTE — TELEPHONE ENCOUNTER
[facility].   Last filled 8/18/23  CSA 6/28/23  UDS 12/28/22  Last visit 6/26/23  Med refilled

## 2023-09-19 ENCOUNTER — APPOINTMENT (OUTPATIENT)
Dept: GENERAL RADIOLOGY | Facility: CLINIC | Age: 48
End: 2023-09-19
Attending: EMERGENCY MEDICINE
Payer: COMMERCIAL

## 2023-09-19 ENCOUNTER — HOSPITAL ENCOUNTER (EMERGENCY)
Facility: CLINIC | Age: 48
Discharge: HOME | End: 2023-09-19
Attending: EMERGENCY MEDICINE | Admitting: EMERGENCY MEDICINE
Payer: COMMERCIAL

## 2023-09-19 VITALS
BODY MASS INDEX: 28.88 KG/M2 | TEMPERATURE: 98 F | HEIGHT: 63 IN | DIASTOLIC BLOOD PRESSURE: 97 MMHG | SYSTOLIC BLOOD PRESSURE: 154 MMHG | RESPIRATION RATE: 16 BRPM | WEIGHT: 163 LBS | OXYGEN SATURATION: 98 % | HEART RATE: 73 BPM

## 2023-09-19 DIAGNOSIS — G44.86 CERVICOGENIC HEADACHE: ICD-10-CM

## 2023-09-19 DIAGNOSIS — G89.29 CHRONIC NECK PAIN: ICD-10-CM

## 2023-09-19 DIAGNOSIS — G89.29 CHRONIC MIDLINE LOW BACK PAIN WITHOUT SCIATICA: ICD-10-CM

## 2023-09-19 DIAGNOSIS — M54.50 CHRONIC MIDLINE LOW BACK PAIN WITHOUT SCIATICA: ICD-10-CM

## 2023-09-19 DIAGNOSIS — R07.89 ATYPICAL CHEST PAIN: ICD-10-CM

## 2023-09-19 DIAGNOSIS — M54.2 CHRONIC NECK PAIN: ICD-10-CM

## 2023-09-19 LAB
ANION GAP SERPL CALCULATED.3IONS-SCNC: 8 MMOL/L (ref 7–15)
ATRIAL RATE - MUSE: 70 BPM
BASOPHILS # BLD AUTO: 0 10E3/UL (ref 0–0.2)
BASOPHILS NFR BLD AUTO: 0 %
BUN SERPL-MCNC: 7.1 MG/DL (ref 6–20)
CALCIUM SERPL-MCNC: 9.2 MG/DL (ref 8.6–10)
CHLORIDE SERPL-SCNC: 102 MMOL/L (ref 98–107)
CREAT SERPL-MCNC: 0.69 MG/DL (ref 0.51–0.95)
DEPRECATED HCO3 PLAS-SCNC: 31 MMOL/L (ref 22–29)
DIASTOLIC BLOOD PRESSURE - MUSE: NORMAL MMHG
EGFRCR SERPLBLD CKD-EPI 2021: >90 ML/MIN/1.73M2
EOSINOPHIL # BLD AUTO: 0.1 10E3/UL (ref 0–0.7)
EOSINOPHIL NFR BLD AUTO: 1 %
ERYTHROCYTE [DISTWIDTH] IN BLOOD BY AUTOMATED COUNT: 13.8 % (ref 10–15)
GLUCOSE SERPL-MCNC: 87 MG/DL (ref 70–99)
HCT VFR BLD AUTO: 37.1 % (ref 35–47)
HGB BLD-MCNC: 12.3 G/DL (ref 11.7–15.7)
HOLD SPECIMEN: NORMAL
HOLD SPECIMEN: NORMAL
IMM GRANULOCYTES # BLD: 0 10E3/UL
IMM GRANULOCYTES NFR BLD: 0 %
INTERPRETATION ECG - MUSE: NORMAL
LYMPHOCYTES # BLD AUTO: 1.6 10E3/UL (ref 0.8–5.3)
LYMPHOCYTES NFR BLD AUTO: 36 %
MCH RBC QN AUTO: 28.5 PG (ref 26.5–33)
MCHC RBC AUTO-ENTMCNC: 33.2 G/DL (ref 31.5–36.5)
MCV RBC AUTO: 86 FL (ref 78–100)
MONOCYTES # BLD AUTO: 0.3 10E3/UL (ref 0–1.3)
MONOCYTES NFR BLD AUTO: 8 %
NEUTROPHILS # BLD AUTO: 2.4 10E3/UL (ref 1.6–8.3)
NEUTROPHILS NFR BLD AUTO: 55 %
NRBC # BLD AUTO: 0 10E3/UL
NRBC BLD AUTO-RTO: 0 /100
P AXIS - MUSE: 37 DEGREES
PLATELET # BLD AUTO: 172 10E3/UL (ref 150–450)
POTASSIUM SERPL-SCNC: 3.3 MMOL/L (ref 3.4–5.3)
PR INTERVAL - MUSE: 144 MS
QRS DURATION - MUSE: 76 MS
QT - MUSE: 400 MS
QTC - MUSE: 432 MS
R AXIS - MUSE: 19 DEGREES
RBC # BLD AUTO: 4.31 10E6/UL (ref 3.8–5.2)
SODIUM SERPL-SCNC: 141 MMOL/L (ref 136–145)
SYSTOLIC BLOOD PRESSURE - MUSE: NORMAL MMHG
T AXIS - MUSE: 9 DEGREES
TROPONIN T SERPL HS-MCNC: <6 NG/L
VENTRICULAR RATE- MUSE: 70 BPM
WBC # BLD AUTO: 4.5 10E3/UL (ref 4–11)

## 2023-09-19 PROCEDURE — 71046 X-RAY EXAM CHEST 2 VIEWS: CPT

## 2023-09-19 PROCEDURE — 80048 BASIC METABOLIC PNL TOTAL CA: CPT | Performed by: EMERGENCY MEDICINE

## 2023-09-19 PROCEDURE — 36415 COLL VENOUS BLD VENIPUNCTURE: CPT | Performed by: EMERGENCY MEDICINE

## 2023-09-19 PROCEDURE — 84484 ASSAY OF TROPONIN QUANT: CPT | Performed by: EMERGENCY MEDICINE

## 2023-09-19 PROCEDURE — 99285 EMERGENCY DEPT VISIT HI MDM: CPT | Mod: 25

## 2023-09-19 PROCEDURE — 250N000013 HC RX MED GY IP 250 OP 250 PS 637: Performed by: EMERGENCY MEDICINE

## 2023-09-19 PROCEDURE — 85014 HEMATOCRIT: CPT | Performed by: EMERGENCY MEDICINE

## 2023-09-19 PROCEDURE — 93005 ELECTROCARDIOGRAM TRACING: CPT

## 2023-09-19 RX ORDER — LORAZEPAM 0.5 MG/1
0.5 TABLET ORAL ONCE
Status: COMPLETED | OUTPATIENT
Start: 2023-09-19 | End: 2023-09-19

## 2023-09-19 RX ORDER — LIDOCAINE 4 G/G
1 PATCH TOPICAL ONCE
Status: DISCONTINUED | OUTPATIENT
Start: 2023-09-19 | End: 2023-09-19 | Stop reason: HOSPADM

## 2023-09-19 RX ORDER — MAGNESIUM HYDROXIDE/ALUMINUM HYDROXICE/SIMETHICONE 120; 1200; 1200 MG/30ML; MG/30ML; MG/30ML
15 SUSPENSION ORAL ONCE
Status: COMPLETED | OUTPATIENT
Start: 2023-09-19 | End: 2023-09-19

## 2023-09-19 RX ORDER — GABAPENTIN 300 MG/1
CAPSULE ORAL
Qty: 270 CAPSULE | Refills: 1 | Status: SHIPPED | OUTPATIENT
Start: 2023-09-19 | End: 2023-09-29

## 2023-09-19 RX ORDER — LORAZEPAM 0.5 MG/1
0.5 TABLET ORAL EVERY 6 HOURS PRN
Qty: 10 TABLET | Refills: 0 | Status: SHIPPED | OUTPATIENT
Start: 2023-09-19

## 2023-09-19 RX ORDER — LIDOCAINE 50 MG/G
1 PATCH TOPICAL EVERY 24 HOURS
Qty: 10 PATCH | Refills: 0 | Status: SHIPPED | OUTPATIENT
Start: 2023-09-19 | End: 2023-09-29

## 2023-09-19 RX ADMIN — ALUMINUM HYDROXIDE, MAGNESIUM HYDROXIDE, AND SIMETHICONE 15 ML: 200; 200; 20 SUSPENSION ORAL at 08:42

## 2023-09-19 RX ADMIN — LIDOCAINE 1 PATCH: 4 PATCH TOPICAL at 10:04

## 2023-09-19 RX ADMIN — LORAZEPAM 0.5 MG: 0.5 TABLET ORAL at 10:04

## 2023-09-19 ASSESSMENT — ACTIVITIES OF DAILY LIVING (ADL)
ADLS_ACUITY_SCORE: 35
ADLS_ACUITY_SCORE: 35

## 2023-09-19 NOTE — ED PROVIDER NOTES
"  History     Chief Complaint:  Chest Pain       HPI   Jeff Serra is a 48 year old female who presents with intermittent chest pain. The pain began on Friday, 4 days ago, that woke her from her sleep. The pain was constant on Friday, but has been intermittent for the following days. She denies any exacerbating factors. She has had a slight nonproductive cough and has felt slightly short of breath, and had a fever this weekend. She has been taking Tylenol and a cold medicine. She denies birth control use. She does not get her menstrual periods as her uterus has been removed.      Independent Historian:   None - Patient Only    Review of External Notes:       Medications:    Azithromycin   Gabapentin  Loratadine  Losartan  Omeprazole  Ondansetron  Oxycodone  Thiamine  Tizanidine  Zolpidem      Past Medical History:    PE  Hypertension  Continuous opioid dependence  Right leg DVT  Ovarian cyst  Kidney stones  Left patella chondral defect  Umbilical hernia  Obstructive sleep apnea  Postsurgical malabsorption  Cervicogenic headache  GERD  BPPV  Thrombosed hemorrhoids  Anemia   Chronic pain syndrome  Major depressive disorder     Past Surgical History:    Hysterectomy  Herniorrhaphy   Gastric sleeve  Laparoscopic lysis adhesions  Mini laparotomy with tubal ligation  Lithotripsy   Ankle surgery     Physical Exam   Patient Vitals for the past 24 hrs:   BP Temp Temp src Pulse Resp SpO2 Height Weight   09/19/23 0843 (!) 162/102 -- -- -- 14 100 % -- --   09/19/23 0657 (!) 160/103 98  F (36.7  C) Oral 75 20 100 % 1.588 m (5' 2.5\") 73.9 kg (163 lb)        Physical Exam  Vitals reviewed.   HENT:      Head: Normocephalic.   Eyes:      Pupils: Pupils are equal, round, and reactive to light.   Cardiovascular:      Rate and Rhythm: Normal rate.      Heart sounds: Normal heart sounds.   Pulmonary:      Effort: Pulmonary effort is normal.      Breath sounds: Normal breath sounds.   Abdominal:      General: Bowel sounds are " normal.      Palpations: Abdomen is soft.   Musculoskeletal:         General: Normal range of motion.      Cervical back: Normal range of motion.   Skin:     General: Skin is warm.      Capillary Refill: Capillary refill takes less than 2 seconds.   Neurological:      General: No focal deficit present.      Mental Status: She is alert and oriented to person, place, and time.   Psychiatric:         Mood and Affect: Mood normal.           Emergency Department Course   ECG  ECG taken at 0705, ECG read at 0705  NSR  Normal ECG   Rate 70 bpm. NV interval 144 ms. QRS duration 78 ms. QT/QTc 400/432 ms. P-R-T axes 37 19 9.     Imaging:  Chest XR,  PA & LAT   Preliminary Result   IMPRESSION: No focal consolidation, pleural effusion or convincing   pneumothorax. Vertical linear mid axillary left upper lung density may   be artifactual versus possibly atelectasis/scar. Cardiomediastinal   silhouette is unremarkable.         Report per radiology    Laboratory:  Labs Ordered and Resulted from Time of ED Arrival to Time of ED Departure   BASIC METABOLIC PANEL - Abnormal       Result Value    Sodium 141      Potassium 3.3 (*)     Chloride 102      Carbon Dioxide (CO2) 31 (*)     Anion Gap 8      Urea Nitrogen 7.1      Creatinine 0.69      Calcium 9.2      Glucose 87      GFR Estimate >90     TROPONIN T, HIGH SENSITIVITY - Normal    Troponin T, High Sensitivity <6     CBC WITH PLATELETS AND DIFFERENTIAL    WBC Count 4.5      RBC Count 4.31      Hemoglobin 12.3      Hematocrit 37.1      MCV 86      MCH 28.5      MCHC 33.2      RDW 13.8      Platelet Count 172      % Neutrophils 55      % Lymphocytes 36      % Monocytes 8      % Eosinophils 1      % Basophils 0      % Immature Granulocytes 0      NRBCs per 100 WBC 0      Absolute Neutrophils 2.4      Absolute Lymphocytes 1.6      Absolute Monocytes 0.3      Absolute Eosinophils 0.1      Absolute Basophils 0.0      Absolute Immature Granulocytes 0.0      Absolute NRBCs 0.0         Emergency Department Course & Assessments:    Interventions:  Medications   Lidocaine (LIDOCARE) 4 % Patch 1 patch (1 patch Transdermal $Patch/Med Applied 9/19/23 1004)   alum & mag hydroxide-simethicone (MAALOX) suspension 15 mL (15 mLs Oral $Given 9/19/23 0842)   LORazepam (ATIVAN) tablet 0.5 mg (0.5 mg Oral $Given 9/19/23 1004)        Assessments:  0810 I obtained history and examined the patient, as noted above.  1027 I rechecked and updated the patient.    Independent Interpretation (X-rays, CTs, rhythm strip):  None    Consultations/Discussion of Management or Tests:  None        Social Determinants of Health affecting care:   None    Disposition:  The patient was discharged to home.     Impression & Plan      Medical Decision Making:  Patient presents with chest pain symptoms are highly suspicious for chest wall pain.  Patient is an ongoing complaint cough chest x-ray negative for pneumonia.  Patient is not at risk for PE.  Symptoms seem like chest wall pain.  Lab work and troponin negative.  Patient offered reassurance symptomatic medication and follow-up with primary care.    Diagnosis:    ICD-10-CM    1. Atypical chest pain  R07.89            Discharge Medications:  Discharge Medication List as of 9/19/2023 10:49 AM        START taking these medications    Details   lidocaine (LIDODERM) 5 % patch Place 1 patch onto the skin every 24 hours for 10 daysDisp-10 patch, R-0Local Print      LORazepam (ATIVAN) 0.5 MG tablet Take 1 tablet (0.5 mg) by mouth every 6 hours as needed for anxiety or muscle spasms, Disp-10 tablet, R-0, Local Print      !! gabapentin (NEURONTIN) 300 MG capsule TAKE 2 CAPSULE BY MOUTH AT BEDTIME AND 1 CAPSULE BY MOUTH IN THE MORNING, Disp-270 capsule, R-1, E-PrescribeZERO refills remain on this prescription. Your patient is requesting advance approval of refills for this medication to PREVENT ANY MISSED DOSES       !! - Potential duplicate medications found. Please discuss with provider.              Scribe Disclosure:  I, Hal Marie, am serving as a scribe at 8:09 AM on 9/19/2023 to document services personally performed by Felix Cagle MD based on my observations and the provider's statements to me.   9/19/2023   Felix Cagle MD Goodman, Brian Samuel, MD  09/24/23 4238

## 2023-09-19 NOTE — ED TRIAGE NOTES
Patient arrives complaining of chest discomfort, shortness of breath and cold in chest. This discomfort woke patient out of sleep.      Triage Assessment       Row Name 09/19/23 0656       Triage Assessment (Adult)    Airway WDL WDL       Respiratory WDL    Respiratory WDL X  just rattling, short of breath       Skin Circulation/Temperature WDL    Skin Circulation/Temperature WDL WDL       Cardiac WDL    Cardiac WDL X;all    Pulse Rate & Regularity radial pulse regular       Chest Pain Assessment    Chest Pain Location midsternal    Chest Pain Radiation arm    Character positional;pressure    Chest Pain Intervention 12-lead ECG obtained       Peripheral/Neurovascular WDL    Peripheral Neurovascular WDL WDL       Cognitive/Neuro/Behavioral WDL    Cognitive/Neuro/Behavioral WDL WDL

## 2023-09-19 NOTE — DISCHARGE INSTRUCTIONS
We have done extensive heart and lung testing including x-ray lab work and an EKG all of this are normal.  Your blood pressure is running a little bit high.  Please follow-up with your regular doctor to reassess blood pressure as we have discussed it is very common not to find test answers for chest pain in an emergency room if you develop high fever shortness of breath is progressively worse or pain becomes worse or severe the emergency room is here to recheck at any time.  Thanks for your patience today.

## 2023-09-25 ENCOUNTER — HOSPITAL ENCOUNTER (OUTPATIENT)
Facility: CLINIC | Age: 48
Setting detail: OBSERVATION
Discharge: HOME | End: 2023-09-26
Attending: EMERGENCY MEDICINE | Admitting: EMERGENCY MEDICINE
Payer: COMMERCIAL

## 2023-09-25 ENCOUNTER — APPOINTMENT (OUTPATIENT)
Dept: GENERAL RADIOLOGY | Facility: CLINIC | Age: 48
End: 2023-09-25
Attending: EMERGENCY MEDICINE
Payer: COMMERCIAL

## 2023-09-25 DIAGNOSIS — I10 HYPERTENSION GOAL BP (BLOOD PRESSURE) < 140/90: ICD-10-CM

## 2023-09-25 DIAGNOSIS — R07.9 CHEST PAIN, UNSPECIFIED TYPE: ICD-10-CM

## 2023-09-25 LAB
ALBUMIN SERPL BCG-MCNC: 4.2 G/DL (ref 3.5–5.2)
ALP SERPL-CCNC: 84 U/L (ref 35–104)
ALT SERPL W P-5'-P-CCNC: 28 U/L (ref 0–50)
ANION GAP SERPL CALCULATED.3IONS-SCNC: 12 MMOL/L (ref 7–15)
ANION GAP SERPL CALCULATED.3IONS-SCNC: 9 MMOL/L (ref 7–15)
AST SERPL W P-5'-P-CCNC: 20 U/L (ref 0–45)
BASOPHILS # BLD AUTO: 0 10E3/UL (ref 0–0.2)
BASOPHILS # BLD AUTO: 0 10E3/UL (ref 0–0.2)
BASOPHILS NFR BLD AUTO: 0 %
BASOPHILS NFR BLD AUTO: 1 %
BILIRUB SERPL-MCNC: 0.8 MG/DL
BUN SERPL-MCNC: 10.4 MG/DL (ref 6–20)
BUN SERPL-MCNC: 10.7 MG/DL (ref 6–20)
CALCIUM SERPL-MCNC: 9.4 MG/DL (ref 8.6–10)
CALCIUM SERPL-MCNC: 9.5 MG/DL (ref 8.6–10)
CHLORIDE SERPL-SCNC: 104 MMOL/L (ref 98–107)
CHLORIDE SERPL-SCNC: 104 MMOL/L (ref 98–107)
CREAT SERPL-MCNC: 0.8 MG/DL (ref 0.51–0.95)
CREAT SERPL-MCNC: 0.81 MG/DL (ref 0.51–0.95)
D DIMER PPP FEU-MCNC: <0.27 UG/ML FEU (ref 0–0.5)
DEPRECATED HCO3 PLAS-SCNC: 29 MMOL/L (ref 22–29)
DEPRECATED HCO3 PLAS-SCNC: 31 MMOL/L (ref 22–29)
EGFRCR SERPLBLD CKD-EPI 2021: 88 ML/MIN/1.73M2
EGFRCR SERPLBLD CKD-EPI 2021: 89 ML/MIN/1.73M2
EOSINOPHIL # BLD AUTO: 0.1 10E3/UL (ref 0–0.7)
EOSINOPHIL # BLD AUTO: 0.1 10E3/UL (ref 0–0.7)
EOSINOPHIL NFR BLD AUTO: 1 %
EOSINOPHIL NFR BLD AUTO: 1 %
ERYTHROCYTE [DISTWIDTH] IN BLOOD BY AUTOMATED COUNT: 14 % (ref 10–15)
ERYTHROCYTE [DISTWIDTH] IN BLOOD BY AUTOMATED COUNT: 14.1 % (ref 10–15)
FLUAV RNA SPEC QL NAA+PROBE: NEGATIVE
FLUBV RNA RESP QL NAA+PROBE: NEGATIVE
GLUCOSE SERPL-MCNC: 91 MG/DL (ref 70–99)
GLUCOSE SERPL-MCNC: 92 MG/DL (ref 70–99)
HCT VFR BLD AUTO: 39.1 % (ref 35–47)
HCT VFR BLD AUTO: 39.6 % (ref 35–47)
HGB BLD-MCNC: 12.9 G/DL (ref 11.7–15.7)
HGB BLD-MCNC: 13 G/DL (ref 11.7–15.7)
HOLD SPECIMEN: NORMAL
IMM GRANULOCYTES # BLD: 0 10E3/UL
IMM GRANULOCYTES # BLD: 0 10E3/UL
IMM GRANULOCYTES NFR BLD: 0 %
IMM GRANULOCYTES NFR BLD: 0 %
LIPASE SERPL-CCNC: 31 U/L (ref 13–60)
LYMPHOCYTES # BLD AUTO: 2 10E3/UL (ref 0.8–5.3)
LYMPHOCYTES # BLD AUTO: 2.5 10E3/UL (ref 0.8–5.3)
LYMPHOCYTES NFR BLD AUTO: 42 %
LYMPHOCYTES NFR BLD AUTO: 44 %
MCH RBC QN AUTO: 28.4 PG (ref 26.5–33)
MCH RBC QN AUTO: 28.5 PG (ref 26.5–33)
MCHC RBC AUTO-ENTMCNC: 32.8 G/DL (ref 31.5–36.5)
MCHC RBC AUTO-ENTMCNC: 33 G/DL (ref 31.5–36.5)
MCV RBC AUTO: 87 FL (ref 78–100)
MCV RBC AUTO: 87 FL (ref 78–100)
MONOCYTES # BLD AUTO: 0.3 10E3/UL (ref 0–1.3)
MONOCYTES # BLD AUTO: 0.5 10E3/UL (ref 0–1.3)
MONOCYTES NFR BLD AUTO: 6 %
MONOCYTES NFR BLD AUTO: 8 %
NEUTROPHILS # BLD AUTO: 2.2 10E3/UL (ref 1.6–8.3)
NEUTROPHILS # BLD AUTO: 2.9 10E3/UL (ref 1.6–8.3)
NEUTROPHILS NFR BLD AUTO: 48 %
NEUTROPHILS NFR BLD AUTO: 49 %
NRBC # BLD AUTO: 0 10E3/UL
NRBC # BLD AUTO: 0 10E3/UL
NRBC BLD AUTO-RTO: 0 /100
NRBC BLD AUTO-RTO: 0 /100
NT-PROBNP SERPL-MCNC: <36 PG/ML (ref 0–900)
PLATELET # BLD AUTO: 166 10E3/UL (ref 150–450)
PLATELET # BLD AUTO: 178 10E3/UL (ref 150–450)
POTASSIUM SERPL-SCNC: 3.7 MMOL/L (ref 3.4–5.3)
POTASSIUM SERPL-SCNC: 3.8 MMOL/L (ref 3.4–5.3)
PROT SERPL-MCNC: 7.4 G/DL (ref 6.4–8.3)
RBC # BLD AUTO: 4.52 10E6/UL (ref 3.8–5.2)
RBC # BLD AUTO: 4.58 10E6/UL (ref 3.8–5.2)
RSV RNA SPEC NAA+PROBE: NEGATIVE
SARS-COV-2 RNA RESP QL NAA+PROBE: NEGATIVE
SODIUM SERPL-SCNC: 144 MMOL/L (ref 136–145)
SODIUM SERPL-SCNC: 145 MMOL/L (ref 136–145)
TROPONIN T SERPL HS-MCNC: <6 NG/L
TROPONIN T SERPL HS-MCNC: <6 NG/L
WBC # BLD AUTO: 4.6 10E3/UL (ref 4–11)
WBC # BLD AUTO: 5.9 10E3/UL (ref 4–11)

## 2023-09-25 PROCEDURE — 93005 ELECTROCARDIOGRAM TRACING: CPT | Performed by: EMERGENCY MEDICINE

## 2023-09-25 PROCEDURE — 85025 COMPLETE CBC W/AUTO DIFF WBC: CPT | Performed by: EMERGENCY MEDICINE

## 2023-09-25 PROCEDURE — 85379 FIBRIN DEGRADATION QUANT: CPT | Performed by: EMERGENCY MEDICINE

## 2023-09-25 PROCEDURE — 93308 TTE F-UP OR LMTD: CPT | Mod: 26 | Performed by: EMERGENCY MEDICINE

## 2023-09-25 PROCEDURE — 250N000013 HC RX MED GY IP 250 OP 250 PS 637: Performed by: EMERGENCY MEDICINE

## 2023-09-25 PROCEDURE — 87637 SARSCOV2&INF A&B&RSV AMP PRB: CPT | Performed by: EMERGENCY MEDICINE

## 2023-09-25 PROCEDURE — 250N000013 HC RX MED GY IP 250 OP 250 PS 637: Performed by: NURSE PRACTITIONER

## 2023-09-25 PROCEDURE — 36415 COLL VENOUS BLD VENIPUNCTURE: CPT | Performed by: EMERGENCY MEDICINE

## 2023-09-25 PROCEDURE — 84484 ASSAY OF TROPONIN QUANT: CPT | Performed by: EMERGENCY MEDICINE

## 2023-09-25 PROCEDURE — G0378 HOSPITAL OBSERVATION PER HR: HCPCS

## 2023-09-25 PROCEDURE — 83690 ASSAY OF LIPASE: CPT | Performed by: EMERGENCY MEDICINE

## 2023-09-25 PROCEDURE — 71046 X-RAY EXAM CHEST 2 VIEWS: CPT

## 2023-09-25 PROCEDURE — 83880 ASSAY OF NATRIURETIC PEPTIDE: CPT | Performed by: EMERGENCY MEDICINE

## 2023-09-25 PROCEDURE — 93308 TTE F-UP OR LMTD: CPT | Performed by: EMERGENCY MEDICINE

## 2023-09-25 PROCEDURE — 99222 1ST HOSP IP/OBS MODERATE 55: CPT | Performed by: NURSE PRACTITIONER

## 2023-09-25 PROCEDURE — 93010 ELECTROCARDIOGRAM REPORT: CPT | Mod: 59 | Performed by: EMERGENCY MEDICINE

## 2023-09-25 PROCEDURE — 82374 ASSAY BLOOD CARBON DIOXIDE: CPT | Performed by: EMERGENCY MEDICINE

## 2023-09-25 PROCEDURE — 99285 EMERGENCY DEPT VISIT HI MDM: CPT | Mod: 25 | Performed by: EMERGENCY MEDICINE

## 2023-09-25 RX ORDER — ACETAMINOPHEN 325 MG/1
975 TABLET ORAL EVERY 6 HOURS PRN
Status: DISCONTINUED | OUTPATIENT
Start: 2023-09-25 | End: 2023-09-25

## 2023-09-25 RX ORDER — NALOXONE HYDROCHLORIDE 0.4 MG/ML
0.4 INJECTION, SOLUTION INTRAMUSCULAR; INTRAVENOUS; SUBCUTANEOUS
Status: DISCONTINUED | OUTPATIENT
Start: 2023-09-25 | End: 2023-09-26 | Stop reason: HOSPADM

## 2023-09-25 RX ORDER — NALOXONE HYDROCHLORIDE 0.4 MG/ML
0.2 INJECTION, SOLUTION INTRAMUSCULAR; INTRAVENOUS; SUBCUTANEOUS
Status: DISCONTINUED | OUTPATIENT
Start: 2023-09-25 | End: 2023-09-26 | Stop reason: HOSPADM

## 2023-09-25 RX ORDER — ONDANSETRON 4 MG/1
4 TABLET, ORALLY DISINTEGRATING ORAL EVERY 6 HOURS PRN
Status: DISCONTINUED | OUTPATIENT
Start: 2023-09-25 | End: 2023-09-26 | Stop reason: HOSPADM

## 2023-09-25 RX ORDER — MAGNESIUM HYDROXIDE/ALUMINUM HYDROXICE/SIMETHICONE 120; 1200; 1200 MG/30ML; MG/30ML; MG/30ML
30 SUSPENSION ORAL EVERY 4 HOURS PRN
Status: DISCONTINUED | OUTPATIENT
Start: 2023-09-25 | End: 2023-09-26 | Stop reason: HOSPADM

## 2023-09-25 RX ORDER — LOSARTAN POTASSIUM 25 MG/1
50 TABLET ORAL DAILY
Status: DISCONTINUED | OUTPATIENT
Start: 2023-09-26 | End: 2023-09-26 | Stop reason: HOSPADM

## 2023-09-25 RX ORDER — LORAZEPAM 0.5 MG/1
0.5 TABLET ORAL EVERY 6 HOURS PRN
Status: DISCONTINUED | OUTPATIENT
Start: 2023-09-25 | End: 2023-09-26 | Stop reason: HOSPADM

## 2023-09-25 RX ORDER — ACETAMINOPHEN 325 MG/1
650 TABLET ORAL EVERY 4 HOURS PRN
Status: DISCONTINUED | OUTPATIENT
Start: 2023-09-25 | End: 2023-09-26 | Stop reason: HOSPADM

## 2023-09-25 RX ORDER — OXYCODONE HYDROCHLORIDE 5 MG/1
5 TABLET ORAL ONCE
Status: COMPLETED | OUTPATIENT
Start: 2023-09-25 | End: 2023-09-25

## 2023-09-25 RX ORDER — NITROGLYCERIN 0.4 MG/1
0.4 TABLET SUBLINGUAL EVERY 5 MIN PRN
Status: DISCONTINUED | OUTPATIENT
Start: 2023-09-25 | End: 2023-09-26 | Stop reason: HOSPADM

## 2023-09-25 RX ORDER — LOSARTAN POTASSIUM 50 MG/1
50 TABLET ORAL ONCE
Status: COMPLETED | OUTPATIENT
Start: 2023-09-25 | End: 2023-09-25

## 2023-09-25 RX ORDER — ACETAMINOPHEN 325 MG/1
975 TABLET ORAL ONCE
Status: COMPLETED | OUTPATIENT
Start: 2023-09-25 | End: 2023-09-25

## 2023-09-25 RX ORDER — PANTOPRAZOLE SODIUM 40 MG/1
40 TABLET, DELAYED RELEASE ORAL
Status: DISCONTINUED | OUTPATIENT
Start: 2023-09-26 | End: 2023-09-26 | Stop reason: HOSPADM

## 2023-09-25 RX ORDER — MAGNESIUM HYDROXIDE/ALUMINUM HYDROXICE/SIMETHICONE 120; 1200; 1200 MG/30ML; MG/30ML; MG/30ML
15 SUSPENSION ORAL ONCE
Status: COMPLETED | OUTPATIENT
Start: 2023-09-25 | End: 2023-09-25

## 2023-09-25 RX ORDER — LIDOCAINE 40 MG/G
CREAM TOPICAL
Status: DISCONTINUED | OUTPATIENT
Start: 2023-09-25 | End: 2023-09-26 | Stop reason: HOSPADM

## 2023-09-25 RX ADMIN — ACETAMINOPHEN 975 MG: 325 TABLET, FILM COATED ORAL at 16:38

## 2023-09-25 RX ADMIN — OXYCODONE HYDROCHLORIDE 15 MG: 5 TABLET ORAL at 20:20

## 2023-09-25 RX ADMIN — LORAZEPAM 0.5 MG: 0.5 TABLET ORAL at 22:14

## 2023-09-25 RX ADMIN — OXYCODONE HYDROCHLORIDE 5 MG: 5 TABLET ORAL at 10:13

## 2023-09-25 RX ADMIN — ACETAMINOPHEN 975 MG: 325 TABLET, FILM COATED ORAL at 07:42

## 2023-09-25 RX ADMIN — ALUMINUM HYDROXIDE, MAGNESIUM HYDROXIDE, AND SIMETHICONE 15 ML: 200; 200; 20 SUSPENSION ORAL at 10:13

## 2023-09-25 RX ADMIN — LOSARTAN POTASSIUM 50 MG: 50 TABLET, FILM COATED ORAL at 07:42

## 2023-09-25 ASSESSMENT — ACTIVITIES OF DAILY LIVING (ADL)
ADLS_ACUITY_SCORE: 33
ADLS_ACUITY_SCORE: 35
ADLS_ACUITY_SCORE: 33
ADLS_ACUITY_SCORE: 35
ADLS_ACUITY_SCORE: 33
ADLS_ACUITY_SCORE: 35
ADLS_ACUITY_SCORE: 33

## 2023-09-25 NOTE — PLAN OF CARE
"Assumed cares [id number]     BP (!) 149/100 (BP Location: Right arm)   Pulse 82   Temp 98.1  F (36.7  C) (Oral)   Resp 16   Ht 1.6 m (5' 3\")   Wt 76.7 kg (169 lb 1.6 oz)   LMP 09/12/2016 (Exact Date)   SpO2 100%   BMI 29.95 kg/m       Pain: Patient has chest pain. PRN tylenol given.   Neuro:  A&Ox4.   Respiratory: Lungs clear and equal, on RA.   Cardiac/Neurovascular: HR and pulse WDL. BP slightly hypertensive. No numbness or tingling reported.   GI/: Adequate urine output. No BM for 2 days.   Nutrition: Regular diet.    Activity: SBA to independent.   Skin: No concerns.   Lines: PIV L arm (SL).   Events this shift: Patient admitted to [facility] from [facility]. BP slightly hypertensive. PRN tylenol given. Troponin recheck at 1800. Cardiac monitoring.      Plan: Continue with plan of care.     Goal Outcome Evaluation:      Plan of Care Reviewed With: patient    Overall Patient Progress: no changeOverall Patient Progress: no change    Outcome Evaluation: Admitted around 1630 from [facility] ED      "

## 2023-09-25 NOTE — ED PROVIDER NOTES
"ED Provider Note  [facility]      History     Chief Complaint   Patient presents with    Chest Pain     Mid chest pain radiating to left side. Has been present since the 19th. Pain is constant 8/10. Some SOB.     HPI  Jeff Serra is a 48 year old female with a history of hypertension, prior DVT and PE, opioid dependence who presents to the emergency department with substernal chest pain.  She reports the pain initially began on September 19.  She reports she was just eating at the time when it came on.  It was initially intermittent but now has become more constant and more painful.  She denies radiation of the pain.  She does occasionally feel short of breath.  She particularly notes at night when she tries to sleep she wakes up gasping for air.  She denies any clear alleviating or exacerbating factors.  She has noted some swelling in her lower extremities and reports gaining 6 pounds since the 19th.  Denies any fever.  She has had an intermittent cough.  No associated nausea, vomiting, abdominal pain.  She reports she was previously on medications for her blood pressure however was taken off the medications approximately 1 month ago.  She does not recall what she was taking.      Physical Exam   BP: (!) 165/114  Pulse: 78  Temp: 98.1  F (36.7  C)  Resp: 16  Height: 160 cm (5' 3\")  Weight: 76.7 kg (169 lb 1.6 oz)  SpO2: 99 %  Physical Exam  General: patient is alert and oriented and in no acute distress   Head: atraumatic and normocephalic   EENT: moist mucus membranes without tonsillar erythema or exudates, pupils round and reactive   Neck: supple   Cardiovascular: regular rate and rhythm, extremities warm and well perfused, trace bilateral lower extremity edema, 2+ radial and DP pulses bilaterally  Pulmonary: lungs clear to auscultation bilaterally   Abdomen: soft, non-tender   Musculoskeletal: normal range of motion   Neurological: alert and oriented, moving all extremities " symmetrically, gait normal   Skin: warm, dry     ED Course, Procedures, & Data      Procedures            EKG Interpretation:      Interpreted by Lovely Rodriguez MD  Time reviewed: 0650  Symptoms at time of EKG: chest pain   Rhythm: normal sinus   Rate: normal  Axis: normal  Ectopy: none  Conduction: normal  ST Segments/ T Waves: No ST-T wave changes  Q Waves: none  Comparison to prior: Unchanged    Clinical Impression: normal EKG                 Results for orders placed or performed during the hospital encounter of 09/25/23   [place] Draw     Status: None (In process)    Narrative    The following orders were created for panel order [place] Draw.  Procedure                               Abnormality         Status                     ---------                               -----------         ------                     Extra Blue Top Tube[[id number]]                              In process                   Please view results for these tests on the individual orders.   CBC with Platelets & Differential     Status: None (In process)    Narrative    The following orders were created for panel order CBC with Platelets & Differential.  Procedure                               Abnormality         Status                     ---------                               -----------         ------                     CBC with platelets and d...[[id number]]                      In process                   Please view results for these tests on the individual orders.     Medications   losartan (COZAAR) tablet 50 mg (has no administration in time range)   acetaminophen (TYLENOL) tablet 975 mg (has no administration in time range)     Labs Ordered and Resulted from Time of ED Arrival to Time of ED Departure - No data to display  XR Chest 2 Views    (Results Pending)          Critical care was not performed.     Medical Decision Making  The patient's presentation was of high complexity (an acute health issue posing potential  threat to life or bodily function).    The patient's evaluation involved:  review of 1 test result(s) ordered prior to this encounter (labs, stress test)  ordering and/or review of 3+ test(s) in this encounter (see separate area of note for details)  independent interpretation of testing performed by another health professional (POCUS echo)    The patient's management necessitated moderate risk (prescription drug management including medications given in the ED) and high risk (a decision regarding hospitalization).    Assessment & Plan     is a 48 year old female with a history of hypertension, prior DVT and PE, opioid dependence who presents to the emergency department with substernal chest pain.  She is hypertensive otherwise in no respiratory distress and afebrile.  Differential diagnosis includes but is not limited to ACS, CHF, hypertensive urgency, PE, pleuritis, pneumonia, GERD.  Her ECG shows no acute ischemic changes.  Troponin <6.  Laboratory evaluation demonstrates no significant electrolyte abnormalities, negative D-dimer, BNP less than 36.  Chest x-ray negative.  Low suspicion for PE or aortic dissection.  Bedside cardiac ultrasound unremarkable.  She was given a GI cocktail, acetaminophen and oxycodone.  Reports ongoing pain.  We did discuss expedited outpatient follow-up for stress test however she would like to proceed with observation admission.  She would be a good candidate for cardiac CT now has a possible contrast allergy.  Anticipate pretreatment followed by cardiac CT to rule out coronary artery disease and reinitiation on antihypertensive management.  She was given losartan in the ED with improvement in her blood pressure.    I have reviewed the nursing notes. I have reviewed the findings, diagnosis, plan and need for follow up with the patient.    New Prescriptions    No medications on file       Final diagnoses:   Chest pain, unspecified type       MD HUGO Jade  [facility] EMERGENCY DEPARTMENT  9/25/2023     Lovely Rodriguez MD  09/25/23 4165

## 2023-09-26 ENCOUNTER — APPOINTMENT (OUTPATIENT)
Dept: CARDIOLOGY | Facility: CLINIC | Age: 48
End: 2023-09-26
Attending: NURSE PRACTITIONER
Payer: COMMERCIAL

## 2023-09-26 VITALS
OXYGEN SATURATION: 98 % | SYSTOLIC BLOOD PRESSURE: 110 MMHG | BODY MASS INDEX: 29.96 KG/M2 | HEART RATE: 80 BPM | HEIGHT: 63 IN | WEIGHT: 169.1 LBS | RESPIRATION RATE: 16 BRPM | TEMPERATURE: 97.9 F | DIASTOLIC BLOOD PRESSURE: 74 MMHG

## 2023-09-26 LAB
ANION GAP SERPL CALCULATED.3IONS-SCNC: 11 MMOL/L (ref 7–15)
ATRIAL RATE - MUSE: 74 BPM
BASOPHILS # BLD AUTO: 0 10E3/UL (ref 0–0.2)
BASOPHILS NFR BLD AUTO: 0 %
BUN SERPL-MCNC: 8.5 MG/DL (ref 6–20)
CALCIUM SERPL-MCNC: 9.1 MG/DL (ref 8.6–10)
CHLORIDE SERPL-SCNC: 102 MMOL/L (ref 98–107)
CREAT SERPL-MCNC: 0.67 MG/DL (ref 0.51–0.95)
DEPRECATED HCO3 PLAS-SCNC: 27 MMOL/L (ref 22–29)
DIASTOLIC BLOOD PRESSURE - MUSE: NORMAL MMHG
EGFRCR SERPLBLD CKD-EPI 2021: >90 ML/MIN/1.73M2
EOSINOPHIL # BLD AUTO: 0.1 10E3/UL (ref 0–0.7)
EOSINOPHIL NFR BLD AUTO: 1 %
ERYTHROCYTE [DISTWIDTH] IN BLOOD BY AUTOMATED COUNT: 13.9 % (ref 10–15)
GLUCOSE SERPL-MCNC: 88 MG/DL (ref 70–99)
HCT VFR BLD AUTO: 39.8 % (ref 35–47)
HGB BLD-MCNC: 13 G/DL (ref 11.7–15.7)
IMM GRANULOCYTES # BLD: 0 10E3/UL
IMM GRANULOCYTES NFR BLD: 0 %
INTERPRETATION ECG - MUSE: NORMAL
LYMPHOCYTES # BLD AUTO: 2.3 10E3/UL (ref 0.8–5.3)
LYMPHOCYTES NFR BLD AUTO: 51 %
MCH RBC QN AUTO: 28.4 PG (ref 26.5–33)
MCHC RBC AUTO-ENTMCNC: 32.7 G/DL (ref 31.5–36.5)
MCV RBC AUTO: 87 FL (ref 78–100)
MONOCYTES # BLD AUTO: 0.3 10E3/UL (ref 0–1.3)
MONOCYTES NFR BLD AUTO: 6 %
NEUTROPHILS # BLD AUTO: 1.9 10E3/UL (ref 1.6–8.3)
NEUTROPHILS NFR BLD AUTO: 42 %
NRBC # BLD AUTO: 0 10E3/UL
NRBC BLD AUTO-RTO: 0 /100
P AXIS - MUSE: 31 DEGREES
PLATELET # BLD AUTO: 147 10E3/UL (ref 150–450)
POTASSIUM SERPL-SCNC: 3.7 MMOL/L (ref 3.4–5.3)
PR INTERVAL - MUSE: 140 MS
QRS DURATION - MUSE: 74 MS
QT - MUSE: 402 MS
QTC - MUSE: 446 MS
R AXIS - MUSE: 9 DEGREES
RBC # BLD AUTO: 4.58 10E6/UL (ref 3.8–5.2)
SODIUM SERPL-SCNC: 140 MMOL/L (ref 136–145)
SYSTOLIC BLOOD PRESSURE - MUSE: NORMAL MMHG
T AXIS - MUSE: 7 DEGREES
VENTRICULAR RATE- MUSE: 74 BPM
WBC # BLD AUTO: 4.6 10E3/UL (ref 4–11)

## 2023-09-26 PROCEDURE — 258N000003 HC RX IP 258 OP 636: Performed by: STUDENT IN AN ORGANIZED HEALTH CARE EDUCATION/TRAINING PROGRAM

## 2023-09-26 PROCEDURE — 36415 COLL VENOUS BLD VENIPUNCTURE: CPT | Performed by: EMERGENCY MEDICINE

## 2023-09-26 PROCEDURE — 93321 DOPPLER ECHO F-UP/LMTD STD: CPT | Mod: 26 | Performed by: STUDENT IN AN ORGANIZED HEALTH CARE EDUCATION/TRAINING PROGRAM

## 2023-09-26 PROCEDURE — 250N000009 HC RX 250: Performed by: STUDENT IN AN ORGANIZED HEALTH CARE EDUCATION/TRAINING PROGRAM

## 2023-09-26 PROCEDURE — G0378 HOSPITAL OBSERVATION PER HR: HCPCS

## 2023-09-26 PROCEDURE — 93005 ELECTROCARDIOGRAM TRACING: CPT

## 2023-09-26 PROCEDURE — 255N000002 HC RX 255 OP 636: Performed by: STUDENT IN AN ORGANIZED HEALTH CARE EDUCATION/TRAINING PROGRAM

## 2023-09-26 PROCEDURE — 99239 HOSP IP/OBS DSCHRG MGMT >30: CPT | Performed by: PHYSICIAN ASSISTANT

## 2023-09-26 PROCEDURE — 93350 STRESS TTE ONLY: CPT | Mod: 26 | Performed by: STUDENT IN AN ORGANIZED HEALTH CARE EDUCATION/TRAINING PROGRAM

## 2023-09-26 PROCEDURE — 250N000011 HC RX IP 250 OP 636: Performed by: STUDENT IN AN ORGANIZED HEALTH CARE EDUCATION/TRAINING PROGRAM

## 2023-09-26 PROCEDURE — 93325 DOPPLER ECHO COLOR FLOW MAPG: CPT | Mod: 26 | Performed by: STUDENT IN AN ORGANIZED HEALTH CARE EDUCATION/TRAINING PROGRAM

## 2023-09-26 PROCEDURE — 93018 CV STRESS TEST I&R ONLY: CPT | Performed by: STUDENT IN AN ORGANIZED HEALTH CARE EDUCATION/TRAINING PROGRAM

## 2023-09-26 PROCEDURE — 93016 CV STRESS TEST SUPVJ ONLY: CPT | Performed by: STUDENT IN AN ORGANIZED HEALTH CARE EDUCATION/TRAINING PROGRAM

## 2023-09-26 PROCEDURE — 250N000013 HC RX MED GY IP 250 OP 250 PS 637: Performed by: EMERGENCY MEDICINE

## 2023-09-26 PROCEDURE — 80048 BASIC METABOLIC PNL TOTAL CA: CPT | Performed by: EMERGENCY MEDICINE

## 2023-09-26 PROCEDURE — 250N000013 HC RX MED GY IP 250 OP 250 PS 637: Performed by: NURSE PRACTITIONER

## 2023-09-26 PROCEDURE — 85025 COMPLETE CBC W/AUTO DIFF WBC: CPT | Performed by: EMERGENCY MEDICINE

## 2023-09-26 RX ORDER — GABAPENTIN 300 MG/1
600 CAPSULE ORAL AT BEDTIME
Status: DISCONTINUED | OUTPATIENT
Start: 2023-09-26 | End: 2023-09-26 | Stop reason: HOSPADM

## 2023-09-26 RX ORDER — GABAPENTIN 300 MG/1
300 CAPSULE ORAL DAILY
Status: DISCONTINUED | OUTPATIENT
Start: 2023-09-26 | End: 2023-09-26 | Stop reason: HOSPADM

## 2023-09-26 RX ORDER — SODIUM CHLORIDE 9 MG/ML
INJECTION, SOLUTION INTRAVENOUS CONTINUOUS
Status: DISCONTINUED | OUTPATIENT
Start: 2023-09-26 | End: 2023-09-26

## 2023-09-26 RX ORDER — LOSARTAN POTASSIUM 50 MG/1
50 TABLET ORAL DAILY
Qty: 90 TABLET | Refills: 0 | Status: CANCELLED | OUTPATIENT
Start: 2023-09-26

## 2023-09-26 RX ORDER — METOPROLOL TARTRATE 1 MG/ML
1-20 INJECTION, SOLUTION INTRAVENOUS
Status: DISCONTINUED | OUTPATIENT
Start: 2023-09-26 | End: 2023-09-26

## 2023-09-26 RX ORDER — DOBUTAMINE HYDROCHLORIDE 200 MG/100ML
10-50 INJECTION INTRAVENOUS CONTINUOUS
Status: DISCONTINUED | OUTPATIENT
Start: 2023-09-26 | End: 2023-09-26

## 2023-09-26 RX ORDER — ATROPINE SULFATE 0.4 MG/ML
.2-2 AMPUL (ML) INJECTION
Status: COMPLETED | OUTPATIENT
Start: 2023-09-26 | End: 2023-09-26

## 2023-09-26 RX ADMIN — LOSARTAN POTASSIUM 50 MG: 25 TABLET, FILM COATED ORAL at 09:09

## 2023-09-26 RX ADMIN — NITROGLYCERIN 0.4 MG: 0.4 TABLET SUBLINGUAL at 09:46

## 2023-09-26 RX ADMIN — ACETAMINOPHEN 650 MG: 325 TABLET, FILM COATED ORAL at 09:10

## 2023-09-26 RX ADMIN — NITROGLYCERIN 0.4 MG: 0.4 TABLET SUBLINGUAL at 09:26

## 2023-09-26 RX ADMIN — NITROGLYCERIN 0.4 MG: 0.4 TABLET SUBLINGUAL at 09:09

## 2023-09-26 RX ADMIN — GABAPENTIN 300 MG: 300 CAPSULE ORAL at 09:09

## 2023-09-26 RX ADMIN — METOPROLOL TARTRATE 8 MG: 5 INJECTION INTRAVENOUS at 11:02

## 2023-09-26 RX ADMIN — DEXTROSE MONOHYDRATE 10 MCG/KG/MIN: 50 INJECTION, SOLUTION INTRAVENOUS at 10:48

## 2023-09-26 RX ADMIN — GABAPENTIN 600 MG: 300 CAPSULE ORAL at 02:23

## 2023-09-26 RX ADMIN — NITROGLYCERIN 0.4 MG: 0.4 TABLET SUBLINGUAL at 01:34

## 2023-09-26 RX ADMIN — PERFLUTREN 5 ML: 6.52 INJECTION, SUSPENSION INTRAVENOUS at 11:04

## 2023-09-26 RX ADMIN — ATROPINE SULFATE 0.6 MG: 0.4 INJECTION, SOLUTION INTRAVENOUS at 11:03

## 2023-09-26 RX ADMIN — LORAZEPAM 0.5 MG: 0.5 TABLET ORAL at 09:10

## 2023-09-26 RX ADMIN — PANTOPRAZOLE SODIUM 40 MG: 40 TABLET, DELAYED RELEASE ORAL at 09:09

## 2023-09-26 RX ADMIN — ACETAMINOPHEN 650 MG: 325 TABLET, FILM COATED ORAL at 01:35

## 2023-09-26 RX ADMIN — OXYCODONE HYDROCHLORIDE 15 MG: 5 TABLET ORAL at 09:10

## 2023-09-26 ASSESSMENT — ACTIVITIES OF DAILY LIVING (ADL)
ADLS_ACUITY_SCORE: 33

## 2023-09-26 NOTE — PROGRESS NOTES
Patient discharged from the hospital to home at 1430 accompanied by her family. PIV removed, discharge instructions reviewed and copies given to pt.

## 2023-09-26 NOTE — PLAN OF CARE
"Goal Outcome Evaluation:    - Serial troponins and stress test complete: In progress    - Seen and cleared by consultant if applicable: In progress    - Adequate pain control on oral analgesia: In progress, pt rates pain 8/10, PRN oxy 15 mg given.    - Vital signs normal or at patient baseline: In progress  BP (!) 149/97 (BP Location: Right arm, Patient Position: Semi-Ayon's)   Pulse 84   Temp 98.5  F (36.9  C) (Oral)   Resp 20   Ht 1.6 m (5' 3\")   Wt 76.7 kg (169 lb 1.6 oz)   LMP 09/12/2016 (Exact Date)   SpO2 100%   BMI 29.95 kg/m      - Safe disposition plan has been identified: In progress    Pt A/O, sitting up in bed, ate 100% of dinner.  - Nurse to notify provider when observation goals have been met and patient is ready for discharge.   "

## 2023-09-26 NOTE — PROGRESS NOTES
Goal Outcome Evaluation:       - Serial troponins and stress test complete: Not met, waiting for test results     - Seen and cleared by consultant if applicable: In progress    - Adequate pain control on oral analgesia: Not met,  pt still report chest pressure, pain worse when walking, pt requesting strong pain medicine. Given PRN Oxycodone 15mg, Ativan 0.5mg, Tylenol and nitroglycerin.     - Vital signs normal or at patient baseline: met    - Safe disposition plan has been identified: Met    - Nurse to notify provider when observation goals have been met and patient is ready for discharge.

## 2023-09-26 NOTE — PROGRESS NOTES
oal Outcome Evaluation:        - Serial troponins and stress test complete: Met    - Seen and cleared by consultant if applicable: Met    - Adequate pain control on oral analgesia: Met: pain improved this afternoon per pt.     - Vital signs normal or at patient baseline: met    - Safe disposition plan has been identified: Met    - Nurse to notify provider when observation goals have been met and patient is ready for discharge. Met

## 2023-09-26 NOTE — PROGRESS NOTES
"ED Observation Progress Note  [facility]  Note Date: 9/26/2023    Jeff Serra MRN: [id number]   Age: 48 year old YOB: 1975     Interval History   48-year-old female with minimal if any classic cardiac risk factors had an episode of chest heaviness that brought her to the emergency department.  Her evaluation there was normal including labs and EKG blood pressure is mildly elevated.  Plan is ED observation for dobutamine stress test.  This morning she is asymptomatic.    Physical Exam   /74 (BP Location: Right arm, Patient Position: Semi-Ayon's, Cuff Size: Adult Regular)   Pulse 80   Temp 97.9  F (36.6  C) (Oral)   Resp 16   Ht 1.6 m (5' 3\")   Wt 76.7 kg (169 lb 1.6 oz)   LMP 09/12/2016 (Exact Date)   SpO2 98%   BMI 29.95 kg/m    Physical Exam  General: Awake alert no acute distress  Cardiac: Regular rate and rhythm no murmurs rubs gallops  Respiratory: Lungs clear  Results   Dobutamine stress test has been performed results are pending.    Assessments & Plan (with Medical Decision Making)   Jeff Serra is a 48 year old female admitted to the ED Observation Unit with chest pain symptoms appropriate for provocative testing.  Dobutamine stress test was performed she tolerated well the results are pending if negative will discharge home if positive discussed with cardiology.  Patient is aware of this plan..     Services consulted during the observation course: Cardiology. Notable consultant recommendations include: Dobutamine stress test    Observation goals to be met before discharge home:  Disposition per results of dobutamine stress test    --  Jacoby Sheehan MD  [facility] UNIT 6D OBSERVATION [place]  9/26/2023   "

## 2023-09-26 NOTE — PLAN OF CARE
"Goal Outcome Evaluation:    - Serial troponins and stress test complete: In progress    - Seen and cleared by consultant if applicable: In progress    - Adequate pain control on oral analgesia: In progress    - Vital signs normal or at patient baseline: In progress  BP (!) 142/89 (BP Location: Right arm)   Pulse 74   Temp 97.6  F (36.4  C) (Oral)   Resp 14   Ht 1.6 m (5' 3\")   Wt 76.7 kg (169 lb 1.6 oz)   LMP 09/12/2016 (Exact Date)   SpO2 94%   BMI 29.95 kg/m      - Safe disposition plan has been identified: In progress    - Nurse to notify provider when observation goals have been met and patient is ready for discharge.   "

## 2023-09-26 NOTE — PROGRESS NOTES
Pt here for dobutamine stress test. Test, meds and side effects reviewed with patient. Patient rated her chest pressure at an 8/10 prior to beginning stress test.  Achieved target HR at 30 mcg Dobutamine and a total of 0.6 mg IV atropine.  Patient denied chest pain during stress test and rates her chest pressure at a 6/10 post stress test.  Gave a total of 8 mg IV Metoprolol to bring HR back to baseline. Post monitoring complete and VSS.  Pt transferred back to [facility] via transport.

## 2023-09-26 NOTE — DISCHARGE SUMMARY
"[facility]  ED Observation Discharge Summary      Date of Admission:  9/25/2023  Date of Discharge:  9/26/2023  Discharging Provider: Arielle Early PA-C  Discharge Service: ED Observation Service    Discharge Diagnoses   Chest pain  HTN  Chronic pain  Anxiety  GERD    Clinically Significant Risk Factors     # Overweight: Estimated body mass index is 29.95 kg/m  as calculated from the following:    Height as of this encounter: 1.6 m (5' 3\").    Weight as of this encounter: 76.7 kg (169 lb 1.6 oz).       Follow-ups Needed After Discharge   Follow up with PCP in 1 week    Unresulted Labs Ordered in the Past 30 Days of this Admission       No orders found for last 31 day(s).          Discharge Disposition   Discharged to home  Condition at discharge: Stable    Hospital Course   Jeff Serra is a 48 year old female admitted on 9/25/2023. She has a history of hypertension, prior DVT and PE, opioid dependence who presents to the emergency department with substernal chest pain.     #Chest pain  #HTN  Patient presents with 6 days of intermittent substernal chest pain that radiates into the left chest and up into the neck and jaw.  Patient reports the pain feels like something is sitting on her chest and is also associated with shortness of breath.  It is associated with activity, however, also occurs while at rest sometimes.  Patient has a history of hypertension but has not been taking any medications for this for the last year. Heart score for cardiac risk stratification:4.  In the ED, EKG shows NSR.  Blood pressure is elevated, 150/96 on arrival.  Labs show normal troponin, delta troponin less than 6.  D-dimer normal.  BNP normal.  No leukocytosis, WBC 5.9.  Screen for COVID and influenza, RSV is negative.  Chest x-ray shows no acute cardiopulmonary process.  Limited bedside echo shows no acute process.  Medications: Losartan 50 mg x 1. Patient was admitted to ED " observation and underwent dobutamine stress echo which was normal. Stable for discharge home.    #Chronic pain  #Anxiety  - Continue prior to admission oxycodone, gabapentin, Ativan as needed     #GERD  - Continue prior to admission omeprazole daily    Consultations This Hospital Stay   None    Code Status   Full Code    Time Spent on this Encounter   I, Arielle Early PA-C, personally saw the patient today and spent greater than 30 minutes discharging this patient.       Arielle Early PA-C  [facility] [address] [facility] [address] [address] [address]  Phone: [phone]  Fax: [phone]  ______________________________________________________________________    Physical Exam   Vital Signs: Temp: 97.9  F (36.6  C) Temp src: Oral BP: 110/74 Pulse: 80   Resp: 16 SpO2: 98 % O2 Device: None (Room air)    Weight: 169 lbs 1.6 oz  Exam:  Constitutional: alert, no distress, and cooperative  Head: normocephalic, atraumatic  Neck: no asymmetry, masses, or scars  ENT: throat normal without erythema or exudate  Cardiovascular: RRR  Respiratory: diminished, respirations unlabored  Gastrointestinal: (+) BS, non tender, soft  Musculoskeletal: normal muscle tone, no pitting edema  Skin: no suspicious lesions or rashes  Neurologic: oriented x 3, moves all extremities, no slurred speech  Psychiatric: normal affect and mood       Primary Care Physician   Jordyn Loredo    Discharge Orders      Reason for your hospital stay    You were hospitalized for chest pain. Your heart enzymes were normal. Chest xray normal. No events on the heart monitor. You had a stress test which was normal.     Activity    Your activity upon discharge: activity as tolerated     Adult [facility] Follow-up and recommended labs and tests    Follow up with primary care provider, Jordyn Loredo, within 7 days for hospital follow- up.  No follow up labs or test are needed.      Appointments on  [place] and/or [facility] (with [facility] or [facility] provider or service). Call [phone] if you haven't heard regarding these appointments within 7 days of discharge.     When to contact your care team    Contact your care team for severe uncontrolled pain, fevers, worsening shortness of breath, fevers, syncope, or any other new or worsening problems.     Diet    Follow this diet upon discharge: regular       Significant Results and Procedures   Results for orders placed or performed during the hospital encounter of 23   XR Chest 2 Views    Narrative    CHEST TWO VIEWS  2023 8:36 AM     HISTORY:  Chest pain.    COMPARISON: 2023.      Impression    IMPRESSION: No acute cardiopulmonary disease.    DEONDRE OCHOA MD         SYSTEM ID:  [id number]   POC US ECHO LIMITED    Impression    Limited Bedside Cardiac Ultrasound, performed and interpreted by me.   Indication: Chest Pain.  Parasternal long axis, parasternal short axis, apical 4 chamber, and subcostal views were acquired.   Image quality was satisfactory.    Findings:    Global left ventricular function appears intact.  Chambers do not appear dilated.  There is no evidence of free fluid within the pericardium.    IMPRESSION: Grossly normal left ventricular function and chamber size.  No pericardial effusion..      Dobutamine Stress Echocardiogram    Narrative    [id number]  [id number]  [id number]  [id number]^HARVEY^LINDA^RAMONA     [facility]  Echocardiography Laboratory  [address] [place] [id number]     Name: JORY NÚÑEZ  MRN: [id number]  : 1975  Study Date: 2023 10:36 AM  Age: 48 yrs  Gender: Female  Patient Location: [facility]  Reason For Study: Chest Pain  Ordering Physician: LINDA GABRIEL  Performed By: Francoise Erickson RDCS     BSA: 1.8 m2  Height: 63 in  Weight: 169  lb  ______________________________________________________________________________  ______________________________________________________________________________  Interpretation Summary  Normal, low-risk dobutamine echocardiogram without evidence of ischemia.  The target heart rate was achieved.  Normal biventricular size, thickness, and global systolic function at  baseline, LVEF=55-60%.  With low-dose dobutamine, LVEF augmented and LV cavity size decreased  appropriately.  With peak dobutamine, LVEF increased further to >60% and LV cavity size  decreased appropriately.  No regional wall motion abnormalities at rest or with dobutamine.  No angina was elicited.  No ECG evidence of ischemia. Normal heart rate and blood pressure response to  dobutamine.  No significant valvular abnormalities are noted on screening Doppler exam.  The aortic root and visualized ascending aorta are normal.  ______________________________________________________________________________  Stress  The drug infusion was stopped due to target heart rate achieved.  The patient did not exhibit any symptoms during drug infusion.  The maximum dose of dobutamine was 30mcg/kg/min.  The maximum dose of atropine was 0.6mg.  The maximum dose of metoprolol was 8.0mg.  Definity (NDC #82464-070-98) given intravenously.  Patient was given 5ml mixture of 1.5ml Definity and 8.5ml saline.  5 ml wasted.  Definity Expiration 9/2024 .  Definity Lot # [id number] .     Stress Results                                       Maximum Predicted HR:   170 bpm             Target HR: 145 bpm        % Maximum Predicted HR: 92 %                                 DurationHeart Rate                        Stage  (mm:ss)   (bpm)    BP   Dose                      Baseline  0:00      77    126/76 0.00                        Peak    8:50      156   115/6130.00                          Stress Duration:   8:50 mm:ss                       Maximum Stress HR: 156 bpm     Procedure  Dobutamine  stress echo with two dimensional color and spectral Doppler  performed. Definity (NDC #35782-317) given intravenously.     ______________________________________________________________________________  MMode/2D Measurements & Calculations  asc Aorta Diam: 2.7 cm  Asc Ao diam index BSA (cm/m2): 1.5  Asc Ao diam index Ht(cm/m): 1.7     ______________________________________________________________________________  Report approved by: MD Alex Vale 09/26/2023 11:29 AM             Discharge Medications   Current Discharge Medication List        CONTINUE these medications which have NOT CHANGED    Details   oxyCODONE IR (ROXICODONE) 15 MG tablet Take 1 tablet (15 mg) by mouth 3 times daily as needed for pain  Qty: 90 tablet, Refills: 0    Associated Diagnoses: Chronic midline low back pain without sciatica; Chronic, continuous use of opioids      acetaminophen (TYLENOL) 500 MG tablet Take 1,000 mg by mouth 2 times daily as needed for mild pain      Calcium Carb-Cholecalciferol (CALCIUM 600 + D) 600-400 MG-UNIT TABS per tablet Take 1 tablet by mouth 2 times daily Take one twice daily  Qty: 60 tablet, Refills: 3    Comments: If calcium 600 citrate + 400 mg tabs are available that would be preferred instead of carbonate  Associated Diagnoses: Postsurgical malabsorption      cholecalciferol 125 MCG (5000 UT) CAPS Take 1 capsule (5,000 Units) by mouth daily  Qty: 90 capsule, Refills: 3    Associated Diagnoses: Postsurgical malabsorption      cyanocobalamin (CYANOCOBALAMIN) 1000 MCG/ML injection Inject 1 mL (1,000 mcg) Subcutaneous every 30 days  Qty: 3 mL, Refills: 3    Associated Diagnoses: Postsurgical malabsorption      gabapentin (NEURONTIN) 300 MG capsule TAKE 2 CAPSULE BY MOUTH AT BEDTIME AND 1 CAPSULE BY MOUTH IN THE MORNING  Qty: 270 capsule, Refills: 1    Comments: ZERO refills remain on this prescription. Your patient is requesting advance approval of refills for this medication to PREVENT  ANY MISSED DOSES  Associated Diagnoses: Chronic midline low back pain without sciatica; Chronic neck pain; Cervicogenic headache      guaiFENesin-codeine (CHERATUSSIN AC) 100-10 MG/5ML solution Take 5-10 mLs by mouth every 4 hours as needed for cough  Qty: 240 mL, Refills: 0    Associated Diagnoses: Acute cough      Lidocaine (LIDOCARE) 4 % Patch Place 1 patch onto the skin every 24 hours To prevent lidocaine toxicity, patient should be patch free for 12 hrs daily.  Qty: 15 patch, Refills: 1    Associated Diagnoses: Right flank pain      lidocaine (LIDODERM) 5 % patch Place 1 patch onto the skin every 24 hours for 10 days  Qty: 10 patch, Refills: 0      loratadine (CLARITIN) 10 MG tablet Take 1 tablet (10 mg) by mouth daily  Qty: 90 tablet, Refills: 3    Associated Diagnoses: Seasonal allergic rhinitis due to pollen      LORazepam (ATIVAN) 0.5 MG tablet Take 1 tablet (0.5 mg) by mouth every 6 hours as needed for anxiety or muscle spasms  Qty: 10 tablet, Refills: 0      losartan (COZAAR) 50 MG tablet Take 1 tablet (50 mg) by mouth daily Restarted 5/15/22  Qty: 90 tablet, Refills: 1    Associated Diagnoses: Hypertension goal BP (blood pressure) < 140/90      Multiple Vitamins-Iron (QC DAILY MULTIVITAMINS/IRON) TABS Take 2 tablets by mouth daily  Qty: 180 tablet, Refills: 3    Comments: Must contain the following: At least 18 mg of Iron, At least 10 TO 15 MG zinc, At least 2000 IUs of vitamin A, At least 400 mg of Folic Acid, At least 1 mg of Copper, At least 1.5 mg of Thiamine.  Associated Diagnoses: Postsurgical malabsorption      omeprazole (PRILOSEC) 20 MG DR capsule Take 1 capsule (20 mg) by mouth every morning (before breakfast)  Qty: 90 capsule, Refills: 0    Associated Diagnoses: S/P laparoscopic sleeve gastrectomy      ondansetron (ZOFRAN ODT) 4 MG ODT tab Take 1 tablet (4 mg) by mouth every 6 hours as needed for nausea or vomiting  Qty: 20 tablet, Refills: 0    Associated Diagnoses: S/P laparoscopic sleeve  "gastrectomy      thiamine (B-1) 100 MG tablet Take 1 tablet (100 mg) by mouth once a week  Qty: 4 tablet, Refills: 11    Associated Diagnoses: Postsurgical malabsorption      tiZANidine (ZANAFLEX) 4 MG tablet Take 1 tablet (4 mg) by mouth every 8 hours as needed for muscle spasms  Qty: 30 tablet, Refills: 0    Associated Diagnoses: Neck muscle spasm; Tension headache      tuberculin-allergy syringes 27G X 1/2\" 1 ML MISC Use for B12 injections.  Qty: 3 each, Refills: 3    Associated Diagnoses: Postsurgical malabsorption      zolpidem (AMBIEN) 10 MG tablet TAKE 1 TABLET(10 MG) BY MOUTH EVERY NIGHT AS NEEDED FOR SLEEP  Qty: 30 tablet, Refills: 2    Associated Diagnoses: Insomnia, unspecified type           Allergies   Allergies   Allergen Reactions    Contrast Dye Nausea and Vomiting     States she sometimes has vomited after receiving iv contrast dye    Nsaids      Gastric Sleeve     "
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
## 2023-09-26 NOTE — PLAN OF CARE
"Goal Outcome Evaluation:    - Serial troponins and stress test complete: In progress    - Seen and cleared by consultant if applicable: In progress    - Adequate pain control on oral analgesia: In progress, pt reporting 7/10 pain, given PRN tylenol and nitroglycerin     - Vital signs normal or at patient baseline: In progress  /88 (BP Location: Right arm)   Pulse 85   Temp 97.7  F (36.5  C) (Oral)   Resp 16   Ht 1.6 m (5' 3\")   Wt 76.7 kg (169 lb 1.6 oz)   LMP 09/12/2016 (Exact Date)   SpO2 98%   BMI 29.95 kg/m      - Safe disposition plan has been identified: In progress    - Nurse to notify provider when observation goals have been met and patient is ready for discharge.   "

## 2023-09-27 ENCOUNTER — TELEPHONE (OUTPATIENT)
Dept: FAMILY MEDICINE | Facility: CLINIC | Age: 48
End: 2023-09-27
Payer: COMMERCIAL

## 2023-09-27 NOTE — TELEPHONE ENCOUNTER
Patient was discharged for hospital on 9/26 and was told to schedul follow up with PCP ASAP. I scheduled her for 9/29 at 4pm but realized that is only a 20 min slot.   It that ok or should I find another provider that could see her this week?

## 2023-09-28 ENCOUNTER — PATIENT OUTREACH (OUTPATIENT)
Dept: CARE COORDINATION | Facility: CLINIC | Age: 48
End: 2023-09-28
Payer: COMMERCIAL

## 2023-09-28 ASSESSMENT — ACTIVITIES OF DAILY LIVING (ADL): DEPENDENT_IADLS:: INDEPENDENT

## 2023-09-28 NOTE — PROGRESS NOTES
Clinic Care Coordination Contact  Clinic Care Coordination Contact  OUTREACH with Post Discharge Assessment    Referral Information:  Referral Source: IP Handoff    Primary Diagnosis: Other (include Comment box) (BP)    Chief Complaint   Patient presents with    Clinic Care Coordination - Post Hospital      [place] Utilization: Clinic Utilization  Difficulty keeping appointments:: No  Compliance Concerns: No  No-Show Concerns: No  No PCP office visit in Past Year: No  Utilization      Hospital Admissions  1             ED Visits  4             No Show Count (past year)  0                    Current as of: 9/27/2023  5:10 PM              Clinical Concerns:  Current Medical Concerns:  please see post hospital follow up assessment below    Current Behavioral Concerns: patient to discuss anxiety with PCP.     Education Provided to patient: per her IP AVS.    Pain  Pain (GOAL):: No  Health Maintenance Reviewed: Up to date  Clinical Pathway: None    Admission:    Admission Date: 09/25/23   Reason for Admission: substernal chest pain.  Discharge:   Discharge Date: 09/26/23  Discharge Diagnosis: Chest pain  HTN  Chronic pain  Anxiety  GERD    Enrollment  Outreach Frequency: monthly    Discharge Assessment  How are you doing now that you are home?: CC RN contacted patient, introduced self, role, care coordination program and reason for call. Patient reports the chest discomfort is significantly decreased however, sensation is still present. Patient has a post hospital follow up appointment tomorrow with PCP. Patient wishing to discuss her blood pressure medication regimen, ensure her home blood pressure cuff is accurate by comparing her home cuff to clinic's calibrated cuff, and anxiety. She has as needed lorazepam but does not have other antianxiety assistance such as medications or therapy. Patient agreeable to CC, CC goal created.  How are your symptoms? (Red Flag symptoms escalate to triage hotline per guidelines):  Improved  Do you feel your condition is stable enough to be safe at home until your provider visit?: Yes  Does the patient have their discharge instructions? : Yes  Does the patient have questions regarding their discharge instructions? : No  Were you started on any new medications or were there changes to any of your previous medications? : No  Does the patient have all of their medications?: Yes  Do you have questions regarding any of your medications? : No  Discharge follow-up appointment scheduled within 14 calendar days? : Yes  Discharge Follow Up Appointment Date: 09/29/23  Discharge Follow Up Appointment Scheduled with?: Primary Care Provider         Post-op (Clinicians Only)  Did the patient have surgery or a procedure: No  Fever: No  Chills: No  Eating & Drinking: eating and drinking without complaints/concerns  PO Intake: regular diet  Bowel Function: normal  Urinary Status: voiding without complaint/concerns    Medication Management:  Medication review status: Medications reviewed.  Changes noted per patient report.  Flagged losartan 50 mg  for removal, other provider placed on hold. Patient not taking.     Functional Status:  Dependent ADLs:: Independent  Dependent IADLs:: Independent  Mobility Status: Independent  Fallen 2 or more times in the past year?: No  Any fall with injury in the past year?: No    Living Situation:  Current living arrangement:: I live in a private home with family  [facility]    Lifestyle & Psychosocial Needs:    Social Determinants of Health     Food Insecurity: No Food Insecurity (5/5/2022)    Hunger Vital Sign     Worried About Running Out of Food in the Last Year: Never true     Ran Out of Food in the Last Year: Never true   Depression: Not at risk (6/13/2023)    PHQ-2     PHQ-2 Score: 0   Housing Stability: Low Risk  (5/5/2022)    Housing Stability Vital Sign     Unable to Pay for Housing in the Last Year: No     Number of Places Lived in the  Last Year: 1     Unstable Housing in the Last Year: No   Tobacco Use: Low Risk  (9/25/2023)    Patient History     Smoking Tobacco Use: Never     Smokeless Tobacco Use: Never     Passive Exposure: Never   Financial Resource Strain: Low Risk  (5/5/2022)    Overall Financial Resource Strain (CARDIA)     Difficulty of Paying Living Expenses: Not hard at all   Alcohol Use: Not At Risk (5/5/2022)    AUDIT-C     Frequency of Alcohol Consumption: 2-4 times a month     Average Number of Drinks: 1 or 2     Frequency of Binge Drinking: Never   Transportation Needs: No Transportation Needs (5/5/2022)    PRAPARE - Transportation     Lack of Transportation (Medical): No     Lack of Transportation (Non-Medical): No   Physical Activity: Insufficiently Active (5/5/2022)    Exercise Vital Sign     Days of Exercise per Week: 3 days     Minutes of Exercise per Session: 30 min   Interpersonal Safety: Not on file   Stress: No Stress Concern Present (5/5/2022)    [employer] - Occupational Stress Questionnaire     Feeling of Stress : Not at all   Social Connections: Moderately Integrated (5/5/2022)    Social Connection and Isolation Panel [NHANES]     Frequency of Communication with Friends and Family: More than three times a week     Frequency of Social Gatherings with Friends and Family: Twice a week     Attends Zoroastrian Services: More than 4 times per year     Active Member of Clubs or Organizations: No     Attends Club or Organization Meetings: Not on file     Marital Status:      Diet:: Regular  Inadequate nutrition (GOAL):: No  Inadequate activity/exercise (GOAL):: No  Significant changes in sleep pattern (GOAL): No  Transportation means:: Regular car     Zoroastrian or spiritual beliefs that impact treatment:: No  Mental health DX:: Yes  Mental health DX how managed:: Medication  Mental health management concern (GOAL):: No  Chemical Dependency Status: No Current Concerns  Informal Support  system:: Children, Family     Resources and Interventions:  Current Resources:    Supplies Currently Used at Home: Other (BP cuff)  Equipment Currently Used at Home: none  Employment Status: employed full-time       Advance Care Plan/Directive  Advanced Care Plans/Directives on file:: No  Advanced Care Plan/Directive Status: Considering Options    Referrals Placed: None     Care Plan:   Care Plan: Blood Pressure Management       Problem: Need Blood Pressure Management       Goal: General Goal - please update text       Start Date: 9/28/2023 Expected End Date: 12/28/2023    This Visit's Progress: On track    Note:     Barriers: none  Strengths: patient motivated to work on health   Patient expressed understanding of goal: yes   Action steps to achieve this goal:  1. I will attend my appointment tomorrow, 9/29 with my doctor, Dr. Loredo to discuss my hospitalization and blood pressure management.  2. I will bring my home blood pressure cuff in to the clinic and determine if it is accurate. If not, I can ask my doctor for a new prescription for a blood pressure cuff  3. I will carry out my treatment plans as determined at my clinic visit tomorrow.                               Patient/Caregiver understanding: yes     Outreach Frequency: monthly  Future Appointments                Tomorrow Jordyn Loredo MD [facility]            Plan: 1. Patient verbalized good understanding of care plans and will follow recommendations.  2. Patient enrolled in Care Coordination, goal(s) identified at this time.   3. Patient centered care plan, and introduction letter sent to patient.  4. CHW will reach out to patient per standard workflow and support goal(s), CC RN will review chart in 6 weeks to review goal progression.      Alondra Andrea RN, BSN, PHN Care Coordinator  [place] [place] [facility]   Phone: [phone]

## 2023-09-28 NOTE — LETTER
[facility] CARE COORDINATION  September 28, 2023    Jeff Serra  [address] [address] [address]      Dear Jeff,        I am a  clinic care coordinator who works with Jordyn Loredo MD with the [facility]. I wanted to thank you for spending the time to talk with me.  Below is a description of clinic care coordination and how I can further assist you.       The clinic care coordination team is made up of a registered nurse, , financial resource worker and community health worker who understand the health care system. The goal of clinic care coordination is to help you manage your health and improve access to the health care system. Our team works alongside your provider to assist you in determining your health and social needs. We can help you obtain health care and community resources, providing you with necessary information and education. We can work with you through any barriers and develop a care plan that helps coordinate and strengthen the communication between you and your care team.  Our services are voluntary and are offered without charge to you personally.    Please feel free to contact me with any questions or concerns regarding care coordination and what we can offer.      We are focused on providing you with the highest-quality healthcare experience possible.    Sincerely,     Alondra Andrea RN, BSN, PHN Care Coordinator  [facility]   Phone: [phone]

## 2023-09-28 NOTE — LETTER
[facility]  Patient Centered Plan of Care  About Me:        Patient Name:  Jeff Serra    YOB: 1975  Age:         48 year old   [place] MRN:    [id number] Telephone Information:  Home Phone [phone]   Mobile [phone]       Address:  [address] [address] [address] Email address:  [email or web address]      Emergency Contact(s)    Name Relationship Lgl Grd Work Phone Home Phone Mobile Phone   DEMARCUS GUIDO* Spouse No  [phone]           Primary language:  English     needed? No   [employer]:  [phone] op. 1  Other communication barriers:None    Preferred Method of Communication:  Mail  Current living arrangement: I live in a private home with family    Mobility Status/ Medical Equipment: Independent        Health Maintenance  Health Maintenance Reviewed: Up to date      My Access Plan  Medical Emergency 911   Primary Clinic Line [facility] [phone]   24 Hour Appointment Line [phone] or  [phone]) (toll-free)   24 Hour Nurse Line [phone] (toll-free)   Preferred Urgent Care [facility] [phone]     Preferred Hospital [facility]  [phone]     Preferred Pharmacy [id number] [place], [place] - [address]  [address]     Behavioral Health Crisis Line The National Suicide Prevention Lifeline at 1-973.589.3981 or Text/Call 988             My Care Team Members  Patient Care Team         Relationship Specialty Notifications Start End    Jordyn Loredo MD PCP - General Family Practice  5/30/18     Phone: [phone] Fax: [phone] [address] [address]    Jordyn Loredo MD Assigned PCP   1/24/21     Phone: [phone] Fax: [phone] [address] [address]     Latricia Lund PA-C Assigned Surgical Provider   7/4/21     Phone: [phone] Fax: [phone]         [address] [place] [address]    Jordyn Loredo MD Assigned Pain Medication Provider   1/9/23     Phone: [phone] Fax: [phone] [address] [facility] [address]    Alondra Andrea, RN Lead Care Coordinator Primary Care - CC Admissions 9/28/23     Phone: [phone] Fax: [phone]        Nichole Huffman Community Health Worker Primary Care - CC Admissions 9/28/23     Phone: [phone] Fax: [phone]                  My Care Plans  Self Management and Treatment Plan  Care Plan  Care Plan: Blood Pressure Management       Problem: Need Blood Pressure Management       Goal: General Goal - please update text       Start Date: 9/28/2023 Expected End Date: 12/28/2023    This Visit's Progress: On track    Note:     Barriers: none  Strengths: patient motivated to work on health   Patient expressed understanding of goal: yes   Action steps to achieve this goal:  1. I will attend my appointment tomorrow, 9/29 with my doctor, Dr. Loredo to discuss my hospitalization and blood pressure management.  2. I will bring my home blood pressure cuff in to the clinic and determine if it is accurate. If not, I can ask my doctor for a new prescription for a blood pressure cuff  3. I will carry out my treatment plans as determined at my clinic visit tomorrow.                                  Action Plans on File:                       Advance Care Plans/Directives Type:   No data recorded    My Medical and Care Information  Problem List   Patient Active Problem List   Diagnosis    CARDIOVASCULAR SCREENING; LDL GOAL LESS THAN 160    Chronic neck pain    Hypertension goal BP (blood pressure) < 140/90    History of pulmonary embolism    Allergic rhinitis    Chronic, continuous use of opioids    Hx of renal calculi    Chronic midline low back pain without sciatica    S/P  hysterectomy    Vitamin D deficiency    Chondral defect of left patella    Left knee pain, unspecified chronicity    Snoring    Bilateral leg edema    Hirsutism    Umbilical hernia without obstruction and without gangrene    CATALINO (obstructive sleep apnea)    Cervicogenic headache    Bariatric surgery status    Postsurgical malabsorption    Chest pain, unspecified type      Current Medications and Allergies:  See MyChart    Care Coordination Start Date: 9/28/2023   Frequency of Care Coordination: monthly     Form Last Updated: 09/28/2023

## 2023-09-29 ENCOUNTER — OFFICE VISIT (OUTPATIENT)
Dept: FAMILY MEDICINE | Facility: CLINIC | Age: 48
End: 2023-09-29
Payer: COMMERCIAL

## 2023-09-29 VITALS
HEART RATE: 87 BPM | HEIGHT: 62 IN | OXYGEN SATURATION: 100 % | WEIGHT: 166.8 LBS | DIASTOLIC BLOOD PRESSURE: 101 MMHG | BODY MASS INDEX: 30.69 KG/M2 | TEMPERATURE: 97.8 F | RESPIRATION RATE: 16 BRPM | SYSTOLIC BLOOD PRESSURE: 139 MMHG

## 2023-09-29 DIAGNOSIS — G44.86 CERVICOGENIC HEADACHE: ICD-10-CM

## 2023-09-29 DIAGNOSIS — F11.90 CHRONIC, CONTINUOUS USE OF OPIOIDS: ICD-10-CM

## 2023-09-29 DIAGNOSIS — I10 HYPERTENSION GOAL BP (BLOOD PRESSURE) < 140/90: ICD-10-CM

## 2023-09-29 DIAGNOSIS — G89.29 CHRONIC MIDLINE LOW BACK PAIN WITHOUT SCIATICA: ICD-10-CM

## 2023-09-29 DIAGNOSIS — G89.29 CHRONIC NECK PAIN: ICD-10-CM

## 2023-09-29 DIAGNOSIS — M54.50 CHRONIC MIDLINE LOW BACK PAIN WITHOUT SCIATICA: ICD-10-CM

## 2023-09-29 DIAGNOSIS — M54.2 CHRONIC NECK PAIN: ICD-10-CM

## 2023-09-29 DIAGNOSIS — R07.89 ATYPICAL CHEST PAIN: Primary | ICD-10-CM

## 2023-09-29 PROCEDURE — 99495 TRANSJ CARE MGMT MOD F2F 14D: CPT | Performed by: FAMILY MEDICINE

## 2023-09-29 RX ORDER — OXYCODONE HYDROCHLORIDE 15 MG/1
15 TABLET ORAL 3 TIMES DAILY PRN
Qty: 90 TABLET | Refills: 0 | Status: SHIPPED | OUTPATIENT
Start: 2023-10-16 | End: 2023-10-18

## 2023-09-29 RX ORDER — PREGABALIN 75 MG/1
75 CAPSULE ORAL 2 TIMES DAILY
Qty: 60 CAPSULE | Refills: 2 | Status: SHIPPED | OUTPATIENT
Start: 2023-09-29 | End: 2024-01-08

## 2023-09-29 RX ORDER — LOSARTAN POTASSIUM AND HYDROCHLOROTHIAZIDE 12.5; 5 MG/1; MG/1
1 TABLET ORAL DAILY
Qty: 90 TABLET | Refills: 1 | Status: SHIPPED | OUTPATIENT
Start: 2023-09-29 | End: 2024-01-08

## 2023-09-29 ASSESSMENT — PAIN SCALES - GENERAL: PAINLEVEL: NO PAIN (0)

## 2023-09-29 NOTE — PROGRESS NOTES
"  Assessment & Plan     Atypical chest pain  Reviewed recent history including overnight stay in the hospital for some atypical chest pain.  Still having some discomfort left side of her chest rating up to her neck.  Seems more mechanical at this point.  All testing reviewed and was negative.  1 thing of note was her blood pressure being elevated and she was off therapy as it did not seem like she needed it.  But I really do not think that was the cause of her pain.  We talked about options and we will go ahead and change her from gabapentin to Lyrica just to see if that helps with her issues with chronic pain overall.    Hypertension goal BP (blood pressure) < 140/90  Restart Hyzaar and plan to follow-up with readings  - losartan-hydrochlorothiazide (HYZAAR) 50-12.5 MG tablet; Take 1 tablet by mouth daily    Chronic midline low back pain without sciatica  Stable and following  - oxyCODONE IR (ROXICODONE) 15 MG tablet; Take 1 tablet (15 mg) by mouth 3 times daily as needed for pain  - pregabalin (LYRICA) 75 MG capsule; Take 1 capsule (75 mg) by mouth 2 times daily    Chronic, continuous use of opioids  Up-to-date on routine monitoring  - oxyCODONE IR (ROXICODONE) 15 MG tablet; Take 1 tablet (15 mg) by mouth 3 times daily as needed for pain    Chronic neck pain  In place of gabapentin.  Discussed mechanism of action of the proposed medication, as well as potential effects, both good and bad.  Patient expressed understanding and agreed with treatment.   - pregabalin (LYRICA) 75 MG capsule; Take 1 capsule (75 mg) by mouth 2 times daily    Cervicogenic headache  As above  - pregabalin (LYRICA) 75 MG capsule; Take 1 capsule (75 mg) by mouth 2 times daily           MED REC REQUIRED  Post Medication Reconciliation Status: Updated in chart  BMI:   Estimated body mass index is 30.42 kg/m  as calculated from the following:    Height as of this encounter: 1.577 m (5' 2.09\").    Weight as of this encounter: 75.7 kg (166 lb 12.8 " oz).       See Patient Instructions    Jordyn Loredo MD  [facility] R[place] Aguilar is a 48 year old, presenting for the following health issues:  Hospital F/U        9/29/2023     3:50 PM   Additional Questions   Roomed by Opal   Accompanied by self       HPI         9/28/2023    10:39 AM   Post Discharge Outreach   Admission Date 9/25/2023   Reason for Admission substernal chest pain.   Discharge Date 9/26/2023   Discharge Diagnosis Chest pain  HTN  Chronic pain  Anxiety  GERD   How are you doing now that you are home? CC RN contacted patient, introduced self, role, care coordination program and reason for call. Patient reports the chest discomfort is significantly decreased however, sensation is still present. Patient has a post hospital follow up appointment tomorrow with PCP. Patient wishing to discuss her blood pressure medication regimen, ensure her home blood pressure cuff is accurate by comparing her home cuff to clinic's calibrated cuff, and anxiety. She has as needed lorazepam but does not have other antianxiety assistance such as medications or therapy. Patient agreeable to CC, CC goal created.   How are your symptoms? (Red Flag symptoms escalate to triage hotline per guidelines) Improved   Do you feel your condition is stable enough to be safe at home until your provider visit? Yes   Does the patient have their discharge instructions?  Yes   Does the patient have questions regarding their discharge instructions?  No   Were you started on any new medications or were there changes to any of your previous medications?  No   Does the patient have all of their medications? Yes   Do you have questions regarding any of your medications?  No   Discharge follow-up appointment scheduled within 14 calendar days?  Yes   Discharge Follow Up Appointment Date 9/29/2023   Discharge Follow Up Appointment Scheduled with? Primary Care Provider     Hospital Follow-up  "Visit:    Hospital/Nursing Home/IP Rehab Facility: [facility]  Date of Admission: 09/25/23  Date of Discharge: 09/28/23  Reason(s) for Admission: hypertension and chest pain     Was your hospitalization related to COVID-19? No   Problems taking medications regularly:  None  Medication changes since discharge: None  Problems adhering to non-medication therapy:  None    Summary of hospitalization:  CareEverywhere information obtained and reviewed  Diagnostic Tests/Treatments reviewed.  Follow up needed: none  Other Healthcare Providers Involved in Patient s Care:         None  Update since discharge: improved.         Plan of care communicated with patient           Here today in follow-up of recent hospitalization as above.      Review of Systems   Constitutional, HEENT, cardiovascular, pulmonary, gi and gu systems are negative, except as otherwise noted.      Objective    BP (!) 139/101 (BP Location: Right arm, Patient Position: Sitting, Cuff Size: Adult Regular)   Pulse 87   Temp 97.8  F (36.6  C) (Oral)   Resp 16   Ht 1.577 m (5' 2.09\")   Wt 75.7 kg (166 lb 12.8 oz)   LMP 09/12/2016 (Exact Date)   SpO2 100%   BMI 30.42 kg/m    Body mass index is 30.42 kg/m .  Physical Exam   Alert, pleasant, upbeat, and in no apparent discomfort.  S1 and S2 normal, no murmurs, clicks, gallops or rubs. Regular rate and rhythm. Chest is clear; no wheezes or rales. No edema or JVD.  Past labs reviewed with the patient.   Reviewed imaging tests                      "

## 2023-10-04 ENCOUNTER — TELEPHONE (OUTPATIENT)
Dept: FAMILY MEDICINE | Facility: CLINIC | Age: 48
End: 2023-10-04
Payer: COMMERCIAL

## 2023-10-04 NOTE — TELEPHONE ENCOUNTER
Forms/Letter Request    Type of form/letter:  Dewayne [place]    Have you been seen for this request: N/A    Do we have the form/letter: Yes: Will place form on providers desk for review.signature    When is form/letter needed by: asap    How would you like the form/letter returned: Fax : [phone]

## 2023-10-18 ENCOUNTER — MYC MEDICAL ADVICE (OUTPATIENT)
Dept: FAMILY MEDICINE | Facility: CLINIC | Age: 48
End: 2023-10-18
Payer: COMMERCIAL

## 2023-10-18 DIAGNOSIS — G89.29 CHRONIC MIDLINE LOW BACK PAIN WITHOUT SCIATICA: ICD-10-CM

## 2023-10-18 DIAGNOSIS — F11.90 CHRONIC, CONTINUOUS USE OF OPIOIDS: ICD-10-CM

## 2023-10-18 DIAGNOSIS — M54.50 CHRONIC MIDLINE LOW BACK PAIN WITHOUT SCIATICA: ICD-10-CM

## 2023-10-18 RX ORDER — OXYCODONE HYDROCHLORIDE 15 MG/1
15 TABLET ORAL 3 TIMES DAILY PRN
Qty: 90 TABLET | Refills: 0 | Status: SHIPPED | OUTPATIENT
Start: 2023-10-18 | End: 2023-11-15

## 2023-10-18 NOTE — TELEPHONE ENCOUNTER
Routing refill request to provider for review/approval because:  Drug not on the [facility] refill protocol     SHALINI Cruz  [facility] Primary Care Triage

## 2023-11-02 ENCOUNTER — PATIENT OUTREACH (OUTPATIENT)
Dept: CARE COORDINATION | Facility: CLINIC | Age: 48
End: 2023-11-02
Payer: COMMERCIAL

## 2023-11-03 NOTE — PROGRESS NOTES
Clinic Care Coordination Contact  Community Health Worker Follow Up    Care Gaps:     Health Maintenance Due   Topic Date Due    COVID-19 Vaccine (5 - 2023-24 season) 09/01/2023    ZOSTER IMMUNIZATION (1 of 2) 01/14/2023       Patient will discuss at next PCP visit.    Care Plan:   Care Plan: Blood Pressure Management       Problem: Need Blood Pressure Management       Goal: General Goal - please update text       Start Date: 9/28/2023 Expected End Date: 12/28/2023    This Visit's Progress: 90% Recent Progress: On track    Note:     Barriers: none  Strengths: patient motivated to work on health   Patient expressed understanding of goal: yes   Action steps to achieve this goal:  1. I will attend my appointment tomorrow, 9/29 with my doctor, Dr. Loredo to discuss my hospitalization and blood pressure management.  2. I will bring my home blood pressure cuff in to the clinic and determine if it is accurate. If not, I can ask my doctor for a new prescription for a blood pressure cuff  3. I will carry out my treatment plans as determined at my clinic visit tomorrow.                                 Intervention and Education during outreach: Patient states that her blood pressure has been well controlled and she is taking her medications as prescribed. She also states that her pain has been well controlled.     CHW Plan: CHW will continue to support patient with goals through routine scheduled outreach.     Next outreach due: 12/4/23

## 2023-11-06 DIAGNOSIS — G47.00 INSOMNIA, UNSPECIFIED TYPE: ICD-10-CM

## 2023-11-06 RX ORDER — ZOLPIDEM TARTRATE 10 MG/1
TABLET ORAL
Qty: 90 TABLET | Refills: 0 | Status: SHIPPED | OUTPATIENT
Start: 2023-11-06 | End: 2024-01-08

## 2023-11-15 ENCOUNTER — MYC REFILL (OUTPATIENT)
Dept: FAMILY MEDICINE | Facility: CLINIC | Age: 48
End: 2023-11-15
Payer: COMMERCIAL

## 2023-11-15 DIAGNOSIS — G89.29 CHRONIC MIDLINE LOW BACK PAIN WITHOUT SCIATICA: ICD-10-CM

## 2023-11-15 DIAGNOSIS — M54.50 CHRONIC MIDLINE LOW BACK PAIN WITHOUT SCIATICA: ICD-10-CM

## 2023-11-15 DIAGNOSIS — F11.90 CHRONIC, CONTINUOUS USE OF OPIOIDS: ICD-10-CM

## 2023-11-16 RX ORDER — OXYCODONE HYDROCHLORIDE 15 MG/1
15 TABLET ORAL 3 TIMES DAILY PRN
Qty: 90 TABLET | Refills: 0 | Status: SHIPPED | OUTPATIENT
Start: 2023-11-16 | End: 2023-12-14

## 2023-12-06 ENCOUNTER — PATIENT OUTREACH (OUTPATIENT)
Dept: CARE COORDINATION | Facility: CLINIC | Age: 48
End: 2023-12-06
Payer: COMMERCIAL

## 2023-12-07 NOTE — PROGRESS NOTES
[facility]/Voicemail    Clinical Data: Care Coordinator Outreach    Outreach Documentation Number of Outreach Attempt   12/7/2023  10:14 AM 1       Left message on patient's voicemail with call back information and requested return call.    Plan:  Care Coordinator will try to reach patient again in 3-5 business days.    Next outreach due: 12/13/23

## 2023-12-14 ENCOUNTER — MYC REFILL (OUTPATIENT)
Dept: FAMILY MEDICINE | Facility: CLINIC | Age: 48
End: 2023-12-14
Payer: COMMERCIAL

## 2023-12-14 DIAGNOSIS — M54.50 CHRONIC MIDLINE LOW BACK PAIN WITHOUT SCIATICA: ICD-10-CM

## 2023-12-14 DIAGNOSIS — G89.29 CHRONIC MIDLINE LOW BACK PAIN WITHOUT SCIATICA: ICD-10-CM

## 2023-12-14 DIAGNOSIS — F11.90 CHRONIC, CONTINUOUS USE OF OPIOIDS: ICD-10-CM

## 2023-12-14 DIAGNOSIS — R05.1 ACUTE COUGH: ICD-10-CM

## 2023-12-14 RX ORDER — CODEINE PHOSPHATE AND GUAIFENESIN 10; 100 MG/5ML; MG/5ML
1-2 SOLUTION ORAL EVERY 4 HOURS PRN
Qty: 240 ML | Refills: 0 | OUTPATIENT
Start: 2023-12-14

## 2023-12-14 RX ORDER — OXYCODONE HYDROCHLORIDE 15 MG/1
15 TABLET ORAL 3 TIMES DAILY PRN
Qty: 90 TABLET | Refills: 0 | Status: SHIPPED | OUTPATIENT
Start: 2023-12-14 | End: 2024-01-08

## 2023-12-19 ENCOUNTER — PATIENT OUTREACH (OUTPATIENT)
Dept: CARE COORDINATION | Facility: CLINIC | Age: 48
End: 2023-12-19
Payer: COMMERCIAL

## 2023-12-19 NOTE — LETTER
[facility] CARE COORDINATION  [address] [address]    December 19, 2023    Jeff Serra  [address] [address] [address]      Dear Jeff,    I have been attempting to reach you since our last contact. I would like to continue to work with you and provide any additional support you may need on achieving your health care related goals. I would appreciate if you would give me a call at [phone] to let me know if you would like to continue working together. I know that there are many things that can affect our ability to communicate and I hope we can continue to work together.    All of us at the [facility] are invested in your health and are here to assist you in meeting your goals.     Sincerely,    GILBERT Carrillo

## 2023-12-19 NOTE — PROGRESS NOTES
[facility]/Voicemail    Clinical Data: Care Coordinator Outreach    Outreach Documentation Number of Outreach Attempt   12/7/2023  10:14 AM 1   12/19/2023  10:11 AM 2       Left message on patient's voicemail with call back information and requested return call.    Plan: Care Coordinator will send unable to contact letter with care coordinator contact information via [employer]. Care Coordinator will try to reach patient again in 10 business days.    Next outreach due: 1/2/24

## 2023-12-28 ENCOUNTER — PATIENT OUTREACH (OUTPATIENT)
Dept: CARE COORDINATION | Facility: CLINIC | Age: 48
End: 2023-12-28
Payer: COMMERCIAL

## 2023-12-28 NOTE — LETTER
[facility]  Patient Centered Plan of Care  About Me:        Patient Name:  Jeff Serra    YOB: 1975  Age:         48 year old   Ifeoma MRN:    [id number] Telephone Information:  Home Phone [phone]   Mobile [phone]       Address:  [address] [address] Email address:  [email or web address]      Emergency Contact(s)    Name Relationship Lgl Grd Work Phone Home Phone Mobile Phone   DEMARCUS GUIDO* Spouse No  [phone]           Primary language:  English     needed? No   [place] [employer]:  [phone] op. 1  Other communication barriers:None    Preferred Method of Communication:  Mail  Current living arrangement: I live in a private home with family    Mobility Status/ Medical Equipment: Independent        Health Maintenance  Health Maintenance Reviewed: Up to date      My Access Plan  Medical Emergency 911   Primary Clinic Line [facility] [phone]   24 Hour Appointment Line [phone] or  [phone] ([phone]) (toll-free)   24 Hour Nurse Line [phone] (toll-free)   Preferred Urgent Care [facility] [phone]     Preferred Hospital [facility]  [phone]     Preferred Pharmacy [id number] [place], [place] - [address]     Behavioral Health Crisis Line The National Suicide Prevention Lifeline at 1-578.729.9940 or Text/Call 988           My Care Team Members  Patient Care Team         Relationship Specialty Notifications Start End    Jordyn Loredo MD PCP - General Family Practice  5/30/18     Phone: [phone] Fax: [phone] [address] [address]    Jordyn Loredo MD Assigned PCP   1/24/21     Phone: [phone] Fax: [phone] [address] [address]    Kevon  Latricia Briones PA-C Assigned Surgical Provider   7/4/21     Phone: [phone] Fax: [phone]         [address] [place] [address]    Jordyn Loredo MD Assigned Pain Medication Provider   1/9/23     Phone: [phone] Fax: [phone]         [address] [facility] [address]    Alondra Andrea, RN Lead Care Coordinator Primary Care - CC Admissions 9/28/23     Phone: [phone] Fax: [phone]        Nichole Huffman, CHW Community Health Worker Primary Care - CC Admissions 9/28/23     Phone: [phone] Fax: [phone]                    My Care Plans  Self Management and Treatment Plan    Care Plan  Care Plan: Blood Pressure Management       Problem: Need Blood Pressure Management       Goal: General Goal - please update text       Start Date: 9/28/2023 Expected End Date: 12/28/2023    This Visit's Progress: 90% Recent Progress: On track    Note:     Barriers: none  Strengths: patient motivated to work on health   Patient expressed understanding of goal: yes   Action steps to achieve this goal:  1. I will attend my appointment tomorrow, 9/29 with my doctor, Dr. Loredo to discuss my hospitalization and blood pressure management.  2. I will bring my home blood pressure cuff in to the clinic and determine if it is accurate. If not, I can ask my doctor for a new prescription for a blood pressure cuff  3. I will carry out my treatment plans as determined at my clinic visit tomorrow.                                 Action Plans on File:                       Advance Care Plans/Directives:   Advanced Care Plan/Directives on file:   No    Discussed with patient/caregiver(s): No data recorded           My Medical and Care Information  Problem List   Patient Active Problem List   Diagnosis    CARDIOVASCULAR SCREENING; LDL GOAL LESS THAN 160    Chronic neck pain    Hypertension goal BP (blood pressure) < 140/90    History of pulmonary embolism    Allergic rhinitis    Chronic,  continuous use of opioids    Hx of renal calculi    Chronic midline low back pain without sciatica    S/P hysterectomy    Vitamin D deficiency    Chondral defect of left patella    Left knee pain, unspecified chronicity    Snoring    Bilateral leg edema    Hirsutism    Umbilical hernia without obstruction and without gangrene    CATALINO (obstructive sleep apnea)    Cervicogenic headache    Bariatric surgery status    Postsurgical malabsorption    Chest pain, unspecified type          Care Coordination Start Date: 9/28/2023   Frequency of Care Coordination: monthly, more frequently as needed     Form Last Updated: 12/28/2023

## 2023-12-28 NOTE — PROGRESS NOTES
Clinic Care Coordination Contact  Care Coordination Clinician Chart Review    Situation: Patient chart reviewed by Care Coordinator.       Background: Care Coordination Program started: 9/28/2023. Initial assessment completed and patient-centered care plan(s) were developed with participation from patient. Lead CC handed patient off to CHW for continued outreaches.       Assessment: Per chart review, patient outreach completed by CC CHW on 11/2/23.  At that time Patient is actively working to accomplish goal(s). Patient's goal(s) appropriate and relevant at this time. Patient is not due for updated Plan of Care.  Assessments will be completed annually or as needed/with change of patient status.    CHW has made 2 subsequent out reach calls to the patient and CHW is unable to contact.       Care Plan: Blood Pressure Management       Problem: Need Blood Pressure Management       Goal: General Goal - please update text       Start Date: 9/28/2023 Expected End Date: 12/28/2023    This Visit's Progress: 90% Recent Progress: On track    Note:     Barriers: none  Strengths: patient motivated to work on health   Patient expressed understanding of goal: yes   Action steps to achieve this goal:  1. I will attend my appointment tomorrow, 9/29 with my doctor, Dr. Loredo to discuss my hospitalization and blood pressure management.  2. I will bring my home blood pressure cuff in to the clinic and determine if it is accurate. If not, I can ask my doctor for a new prescription for a blood pressure cuff  3. I will carry out my treatment plans as determined at my clinic visit tomorrow.                                    Plan/Recommendations: CHW to outreach as currently planned. If 3rd call is unsuccessful. CHW to route chart to CC RN to disenroll. Otherwise, CHW to adjust goals with patient as relevant during successful call.       Plan of Care updated and sent to patient: Yes, via Paty Andrea RN, BSN, PHN Care  Coordinator  Delroy Cabral, and Judy Coelho   Phone: [phone]

## 2024-01-05 ENCOUNTER — PATIENT OUTREACH (OUTPATIENT)
Dept: CARE COORDINATION | Facility: CLINIC | Age: 49
End: 2024-01-05
Payer: COMMERCIAL

## 2024-01-05 NOTE — PROGRESS NOTES
[facility]/Voicemail    Clinical Data: Care Coordinator Outreach    Outreach Documentation Number of Outreach Attempt   12/7/2023  10:14 AM 1   12/19/2023  10:11 AM 2   1/5/2024   3:55 PM 3       Left message on patient's voicemail with call back information and requested return call.    Plan: Request chart review to disenroll, unable to reach patient  Care Coordinator will do no further outreaches at this time.    GILBERT Velez, Judy Coelho, Oren Johnson [place] and [facility]  [phone]

## 2024-01-08 ENCOUNTER — PATIENT OUTREACH (OUTPATIENT)
Dept: CARE COORDINATION | Facility: CLINIC | Age: 49
End: 2024-01-08

## 2024-01-08 ENCOUNTER — VIRTUAL VISIT (OUTPATIENT)
Dept: FAMILY MEDICINE | Facility: CLINIC | Age: 49
End: 2024-01-08
Attending: FAMILY MEDICINE
Payer: COMMERCIAL

## 2024-01-08 DIAGNOSIS — G89.29 CHRONIC NECK PAIN: ICD-10-CM

## 2024-01-08 DIAGNOSIS — F11.90 CHRONIC, CONTINUOUS USE OF OPIOIDS: ICD-10-CM

## 2024-01-08 DIAGNOSIS — I10 HYPERTENSION GOAL BP (BLOOD PRESSURE) < 140/90: ICD-10-CM

## 2024-01-08 DIAGNOSIS — G47.00 INSOMNIA, UNSPECIFIED TYPE: ICD-10-CM

## 2024-01-08 DIAGNOSIS — M54.2 CHRONIC NECK PAIN: ICD-10-CM

## 2024-01-08 DIAGNOSIS — R10.9 RIGHT FLANK PAIN: ICD-10-CM

## 2024-01-08 DIAGNOSIS — G44.86 CERVICOGENIC HEADACHE: ICD-10-CM

## 2024-01-08 DIAGNOSIS — M54.50 CHRONIC MIDLINE LOW BACK PAIN WITHOUT SCIATICA: ICD-10-CM

## 2024-01-08 DIAGNOSIS — G89.29 CHRONIC MIDLINE LOW BACK PAIN WITHOUT SCIATICA: ICD-10-CM

## 2024-01-08 DIAGNOSIS — J34.89 SINUS PRESSURE: Primary | ICD-10-CM

## 2024-01-08 PROCEDURE — 99214 OFFICE O/P EST MOD 30 MIN: CPT | Mod: 95 | Performed by: FAMILY MEDICINE

## 2024-01-08 RX ORDER — LOSARTAN POTASSIUM AND HYDROCHLOROTHIAZIDE 25; 100 MG/1; MG/1
1 TABLET ORAL DAILY
Qty: 90 TABLET | Refills: 1 | Status: SHIPPED | OUTPATIENT
Start: 2024-01-08 | End: 2024-06-25

## 2024-01-08 RX ORDER — CEFUROXIME AXETIL 500 MG/1
500 TABLET ORAL 2 TIMES DAILY
Qty: 20 TABLET | Refills: 0 | Status: SHIPPED | OUTPATIENT
Start: 2024-01-08 | End: 2024-01-18

## 2024-01-08 RX ORDER — ZOLPIDEM TARTRATE 10 MG/1
TABLET ORAL
Qty: 90 TABLET | Refills: 0 | Status: SHIPPED | OUTPATIENT
Start: 2024-01-08 | End: 2024-04-29

## 2024-01-08 RX ORDER — OXYCODONE HYDROCHLORIDE 15 MG/1
15 TABLET ORAL 3 TIMES DAILY PRN
Qty: 90 TABLET | Refills: 0 | Status: SHIPPED | OUTPATIENT
Start: 2024-01-12 | End: 2024-02-07

## 2024-01-08 RX ORDER — PREGABALIN 75 MG/1
75 CAPSULE ORAL 2 TIMES DAILY
Qty: 60 CAPSULE | Refills: 2 | Status: SHIPPED | OUTPATIENT
Start: 2024-01-08 | End: 2024-03-04

## 2024-01-08 NOTE — PROGRESS NOTES
Clinic Care Coordination Contact  [facility]    Clinical Data: Please see 1/5/24 patient outreach encounter for patient outreach attempts made by CC CHW.     Plan: RN Care Coordinator will send disenrollment letter with care coordinator contact information via [employer]. Care Coordinator will do no further outreaches at this time. Care team may place a new CC order at any time moving forward.     Alondra Andrea RN, BSN, PHN Care Coordinator  [place], [place], and Judy Coelho   Phone: [phone]

## 2024-01-08 NOTE — PROGRESS NOTES
"Jeff is a 48 year old who is being evaluated via a billable video visit.        Instructions Relayed to Patient by Virtual Roomer:     Patient is active on [employer]:   Relayed following to patient: \"It looks like you are active on C[employer], are you able to join the visit this way? If not, do you need us to send you a link now or would you like your provider to send a link via text or email when they are ready to initiate the visit?\"    Reminded patient to ensure they were logged on to virtual visit by arrival time listed. Documented in appointment notes if patient had flexibility to initiate visit sooner than arrival time. If pediatric virtual visit, ensured pediatric patient along with parent/guardian will be present for video visit.     Patient offered the website [email or web address] and/or phone number to [employer] Help line: [phone]   How would you like to obtain your AVS? [employer]  If the video visit is dropped, the invitation should be resent by: Send to e-mail at: [email or web address]  Will anyone else be joining your video visit? No          Assessment & Plan     Sinus pressure  Patient has not been feeling well for the past couple of weeks.  Did have a cold previously but not really having a lot of nasal congestion or cough or sore throat.  But lots of pressure in her head.  Feels it into her ears and into her sinuses.  She also has had a return of some right flank pain that we have evaluated many times.  Scans have been unremarkable.  In this case she does feel as though she is having some increase in frequency but no dysuria.  We talked about how to deal with this and I think we should get her started on a course of antibiotics to work on both sinuses and urinary tract just in case and see how she does.  She will contact me in a few days with progress and if things or not improving then we can decide where to go from there.  Patient agrees with plan.  - cefuroxime (CEFTIN) 500 " MG tablet; Take 1 tablet (500 mg) by mouth 2 times daily for 10 days    Right flank pain  As above  - cefuroxime (CEFTIN) 500 MG tablet; Take 1 tablet (500 mg) by mouth 2 times daily for 10 days    Hypertension goal BP (blood pressure) < 140/90  Home blood pressure readings still been running a little bit high as well.  Will increase dosage.  - losartan-hydrochlorothiazide (HYZAAR) 100-25 MG tablet; Take 1 tablet by mouth daily    Chronic midline low back pain without sciatica  Stable and monitored  - oxyCODONE IR (ROXICODONE) 15 MG tablet; Take 1 tablet (15 mg) by mouth 3 times daily as needed for pain  - pregabalin (LYRICA) 75 MG capsule; Take 1 capsule (75 mg) by mouth 2 times daily    Chronic, continuous use of opioids  Due for urine drug screen with next lab work  - oxyCODONE IR (ROXICODONE) 15 MG tablet; Take 1 tablet (15 mg) by mouth 3 times daily as needed for pain  - Urine Drug Screen; Future    Chronic neck pain  Stable on current regimen.  Continue same plan and routine follow-up.   - pregabalin (LYRICA) 75 MG capsule; Take 1 capsule (75 mg) by mouth 2 times daily    Cervicogenic headache  As above  - pregabalin (LYRICA) 75 MG capsule; Take 1 capsule (75 mg) by mouth 2 times daily    Insomnia, unspecified type  Stable on current regimen.  Continue same plan and routine follow-up.   - zolpidem (AMBIEN) 10 MG tablet; TAKE 1 TABLET(10 MG) BY MOUTH EVERY NIGHT AS NEEDED FOR SLEEP       See Patient Instructions    Jordyn Loredo MD  [facility]    Nicole Aguilar is a 48 year old, presenting for the following health issues:  Acute Illness and Hypertension        1/8/2024     4:34 PM   Additional Questions   Roomed by Waldo DE LEON       History of Present Illness       Reason for visit:  Update and also have not been feeling to well  for the past week plus  Symptom onset:  1-2 weeks ago  Symptoms include:  Almost like a head and sinus feverish and also having pain in my  side  Symptom intensity:  Moderate  Symptom progression:  Worsening  Had these symptoms before:  Yes  Has tried/received treatment for these symptoms:  Yes  Previous treatment was successful:  Yes  Prior treatment description:  Antibiotics  What makes it worse:  No  What makes it better:  No tried hot liquidates and over the counter medicine    She eats 2-3 servings of fruits and vegetables daily.She consumes 2 sweetened beverage(s) daily.She exercises with enough effort to increase her heart rate 30 to 60 minutes per day.  She exercises with enough effort to increase her heart rate 3 or less days per week.   She is taking medications regularly.       Acute Illness  Acute illness concerns: Cold Like Symptoms   Onset/Duration: ~2 weeks   Symptoms:  Fever: No  Chills/Sweats: No  Headache (location?): YES  Sinus Pressure: No  Conjunctivitis:  No  Ear Pain: no  Rhinorrhea: No  Congestion: YES  Sore Throat: No  Cough: no  Wheeze: No  Decreased Appetite: YES  Nausea: No  Vomiting: No  Diarrhea: No  Dysuria/Freq.: No  Dysuria or Hematuria: No  Fatigue/Achiness: YES  Sick/Strep Exposure: No  Therapies tried and outcome: hot tea - does not help   Hypertension Follow-up    Do you check your blood pressure regularly outside of the clinic? Yes   Are you following a low salt diet? Yes  Are your blood pressures ever more than 140 on the top number (systolic) OR more   than 90 on the bottom number (diastolic), for example 140/90? Yes      Video visit patient today to talk about some head pressure and illness symptoms as above.      Review of Systems   Constitutional, HEENT, cardiovascular, pulmonary, gi and gu systems are negative, except as otherwise noted.      Objective           Vitals:  No vitals were obtained today due to virtual visit.    Physical Exam   GENERAL: Healthy, alert and no distress  EYES: Eyes grossly normal to inspection.  No discharge or erythema, or obvious scleral/conjunctival abnormalities.  RESP: No  audible wheeze, cough, or visible cyanosis.  No visible retractions or increased work of breathing.    SKIN: Visible skin clear. No significant rash, abnormal pigmentation or lesions.  NEURO: Cranial nerves grossly intact.  Mentation and speech appropriate for age.  PSYCH: Mentation appears normal, affect normal/bright, judgement and insight intact, normal speech and appearance well-groomed.    Past labs reviewed with the patient.             Video-Visit Details    Type of service:  Video Visit   Video Start Time:  1702  Video End Time: 1708    Originating Location (pt. Location): Home    Distant Location (provider location):  Off-site  Platform used for Video Visit: [place]

## 2024-01-08 NOTE — LETTER
[facility] CARE COORDINATION  [address] [address]   January 8, 2024    Jeff Serra  [address] [address] [address]      Dear Jeff,    I have been unsuccessful in reaching you since our last contact. At this time the Care Coordination team will make no further attempts to reach you, however this does not change your ability to continue receiving care from your providers at your primary care clinic. If you need additional support from a care coordinator in the future please contact Nichole rosales Community Heatlh Worker (CHW) at [phone].    All of us at the [facility] are invested in your health and are here to assist you in meeting your goals.     Sincerely,    Alondra Andrea RN, BSN, PHN Care Coordinator  [place], [place], and Judy Coelho   Phone: [phone]

## 2024-01-31 DIAGNOSIS — J30.1 SEASONAL ALLERGIC RHINITIS DUE TO POLLEN: ICD-10-CM

## 2024-02-01 RX ORDER — LORATADINE 10 MG/1
10 TABLET ORAL DAILY
Qty: 90 TABLET | Refills: 2 | Status: SHIPPED | OUTPATIENT
Start: 2024-02-01 | End: 2024-07-24

## 2024-02-06 ENCOUNTER — PATIENT OUTREACH (OUTPATIENT)
Dept: CARE COORDINATION | Facility: CLINIC | Age: 49
End: 2024-02-06
Payer: COMMERCIAL

## 2024-02-07 ENCOUNTER — MYC REFILL (OUTPATIENT)
Dept: FAMILY MEDICINE | Facility: CLINIC | Age: 49
End: 2024-02-07
Payer: COMMERCIAL

## 2024-02-07 DIAGNOSIS — M54.50 CHRONIC MIDLINE LOW BACK PAIN WITHOUT SCIATICA: ICD-10-CM

## 2024-02-07 DIAGNOSIS — F11.90 CHRONIC, CONTINUOUS USE OF OPIOIDS: ICD-10-CM

## 2024-02-07 DIAGNOSIS — G89.29 CHRONIC MIDLINE LOW BACK PAIN WITHOUT SCIATICA: ICD-10-CM

## 2024-02-07 DIAGNOSIS — G47.00 INSOMNIA, UNSPECIFIED TYPE: ICD-10-CM

## 2024-02-07 RX ORDER — OXYCODONE HYDROCHLORIDE 15 MG/1
15 TABLET ORAL 3 TIMES DAILY PRN
Qty: 90 TABLET | Refills: 0 | Status: SHIPPED | OUTPATIENT
Start: 2024-02-09 | End: 2024-03-04

## 2024-02-07 RX ORDER — ZOLPIDEM TARTRATE 10 MG/1
TABLET ORAL
Qty: 90 TABLET | Refills: 0 | OUTPATIENT
Start: 2024-02-07

## 2024-02-20 ENCOUNTER — PATIENT OUTREACH (OUTPATIENT)
Dept: CARE COORDINATION | Facility: CLINIC | Age: 49
End: 2024-02-20
Payer: COMMERCIAL

## 2024-03-04 ENCOUNTER — VIRTUAL VISIT (OUTPATIENT)
Dept: FAMILY MEDICINE | Facility: CLINIC | Age: 49
End: 2024-03-04
Payer: COMMERCIAL

## 2024-03-04 DIAGNOSIS — M54.2 CHRONIC NECK PAIN: ICD-10-CM

## 2024-03-04 DIAGNOSIS — F11.90 CHRONIC, CONTINUOUS USE OF OPIOIDS: ICD-10-CM

## 2024-03-04 DIAGNOSIS — I10 HYPERTENSION GOAL BP (BLOOD PRESSURE) < 140/90: Primary | ICD-10-CM

## 2024-03-04 DIAGNOSIS — G44.86 CERVICOGENIC HEADACHE: ICD-10-CM

## 2024-03-04 DIAGNOSIS — M54.50 CHRONIC MIDLINE LOW BACK PAIN WITHOUT SCIATICA: ICD-10-CM

## 2024-03-04 DIAGNOSIS — G89.29 CHRONIC NECK PAIN: ICD-10-CM

## 2024-03-04 DIAGNOSIS — G89.29 CHRONIC MIDLINE LOW BACK PAIN WITHOUT SCIATICA: ICD-10-CM

## 2024-03-04 PROCEDURE — 99214 OFFICE O/P EST MOD 30 MIN: CPT | Mod: 95 | Performed by: FAMILY MEDICINE

## 2024-03-04 RX ORDER — PREGABALIN 100 MG/1
100 CAPSULE ORAL 2 TIMES DAILY
Qty: 60 CAPSULE | Refills: 0 | Status: SHIPPED | OUTPATIENT
Start: 2024-03-04 | End: 2024-05-03

## 2024-03-04 RX ORDER — OXYCODONE HYDROCHLORIDE 15 MG/1
15 TABLET ORAL 3 TIMES DAILY PRN
Qty: 90 TABLET | Refills: 0 | Status: SHIPPED | OUTPATIENT
Start: 2024-03-04 | End: 2024-04-05

## 2024-03-04 NOTE — PROGRESS NOTES
"    Instructions Relayed to Patient by Virtual Roomer:     Patient is active on [employer]ocketick:   Relayed following to patient: \"It looks like you are active on [employer]ick, are you able to join the visit this way? If not, do you need us to send you a link now or would you like your provider to send a link via text or email when they are ready to initiate the visit?\"    Reminded patient to ensure they were logged on to virtual visit by arrival time listed. Documented in appointment notes if patient had flexibility to initiate visit sooner than arrival time. If pediatric virtual visit, ensured pediatric patient along with parent/guardian will be present for video visit.     Patient offered the website [email or web address] and/or phone number to [employer] Help line: [phone]      Jeff is a 49 year old who is being evaluated via a billable video visit.      How would you like to obtain your AVS? [employer]  If the video visit is dropped, the invitation should be resent by: Text to cell phone: [phone]  Will anyone else be joining your video visit? No          Assessment & Plan     Hypertension goal BP (blood pressure) < 140/90  Patient is home blood pressure readings are doing fantastic.  Was about 110/75 today.  So we will continue same dosage.    Cervicogenic headache  Still having trouble with headaches we do not have an exact cause.  Certainly does not appear to be blood pressure related.  Has had some neck trouble so we have classified it as that there may be some migrainous component.  She did a little bit better initially on Lyrica but now does not seem to be working as well.  So I think the neck step is to increase the Lyrica to 100 mg twice daily and we should know within a week or so if that is helping.  Can continue to increase if it is.  Otherwise if no help I would favor adding a different agent.  Perhaps Depakote or Topamax.  - pregabalin (LYRICA) 100 MG capsule; Take 1 capsule (100 mg) by " mouth 2 times daily    Chronic midline low back pain without sciatica  Stable on current therapy and following  - pregabalin (LYRICA) 100 MG capsule; Take 1 capsule (100 mg) by mouth 2 times daily  - oxyCODONE IR (ROXICODONE) 15 MG tablet; Take 1 tablet (15 mg) by mouth 3 times daily as needed for pain    Chronic neck pain  As above  - pregabalin (LYRICA) 100 MG capsule; Take 1 capsule (100 mg) by mouth 2 times daily    Chronic, continuous use of opioids  Due for urine drug screen with next lab work.  [facility] database reviewed.  - oxyCODONE IR (ROXICODONE) 15 MG tablet; Take 1 tablet (15 mg) by mouth 3 times daily as needed for pain        See Patient Instructions    Nicole Aguilar is a 49 year old, presenting for the following health issues:  Hypertension        3/4/2024     5:03 PM   Additional Questions   Roomed by Nick     History of Present Illness       Hypertension: She presents for follow up of hypertension.  She does check blood pressure  regularly outside of the clinic. Outpatient blood pressures have not been over 140/90. She follows a low salt diet.     She eats 2-3 servings of fruits and vegetables daily.She consumes 1 sweetened beverage(s) daily.She exercises with enough effort to increase her heart rate 30 to 60 minutes per day.  She exercises with enough effort to increase her heart rate 3 or less days per week.   She is taking medications regularly.         Video visit with patient today to follow-up on headaches and hypertension.      Review of Systems  Constitutional, HEENT, cardiovascular, pulmonary, gi and gu systems are negative, except as otherwise noted.      Objective           Vitals:  No vitals were obtained today due to virtual visit.    Physical Exam   GENERAL: alert and no distress  EYES: Eyes grossly normal to inspection.  No discharge or erythema, or obvious scleral/conjunctival abnormalities.  RESP: No audible wheeze, cough, or visible cyanosis.    SKIN: Visible skin clear. No  significant rash, abnormal pigmentation or lesions.  NEURO: Cranial nerves grossly intact.  Mentation and speech appropriate for age.  PSYCH: Appropriate affect, tone, and pace of words    Past labs reviewed with the patient.       Video-Visit Details    Type of service:  Video Visit   Video Start Time:  1729  Video End Time: 1735    Originating Location (pt. Location): Home    Distant Location (provider location):  Off-site  Platform used for Video Visit: Dipika  Signed Electronically by: Jordyn Loredo MD

## 2024-03-07 ENCOUNTER — OFFICE VISIT (OUTPATIENT)
Dept: URGENT CARE | Facility: URGENT CARE | Age: 49
End: 2024-03-07
Payer: COMMERCIAL

## 2024-03-07 VITALS
RESPIRATION RATE: 20 BRPM | HEART RATE: 78 BPM | OXYGEN SATURATION: 98 % | SYSTOLIC BLOOD PRESSURE: 132 MMHG | TEMPERATURE: 98 F | DIASTOLIC BLOOD PRESSURE: 85 MMHG

## 2024-03-07 DIAGNOSIS — A59.01 TRICHOMONAL VAGINITIS: ICD-10-CM

## 2024-03-07 DIAGNOSIS — R35.0 URINARY FREQUENCY: ICD-10-CM

## 2024-03-07 DIAGNOSIS — N76.0 BACTERIAL VAGINOSIS: ICD-10-CM

## 2024-03-07 DIAGNOSIS — N39.0 URINARY TRACT INFECTION WITHOUT HEMATURIA, SITE UNSPECIFIED: Primary | ICD-10-CM

## 2024-03-07 DIAGNOSIS — B96.89 BACTERIAL VAGINOSIS: ICD-10-CM

## 2024-03-07 DIAGNOSIS — Z11.3 SCREEN FOR STD (SEXUALLY TRANSMITTED DISEASE): ICD-10-CM

## 2024-03-07 DIAGNOSIS — N76.0 VAGINITIS AND VULVOVAGINITIS: ICD-10-CM

## 2024-03-07 LAB
ALBUMIN UR-MCNC: NEGATIVE MG/DL
APPEARANCE UR: CLEAR
BACTERIA #/AREA URNS HPF: ABNORMAL /HPF
BILIRUB UR QL STRIP: NEGATIVE
CLUE CELLS: PRESENT
COLOR UR AUTO: YELLOW
GLUCOSE UR STRIP-MCNC: NEGATIVE MG/DL
HGB UR QL STRIP: NEGATIVE
KETONES UR STRIP-MCNC: NEGATIVE MG/DL
LEUKOCYTE ESTERASE UR QL STRIP: ABNORMAL
NITRATE UR QL: NEGATIVE
PH UR STRIP: 8 [PH] (ref 5–7)
RBC #/AREA URNS AUTO: ABNORMAL /HPF
SP GR UR STRIP: 1.01 (ref 1–1.03)
SQUAMOUS #/AREA URNS AUTO: ABNORMAL /LPF
TRICHOMONAS, WET PREP: PRESENT
UROBILINOGEN UR STRIP-ACNC: 1 E.U./DL
WBC #/AREA URNS AUTO: ABNORMAL /HPF
WBC'S/HIGH POWER FIELD, WET PREP: ABNORMAL
YEAST, WET PREP: ABNORMAL

## 2024-03-07 PROCEDURE — 87491 CHLMYD TRACH DNA AMP PROBE: CPT | Performed by: FAMILY MEDICINE

## 2024-03-07 PROCEDURE — 87210 SMEAR WET MOUNT SALINE/INK: CPT

## 2024-03-07 PROCEDURE — 87591 N.GONORRHOEAE DNA AMP PROB: CPT | Performed by: FAMILY MEDICINE

## 2024-03-07 PROCEDURE — 87086 URINE CULTURE/COLONY COUNT: CPT | Performed by: FAMILY MEDICINE

## 2024-03-07 PROCEDURE — 81001 URINALYSIS AUTO W/SCOPE: CPT

## 2024-03-07 PROCEDURE — 99214 OFFICE O/P EST MOD 30 MIN: CPT | Performed by: FAMILY MEDICINE

## 2024-03-07 RX ORDER — FLUCONAZOLE 150 MG/1
150 TABLET ORAL ONCE
Qty: 1 TABLET | Refills: 0 | Status: SHIPPED | OUTPATIENT
Start: 2024-03-07 | End: 2024-03-07

## 2024-03-07 RX ORDER — CEFDINIR 300 MG/1
300 CAPSULE ORAL 2 TIMES DAILY
Qty: 10 CAPSULE | Refills: 0 | Status: SHIPPED | OUTPATIENT
Start: 2024-03-07 | End: 2024-03-11

## 2024-03-07 RX ORDER — METRONIDAZOLE 500 MG/1
500 TABLET ORAL 2 TIMES DAILY
Qty: 14 TABLET | Refills: 0 | Status: SHIPPED | OUTPATIENT
Start: 2024-03-07 | End: 2024-03-14
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
## 2024-03-07 NOTE — LETTER
March 7, 2024      Jeff Serra  [address] [facility] [address]        To Whom It May Concern:    Jeff Serra  was seen on 3/7.  Please excuse her from work 3/7 due to illness.        Sincerely,        Remi Rincon MD

## 2024-03-07 NOTE — PATIENT INSTRUCTIONS
Take full course of antibiotic - Omnicef - for bladder infection treatment  We will contact you once the urine culture is finalized if any changes are needed    Take full course of antibiotic - metronidazole - for bacterial vaginosis and trichomonas treatment.  No alcohol while taking metronidazole    We will contact you if pending lab result are abnormal.    Refrain from sex for 1 week    Okay to take diflucan if you develop yeast infection after taking antibioitic

## 2024-03-08 ENCOUNTER — MYC MEDICAL ADVICE (OUTPATIENT)
Dept: FAMILY MEDICINE | Facility: CLINIC | Age: 49
End: 2024-03-08
Payer: COMMERCIAL

## 2024-03-08 LAB
BACTERIA UR CULT: NORMAL
C TRACH DNA SPEC QL NAA+PROBE: NEGATIVE
N GONORRHOEA DNA SPEC QL NAA+PROBE: NEGATIVE

## 2024-03-11 ENCOUNTER — HOSPITAL ENCOUNTER (EMERGENCY)
Facility: CLINIC | Age: 49
Discharge: HOME | End: 2024-03-11
Attending: PHYSICIAN ASSISTANT | Admitting: PHYSICIAN ASSISTANT
Payer: COMMERCIAL

## 2024-03-11 VITALS
TEMPERATURE: 97.3 F | OXYGEN SATURATION: 94 % | DIASTOLIC BLOOD PRESSURE: 88 MMHG | RESPIRATION RATE: 16 BRPM | HEART RATE: 75 BPM | SYSTOLIC BLOOD PRESSURE: 134 MMHG

## 2024-03-11 DIAGNOSIS — R19.7 DIARRHEA: ICD-10-CM

## 2024-03-11 LAB
ADV 40+41 DNA STL QL NAA+NON-PROBE: NEGATIVE
ALBUMIN SERPL BCG-MCNC: 3.9 G/DL (ref 3.5–5.2)
ALP SERPL-CCNC: 67 U/L (ref 40–150)
ALT SERPL W P-5'-P-CCNC: 22 U/L (ref 0–50)
ANION GAP SERPL CALCULATED.3IONS-SCNC: 8 MMOL/L (ref 7–15)
AST SERPL W P-5'-P-CCNC: 41 U/L (ref 0–45)
ASTRO TYP 1-8 RNA STL QL NAA+NON-PROBE: NEGATIVE
BASOPHILS # BLD AUTO: 0 10E3/UL (ref 0–0.2)
BASOPHILS NFR BLD AUTO: 1 %
BILIRUB SERPL-MCNC: 0.5 MG/DL
BUN SERPL-MCNC: 5.5 MG/DL (ref 6–20)
C CAYETANENSIS DNA STL QL NAA+NON-PROBE: NEGATIVE
C DIFF TOX B STL QL: NEGATIVE
CALCIUM SERPL-MCNC: 8.8 MG/DL (ref 8.6–10)
CAMPYLOBACTER DNA SPEC NAA+PROBE: NEGATIVE
CHLORIDE SERPL-SCNC: 104 MMOL/L (ref 98–107)
CREAT SERPL-MCNC: 0.67 MG/DL (ref 0.51–0.95)
CRYPTOSP DNA STL QL NAA+NON-PROBE: NEGATIVE
DEPRECATED HCO3 PLAS-SCNC: 33 MMOL/L (ref 22–29)
E COLI O157 DNA STL QL NAA+NON-PROBE: NORMAL
E HISTOLYT DNA STL QL NAA+NON-PROBE: NEGATIVE
EAEC ASTA GENE ISLT QL NAA+PROBE: NEGATIVE
EC STX1+STX2 GENES STL QL NAA+NON-PROBE: NEGATIVE
EGFRCR SERPLBLD CKD-EPI 2021: >90 ML/MIN/1.73M2
EOSINOPHIL # BLD AUTO: 0.1 10E3/UL (ref 0–0.7)
EOSINOPHIL NFR BLD AUTO: 1 %
EPEC EAE GENE STL QL NAA+NON-PROBE: NEGATIVE
ERYTHROCYTE [DISTWIDTH] IN BLOOD BY AUTOMATED COUNT: 14.4 % (ref 10–15)
ETEC LTA+ST1A+ST1B TOX ST NAA+NON-PROBE: NEGATIVE
G LAMBLIA DNA STL QL NAA+NON-PROBE: NEGATIVE
GLUCOSE SERPL-MCNC: 93 MG/DL (ref 70–99)
HCT VFR BLD AUTO: 35.7 % (ref 35–47)
HGB BLD-MCNC: 11.7 G/DL (ref 11.7–15.7)
IMM GRANULOCYTES # BLD: 0 10E3/UL
IMM GRANULOCYTES NFR BLD: 0 %
LYMPHOCYTES # BLD AUTO: 1.9 10E3/UL (ref 0.8–5.3)
LYMPHOCYTES NFR BLD AUTO: 31 %
MAGNESIUM SERPL-MCNC: 1.9 MG/DL (ref 1.7–2.3)
MCH RBC QN AUTO: 28.6 PG (ref 26.5–33)
MCHC RBC AUTO-ENTMCNC: 32.8 G/DL (ref 31.5–36.5)
MCV RBC AUTO: 87 FL (ref 78–100)
MONOCYTES # BLD AUTO: 0.4 10E3/UL (ref 0–1.3)
MONOCYTES NFR BLD AUTO: 7 %
NEUTROPHILS # BLD AUTO: 3.6 10E3/UL (ref 1.6–8.3)
NEUTROPHILS NFR BLD AUTO: 60 %
NOROVIRUS GI+II RNA STL QL NAA+NON-PROBE: NEGATIVE
NRBC # BLD AUTO: 0 10E3/UL
NRBC BLD AUTO-RTO: 0 /100
P SHIGELLOIDES DNA STL QL NAA+NON-PROBE: NEGATIVE
PLATELET # BLD AUTO: 170 10E3/UL (ref 150–450)
POTASSIUM SERPL-SCNC: 4.5 MMOL/L (ref 3.4–5.3)
PROT SERPL-MCNC: 6.6 G/DL (ref 6.4–8.3)
RBC # BLD AUTO: 4.09 10E6/UL (ref 3.8–5.2)
RVA RNA STL QL NAA+NON-PROBE: NEGATIVE
SALMONELLA SP RPOD STL QL NAA+PROBE: NEGATIVE
SAPO I+II+IV+V RNA STL QL NAA+NON-PROBE: NEGATIVE
SHIGELLA SP+EIEC IPAH ST NAA+NON-PROBE: NEGATIVE
SODIUM SERPL-SCNC: 145 MMOL/L (ref 135–145)
V CHOLERAE DNA SPEC QL NAA+PROBE: NEGATIVE
VIBRIO DNA SPEC NAA+PROBE: NEGATIVE
WBC # BLD AUTO: 6 10E3/UL (ref 4–11)
Y ENTEROCOL DNA STL QL NAA+PROBE: NEGATIVE

## 2024-03-11 PROCEDURE — 87493 C DIFF AMPLIFIED PROBE: CPT | Mod: XU | Performed by: PHYSICIAN ASSISTANT

## 2024-03-11 PROCEDURE — 85048 AUTOMATED LEUKOCYTE COUNT: CPT | Performed by: PHYSICIAN ASSISTANT

## 2024-03-11 PROCEDURE — 96374 THER/PROPH/DIAG INJ IV PUSH: CPT

## 2024-03-11 PROCEDURE — 99284 EMERGENCY DEPT VISIT MOD MDM: CPT | Mod: 25

## 2024-03-11 PROCEDURE — 96361 HYDRATE IV INFUSION ADD-ON: CPT

## 2024-03-11 PROCEDURE — 258N000003 HC RX IP 258 OP 636: Performed by: PHYSICIAN ASSISTANT

## 2024-03-11 PROCEDURE — 36415 COLL VENOUS BLD VENIPUNCTURE: CPT | Performed by: PHYSICIAN ASSISTANT

## 2024-03-11 PROCEDURE — 83735 ASSAY OF MAGNESIUM: CPT | Performed by: PHYSICIAN ASSISTANT

## 2024-03-11 PROCEDURE — 87507 IADNA-DNA/RNA PROBE TQ 12-25: CPT | Performed by: PHYSICIAN ASSISTANT

## 2024-03-11 PROCEDURE — 80053 COMPREHEN METABOLIC PANEL: CPT | Performed by: PHYSICIAN ASSISTANT

## 2024-03-11 PROCEDURE — 250N000011 HC RX IP 250 OP 636: Performed by: PHYSICIAN ASSISTANT

## 2024-03-11 RX ORDER — ONDANSETRON 4 MG/1
4 TABLET, ORALLY DISINTEGRATING ORAL EVERY 6 HOURS PRN
Qty: 10 TABLET | Refills: 0 | Status: SHIPPED | OUTPATIENT
Start: 2024-03-11 | End: 2024-03-14

## 2024-03-11 RX ORDER — ONDANSETRON 2 MG/ML
4 INJECTION INTRAMUSCULAR; INTRAVENOUS ONCE
Status: COMPLETED | OUTPATIENT
Start: 2024-03-11 | End: 2024-03-11

## 2024-03-11 RX ADMIN — SODIUM CHLORIDE 1000 ML: 9 INJECTION, SOLUTION INTRAVENOUS at 10:56

## 2024-03-11 RX ADMIN — ONDANSETRON 4 MG: 2 INJECTION INTRAMUSCULAR; INTRAVENOUS at 10:53

## 2024-03-11 ASSESSMENT — COLUMBIA-SUICIDE SEVERITY RATING SCALE - C-SSRS
1. IN THE PAST MONTH, HAVE YOU WISHED YOU WERE DEAD OR WISHED YOU COULD GO TO SLEEP AND NOT WAKE UP?: NO
6. HAVE YOU EVER DONE ANYTHING, STARTED TO DO ANYTHING, OR PREPARED TO DO ANYTHING TO END YOUR LIFE?: NO
2. HAVE YOU ACTUALLY HAD ANY THOUGHTS OF KILLING YOURSELF IN THE PAST MONTH?: NO

## 2024-03-11 ASSESSMENT — ACTIVITIES OF DAILY LIVING (ADL)
ADLS_ACUITY_SCORE: 38
ADLS_ACUITY_SCORE: 38

## 2024-03-11 NOTE — ED PROVIDER NOTES
"    History     Chief Complaint:  Diarrhea       HPI   Jeff Serra is a 49 year old female who presents for evaluation of diarrhea.  This started 2 days ago.  She notes its watery and fairly constant with multiple episodes per day.  No blood in stool.  Has felt nauseous but not vomited.  Has had a little bit of cramping but no ongoing abdominal pain.  No chest pain or shortness of breath.  No fever.  No recent travel or unusual food or water exposures but is currently being treated for UTI BV and yeast infection.  She notes those symptoms are improving.  No rashes.  History of sleeve gastrectomy years ago.      Independent Historian:    none    Review of External Notes:  Reviewed Dr. Mckenzie ARIAS [facility] urgent care note from 3/7/2024 where patient underwent UA which showed white cells but contamination.  Was prescribed cefdinir.  Reviewed culture result which is now available which was negative.  Wet prep showing evidence of BV and trichomonas but no signs of yeast.  Prescribed single dose of Diflucan as well as Flagyl twice daily.    Medications:    ondansetron (ZOFRAN ODT) 4 MG ODT tab  acetaminophen (TYLENOL) 500 MG tablet  Calcium Carb-Cholecalciferol (CALCIUM 600 + D) 600-400 MG-UNIT TABS per tablet  cholecalciferol 125 MCG (5000 UT) CAPS  cyanocobalamin (CYANOCOBALAMIN) 1000 MCG/ML injection  Lidocaine (LIDOCARE) 4 % Patch  loratadine (CLARITIN) 10 MG tablet  LORazepam (ATIVAN) 0.5 MG tablet  losartan-hydrochlorothiazide (HYZAAR) 100-25 MG tablet  metroNIDAZOLE (FLAGYL) 500 MG tablet  Multiple Vitamins-Iron (QC DAILY MULTIVITAMINS/IRON) TABS  omeprazole (PRILOSEC) 20 MG DR capsule  ondansetron (ZOFRAN ODT) 4 MG ODT tab  oxyCODONE IR (ROXICODONE) 15 MG tablet  pregabalin (LYRICA) 100 MG capsule  thiamine (B-1) 100 MG tablet  tiZANidine (ZANAFLEX) 4 MG tablet  tuberculin-allergy syringes 27G X 1/2\" 1 ML MISC  zolpidem (AMBIEN) 10 MG tablet        Past Medical History:    Past Medical History: "   Diagnosis Date    Chronic low back pain 02/28/2013    Chronic neck pain 02/28/2013    Closed fracture of right fibula 11/11/2014    Continuous opioid dependence (H) 04/13/2018    Cyst of ovary, unspecified laterality 10/04/2019    History of blood transfusion 07/29/2002    HTN (hypertension)     Kidney stone     Pelvic pain in female 03/23/2016    Pulmonary embolism (H)     Right leg DVT (H)        Past Surgical History:    Past Surgical History:   Procedure Laterality Date    BIOPSY  UNKNOWN    COLONOSCOPY  10/14/2019    COLONOSCOPY N/A 10/14/2019    Procedure: Colonoscopy, With Polypectomy And Biopsy;  Surgeon: Gege Ferrera DO;  Location: [facility] OR    COLONOSCOPY WITH CO2 INSUFFLATION N/A 10/14/2019    Procedure: COLONOSCOPY, WITH CO2 INSUFFLATION;  Surgeon: Gege Ferrera DO;  Location: [facility] OR    COMBINED ESOPHAGOSCOPY, GASTROSCOPY, DUODENOSCOPY (EGD) WITH CO2 INSUFFLATION N/A 10/14/2019    Procedure: ESOPHAGOGASTRODUODENOSCOPY, WITH CO2 INSUFFLATION;  Surgeon: Gege Ferrera DO;  Location: [facility] OR    ESOPHAGOSCOPY, GASTROSCOPY, DUODENOSCOPY (EGD), COMBINED N/A 10/14/2019    Procedure: Esophagogastroduodenoscopy, With Biopsy;  Surgeon: Gege Ferrera DO;  Location: [facility] OR    HYSTERECTOMY, PAP NO LONGER INDICATED  11/17/2017    LAPAROSCOPIC GASTRIC SLEEVE N/A 05/11/2022    Procedure: LAPAROSCOPIC, GASTRECTOMY, SLEEVE;  Surgeon: Alex Morelos MD;  Location:  OR    LAPAROSCOPIC HERNIORRHAPHY UMBILICAL N/A 05/11/2022    Procedure: Laparoscopic herniorrhaphy umbilical;  Surgeon: Alex Morelos MD;  Location: [facility] OR    LAPAROSCOPIC LYSIS ADHESIONS N/A 05/11/2022    Procedure: Laparoscopic lysis adhesions;  Surgeon: Alex Morelos MD;  Location: [facility] OR    LAPAROSCOPY DIAGNOSTIC (GYN) N/A 04/12/2016    Procedure: LAPAROSCOPY DIAGNOSTIC (GYN);  Surgeon: Margarito Walker MD;  Location: [facility] OR    LAPAROTOMY MINI, TUBAL LIGATION (POST PARTUM), COMBINED  2005    LITHOTRIPSY  2011    ORTHOPEDIC SURGERY   6/22/2020-3/26/2021    ANKLE SURGERY    UPPER GI ENDOSCOPY  10/14/2019          Physical Exam   Patient Vitals for the past 24 hrs:   BP Temp Temp src Pulse Resp SpO2   03/11/24 1230 134/88 -- -- 75 -- 94 %   03/11/24 1215 120/84 -- -- 79 -- 94 %   03/11/24 1209 -- -- -- -- -- 99 %   03/11/24 1208 -- -- -- -- -- 99 %   03/11/24 1207 -- -- -- -- -- 98 %   03/11/24 1206 -- -- -- -- -- 94 %   03/11/24 1205 -- -- -- -- -- 99 %   03/11/24 1204 -- -- -- -- -- 100 %   03/11/24 1100 126/82 -- -- 73 16 100 %   03/11/24 0928 (!) 141/98 97.3  F (36.3  C) Temporal 83 -- 99 %        Physical Exam  General: Awake, alert, non-toxic.  Head:  Scalp is NC/AT  Eyes:  Conjunctiva normal, PERRL  ENT:  The external nose and ears are normal.   Neck:  Normal range of motion without rigidity.  CV:  Regular rate and rhythm    No pathologic murmur, rubs, or gallops.  Resp:  Breath sounds are clear bilaterally    Non-labored, no retractions or accessory muscle use  Abdomen: Abdomen is soft, no distension, no tenderness, no masses  MS:  No lower extremity edema/swelling.   Skin:  Warm and dry, No rash or lesions noted.  Neuro:  Alert and oriented.  GCS 15 Moves all extremities normal.  No facial asymmetry. Gait normal.  Psych: Awake. Alert. Normal affect. Appropriate interactions.      Emergency Department Course     Laboratory:  Labs Ordered and Resulted from Time of ED Arrival to Time of ED Departure   COMPREHENSIVE METABOLIC PANEL - Abnormal       Result Value    Sodium 145      Potassium 4.5      Carbon Dioxide (CO2) 33 (*)     Anion Gap 8      Urea Nitrogen 5.5 (*)     Creatinine 0.67      GFR Estimate >90      Calcium 8.8      Chloride 104      Glucose 93      Alkaline Phosphatase 67      AST 41      ALT 22      Protein Total 6.6      Albumin 3.9      Bilirubin Total 0.5     MAGNESIUM - Normal    Magnesium 1.9     CBC WITH PLATELETS AND DIFFERENTIAL    WBC Count 6.0      RBC Count 4.09      Hemoglobin 11.7      Hematocrit 35.7      MCV 87       MCH 28.6      MCHC 32.8      RDW 14.4      Platelet Count 170      % Neutrophils 60      % Lymphocytes 31      % Monocytes 7      % Eosinophils 1      % Basophils 1      % Immature Granulocytes 0      NRBCs per 100 WBC 0      Absolute Neutrophils 3.6      Absolute Lymphocytes 1.9      Absolute Monocytes 0.4      Absolute Eosinophils 0.1      Absolute Basophils 0.0      Absolute Immature Granulocytes 0.0      Absolute NRBCs 0.0            Emergency Department Course & Assessments:    Interventions:  Medications   sodium chloride 0.9% BOLUS 1,000 mL (0 mLs Intravenous Stopped 3/11/24 1240)   ondansetron (ZOFRAN) injection 4 mg (4 mg Intravenous $Given 3/11/24 1053)        Assessments:  As above  Pt feels better no further stools here, tolerating PO  Independent Interpretation (X-rays, CTs, rhythm strip):  None    Consultations/Discussion of Management or Tests:  None       Social Determinants of Health affecting care:  None     Disposition:  The patient was discharged.    Impression & Plan    CMS Diagnoses: none    Medical Decision Making:  Pleasant 49-year-old female who presents with diarrhea in the setting of cefdinir use recently prescribed for possible UTI.  Broad differential considered.  On exam is well-appearing with stable vitals.  No abdominal pain exam benign do not feel advanced imaging indicated as very low suspicion for significant colitis, diverticulitis, perforation, obstruction, or other intra-abdominal catastrophe.  Labs reassuring with unremarkable electrolytes glucose LFTs kidney function.  Afebrile with normal white blood cell count nontoxic no bloody stools no indication for empiric antibiotic treatment or hospitalization.    Given fluids and Zofran here tolerating p.o. feels well wants to go home no further diarrhea and given that she is keeping down fluids not significantly dehydrated I feel she is stable for discharge.  This is likely antibiotic associated diarrhea from the cefdinir.  As  her culture was negative and her initial sample was quite contaminated I will have her stop this as there is no indication to continue.  She can continue the Flagyl for BV/trichomonas.  She was able to produce a stool sample which was negative for C. difficile and enteric pathogens reassuringly.  Stressed fluids hydration follow-up with PCP and return precautions for pain fever bloody stools weakness dizziness or other new or worsening symptoms or concerns.    Critical Care time:  was 0 minutes for this patient excluding procedures.    Diagnosis:    ICD-10-CM    1. Diarrhea  R19.7            Discharge Medications:  Discharge Medication List as of 3/11/2024 12:41 PM        START taking these medications    Details   !! ondansetron (ZOFRAN ODT) 4 MG ODT tab Take 1 tablet (4 mg) by mouth every 6 hours as needed for nausea or vomiting, Disp-10 tablet, R-0, E-Prescribe       !! - Potential duplicate medications found. Please discuss with provider.             3/11/2024   No att. providers found              Harrison Olivo PA-C  03/11/24 2597

## 2024-03-11 NOTE — ED TRIAGE NOTES
Patient was given antibiotics on Thursday for a UTI- diarrhea started on Saturday. No appetite but able to keep food/water down. Patient states it's 'constant' diarrhea.

## 2024-03-14 ENCOUNTER — VIRTUAL VISIT (OUTPATIENT)
Dept: FAMILY MEDICINE | Facility: CLINIC | Age: 49
End: 2024-03-14
Payer: COMMERCIAL

## 2024-03-14 DIAGNOSIS — B37.31 YEAST VAGINITIS: ICD-10-CM

## 2024-03-14 DIAGNOSIS — R19.7 DIARRHEA, UNSPECIFIED TYPE: Primary | ICD-10-CM

## 2024-03-14 PROCEDURE — 99214 OFFICE O/P EST MOD 30 MIN: CPT | Mod: 95 | Performed by: FAMILY MEDICINE

## 2024-03-14 RX ORDER — FLUCONAZOLE 150 MG/1
150 TABLET ORAL ONCE
Qty: 1 TABLET | Refills: 0 | Status: SHIPPED | OUTPATIENT
Start: 2024-03-14 | End: 2024-03-14

## 2024-03-14 RX ORDER — DIPHENOXYLATE HCL/ATROPINE 2.5-.025MG
1 TABLET ORAL 4 TIMES DAILY PRN
Qty: 20 TABLET | Refills: 0 | Status: SHIPPED | OUTPATIENT
Start: 2024-03-14 | End: 2024-06-25

## 2024-03-14 ASSESSMENT — ENCOUNTER SYMPTOMS: HEADACHES: 1

## 2024-03-14 NOTE — PROGRESS NOTES
Jeff is a 49 year old who is being evaluated via a billable video visit.    How would you like to obtain your AVS? [employer]  If the video visit is dropped, the invitation should be resent by: Text to cell phone: [phone]  Will anyone else be joining your video visit? No          Instructions Relayed to Patient by Virtual Roomer:       Reminded patient to ensure they were logged on to virtual visit by arrival time listed. Documented in appointment notes if patient had flexibility to initiate visit sooner than arrival time. If pediatric virtual visit, ensured pediatric patient along with parent/guardian will be present for video visit.     Patient offered the website [email or web address] and/or phone number to [employer] Help line: [phone]     Assessment & Plan     Diarrhea, unspecified type  Interval history reviewed with patient.  She presented with some dysuria to urgent care and some testing was run showing likely urinary infection but she was also positive and her wet prep for clue cells and trichomonas.  Placed on cefdinir and metronidazole.  Developed some significant diarrhea and was seen through the emergency department.  Those records reviewed as well.  Lab testing including C. difficile were negative.  She has not finished her antibiotics.  Feels as though she has developed a yeast infection.  Still some diarrhea and has just felt lousy this week.  So we will go ahead and help out with some Lomotil.  Wrote a note for work covering this week.  Discussed the findings and she actually had her  tested and he was negative for trichomoniasis.  So I told her we really cannot make any assessments on the situation as lab errors are certainly possible.  But in any case that issue seems to be resolved with treatment.  - diphenoxylate-atropine (LOMOTIL) 2.5-0.025 MG tablet; Take 1 tablet by mouth 4 times daily as needed for diarrhea    Yeast vaginitis  Discussed mechanism of action of  the proposed medication, as well as potential effects, both good and bad.  Patient expressed understanding and agreed with treatment.   - fluconazole (DIFLUCAN) 150 MG tablet; Take 1 tablet (150 mg) by mouth once for 1 dose        See Patient Instructions    Nicole Aguilar is a 49 year old, presenting for the following health issues:  Headache, Kidney Problem, Forms, and Hypertension        3/14/2024     8:27 AM   Additional Questions   Roomed by Mirta         3/14/2024     8:27 AM   Patient Reported Additional Medications   Patient reports taking the following new medications none     Video Start Time:  0858    History of Present Illness       Hypertension: She presents for follow up of hypertension.  She does check blood pressure  regularly outside of the clinic. Outpatient blood pressures have not been over 140/90. She follows a low salt diet.     She eats 2-3 servings of fruits and vegetables daily.She consumes 1 sweetened beverage(s) daily.She exercises with enough effort to increase her heart rate 30 to 60 minutes per day.  She exercises with enough effort to increase her heart rate 3 or less days per week.   She is taking medications regularly.         Video visit with patient today to follow-up on urinary issues as above.      Review of Systems  Constitutional, HEENT, cardiovascular, pulmonary, gi and gu systems are negative, except as otherwise noted.      Objective           Vitals:  No vitals were obtained today due to virtual visit.    Physical Exam   GENERAL: alert and no distress  EYES: Eyes grossly normal to inspection.  No discharge or erythema, or obvious scleral/conjunctival abnormalities.  RESP: No audible wheeze, cough, or visible cyanosis.    SKIN: Visible skin clear. No significant rash, abnormal pigmentation or lesions.  NEURO: Cranial nerves grossly intact.  Mentation and speech appropriate for age.  PSYCH: Appropriate affect, tone, and pace of words    Past labs reviewed with the  patient.       Video-Visit Details    Type of service:  Video Visit   Video End Time: 0904  Originating Location (pt. Location): Home    Distant Location (provider location):  Off-site  Platform used for Video Visit: [facility]  Signed Electronically by: Jordyn Loredo MD

## 2024-03-14 NOTE — LETTER
March 14, 2024      Jeff Serra  [address]  [place] [facility] [address]        Off work due to illness 3/11 - 3/15/2024.          Sincerely,        Jordyn Loredo MD

## 2024-04-05 ENCOUNTER — MYC REFILL (OUTPATIENT)
Dept: FAMILY MEDICINE | Facility: CLINIC | Age: 49
End: 2024-04-05
Payer: COMMERCIAL

## 2024-04-05 DIAGNOSIS — G89.29 CHRONIC MIDLINE LOW BACK PAIN WITHOUT SCIATICA: ICD-10-CM

## 2024-04-05 DIAGNOSIS — M54.50 CHRONIC MIDLINE LOW BACK PAIN WITHOUT SCIATICA: ICD-10-CM

## 2024-04-05 DIAGNOSIS — F11.90 CHRONIC, CONTINUOUS USE OF OPIOIDS: ICD-10-CM

## 2024-04-05 RX ORDER — OXYCODONE HYDROCHLORIDE 15 MG/1
15 TABLET ORAL 3 TIMES DAILY PRN
Qty: 90 TABLET | Refills: 0 | Status: SHIPPED | OUTPATIENT
Start: 2024-04-05 | End: 2024-05-03

## 2024-04-05 RX ORDER — OXYCODONE HYDROCHLORIDE 15 MG/1
15 TABLET ORAL 3 TIMES DAILY PRN
Qty: 90 TABLET | Refills: 0 | Status: CANCELLED | OUTPATIENT
Start: 2024-04-05

## 2024-04-11 PROCEDURE — 87637 SARSCOV2&INF A&B&RSV AMP PRB: CPT | Performed by: EMERGENCY MEDICINE

## 2024-04-11 PROCEDURE — 99283 EMERGENCY DEPT VISIT LOW MDM: CPT

## 2024-04-11 ASSESSMENT — COLUMBIA-SUICIDE SEVERITY RATING SCALE - C-SSRS
6. HAVE YOU EVER DONE ANYTHING, STARTED TO DO ANYTHING, OR PREPARED TO DO ANYTHING TO END YOUR LIFE?: NO
1. IN THE PAST MONTH, HAVE YOU WISHED YOU WERE DEAD OR WISHED YOU COULD GO TO SLEEP AND NOT WAKE UP?: NO
2. HAVE YOU ACTUALLY HAD ANY THOUGHTS OF KILLING YOURSELF IN THE PAST MONTH?: NO

## 2024-04-12 ENCOUNTER — HOSPITAL ENCOUNTER (EMERGENCY)
Facility: CLINIC | Age: 49
Discharge: HOME | End: 2024-04-12
Attending: EMERGENCY MEDICINE | Admitting: EMERGENCY MEDICINE
Payer: COMMERCIAL

## 2024-04-12 VITALS
SYSTOLIC BLOOD PRESSURE: 139 MMHG | DIASTOLIC BLOOD PRESSURE: 94 MMHG | OXYGEN SATURATION: 98 % | TEMPERATURE: 99.1 F | RESPIRATION RATE: 18 BRPM | HEART RATE: 95 BPM

## 2024-04-12 DIAGNOSIS — U07.1 COVID-19 VIRUS INFECTION: ICD-10-CM

## 2024-04-12 DIAGNOSIS — R10.9 ABDOMINAL PAIN, UNSPECIFIED ABDOMINAL LOCATION: ICD-10-CM

## 2024-04-12 LAB
ALBUMIN UR-MCNC: NEGATIVE MG/DL
ALBUMIN UR-MCNC: NEGATIVE MG/DL
APPEARANCE UR: CLEAR
APPEARANCE UR: CLEAR
BILIRUB UR QL STRIP: NEGATIVE
BILIRUB UR QL STRIP: NEGATIVE
COLOR UR AUTO: ABNORMAL
COLOR UR AUTO: ABNORMAL
FLUAV RNA SPEC QL NAA+PROBE: NEGATIVE
FLUBV RNA RESP QL NAA+PROBE: NEGATIVE
GLUCOSE UR STRIP-MCNC: NEGATIVE MG/DL
GLUCOSE UR STRIP-MCNC: NEGATIVE MG/DL
GROUP A STREP BY PCR: NOT DETECTED
HGB UR QL STRIP: NEGATIVE
HGB UR QL STRIP: NEGATIVE
KETONES UR STRIP-MCNC: NEGATIVE MG/DL
KETONES UR STRIP-MCNC: NEGATIVE MG/DL
LEUKOCYTE ESTERASE UR QL STRIP: ABNORMAL
LEUKOCYTE ESTERASE UR QL STRIP: ABNORMAL
MUCOUS THREADS #/AREA URNS LPF: PRESENT /LPF
NITRATE UR QL: NEGATIVE
NITRATE UR QL: NEGATIVE
PH UR STRIP: 6 [PH] (ref 5–7)
PH UR STRIP: 7 [PH] (ref 5–7)
RBC URINE: 1 /HPF
RBC URINE: 1 /HPF
RSV RNA SPEC NAA+PROBE: NEGATIVE
SARS-COV-2 RNA RESP QL NAA+PROBE: POSITIVE
SP GR UR STRIP: 1.02 (ref 1–1.03)
SP GR UR STRIP: 1.02 (ref 1–1.03)
SQUAMOUS EPITHELIAL: 15 /HPF
SQUAMOUS EPITHELIAL: 3 /HPF
UROBILINOGEN UR STRIP-MCNC: NORMAL MG/DL
UROBILINOGEN UR STRIP-MCNC: NORMAL MG/DL
WBC URINE: 2 /HPF
WBC URINE: 9 /HPF

## 2024-04-12 PROCEDURE — 81001 URINALYSIS AUTO W/SCOPE: CPT | Performed by: EMERGENCY MEDICINE

## 2024-04-12 PROCEDURE — 87651 STREP A DNA AMP PROBE: CPT | Performed by: EMERGENCY MEDICINE

## 2024-04-12 PROCEDURE — 250N000011 HC RX IP 250 OP 636: Performed by: EMERGENCY MEDICINE

## 2024-04-12 RX ORDER — IBUPROFEN 600 MG/1
600 TABLET, FILM COATED ORAL EVERY 6 HOURS PRN
Qty: 20 TABLET | Refills: 0 | Status: SHIPPED | OUTPATIENT
Start: 2024-04-12 | End: 2024-05-12

## 2024-04-12 RX ORDER — ONDANSETRON 4 MG/1
4 TABLET, ORALLY DISINTEGRATING ORAL ONCE
Status: COMPLETED | OUTPATIENT
Start: 2024-04-12 | End: 2024-04-12

## 2024-04-12 RX ORDER — ACETAMINOPHEN 500 MG
1000 TABLET ORAL EVERY 6 HOURS PRN
Qty: 30 TABLET | Refills: 0 | Status: SHIPPED | OUTPATIENT
Start: 2024-04-12 | End: 2024-04-19

## 2024-04-12 RX ORDER — ONDANSETRON 4 MG/1
4 TABLET, ORALLY DISINTEGRATING ORAL EVERY 8 HOURS PRN
Qty: 10 TABLET | Refills: 0 | Status: SHIPPED | OUTPATIENT
Start: 2024-04-12 | End: 2024-04-15

## 2024-04-12 RX ADMIN — ONDANSETRON 4 MG: 4 TABLET, ORALLY DISINTEGRATING ORAL at 01:13

## 2024-04-12 ASSESSMENT — ACTIVITIES OF DAILY LIVING (ADL): ADLS_ACUITY_SCORE: 36

## 2024-04-12 NOTE — ED PROVIDER NOTES
"  History     Chief Complaint:  Abdominal Pain       HPI   Jeff Serra is a 49 year old female presenting with abdominal pain amongst othe complaints.  Patient reports 2 days ago developing a cough and rhinorrhea.  Yesterday she developed a fever, Tmax 102.6 Fahrenheit.  She has been taking Tylenol, 500 mg roughly every 4-6 hours.  She also developed lower abdominal pain and a pressure sensation with urinating.  She reports accompanying nausea though denies sore throat, vomiting, diarrhea, chest pain, dyspnea, vaginal bleeding/discharge.  No known sick contacts.        Independent Historian:   None - Patient Only    Review of External Notes:   none       Medications:    acetaminophen (TYLENOL) 500 MG tablet  Calcium Carb-Cholecalciferol (CALCIUM 600 + D) 600-400 MG-UNIT TABS per tablet  cholecalciferol 125 MCG (5000 UT) CAPS  cyanocobalamin (CYANOCOBALAMIN) 1000 MCG/ML injection  diphenoxylate-atropine (LOMOTIL) 2.5-0.025 MG tablet  Lidocaine (LIDOCARE) 4 % Patch  loratadine (CLARITIN) 10 MG tablet  LORazepam (ATIVAN) 0.5 MG tablet  losartan-hydrochlorothiazide (HYZAAR) 100-25 MG tablet  Multiple Vitamins-Iron (QC DAILY MULTIVITAMINS/IRON) TABS  omeprazole (PRILOSEC) 20 MG DR capsule  ondansetron (ZOFRAN ODT) 4 MG ODT tab  oxyCODONE IR (ROXICODONE) 15 MG tablet  pregabalin (LYRICA) 100 MG capsule  thiamine (B-1) 100 MG tablet  tiZANidine (ZANAFLEX) 4 MG tablet  tuberculin-allergy syringes 27G X 1/2\" 1 ML MISC  zolpidem (AMBIEN) 10 MG tablet        Past Medical History:    Past Medical History:   Diagnosis Date    Chronic low back pain 02/28/2013    Chronic neck pain 02/28/2013    Closed fracture of right fibula 11/11/2014    Continuous opioid dependence (H) 04/13/2018    Cyst of ovary, unspecified laterality 10/04/2019    History of blood transfusion 07/29/2002    HTN (hypertension)     Kidney stone     Pelvic pain in female 03/23/2016    Pulmonary embolism (H)     Right leg DVT (H)        Past Surgical " History:    Past Surgical History:   Procedure Laterality Date    BIOPSY  UNKNOWN    COLONOSCOPY  10/14/2019    COLONOSCOPY N/A 10/14/2019    Procedure: Colonoscopy, With Polypectomy And Biopsy;  Surgeon: Gege Ferrera DO;  Location: [facility]    COLONOSCOPY WITH CO2 INSUFFLATION N/A 10/14/2019    Procedure: COLONOSCOPY, WITH CO2 INSUFFLATION;  Surgeon: Gege Ferrera DO;  Location: [facility]    COMBINED ESOPHAGOSCOPY, GASTROSCOPY, DUODENOSCOPY (EGD) WITH CO2 INSUFFLATION N/A 10/14/2019    Procedure: ESOPHAGOGASTRODUODENOSCOPY, WITH CO2 INSUFFLATION;  Surgeon: Gege Ferrera DO;  Location: [facility]    ESOPHAGOSCOPY, GASTROSCOPY, DUODENOSCOPY (EGD), COMBINED N/A 10/14/2019    Procedure: Esophagogastroduodenoscopy, With Biopsy;  Surgeon: Gege Ferrera DO;  Location: [facility]    HYSTERECTOMY, PAP NO LONGER INDICATED  11/17/2017    LAPAROSCOPIC GASTRIC SLEEVE N/A 05/11/2022    Procedure: LAPAROSCOPIC, GASTRECTOMY, SLEEVE;  Surgeon: Alex Morelos MD;  Location:  OR    LAPAROSCOPIC HERNIORRHAPHY UMBILICAL N/A 05/11/2022    Procedure: Laparoscopic herniorrhaphy umbilical;  Surgeon: Alex Morelos MD;  Location:  OR    LAPAROSCOPIC LYSIS ADHESIONS N/A 05/11/2022    Procedure: Laparoscopic lysis adhesions;  Surgeon: Alex Morelos MD;  Location:  OR    LAPAROSCOPY DIAGNOSTIC (GYN) N/A 04/12/2016    Procedure: LAPAROSCOPY DIAGNOSTIC (GYN);  Surgeon: Margarito Walker MD;  Location: [facility]    LAPAROTOMY MINI, TUBAL LIGATION (POST PARTUM), COMBINED  2005    LITHOTRIPSY  2011    ORTHOPEDIC SURGERY  6/22/2020-3/26/2021    ANKLE SURGERY    UPPER GI ENDOSCOPY  10/14/2019        Physical Exam   Patient Vitals for the past 24 hrs:   BP Temp Temp src Pulse Resp SpO2   04/11/24 2327 (!) 155/90 99.1  F (37.3  C) Oral 112 22 97 %        Physical Exam  Nursing note and vitals reviewed.  Constitutional: Well nourished.   Eyes: Conjunctiva normal.  Pupils are equal, round, and reactive to light.   ENT: Nose normal. Mucous  membranes pink and moist.  TM normal. Mild posterior oropharyngeal erythema/exudate. Uvula midline  Neck: Normal range of motion.  CVS: Sinus tachycardia.  Normal heart sounds.   Pulmonary: Lungs clear to auscultation bilaterally. No wheezes/rales/rhonchi.  GI: Abdomen soft. Nontender, nondistended. No rigidity or guarding. No CVA tenderness    MSK: Moves all extremities  Neuro: Alert. Follows simple commands.  Skin: Skin is warm and dry. No rash noted.   Psychiatric: Normal affect.       Emergency Department Course       Imaging:  No orders to display          Laboratory:  Labs Ordered and Resulted from Time of ED Arrival to Time of ED Departure   INFLUENZA A/B, RSV, & SARS-COV2 PCR - Abnormal       Result Value    Influenza A PCR Negative      Influenza B PCR Negative      RSV PCR Negative      SARS CoV2 PCR Positive (*)    ROUTINE UA WITH MICROSCOPIC REFLEX TO CULTURE - Abnormal    Color Urine Light Yellow      Appearance Urine Clear      Glucose Urine Negative      Bilirubin Urine Negative      Ketones Urine Negative      Specific Gravity Urine 1.020      Blood Urine Negative      pH Urine 7.0      Protein Albumin Urine Negative      Urobilinogen Urine Normal      Nitrite Urine Negative      Leukocyte Esterase Urine Small (*)     RBC Urine 1      WBC Urine 9 (*)     Squamous Epithelials Urine 15 (*)    ROUTINE UA WITH MICROSCOPIC REFLEX TO CULTURE - Abnormal    Color Urine Light Yellow      Appearance Urine Clear      Glucose Urine Negative      Bilirubin Urine Negative      Ketones Urine Negative      Specific Gravity Urine 1.023      Blood Urine Negative      pH Urine 6.0      Protein Albumin Urine Negative      Urobilinogen Urine Normal      Nitrite Urine Negative      Leukocyte Esterase Urine Trace (*)     Mucus Urine Present (*)     RBC Urine 1      WBC Urine 2      Squamous Epithelials Urine 3 (*)    GROUP A STREPTOCOCCUS PCR THROAT SWAB - Normal    Group A strep by PCR Not Detected              Emergency  Department Course &     Independent Interpretation (X-rays, CTs, rhythm strip):  None    Consultations/Discussion of Management or Tests:  None        Social Determinants of Health affecting care:   None    Disposition:  The patient was discharged.     Impression & Plan      Medical Decision Making:  Patient is a 49-year-old female presenting with predominant complaints of abdominal pain as well as cough and rhinorrhea.  She also reports fevers.  She has a low-grade fever on arrival, mildly tachycardic though in no significant distress.  I do not feel emergent blood work is warranted.  She has no abdominal tenderness on exam and I doubt intra-abdominal catastrophe.  Initial UA sample contaminated though repeat without evidence to suggest obvious infection.  She denies any vaginal bleeding/discharge, I doubt pelvic etiology.  Patient did test positive for COVID-19 during her time in the ED which I strongly suspect is the etiology of her URI symptoms/fever.  No clinical evidence to suggest strep pharyngitis, peritonsillar abscess, retropharyngeal abscess or epiglottitis.  Lungs clear, no significant work of breathing overall to necessitate chest x-ray as I clinically doubt pneumonia.  She denies any chest pain/dyspnea.  COVID precautions discussed as well as need for quarantine.  I encouraged Tylenol/Motrin as needed and will also provide ODT Zofran for breakthrough symptoms.  P.o. hydration encouraged and to monitor for pulse ox under 90% which would necessitate return to the ED as well as increased work of breathing/chest pain or worsening symptoms.  Close PCP follow-up.    Diagnosis:    ICD-10-CM    1. COVID-19 virus infection  U07.1       2. Abdominal pain, unspecified abdominal location  R10.9            Discharge Medications:  Discharge Medication List as of 4/12/2024  1:57 AM        START taking these medications    Details   ibuprofen (ADVIL/MOTRIN) 600 MG tablet Take 1 tablet (600 mg) by mouth every 6 hours  as needed for fever, Disp-20 tablet, R-0, Local Print            4/12/2024   Monet Daniel, Monet Lora,   04/12/24 0247

## 2024-04-12 NOTE — Clinical Note
Jeff Serra was seen and treated in our emergency department on 4/11/2024.  She may return to work on 04/15/2024.       If you have any questions or concerns, please don't hesitate to call.      Monet Daniel, DO

## 2024-04-12 NOTE — DISCHARGE INSTRUCTIONS

## 2024-04-15 ENCOUNTER — HOSPITAL ENCOUNTER (EMERGENCY)
Facility: CLINIC | Age: 49
Discharge: HOME | End: 2024-04-15
Attending: FAMILY MEDICINE | Admitting: FAMILY MEDICINE
Payer: COMMERCIAL

## 2024-04-15 ENCOUNTER — APPOINTMENT (OUTPATIENT)
Dept: GENERAL RADIOLOGY | Facility: CLINIC | Age: 49
End: 2024-04-15
Attending: FAMILY MEDICINE
Payer: COMMERCIAL

## 2024-04-15 ENCOUNTER — NURSE TRIAGE (OUTPATIENT)
Dept: FAMILY MEDICINE | Facility: CLINIC | Age: 49
End: 2024-04-15
Payer: COMMERCIAL

## 2024-04-15 VITALS
TEMPERATURE: 98.6 F | WEIGHT: 178.3 LBS | HEIGHT: 63 IN | SYSTOLIC BLOOD PRESSURE: 134 MMHG | BODY MASS INDEX: 31.59 KG/M2 | HEART RATE: 87 BPM | DIASTOLIC BLOOD PRESSURE: 87 MMHG | RESPIRATION RATE: 18 BRPM | OXYGEN SATURATION: 98 %

## 2024-04-15 DIAGNOSIS — U07.1 COVID-19 VIRUS INFECTION: ICD-10-CM

## 2024-04-15 DIAGNOSIS — N30.00 ACUTE CYSTITIS WITHOUT HEMATURIA: ICD-10-CM

## 2024-04-15 LAB
ALBUMIN UR-MCNC: 20 MG/DL
APPEARANCE UR: ABNORMAL
BACTERIA #/AREA URNS HPF: ABNORMAL /HPF
BILIRUB UR QL STRIP: NEGATIVE
COLOR UR AUTO: YELLOW
GLUCOSE UR STRIP-MCNC: NEGATIVE MG/DL
HCG UR QL: NEGATIVE
HGB UR QL STRIP: NEGATIVE
HYALINE CASTS: 2 /LPF
KETONES UR STRIP-MCNC: NEGATIVE MG/DL
LEUKOCYTE ESTERASE UR QL STRIP: ABNORMAL
NITRATE UR QL: POSITIVE
PH UR STRIP: 7 [PH] (ref 5–7)
RBC URINE: 24 /HPF
SP GR UR STRIP: 1.02 (ref 1–1.03)
SQUAMOUS EPITHELIAL: 9 /HPF
UROBILINOGEN UR STRIP-MCNC: 2 MG/DL
WBC URINE: >182 /HPF

## 2024-04-15 PROCEDURE — 99284 EMERGENCY DEPT VISIT MOD MDM: CPT | Mod: 25 | Performed by: FAMILY MEDICINE

## 2024-04-15 PROCEDURE — 81025 URINE PREGNANCY TEST: CPT | Performed by: FAMILY MEDICINE

## 2024-04-15 PROCEDURE — 87086 URINE CULTURE/COLONY COUNT: CPT | Performed by: FAMILY MEDICINE

## 2024-04-15 PROCEDURE — 99284 EMERGENCY DEPT VISIT MOD MDM: CPT | Performed by: FAMILY MEDICINE

## 2024-04-15 PROCEDURE — 81001 URINALYSIS AUTO W/SCOPE: CPT | Performed by: FAMILY MEDICINE

## 2024-04-15 PROCEDURE — 71046 X-RAY EXAM CHEST 2 VIEWS: CPT

## 2024-04-15 PROCEDURE — 250N000013 HC RX MED GY IP 250 OP 250 PS 637: Performed by: FAMILY MEDICINE

## 2024-04-15 RX ORDER — GUAIFENESIN/DEXTROMETHORPHAN 100-10MG/5
10 SYRUP ORAL EVERY 4 HOURS PRN
Qty: 236 ML | Refills: 0 | Status: SHIPPED | OUTPATIENT
Start: 2024-04-15 | End: 2024-04-17

## 2024-04-15 RX ORDER — FLUCONAZOLE 150 MG/1
TABLET ORAL
Qty: 2 TABLET | Refills: 0 | Status: SHIPPED | OUTPATIENT
Start: 2024-04-15 | End: 2024-04-18

## 2024-04-15 RX ORDER — CEFDINIR 300 MG/1
300 CAPSULE ORAL 2 TIMES DAILY
Qty: 14 CAPSULE | Refills: 0 | Status: SHIPPED | OUTPATIENT
Start: 2024-04-15 | End: 2024-04-17

## 2024-04-15 RX ORDER — ALBUTEROL SULFATE 90 UG/1
2 AEROSOL, METERED RESPIRATORY (INHALATION) ONCE
Status: COMPLETED | OUTPATIENT
Start: 2024-04-15 | End: 2024-04-15

## 2024-04-15 RX ORDER — PHENAZOPYRIDINE HYDROCHLORIDE 200 MG/1
200 TABLET, FILM COATED ORAL 3 TIMES DAILY
Qty: 9 TABLET | Refills: 0 | Status: SHIPPED | OUTPATIENT
Start: 2024-04-15 | End: 2024-04-18

## 2024-04-15 RX ADMIN — ALBUTEROL SULFATE 2 PUFF: 90 AEROSOL, METERED RESPIRATORY (INHALATION) at 13:37

## 2024-04-15 ASSESSMENT — ACTIVITIES OF DAILY LIVING (ADL)
ADLS_ACUITY_SCORE: 38

## 2024-04-15 ASSESSMENT — COLUMBIA-SUICIDE SEVERITY RATING SCALE - C-SSRS
6. HAVE YOU EVER DONE ANYTHING, STARTED TO DO ANYTHING, OR PREPARED TO DO ANYTHING TO END YOUR LIFE?: NO
2. HAVE YOU ACTUALLY HAD ANY THOUGHTS OF KILLING YOURSELF IN THE PAST MONTH?: NO
1. IN THE PAST MONTH, HAVE YOU WISHED YOU WERE DEAD OR WISHED YOU COULD GO TO SLEEP AND NOT WAKE UP?: NO

## 2024-04-15 NOTE — TELEPHONE ENCOUNTER
"Patient called in stating she was seen at the Emergency room and diagnosed with Covid.  She states she really doesn't feel any better than she did, possibly a little worse than she did yesterday.  She notes she has broken her fever and was at 97.7.  Her breathing though has gotten worse and she can only take a few steps before feeling winded.  When asked to describe her breathing she states that she had some wheezing but it didn't last long.  Patient states she has some coughing and isn't able to get up the phlegm that she is coughing up very easily.  BP was at 141/101 and pulse was at 89 when patient took it while we were on the phone.      Disposition: Go to ED Now.  Patient rated her breathing as moderate difficulty.  When read the description (speaks in phrases, SOB even at rest, pulse 100-120) patient agreed to this rating of her breathing.  Patient asked where she should go.  Advised her to check with her insurance.  Patient states she will go back to [place] ED because she was just there.  Advised patient to inform staff at ED that she has Covid so they could properly isolate her and to wear a mask.  Patient verbalized understanding.      Reason for Disposition   MODERATE difficulty breathing (e.g., speaks in phrases, SOB even at rest, pulse 100-120)    Additional Information   Negative: SEVERE difficulty breathing (e.g., struggling for each breath, speaks in single words)   Negative: Difficult to awaken or acting confused (e.g., disoriented, slurred speech)   Negative: Bluish (or gray) lips or face now   Negative: Shock suspected (e.g., cold/pale/clammy skin, too weak to stand, low BP, rapid pulse)   Negative: Sounds like a life-threatening emergency to the triager    Answer Assessment - Initial Assessment Questions  1. COVID-19 DIAGNOSIS: \"How do you know that you have COVID?\" (e.g., positive lab test or self-test, diagnosed by doctor or NP/PA, symptoms after exposure).      ED test  2. COVID-19 EXPOSURE: " "\"Was there any known exposure to COVID before the symptoms began?\" CDC Definition of close contact: within 6 feet (2 meters) for a total of 15 minutes or more over a 24-hour period.       No exposure that she knows of.  3. ONSET: \"When did the COVID-19 symptoms start?\"       Last Wednesday  4. WORST SYMPTOM: \"What is your worst symptom?\" (e.g., cough, fever, shortness of breath, muscle aches)      Short of breath when walking, fever (now broken),   5. COUGH: \"Do you have a cough?\" If Yes, ask: \"How bad is the cough?\"        Cough is still present a little bit  6. FEVER: \"Do you have a fever?\" If Yes, ask: \"What is your temperature, how was it measured, and when did it start?\"      Had a fever now 97.7  7. RESPIRATORY STATUS: \"Describe your breathing?\" (e.g., normal; shortness of breath, wheezing, unable to speak)       7/10, not 100%, heard a little wheezing a little bit ago, short lived  8. BETTER-SAME-WORSE: \"Are you getting better, staying the same or getting worse compared to yesterday?\"  If getting worse, ask, \"In what way?\"      Little worse compared to yesterday, from coughing and SOB,   9. OTHER SYMPTOMS: \"Do you have any other symptoms?\"  (e.g., chills, fatigue, headache, loss of smell or taste, muscle pain, sore throat)      Loss of taste, more hot flashes, Headaches, muscle pains  10. HIGH RISK DISEASE: \"Do you have any chronic medical problems?\" (e.g., asthma, heart or lung disease, weak immune system, obesity, etc.)        No  11. VACCINE: \"Have you had the COVID-19 vaccine?\" If Yes, ask: \"Which one, how many shots, when did you get it?\"        Yes,   12. PREGNANCY: \"Is there any chance you are pregnant?\" \"When was your last menstrual period?\"        No  13. O2 SATURATION MONITOR:  \"Do you use an oxygen saturation monitor (pulse oximeter) at home?\" If Yes, ask \"What is your reading (oxygen level) today?\" \"What is your usual oxygen saturation reading?\" (e.g., 95%)        NA    Protocols used: Coronavirus " (COVID-19) Diagnosed or [id number]

## 2024-04-15 NOTE — ED TRIAGE NOTES
Triage Assessment (Adult)       Row Name 04/15/24 1312          Triage Assessment    Airway WDL WDL        Respiratory WDL    Respiratory WDL X;cough     Cough Frequency infrequent     Cough Type productive        Skin Circulation/Temperature WDL    Skin Circulation/Temperature WDL WDL        Cardiac WDL    Cardiac WDL WDL        Peripheral/Neurovascular WDL    Peripheral Neurovascular WDL WDL        Cognitive/Neuro/Behavioral WDL    Cognitive/Neuro/Behavioral WDL WDL

## 2024-04-15 NOTE — LETTER
April 15, 2024      To Whom It May Concern:      Jeff Serra was seen in our Emergency Department today, 04/15/24.  I expect her condition to improve over the next 2-3 days.  She may return to work/school when improved.    Sincerely,        Arturo Lara MD
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
## 2024-04-16 LAB — BACTERIA UR CULT: ABNORMAL

## 2024-04-17 ENCOUNTER — TELEPHONE (OUTPATIENT)
Dept: FAMILY MEDICINE | Facility: CLINIC | Age: 49
End: 2024-04-17

## 2024-04-17 ENCOUNTER — VIRTUAL VISIT (OUTPATIENT)
Dept: FAMILY MEDICINE | Facility: CLINIC | Age: 49
End: 2024-04-17
Payer: COMMERCIAL

## 2024-04-17 ENCOUNTER — TELEPHONE (OUTPATIENT)
Dept: EMERGENCY MEDICINE | Facility: CLINIC | Age: 49
End: 2024-04-17

## 2024-04-17 ENCOUNTER — PATIENT OUTREACH (OUTPATIENT)
Dept: CARE COORDINATION | Facility: CLINIC | Age: 49
End: 2024-04-17

## 2024-04-17 DIAGNOSIS — J18.9 PNEUMONIA OF RIGHT LOWER LOBE DUE TO INFECTIOUS ORGANISM: ICD-10-CM

## 2024-04-17 DIAGNOSIS — N30.90 CYSTITIS: Primary | ICD-10-CM

## 2024-04-17 DIAGNOSIS — J20.9 ACUTE BRONCHITIS, UNSPECIFIED ORGANISM: ICD-10-CM

## 2024-04-17 PROCEDURE — 99214 OFFICE O/P EST MOD 30 MIN: CPT | Mod: 95 | Performed by: INTERNAL MEDICINE

## 2024-04-17 RX ORDER — LEVOFLOXACIN 500 MG/1
500 TABLET, FILM COATED ORAL DAILY
Qty: 7 TABLET | Refills: 0 | Status: SHIPPED | OUTPATIENT
Start: 2024-04-17 | End: 2024-04-24

## 2024-04-17 RX ORDER — BENZONATATE 200 MG/1
200 CAPSULE ORAL 3 TIMES DAILY PRN
Qty: 30 CAPSULE | Refills: 1 | Status: SHIPPED | OUTPATIENT
Start: 2024-04-17 | End: 2024-06-25

## 2024-04-17 NOTE — TELEPHONE ENCOUNTER
Unfortunately I am out of the office for the next week.  Looks like she tested positive for COVID on the 11th so I would suggest a virtual visit rather than in person hide or if needed

## 2024-04-17 NOTE — TELEPHONE ENCOUNTER
[facility] ([facility] [place])    Reason for call: Lab Result Notification     Lab Result (including Rx patient on, if applicable).  If culture, copy of lab report at bottom.  Lab Result: Final Urine Culture Report on 4/16/24  [facility] Emergency Dept discharge antibiotic prescribed: Cefdinir (Omnicef) 300 mg capsule, 1 capsule (300 mg) by mouth 2 times daily for 7 days   #1. Bacteria, >100,000 CFU/ML Citrobacter koseri is SUSCEPTIBLE to Antibiotic.    No change in treatment per [facility] ED lab result Urine Culture protocol.     Creatinine Level (mg/dl)   Creatinine   Date Value Ref Range Status   03/11/2024 0.67 0.51 - 0.95 mg/dL Final   06/14/2021 0.82 0.52 - 1.04 mg/dL Final    Creatinine clearance (ml/min), if applicable    Serum creatinine: 0.67 mg/dL 03/11/24 1052  Estimated creatinine clearance: 99 mL/min     Patient's current Symptoms:   Pt reports improvement overall- still complains of slight pressure, but getting better.   No frequency/urgency/pain/burning/fever.     Patient Education on preventing future UTI's.  Practice good personal hygiene. Wipe yourself from front to back after using the toilet. This helps keep bacteria from getting into the urethra. Keep the genital area clean and dry.  Drink plenty of fluids, such as water, juice, or other caffeine-free drinks.  Drink at least 6-8 glasses a day (1 1/2 to 2 1/2 liters), unless you must restrict fluids for other medical reasons. This will force the medicine (antibiotic) into your urinary system and flush the bacteria out of your body. Cranberry juice has been shown to help clear out the bacteria.  Empty your bladder. Always empty your bladder when you feel the urge to urinate. And always urinate before going to sleep. Urine that stays in your bladder can lead to infection.   Follow up with your health care provider as directed. He or she may test to make sure the infection has cleared. If necessary,  additional treatment may be started.    RN Recommendations/Instructions per [place] ED lab result protocol:   [facility] ED lab result protocol utilized: Urine Culture    Patient/care giver notified to contact your PCP clinic or return to the Emergency department if your:  Symptoms return.  Symptoms do not improve after 3 days on antibiotic.  Symptoms do not resolve after completing antibiotic.  Symptoms worsen or other concerning symptoms.        Steff Jameson, RN

## 2024-04-17 NOTE — TELEPHONE ENCOUNTER
Reason for Call:  Appointment Request    Patient requesting this type of appt:  Hospital/ED Follow-Up     Requested provider: Jordyn Loredo    Reason patient unable to be scheduled: Not with their preferred provider    When does patient want to be seen/preferred time: Same day    Comments: Went to ER on the 12th and 15th     [facility] you [facility] or would you prefer to receive a phone call?:   Patient would prefer a phone call   Okay to leave a detailed message?: Yes at Cell number on file:    Telephone Information:   Mobile [phone]       Call taken on 4/17/2024 at 9:53 AM by Bree Johnson

## 2024-04-17 NOTE — PROGRESS NOTES
"    Instructions Relayed to Patient by Virtual Roomer:     Patient is active on [employer]:   Relayed following to patient: \"It looks like you are active on [employer], are you able to join the visit this way? If not, do you need us to send you a link now or would you like your provider to send a link via text or email when they are ready to initiate the visit?\"    Reminded patient to ensure they were logged on to virtual visit by arrival time listed. Documented in appointment notes if patient had flexibility to initiate visit sooner than arrival time. If pediatric virtual visit, ensured pediatric patient along with parent/guardian will be present for video visit.     Patient offered the website [email or web address] and/or phone number to [employer] Help line: [phone]    Jeff is a 49 year old who is being evaluated via a billable video visit.    How would you like to obtain your AVS? [email or web address]  If the video visit is dropped, the invitation should be resent by: Text to cell phone: [phone]  Will anyone else be joining your video visit? No      Assessment & Plan     Cystitis  Her urine culture grew Citrobacter Koseri  Still having some symptoms  She was placed on cefdinir however I think Levaquin works better for this and also will cover her questionable right lower lobe pneumonia so I will change antibiotics to Levaquin  - levofloxacin (LEVAQUIN) 500 MG tablet; Take 1 tablet (500 mg) by mouth daily for 7 days    Acute bronchitis, unspecified organism  As above he still has cough we discussed about taking some Tessalon Perles  - benzonatate (TESSALON) 200 MG capsule; Take 1 capsule (200 mg) by mouth 3 times daily as needed for cough    Pneumonia of right lower lobe due to infectious organism  She is on cefdinir and I asked her to stop this and change to levofloxacin  - levofloxacin (LEVAQUIN) 500 MG tablet; Take 1 tablet (500 mg) by mouth daily for 7 days      30 minutes spent by me on the date of " "the encounter doing chart review, history and exam, documentation and further activities per the note    MED REC REQUIRED  Post Medication Reconciliation Status: discharge medications reconciled, continue medications without change  BMI  Estimated body mass index is 32.09 kg/m  as calculated from the following:    Height as of 4/15/24: 1.588 m (5' 2.5\").    Weight as of 4/15/24: 80.9 kg (178 lb 4.8 oz).             Nicole Aguilar is a 49 year old, presenting for the following health issues:  ER F/U and Kidney Problem      4/17/2024     1:08 PM   Additional Questions   Roomed by candi   Accompanied by self     Video Start Time: 200 PM    Kidney Problem         Pt mentioned it is in regards to her kidney infection    ED/UC Followup:    Facility:  [facility] [place] [facility] Department  Date of visit: 4/15/2024  Reason for visit: abdominal pain, shortness of breath  Current Status: pt says she's been up and down since visit        Review of Systems  Constitutional, HEENT, cardiovascular, pulmonary, gi and gu systems are negative, except as otherwise noted.      Objective           Vitals:  No vitals were obtained today due to virtual visit.    Physical Exam   GENERAL: alert and no distress  EYES: Eyes grossly normal to inspection.  No discharge or erythema, or obvious scleral/conjunctival abnormalities.  RESP: No audible wheeze, cough, or visible cyanosis.    SKIN: Visible skin clear. No significant rash, abnormal pigmentation or lesions.  NEURO: Cranial nerves grossly intact.  Mentation and speech appropriate for age.  PSYCH: Appropriate affect, tone, and pace of words          Video-Visit Details    Type of service:  Video Visit   Video End Time:230 PM  Originating Location (pt. Location): Home    Distant Location (provider location):  Off-site  Platform used for Video Visit: Dipika  Signed Electronically by: Ravinder Staton MD    "

## 2024-04-17 NOTE — TELEPHONE ENCOUNTER
I am fine with that.  But ultimately it is up to the patient.  I just know it will not be me because I will be out of the office.   (2) potential problem

## 2024-04-17 NOTE — PROGRESS NOTES
Clinic Care Coordination Contact  Community Health Worker Initial Outreach    CHW Initial Information Gathering:  Referral Source: ED Follow-Up  Preferred Hospital: [facility]  [phone]  Preferred Urgent Care: [facility] [place], [phone]  Current living arrangement: I live in a private home with family  Type of residence: Apartment (Apartment has stairs, it has been difficutl to use stairs due to COVID-19 respiratory symptoms)  Community Resources: [facility] Programs (Energy Assistance, Medical Assistance)  Supplies Currently Used at Home: Other (Inhaler for COVID-19 symptoms)  Equipment Currently Used at Home: none  Informal Support system: Spouse, Parents (Parents do not live nearby.)  No PCP office visit in Past Year: No (Pt last saw her PCP on 03/14/2024. Annual Wellness Scheduled for 06/25/2024.)  Transportation means: Other ([employer])  CHW Additional Questions  If ED/Hospital discharge, follow-up appointment scheduled as recommended?: Yes (Pt had ED follow-up today, 4/17/2024.)  Medication changes made following ED/Hospital discharge?: Yes  MyChart active?: Yes  Patient sent Social Determinants of Health questionnaire?: Yes    Patient accepts CC: Yes. Patient scheduled for assessment with CC SW on 04/22/2024 at 1:00 PM. Patient noted desire to discuss resources to help support her since she has had to take time off of work due to COVID-19 (has been off work since 04/10/2024). She is interested in financial resources, applying for SNAP if eligible, and other resources for food/groceries. CHW informed patient we also have a financial resource worker - pt preferred to speak with SW for initial assessment.       Pratibha Miles  Community Health Worker  [facility]

## 2024-04-18 ENCOUNTER — MYC MEDICAL ADVICE (OUTPATIENT)
Dept: FAMILY MEDICINE | Facility: CLINIC | Age: 49
End: 2024-04-18
Payer: COMMERCIAL

## 2024-04-18 DIAGNOSIS — J06.9 UPPER RESPIRATORY TRACT INFECTION, UNSPECIFIED TYPE: Primary | ICD-10-CM

## 2024-04-22 ENCOUNTER — PATIENT OUTREACH (OUTPATIENT)
Dept: NURSING | Facility: CLINIC | Age: 49
End: 2024-04-22
Payer: COMMERCIAL

## 2024-04-22 DIAGNOSIS — Z71.89 OTHER SPECIFIED COUNSELING: Primary | Chronic | ICD-10-CM

## 2024-04-22 NOTE — PROGRESS NOTES
Clinic Care Coordination Contact  Clinic Care Coordination Contact  OUTREACH    Referral Information:  Referral Source: ED Follow-Up  Chief Complaint   Patient presents with    Clinic Care Coordination - Initial     BRANDON OLIVER outreached to patient. Patient reports she is out of work due to Covid and pneumonia and has no income currently. BRANDON OLIVER discussed FRW referral for SNAP and Cash programs, patient agreeable. BRANDON OLIVER enrolled patient into [facility] as well.     [place] Utilization:   Clinic Utilization  No PCP office visit in Past Year: No (Pt last saw her PCP on 03/14/2024. Annual Wellness Scheduled for 06/25/2024.)  Utilization      No Show Count (past year)  0             ED Visits  6             Hospital Admissions  1                    Current as of: 4/18/2024  9:02 PM                Clinical Concerns:  Current Medical Concerns:  N/A    Current Behavioral Concerns: N/A    Education Provided to patient: Role of care coordinator, financial programs      Health Maintenance Reviewed:    Clinical Pathway: None    Medication Management:  Medication review status: Medications not reviewed due to nature of call.     Living Situation:  Current living arrangement:: I live in a private home with family  Type of residence:: Apartment (Apartment has stairs, it has been difficutl to use stairs due to COVID-19 respiratory symptoms)    Lifestyle & Psychosocial Needs:    Social Determinants of Health     Food Insecurity: Low Risk  (1/8/2024)    Food Insecurity     Within the past 12 months, did you worry that your food would run out before you got money to buy more?: No     Within the past 12 months, did the food you bought just not last and you didn t have money to get more?: No   Depression: Not at risk (1/8/2024)    PHQ-2     PHQ-2 Score: 1   Housing Stability: Low Risk  (1/8/2024)    Housing Stability     Do you have housing? : Yes     Are you worried about losing your housing?: No   Tobacco Use: Low Risk   (4/17/2024)    Patient History     Smoking Tobacco Use: Never     Smokeless Tobacco Use: Never     Passive Exposure: Never   Financial Resource Strain: Low Risk  (1/8/2024)    Financial Resource Strain     Within the past 12 months, have you or your family members you live with been unable to get utilities (heat, electricity) when it was really needed?: No   Alcohol Use: Not At Risk (5/5/2022)    AUDIT-C     Frequency of Alcohol Consumption: 2-4 times a month     Average Number of Drinks: 1 or 2     Frequency of Binge Drinking: Never   Transportation Needs: Low Risk  (1/8/2024)    Transportation Needs     Within the past 12 months, has lack of transportation kept you from medical appointments, getting your medicines, non-medical meetings or appointments, work, or from getting things that you need?: No   Physical Activity: Insufficiently Active (5/5/2022)    Exercise Vital Sign     Days of Exercise per Week: 3 days     Minutes of Exercise per Session: 30 min   Interpersonal Safety: Low Risk  (9/29/2023)    Interpersonal Safety     Do you feel physically and emotionally safe where you currently live?: Yes     Within the past 12 months, have you been hit, slapped, kicked or otherwise physically hurt by someone?: No     Within the past 12 months, have you been humiliated or emotionally abused in other ways by your partner or ex-partner?: No   Stress: No Stress Concern Present (5/5/2022)    [employer] - Occupational Stress Questionnaire     Feeling of Stress : Not at all   Social Connections: Moderately Integrated (5/5/2022)    Social Connection and Isolation Panel [NHANES]     Frequency of Communication with Friends and Family: More than three times a week     Frequency of Social Gatherings with Friends and Family: Twice a week     Attends Mosque Services: More than 4 times per year     Active Member of Clubs or Organizations: No     Attends Club or Organization Meetings: Not on file      Marital Status:    Health Literacy: Not on file        Transportation means:: Other (Ride [employer])     Informal Support system:: Spouse, Parent ( and parents. Parents do not live nearby.)      Resources and Interventions:  Current Resources:   Community Resources: [facility] Programs (Energy Assistance, Medical Assistance)  Supplies Currently Used at Home: Other (Inhaler for COVID-19 symptoms)  Equipment Currently Used at Home: none      Patient/Caregiver understanding: Pt reports understanding and denies any additional questions or concerns at this times. BRANDON CC engaged in AIDET communication during encounter.     Future Appointments                In 2 months Jordyn Loredo MD [facility]            Plan: BRANDON CC to mail patient [facility] welcome pack. BRANDON CC to close program as resources given, no ongoing needs.    Paulina Platt [facility]  Social Work Primary Care Clinic Care Coordinator  [employer]  [phone]  [email or web address]

## 2024-04-24 ENCOUNTER — PATIENT OUTREACH (OUTPATIENT)
Dept: CARE COORDINATION | Facility: CLINIC | Age: 49
End: 2024-04-24
Payer: COMMERCIAL

## 2024-04-24 ENCOUNTER — MYC MEDICAL ADVICE (OUTPATIENT)
Dept: FAMILY MEDICINE | Facility: CLINIC | Age: 49
End: 2024-04-24
Payer: COMMERCIAL

## 2024-04-25 NOTE — TELEPHONE ENCOUNTER
Review mychart msg for additional details. Provider completed FMLA forms. Pt notified via phone. She is asking that we fax FMLA along with the additional Health Care form she uploaded in separate mychart to be faxed back to [facility] at . A copy of all 4 documents were also uploaded to pts mychart as well.  Yun Soto, CMA

## 2024-04-29 DIAGNOSIS — G47.00 INSOMNIA, UNSPECIFIED TYPE: ICD-10-CM

## 2024-04-29 RX ORDER — ZOLPIDEM TARTRATE 10 MG/1
TABLET ORAL
Qty: 90 TABLET | Refills: 1 | Status: SHIPPED | OUTPATIENT
Start: 2024-04-29

## 2024-04-30 ENCOUNTER — ANCILLARY PROCEDURE (OUTPATIENT)
Dept: GENERAL RADIOLOGY | Facility: CLINIC | Age: 49
End: 2024-04-30
Attending: FAMILY MEDICINE
Payer: COMMERCIAL

## 2024-04-30 DIAGNOSIS — J06.9 UPPER RESPIRATORY TRACT INFECTION, UNSPECIFIED TYPE: ICD-10-CM

## 2024-04-30 PROCEDURE — 71046 X-RAY EXAM CHEST 2 VIEWS: CPT | Mod: TC | Performed by: RADIOLOGY

## 2024-05-01 ENCOUNTER — MYC MEDICAL ADVICE (OUTPATIENT)
Dept: FAMILY MEDICINE | Facility: CLINIC | Age: 49
End: 2024-05-01
Payer: COMMERCIAL

## 2024-05-01 ENCOUNTER — PATIENT OUTREACH (OUTPATIENT)
Dept: CARE COORDINATION | Facility: CLINIC | Age: 49
End: 2024-05-01
Payer: COMMERCIAL

## 2024-05-01 DIAGNOSIS — J20.9 ACUTE BRONCHITIS, UNSPECIFIED ORGANISM: Primary | ICD-10-CM

## 2024-05-01 RX ORDER — GUAIFENESIN/DEXTROMETHORPHAN 100-10MG/5
10 SYRUP ORAL EVERY 4 HOURS PRN
Qty: 354 ML | Refills: 0 | Status: SHIPPED | OUTPATIENT
Start: 2024-05-01 | End: 2024-06-25

## 2024-05-01 NOTE — RESULT ENCOUNTER NOTE
Hienle,  So they still see evidence of that patch in the right lower lung.  As I had mentioned before x-ray findings often lag behind how people are feeling on both ends.  So if you are feeling better then I am not worried about what is seen on x-ray.  If you are still having symptoms of cough, fever, etc. let me know and we may want to go longer on antibiotics.  MOISES Loredo M.D.

## 2024-05-01 NOTE — TELEPHONE ENCOUNTER
Patient had recent visit regarding symptoms on 4/17/24. Routing to this provider to review and advise.     Pratibha Pham, MAINORN, RN   [facility] Primary [facility]

## 2024-05-03 ENCOUNTER — MYC REFILL (OUTPATIENT)
Dept: FAMILY MEDICINE | Facility: CLINIC | Age: 49
End: 2024-05-03
Payer: COMMERCIAL

## 2024-05-03 DIAGNOSIS — G44.86 CERVICOGENIC HEADACHE: ICD-10-CM

## 2024-05-03 DIAGNOSIS — M54.2 CHRONIC NECK PAIN: ICD-10-CM

## 2024-05-03 DIAGNOSIS — G89.29 CHRONIC NECK PAIN: ICD-10-CM

## 2024-05-03 DIAGNOSIS — M54.50 CHRONIC MIDLINE LOW BACK PAIN WITHOUT SCIATICA: ICD-10-CM

## 2024-05-03 DIAGNOSIS — G89.29 CHRONIC MIDLINE LOW BACK PAIN WITHOUT SCIATICA: ICD-10-CM

## 2024-05-03 DIAGNOSIS — F11.90 CHRONIC, CONTINUOUS USE OF OPIOIDS: ICD-10-CM

## 2024-05-03 RX ORDER — PREGABALIN 100 MG/1
100 CAPSULE ORAL 2 TIMES DAILY
Qty: 60 CAPSULE | Refills: 5 | Status: SHIPPED | OUTPATIENT
Start: 2024-05-03

## 2024-05-03 RX ORDER — OXYCODONE HYDROCHLORIDE 15 MG/1
15 TABLET ORAL 3 TIMES DAILY PRN
Qty: 90 TABLET | Refills: 0 | Status: SHIPPED | OUTPATIENT
Start: 2024-05-03 | End: 2024-05-17

## 2024-05-17 ENCOUNTER — VIRTUAL VISIT (OUTPATIENT)
Dept: FAMILY MEDICINE | Facility: CLINIC | Age: 49
End: 2024-05-17
Payer: COMMERCIAL

## 2024-05-17 DIAGNOSIS — E66.811 CLASS 1 OBESITY WITH SERIOUS COMORBIDITY AND BODY MASS INDEX (BMI) OF 32.0 TO 32.9 IN ADULT, UNSPECIFIED OBESITY TYPE: ICD-10-CM

## 2024-05-17 DIAGNOSIS — Z86.16 HISTORY OF COVID-19: Primary | ICD-10-CM

## 2024-05-17 DIAGNOSIS — M54.50 CHRONIC MIDLINE LOW BACK PAIN WITHOUT SCIATICA: ICD-10-CM

## 2024-05-17 DIAGNOSIS — I10 HYPERTENSION GOAL BP (BLOOD PRESSURE) < 140/90: ICD-10-CM

## 2024-05-17 DIAGNOSIS — G89.29 CHRONIC MIDLINE LOW BACK PAIN WITHOUT SCIATICA: ICD-10-CM

## 2024-05-17 DIAGNOSIS — F11.90 CHRONIC, CONTINUOUS USE OF OPIOIDS: ICD-10-CM

## 2024-05-17 PROCEDURE — 99214 OFFICE O/P EST MOD 30 MIN: CPT | Mod: 95 | Performed by: FAMILY MEDICINE

## 2024-05-17 RX ORDER — OXYCODONE HYDROCHLORIDE 15 MG/1
15 TABLET ORAL 3 TIMES DAILY PRN
Qty: 90 TABLET | Refills: 0 | Status: SHIPPED | OUTPATIENT
Start: 2024-05-31 | End: 2024-05-31

## 2024-05-17 NOTE — LETTER
May 17, 2024      Jeff Serra  [id number]  [place] [place] [address]        Out of work due to illness 5/17/2024.          Sincerely,        Jordyn Loredo MD

## 2024-05-17 NOTE — LETTER
May 17, 2024      Jeff Serra  [address]  [place] [facility] [address]        Out of work due to illness 5/14/2024.          Sincerely,        Jordyn Loredo MD

## 2024-05-17 NOTE — PROGRESS NOTES
"    Instructions Relayed to Patient by Virtual Roomer:     Patient is active on "[employer]":   Relayed following to patient: \"It looks like you are active on "[employer]", are you able to join the visit this way? If not, do you need us to send you a link now or would you like your provider to send a link via text or email when they are ready to initiate the visit?\"      Patient Confirmed they will join visit via: [employer]  Reminded patient to ensure they were logged on to virtual visit by arrival time listed.   Asked if patient has flexibility to initiate visit sooner than arrival time: patient is unable to initiate visit earlier than arrival time     If pediatric virtual visit, ensured pediatric patient along with parent/guardian will be present for video visit.     Patient offered the website [email or web address] and/or phone number to "[employer]" Help line: [phone]    Jeff is a 49 year old who is being evaluated via a billable video visit.    How would you like to obtain your AVS? [employer]  If the video visit is dropped, the invitation should be resent by: Send to e-mail at: [email or web address]  Will anyone else be joining your video visit? No      Assessment & Plan     History of COVID-19  Had COVID-19 starting around April 10.  Has developed some respiratory complications related to this including being somewhat recently treated for bronchitis/pneumonia.  Has been recovering but has been up and down.  Still moving in the right direction.  But she missed a couple of days of work related to still feeling wheezy and coughing a lot.  I will write a note for those days.  Hopefully things continue to move in the right direction.  Does not appear to be an issue of chronic COVID at this point.    Hypertension goal BP (blood pressure) < 140/90  Blood pressure has been running a bit higher since COVID.  We discussed that illnesses can sometimes cause this but her activity level has been a bit lower.  So " "we talked about adding a small amount of amlodipine to her regimen.    Class 1 obesity with serious comorbidity and body mass index (BMI) of 32.0 to 32.9 in adult, unspecified obesity type  Wanted to talk about weight loss with medication such as Wegovy.  We had a lengthy discussion regarding GLP-1 inhibitors including the class of medication and potential affects both good and bad.  We discussed why they might assist in weight loss.  We discussed patient's role in a comprehensive weight loss program including proper diet and active exercise.  We discussed insurance coverage of medications for weight loss in general, and specifically for GLP-1 inhibitors.  We also discussed on line sources that are advertised regarding GLP-1 inhibitors.  Patient is interested in using medication to assist with weight loss and I feel it is appropriate.  I will place the prescription; whether insurance covers or not will be determined on the insurance end.  - Semaglutide-Weight Management (WEGOVY) 0.25 MG/0.5ML pen; Inject 0.25 mg Subcutaneous once a week    Chronic midline low back pain without sciatica  Stable and following  - oxyCODONE IR (ROXICODONE) 15 MG tablet; Take 1 tablet (15 mg) by mouth 3 times daily as needed for pain    Chronic, continuous use of opioids  Stable and up-to-date on routine monitoring  - oxyCODONE IR (ROXICODONE) 15 MG tablet; Take 1 tablet (15 mg) by mouth 3 times daily as needed for pain          BMI  Estimated body mass index is 32.09 kg/m  as calculated from the following:    Height as of 4/15/24: 1.588 m (5' 2.5\").    Weight as of 4/15/24: 80.9 kg (178 lb 4.8 oz).   Weight management plan: As above      Has a follow-up with me in about a month for a physical.  Good chance to recheck.    Nicole Aguilar is a 49 year old, presenting for the following health issues:  Follow Up  - Recently had covid and pneumonia, still symptomatic and wants to know what she needs in order to go back to work      " 5/17/2024     9:01 AM   Additional Questions   Roomed by Martín WILLIAMSON       Video Start Time:  1057    History of Present Illness       Reason for visit:  Follow up on Covid and Pneumonia          Video visit patient in to follow-up on COVID as above.  Also want to talk about BP and weight loss.      Review of Systems  Constitutional, HEENT, cardiovascular, pulmonary, gi and gu systems are negative, except as otherwise noted.      Objective           Vitals:  No vitals were obtained today due to virtual visit.    Physical Exam   GENERAL: alert and no distress  EYES: Eyes grossly normal to inspection.  No discharge or erythema, or obvious scleral/conjunctival abnormalities.  RESP: No audible wheeze, cough, or visible cyanosis.    SKIN: Visible skin clear. No significant rash, abnormal pigmentation or lesions.  NEURO: Cranial nerves grossly intact.  Mentation and speech appropriate for age.  PSYCH: Appropriate affect, tone, and pace of words    Past labs reviewed with the patient.   Reviewed imaging      Video-Visit Details    Type of service:  Video Visit   Video End Time: 1107  Originating Location (pt. Location): Home    Distant Location (provider location):  Off-site  Platform used for Video Visit: Dipika  Signed Electronically by: Jordyn Loredo MD

## 2024-05-26 ENCOUNTER — HEALTH MAINTENANCE LETTER (OUTPATIENT)
Age: 49
End: 2024-05-26

## 2024-05-31 ENCOUNTER — MYC MEDICAL ADVICE (OUTPATIENT)
Dept: FAMILY MEDICINE | Facility: CLINIC | Age: 49
End: 2024-05-31
Payer: COMMERCIAL

## 2024-05-31 DIAGNOSIS — G89.29 CHRONIC MIDLINE LOW BACK PAIN WITHOUT SCIATICA: ICD-10-CM

## 2024-05-31 DIAGNOSIS — F11.90 CHRONIC, CONTINUOUS USE OF OPIOIDS: ICD-10-CM

## 2024-05-31 DIAGNOSIS — M54.50 CHRONIC MIDLINE LOW BACK PAIN WITHOUT SCIATICA: ICD-10-CM

## 2024-05-31 RX ORDER — OXYCODONE HYDROCHLORIDE 15 MG/1
15 TABLET ORAL 3 TIMES DAILY PRN
Qty: 90 TABLET | Refills: 0 | Status: SHIPPED | OUTPATIENT
Start: 2024-05-31 | End: 2024-06-25

## 2024-05-31 NOTE — TELEPHONE ENCOUNTER
Routing to provider to review and advise. Pended medication and preferred pharmacy    Kirby Boyer RN  [facility]

## 2024-06-25 ENCOUNTER — OFFICE VISIT (OUTPATIENT)
Dept: FAMILY MEDICINE | Facility: CLINIC | Age: 49
End: 2024-06-25
Attending: FAMILY MEDICINE
Payer: COMMERCIAL

## 2024-06-25 VITALS
RESPIRATION RATE: 16 BRPM | SYSTOLIC BLOOD PRESSURE: 114 MMHG | TEMPERATURE: 98.6 F | WEIGHT: 166 LBS | OXYGEN SATURATION: 97 % | DIASTOLIC BLOOD PRESSURE: 83 MMHG | BODY MASS INDEX: 29.41 KG/M2 | HEART RATE: 96 BPM | HEIGHT: 63 IN

## 2024-06-25 DIAGNOSIS — M54.50 CHRONIC MIDLINE LOW BACK PAIN WITHOUT SCIATICA: ICD-10-CM

## 2024-06-25 DIAGNOSIS — Z13.220 SCREENING CHOLESTEROL LEVEL: ICD-10-CM

## 2024-06-25 DIAGNOSIS — I10 HYPERTENSION GOAL BP (BLOOD PRESSURE) < 140/90: ICD-10-CM

## 2024-06-25 DIAGNOSIS — K59.00 CONSTIPATION, UNSPECIFIED CONSTIPATION TYPE: ICD-10-CM

## 2024-06-25 DIAGNOSIS — Z00.00 ROUTINE GENERAL MEDICAL EXAMINATION AT A HEALTH CARE FACILITY: Primary | ICD-10-CM

## 2024-06-25 DIAGNOSIS — F11.90 CHRONIC, CONTINUOUS USE OF OPIOIDS: ICD-10-CM

## 2024-06-25 DIAGNOSIS — E66.811 CLASS 1 OBESITY WITH SERIOUS COMORBIDITY AND BODY MASS INDEX (BMI) OF 32.0 TO 32.9 IN ADULT, UNSPECIFIED OBESITY TYPE: ICD-10-CM

## 2024-06-25 DIAGNOSIS — G89.29 CHRONIC MIDLINE LOW BACK PAIN WITHOUT SCIATICA: ICD-10-CM

## 2024-06-25 PROCEDURE — 80061 LIPID PANEL: CPT | Performed by: FAMILY MEDICINE

## 2024-06-25 PROCEDURE — 99396 PREV VISIT EST AGE 40-64: CPT | Performed by: FAMILY MEDICINE

## 2024-06-25 PROCEDURE — 36415 COLL VENOUS BLD VENIPUNCTURE: CPT | Performed by: FAMILY MEDICINE

## 2024-06-25 PROCEDURE — 80307 DRUG TEST PRSMV CHEM ANLYZR: CPT | Performed by: FAMILY MEDICINE

## 2024-06-25 RX ORDER — OXYCODONE HYDROCHLORIDE 15 MG/1
15 TABLET ORAL 3 TIMES DAILY PRN
Qty: 90 TABLET | Refills: 0 | Status: SHIPPED | OUTPATIENT
Start: 2024-06-28 | End: 2024-07-26

## 2024-06-25 RX ORDER — POLYETHYLENE GLYCOL 3350 17 G/17G
17 POWDER, FOR SOLUTION ORAL DAILY PRN
Qty: 238 G | Refills: 0 | Status: SHIPPED | OUTPATIENT
Start: 2024-06-25

## 2024-06-25 RX ORDER — LOSARTAN POTASSIUM AND HYDROCHLOROTHIAZIDE 25; 100 MG/1; MG/1
1 TABLET ORAL DAILY
Qty: 90 TABLET | Refills: 1 | Status: SHIPPED | OUTPATIENT
Start: 2024-06-25 | End: 2024-09-30

## 2024-06-25 RX ORDER — AMOXICILLIN 250 MG
1 CAPSULE ORAL 2 TIMES DAILY PRN
Qty: 30 TABLET | Refills: 0 | Status: SHIPPED | OUTPATIENT
Start: 2024-06-25

## 2024-06-25 SDOH — HEALTH STABILITY: PHYSICAL HEALTH: ON AVERAGE, HOW MANY MINUTES DO YOU ENGAGE IN EXERCISE AT THIS LEVEL?: 30 MIN

## 2024-06-25 SDOH — HEALTH STABILITY: PHYSICAL HEALTH: ON AVERAGE, HOW MANY DAYS PER WEEK DO YOU ENGAGE IN MODERATE TO STRENUOUS EXERCISE (LIKE A BRISK WALK)?: 3 DAYS

## 2024-06-25 ASSESSMENT — ANXIETY QUESTIONNAIRES
6. BECOMING EASILY ANNOYED OR IRRITABLE: NOT AT ALL
GAD7 TOTAL SCORE: 1
IF YOU CHECKED OFF ANY PROBLEMS ON THIS QUESTIONNAIRE, HOW DIFFICULT HAVE THESE PROBLEMS MADE IT FOR YOU TO DO YOUR WORK, TAKE CARE OF THINGS AT HOME, OR GET ALONG WITH OTHER PEOPLE: SOMEWHAT DIFFICULT
5. BEING SO RESTLESS THAT IT IS HARD TO SIT STILL: NOT AT ALL
8. IF YOU CHECKED OFF ANY PROBLEMS, HOW DIFFICULT HAVE THESE MADE IT FOR YOU TO DO YOUR WORK, TAKE CARE OF THINGS AT HOME, OR GET ALONG WITH OTHER PEOPLE?: SOMEWHAT DIFFICULT
2. NOT BEING ABLE TO STOP OR CONTROL WORRYING: NOT AT ALL
6. BECOMING EASILY ANNOYED OR IRRITABLE: NOT AT ALL
3. WORRYING TOO MUCH ABOUT DIFFERENT THINGS: NOT AT ALL
IF YOU CHECKED OFF ANY PROBLEMS ON THIS QUESTIONNAIRE, HOW DIFFICULT HAVE THESE PROBLEMS MADE IT FOR YOU TO DO YOUR WORK, TAKE CARE OF THINGS AT HOME, OR GET ALONG WITH OTHER PEOPLE: NOT DIFFICULT AT ALL
7. FEELING AFRAID AS IF SOMETHING AWFUL MIGHT HAPPEN: NOT AT ALL
5. BEING SO RESTLESS THAT IT IS HARD TO SIT STILL: NOT AT ALL
4. TROUBLE RELAXING: SEVERAL DAYS
7. FEELING AFRAID AS IF SOMETHING AWFUL MIGHT HAPPEN: NOT AT ALL
4. TROUBLE RELAXING: NOT AT ALL
GAD7 TOTAL SCORE: 1
7. FEELING AFRAID AS IF SOMETHING AWFUL MIGHT HAPPEN: NOT AT ALL
GAD7 TOTAL SCORE: 1
2. NOT BEING ABLE TO STOP OR CONTROL WORRYING: NOT AT ALL
1. FEELING NERVOUS, ANXIOUS, OR ON EDGE: NOT AT ALL
1. FEELING NERVOUS, ANXIOUS, OR ON EDGE: NOT AT ALL
GAD7 TOTAL SCORE: 0
3. WORRYING TOO MUCH ABOUT DIFFERENT THINGS: NOT AT ALL

## 2024-06-25 ASSESSMENT — PAIN SCALES - GENERAL: PAINLEVEL: WORST PAIN (10)

## 2024-06-25 ASSESSMENT — SOCIAL DETERMINANTS OF HEALTH (SDOH): HOW OFTEN DO YOU GET TOGETHER WITH FRIENDS OR RELATIVES?: ONCE A WEEK

## 2024-06-25 NOTE — LETTER
June 25, 2024      Jeff Serra  [address]  [place] [place] [address]        Off work today due to illness.          Sincerely,        Jordyn Loredo MD

## 2024-06-25 NOTE — PROGRESS NOTES
Answers submitted by the patient for this visit:  Patient Health Questionnaire (Submitted on 6/25/2024)  If you checked off any problems, how difficult have these problems made it for you to do your work, take care of things at home, or get along with other people?: Somewhat difficult  PHQ9 TOTAL SCORE: 2  CHAO-7 (Submitted on 6/25/2024)  CHAO 7 TOTAL SCORE: 1

## 2024-06-25 NOTE — PROGRESS NOTES
Preventive Care Visit  [facility] ILIA Cobb Jae Loredo MD, Family Medicine  Jun 25, 2024      Assessment & Plan     Routine general medical examination at a health care facility  Routine health maintenance otherwise up-to-date    Hypertension goal BP (blood pressure) < 140/90  Doing well on current dosage.  Probably even better due to weight loss  - losartan-hydrochlorothiazide (HYZAAR) 100-25 MG tablet; Take 1 tablet by mouth daily    Chronic midline low back pain without sciatica  Stable and up-to-date on routine monitoring  - oxyCODONE IR (ROXICODONE) 15 MG tablet; Take 1 tablet (15 mg) by mouth 3 times daily as needed for pain    Chronic, continuous use of opioids  Update urine drug screen and controlled substance agreement today  - Urine Drug Screen; Future  - oxyCODONE IR (ROXICODONE) 15 MG tablet; Take 1 tablet (15 mg) by mouth 3 times daily as needed for pain  - Urine Drug Screen    Screening cholesterol level    - Lipid panel reflex to direct LDL Non-fasting; Future  - Lipid panel reflex to direct LDL Non-fasting    Class 1 obesity with serious comorbidity and body mass index (BMI) of 32.0 to 32.9 in adult, unspecified obesity type  Is down over 10 pounds and feels like it is working well.  - Semaglutide-Weight Management (WEGOVY) 1 MG/0.5ML pen; Inject 1 mg Subcutaneous once a week    Constipation, unspecified constipation type  Has developed some issues with abdominal pain and constipation.  We discussed that this is likely multifactorial including the Wegovy as well as her pain medication and she has certainly had some recent abdominal issues that may be contributing.  So we talked about a strategy to try to get her going as well as the maintenance phase.  A little bit trial and error to begin with.  - polyethylene glycol (MIRALAX) 17 GM/Dose powder; Take 17 g by mouth daily as needed for constipation  - senna-docusate (SENOKOT-S/PERICOLACE) 8.6-50 MG tablet; Take 1 tablet by  mouth 2 times daily as needed for constipation    Patient has been advised of split billing requirements and indicates understanding: Yes        Counseling  Appropriate preventive services were discussed with this patient, including applicable screening as appropriate for fall prevention, nutrition, physical activity, Tobacco-use cessation, weight loss and cognition.  Checklist reviewing preventive services available has been given to the patient.  Reviewed patient's diet, addressing concerns and/or questions.   She is at risk for lack of exercise and has been provided with information to increase physical activity for the benefit of her well-being.       Regular exercise  See Patient Instructions    Nicole Aguilar is a 49 year old, presenting for the following:  Physical        6/25/2024     1:55 PM   Additional Questions   Roomed by Justyna STAPLETON   Accompanied by self        Health Care Directive  Patient does not have a Health Care Directive or Living Will: Discussed advance care planning with patient; however, patient declined at this time.    Healthy Habits:     Getting at least 3 servings of Calcium per day:  NO    Bi-annual eye exam:  Yes    Dental care twice a year:  Yes    Sleep apnea or symptoms of sleep apnea:  None    Diet:  Regular (no restrictions)    Frequency of exercise:  2-3 days/week    Duration of exercise:  15-30 minutes    Taking medications regularly:  Yes    Barriers to taking medications:  None    Medication side effects:  None    Additional concerns today:  No    Here today for routine checkup and follow-up on ongoing issues.    Constipation currently      6/25/2024   General Health   How would you rate your overall physical health? (!) POOR            6/25/2024   Nutrition   Three or more servings of calcium each day? (!) NO   Diet: Regular (no restrictions)   How many servings of fruit and vegetables per day? (!) 2-3   How many sweetened beverages each day? 0-1            6/25/2024    Exercise   Days per week of moderate/strenous exercise 3 days   Average minutes spent exercising at this level 30 min            1/8/2024   Social Factors   Worry food won't last until get money to buy more No   Food not last or not have enough money for food? No   Do you have housing? (Housing is defined as stable permanent housing and does not include staying ouside in a car, in a tent, in an abandoned building, in an overnight shelter, or couch-surfing.) Yes   Are you worried about losing your housing? No   Lack of transportation? No   Unable to get utilities (heat,electricity)? No            3/7/2023   Dental   Dentist two times every year? Yes             Today's PHQ-9 Score:       6/25/2024     1:52 PM   PHQ-9 SCORE   PHQ-9 Total Score MyChart 2 (Minimal depression)   PHQ-9 Total Score 2         3/7/2023   Substance Use   Alcohol more than 3/day or more than 7/wk No        Social History     Tobacco Use    Smoking status: Never     Passive exposure: Never    Smokeless tobacco: Never   Vaping Use    Vaping status: Never Used   Substance Use Topics    Alcohol use: Not Currently    Drug use: No           3/21/2023   LAST FHS-7 RESULTS   1st degree relative breast or ovarian cancer No   Any relative bilateral breast cancer No   Any male have breast cancer No   Any woman with breast cancer before 50yrs No   2 or more relatives with breast AND/OR ovarian cancer Yes   2 or more relatives with breast AND/OR bowel cancer No                 History of abnormal Pap smear: Status post hysterectomy with removal of cervix and no history of CIN2 or greater or cervical cancer. Health Maintenance and Surgical History updated.        Latest Ref Rng & Units 7/12/2016     1:07 PM 7/12/2016    12:00 AM   PAP / HPV   PAP (Historical)   NIL    HPV 16 DNA NEG Negative     HPV 18 DNA NEG Negative     Other HR HPV NEG Negative       ASCVD Risk   The 10-year ASCVD risk score (Chelle NELSON, et al., 2019) is: 1.3%    Values used  "to calculate the score:      Age: 49 years      Sex: Female      Is Non- : Yes      Diabetic: No      Tobacco smoker: No      Systolic Blood Pressure: 114 mmHg      Is BP treated: Yes      HDL Cholesterol: 89 mg/dL      Total Cholesterol: 202 mg/dL           Reviewed and updated as needed this visit by Provider   Tobacco  Allergies  Meds  Problems  Med Hx  Surg Hx  Fam Hx            Lab work is in process  Labs reviewed in EPIC  BP Readings from Last 3 Encounters:   06/25/24 114/83   04/15/24 134/87   04/12/24 (!) 139/94    Wt Readings from Last 3 Encounters:   06/25/24 75.3 kg (166 lb)   04/15/24 80.9 kg (178 lb 4.8 oz)   09/29/23 75.7 kg (166 lb 12.8 oz)                      Review of Systems  CONSTITUTIONAL: NEGATIVE for fever, chills, change in weight  INTEGUMENTARY/SKIN: NEGATIVE for worrisome rashes, moles or lesions  EYES: NEGATIVE for vision changes or irritation  ENT/MOUTH: NEGATIVE for ear, mouth and throat problems  RESP: NEGATIVE for significant cough or SOB  BREAST: NEGATIVE for masses, tenderness or discharge  CV: NEGATIVE for chest pain, palpitations or peripheral edema  GI: Constipated as above  : NEGATIVE for frequency, dysuria, or hematuria  MUSCULOSKELETAL: NEGATIVE for significant arthralgias or myalgia  NEURO: NEGATIVE for weakness, dizziness or paresthesias  ENDOCRINE: NEGATIVE for temperature intolerance, skin/hair changes  HEME: NEGATIVE for bleeding problems  PSYCHIATRIC: NEGATIVE for changes in mood or affect     Objective    Exam  /83 (BP Location: Right arm, Patient Position: Sitting, Cuff Size: Adult Large)   Pulse 96   Temp 98.6  F (37  C) (Oral)   Resp 16   Ht 1.594 m (5' 2.75\")   Wt 75.3 kg (166 lb)   LMP 09/12/2016 (Exact Date)   SpO2 97%   BMI 29.64 kg/m     Estimated body mass index is 29.64 kg/m  as calculated from the following:    Height as of this encounter: 1.594 m (5' 2.75\").    Weight as of this encounter: 75.3 kg (166 " lb).    Physical Exam  GENERAL: alert and no distress  EYES: Eyes grossly normal to inspection, PERRL and conjunctivae and sclerae normal  HENT: ear canals and TM's normal, nose and mouth without ulcers or lesions  NECK: no adenopathy, no asymmetry, masses, or scars  RESP: lungs clear to auscultation - no rales, rhonchi or wheezes  CV: regular rate and rhythm, normal S1 S2, no S3 or S4, no murmur, click or rub, no peripheral edema  ABDOMEN: soft, nontender, no hepatosplenomegaly, no masses and bowel sounds normal  MS: no gross musculoskeletal defects noted, no edema  SKIN: no suspicious lesions or rashes  NEURO: Normal strength and tone, mentation intact and speech normal  PSYCH: mentation appears normal, affect normal/bright        Signed Electronically by: Jordyn Loredo MD

## 2024-06-25 NOTE — PATIENT INSTRUCTIONS
"Patient Education   Preventive Care Advice   This is general advice we often give to help people stay healthy. Your care team may have specific advice just for you. Please talk to your care team about your own preventive care needs.  Lifestyle  Exercise at least 150 minutes each week (30 minutes a day, 5 days a week).  Do muscle strengthening activities 2 days a week. These help control your weight and prevent disease.  No smoking.  Wear sunscreen to prevent skin cancer.  Have your home tested for radon every 2 to 5 years. Radon is a colorless, odorless gas that can harm your lungs. To learn more, go to www.health.[email or web address].mn.us and search for \"Radon in Homes.\"  Keep guns unloaded and locked up in a safe place like a safe or gun vault, or, use a gun lock and hide the keys. Always lock away bullets separately. To learn more, visit [employer].[place].gov and search for \"safe gun storage.\"  Nutrition  Eat 5 or more servings of fruits and vegetables each day.  Try wheat bread, brown rice and whole grain pasta (instead of white bread, rice, and pasta).  Get enough calcium and vitamin D. Check the label on foods and aim for 100% of the RDA (recommended daily allowance).  Regular exams  Have a dental exam and cleaning every 6 months.  See your health care team every year to talk about:  Any changes in your health.  Any medicines your care team has prescribed.  Preventive care, family planning, and ways to prevent chronic diseases.  Shots (vaccines)   HPV shots (up to age 26), if you've never had them before.  Hepatitis B shots (up to age 59), if you've never had them before.  COVID-19 shot: Get this shot when it's due.  Flu shot: Get a flu shot every year.  Tetanus shot: Get a tetanus shot every 10 years.  Pneumococcal, hepatitis A, and RSV shots: Ask your care team if you need these based on your risk.  Shingles shot (for age 50 and up).  General health tests  Diabetes screening:  Starting at age 35, Get screened for diabetes at least " every 3 years.  If you are younger than age 35, ask your care team if you should be screened for diabetes.  Cholesterol test: At age 39, start having a cholesterol test every 5 years, or more often if advised.  Bone density scan (DEXA): At age 50, ask your care team if you should have this scan for osteoporosis (brittle bones).  Hepatitis C: Get tested at least once in your life.  Abdominal aortic aneurysm screening: Talk to your doctor about having this screening if you:  Have ever smoked; and  Are biologically male; and  Are between the ages of 65 and 75.  STIs (sexually transmitted infections)  Before age 24: Ask your care team if you should be screened for STIs.  After age 24: Get screened for STIs if you're at risk. You are at risk for STIs (including HIV) if:  You are sexually active with more than one person.  You don't use condoms every time.  You or a partner was diagnosed with a sexually transmitted infection.  If you are at risk for HIV, ask about PrEP medicine to prevent HIV.  Get tested for HIV at least once in your life, whether you are at risk for HIV or not.  Cancer screening tests  Cervical cancer screening: If you have a cervix, begin getting regular cervical cancer screening tests at age 21. Most people who have regular screenings with normal results can stop after age 65. Talk about this with your provider.  Breast cancer scan (mammogram): If you've ever had breasts, begin having regular mammograms starting at age 40. This is a scan to check for breast cancer.  Colon cancer screening: It is important to start screening for colon cancer at age 45.  Have a colonoscopy test every 10 years (or more often if you're at risk) Or, ask your provider about stool tests like a FIT test every year or Cologuard test every 3 years.  To learn more about your testing options, visit: [email or web address].pdf.  For help making a decision, visit: dionicio/[email or web address].  Prostate cancer screening test: If you have a  "prostate and are age 55 to 69, ask your provider if you would benefit from a yearly prostate cancer screening test.  Lung cancer screening: If you are a current or former smoker age 50 to 80, ask your care team if ongoing lung cancer screenings are right for you.  For informational purposes only. Not to replace the advice of your health care provider. Copyright   2023 [facility]. All rights reserved. Clinically reviewed by the [facility] Transitions Program. [employer] [id number] - REV 04/24.       Constipation    - wegovy?  - oxycodone?  - garden variety?   --- probably a little bit of all three    Plan:    1) MiraLAX daily - this will help pull some extra water and soften up stools.  Can take every day the rest of your life safely, but can probably back down once things are moving to every other day or once a week or what ever    2) sennakot - \"stimulates\" the intestines to move.  Use once or twice daily initially until things are moving through and then you can just enemas as needed  "

## 2024-06-25 NOTE — LETTER

## 2024-06-26 ENCOUNTER — HOSPITAL ENCOUNTER (EMERGENCY)
Facility: CLINIC | Age: 49
Discharge: HOME | End: 2024-06-26
Attending: EMERGENCY MEDICINE | Admitting: EMERGENCY MEDICINE
Payer: COMMERCIAL

## 2024-06-26 ENCOUNTER — APPOINTMENT (OUTPATIENT)
Dept: CT IMAGING | Facility: CLINIC | Age: 49
End: 2024-06-26
Attending: EMERGENCY MEDICINE
Payer: COMMERCIAL

## 2024-06-26 VITALS
SYSTOLIC BLOOD PRESSURE: 133 MMHG | OXYGEN SATURATION: 99 % | RESPIRATION RATE: 20 BRPM | DIASTOLIC BLOOD PRESSURE: 81 MMHG | WEIGHT: 167.55 LBS | HEIGHT: 62 IN | BODY MASS INDEX: 30.83 KG/M2 | HEART RATE: 90 BPM | TEMPERATURE: 98.9 F

## 2024-06-26 DIAGNOSIS — N39.0 URINARY TRACT INFECTION IN FEMALE: ICD-10-CM

## 2024-06-26 DIAGNOSIS — K52.9 PROCTOCOLITIS: ICD-10-CM

## 2024-06-26 LAB
ALBUMIN SERPL BCG-MCNC: 4.3 G/DL (ref 3.5–5.2)
ALBUMIN UR-MCNC: 30 MG/DL
ALP SERPL-CCNC: 64 U/L (ref 40–150)
ALT SERPL W P-5'-P-CCNC: 17 U/L (ref 0–50)
AMPHETAMINES UR QL SCN: NORMAL
ANION GAP SERPL CALCULATED.3IONS-SCNC: 15 MMOL/L (ref 7–15)
APPEARANCE UR: ABNORMAL
AST SERPL W P-5'-P-CCNC: 19 U/L (ref 0–45)
BARBITURATES UR QL SCN: NORMAL
BASOPHILS # BLD AUTO: 0 10E3/UL (ref 0–0.2)
BASOPHILS NFR BLD AUTO: 1 %
BENZODIAZ UR QL SCN: NORMAL
BILIRUB SERPL-MCNC: 0.8 MG/DL
BILIRUB UR QL STRIP: NEGATIVE
BUN SERPL-MCNC: 14.4 MG/DL (ref 6–20)
BZE UR QL SCN: NORMAL
CALCIUM SERPL-MCNC: 8.8 MG/DL (ref 8.6–10)
CANNABINOIDS UR QL SCN: NORMAL
CHLORIDE SERPL-SCNC: 100 MMOL/L (ref 98–107)
CHOLEST SERPL-MCNC: 251 MG/DL
COLOR UR AUTO: YELLOW
CREAT SERPL-MCNC: 0.93 MG/DL (ref 0.51–0.95)
DEPRECATED HCO3 PLAS-SCNC: 28 MMOL/L (ref 22–29)
EGFRCR SERPLBLD CKD-EPI 2021: 74 ML/MIN/1.73M2
EOSINOPHIL # BLD AUTO: 0 10E3/UL (ref 0–0.7)
EOSINOPHIL NFR BLD AUTO: 1 %
ERYTHROCYTE [DISTWIDTH] IN BLOOD BY AUTOMATED COUNT: 14 % (ref 10–15)
FASTING STATUS PATIENT QL REPORTED: NO
FENTANYL UR QL: NORMAL
GLUCOSE SERPL-MCNC: 88 MG/DL (ref 70–99)
GLUCOSE UR STRIP-MCNC: NEGATIVE MG/DL
HCT VFR BLD AUTO: 37.9 % (ref 35–47)
HDLC SERPL-MCNC: 69 MG/DL
HGB BLD-MCNC: 12.7 G/DL (ref 11.7–15.7)
HGB UR QL STRIP: ABNORMAL
IMM GRANULOCYTES # BLD: 0 10E3/UL
IMM GRANULOCYTES NFR BLD: 0 %
KETONES UR STRIP-MCNC: NEGATIVE MG/DL
LDLC SERPL CALC-MCNC: 166 MG/DL
LEUKOCYTE ESTERASE UR QL STRIP: ABNORMAL
LIPASE SERPL-CCNC: 31 U/L (ref 13–60)
LYMPHOCYTES # BLD AUTO: 2.4 10E3/UL (ref 0.8–5.3)
LYMPHOCYTES NFR BLD AUTO: 37 %
MCH RBC QN AUTO: 28.7 PG (ref 26.5–33)
MCHC RBC AUTO-ENTMCNC: 33.5 G/DL (ref 31.5–36.5)
MCV RBC AUTO: 86 FL (ref 78–100)
MONOCYTES # BLD AUTO: 0.5 10E3/UL (ref 0–1.3)
MONOCYTES NFR BLD AUTO: 7 %
MUCOUS THREADS #/AREA URNS LPF: PRESENT /LPF
NEUTROPHILS # BLD AUTO: 3.6 10E3/UL (ref 1.6–8.3)
NEUTROPHILS NFR BLD AUTO: 55 %
NITRATE UR QL: NEGATIVE
NONHDLC SERPL-MCNC: 182 MG/DL
NRBC # BLD AUTO: 0 10E3/UL
NRBC BLD AUTO-RTO: 0 /100
OPIATES UR QL SCN: NORMAL
PCP QUAL URINE (ROCHE): NORMAL
PH UR STRIP: 6 [PH] (ref 5–7)
PLATELET # BLD AUTO: 185 10E3/UL (ref 150–450)
POTASSIUM SERPL-SCNC: 3 MMOL/L (ref 3.4–5.3)
PROT SERPL-MCNC: 7.3 G/DL (ref 6.4–8.3)
RBC # BLD AUTO: 4.43 10E6/UL (ref 3.8–5.2)
RBC URINE: 47 /HPF
SODIUM SERPL-SCNC: 143 MMOL/L (ref 135–145)
SP GR UR STRIP: 1.02 (ref 1–1.03)
SQUAMOUS EPITHELIAL: 23 /HPF
TRIGL SERPL-MCNC: 81 MG/DL
UROBILINOGEN UR STRIP-MCNC: NORMAL MG/DL
WBC # BLD AUTO: 6.5 10E3/UL (ref 4–11)
WBC URINE: 168 /HPF

## 2024-06-26 PROCEDURE — 83690 ASSAY OF LIPASE: CPT | Performed by: EMERGENCY MEDICINE

## 2024-06-26 PROCEDURE — 250N000013 HC RX MED GY IP 250 OP 250 PS 637: Performed by: EMERGENCY MEDICINE

## 2024-06-26 PROCEDURE — 87086 URINE CULTURE/COLONY COUNT: CPT | Performed by: EMERGENCY MEDICINE

## 2024-06-26 PROCEDURE — 96360 HYDRATION IV INFUSION INIT: CPT

## 2024-06-26 PROCEDURE — 99284 EMERGENCY DEPT VISIT MOD MDM: CPT | Mod: 25

## 2024-06-26 PROCEDURE — 74176 CT ABD & PELVIS W/O CONTRAST: CPT

## 2024-06-26 PROCEDURE — 80053 COMPREHEN METABOLIC PANEL: CPT | Performed by: EMERGENCY MEDICINE

## 2024-06-26 PROCEDURE — 81001 URINALYSIS AUTO W/SCOPE: CPT | Performed by: EMERGENCY MEDICINE

## 2024-06-26 PROCEDURE — 85025 COMPLETE CBC W/AUTO DIFF WBC: CPT | Performed by: EMERGENCY MEDICINE

## 2024-06-26 PROCEDURE — 36415 COLL VENOUS BLD VENIPUNCTURE: CPT | Performed by: EMERGENCY MEDICINE

## 2024-06-26 PROCEDURE — 258N000003 HC RX IP 258 OP 636: Performed by: EMERGENCY MEDICINE

## 2024-06-26 RX ORDER — OXYCODONE HYDROCHLORIDE 5 MG/1
15 TABLET ORAL ONCE
Status: COMPLETED | OUTPATIENT
Start: 2024-06-26 | End: 2024-06-26

## 2024-06-26 RX ORDER — CEFUROXIME AXETIL 500 MG/1
500 TABLET ORAL 2 TIMES DAILY
Qty: 14 TABLET | Refills: 0 | Status: SHIPPED | OUTPATIENT
Start: 2024-06-26 | End: 2024-07-03

## 2024-06-26 RX ORDER — FLUCONAZOLE 150 MG/1
150 TABLET ORAL ONCE
Qty: 1 TABLET | Refills: 0 | Status: SHIPPED | OUTPATIENT
Start: 2024-06-26 | End: 2024-06-26

## 2024-06-26 RX ORDER — ACETAMINOPHEN 325 MG/1
650 TABLET ORAL ONCE
Status: COMPLETED | OUTPATIENT
Start: 2024-06-26 | End: 2024-06-26

## 2024-06-26 RX ADMIN — SODIUM CHLORIDE, POTASSIUM CHLORIDE, SODIUM LACTATE AND CALCIUM CHLORIDE 1000 ML: 600; 310; 30; 20 INJECTION, SOLUTION INTRAVENOUS at 09:44

## 2024-06-26 RX ADMIN — OXYCODONE HYDROCHLORIDE 15 MG: 5 TABLET ORAL at 09:03

## 2024-06-26 RX ADMIN — ACETAMINOPHEN 650 MG: 325 TABLET, FILM COATED ORAL at 09:03

## 2024-06-26 ASSESSMENT — ACTIVITIES OF DAILY LIVING (ADL)
ADLS_ACUITY_SCORE: 38
ADLS_ACUITY_SCORE: 36
ADLS_ACUITY_SCORE: 38

## 2024-06-26 NOTE — RESULT ENCOUNTER NOTE
"Donyelle,  Your cholesterol level definitely looks a little bit higher than the last couple years.  The good news is that you still have a fantastic HDL (\"good\" cholesterol), but your LDL (\"bad\" cholesterol) seems a bit up.  Obviously you have been dealing with a lot of issues since surgery, etc. but before we jump onto a medicine this is probably a good reminder to keep an eye on healthy diet, plenty of exercise, etc.  MOISES Loredo M.D. "

## 2024-06-26 NOTE — ED PROVIDER NOTES
"History     Chief Complaint:  Abdominal Pain and Pelvic Pain       HPI   Jeff Serra is a 49 year old female presenting with 3 days of intermittent lower abdominal tenderness and pelvic pain with decreased stool output and urinary urgency but no dysuria.  No fever.  Nausea but no vomiting.  No melena hematochezia.  No chest pain or shortness of breath.      Independent Historian:   None    Review of External Notes:   See ED course      Medications:    cefuroxime (CEFTIN) 500 MG tablet  fluconazole (DIFLUCAN) 150 MG tablet  Calcium Carb-Cholecalciferol (CALCIUM 600 + D) 600-400 MG-UNIT TABS per tablet  cholecalciferol 125 MCG (5000 UT) CAPS  cyanocobalamin (CYANOCOBALAMIN) 1000 MCG/ML injection  loratadine (CLARITIN) 10 MG tablet  LORazepam (ATIVAN) 0.5 MG tablet  losartan-hydrochlorothiazide (HYZAAR) 100-25 MG tablet  Multiple Vitamins-Iron (QC DAILY MULTIVITAMINS/IRON) TABS  omeprazole (PRILOSEC) 20 MG DR capsule  [START ON 6/28/2024] oxyCODONE IR (ROXICODONE) 15 MG tablet  polyethylene glycol (MIRALAX) 17 GM/Dose powder  pregabalin (LYRICA) 100 MG capsule  Semaglutide-Weight Management (WEGOVY) 1 MG/0.5ML pen  senna-docusate (SENOKOT-S/PERICOLACE) 8.6-50 MG tablet  thiamine (B-1) 100 MG tablet  tiZANidine (ZANAFLEX) 4 MG tablet  tuberculin-allergy syringes 27G X 1/2\" 1 ML MISC  zolpidem (AMBIEN) 10 MG tablet        Past Medical History:    Past Medical History:   Diagnosis Date    Chronic low back pain 02/28/2013    Chronic neck pain 02/28/2013    Closed fracture of right fibula 11/11/2014    Continuous opioid dependence (H) 04/13/2018    Cyst of ovary, unspecified laterality 10/04/2019    History of blood transfusion 07/29/2002    History of hysterectomy     HTN (hypertension)     Kidney stone     Pelvic pain in female 03/23/2016    Pulmonary embolism (H)     Right leg DVT (H)        Past Surgical History:    Past Surgical History:   Procedure Laterality Date    BIOPSY  UNKNOWN    COLONOSCOPY  10/14/2019 " "   COLONOSCOPY N/A 10/14/2019    Procedure: Colonoscopy, With Polypectomy And Biopsy;  Surgeon: Gege Ferrera DO;  Location: [facility] OR    COLONOSCOPY WITH CO2 INSUFFLATION N/A 10/14/2019    Procedure: COLONOSCOPY, WITH CO2 INSUFFLATION;  Surgeon: Gege Ferrera DO;  Location: [facility] OR    COMBINED ESOPHAGOSCOPY, GASTROSCOPY, DUODENOSCOPY (EGD) WITH CO2 INSUFFLATION N/A 10/14/2019    Procedure: ESOPHAGOGASTRODUODENOSCOPY, WITH CO2 INSUFFLATION;  Surgeon: Gege Frerera DO;  Location: [facility] OR    ESOPHAGOSCOPY, GASTROSCOPY, DUODENOSCOPY (EGD), COMBINED N/A 10/14/2019    Procedure: Esophagogastroduodenoscopy, With Biopsy;  Surgeon: Gege Ferrera DO;  Location: [facility] OR    HYSTERECTOMY, PAP NO LONGER INDICATED  11/17/2017    LAPAROSCOPIC GASTRIC SLEEVE N/A 05/11/2022    Procedure: LAPAROSCOPIC, GASTRECTOMY, SLEEVE;  Surgeon: Alex Morelos MD;  Location:  OR    LAPAROSCOPIC HERNIORRHAPHY UMBILICAL N/A 05/11/2022    Procedure: Laparoscopic herniorrhaphy umbilical;  Surgeon: Alex Morelos MD;  Location:  OR    LAPAROSCOPIC LYSIS ADHESIONS N/A 05/11/2022    Procedure: Laparoscopic lysis adhesions;  Surgeon: Alex Morelos MD;  Location:  OR    LAPAROSCOPY DIAGNOSTIC (GYN) N/A 04/12/2016    Procedure: LAPAROSCOPY DIAGNOSTIC (GYN);  Surgeon: Margarito Walker MD;  Location: [facility] OR    LAPAROTOMY MINI, TUBAL LIGATION (POST PARTUM), COMBINED  2005    LITHOTRIPSY  2011    ORTHOPEDIC SURGERY  6/22/2020-3/26/2021    ANKLE SURGERY    UPPER GI ENDOSCOPY  10/14/2019        Physical Exam   Patient Vitals for the past 24 hrs:   BP Temp Temp src Pulse Resp SpO2 Height Weight   06/26/24 1056 133/81 -- -- 90 -- 99 % -- --   06/26/24 0837 105/72 98.9  F (37.2  C) Oral 102 20 98 % 1.575 m (5' 2\") 76 kg (167 lb 8.8 oz)          Abdomen: Soft nontender nondistended    CV: ppi, regular   Resp: speaking in full sentences without any resp distress  Skin: warm dry well perfused  Neuro: Alert, no gross motor or sensory deficits,  gait " stable      Emergency Department Course   ECG        Imaging:  Abd/pelvis CT no contrast - Stone Protocol   Preliminary Result   IMPRESSION:    1.  Mild wall thickening of the sigmoid colon and rectum could   represent a mild proctocolitis. Moderate stool at the sigmoid colon   and rectum.   2.  No abscess or free air.   3.  No other acute abnormality.             Laboratory:  Labs Ordered and Resulted from Time of ED Arrival to Time of ED Departure   COMPREHENSIVE METABOLIC PANEL - Abnormal       Result Value    Sodium 143      Potassium 3.0 (*)     Carbon Dioxide (CO2) 28      Anion Gap 15      Urea Nitrogen 14.4      Creatinine 0.93      GFR Estimate 74      Calcium 8.8      Chloride 100      Glucose 88      Alkaline Phosphatase 64      AST 19      ALT 17      Protein Total 7.3      Albumin 4.3      Bilirubin Total 0.8     ROUTINE UA WITH MICROSCOPIC REFLEX TO CULTURE - Abnormal    Color Urine Yellow      Appearance Urine Slightly Cloudy (*)     Glucose Urine Negative      Bilirubin Urine Negative      Ketones Urine Negative      Specific Gravity Urine 1.022      Blood Urine Trace (*)     pH Urine 6.0      Protein Albumin Urine 30 (*)     Urobilinogen Urine Normal      Nitrite Urine Negative      Leukocyte Esterase Urine Large (*)     Mucus Urine Present (*)     RBC Urine 47 (*)     WBC Urine 168 (*)     Squamous Epithelials Urine 23 (*)    LIPASE - Normal    Lipase 31     CBC WITH PLATELETS AND DIFFERENTIAL    WBC Count 6.5      RBC Count 4.43      Hemoglobin 12.7      Hematocrit 37.9      MCV 86      MCH 28.7      MCHC 33.5      RDW 14.0      Platelet Count 185      % Neutrophils 55      % Lymphocytes 37      % Monocytes 7      % Eosinophils 1      % Basophils 1      % Immature Granulocytes 0      NRBCs per 100 WBC 0      Absolute Neutrophils 3.6      Absolute Lymphocytes 2.4      Absolute Monocytes 0.5      Absolute Eosinophils 0.0      Absolute Basophils 0.0      Absolute Immature Granulocytes 0.0       Absolute NRBCs 0.0     URINE CULTURE        Procedures       Emergency Department Course & Assessments:    Interventions:  Medications   acetaminophen (TYLENOL) tablet 650 mg (650 mg Oral $Given 24 0903)   oxyCODONE (ROXICODONE) tablet 15 mg (15 mg Oral $Given 24 09)   lactated ringers BOLUS 1,000 mL (0 mLs Intravenous Stopped 24 1101)        Assessments:      Independent Interpretation (X-rays, CTs, rhythm strip):  None    Consultations/Discussion of Management or Tests:  None       ED Course as of 24 1121      0848 I reviewed office visit from yesterday when she was seen for well check.       Social Determinants of Health affecting care:   None    Disposition:  The patient was discharged.     Impression & Plan    CMS Diagnoses:     None      MIPS (If applicable):          Medical Decision Makin-year-old female presenting with lower abdominal/pelvis pain.  Workup here shows mild proctocolitis and urinalysis is suspicious for urinary tract infection.  She is stable for discharge home based on vitals exam and appearance here in the emergency department.  Will prescribe cefuroxime.  She is comfortable with that plan understands what is known, what is unknown, what to watch out for, when to return to the emergency department.      Diagnosis:    ICD-10-CM    1. Proctocolitis  K52.9       2. Urinary tract infection in female  N39.0            Discharge Medications:  Discharge Medication List as of 2024 11:01 AM        START taking these medications    Details   cefuroxime (CEFTIN) 500 MG tablet Take 1 tablet (500 mg) by mouth 2 times daily for 7 days, Disp-14 tablet, R-0, E-Prescribe                Chao Acuna MD  2024   Chao Acuna, *       Chao Acuna MD  24 1122

## 2024-06-26 NOTE — ED TRIAGE NOTES
Arrives with complaints of 3 days of abdominal and pelvic pain, also reports not having any BMs for 3 days. Alert and oriented, ABCs intact.     Triage Assessment (Adult)       Row Name 06/26/24 0839          Triage Assessment    Airway WDL WDL        Respiratory WDL    Respiratory WDL WDL        Skin Circulation/Temperature WDL    Skin Circulation/Temperature WDL WDL        Cardiac WDL    Cardiac WDL WDL        Peripheral/Neurovascular WDL    Peripheral Neurovascular WDL WDL        Cognitive/Neuro/Behavioral WDL    Cognitive/Neuro/Behavioral WDL WDL

## 2024-06-27 LAB — BACTERIA UR CULT: NORMAL

## 2024-06-28 ENCOUNTER — MYC REFILL (OUTPATIENT)
Dept: FAMILY MEDICINE | Facility: CLINIC | Age: 49
End: 2024-06-28
Payer: COMMERCIAL

## 2024-06-28 DIAGNOSIS — E66.811 CLASS 1 OBESITY WITH SERIOUS COMORBIDITY AND BODY MASS INDEX (BMI) OF 32.0 TO 32.9 IN ADULT, UNSPECIFIED OBESITY TYPE: ICD-10-CM

## 2024-07-24 ENCOUNTER — MYC REFILL (OUTPATIENT)
Dept: FAMILY MEDICINE | Facility: CLINIC | Age: 49
End: 2024-07-24
Payer: COMMERCIAL

## 2024-07-24 DIAGNOSIS — J30.1 SEASONAL ALLERGIC RHINITIS DUE TO POLLEN: ICD-10-CM

## 2024-07-24 RX ORDER — LORATADINE 10 MG/1
10 TABLET ORAL DAILY
Qty: 90 TABLET | Refills: 2 | Status: SHIPPED | OUTPATIENT
Start: 2024-07-24

## 2024-07-26 ENCOUNTER — MYC REFILL (OUTPATIENT)
Dept: FAMILY MEDICINE | Facility: CLINIC | Age: 49
End: 2024-07-26
Payer: COMMERCIAL

## 2024-07-26 ENCOUNTER — MYC MEDICAL ADVICE (OUTPATIENT)
Dept: FAMILY MEDICINE | Facility: CLINIC | Age: 49
End: 2024-07-26
Payer: COMMERCIAL

## 2024-07-26 DIAGNOSIS — E66.811 CLASS 1 OBESITY WITH SERIOUS COMORBIDITY AND BODY MASS INDEX (BMI) OF 32.0 TO 32.9 IN ADULT, UNSPECIFIED OBESITY TYPE: ICD-10-CM

## 2024-07-26 DIAGNOSIS — F11.90 CHRONIC, CONTINUOUS USE OF OPIOIDS: ICD-10-CM

## 2024-07-26 DIAGNOSIS — G89.29 CHRONIC MIDLINE LOW BACK PAIN WITHOUT SCIATICA: ICD-10-CM

## 2024-07-26 DIAGNOSIS — M54.50 CHRONIC MIDLINE LOW BACK PAIN WITHOUT SCIATICA: ICD-10-CM

## 2024-07-26 RX ORDER — OXYCODONE HYDROCHLORIDE 15 MG/1
15 TABLET ORAL 3 TIMES DAILY PRN
Qty: 90 TABLET | Refills: 0 | Status: SHIPPED | OUTPATIENT
Start: 2024-07-26 | End: 2024-08-26

## 2024-08-02 ENCOUNTER — ANCILLARY PROCEDURE (OUTPATIENT)
Dept: MAMMOGRAPHY | Facility: CLINIC | Age: 49
End: 2024-08-02
Attending: FAMILY MEDICINE
Payer: COMMERCIAL

## 2024-08-02 DIAGNOSIS — Z12.31 VISIT FOR SCREENING MAMMOGRAM: ICD-10-CM

## 2024-08-02 PROCEDURE — 77063 BREAST TOMOSYNTHESIS BI: CPT | Mod: TC | Performed by: RADIOLOGY

## 2024-08-02 PROCEDURE — 77067 SCR MAMMO BI INCL CAD: CPT | Mod: TC | Performed by: RADIOLOGY

## 2024-08-08 ENCOUNTER — APPOINTMENT (OUTPATIENT)
Dept: CT IMAGING | Facility: CLINIC | Age: 49
End: 2024-08-08
Attending: EMERGENCY MEDICINE
Payer: COMMERCIAL

## 2024-08-08 ENCOUNTER — HOSPITAL ENCOUNTER (EMERGENCY)
Facility: CLINIC | Age: 49
Discharge: HOME | End: 2024-08-08
Attending: EMERGENCY MEDICINE | Admitting: EMERGENCY MEDICINE
Payer: COMMERCIAL

## 2024-08-08 VITALS
SYSTOLIC BLOOD PRESSURE: 119 MMHG | DIASTOLIC BLOOD PRESSURE: 81 MMHG | WEIGHT: 164 LBS | OXYGEN SATURATION: 99 % | BODY MASS INDEX: 30 KG/M2 | RESPIRATION RATE: 16 BRPM | TEMPERATURE: 98.2 F | HEART RATE: 100 BPM

## 2024-08-08 DIAGNOSIS — R07.9 CHEST PAIN, UNSPECIFIED TYPE: ICD-10-CM

## 2024-08-08 DIAGNOSIS — E83.42 HYPOMAGNESEMIA: ICD-10-CM

## 2024-08-08 DIAGNOSIS — E87.6 HYPOKALEMIA: ICD-10-CM

## 2024-08-08 LAB
ALBUMIN SERPL BCG-MCNC: 4.2 G/DL (ref 3.5–5.2)
ALP SERPL-CCNC: 61 U/L (ref 40–150)
ALT SERPL W P-5'-P-CCNC: 16 U/L (ref 0–50)
ANION GAP SERPL CALCULATED.3IONS-SCNC: 12 MMOL/L (ref 7–15)
AST SERPL W P-5'-P-CCNC: 10 U/L (ref 0–45)
ATRIAL RATE - MUSE: 106 BPM
BASOPHILS # BLD AUTO: 0 10E3/UL (ref 0–0.2)
BASOPHILS NFR BLD AUTO: 1 %
BILIRUB SERPL-MCNC: 1.3 MG/DL
BUN SERPL-MCNC: 11.4 MG/DL (ref 6–20)
CALCIUM SERPL-MCNC: 9.5 MG/DL (ref 8.8–10.4)
CHLORIDE SERPL-SCNC: 102 MMOL/L (ref 98–107)
CREAT BLD-MCNC: 0.9 MG/DL (ref 0.5–1)
CREAT SERPL-MCNC: 0.93 MG/DL (ref 0.51–0.95)
DIASTOLIC BLOOD PRESSURE - MUSE: NORMAL MMHG
EGFRCR SERPLBLD CKD-EPI 2021: 74 ML/MIN/1.73M2
EGFRCR SERPLBLD CKD-EPI 2021: >60 ML/MIN/1.73M2
EOSINOPHIL # BLD AUTO: 0 10E3/UL (ref 0–0.7)
EOSINOPHIL NFR BLD AUTO: 1 %
ERYTHROCYTE [DISTWIDTH] IN BLOOD BY AUTOMATED COUNT: 13.8 % (ref 10–15)
GLUCOSE SERPL-MCNC: 107 MG/DL (ref 70–99)
HCO3 SERPL-SCNC: 30 MMOL/L (ref 22–29)
HCT VFR BLD AUTO: 34.1 % (ref 35–47)
HGB BLD-MCNC: 11.7 G/DL (ref 11.7–15.7)
IMM GRANULOCYTES # BLD: 0 10E3/UL
IMM GRANULOCYTES NFR BLD: 0 %
INTERPRETATION ECG - MUSE: NORMAL
LACTATE SERPL-SCNC: 1.6 MMOL/L (ref 0.7–2)
LYMPHOCYTES # BLD AUTO: 1.4 10E3/UL (ref 0.8–5.3)
LYMPHOCYTES NFR BLD AUTO: 24 %
MAGNESIUM SERPL-MCNC: 1.6 MG/DL (ref 1.7–2.3)
MCH RBC QN AUTO: 29 PG (ref 26.5–33)
MCHC RBC AUTO-ENTMCNC: 34.3 G/DL (ref 31.5–36.5)
MCV RBC AUTO: 84 FL (ref 78–100)
MONOCYTES # BLD AUTO: 0.4 10E3/UL (ref 0–1.3)
MONOCYTES NFR BLD AUTO: 7 %
NEUTROPHILS # BLD AUTO: 3.9 10E3/UL (ref 1.6–8.3)
NEUTROPHILS NFR BLD AUTO: 67 %
NRBC # BLD AUTO: 0 10E3/UL
NRBC BLD AUTO-RTO: 0 /100
P AXIS - MUSE: 39 DEGREES
PLATELET # BLD AUTO: 163 10E3/UL (ref 150–450)
POTASSIUM SERPL-SCNC: 2.8 MMOL/L (ref 3.4–5.3)
PR INTERVAL - MUSE: 138 MS
PROT SERPL-MCNC: 7.3 G/DL (ref 6.4–8.3)
QRS DURATION - MUSE: 80 MS
QT - MUSE: 344 MS
QTC - MUSE: 456 MS
R AXIS - MUSE: 8 DEGREES
RBC # BLD AUTO: 4.04 10E6/UL (ref 3.8–5.2)
SODIUM SERPL-SCNC: 144 MMOL/L (ref 135–145)
SYSTOLIC BLOOD PRESSURE - MUSE: NORMAL MMHG
T AXIS - MUSE: 14 DEGREES
TROPONIN T SERPL HS-MCNC: <6 NG/L
VENTRICULAR RATE- MUSE: 106 BPM
WBC # BLD AUTO: 5.8 10E3/UL (ref 4–11)

## 2024-08-08 PROCEDURE — 83735 ASSAY OF MAGNESIUM: CPT | Performed by: EMERGENCY MEDICINE

## 2024-08-08 PROCEDURE — 85025 COMPLETE CBC W/AUTO DIFF WBC: CPT | Performed by: EMERGENCY MEDICINE

## 2024-08-08 PROCEDURE — 250N000013 HC RX MED GY IP 250 OP 250 PS 637: Performed by: EMERGENCY MEDICINE

## 2024-08-08 PROCEDURE — 96366 THER/PROPH/DIAG IV INF ADDON: CPT | Performed by: EMERGENCY MEDICINE

## 2024-08-08 PROCEDURE — 93010 ELECTROCARDIOGRAM REPORT: CPT | Performed by: EMERGENCY MEDICINE

## 2024-08-08 PROCEDURE — 99285 EMERGENCY DEPT VISIT HI MDM: CPT | Mod: 25 | Performed by: EMERGENCY MEDICINE

## 2024-08-08 PROCEDURE — 250N000011 HC RX IP 250 OP 636: Performed by: EMERGENCY MEDICINE

## 2024-08-08 PROCEDURE — 93005 ELECTROCARDIOGRAM TRACING: CPT | Performed by: EMERGENCY MEDICINE

## 2024-08-08 PROCEDURE — 83605 ASSAY OF LACTIC ACID: CPT | Performed by: EMERGENCY MEDICINE

## 2024-08-08 PROCEDURE — 96365 THER/PROPH/DIAG IV INF INIT: CPT | Mod: 59 | Performed by: EMERGENCY MEDICINE

## 2024-08-08 PROCEDURE — 96375 TX/PRO/DX INJ NEW DRUG ADDON: CPT | Mod: 59 | Performed by: EMERGENCY MEDICINE

## 2024-08-08 PROCEDURE — 258N000003 HC RX IP 258 OP 636: Performed by: EMERGENCY MEDICINE

## 2024-08-08 PROCEDURE — 84484 ASSAY OF TROPONIN QUANT: CPT | Performed by: EMERGENCY MEDICINE

## 2024-08-08 PROCEDURE — 71275 CT ANGIOGRAPHY CHEST: CPT

## 2024-08-08 PROCEDURE — 36415 COLL VENOUS BLD VENIPUNCTURE: CPT | Performed by: EMERGENCY MEDICINE

## 2024-08-08 PROCEDURE — 82565 ASSAY OF CREATININE: CPT

## 2024-08-08 PROCEDURE — 250N000009 HC RX 250: Performed by: EMERGENCY MEDICINE

## 2024-08-08 PROCEDURE — 80053 COMPREHEN METABOLIC PANEL: CPT | Performed by: EMERGENCY MEDICINE

## 2024-08-08 RX ORDER — POTASSIUM CHLORIDE 1500 MG/1
20 TABLET, EXTENDED RELEASE ORAL 2 TIMES DAILY
Qty: 20 TABLET | Refills: 0 | Status: SHIPPED | OUTPATIENT
Start: 2024-08-08 | End: 2024-09-30

## 2024-08-08 RX ORDER — IOPAMIDOL 755 MG/ML
100 INJECTION, SOLUTION INTRAVASCULAR ONCE
Status: COMPLETED | OUTPATIENT
Start: 2024-08-08 | End: 2024-08-08

## 2024-08-08 RX ORDER — POTASSIUM CHLORIDE 750 MG/1
40 TABLET, EXTENDED RELEASE ORAL ONCE
Status: COMPLETED | OUTPATIENT
Start: 2024-08-08 | End: 2024-08-08

## 2024-08-08 RX ORDER — POTASSIUM CHLORIDE 7.45 MG/ML
10 INJECTION INTRAVENOUS ONCE
Status: COMPLETED | OUTPATIENT
Start: 2024-08-08 | End: 2024-08-08

## 2024-08-08 RX ORDER — DIPHENHYDRAMINE HYDROCHLORIDE 50 MG/ML
50 INJECTION INTRAMUSCULAR; INTRAVENOUS ONCE
Status: COMPLETED | OUTPATIENT
Start: 2024-08-08 | End: 2024-08-08

## 2024-08-08 RX ORDER — KETOROLAC TROMETHAMINE 15 MG/ML
15 INJECTION, SOLUTION INTRAMUSCULAR; INTRAVENOUS ONCE
Status: COMPLETED | OUTPATIENT
Start: 2024-08-08 | End: 2024-08-08

## 2024-08-08 RX ORDER — MAGNESIUM SULFATE HEPTAHYDRATE 40 MG/ML
2 INJECTION, SOLUTION INTRAVENOUS ONCE
Status: COMPLETED | OUTPATIENT
Start: 2024-08-08 | End: 2024-08-08

## 2024-08-08 RX ORDER — ONDANSETRON 2 MG/ML
4 INJECTION INTRAMUSCULAR; INTRAVENOUS ONCE
Status: COMPLETED | OUTPATIENT
Start: 2024-08-08 | End: 2024-08-08

## 2024-08-08 RX ADMIN — KETOROLAC TROMETHAMINE 15 MG: 15 INJECTION, SOLUTION INTRAMUSCULAR; INTRAVENOUS at 09:35

## 2024-08-08 RX ADMIN — POTASSIUM CHLORIDE 10 MEQ: 7.46 INJECTION, SOLUTION INTRAVENOUS at 12:22

## 2024-08-08 RX ADMIN — DIPHENHYDRAMINE HYDROCHLORIDE 50 MG: 50 INJECTION, SOLUTION INTRAMUSCULAR; INTRAVENOUS at 09:35

## 2024-08-08 RX ADMIN — POTASSIUM CHLORIDE 40 MEQ: 750 TABLET, EXTENDED RELEASE ORAL at 11:32

## 2024-08-08 RX ADMIN — SODIUM CHLORIDE 84 ML: 9 INJECTION, SOLUTION INTRAVENOUS at 10:31

## 2024-08-08 RX ADMIN — SODIUM CHLORIDE 1000 ML: 9 INJECTION, SOLUTION INTRAVENOUS at 09:34

## 2024-08-08 RX ADMIN — MAGNESIUM SULFATE HEPTAHYDRATE 2 G: 40 INJECTION, SOLUTION INTRAVENOUS at 11:17

## 2024-08-08 RX ADMIN — IOPAMIDOL 64 ML: 755 INJECTION, SOLUTION INTRAVENOUS at 10:28

## 2024-08-08 RX ADMIN — ONDANSETRON 4 MG: 2 INJECTION INTRAMUSCULAR; INTRAVENOUS at 09:34

## 2024-08-08 ASSESSMENT — ACTIVITIES OF DAILY LIVING (ADL)
ADLS_ACUITY_SCORE: 38

## 2024-08-08 NOTE — DISCHARGE INSTRUCTIONS
Follow-up with your primary care clinic next week for reevaluation and recheck of potassium and magnesium levels.    Return to the emergency department for any problems.
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
## 2024-08-26 ENCOUNTER — MYC REFILL (OUTPATIENT)
Dept: FAMILY MEDICINE | Facility: CLINIC | Age: 49
End: 2024-08-26
Payer: COMMERCIAL

## 2024-08-26 DIAGNOSIS — F11.90 CHRONIC, CONTINUOUS USE OF OPIOIDS: ICD-10-CM

## 2024-08-26 DIAGNOSIS — G89.29 CHRONIC MIDLINE LOW BACK PAIN WITHOUT SCIATICA: ICD-10-CM

## 2024-08-26 DIAGNOSIS — E66.811 CLASS 1 OBESITY WITH SERIOUS COMORBIDITY AND BODY MASS INDEX (BMI) OF 32.0 TO 32.9 IN ADULT, UNSPECIFIED OBESITY TYPE: ICD-10-CM

## 2024-08-26 DIAGNOSIS — M54.50 CHRONIC MIDLINE LOW BACK PAIN WITHOUT SCIATICA: ICD-10-CM

## 2024-08-26 RX ORDER — OXYCODONE HYDROCHLORIDE 15 MG/1
15 TABLET ORAL 3 TIMES DAILY PRN
Qty: 90 TABLET | Refills: 0 | Status: SHIPPED | OUTPATIENT
Start: 2024-08-26 | End: 2024-09-25

## 2024-09-14 ENCOUNTER — PATIENT OUTREACH (OUTPATIENT)
Dept: CARE COORDINATION | Facility: CLINIC | Age: 49
End: 2024-09-14
Payer: COMMERCIAL

## 2024-09-25 ENCOUNTER — MYC REFILL (OUTPATIENT)
Dept: FAMILY MEDICINE | Facility: CLINIC | Age: 49
End: 2024-09-25
Payer: COMMERCIAL

## 2024-09-25 DIAGNOSIS — F11.90 CHRONIC, CONTINUOUS USE OF OPIOIDS: ICD-10-CM

## 2024-09-25 DIAGNOSIS — E66.811 CLASS 1 OBESITY WITH SERIOUS COMORBIDITY AND BODY MASS INDEX (BMI) OF 32.0 TO 32.9 IN ADULT, UNSPECIFIED OBESITY TYPE: ICD-10-CM

## 2024-09-25 DIAGNOSIS — G89.29 CHRONIC MIDLINE LOW BACK PAIN WITHOUT SCIATICA: ICD-10-CM

## 2024-09-25 DIAGNOSIS — M54.50 CHRONIC MIDLINE LOW BACK PAIN WITHOUT SCIATICA: ICD-10-CM

## 2024-09-25 RX ORDER — OXYCODONE HYDROCHLORIDE 15 MG/1
15 TABLET ORAL 3 TIMES DAILY PRN
Qty: 90 TABLET | Refills: 0 | Status: SHIPPED | OUTPATIENT
Start: 2024-09-25

## 2024-09-30 ENCOUNTER — VIRTUAL VISIT (OUTPATIENT)
Dept: FAMILY MEDICINE | Facility: CLINIC | Age: 49
End: 2024-09-30
Attending: FAMILY MEDICINE
Payer: COMMERCIAL

## 2024-09-30 DIAGNOSIS — F11.90 CHRONIC, CONTINUOUS USE OF OPIOIDS: ICD-10-CM

## 2024-09-30 DIAGNOSIS — E83.42 HYPOMAGNESEMIA: ICD-10-CM

## 2024-09-30 DIAGNOSIS — I10 HYPERTENSION GOAL BP (BLOOD PRESSURE) < 140/90: Primary | ICD-10-CM

## 2024-09-30 DIAGNOSIS — Z12.11 SCREEN FOR COLON CANCER: ICD-10-CM

## 2024-09-30 DIAGNOSIS — E66.811 CLASS 1 OBESITY WITH SERIOUS COMORBIDITY AND BODY MASS INDEX (BMI) OF 32.0 TO 32.9 IN ADULT, UNSPECIFIED OBESITY TYPE: ICD-10-CM

## 2024-09-30 DIAGNOSIS — M25.562 LEFT KNEE PAIN, UNSPECIFIED CHRONICITY: ICD-10-CM

## 2024-09-30 DIAGNOSIS — E87.6 HYPOKALEMIA: ICD-10-CM

## 2024-09-30 PROCEDURE — G2211 COMPLEX E/M VISIT ADD ON: HCPCS | Mod: 95 | Performed by: FAMILY MEDICINE

## 2024-09-30 PROCEDURE — 99214 OFFICE O/P EST MOD 30 MIN: CPT | Mod: 95 | Performed by: FAMILY MEDICINE

## 2024-09-30 RX ORDER — LOSARTAN POTASSIUM 100 MG/1
100 TABLET ORAL DAILY
Qty: 90 TABLET | Refills: 0 | Status: SHIPPED | OUTPATIENT
Start: 2024-09-30

## 2024-09-30 NOTE — PROGRESS NOTES
"  If patient has telephone visit, have they been educated on video visit as preferred visit method and offered to change to video visit? N/A        Instructions Relayed to Patient by Virtual Roomer:     Patient is active on [employer]:   Relayed following to patient: \"It looks like you are active on [employer], are you able to join the visit this way? If not, do you need us to send you a link now or would you like your provider to send a link via text or email when they are ready to initiate the visit?\"      Patient Confirmed they will join visit via: [employer]  Reminded patient to ensure they were logged on to virtual visit by arrival time listed.   Asked if patient has flexibility to initiate visit sooner than arrival time: patient is unable to initiate visit earlier than arrival time     If pediatric virtual visit, ensured pediatric patient along with parent/guardian will be present for video visit.     Patient offered the website [email or web address] and/or phone number to [employer] Help line: [phone]      Jeff is a 49 year old who is being evaluated via a billable video visit.    How would you like to obtain your AVS? [employer]  If the video visit is dropped, the invitation should be resent by: Text to cell phone: [phone]  Will anyone else be joining your video visit? No      Assessment & Plan     Hypertension goal BP (blood pressure) < 140/90  Blood pressure has been under decent control but she has been fairly hypokalemic for the past few months and when she was at the emergency department in August it was down to 2.8.  They gave her a couple of days of potassium supplement and told her to follow-up with me and this is her first chance.  So I am certain that the hydrochlorothiazide is the cause behind her hypokalemia and hypomagnesemia.  So I think we just get rid of that part and stick with losartan.  Will need to reassess in 2 to 3 months and make adjustments accordingly.  If we do need a " diuretic in the future we can use a potassium sparing diuretic such as spironolactone or Maxzide   - losartan (COZAAR) 100 MG tablet; Take 1 tablet (100 mg) by mouth daily.  - Basic metabolic panel; Future    Hypokalemia  As above  - Basic metabolic panel; Future    Hypomagnesemia  As above  - Magnesium; Future    Class 1 obesity with serious comorbidity and body mass index (BMI) of 32.0 to 32.9 in adult, unspecified obesity type  Has been doing well with weight loss with Wegovy.  She was very stable at 1 mg/week but insurance is requiring that we trial the next dose up.  I am a little bit hesitant because of potential side effects but we will give it a shot.  - Semaglutide-Weight Management (WEGOVY) 1.7 MG/0.75ML pen; Inject 1.7 mg subcutaneously once a week.    Chronic, continuous use of opioids  Stable and up-to-date on routine monitoring    Left knee pain, unspecified chronicity  Has had a few days of anterior left knee pain and mild puffiness.  No injury.  By her description sounds peripatellar we discussed some local treatments and gentle exercise.  Contact me if not improving.    Screen for colon cancer  The colonoscopy schedules have been trying to get a hold of her.        Regular exercise  See Patient Instructions    Nicole Aguilar is a 49 year old, presenting for the following health issues:  Hospital F/U (ER Visit - [facility] on 8/8/24 for Chest Pain )        9/30/2024     3:03 PM   Additional Questions   Roomed by Pratibha PALOMINO   Accompanied by Self     Video Start Time:  1647    History of Present Illness       Reason for visit:  Emergency Room Follow up   She is taking medications regularly.       ED/UC Followup:    Facility:  [facility]  Date of visit: 8/8/2024  Reason for visit: Chest Pain   Current Status: Pt states she is doing better, will still have flairs of chest pain but not has often as before.     Here today in follow-up of blood pressure, etc. after her visit 2 months ago to the emergency  department.      Review of Systems  Constitutional, HEENT, cardiovascular, pulmonary, gi and gu systems are negative, except as otherwise noted.      Objective           Vitals:  No vitals were obtained today due to virtual visit.    Physical Exam   GENERAL: alert and no distress  EYES: Eyes grossly normal to inspection.  No discharge or erythema, or obvious scleral/conjunctival abnormalities.  RESP: No audible wheeze, cough, or visible cyanosis.    SKIN: Visible skin clear. No significant rash, abnormal pigmentation or lesions.  NEURO: Cranial nerves grossly intact.  Mentation and speech appropriate for age.  PSYCH: Appropriate affect, tone, and pace of words    Past labs reviewed with the patient.     The longitudinal plan of care for the diagnosis(es)/condition(s) as documented were addressed during this visit. Due to the added complexity in care, I will continue to support Jeff in the subsequent management and with ongoing continuity of care.      Video-Visit Details    Type of service:  Video Visit   Video End Time: 1704  Originating Location (pt. Location): Home    Distant Location (provider location):  Off-site  Platform used for Video Visit: Dipika  Signed Electronically by: Jordyn Loredo MD

## 2024-10-11 ENCOUNTER — OFFICE VISIT (OUTPATIENT)
Dept: URGENT CARE | Facility: URGENT CARE | Age: 49
End: 2024-10-11
Payer: COMMERCIAL

## 2024-10-11 VITALS
WEIGHT: 158 LBS | SYSTOLIC BLOOD PRESSURE: 114 MMHG | RESPIRATION RATE: 17 BRPM | OXYGEN SATURATION: 99 % | HEART RATE: 97 BPM | DIASTOLIC BLOOD PRESSURE: 81 MMHG | BODY MASS INDEX: 28.9 KG/M2 | TEMPERATURE: 98.1 F

## 2024-10-11 DIAGNOSIS — M94.0 COSTOCHONDRITIS: Primary | ICD-10-CM

## 2024-10-11 DIAGNOSIS — J06.9 VIRAL URI WITH COUGH: ICD-10-CM

## 2024-10-11 PROCEDURE — 99213 OFFICE O/P EST LOW 20 MIN: CPT | Performed by: FAMILY MEDICINE

## 2024-10-11 ASSESSMENT — ANXIETY QUESTIONNAIRES: GAD7 TOTAL SCORE: 1

## 2024-10-11 NOTE — PATIENT INSTRUCTIONS
Place ice onto the painful areas of the chest for 10 minutes at a time, every 2 hours while awake.      Take tylenol (acetaminophen) for the pain.      Follow up if not better in 10 days.     Go to the emergency room if you experience worsening, severe shortness of breath.
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
## 2024-10-11 NOTE — LETTER
October 11, 2024      Jeff Serra  [id number] [address] [address] [facility] [address]        To Whom It May Concern:    Jeff Serra was seen in our clinic on October 11, 2024. Because of her current illness, please excuse her absences from work from October 9 to October 11, 2024.  She may return to work on October 14, 2024, provided that she is feeling better.        Sincerely,        Blaine Capps MD

## 2024-10-15 ENCOUNTER — IMMUNIZATION (OUTPATIENT)
Dept: FAMILY MEDICINE | Facility: CLINIC | Age: 49
End: 2024-10-15
Payer: COMMERCIAL

## 2024-10-15 DIAGNOSIS — Z23 ENCOUNTER FOR IMMUNIZATION: Primary | ICD-10-CM

## 2024-10-15 PROCEDURE — 90471 IMMUNIZATION ADMIN: CPT

## 2024-10-15 PROCEDURE — 91320 SARSCV2 VAC 30MCG TRS-SUC IM: CPT

## 2024-10-15 PROCEDURE — 90480 ADMN SARSCOV2 VAC 1/ONLY CMP: CPT

## 2024-10-15 PROCEDURE — 90673 RIV3 VACCINE NO PRESERV IM: CPT

## 2024-10-15 PROCEDURE — 99207 PR NO CHARGE NURSE ONLY: CPT

## 2024-10-15 NOTE — PROGRESS NOTES
Prior to immunization administration, verified patients identity using patient s name and date of birth. Please see Immunization Activity for additional information.     Is the patient's temperature normal (100.5 or less)? Yes     Patient MEETS CRITERIA. PROCEED with vaccine administration.          10/15/2024   COVID   Have you had myocarditis or pericarditis (inflammation of or around the heart muscle) after getting a COVID-19 vaccine? No   Have you had a serious reaction to a COVID vaccine or something in a COVID vaccine, like polyethylene glycol (PEG) or polysorbate? No   Have you had multisystem inflammatory syndrome from COVID-19 in the past 90 days? No   Have you received a bone marrow transplant within the previous 3 months? No            Patient MEETS CRITERIA. PROCEED with vaccine administration.            10/15/2024   INFLUENZA   Would you like to receive the flu shot or the nasal flu vaccine today? Flu Shot   Have you had a serious reaction to a flu vaccine or something in a flu vaccine? No   Have you had Guillain-[place] syndrome within 6 weeks of getting a vaccine? No   Have you received a bone marrow transplant within the previous 6 months? No            Patient MEETS CRITERIA. PROCEED with vaccine administration.        Patient instructed to remain in clinic for 15 minutes afterwards, and to report any adverse reactions.      Link to Ancillary Visit Immunization Standing Orders SmartSet     Screening performed by Allan Leija CMA on 10/15/2024 at 4:12 PM.

## 2024-10-22 ENCOUNTER — VIRTUAL VISIT (OUTPATIENT)
Dept: FAMILY MEDICINE | Facility: CLINIC | Age: 49
End: 2024-10-22
Attending: FAMILY MEDICINE
Payer: COMMERCIAL

## 2024-10-22 DIAGNOSIS — G47.00 INSOMNIA, UNSPECIFIED TYPE: ICD-10-CM

## 2024-10-22 DIAGNOSIS — G89.29 CHRONIC MIDLINE LOW BACK PAIN WITHOUT SCIATICA: ICD-10-CM

## 2024-10-22 DIAGNOSIS — F11.90 CHRONIC, CONTINUOUS USE OF OPIOIDS: ICD-10-CM

## 2024-10-22 DIAGNOSIS — Z12.11 SCREEN FOR COLON CANCER: ICD-10-CM

## 2024-10-22 DIAGNOSIS — E66.811 CLASS 1 OBESITY WITH SERIOUS COMORBIDITY AND BODY MASS INDEX (BMI) OF 32.0 TO 32.9 IN ADULT, UNSPECIFIED OBESITY TYPE: ICD-10-CM

## 2024-10-22 DIAGNOSIS — M54.50 CHRONIC MIDLINE LOW BACK PAIN WITHOUT SCIATICA: ICD-10-CM

## 2024-10-22 DIAGNOSIS — Z98.84 S/P LAPAROSCOPIC SLEEVE GASTRECTOMY: ICD-10-CM

## 2024-10-22 DIAGNOSIS — I10 HYPERTENSION GOAL BP (BLOOD PRESSURE) < 140/90: Primary | ICD-10-CM

## 2024-10-22 PROCEDURE — 99213 OFFICE O/P EST LOW 20 MIN: CPT | Mod: 95 | Performed by: FAMILY MEDICINE

## 2024-10-22 PROCEDURE — G2211 COMPLEX E/M VISIT ADD ON: HCPCS | Mod: 95 | Performed by: FAMILY MEDICINE

## 2024-10-22 RX ORDER — LORAZEPAM 0.5 MG/1
0.5 TABLET ORAL EVERY 6 HOURS PRN
Qty: 10 TABLET | Refills: 0 | Status: CANCELLED | OUTPATIENT
Start: 2024-10-22

## 2024-10-22 RX ORDER — OXYCODONE HYDROCHLORIDE 15 MG/1
15 TABLET ORAL 3 TIMES DAILY PRN
Qty: 90 TABLET | Refills: 0 | Status: SHIPPED | OUTPATIENT
Start: 2024-10-22

## 2024-10-22 RX ORDER — ZOLPIDEM TARTRATE 10 MG/1
TABLET ORAL
Qty: 90 TABLET | Refills: 1 | Status: SHIPPED | OUTPATIENT
Start: 2024-10-22

## 2024-10-22 NOTE — PROGRESS NOTES
"  If patient has telephone visit, have they been educated on video visit as preferred visit method and offered to change to video visit? N/A        Instructions Relayed to Patient by Virtual Roomer:     Patient is active on [employer]:   Relayed following to patient: \"It looks like you are active on [employer], are you able to join the visit this way? If not, do you need us to send you a link now or would you like your provider to send a link via text or email when they are ready to initiate the visit?\"      Patient Confirmed they will join visit via: [employer]  Reminded patient to ensure they were logged on to virtual visit by arrival time listed.   Asked if patient has flexibility to initiate visit sooner than arrival time: patient stated yes, documented in appointment notes availability to initiate visit earlier than arrival time     If pediatric virtual visit, ensured pediatric patient along with parent/guardian will be present for video visit.     Patient offered the website [email or web address] and/or phone number to [employer] Help line: [phone]    Jeff is a 49 year old who is being evaluated via a billable video visit.    How would you like to obtain your AVS? [facility]  If the video visit is dropped, the invitation should be resent by: Text to cell phone: [phone]  Will anyone else be joining your video visit? No      Assessment & Plan     Hypertension goal BP (blood pressure) < 140/90  We stopped her diuretic last visit.  Has not been following blood pressure so I like her to do so and report back to me.  But she is not experiencing any significant swelling.  This was all because of her being fairly hypokalemic for some time.  So during the next few months I also want her to have her lab work done so we can follow-up on the potassium level.    Class 1 obesity with serious comorbidity and body mass index (BMI) of 32.0 to 32.9 in adult, unspecified obesity type  Doing great with continued " weight loss and no significant side effects.  Will continue same dosage and close follow-up  - Semaglutide-Weight Management (WEGOVY) 1.7 MG/0.75ML pen; Inject 1.7 mg subcutaneously once a week.    S/P laparoscopic sleeve gastrectomy  Stable on current regimen.  Continue same plan and routine follow-up.   - omeprazole (PRILOSEC) 20 MG DR capsule; Take 1 capsule (20 mg) by mouth every morning (before breakfast).    Chronic midline low back pain without sciatica  Stable and up-to-date on routine monitoring  - oxyCODONE IR (ROXICODONE) 15 MG tablet; Take 1 tablet (15 mg) by mouth 3 times daily as needed for pain.    Chronic, continuous use of opioids  As above  - oxyCODONE IR (ROXICODONE) 15 MG tablet; Take 1 tablet (15 mg) by mouth 3 times daily as needed for pain.    Insomnia, unspecified type  Stable on current regimen.  Continue same plan and routine follow-up.   - zolpidem (AMBIEN) 10 MG tablet; TAKE ONE TABLET BY MOUTH NIGHTLY AS NEEDED FOR SLEEP    Screen for colon cancer  Due for 5-year colonoscopy and will help set this up  - Colonoscopy Screening  Referral; Future        MED REC REQUIRED  Post Medication Reconciliation Status:     Regular exercise  See Patient Instructions    Nicole Aguilar is a 49 year old, presenting for the following health issues:  Hospital F/U        10/22/2024     2:08 PM   Additional Questions   Roomed by Zunilda WILLIAMSON   Accompanied by Self         10/22/2024     2:08 PM   Patient Reported Additional Medications   Patient reports taking the following new medications None     Video Start Time:  1713    History of Present Illness       Reason for visit:  Urgent care/Hospital follow up    She eats 2-3 servings of fruits and vegetables daily.She consumes 0 sweetened beverage(s) daily.She exercises with enough effort to increase her heart rate 9 or less minutes per day.  She exercises with enough effort to increase her heart rate 3 or less days per week.   She is taking  medications regularly.         Video visit patient had follow-up on hypertension and other issues as above.  Doing well.  Reports no interval health concerns.        Review of Systems  Constitutional, HEENT, cardiovascular, pulmonary, gi and gu systems are negative, except as otherwise noted.      Objective           Vitals:  No vitals were obtained today due to virtual visit.    Physical Exam   GENERAL: alert and no distress  EYES: Eyes grossly normal to inspection.  No discharge or erythema, or obvious scleral/conjunctival abnormalities.  RESP: No audible wheeze, cough, or visible cyanosis.    SKIN: Visible skin clear. No significant rash, abnormal pigmentation or lesions.  NEURO: Cranial nerves grossly intact.  Mentation and speech appropriate for age.  PSYCH: Appropriate affect, tone, and pace of words    Past labs reviewed with the patient.     The longitudinal plan of care for the diagnosis(es)/condition(s) as documented were addressed during this visit. Due to the added complexity in care, I will continue to support Jeff in the subsequent management and with ongoing continuity of care.      Video-Visit Details    Type of service:  Video Visit   Video End Time: 1717  Originating Location (pt. Location): Home    Distant Location (provider location):  Off-site  Platform used for Video Visit: Dipika  Signed Electronically by: Jordyn Loredo MD

## 2024-11-04 ENCOUNTER — TELEPHONE (OUTPATIENT)
Dept: GASTROENTEROLOGY | Facility: CLINIC | Age: 49
End: 2024-11-04
Payer: COMMERCIAL

## 2024-11-04 DIAGNOSIS — Z12.11 SPECIAL SCREENING FOR MALIGNANT NEOPLASMS, COLON: Primary | ICD-10-CM

## 2024-11-04 NOTE — TELEPHONE ENCOUNTER
"Endoscopy Scheduling Screen    Have you had any respiratory illness or flu-like symptoms in the last 10 days?  No    What is your communication preference for Instructions and/or Bowel Prep?   MyChart    What insurance is in the chart?  Other:  [facility]    Ordering/Referring Provider:   PATRICIA RAYMOND        (If ordering provider performs procedure, schedule with ordering provider unless otherwise instructed. )    BMI: Estimated body mass index is 28.9 kg/m  as calculated from the following:    Height as of 6/26/24: 1.575 m (5' 2\").    Weight as of 10/11/24: 71.7 kg (158 lb).     Sedation Ordered  moderate sedation.   If patient BMI > 50 do not schedule in [facility].    If patient BMI > 45 do not schedule at [facility].    Are you taking methadone or Suboxone?  NO, No RN review required.    Have you been diagnosed and are being treated for severe PTSD or severe anxiety?  NO, No RN review required.    Are you taking any prescription medications for pain 3 or more times per week?   NO, No RN review required.--PRN, takes less often    Do you have a history of malignant hyperthermia?  No    (Females) Are you currently pregnant?   No     Have you been diagnosed or told you have pulmonary hypertension?   No    Do you have an LVAD?  No    Have you been told you have moderate to severe sleep apnea?  No.    Have you been told you have COPD, asthma, or any other lung disease?  No    Do you have any heart conditions?  No     Have you ever had or are you waiting for an organ transplant?  No. Continue scheduling, no site restrictions.    Have you had a stroke or transient ischemic attack (TIA aka \"mini stroke\" in the last 6 months?   No    Have you been diagnosed with or been told you have cirrhosis of the liver?   No.    Are you currently on dialysis?   No    Do you need assistance transferring?   No    BMI: Estimated body mass index is 28.9 kg/m  as calculated from the following:    Height as of 6/26/24: 1.575 m (5' 2\").    Weight as " of 10/11/24: 71.7 kg (158 lb).     Is patients BMI > 40 and scheduling location [facility]?  No    Do you take an injectable or oral medication for weight loss or diabetes (excluding insulin)?  Yes, hold time can be up to 7 days. Please consult with you prescribing provider to discuss endoscopy recommendations. (Please schedule at least 7 days out.)    Do you take the medication Naltrexone?  No    Do you take blood thinners?  No       Prep   Are you currently on dialysis or do you have chronic kidney disease?  No    Do you have a diagnosis of diabetes?  No    Do you have a diagnosis of cystic fibrosis (CF)?  No    On a regular basis do you go 3 -5 days between bowel movements?  No    BMI > 40?  No    Preferred Pharmacy:      [facility] [place] [facility] - [address]   [address]   [address]  [place] [address]  Phone: [phone] Fax: [phone]          Final Scheduling Details     Procedure scheduled  Colonoscopy    Surgeon:  Kosta     Date of procedure:  11/26     Pre-OP / PAC:   No - Not required for this site.    Location  [facility] - Patient preference.    Sedation   Moderate Sedation - Per order.      Patient Reminders:   You will receive a call from a Nurse to review instructions and health history.  This assessment must be completed prior to your procedure.  Failure to complete the Nurse assessment may result in the procedure being cancelled.      On the day of your procedure, please designate an adult(s) who can drive you home stay with you for the next 24 hours. The medicines used in the exam will make you sleepy. You will not be able to drive.      You cannot take public transportation, ride share services, or non-medical taxi service without a responsible caregiver.  Medical transport services are allowed with the requirement that a responsible caregiver will receive you at your destination.  We require that drivers and caregivers are confirmed prior to your procedure.

## 2024-11-05 RX ORDER — BISACODYL 5 MG/1
TABLET, DELAYED RELEASE ORAL
Qty: 4 TABLET | Refills: 0 | Status: SHIPPED | OUTPATIENT
Start: 2024-11-05

## 2024-11-05 NOTE — TELEPHONE ENCOUNTER
Extended Golytely Bowel Prep  recommended due to chronic pain medication noted in chart.  and GLP-1 agonist medication noted in chart.  Instructions were sent via [employer]. Bowel prep was sent 11/5/2024 to    [facility] - [address]

## 2024-11-13 ENCOUNTER — TELEPHONE (OUTPATIENT)
Dept: GASTROENTEROLOGY | Facility: CLINIC | Age: 49
End: 2024-11-13
Payer: COMMERCIAL

## 2024-11-13 NOTE — TELEPHONE ENCOUNTER
Please discuss pain medication with patient. Oxycodone on medication list, recent refill.     --------------------------------------------------------------------------------------------------------------------    Pre visit planning completed.      Procedure details:    Patient scheduled for Colonoscopy on 11/26/2024.     Arrival time: 0900. Procedure time 0945    Facility location: [facility]; [address] [place] [address]. Check in location: Main entrance, door #1 on the North side of the building under roundabout awning. DO NOT GO TO SURGERY/ED ENTRANCE.     Sedation type: Conscious sedation     Pre op exam needed? No.    Indication for procedure: Screening       Chart review:     Electronic implanted devices? No    Recent diagnosis of diverticulitis within the last 6 weeks? No      Medication review:    Diabetic? No    Anticoagulants? No    Weight loss medication/injectable? Yes. Semaglutide-Weight Management (WEGOVY). Weekly dosing of medication.  HOLD 7 days before procedure.  Follow up with managing provider.     Other medication HOLDING recommendations:  N/A      Prep for procedure:     Bowel prep recommendation: Extended Golytely. Bowel prep prescription sent by CRC nurse to    [facility] - [address]     Due to: GLP-1 agonist medication noted in chart.     Prep instructions sent via [employer]         Latricia Tran RN  Endoscopy Procedure Pre Assessment   [phone] option 2

## 2024-11-14 ENCOUNTER — HOSPITAL ENCOUNTER (EMERGENCY)
Facility: CLINIC | Age: 49
Discharge: HOME | End: 2024-11-14
Attending: EMERGENCY MEDICINE | Admitting: EMERGENCY MEDICINE
Payer: COMMERCIAL

## 2024-11-14 ENCOUNTER — APPOINTMENT (OUTPATIENT)
Dept: GENERAL RADIOLOGY | Facility: CLINIC | Age: 49
End: 2024-11-14
Attending: EMERGENCY MEDICINE
Payer: COMMERCIAL

## 2024-11-14 VITALS
TEMPERATURE: 98.5 F | HEIGHT: 62 IN | OXYGEN SATURATION: 98 % | WEIGHT: 158 LBS | RESPIRATION RATE: 11 BRPM | SYSTOLIC BLOOD PRESSURE: 114 MMHG | DIASTOLIC BLOOD PRESSURE: 72 MMHG | BODY MASS INDEX: 29.08 KG/M2 | HEART RATE: 107 BPM

## 2024-11-14 DIAGNOSIS — J02.9 PHARYNGITIS, UNSPECIFIED ETIOLOGY: ICD-10-CM

## 2024-11-14 DIAGNOSIS — R07.9 CHEST PAIN, UNSPECIFIED TYPE: ICD-10-CM

## 2024-11-14 LAB
ALBUMIN SERPL BCG-MCNC: 4.2 G/DL (ref 3.5–5.2)
ALP SERPL-CCNC: 64 U/L (ref 40–150)
ALT SERPL W P-5'-P-CCNC: 17 U/L (ref 0–50)
ANION GAP SERPL CALCULATED.3IONS-SCNC: 13 MMOL/L (ref 7–15)
AST SERPL W P-5'-P-CCNC: 15 U/L (ref 0–45)
ATRIAL RATE - MUSE: 115 BPM
BASOPHILS # BLD AUTO: 0 10E3/UL (ref 0–0.2)
BASOPHILS NFR BLD AUTO: 0 %
BILIRUB DIRECT SERPL-MCNC: <0.2 MG/DL (ref 0–0.3)
BILIRUB SERPL-MCNC: 0.9 MG/DL
BUN SERPL-MCNC: 4.1 MG/DL (ref 6–20)
CALCIUM SERPL-MCNC: 8.4 MG/DL (ref 8.8–10.4)
CHLORIDE SERPL-SCNC: 104 MMOL/L (ref 98–107)
CREAT SERPL-MCNC: 0.84 MG/DL (ref 0.51–0.95)
D DIMER PPP FEU-MCNC: 0.27 UG/ML FEU (ref 0–0.5)
DIASTOLIC BLOOD PRESSURE - MUSE: NORMAL MMHG
EGFRCR SERPLBLD CKD-EPI 2021: 84 ML/MIN/1.73M2
EOSINOPHIL # BLD AUTO: 0 10E3/UL (ref 0–0.7)
EOSINOPHIL NFR BLD AUTO: 0 %
ERYTHROCYTE [DISTWIDTH] IN BLOOD BY AUTOMATED COUNT: 14.3 % (ref 10–15)
FLUAV RNA SPEC QL NAA+PROBE: NEGATIVE
FLUBV RNA RESP QL NAA+PROBE: NEGATIVE
GLUCOSE SERPL-MCNC: 91 MG/DL (ref 70–99)
GROUP A STREP BY PCR: NOT DETECTED
HCO3 SERPL-SCNC: 26 MMOL/L (ref 22–29)
HCT VFR BLD AUTO: 35.4 % (ref 35–47)
HGB BLD-MCNC: 11.7 G/DL (ref 11.7–15.7)
IMM GRANULOCYTES # BLD: 0.1 10E3/UL
IMM GRANULOCYTES NFR BLD: 0 %
INTERPRETATION ECG - MUSE: NORMAL
LIPASE SERPL-CCNC: 31 U/L (ref 13–60)
LYMPHOCYTES # BLD AUTO: 1.7 10E3/UL (ref 0.8–5.3)
LYMPHOCYTES NFR BLD AUTO: 14 %
MAGNESIUM SERPL-MCNC: 1.7 MG/DL (ref 1.7–2.3)
MCH RBC QN AUTO: 28 PG (ref 26.5–33)
MCHC RBC AUTO-ENTMCNC: 33.1 G/DL (ref 31.5–36.5)
MCV RBC AUTO: 85 FL (ref 78–100)
MONOCYTES # BLD AUTO: 0.8 10E3/UL (ref 0–1.3)
MONOCYTES NFR BLD AUTO: 7 %
NEUTROPHILS # BLD AUTO: 9.3 10E3/UL (ref 1.6–8.3)
NEUTROPHILS NFR BLD AUTO: 79 %
NRBC # BLD AUTO: 0 10E3/UL
NRBC BLD AUTO-RTO: 0 /100
P AXIS - MUSE: 35 DEGREES
PLATELET # BLD AUTO: 178 10E3/UL (ref 150–450)
POTASSIUM SERPL-SCNC: 3.1 MMOL/L (ref 3.4–5.3)
PR INTERVAL - MUSE: 154 MS
PROT SERPL-MCNC: 7 G/DL (ref 6.4–8.3)
QRS DURATION - MUSE: 78 MS
QT - MUSE: 330 MS
QTC - MUSE: 456 MS
R AXIS - MUSE: 10 DEGREES
RBC # BLD AUTO: 4.18 10E6/UL (ref 3.8–5.2)
RSV RNA SPEC NAA+PROBE: NEGATIVE
SARS-COV-2 RNA RESP QL NAA+PROBE: NEGATIVE
SODIUM SERPL-SCNC: 143 MMOL/L (ref 135–145)
SYSTOLIC BLOOD PRESSURE - MUSE: NORMAL MMHG
T AXIS - MUSE: 9 DEGREES
TROPONIN T SERPL HS-MCNC: <6 NG/L
VENTRICULAR RATE- MUSE: 115 BPM
WBC # BLD AUTO: 11.9 10E3/UL (ref 4–11)

## 2024-11-14 PROCEDURE — 250N000011 HC RX IP 250 OP 636: Performed by: EMERGENCY MEDICINE

## 2024-11-14 PROCEDURE — 82435 ASSAY OF BLOOD CHLORIDE: CPT | Performed by: EMERGENCY MEDICINE

## 2024-11-14 PROCEDURE — 80048 BASIC METABOLIC PNL TOTAL CA: CPT | Performed by: EMERGENCY MEDICINE

## 2024-11-14 PROCEDURE — 85041 AUTOMATED RBC COUNT: CPT | Performed by: EMERGENCY MEDICINE

## 2024-11-14 PROCEDURE — 85379 FIBRIN DEGRADATION QUANT: CPT | Performed by: EMERGENCY MEDICINE

## 2024-11-14 PROCEDURE — 83690 ASSAY OF LIPASE: CPT | Performed by: EMERGENCY MEDICINE

## 2024-11-14 PROCEDURE — 96374 THER/PROPH/DIAG INJ IV PUSH: CPT

## 2024-11-14 PROCEDURE — 99285 EMERGENCY DEPT VISIT HI MDM: CPT | Mod: 25

## 2024-11-14 PROCEDURE — 87637 SARSCOV2&INF A&B&RSV AMP PRB: CPT | Performed by: EMERGENCY MEDICINE

## 2024-11-14 PROCEDURE — 82040 ASSAY OF SERUM ALBUMIN: CPT | Performed by: EMERGENCY MEDICINE

## 2024-11-14 PROCEDURE — 84155 ASSAY OF PROTEIN SERUM: CPT | Performed by: EMERGENCY MEDICINE

## 2024-11-14 PROCEDURE — 93005 ELECTROCARDIOGRAM TRACING: CPT

## 2024-11-14 PROCEDURE — 85018 HEMOGLOBIN: CPT | Performed by: EMERGENCY MEDICINE

## 2024-11-14 PROCEDURE — 258N000003 HC RX IP 258 OP 636: Performed by: EMERGENCY MEDICINE

## 2024-11-14 PROCEDURE — 250N000013 HC RX MED GY IP 250 OP 250 PS 637: Performed by: EMERGENCY MEDICINE

## 2024-11-14 PROCEDURE — 85025 COMPLETE CBC W/AUTO DIFF WBC: CPT | Performed by: EMERGENCY MEDICINE

## 2024-11-14 PROCEDURE — 84484 ASSAY OF TROPONIN QUANT: CPT | Performed by: EMERGENCY MEDICINE

## 2024-11-14 PROCEDURE — 87651 STREP A DNA AMP PROBE: CPT | Performed by: EMERGENCY MEDICINE

## 2024-11-14 PROCEDURE — 83735 ASSAY OF MAGNESIUM: CPT | Performed by: EMERGENCY MEDICINE

## 2024-11-14 PROCEDURE — 36415 COLL VENOUS BLD VENIPUNCTURE: CPT | Performed by: EMERGENCY MEDICINE

## 2024-11-14 PROCEDURE — 71046 X-RAY EXAM CHEST 2 VIEWS: CPT

## 2024-11-14 PROCEDURE — 96361 HYDRATE IV INFUSION ADD-ON: CPT

## 2024-11-14 PROCEDURE — 84295 ASSAY OF SERUM SODIUM: CPT | Performed by: EMERGENCY MEDICINE

## 2024-11-14 RX ORDER — KETOROLAC TROMETHAMINE 15 MG/ML
15 INJECTION, SOLUTION INTRAMUSCULAR; INTRAVENOUS ONCE
Status: COMPLETED | OUTPATIENT
Start: 2024-11-14 | End: 2024-11-14

## 2024-11-14 RX ORDER — ACETAMINOPHEN 325 MG/1
975 TABLET ORAL ONCE
Status: COMPLETED | OUTPATIENT
Start: 2024-11-14 | End: 2024-11-14

## 2024-11-14 RX ORDER — POTASSIUM CHLORIDE 1500 MG/1
40 TABLET, EXTENDED RELEASE ORAL ONCE
Status: COMPLETED | OUTPATIENT
Start: 2024-11-14 | End: 2024-11-14

## 2024-11-14 RX ADMIN — SODIUM CHLORIDE 1000 ML: 9 INJECTION, SOLUTION INTRAVENOUS at 08:30

## 2024-11-14 RX ADMIN — POTASSIUM CHLORIDE 40 MEQ: 1500 TABLET, EXTENDED RELEASE ORAL at 10:16

## 2024-11-14 RX ADMIN — KETOROLAC TROMETHAMINE 15 MG: 15 INJECTION, SOLUTION INTRAMUSCULAR; INTRAVENOUS at 08:24

## 2024-11-14 RX ADMIN — ACETAMINOPHEN 975 MG: 325 TABLET, FILM COATED ORAL at 08:57

## 2024-11-14 ASSESSMENT — COLUMBIA-SUICIDE SEVERITY RATING SCALE - C-SSRS
2. HAVE YOU ACTUALLY HAD ANY THOUGHTS OF KILLING YOURSELF IN THE PAST MONTH?: NO
1. IN THE PAST MONTH, HAVE YOU WISHED YOU WERE DEAD OR WISHED YOU COULD GO TO SLEEP AND NOT WAKE UP?: NO
6. HAVE YOU EVER DONE ANYTHING, STARTED TO DO ANYTHING, OR PREPARED TO DO ANYTHING TO END YOUR LIFE?: NO

## 2024-11-14 ASSESSMENT — ACTIVITIES OF DAILY LIVING (ADL)
ADLS_ACUITY_SCORE: 0

## 2024-11-14 NOTE — DISCHARGE INSTRUCTIONS
What do you do next:   Continue your home medications unless we have specifically changed them  If medications were prescribed today, take these as directed.  You can use over-the-counter acetaminophen (Tylenol ) for fever or pain control as applicable to your visit today.  Acetaminophen (Tylenol): Take 500 to 1000 mg by mouth every 6 hours as needed for fever or pain.  Do not take more than 4000 total milligrams of acetaminophen-containing products in a 24-hour timeframe.  Follow up as indicated below    When do you return: Review your discharge papers for specifics on reasons to return.    Thank you for allowing us to care for you today.

## 2024-11-14 NOTE — ED TRIAGE NOTES
"    pt arrives complaining of chest pain, shortness of breath and sore throat. \"Can't hardly swallow.\" Took nyquil before bed.     "

## 2024-11-14 NOTE — ED PROVIDER NOTES
"Emergency Department Note      Code Status: Prior    History of Present Illness     Chief Complaint:  Chest Pain and Shortness of Breath    The history is provided by the patient.      Jeff Serra is a 49 year old female with a history of hypertension, DVT, and PE presenting with constant, right sided chest pain and shortness of breath. The patient notes she was experiencing chest pain all day yesterday (11/13/24). The patient reports her chest pain worsened associated with the onset of shortness of breath 20 minutes prior to arrival (0600). She notes difficulty swallowing, throat pain, and dry cough. She recorded a temperature of 99.0 F yesterday. Jeff denies ear pain, vomiting, or nausea. No recent injuries. No known ill contacts. No pain medications to manage her symptoms. Of note, the patient takes losartan and insulin injections regularly.     Independent Historian:    None    Review of External Notes  None    Past Medical History   Medical History, Surgical History, Problem List, and Medications  Reviewed in Epic    Physical Exam   Patient Vitals for the past 24 hrs:   BP Temp Temp src Pulse Resp SpO2 Height Weight   11/14/24 1020 114/72 -- -- 107 11 -- -- --   11/14/24 1017 114/72 98.5  F (36.9  C) -- 98 11 -- -- --   11/14/24 0934 121/78 99.5  F (37.5  C) Oral 102 15 98 % -- --   11/14/24 0930 121/78 -- -- 99 17 -- -- --   11/14/24 0915 128/75 -- -- 110 19 -- -- --   11/14/24 0900 137/83 -- -- 112 21 -- -- --   11/14/24 0857 137/83 -- -- 105 15 -- -- --   11/14/24 0845 132/84 -- -- 109 21 -- -- --   11/14/24 0830 (!) 129/92 -- -- 109 14 -- -- --   11/14/24 0730 -- -- -- 109 11 98 % -- --   11/14/24 0715 -- -- -- 111 12 98 % -- --   11/14/24 0700 -- -- -- 112 19 100 % -- --   11/14/24 0645 -- -- -- -- 18 -- -- --   11/14/24 0630 (!) 128/95 100.4  F (38  C) Oral 120 20 96 % 1.575 m (5' 2\") 71.7 kg (158 lb)       Physical Exam  Constitutional: Vital signs reviewed as above.   Eyes: PEERL, EOMI " B/L  Neck: No JVD noted. FROM   Cardiovascular: tachycardic rate, Regular rhythm and normal heart sounds.  No murmur heard. Equal B/L peripheral pulses.  Pulmonary/Chest: Effort normal and breath sounds normal. No respiratory distress. Patient has no wheezes. Patient has no rales.   Gastrointestinal: Soft. There is no tenderness.   Musculoskeletal/Extremities: No pitting edema noted. Normal tone.  Skin: Skin is warm and dry. There is no diaphoresis noted.   Psychiatric: The patient appears calm.     Diagnostics     Laboratory: Imaging:   Labs Ordered and Resulted from Time of ED Arrival to Time of ED Departure   BASIC METABOLIC PANEL - Abnormal       Result Value    Sodium 143      Potassium 3.1 (*)     Chloride 104      Carbon Dioxide (CO2) 26      Anion Gap 13      Urea Nitrogen 4.1 (*)     Creatinine 0.84      GFR Estimate 84      Calcium 8.4 (*)     Glucose 91     CBC WITH PLATELETS AND DIFFERENTIAL - Abnormal    WBC Count 11.9 (*)     RBC Count 4.18      Hemoglobin 11.7      Hematocrit 35.4      MCV 85      MCH 28.0      MCHC 33.1      RDW 14.3      Platelet Count 178      % Neutrophils 79      % Lymphocytes 14      % Monocytes 7      % Eosinophils 0      % Basophils 0      % Immature Granulocytes 0      NRBCs per 100 WBC 0      Absolute Neutrophils 9.3 (*)     Absolute Lymphocytes 1.7      Absolute Monocytes 0.8      Absolute Eosinophils 0.0      Absolute Basophils 0.0      Absolute Immature Granulocytes 0.1      Absolute NRBCs 0.0     TROPONIN T, HIGH SENSITIVITY - Normal    Troponin T, High Sensitivity <6     MAGNESIUM - Normal    Magnesium 1.7     D DIMER QUANTITATIVE - Normal    D-Dimer Quantitative 0.27     HEPATIC FUNCTION PANEL - Normal    Protein Total 7.0      Albumin 4.2      Bilirubin Total 0.9      Alkaline Phosphatase 64      AST 15      ALT 17      Bilirubin Direct <0.20     LIPASE - Normal    Lipase 31     INFLUENZA A/B, RSV AND SARS-COV2 PCR - Normal    Influenza A PCR Negative      Influenza B  PCR Negative      RSV PCR Negative      SARS CoV2 PCR Negative     GROUP A STREPTOCOCCUS PCR THROAT SWAB - Normal    Group A strep by PCR Not Detected       Chest XR,  PA & LAT   Final Result   IMPRESSION: Cardiac silhouette is within normal limits. Artifact from   patient's hair overlying the right chest limits the exam. No definite   focal airspace consolidation, pleural effusion, or pneumothorax within   these limitations.      LLOYD FLANAGAN MD            SYSTEM ID:  [id number]            EKG   ECG taken at 0644, ECG read at 0644  Sinus tachycardia   Low voltage QRS  Borderline ECG   Tachycardic as compared to prior, dated 4/26/23.  Rate 115 bpm. LA interval 154 ms. QRS duration 78 ms. QT/QTc 330/456 ms. P-R-T axes 35 10 9.    Independent Interpretation  See ED course    ED Course    Medications Administered  Medications   ketorolac (TORADOL) injection 15 mg (15 mg Intravenous $Given 11/14/24 0824)   sodium chloride 0.9% BOLUS 1,000 mL (0 mLs Intravenous Stopped 11/14/24 1027)   acetaminophen (TYLENOL) tablet 975 mg (975 mg Oral $Given 11/14/24 0857)   potassium chloride corin ER (KLOR-CON M20) CR tablet 40 mEq (40 mEq Oral $Given 11/14/24 1016)     Procedures  None     Discussion of Management  See ED Course    ED Course  ED Course as of 11/14/24 1441   Thu Nov 14, 2024   0636 I obtained the history and examined the patient as noted above.      0815 Chest XR,  PA & LAT  My interpretation: No gross PTX or lobar infiltrate.   0816 Rechecked and updated.    1023 Rechecked and updated.     Optional/Additional Documentation: None    Medical Decision Making / Diagnosis     MIPS     None    Medical Decision Making:  Jeff Serra is a 49 year old female presented to the Emergency Department with a complaint of chest pain. Fortunately the workup in the ED has been unremarkable and at this time I am not concerned for ACS. The EKG shows sinus tachycardia. The troponin is negative. Based on the [facility]  high sensitivity troponin pathway, this should rule the patient out for myocardial injury.    I considered other possible causes of chest pain including PE (negative d-dimer), infection, pneumothorax, aortic dissection, inflammatory conditions (percarditis, etc.) and even more benign causes such as reflux and esophageal motility issues. The physical exam, laboratory, and radiological findings listed above make life threatening conditions less likely. At this time I believe the patient is safe for discharge. I have encouraged close outpatient follow up.     Anticipatory guidance given prior to discharge.    Critical Care:  None.    Disposition:  See ED Course and MDM    ICD-10 Codes:    ICD-10-CM    1. Chest pain, unspecified type  R07.9       2. Pharyngitis, unspecified etiology  J02.9          Discharge Medications:  Discharge Medication List as of 11/14/2024 10:28 AM         Scribe Disclosure:  ISilvia, am serving as a scribe at 7:10 AM on 11/14/2024 to document services personally performed by Irving Salinas DO based on my observations and the provider's statements to me.    11/14/2024   Irving Salinas DO     [employer]                    Irving Salinas DO  11/14/24 [id number]

## 2024-11-15 NOTE — TELEPHONE ENCOUNTER
Attempted to contact patient in order to complete pre assessment questions.     No answer. Left message to return call to [phone] option 2.    Callback required communication sent via [employer].    Please verify pain medication per prior note, if taking opioid pain meds 3 or more times per week, would recommend rescheduling with [facility].      Pam Levine, SHALINI  Endoscopy Procedure Pre Assessment

## 2024-11-15 NOTE — TELEPHONE ENCOUNTER
Pre assessment completed for upcoming procedure.   (Please see previous telephone encounter notes for complete details)    Patient  returned call.       Procedure details:    Arrival time and facility location reviewed.    Pre op exam needed? No.    Designated  policy reviewed. Instructed to have someone stay 6  hours post procedure.       Medication review:    Weight loss injectable medication: Semaglutide-Weight Management (WEGOVY). Weekly dosing of medication.  HOLD 7 days before procedure.  Follow up with managing provider.       Prep for procedure:     Procedure prep instructions reviewed.        Any additional information needed:  Patient returned PA call.  Reports that she is using oxycodone for pain, but only using a couple times per week as needed.  Patient did have ER visit for chest pain on 11/14/2024, will message for review.  No cardiology follow up recommended by ER provider.        Patient  verbalized understanding and had no questions or concerns at this time.      Mary Briscoe RN  Endoscopy Procedure Pre Assessment   [phone] option 2

## 2024-11-18 ENCOUNTER — APPOINTMENT (OUTPATIENT)
Dept: CT IMAGING | Facility: CLINIC | Age: 49
End: 2024-11-18
Attending: EMERGENCY MEDICINE
Payer: COMMERCIAL

## 2024-11-18 ENCOUNTER — APPOINTMENT (OUTPATIENT)
Dept: GENERAL RADIOLOGY | Facility: CLINIC | Age: 49
End: 2024-11-18
Attending: EMERGENCY MEDICINE
Payer: COMMERCIAL

## 2024-11-18 ENCOUNTER — HOSPITAL ENCOUNTER (EMERGENCY)
Facility: CLINIC | Age: 49
Discharge: HOME | End: 2024-11-18
Attending: EMERGENCY MEDICINE | Admitting: EMERGENCY MEDICINE
Payer: COMMERCIAL

## 2024-11-18 VITALS
WEIGHT: 156 LBS | BODY MASS INDEX: 27.64 KG/M2 | RESPIRATION RATE: 22 BRPM | HEART RATE: 114 BPM | DIASTOLIC BLOOD PRESSURE: 93 MMHG | OXYGEN SATURATION: 98 % | TEMPERATURE: 98.7 F | HEIGHT: 63 IN | SYSTOLIC BLOOD PRESSURE: 133 MMHG

## 2024-11-18 DIAGNOSIS — R05.1 ACUTE COUGH: ICD-10-CM

## 2024-11-18 LAB
ALBUMIN SERPL BCG-MCNC: 3.7 G/DL (ref 3.5–5.2)
ALBUMIN UR-MCNC: 10 MG/DL
ALP SERPL-CCNC: 115 U/L (ref 40–150)
ALT SERPL W P-5'-P-CCNC: 142 U/L (ref 0–50)
ANION GAP SERPL CALCULATED.3IONS-SCNC: 12 MMOL/L (ref 7–15)
APPEARANCE UR: CLEAR
AST SERPL W P-5'-P-CCNC: 116 U/L (ref 0–45)
ATRIAL RATE - MUSE: 119 BPM
BASOPHILS # BLD AUTO: 0.1 10E3/UL (ref 0–0.2)
BASOPHILS NFR BLD AUTO: 0 %
BILIRUB SERPL-MCNC: 0.7 MG/DL
BILIRUB UR QL STRIP: NEGATIVE
BUN SERPL-MCNC: 6 MG/DL (ref 6–20)
CALCIUM SERPL-MCNC: 9.3 MG/DL (ref 8.8–10.4)
CHLORIDE SERPL-SCNC: 101 MMOL/L (ref 98–107)
COLOR UR AUTO: ABNORMAL
CREAT SERPL-MCNC: 0.7 MG/DL (ref 0.51–0.95)
D DIMER PPP FEU-MCNC: 0.6 UG/ML FEU (ref 0–0.5)
DIASTOLIC BLOOD PRESSURE - MUSE: NORMAL MMHG
EGFRCR SERPLBLD CKD-EPI 2021: >90 ML/MIN/1.73M2
EOSINOPHIL # BLD AUTO: 0.1 10E3/UL (ref 0–0.7)
EOSINOPHIL NFR BLD AUTO: 1 %
ERYTHROCYTE [DISTWIDTH] IN BLOOD BY AUTOMATED COUNT: 14.4 % (ref 10–15)
FLUAV RNA SPEC QL NAA+PROBE: NEGATIVE
FLUBV RNA RESP QL NAA+PROBE: NEGATIVE
GLUCOSE SERPL-MCNC: 89 MG/DL (ref 70–99)
GLUCOSE UR STRIP-MCNC: NEGATIVE MG/DL
GROUP A STREP BY PCR: NOT DETECTED
HCG SERPL QL: NEGATIVE
HCO3 SERPL-SCNC: 29 MMOL/L (ref 22–29)
HCT VFR BLD AUTO: 38 % (ref 35–47)
HGB BLD-MCNC: 12.3 G/DL (ref 11.7–15.7)
HGB UR QL STRIP: NEGATIVE
IMM GRANULOCYTES # BLD: 0.1 10E3/UL
IMM GRANULOCYTES NFR BLD: 0 %
INTERPRETATION ECG - MUSE: NORMAL
KETONES UR STRIP-MCNC: NEGATIVE MG/DL
LEUKOCYTE ESTERASE UR QL STRIP: NEGATIVE
LIPASE SERPL-CCNC: 22 U/L (ref 13–60)
LYMPHOCYTES # BLD AUTO: 2 10E3/UL (ref 0.8–5.3)
LYMPHOCYTES NFR BLD AUTO: 15 %
MCH RBC QN AUTO: 27.3 PG (ref 26.5–33)
MCHC RBC AUTO-ENTMCNC: 32.4 G/DL (ref 31.5–36.5)
MCV RBC AUTO: 84 FL (ref 78–100)
MONOCYTES # BLD AUTO: 1.1 10E3/UL (ref 0–1.3)
MONOCYTES NFR BLD AUTO: 8 %
MUCOUS THREADS #/AREA URNS LPF: PRESENT /LPF
NEUTROPHILS # BLD AUTO: 10.2 10E3/UL (ref 1.6–8.3)
NEUTROPHILS NFR BLD AUTO: 76 %
NITRATE UR QL: NEGATIVE
NRBC # BLD AUTO: 0 10E3/UL
NRBC BLD AUTO-RTO: 0 /100
P AXIS - MUSE: 50 DEGREES
PH UR STRIP: 8 [PH] (ref 5–7)
PLATELET # BLD AUTO: 204 10E3/UL (ref 150–450)
POTASSIUM SERPL-SCNC: 3.9 MMOL/L (ref 3.4–5.3)
PR INTERVAL - MUSE: 130 MS
PROT SERPL-MCNC: 7.4 G/DL (ref 6.4–8.3)
QRS DURATION - MUSE: 74 MS
QT - MUSE: 312 MS
QTC - MUSE: 438 MS
R AXIS - MUSE: 13 DEGREES
RBC # BLD AUTO: 4.51 10E6/UL (ref 3.8–5.2)
RBC URINE: 2 /HPF
RSV RNA SPEC NAA+PROBE: NEGATIVE
SARS-COV-2 RNA RESP QL NAA+PROBE: NEGATIVE
SODIUM SERPL-SCNC: 142 MMOL/L (ref 135–145)
SP GR UR STRIP: >1.05 (ref 1–1.03)
SQUAMOUS EPITHELIAL: 2 /HPF
SYSTOLIC BLOOD PRESSURE - MUSE: NORMAL MMHG
T AXIS - MUSE: 7 DEGREES
TROPONIN T SERPL HS-MCNC: <6 NG/L
TSH SERPL DL<=0.005 MIU/L-ACNC: 0.55 UIU/ML (ref 0.3–4.2)
UROBILINOGEN UR STRIP-MCNC: NORMAL MG/DL
VENTRICULAR RATE- MUSE: 119 BPM
WBC # BLD AUTO: 13.5 10E3/UL (ref 4–11)
WBC URINE: 2 /HPF

## 2024-11-18 PROCEDURE — 96375 TX/PRO/DX INJ NEW DRUG ADDON: CPT | Performed by: EMERGENCY MEDICINE

## 2024-11-18 PROCEDURE — 36415 COLL VENOUS BLD VENIPUNCTURE: CPT | Performed by: EMERGENCY MEDICINE

## 2024-11-18 PROCEDURE — 250N000011 HC RX IP 250 OP 636: Performed by: EMERGENCY MEDICINE

## 2024-11-18 PROCEDURE — 85004 AUTOMATED DIFF WBC COUNT: CPT | Performed by: EMERGENCY MEDICINE

## 2024-11-18 PROCEDURE — 84484 ASSAY OF TROPONIN QUANT: CPT | Performed by: EMERGENCY MEDICINE

## 2024-11-18 PROCEDURE — 99285 EMERGENCY DEPT VISIT HI MDM: CPT | Mod: 25 | Performed by: EMERGENCY MEDICINE

## 2024-11-18 PROCEDURE — 80051 ELECTROLYTE PANEL: CPT | Performed by: EMERGENCY MEDICINE

## 2024-11-18 PROCEDURE — 84703 CHORIONIC GONADOTROPIN ASSAY: CPT | Performed by: EMERGENCY MEDICINE

## 2024-11-18 PROCEDURE — 85379 FIBRIN DEGRADATION QUANT: CPT | Performed by: EMERGENCY MEDICINE

## 2024-11-18 PROCEDURE — 87637 SARSCOV2&INF A&B&RSV AMP PRB: CPT | Performed by: EMERGENCY MEDICINE

## 2024-11-18 PROCEDURE — 71046 X-RAY EXAM CHEST 2 VIEWS: CPT

## 2024-11-18 PROCEDURE — 258N000003 HC RX IP 258 OP 636: Performed by: EMERGENCY MEDICINE

## 2024-11-18 PROCEDURE — 93010 ELECTROCARDIOGRAM REPORT: CPT | Performed by: EMERGENCY MEDICINE

## 2024-11-18 PROCEDURE — 87651 STREP A DNA AMP PROBE: CPT | Performed by: EMERGENCY MEDICINE

## 2024-11-18 PROCEDURE — 81001 URINALYSIS AUTO W/SCOPE: CPT | Performed by: EMERGENCY MEDICINE

## 2024-11-18 PROCEDURE — 71275 CT ANGIOGRAPHY CHEST: CPT

## 2024-11-18 PROCEDURE — 93005 ELECTROCARDIOGRAM TRACING: CPT | Performed by: EMERGENCY MEDICINE

## 2024-11-18 PROCEDURE — 83690 ASSAY OF LIPASE: CPT | Performed by: EMERGENCY MEDICINE

## 2024-11-18 PROCEDURE — 84443 ASSAY THYROID STIM HORMONE: CPT | Performed by: EMERGENCY MEDICINE

## 2024-11-18 PROCEDURE — 250N000009 HC RX 250: Performed by: EMERGENCY MEDICINE

## 2024-11-18 PROCEDURE — 96374 THER/PROPH/DIAG INJ IV PUSH: CPT | Mod: 59 | Performed by: EMERGENCY MEDICINE

## 2024-11-18 PROCEDURE — 96361 HYDRATE IV INFUSION ADD-ON: CPT | Performed by: EMERGENCY MEDICINE

## 2024-11-18 PROCEDURE — 80053 COMPREHEN METABOLIC PANEL: CPT | Performed by: EMERGENCY MEDICINE

## 2024-11-18 PROCEDURE — 99284 EMERGENCY DEPT VISIT MOD MDM: CPT | Performed by: EMERGENCY MEDICINE

## 2024-11-18 RX ORDER — IOPAMIDOL 755 MG/ML
500 INJECTION, SOLUTION INTRAVASCULAR ONCE
Status: COMPLETED | OUTPATIENT
Start: 2024-11-18 | End: 2024-11-18

## 2024-11-18 RX ORDER — KETOROLAC TROMETHAMINE 15 MG/ML
15 INJECTION, SOLUTION INTRAMUSCULAR; INTRAVENOUS ONCE
Status: COMPLETED | OUTPATIENT
Start: 2024-11-18 | End: 2024-11-18

## 2024-11-18 RX ORDER — BENZONATATE 200 MG/1
200 CAPSULE ORAL 3 TIMES DAILY PRN
Qty: 25 CAPSULE | Refills: 0 | Status: SHIPPED | OUTPATIENT
Start: 2024-11-18

## 2024-11-18 RX ORDER — ONDANSETRON 2 MG/ML
4 INJECTION INTRAMUSCULAR; INTRAVENOUS ONCE
Status: COMPLETED | OUTPATIENT
Start: 2024-11-18 | End: 2024-11-18

## 2024-11-18 RX ADMIN — ONDANSETRON 4 MG: 2 INJECTION INTRAMUSCULAR; INTRAVENOUS at 10:24

## 2024-11-18 RX ADMIN — KETOROLAC TROMETHAMINE 15 MG: 15 INJECTION, SOLUTION INTRAMUSCULAR; INTRAVENOUS at 10:24

## 2024-11-18 RX ADMIN — IOPAMIDOL 77 ML: 755 INJECTION, SOLUTION INTRAVENOUS at 11:57

## 2024-11-18 RX ADMIN — SODIUM CHLORIDE 1000 ML: 9 INJECTION, SOLUTION INTRAVENOUS at 10:24

## 2024-11-18 RX ADMIN — SODIUM CHLORIDE 75 ML: 9 INJECTION, SOLUTION INTRAVENOUS at 11:58

## 2024-11-18 ASSESSMENT — ACTIVITIES OF DAILY LIVING (ADL)
ADLS_ACUITY_SCORE: 0

## 2024-11-18 NOTE — ED TRIAGE NOTES
Patient reports she was seen at [facility] [place] ED last Thursday and was tested for for covid and flu and was negative.      Triage Assessment (Adult)       Row Name 11/18/24 0918          Triage Assessment    Airway WDL WDL        Respiratory WDL    Respiratory WDL cough;X     Cough Frequency infrequent     Cough Type bronchospastic        Skin Circulation/Temperature WDL    Skin Circulation/Temperature WDL WDL        Cardiac WDL    Cardiac WDL X  chest hurts from coughing        Peripheral/Neurovascular WDL    Peripheral Neurovascular WDL WDL        Cognitive/Neuro/Behavioral WDL    Cognitive/Neuro/Behavioral WDL WDL

## 2024-11-18 NOTE — DISCHARGE INSTRUCTIONS
Please make an appointment to follow-up with your primary care provider to have your liver test repeated    If you develop chest pain, shortness of breath or worsening symptoms return to the emergency department
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
## 2024-11-19 ENCOUNTER — TELEPHONE (OUTPATIENT)
Dept: FAMILY MEDICINE | Facility: CLINIC | Age: 49
End: 2024-11-19
Payer: COMMERCIAL

## 2024-11-19 ENCOUNTER — MYC MEDICAL ADVICE (OUTPATIENT)
Dept: FAMILY MEDICINE | Facility: CLINIC | Age: 49
End: 2024-11-19
Payer: COMMERCIAL

## 2024-11-19 NOTE — TELEPHONE ENCOUNTER
Reason for Call:  Appointment Request    Patient requesting this type of appt: [facility]-op[id number]    Requested provider: Jordyn Loredo    Reason patient unable to be scheduled: Not within requested timeframe    When does patient want to be seen/preferred time: 3-7 days    Comments: any provider at [place]    Could we send this information to you in [facility] or would you prefer to receive a phone call?:   Patient would prefer a phone call   Okay to leave a detailed message?: Yes at Cell number on file:    Telephone Information:   Mobile [phone]       Call taken on 11/19/2024 at 11:22 AM by Peyton STAPLETON

## 2024-11-21 ENCOUNTER — PATIENT OUTREACH (OUTPATIENT)
Dept: CARE COORDINATION | Facility: CLINIC | Age: 49
End: 2024-11-21
Payer: COMMERCIAL

## 2024-11-21 NOTE — PROGRESS NOTES
[facility] Care Resource Center Contact  [facility]/Voicemail     Clinical Data: Care Coordination ED-sourced Outreach-     Outreach attempted x 2.  Left message on patient's voicemail, providing [facility]'s 24/7 scheduling and nurse triage phone number [phone] ([phone]) for questions/concerns and/or to schedule an appt with an [facility] provider.      Care Coordination introduction letter with explanation of Clinic Care Coordination services sent to patient via [employer]. Clinic Care Coordination services remain available via referral if needed.    Plan: [facility] will do no further outreaches at this time.       GILBERT Romero  [facility] Resource [place], [facility]    *Connected Care Resource Team does NOT follow patient ongoing. Referrals are identified based on internal discharge reports and the outreach is to ensure patient has an understanding of their discharge instructions.

## 2024-11-21 NOTE — LETTER
Jeff Serra  [id number] [facility] [id number]    Dear Jeff Serra,      I am a team member within the Connected Care Resource Center with [facility]. I recently tried to reach you to ensure you were doing well following a recent visit within our health system. I also wanted to take this chance to introduce Clinic Care Coordination.     Below is a description of Clinic Care Coordination and how this team can further assist you:       The Clinic Care Coordination team is made up of a Registered Nurse, , Financial Resource Worker, and a Community Health Worker who understand and can help navigate the health care system. The goal of clinic care coordination is to help you manage your health, improve access to care, and achieve optimal health outcomes. They work alongside your provider to assist you in determining your health and social needs, obtain health care and community resources, and provide you with necessary information and education. Clinic Care Coordination can work with you through any barriers and develop a care plan that helps coordinate and strengthen the relationship between you and your care team.    If you wish to connect with the Clinic Care Coordination Team, please let your [facility] Primary Care Provider or Clinic Care Team know and they can place a referral. The Clinic Care Coordination team will then reach out by phone to further support you.    We are focused on providing you with the highest-quality healthcare experience possible.    Sincerely,   Kylie ZABALA  Community Health Worker    Connected Care Resources   [facility]

## 2024-11-25 ENCOUNTER — TELEPHONE (OUTPATIENT)
Dept: FAMILY MEDICINE | Facility: CLINIC | Age: 49
End: 2024-11-25

## 2024-11-25 ENCOUNTER — OFFICE VISIT (OUTPATIENT)
Dept: FAMILY MEDICINE | Facility: CLINIC | Age: 49
End: 2024-11-25
Payer: COMMERCIAL

## 2024-11-25 VITALS
TEMPERATURE: 97.7 F | WEIGHT: 154.6 LBS | HEART RATE: 99 BPM | HEIGHT: 63 IN | DIASTOLIC BLOOD PRESSURE: 87 MMHG | RESPIRATION RATE: 18 BRPM | SYSTOLIC BLOOD PRESSURE: 128 MMHG | OXYGEN SATURATION: 100 % | BODY MASS INDEX: 27.39 KG/M2

## 2024-11-25 DIAGNOSIS — G89.29 CHRONIC MIDLINE LOW BACK PAIN WITHOUT SCIATICA: ICD-10-CM

## 2024-11-25 DIAGNOSIS — G89.29 CHRONIC NECK PAIN: ICD-10-CM

## 2024-11-25 DIAGNOSIS — Z01.818 PREOP GENERAL PHYSICAL EXAM: Primary | ICD-10-CM

## 2024-11-25 DIAGNOSIS — I10 HYPERTENSION GOAL BP (BLOOD PRESSURE) < 140/90: ICD-10-CM

## 2024-11-25 DIAGNOSIS — M54.50 CHRONIC MIDLINE LOW BACK PAIN WITHOUT SCIATICA: ICD-10-CM

## 2024-11-25 DIAGNOSIS — F11.90 CHRONIC, CONTINUOUS USE OF OPIOIDS: ICD-10-CM

## 2024-11-25 DIAGNOSIS — Z12.11 COLON CANCER SCREENING: ICD-10-CM

## 2024-11-25 DIAGNOSIS — M54.2 CHRONIC NECK PAIN: ICD-10-CM

## 2024-11-25 DIAGNOSIS — E66.811 CLASS 1 OBESITY WITH SERIOUS COMORBIDITY AND BODY MASS INDEX (BMI) OF 32.0 TO 32.9 IN ADULT, UNSPECIFIED OBESITY TYPE: ICD-10-CM

## 2024-11-25 DIAGNOSIS — G44.86 CERVICOGENIC HEADACHE: ICD-10-CM

## 2024-11-25 PROCEDURE — 99214 OFFICE O/P EST MOD 30 MIN: CPT | Performed by: FAMILY MEDICINE

## 2024-11-25 RX ORDER — PREGABALIN 100 MG/1
100 CAPSULE ORAL 2 TIMES DAILY
Qty: 60 CAPSULE | Refills: 5 | Status: SHIPPED | OUTPATIENT
Start: 2024-11-25

## 2024-11-25 RX ORDER — LOSARTAN POTASSIUM 100 MG/1
100 TABLET ORAL DAILY
Qty: 90 TABLET | Refills: 1 | Status: SHIPPED | OUTPATIENT
Start: 2024-11-25

## 2024-11-25 ASSESSMENT — PAIN SCALES - GENERAL: PAINLEVEL_OUTOF10: NO PAIN (0)

## 2024-11-25 NOTE — PATIENT INSTRUCTIONS
How to Take Your Medication Before Surgery  Preoperative Medication Instructions   Antiplatelet or Anticoagulation Medication Instructions   - Patient is on no antiplatelet or anticoagulation medications.    Additional Medication Instructions  Take all scheduled medications on the day of surgery       Patient Education   Preparing for Your Surgery  For Adults  Getting started  In most cases, a nurse will call to review your health history and instructions. They will give you an arrival time based on your scheduled surgery time. Please be ready to share:  Your doctor's clinic name and phone number  Your medical, surgical, and anesthesia history  A list of allergies and sensitivities  A list of medicines, including herbal treatments and over-the-counter drugs  Whether the patient has a legal guardian (ask how to send us the papers in advance)  Note: You may not receive a call if you were seen at our PAC (Preoperative Assessment Center).  Please tell us if you're pregnant--or if there's any chance you might be pregnant. Some surgeries may injure a fetus (unborn baby), so they require a pregnancy test. Surgeries that are safe for a fetus don't always need a test, and you can choose whether to have one.   Preparing for surgery  Within 10 to 30 days of surgery: Have a pre-op exam (sometimes called an H&P, or History and Physical). This can be done at a clinic or pre-operative center.  If you're having a , you may not need this exam. Talk to your care team.  At your pre-op exam, talk to your care team about all medicines you take. (This includes CBD oil and any drugs, such as THC, marijuana, and other forms of cannabis.) If you need to stop any medicine before surgery, ask when to start taking it again.  This is for your safety. Many medicines and drugs can make you bleed too much during surgery. Some change how well surgery (anesthesia) drugs work.  Call your insurance company to let them know you're having  surgery. (If you don't have insurance, call [phone].)  Call your clinic if there's any change in your health. This includes a scrape or scratch near the surgery site, or any signs of a cold (sore throat, runny nose, cough, rash, fever).  Eating and drinking guidelines  For your safety: Unless your surgeon tells you otherwise, follow the guidelines below.  Eat and drink as normal until 8 hours before you arrive for surgery. After that, no food or milk. You can spit out gum when you arrive.  Drink clear liquids until 2 hours before you arrive. These are liquids you can see through, like water, Gatorade, and Propel Water. They also include plain black coffee and tea (no cream or milk).  No alcohol for 24 hours before you arrive. The night before surgery, stop any drinks that contain THC.  If your care team tells you to take medicine on the morning of surgery, it's okay to take it with a sip of water. No other medicines or drugs are allowed (including CBD oil)--follow your care team's instructions.  If you have questions the day of surgery, call your hospital or surgery center.   Preventing infection  Shower or bathe the night before and the morning of surgery. Follow the instructions your clinic gave you. (If no instructions, use regular soap.)  Don't shave or clip hair near your surgery site. We'll remove the hair if needed.  Don't smoke or vape the morning of surgery. No chewing tobacco for 6 hours before you arrive. A nicotine patch is okay. You may spit out nicotine gum when you arrive.  For some surgeries, the surgeon will tell you to fully quit smoking and nicotine.  We will make every effort to keep you safe from infection. We will:  Clean our hands often with soap and water (or an alcohol-based hand rub).  Clean the skin at your surgery site with a special soap that kills germs.  Give you a special gown to keep you warm. (Cold raises the risk of infection.)  Wear hair covers, masks, gowns, and gloves  during surgery.  Give antibiotic medicine, if prescribed. Not all surgeries need this medicine.  What to bring on the day of surgery  Photo ID and insurance card  Copy of your health care directive, if you have one  Glasses and hearing aids (bring cases)  You can't wear contacts during surgery  Inhaler and eye drops, if you use them (tell us about these when you arrive)  CPAP machine or breathing device, if you use them  A few personal items, if spending the night  If you have . . .  A pacemaker, ICD (cardiac defibrillator), or other implant: Bring the ID card.  An implanted stimulator: Bring the remote control.  A legal guardian: Bring a copy of the certified (court-stamped) guardianship papers.  Please remove any jewelry, including body piercings. Leave jewelry and other valuables at home.  If you're going home the day of surgery  You must have a responsible adult drive you home. They should stay with you overnight as well.  If you don't have someone to stay with you, and you aren't safe to go home alone, we may keep you overnight. Insurance often won't pay for this.  After surgery  If it's hard to control your pain or you need more pain medicine, please call your surgeon's office.  Questions?   If you have any questions for your care team, list them here:   ____________________________________________________________________________________________________________________________________________________________________________________________________________________________________________________________  For informational purposes only. Not to replace the advice of your health care provider. Copyright   2003, 2019 [facility]. All rights reserved. Clinically reviewed by Guido Perera MD. [employer] [id number] - REV 08/24.

## 2024-11-25 NOTE — TELEPHONE ENCOUNTER
FMLA form that pt brought to visit was completed by provider and placed in TC bin. Form faxed to [phone]. Will let pt know via [employer]
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
## 2024-11-26 ENCOUNTER — HOSPITAL ENCOUNTER (OUTPATIENT)
Facility: CLINIC | Age: 49
Discharge: HOME | End: 2024-11-26
Attending: INTERNAL MEDICINE | Admitting: INTERNAL MEDICINE
Payer: COMMERCIAL

## 2024-11-26 VITALS
SYSTOLIC BLOOD PRESSURE: 130 MMHG | WEIGHT: 154 LBS | RESPIRATION RATE: 16 BRPM | HEART RATE: 94 BPM | BODY MASS INDEX: 27.29 KG/M2 | OXYGEN SATURATION: 95 % | DIASTOLIC BLOOD PRESSURE: 85 MMHG | HEIGHT: 63 IN

## 2024-11-26 LAB — COLONOSCOPY: NORMAL

## 2024-11-26 PROCEDURE — G0500 MOD SEDAT ENDO SERVICE >5YRS: HCPCS | Mod: PT | Performed by: INTERNAL MEDICINE

## 2024-11-26 PROCEDURE — 250N000011 HC RX IP 250 OP 636: Performed by: INTERNAL MEDICINE

## 2024-11-26 PROCEDURE — 88305 TISSUE EXAM BY PATHOLOGIST: CPT | Mod: TC | Performed by: INTERNAL MEDICINE

## 2024-11-26 PROCEDURE — 45385 COLONOSCOPY W/LESION REMOVAL: CPT | Performed by: INTERNAL MEDICINE

## 2024-11-26 PROCEDURE — 45382 COLONOSCOPY W/CONTROL BLEED: CPT | Performed by: INTERNAL MEDICINE

## 2024-11-26 PROCEDURE — 88305 TISSUE EXAM BY PATHOLOGIST: CPT | Mod: 26 | Performed by: PATHOLOGY

## 2024-11-26 RX ORDER — ONDANSETRON 2 MG/ML
4 INJECTION INTRAMUSCULAR; INTRAVENOUS EVERY 6 HOURS PRN
Status: DISCONTINUED | OUTPATIENT
Start: 2024-11-26 | End: 2024-11-26 | Stop reason: HOSPADM

## 2024-11-26 RX ORDER — DIPHENHYDRAMINE HYDROCHLORIDE 50 MG/ML
25-50 INJECTION INTRAMUSCULAR; INTRAVENOUS
Status: DISCONTINUED | OUTPATIENT
Start: 2024-11-26 | End: 2024-11-26 | Stop reason: HOSPADM

## 2024-11-26 RX ORDER — EPINEPHRINE 1 MG/ML
0.1 INJECTION, SOLUTION, CONCENTRATE INTRAVENOUS
Status: DISCONTINUED | OUTPATIENT
Start: 2024-11-26 | End: 2024-11-26 | Stop reason: HOSPADM

## 2024-11-26 RX ORDER — NALOXONE HYDROCHLORIDE 0.4 MG/ML
0.2 INJECTION, SOLUTION INTRAMUSCULAR; INTRAVENOUS; SUBCUTANEOUS
Status: DISCONTINUED | OUTPATIENT
Start: 2024-11-26 | End: 2024-11-26 | Stop reason: HOSPADM

## 2024-11-26 RX ORDER — NALOXONE HYDROCHLORIDE 0.4 MG/ML
0.4 INJECTION, SOLUTION INTRAMUSCULAR; INTRAVENOUS; SUBCUTANEOUS
Status: DISCONTINUED | OUTPATIENT
Start: 2024-11-26 | End: 2024-11-26 | Stop reason: HOSPADM

## 2024-11-26 RX ORDER — PROCHLORPERAZINE MALEATE 10 MG
10 TABLET ORAL EVERY 6 HOURS PRN
Status: DISCONTINUED | OUTPATIENT
Start: 2024-11-26 | End: 2024-11-26 | Stop reason: HOSPADM

## 2024-11-26 RX ORDER — FLUMAZENIL 0.1 MG/ML
0.2 INJECTION, SOLUTION INTRAVENOUS
Status: DISCONTINUED | OUTPATIENT
Start: 2024-11-26 | End: 2024-11-26 | Stop reason: HOSPADM

## 2024-11-26 RX ORDER — ONDANSETRON 2 MG/ML
4 INJECTION INTRAMUSCULAR; INTRAVENOUS
Status: DISCONTINUED | OUTPATIENT
Start: 2024-11-26 | End: 2024-11-26 | Stop reason: HOSPADM

## 2024-11-26 RX ORDER — ATROPINE SULFATE 0.1 MG/ML
1 INJECTION INTRAVENOUS
Status: DISCONTINUED | OUTPATIENT
Start: 2024-11-26 | End: 2024-11-26 | Stop reason: HOSPADM

## 2024-11-26 RX ORDER — LIDOCAINE 40 MG/G
CREAM TOPICAL
Status: DISCONTINUED | OUTPATIENT
Start: 2024-11-26 | End: 2024-11-26 | Stop reason: HOSPADM

## 2024-11-26 RX ORDER — SIMETHICONE 40MG/0.6ML
133 SUSPENSION, DROPS(FINAL DOSAGE FORM)(ML) ORAL
Status: DISCONTINUED | OUTPATIENT
Start: 2024-11-26 | End: 2024-11-26 | Stop reason: HOSPADM

## 2024-11-26 RX ORDER — FENTANYL CITRATE 50 UG/ML
50-100 INJECTION, SOLUTION INTRAMUSCULAR; INTRAVENOUS EVERY 5 MIN PRN
Status: DISCONTINUED | OUTPATIENT
Start: 2024-11-26 | End: 2024-11-26 | Stop reason: HOSPADM

## 2024-11-26 RX ORDER — ONDANSETRON 4 MG/1
4 TABLET, ORALLY DISINTEGRATING ORAL EVERY 6 HOURS PRN
Status: DISCONTINUED | OUTPATIENT
Start: 2024-11-26 | End: 2024-11-26 | Stop reason: HOSPADM

## 2024-11-26 RX ADMIN — FENTANYL CITRATE 100 MCG: 50 INJECTION, SOLUTION INTRAMUSCULAR; INTRAVENOUS at 09:28

## 2024-11-26 RX ADMIN — FENTANYL CITRATE 50 MCG: 50 INJECTION, SOLUTION INTRAMUSCULAR; INTRAVENOUS at 09:35

## 2024-11-26 RX ADMIN — MIDAZOLAM HYDROCHLORIDE 1 MG: 1 INJECTION, SOLUTION INTRAMUSCULAR; INTRAVENOUS at 09:33

## 2024-11-26 RX ADMIN — FENTANYL CITRATE 50 MCG: 50 INJECTION, SOLUTION INTRAMUSCULAR; INTRAVENOUS at 09:33

## 2024-11-26 RX ADMIN — MIDAZOLAM HYDROCHLORIDE 1 MG: 1 INJECTION, SOLUTION INTRAMUSCULAR; INTRAVENOUS at 09:35

## 2024-11-26 RX ADMIN — MIDAZOLAM HYDROCHLORIDE 2 MG: 1 INJECTION, SOLUTION INTRAMUSCULAR; INTRAVENOUS at 09:28

## 2024-11-26 ASSESSMENT — ACTIVITIES OF DAILY LIVING (ADL)
ADLS_ACUITY_SCORE: 59
ADLS_ACUITY_SCORE: 59
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
## 2024-11-26 NOTE — DISCHARGE INSTRUCTIONS
The patient has received a copy of the Provation report the doctor has written and discharge instructions have been discussed with the patient and responsible adult.  All questions were addressed and answered prior to patient discharge.   Clip Placement    This Patient has received a temporary endoscopic clip which can be safely used in a MRI with a static magnetic field of 1/5-April and 3-April ONLY.  Retention times for this clip vary so if you have an MRI within the next year make sure to mention you had a clip placed on this day to the MRI staff. If you were given the informational card, keep that in in your wallet or purse for 1 year.    Non-clinical testing demonstrated that the clip is MR Conditional according to ASTM -33. A patient with this device can be scanned safely in an MR system under the following conditions.    Static magnetic field of 1.5-April and 3-Telsa, only.  Maximum spatial gradient magnetic field of 2,000-gauss/cm (20-T/m)  Maximum MR system reported, whole body averaged specific absorption rate (REFUGIO) of 2-W/kg for 15 minutes of scanning (i.e., per pulse sequence) in the Normal Operating Mode.    Under the scan conditions defined, the clip is expected to produce a maximum temperature rise of 1.9 degrees C after 15 minutes of continuous scanning (i.e., per pulse sequence). In Non-clinical testing, the image artifact caused by the clip extends approximately 30-mm from this implant when imaged using a gradient echo pulse sequence and a 3-April RM system.

## 2024-11-27 LAB
PATH REPORT.COMMENTS IMP SPEC: NORMAL
PATH REPORT.COMMENTS IMP SPEC: NORMAL
PATH REPORT.FINAL DX SPEC: NORMAL
PATH REPORT.GROSS SPEC: NORMAL
PATH REPORT.MICROSCOPIC SPEC OTHER STN: NORMAL
PATH REPORT.RELEVANT HX SPEC: NORMAL
PHOTO IMAGE: NORMAL

## 2024-12-10 ENCOUNTER — PATIENT OUTREACH (OUTPATIENT)
Dept: GASTROENTEROLOGY | Facility: CLINIC | Age: 49
End: 2024-12-10
Payer: COMMERCIAL

## 2024-12-10 PROBLEM — D12.6 ADENOMATOUS COLON POLYP: Status: ACTIVE | Noted: 2024-12-10

## 2024-12-30 ENCOUNTER — MYC MEDICAL ADVICE (OUTPATIENT)
Dept: FAMILY MEDICINE | Facility: CLINIC | Age: 49
End: 2024-12-30

## 2024-12-30 DIAGNOSIS — J06.9 UPPER RESPIRATORY TRACT INFECTION, UNSPECIFIED TYPE: Primary | ICD-10-CM

## 2024-12-30 RX ORDER — AZITHROMYCIN 250 MG/1
TABLET, FILM COATED ORAL
Qty: 6 TABLET | Refills: 0 | Status: SHIPPED | OUTPATIENT
Start: 2024-12-30 | End: 2025-01-04

## 2024-12-30 RX ORDER — PREDNISONE 20 MG/1
40 TABLET ORAL DAILY
Qty: 10 TABLET | Refills: 0 | Status: SHIPPED | OUTPATIENT
Start: 2024-12-30 | End: 2025-01-04

## 2025-01-20 ENCOUNTER — MYC REFILL (OUTPATIENT)
Dept: FAMILY MEDICINE | Facility: CLINIC | Age: 50
End: 2025-01-20
Payer: COMMERCIAL

## 2025-01-20 DIAGNOSIS — G89.29 CHRONIC MIDLINE LOW BACK PAIN WITHOUT SCIATICA: ICD-10-CM

## 2025-01-20 DIAGNOSIS — E66.811 CLASS 1 OBESITY WITH SERIOUS COMORBIDITY AND BODY MASS INDEX (BMI) OF 32.0 TO 32.9 IN ADULT, UNSPECIFIED OBESITY TYPE: ICD-10-CM

## 2025-01-20 DIAGNOSIS — F11.90 CHRONIC, CONTINUOUS USE OF OPIOIDS: ICD-10-CM

## 2025-01-20 DIAGNOSIS — M54.50 CHRONIC MIDLINE LOW BACK PAIN WITHOUT SCIATICA: ICD-10-CM

## 2025-01-20 RX ORDER — SEMAGLUTIDE 1.7 MG/.75ML
INJECTION, SOLUTION SUBCUTANEOUS
Qty: 3 ML | Refills: 2 | OUTPATIENT
Start: 2025-01-20

## 2025-01-20 RX ORDER — OXYCODONE HYDROCHLORIDE 15 MG/1
15 TABLET ORAL 3 TIMES DAILY PRN
Qty: 90 TABLET | Refills: 0 | Status: SHIPPED | OUTPATIENT
Start: 2025-01-20

## 2025-02-05 ENCOUNTER — HOSPITAL ENCOUNTER (EMERGENCY)
Facility: CLINIC | Age: 50
Discharge: HOME | End: 2025-02-05
Attending: EMERGENCY MEDICINE | Admitting: EMERGENCY MEDICINE
Payer: COMMERCIAL

## 2025-02-05 VITALS
RESPIRATION RATE: 16 BRPM | DIASTOLIC BLOOD PRESSURE: 71 MMHG | TEMPERATURE: 98.3 F | WEIGHT: 165 LBS | OXYGEN SATURATION: 99 % | SYSTOLIC BLOOD PRESSURE: 100 MMHG | BODY MASS INDEX: 30.36 KG/M2 | HEART RATE: 76 BPM | HEIGHT: 62 IN

## 2025-02-05 DIAGNOSIS — R03.1 BLOOD PRESSURE ABNORMALLY LOW: ICD-10-CM

## 2025-02-05 DIAGNOSIS — R51.9 NONINTRACTABLE HEADACHE, UNSPECIFIED CHRONICITY PATTERN, UNSPECIFIED HEADACHE TYPE: ICD-10-CM

## 2025-02-05 LAB
ATRIAL RATE - MUSE: 81 BPM
DIASTOLIC BLOOD PRESSURE - MUSE: NORMAL MMHG
INTERPRETATION ECG - MUSE: NORMAL
P AXIS - MUSE: 41 DEGREES
PR INTERVAL - MUSE: 148 MS
QRS DURATION - MUSE: 84 MS
QT - MUSE: 398 MS
QTC - MUSE: 462 MS
R AXIS - MUSE: 10 DEGREES
SYSTOLIC BLOOD PRESSURE - MUSE: NORMAL MMHG
T AXIS - MUSE: 10 DEGREES
VENTRICULAR RATE- MUSE: 81 BPM

## 2025-02-05 PROCEDURE — 96361 HYDRATE IV INFUSION ADD-ON: CPT

## 2025-02-05 PROCEDURE — 93005 ELECTROCARDIOGRAM TRACING: CPT

## 2025-02-05 PROCEDURE — 250N000011 HC RX IP 250 OP 636: Mod: JZ | Performed by: EMERGENCY MEDICINE

## 2025-02-05 PROCEDURE — 96374 THER/PROPH/DIAG INJ IV PUSH: CPT

## 2025-02-05 PROCEDURE — 258N000003 HC RX IP 258 OP 636: Performed by: EMERGENCY MEDICINE

## 2025-02-05 PROCEDURE — 250N000013 HC RX MED GY IP 250 OP 250 PS 637: Performed by: EMERGENCY MEDICINE

## 2025-02-05 PROCEDURE — 96375 TX/PRO/DX INJ NEW DRUG ADDON: CPT

## 2025-02-05 PROCEDURE — 99285 EMERGENCY DEPT VISIT HI MDM: CPT | Mod: 25

## 2025-02-05 RX ORDER — MORPHINE SULFATE 4 MG/ML
4 INJECTION, SOLUTION INTRAMUSCULAR; INTRAVENOUS
Status: COMPLETED | OUTPATIENT
Start: 2025-02-05 | End: 2025-02-05

## 2025-02-05 RX ORDER — METOCLOPRAMIDE HYDROCHLORIDE 5 MG/ML
10 INJECTION INTRAMUSCULAR; INTRAVENOUS ONCE
Status: COMPLETED | OUTPATIENT
Start: 2025-02-05 | End: 2025-02-05

## 2025-02-05 RX ORDER — DIPHENHYDRAMINE HYDROCHLORIDE 50 MG/ML
25 INJECTION INTRAMUSCULAR; INTRAVENOUS ONCE
Status: COMPLETED | OUTPATIENT
Start: 2025-02-05 | End: 2025-02-05

## 2025-02-05 RX ORDER — ACETAMINOPHEN 500 MG
1000 TABLET ORAL ONCE
Status: COMPLETED | OUTPATIENT
Start: 2025-02-05 | End: 2025-02-05

## 2025-02-05 RX ORDER — ONDANSETRON 2 MG/ML
4 INJECTION INTRAMUSCULAR; INTRAVENOUS EVERY 30 MIN PRN
Status: DISCONTINUED | OUTPATIENT
Start: 2025-02-05 | End: 2025-02-05

## 2025-02-05 RX ADMIN — METOCLOPRAMIDE 10 MG: 5 INJECTION, SOLUTION INTRAMUSCULAR; INTRAVENOUS at 13:35

## 2025-02-05 RX ADMIN — MORPHINE SULFATE 4 MG: 4 INJECTION, SOLUTION INTRAMUSCULAR; INTRAVENOUS at 12:16

## 2025-02-05 RX ADMIN — ONDANSETRON 4 MG: 2 INJECTION, SOLUTION INTRAMUSCULAR; INTRAVENOUS at 12:17

## 2025-02-05 RX ADMIN — ACETAMINOPHEN 1000 MG: 500 TABLET, FILM COATED ORAL at 13:34

## 2025-02-05 RX ADMIN — SODIUM CHLORIDE 1000 ML: 9 INJECTION, SOLUTION INTRAVENOUS at 12:12

## 2025-02-05 RX ADMIN — DIPHENHYDRAMINE HYDROCHLORIDE 25 MG: 50 INJECTION, SOLUTION INTRAMUSCULAR; INTRAVENOUS at 13:35

## 2025-02-05 ASSESSMENT — ACTIVITIES OF DAILY LIVING (ADL)
ADLS_ACUITY_SCORE: 59

## 2025-02-05 NOTE — DISCHARGE INSTRUCTIONS
As we discussed, the CT scan of your head does not show any emergent causes and this may be due to your chronic headaches or possibly even a migraine.  Please come back to the ER immediately with any other concerns you have, and please talk to your physician again about using the tizanidine if you intend to use this in the future for any headaches you have.  Come back to the ER immediately with any other concerns you have and do follow with your regular doctor in a week ahead.  You are still likely feeling somewhat lightheaded from the medication, the tizanidine that is, so do your best to be very careful as this does predispose people to being dizzy and lightheaded.

## 2025-02-05 NOTE — ED TRIAGE NOTES
Patient brought in by EMS from home. Patient complains of headache that started this morning. States developed dizziness after taking tizanadine. Patent reports headache was sudden onset.     Triage Assessment (Adult)       Row Name 02/05/25 1211          Triage Assessment    Airway WDL WDL        Respiratory WDL    Respiratory WDL WDL        Skin Circulation/Temperature WDL    Skin Circulation/Temperature WDL WDL        Cardiac WDL    Cardiac WDL WDL        Peripheral/Neurovascular WDL    Peripheral Neurovascular WDL WDL        Cognitive/Neuro/Behavioral WDL    Cognitive/Neuro/Behavioral WDL X     Level of Consciousness alert     Arousal Level opens eyes spontaneously     Orientation oriented x 4     Speech spontaneous;logical;clear        Pupils (CN II)    Pupil PERRLA yes     Pupil Size Left 3 mm     Pupil Size Right 3 mm        Cordelia Coma Scale    Best Eye Response 4-->(E4) spontaneous     Best Motor Response 6-->(M6) obeys commands     Best Verbal Response 5-->(V5) oriented     Cordelia Coma Scale Score 15

## 2025-02-05 NOTE — ED PROVIDER NOTES
Emergency Department Note      History of Present Illness     Chief Complaint  Headache    HPI  Jeff Serra is a 50 year old female who presents to the emergency room with headache that started roughly 5 hours prior to arrival.  She states at 7 AM, she had a headache where previously she did not have any and at around 730, 30 minutes after onset pain hit maximum intensity.  She got up and tried to take a medication that she had previously been prescribed, tizanidine but it was , and after taking this medication she began to feel very dizzy and lightheaded and felt like her headache was worse and eventually she called 911.  Denies any vomiting or numbness or tingling, does state that she feels very fatigued.      Independent Historian  no    Review of External Notes  Yes I have independently reviewed the patient's last office visit from 2024 where the patient was seen by her family practice doctor for a preop general physical exam.      Past Medical History   Medical History and Problem List  Past Medical History:   Diagnosis Date    Chronic low back pain 2013    Chronic neck pain 2013    Closed fracture of right fibula 2014    Continuous opioid dependence (H) 2018    Cyst of ovary, unspecified laterality 10/04/2019    History of blood transfusion 2002    History of hysterectomy     HTN (hypertension)     Kidney stone     Pelvic pain in female 2016    Pulmonary embolism (H)     Right leg DVT (H)        Medications  Calcium Carb-Cholecalciferol (CALCIUM 600 + D) 600-400 MG-UNIT TABS per tablet  cholecalciferol 125 MCG (5000 UT) CAPS  cyanocobalamin (CYANOCOBALAMIN) 1000 MCG/ML injection  loratadine (CLARITIN) 10 MG tablet  LORazepam (ATIVAN) 0.5 MG tablet  losartan (COZAAR) 100 MG tablet  Multiple Vitamins-Iron (QC DAILY MULTIVITAMINS/IRON) TABS  omeprazole (PRILOSEC) 20 MG DR capsule  oxyCODONE IR (ROXICODONE) 15 MG tablet  polyethylene glycol (MIRALAX) 17  "GM/Dose powder  pregabalin (LYRICA) 100 MG capsule  Semaglutide-Weight Management (WEGOVY) 1.7 MG/0.75ML pen  senna-docusate (SENOKOT-S/PERICOLACE) 8.6-50 MG tablet  thiamine (B-1) 100 MG tablet  tiZANidine (ZANAFLEX) 4 MG tablet  tuberculin-allergy syringes 27G X 1/2\" 1 ML MISC  zolpidem (AMBIEN) 10 MG tablet        Surgical History   Past Surgical History:   Procedure Laterality Date    BIOPSY  UNKNOWN    COLONOSCOPY  10/14/2019    COLONOSCOPY N/A 10/14/2019    Procedure: Colonoscopy, With Polypectomy And Biopsy;  Surgeon: Gege Ferrera DO;  Location: [facility]    COLONOSCOPY N/A 11/26/2024    Procedure: Colonoscopy with polypectomy using exacto snare and 2 clips;  Surgeon: Filippo Brambila MD;  Location: [facility]    COLONOSCOPY WITH CO2 INSUFFLATION N/A 10/14/2019    Procedure: COLONOSCOPY, WITH CO2 INSUFFLATION;  Surgeon: Gege Ferrera DO;  Location: [facility]    COMBINED ESOPHAGOSCOPY, GASTROSCOPY, DUODENOSCOPY (EGD) WITH CO2 INSUFFLATION N/A 10/14/2019    Procedure: ESOPHAGOGASTRODUODENOSCOPY, WITH CO2 INSUFFLATION;  Surgeon: Gege Ferrera DO;  Location: [facility]    ESOPHAGOSCOPY, GASTROSCOPY, DUODENOSCOPY (EGD), COMBINED N/A 10/14/2019    Procedure: Esophagogastroduodenoscopy, With Biopsy;  Surgeon: Gege Ferrera DO;  Location: [facility]    HYSTERECTOMY, PAP NO LONGER INDICATED  11/17/2017    LAPAROSCOPIC GASTRIC SLEEVE N/A 05/11/2022    Procedure: LAPAROSCOPIC, GASTRECTOMY, SLEEVE;  Surgeon: Alxe Morelos MD;  Location:  OR    LAPAROSCOPIC HERNIORRHAPHY UMBILICAL N/A 05/11/2022    Procedure: Laparoscopic herniorrhaphy umbilical;  Surgeon: Alex Morelos MD;  Location: [facility]    LAPAROSCOPIC LYSIS ADHESIONS N/A 05/11/2022    Procedure: Laparoscopic lysis adhesions;  Surgeon: Alex Morelos MD;  Location: [facility]    LAPAROSCOPY DIAGNOSTIC (GYN) N/A 04/12/2016    Procedure: LAPAROSCOPY DIAGNOSTIC (GYN);  Surgeon: Margarito Walker MD;  Location: [facility]    LAPAROTOMY MINI, TUBAL LIGATION (POST PARTUM), " "COMBINED  2005    LITHOTRIPSY  2011    ORTHOPEDIC SURGERY  6/22/2020-3/26/2021    ANKLE SURGERY    UPPER GI ENDOSCOPY  10/14/2019         Physical Exam   Patient Vitals for the past 24 hrs:   BP Temp Temp src Pulse Resp SpO2 Height Weight   02/05/25 1345 95/63 -- -- 81 -- 98 % -- --   02/05/25 1330 93/64 -- -- 81 -- 99 % -- --   02/05/25 1315 89/56 -- -- 80 -- 96 % -- --   02/05/25 1300 90/60 -- -- 77 -- 95 % -- --   02/05/25 1245 89/59 -- -- 55 -- 95 % -- --   02/05/25 1230 93/65 -- -- 76 -- 96 % -- --   02/05/25 1215 105/72 -- -- 90 -- 96 % -- --   02/05/25 1210 105/65 98.3  F (36.8  C) Oral 92 16 95 % 1.575 m (5' 2\") 74.8 kg (165 lb)       Physical Exam  Vitals: reviewed by me  General: Pt seen on [facility], cooperative, and alert to conversation  Eyes: Tracking well, clear conjunctiva BL  ENT: MMM, midline trachea.   Lungs: No tachypnea, no accessory muscle use. No respiratory distress.   CV: Rate as above  MSK: no joint effusion.  No evidence of trauma  Skin: No rash  Neuro: Clear speech and no facial droop.  Moving all extremity spontaneously and following all commands  Psych: Not RIS, no e/o AH/VH      Diagnostics   Lab Results   Labs Ordered and Resulted from Time of ED Arrival to Time of ED Departure - No data to display    Imaging  CT Head w/o Contrast   Final Result   IMPRESSION:      No acute intracranial process.          EKG   ECG results from 02/05/25   EKG 12-lead, tracing only     Value    Systolic Blood Pressure     Diastolic Blood Pressure     Ventricular Rate 81    Atrial Rate 81    WY Interval 148    QRS Duration 84        QTc 462    P Axis 41    R AXIS 10    T Axis 10    Interpretation ECG      Sinus rhythm  Normal ECG  Reviewed by Evert PAEZ             Independent Interpretation  Yes I have independently reviewed the patient's CT scan of the head, no obvious hemorrhage noted      ED Course      Medications Administered   Medications   morphine (PF) injection 4 mg (4 " mg Intravenous $Given 2/5/25 1216)   sodium chloride 0.9% BOLUS 1,000 mL (0 mLs Intravenous Stopped 2/5/25 1433)   metoclopramide (REGLAN) injection 10 mg (10 mg Intravenous $Given 2/5/25 1335)   diphenhydrAMINE (BENADRYL) injection 25 mg (25 mg Intravenous $Given 2/5/25 1335)   acetaminophen (TYLENOL) tablet 1,000 mg (1,000 mg Oral $Given 2/5/25 1334)              Optional/Additional Documentation  None      Medical Decision Making / Diagnosis         MDM  This is a very pleasant 50-year-old female who presents to the emergency room with what appears to be acute onset headache, possibly thunderclap in nature.  Because she was in the window, and the CT scan is formally read as negative, I do not think that we need to continue working her up for subarachnoid hemorrhage.  Notably she actually feels significantly improved here with a migraine cocktail and I think that her blood pressure is likely because of the alpha agonist properties of the tizanidine that she took.  She does not feel lightheaded, she feels well enough to go home has no other somatic symptoms at this time.  She has good family support and her family is at the bedside and they feel comfortable taking her home as well.  No evidence of sepsis, no tachycardia, no fever, and of asked her to talk to her regular doctor next week to go over neck steps about whether or not she should still use her tizanidine for headaches.  Red flags for when to come back to the ER were discussed in detail, patient is highly reliable appearing, will plan for discharge as above.    ICD-10 Codes:    ICD-10-CM    1. Nonintractable headache, unspecified chronicity pattern, unspecified headache type  R51.9       2. Blood pressure abnormally low  R03.1              Discharge Medications  New Prescriptions    No medications on file                  Eric Loyd MD  02/05/25 [id number]

## 2025-02-18 ENCOUNTER — MYC REFILL (OUTPATIENT)
Dept: FAMILY MEDICINE | Facility: CLINIC | Age: 50
End: 2025-02-18
Payer: COMMERCIAL

## 2025-02-18 DIAGNOSIS — E66.811 CLASS 1 OBESITY WITH SERIOUS COMORBIDITY AND BODY MASS INDEX (BMI) OF 32.0 TO 32.9 IN ADULT, UNSPECIFIED OBESITY TYPE: ICD-10-CM

## 2025-02-18 DIAGNOSIS — M54.50 CHRONIC MIDLINE LOW BACK PAIN WITHOUT SCIATICA: ICD-10-CM

## 2025-02-18 DIAGNOSIS — G89.29 CHRONIC MIDLINE LOW BACK PAIN WITHOUT SCIATICA: ICD-10-CM

## 2025-02-18 DIAGNOSIS — F11.90 CHRONIC, CONTINUOUS USE OF OPIOIDS: ICD-10-CM

## 2025-02-19 RX ORDER — OXYCODONE HYDROCHLORIDE 15 MG/1
15 TABLET ORAL 3 TIMES DAILY PRN
Qty: 90 TABLET | Refills: 0 | Status: SHIPPED | OUTPATIENT
Start: 2025-02-19

## 2025-03-03 ENCOUNTER — OFFICE VISIT (OUTPATIENT)
Dept: FAMILY MEDICINE | Facility: CLINIC | Age: 50
End: 2025-03-03
Attending: FAMILY MEDICINE
Payer: COMMERCIAL

## 2025-03-03 VITALS
OXYGEN SATURATION: 94 % | RESPIRATION RATE: 19 BRPM | WEIGHT: 154.8 LBS | BODY MASS INDEX: 28.31 KG/M2 | TEMPERATURE: 97.7 F | HEART RATE: 95 BPM | SYSTOLIC BLOOD PRESSURE: 107 MMHG | DIASTOLIC BLOOD PRESSURE: 79 MMHG

## 2025-03-03 DIAGNOSIS — M25.552 BILATERAL HIP PAIN: ICD-10-CM

## 2025-03-03 DIAGNOSIS — E66.811 CLASS 1 OBESITY WITH SERIOUS COMORBIDITY AND BODY MASS INDEX (BMI) OF 32.0 TO 32.9 IN ADULT, UNSPECIFIED OBESITY TYPE: ICD-10-CM

## 2025-03-03 DIAGNOSIS — I10 HYPERTENSION GOAL BP (BLOOD PRESSURE) < 140/90: ICD-10-CM

## 2025-03-03 DIAGNOSIS — M25.551 BILATERAL HIP PAIN: ICD-10-CM

## 2025-03-03 DIAGNOSIS — G89.29 CHRONIC MIDLINE LOW BACK PAIN WITHOUT SCIATICA: Primary | ICD-10-CM

## 2025-03-03 DIAGNOSIS — F11.90 CHRONIC, CONTINUOUS USE OF OPIOIDS: ICD-10-CM

## 2025-03-03 DIAGNOSIS — M54.50 CHRONIC MIDLINE LOW BACK PAIN WITHOUT SCIATICA: Primary | ICD-10-CM

## 2025-03-03 PROCEDURE — 3078F DIAST BP <80 MM HG: CPT | Performed by: FAMILY MEDICINE

## 2025-03-03 PROCEDURE — G2211 COMPLEX E/M VISIT ADD ON: HCPCS | Performed by: FAMILY MEDICINE

## 2025-03-03 PROCEDURE — 99214 OFFICE O/P EST MOD 30 MIN: CPT | Performed by: FAMILY MEDICINE

## 2025-03-03 PROCEDURE — 3074F SYST BP LT 130 MM HG: CPT | Performed by: FAMILY MEDICINE

## 2025-03-03 PROCEDURE — 1125F AMNT PAIN NOTED PAIN PRSNT: CPT | Performed by: FAMILY MEDICINE

## 2025-03-03 RX ORDER — OXYCODONE HYDROCHLORIDE 15 MG/1
15 TABLET ORAL 3 TIMES DAILY PRN
Qty: 90 TABLET | Refills: 0 | Status: SHIPPED | OUTPATIENT
Start: 2025-03-17

## 2025-03-03 RX ORDER — METHYLPREDNISOLONE 4 MG/1
TABLET ORAL
Qty: 21 TABLET | Refills: 0 | Status: SHIPPED | OUTPATIENT
Start: 2025-03-03

## 2025-03-03 ASSESSMENT — ENCOUNTER SYMPTOMS: BACK PAIN: 1

## 2025-03-03 ASSESSMENT — PAIN SCALES - GENERAL: PAINLEVEL_OUTOF10: SEVERE PAIN (7)

## 2025-03-03 NOTE — PROGRESS NOTES
Answers submitted by the patient for this visit:  General Questionnaire (Submitted on 3/3/2025)  Chief Complaint: Chronic problems general questions HPI Form  What is the reason for your visit today? : followup  How many servings of fruits and vegetables do you eat daily?: 2-3  On average, how many sweetened beverages do you drink each day (Examples: soda, juice, sweet tea, etc.  Do NOT count diet or artificially sweetened beverages)?: 1  How many minutes a day do you exercise enough to make your heart beat faster?: 9 or less  How many days a week do you exercise enough to make your heart beat faster?: 3 or less  How many days per week do you miss taking your medication?: 0  Questionnaire about: Chronic problems general questions HPI Form (Submitted on 3/3/2025)  Chief Complaint: Chronic problems general questions HPI Form    Assessment & Plan     Chronic midline low back pain without sciatica  Up-to-date on routine monitoring.  Plan follow-up in 3 months  - oxyCODONE IR (ROXICODONE) 15 MG tablet; Take 1 tablet (15 mg) by mouth 3 times daily as needed for pain.    Chronic, continuous use of opioids  As above  - oxyCODONE IR (ROXICODONE) 15 MG tablet; Take 1 tablet (15 mg) by mouth 3 times daily as needed for pain.    Hypertension goal BP (blood pressure) < 140/90  Stable on current regimen.  Continue same plan and routine follow-up.     Class 1 obesity with serious comorbidity and body mass index (BMI) of 32.0 to 32.9 in adult, unspecified obesity type  On GLP-1 therapy and doing great.  She is down 40 pounds from last year.  No significant side effects.    Bilateral hip pain  Has felt tight in her hips.  When I examined her range of motion is good and does not recreate pain.  But there are times with movement especially when laying down that just feels somewhat off and she has some pain radiated into her hamstrings and thighs as well.  Not quite radicular but I suspect we can help this out with a course of  "methylprednisolone.  Also encouraged walking on a regular basis.  - methylPREDNISolone (MEDROL DOSEPAK) 4 MG tablet therapy pack; Follow Package Directions          BMI  Estimated body mass index is 28.31 kg/m  as calculated from the following:    Height as of 2/5/25: 1.575 m (5' 2\").    Weight as of this encounter: 70.2 kg (154 lb 12.8 oz).         Regular exercise  See Patient Instructions    Nicole Aguilar is a 50 year old, presenting for the following health issues:  Back Pain and Weight Problem        3/3/2025     4:03 PM   Additional Questions   Roomed by Nick   Accompanied by self     History of Present Illness       Reason for visit:  Followup    She eats 2-3 servings of fruits and vegetables daily.She consumes 1 sweetened beverage(s) daily.She exercises with enough effort to increase her heart rate 9 or less minutes per day.  She exercises with enough effort to increase her heart rate 3 or less days per week.   She is taking medications regularly.          Chronic/Recurring Back Pain Follow Up    Where is your back pain located? (Select all that apply) low back bilateral  How would you describe your back pain?  cramping  Where does your back pain spread? the right and left  thigh  Since your last clinic visit for back pain, how has your pain changed? gradually worsening  Does your back pain interfere with your job? YES  Since your last visit, have you tried any new treatment? No  How many servings of fruits and vegetables do you eat daily?  2-3  On average, how many sweetened beverages do you drink each day (Examples: soda, juice, sweet tea, etc.  Do NOT count diet or artificially sweetened beverages)?   1  How many days per week do you exercise enough to make your heart beat faster? 3 or less  How many minutes a day do you exercise enough to make your heart beat faster? 9 or less  How many days per week do you miss taking your medication? 0  Medication Followup of Wegovy  Taking Medication as " prescribed: yes  Side Effects:  None  Medication Helping Symptoms:  yes    Here today in follow-up of ongoing chronic issues but also wanted talk about some hip pain as well      Review of Systems  Constitutional, HEENT, cardiovascular, pulmonary, gi and gu systems are negative, except as otherwise noted.      Objective    /79 (BP Location: Right arm, Patient Position: Sitting, Cuff Size: Adult Large)   Pulse 95   Temp 97.7  F (36.5  C) (Oral)   Resp 19   Wt 70.2 kg (154 lb 12.8 oz)   LMP 09/12/2016 (Exact Date)   SpO2 94%   BMI 28.31 kg/m    Body mass index is 28.31 kg/m .  Physical Exam   Alert, pleasant, upbeat, and in no apparent discomfort.  S1 and S2 normal, no murmurs, clicks, gallops or rubs. Regular rate and rhythm. Chest is clear; no wheezes or rales. No edema or JVD.  Normal range of motion through her knees and hips bilaterally  Past labs reviewed with the patient.     The longitudinal plan of care for the diagnosis(es)/condition(s) as documented were addressed during this visit. Due to the added complexity in care, I will continue to support Hienwaqar in the subsequent management and with ongoing continuity of care.        Signed Electronically by: Jordyn Loredo MD

## 2025-04-28 ENCOUNTER — MYC REFILL (OUTPATIENT)
Dept: FAMILY MEDICINE | Facility: CLINIC | Age: 50
End: 2025-04-28
Payer: COMMERCIAL

## 2025-04-28 DIAGNOSIS — G47.00 INSOMNIA, UNSPECIFIED TYPE: ICD-10-CM

## 2025-04-28 DIAGNOSIS — E66.811 CLASS 1 OBESITY WITH SERIOUS COMORBIDITY AND BODY MASS INDEX (BMI) OF 32.0 TO 32.9 IN ADULT, UNSPECIFIED OBESITY TYPE: ICD-10-CM

## 2025-04-28 RX ORDER — ZOLPIDEM TARTRATE 10 MG/1
TABLET ORAL
Qty: 90 TABLET | Refills: 1 | OUTPATIENT
Start: 2025-04-28

## 2025-04-28 RX ORDER — ZOLPIDEM TARTRATE 10 MG/1
TABLET ORAL
Qty: 90 TABLET | Refills: 1 | Status: SHIPPED | OUTPATIENT
Start: 2025-04-28

## 2025-05-24 ENCOUNTER — HOSPITAL ENCOUNTER (EMERGENCY)
Facility: CLINIC | Age: 50
Discharge: HOME | End: 2025-05-24
Attending: EMERGENCY MEDICINE | Admitting: EMERGENCY MEDICINE
Payer: COMMERCIAL

## 2025-05-24 ENCOUNTER — APPOINTMENT (OUTPATIENT)
Dept: GENERAL RADIOLOGY | Facility: CLINIC | Age: 50
End: 2025-05-24
Attending: EMERGENCY MEDICINE
Payer: COMMERCIAL

## 2025-05-24 VITALS
HEIGHT: 63 IN | SYSTOLIC BLOOD PRESSURE: 127 MMHG | HEART RATE: 93 BPM | RESPIRATION RATE: 18 BRPM | TEMPERATURE: 98.2 F | WEIGHT: 153.9 LBS | BODY MASS INDEX: 27.27 KG/M2 | OXYGEN SATURATION: 98 % | DIASTOLIC BLOOD PRESSURE: 87 MMHG

## 2025-05-24 DIAGNOSIS — R10.2 PELVIC PAIN: ICD-10-CM

## 2025-05-24 DIAGNOSIS — M25.512 ACUTE PAIN OF LEFT SHOULDER: ICD-10-CM

## 2025-05-24 LAB
ALBUMIN UR-MCNC: NEGATIVE MG/DL
APPEARANCE UR: ABNORMAL
BACTERIA #/AREA URNS HPF: ABNORMAL /HPF
BILIRUB UR QL STRIP: NEGATIVE
COLOR UR AUTO: ABNORMAL
GLUCOSE UR STRIP-MCNC: NEGATIVE MG/DL
HGB UR QL STRIP: NEGATIVE
KETONES UR STRIP-MCNC: NEGATIVE MG/DL
LEUKOCYTE ESTERASE UR QL STRIP: NEGATIVE
MUCOUS THREADS #/AREA URNS LPF: PRESENT /LPF
NITRATE UR QL: NEGATIVE
PH UR STRIP: 7 [PH] (ref 5–7)
RBC URINE: 1 /HPF
SP GR UR STRIP: 1.02 (ref 1–1.03)
SQUAMOUS EPITHELIAL: 9 /HPF
UROBILINOGEN UR STRIP-MCNC: 2 MG/DL
WBC URINE: 1 /HPF

## 2025-05-24 PROCEDURE — 73030 X-RAY EXAM OF SHOULDER: CPT | Mod: LT

## 2025-05-24 PROCEDURE — 81001 URINALYSIS AUTO W/SCOPE: CPT | Performed by: EMERGENCY MEDICINE

## 2025-05-24 PROCEDURE — 72170 X-RAY EXAM OF PELVIS: CPT

## 2025-05-24 PROCEDURE — 250N000013 HC RX MED GY IP 250 OP 250 PS 637: Performed by: EMERGENCY MEDICINE

## 2025-05-24 PROCEDURE — 99284 EMERGENCY DEPT VISIT MOD MDM: CPT | Performed by: EMERGENCY MEDICINE

## 2025-05-24 RX ORDER — ACETAMINOPHEN 325 MG/1
650 TABLET ORAL ONCE
Status: COMPLETED | OUTPATIENT
Start: 2025-05-24 | End: 2025-05-24

## 2025-05-24 RX ADMIN — ACETAMINOPHEN 650 MG: 325 TABLET ORAL at 14:23

## 2025-05-24 ASSESSMENT — ACTIVITIES OF DAILY LIVING (ADL)
ADLS_ACUITY_SCORE: 59

## 2025-05-24 NOTE — DISCHARGE INSTRUCTIONS
Please take tylenol and ibuprofen as needed for pain.     The xray of your shoulder and pelvis are both normal.     Please make an appointment to follow up with Your Primary Care Provider in 3-5 days as needed.
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
## 2025-05-24 NOTE — ED TRIAGE NOTES
Pt having left arm pain radiating into pelvis and neck about a week ago. Pt denies having had a fall or injury into area.

## 2025-05-26 ENCOUNTER — PATIENT OUTREACH (OUTPATIENT)
Dept: CARE COORDINATION | Facility: CLINIC | Age: 50
End: 2025-05-26
Payer: COMMERCIAL

## 2025-06-09 ENCOUNTER — MYC REFILL (OUTPATIENT)
Dept: FAMILY MEDICINE | Facility: CLINIC | Age: 50
End: 2025-06-09
Payer: COMMERCIAL

## 2025-06-09 ENCOUNTER — PATIENT OUTREACH (OUTPATIENT)
Dept: CARE COORDINATION | Facility: CLINIC | Age: 50
End: 2025-06-09
Payer: COMMERCIAL

## 2025-06-09 DIAGNOSIS — G89.29 CHRONIC MIDLINE LOW BACK PAIN WITHOUT SCIATICA: ICD-10-CM

## 2025-06-09 DIAGNOSIS — M54.50 CHRONIC MIDLINE LOW BACK PAIN WITHOUT SCIATICA: ICD-10-CM

## 2025-06-09 DIAGNOSIS — F11.90 CHRONIC, CONTINUOUS USE OF OPIOIDS: ICD-10-CM

## 2025-06-09 RX ORDER — OXYCODONE HYDROCHLORIDE 15 MG/1
15 TABLET ORAL 3 TIMES DAILY PRN
Qty: 90 TABLET | Refills: 0 | Status: SHIPPED | OUTPATIENT
Start: 2025-06-10

## 2025-06-18 ENCOUNTER — OFFICE VISIT (OUTPATIENT)
Dept: FAMILY MEDICINE | Facility: CLINIC | Age: 50
End: 2025-06-18
Attending: FAMILY MEDICINE
Payer: COMMERCIAL

## 2025-06-18 VITALS
HEIGHT: 62 IN | RESPIRATION RATE: 16 BRPM | WEIGHT: 152 LBS | OXYGEN SATURATION: 98 % | DIASTOLIC BLOOD PRESSURE: 76 MMHG | TEMPERATURE: 97 F | SYSTOLIC BLOOD PRESSURE: 108 MMHG | BODY MASS INDEX: 27.97 KG/M2 | HEART RATE: 80 BPM

## 2025-06-18 DIAGNOSIS — E66.811 CLASS 1 OBESITY WITH SERIOUS COMORBIDITY AND BODY MASS INDEX (BMI) OF 32.0 TO 32.9 IN ADULT, UNSPECIFIED OBESITY TYPE: ICD-10-CM

## 2025-06-18 DIAGNOSIS — G44.86 CERVICOGENIC HEADACHE: ICD-10-CM

## 2025-06-18 DIAGNOSIS — M25.559 PAIN IN JOINT, PELVIC REGION AND THIGH, UNSPECIFIED LATERALITY: ICD-10-CM

## 2025-06-18 DIAGNOSIS — Z13.220 SCREENING CHOLESTEROL LEVEL: ICD-10-CM

## 2025-06-18 DIAGNOSIS — F11.90 CHRONIC, CONTINUOUS USE OF OPIOIDS: ICD-10-CM

## 2025-06-18 DIAGNOSIS — M54.50 CHRONIC MIDLINE LOW BACK PAIN WITHOUT SCIATICA: ICD-10-CM

## 2025-06-18 DIAGNOSIS — G89.29 CHRONIC NECK PAIN: ICD-10-CM

## 2025-06-18 DIAGNOSIS — I10 HYPERTENSION GOAL BP (BLOOD PRESSURE) < 140/90: ICD-10-CM

## 2025-06-18 DIAGNOSIS — G89.29 CHRONIC MIDLINE LOW BACK PAIN WITHOUT SCIATICA: ICD-10-CM

## 2025-06-18 DIAGNOSIS — M25.512 LEFT SHOULDER PAIN, UNSPECIFIED CHRONICITY: Primary | ICD-10-CM

## 2025-06-18 DIAGNOSIS — M54.2 CHRONIC NECK PAIN: ICD-10-CM

## 2025-06-18 LAB — ERYTHROCYTE [SEDIMENTATION RATE] IN BLOOD BY WESTERGREN METHOD: 21 MM/HR (ref 0–30)

## 2025-06-18 PROCEDURE — G2211 COMPLEX E/M VISIT ADD ON: HCPCS | Performed by: FAMILY MEDICINE

## 2025-06-18 PROCEDURE — 80053 COMPREHEN METABOLIC PANEL: CPT | Performed by: FAMILY MEDICINE

## 2025-06-18 PROCEDURE — 1125F AMNT PAIN NOTED PAIN PRSNT: CPT | Performed by: FAMILY MEDICINE

## 2025-06-18 PROCEDURE — 80061 LIPID PANEL: CPT | Performed by: FAMILY MEDICINE

## 2025-06-18 PROCEDURE — 3074F SYST BP LT 130 MM HG: CPT | Performed by: FAMILY MEDICINE

## 2025-06-18 PROCEDURE — 80306 DRUG TEST PRSMV INSTRMNT: CPT | Performed by: FAMILY MEDICINE

## 2025-06-18 PROCEDURE — 99214 OFFICE O/P EST MOD 30 MIN: CPT | Performed by: FAMILY MEDICINE

## 2025-06-18 PROCEDURE — 3049F LDL-C 100-129 MG/DL: CPT | Performed by: FAMILY MEDICINE

## 2025-06-18 PROCEDURE — 86140 C-REACTIVE PROTEIN: CPT | Performed by: FAMILY MEDICINE

## 2025-06-18 PROCEDURE — 3078F DIAST BP <80 MM HG: CPT | Performed by: FAMILY MEDICINE

## 2025-06-18 PROCEDURE — 85652 RBC SED RATE AUTOMATED: CPT | Performed by: FAMILY MEDICINE

## 2025-06-18 PROCEDURE — 36415 COLL VENOUS BLD VENIPUNCTURE: CPT | Performed by: FAMILY MEDICINE

## 2025-06-18 PROCEDURE — 86431 RHEUMATOID FACTOR QUANT: CPT | Performed by: FAMILY MEDICINE

## 2025-06-18 RX ORDER — LOSARTAN POTASSIUM 100 MG/1
100 TABLET ORAL DAILY
Qty: 90 TABLET | Refills: 0 | Status: SHIPPED | OUTPATIENT
Start: 2025-06-18

## 2025-06-18 RX ORDER — PREGABALIN 100 MG/1
100 CAPSULE ORAL 2 TIMES DAILY
Qty: 180 CAPSULE | Refills: 1 | Status: SHIPPED | OUTPATIENT
Start: 2025-06-18

## 2025-06-18 ASSESSMENT — PAIN SCALES - GENERAL: PAINLEVEL_OUTOF10: SEVERE PAIN (7)

## 2025-06-18 ASSESSMENT — PATIENT HEALTH QUESTIONNAIRE - PHQ9: SUM OF ALL RESPONSES TO PHQ QUESTIONS 1-9: 1

## 2025-06-18 ASSESSMENT — ANXIETY QUESTIONNAIRES
6. BECOMING EASILY ANNOYED OR IRRITABLE: NOT AT ALL
GAD7 TOTAL SCORE: 1
GAD7 TOTAL SCORE: 1
3. WORRYING TOO MUCH ABOUT DIFFERENT THINGS: SEVERAL DAYS
1. FEELING NERVOUS, ANXIOUS, OR ON EDGE: NOT AT ALL
5. BEING SO RESTLESS THAT IT IS HARD TO SIT STILL: NOT AT ALL
2. NOT BEING ABLE TO STOP OR CONTROL WORRYING: NOT AT ALL
4. TROUBLE RELAXING: NOT AT ALL
7. FEELING AFRAID AS IF SOMETHING AWFUL MIGHT HAPPEN: NOT AT ALL
IF YOU CHECKED OFF ANY PROBLEMS ON THIS QUESTIONNAIRE, HOW DIFFICULT HAVE THESE PROBLEMS MADE IT FOR YOU TO DO YOUR WORK, TAKE CARE OF THINGS AT HOME, OR GET ALONG WITH OTHER PEOPLE: NOT DIFFICULT AT ALL

## 2025-06-18 NOTE — PROGRESS NOTES
Assessment & Plan     Left shoulder pain, unspecified chronicity  Patient well-known to me and here today primarily for routine follow-up of ongoing back pain.  But she notes for a number of months she has had significant decreased range of motion in her left shoulder.  No particular injury.  Was seen through the emergency department a few weeks ago and x-ray was unremarkable.  On exam she has some definite impingement pain but rotator cuff seems strong and stable overall.  So we talked about impingement syndrome and partial rotator cuff tears as being part of the continuum leading to a full rotator cuff tear.  She is concerned that there is something more going on so I am comfortable in taking a look at more advanced imaging to help us determine what type of care is needed.  Has some other ongoing joint issues and I am looking into that is more of an inflammatory issue.  Possibly involved.  - Erythrocyte sedimentation rate auto; Future  - CRP inflammation; Future  - Rheumatoid factor; Future  - MR Shoulder Left w/o Contrast; Future    Pain in joint, pelvic region and thigh, unspecified laterality  Has a lot of pain in her pelvic region, but not quite her hips and not really her back.  Says its uncomfortable to twist and turn.  Imaging at the emergency department a few weeks ago showed some advanced arthritis of the pubic symphysis.  I discussed that that typically relates to childbirth and that type of thing but it certainly is an odd finding and at least 1 take a look at some lab work to make sure she does not have any type of inflammatory arthritis going on  - Erythrocyte sedimentation rate auto; Future  - CRP inflammation; Future  - Rheumatoid factor; Future    Chronic midline low back pain without sciatica  This issue is stable and we will continue to monitor  - pregabalin (LYRICA) 100 MG capsule; Take 1 capsule (100 mg) by mouth 2 times daily.  - Urine Drug Screen Clinic; Future    Chronic neck pain  As  "above  - pregabalin (LYRICA) 100 MG capsule; Take 1 capsule (100 mg) by mouth 2 times daily.    Cervicogenic headache  As above  - pregabalin (LYRICA) 100 MG capsule; Take 1 capsule (100 mg) by mouth 2 times daily.    Chronic, continuous use of opioids  Update controlled substance agreement today and urine drug screen    Hypertension goal BP (blood pressure) < 140/90  Stable on current regimen.  Continue same plan and routine follow-up.  Had some elevated liver functions and like to follow-up  - losartan (COZAAR) 100 MG tablet; Take 1 tablet (100 mg) by mouth daily.  - Comprehensive metabolic panel; Future    Class 1 obesity with serious comorbidity and body mass index (BMI) of 32.0 to 32.9 in adult, unspecified obesity type  Stable on current regimen.  Continue same plan and routine follow-up.   - Semaglutide-Weight Management (WEGOVY) 1.7 MG/0.75ML pen; Inject 1.7 mg subcutaneously once a week.    Screening cholesterol level    - Lipid panel reflex to direct LDL Non-fasting; Future          BMI  Estimated body mass index is 27.8 kg/m  as calculated from the following:    Height as of this encounter: 1.575 m (5' 2\").    Weight as of this encounter: 68.9 kg (152 lb).         {Follow-up (Optional):967213}    Nicole Aguilar is a 50 year old, presenting for the following health issues:  Follow Up        6/18/2025     3:28 PM   Additional Questions   Roomed by Kirby   Accompanied by Self     History of Present Illness       Reason for visit:  Arm pain pelvic bone pain    She eats 2-3 servings of fruits and vegetables daily.She consumes 2 sweetened beverage(s) daily.She exercises with enough effort to increase her heart rate 9 or less minutes per day.  She exercises with enough effort to increase her heart rate 3 or less days per week.   She is taking medications regularly.            Here today in follow-up of ongoing chronic issues but has some separate issues as above      Review of Systems  Constitutional, " "HEENT, cardiovascular, pulmonary, gi and gu systems are negative, except as otherwise noted.      Objective    /76 (BP Location: Right arm, Patient Position: Sitting, Cuff Size: Adult Regular)   Pulse 80   Temp 97  F (36.1  C) (Tympanic)   Resp 16   Ht 1.575 m (5' 2\")   Wt 68.9 kg (152 lb)   LMP 09/12/2016 (Exact Date)   SpO2 98%   BMI 27.80 kg/m    Body mass index is 27.8 kg/m .  Physical Exam   Alert, pleasant, upbeat, and in no apparent discomfort.  S1 and S2 normal, no murmurs, clicks, gallops or rubs. Regular rate and rhythm. Chest is clear; no wheezes or rales. No edema or JVD.  Left shoulder exam as above  Past labs reviewed with the patient.   Reviewed imaging    The longitudinal plan of care for the diagnosis(es)/condition(s) as documented were addressed during this visit. Due to the added complexity in care, I will continue to support Hienwaqar in the subsequent management and with ongoing continuity of care.        Signed Electronically by: Jordyn Loredo MD  {Email feedback regarding this note to [email or web address]   :[id number]}  "

## 2025-06-18 NOTE — PATIENT INSTRUCTIONS
Arthritic changes in the pubic symphysis, also known as pubic symphysis dysfunction (PSD), can cause pain and discomfort in the groin and lower abdomen. This condition involves inflammation or degeneration of the joint between the left and right pubic bones. Pregnancy, childbirth, athletic activities, and certain surgeries are common causes. Treatment often includes rest, pain management, and physical therapy

## 2025-06-18 NOTE — LETTER

## 2025-06-19 ENCOUNTER — RESULTS FOLLOW-UP (OUTPATIENT)
Dept: FAMILY MEDICINE | Facility: CLINIC | Age: 50
End: 2025-06-19

## 2025-06-19 LAB
ALBUMIN SERPL BCG-MCNC: 4.3 G/DL (ref 3.5–5.2)
ALP SERPL-CCNC: 64 U/L (ref 40–150)
ALT SERPL W P-5'-P-CCNC: 26 U/L (ref 0–50)
AMPHETAMINES UR QL: NOT DETECTED
ANION GAP SERPL CALCULATED.3IONS-SCNC: 10 MMOL/L (ref 7–15)
AST SERPL W P-5'-P-CCNC: 20 U/L (ref 0–45)
BARBITURATES UR QL SCN: NOT DETECTED
BENZODIAZ UR QL SCN: NOT DETECTED
BILIRUB SERPL-MCNC: 0.8 MG/DL
BUN SERPL-MCNC: 5.9 MG/DL (ref 6–20)
BUPRENORPHINE UR QL: NOT DETECTED
CALCIUM SERPL-MCNC: 9.4 MG/DL (ref 8.8–10.4)
CANNABINOIDS UR QL: NOT DETECTED
CHLORIDE SERPL-SCNC: 106 MMOL/L (ref 98–107)
CHOLEST SERPL-MCNC: 224 MG/DL
COCAINE UR QL SCN: NOT DETECTED
CREAT SERPL-MCNC: 0.78 MG/DL (ref 0.51–0.95)
CRP SERPL-MCNC: <3 MG/L
D-METHAMPHET UR QL: NOT DETECTED
EGFRCR SERPLBLD CKD-EPI 2021: >90 ML/MIN/1.73M2
FASTING STATUS PATIENT QL REPORTED: YES
FASTING STATUS PATIENT QL REPORTED: YES
GLUCOSE SERPL-MCNC: 102 MG/DL (ref 70–99)
HCO3 SERPL-SCNC: 29 MMOL/L (ref 22–29)
HDLC SERPL-MCNC: 82 MG/DL
LDLC SERPL CALC-MCNC: 124 MG/DL
METHADONE UR QL SCN: NOT DETECTED
NONHDLC SERPL-MCNC: 142 MG/DL
OPIATES UR QL SCN: NOT DETECTED
OXYCODONE UR QL SCN: DETECTED
PCP UR QL SCN: NOT DETECTED
POTASSIUM SERPL-SCNC: 3.7 MMOL/L (ref 3.4–5.3)
PROT SERPL-MCNC: 7.2 G/DL (ref 6.4–8.3)
RHEUMATOID FACT SERPL-ACNC: <10 IU/ML
SODIUM SERPL-SCNC: 145 MMOL/L (ref 135–145)
TRICYCLICS UR QL SCN: NOT DETECTED
TRIGL SERPL-MCNC: 90 MG/DL

## 2025-07-01 ENCOUNTER — ANCILLARY PROCEDURE (OUTPATIENT)
Dept: MRI IMAGING | Facility: CLINIC | Age: 50
End: 2025-07-01
Attending: FAMILY MEDICINE
Payer: COMMERCIAL

## 2025-07-01 DIAGNOSIS — M25.512 LEFT SHOULDER PAIN, UNSPECIFIED CHRONICITY: ICD-10-CM

## 2025-07-01 PROCEDURE — 73221 MRI JOINT UPR EXTREM W/O DYE: CPT | Mod: LT

## 2025-07-02 ENCOUNTER — OFFICE VISIT (OUTPATIENT)
Dept: FAMILY MEDICINE | Facility: CLINIC | Age: 50
End: 2025-07-02
Payer: COMMERCIAL

## 2025-07-02 VITALS
SYSTOLIC BLOOD PRESSURE: 86 MMHG | BODY MASS INDEX: 28.05 KG/M2 | HEART RATE: 85 BPM | RESPIRATION RATE: 16 BRPM | HEIGHT: 62 IN | OXYGEN SATURATION: 99 % | TEMPERATURE: 97.9 F | WEIGHT: 152.4 LBS | DIASTOLIC BLOOD PRESSURE: 66 MMHG

## 2025-07-02 DIAGNOSIS — G89.29 CHRONIC MIDLINE LOW BACK PAIN WITHOUT SCIATICA: ICD-10-CM

## 2025-07-02 DIAGNOSIS — M54.50 CHRONIC MIDLINE LOW BACK PAIN WITHOUT SCIATICA: ICD-10-CM

## 2025-07-02 DIAGNOSIS — M67.912 TENDINOPATHY OF LEFT ROTATOR CUFF: Primary | ICD-10-CM

## 2025-07-02 DIAGNOSIS — M19.012 GLENOHUMERAL ARTHRITIS, LEFT: ICD-10-CM

## 2025-07-02 DIAGNOSIS — F11.90 CHRONIC, CONTINUOUS USE OF OPIOIDS: ICD-10-CM

## 2025-07-02 PROCEDURE — 1125F AMNT PAIN NOTED PAIN PRSNT: CPT | Performed by: FAMILY MEDICINE

## 2025-07-02 PROCEDURE — 99214 OFFICE O/P EST MOD 30 MIN: CPT | Performed by: FAMILY MEDICINE

## 2025-07-02 PROCEDURE — 3074F SYST BP LT 130 MM HG: CPT | Performed by: FAMILY MEDICINE

## 2025-07-02 PROCEDURE — 3078F DIAST BP <80 MM HG: CPT | Performed by: FAMILY MEDICINE

## 2025-07-02 RX ORDER — OXYCODONE HYDROCHLORIDE 15 MG/1
15 TABLET ORAL 3 TIMES DAILY PRN
Qty: 90 TABLET | Refills: 0 | Status: SHIPPED | OUTPATIENT
Start: 2025-07-09

## 2025-07-02 ASSESSMENT — ANXIETY QUESTIONNAIRES
GAD7 TOTAL SCORE: 2
1. FEELING NERVOUS, ANXIOUS, OR ON EDGE: NOT AT ALL
2. NOT BEING ABLE TO STOP OR CONTROL WORRYING: SEVERAL DAYS
7. FEELING AFRAID AS IF SOMETHING AWFUL MIGHT HAPPEN: NOT AT ALL
IF YOU CHECKED OFF ANY PROBLEMS ON THIS QUESTIONNAIRE, HOW DIFFICULT HAVE THESE PROBLEMS MADE IT FOR YOU TO DO YOUR WORK, TAKE CARE OF THINGS AT HOME, OR GET ALONG WITH OTHER PEOPLE: SOMEWHAT DIFFICULT
7. FEELING AFRAID AS IF SOMETHING AWFUL MIGHT HAPPEN: NOT AT ALL
8. IF YOU CHECKED OFF ANY PROBLEMS, HOW DIFFICULT HAVE THESE MADE IT FOR YOU TO DO YOUR WORK, TAKE CARE OF THINGS AT HOME, OR GET ALONG WITH OTHER PEOPLE?: SOMEWHAT DIFFICULT
6. BECOMING EASILY ANNOYED OR IRRITABLE: NOT AT ALL
5. BEING SO RESTLESS THAT IT IS HARD TO SIT STILL: NOT AT ALL
4. TROUBLE RELAXING: NOT AT ALL
3. WORRYING TOO MUCH ABOUT DIFFERENT THINGS: SEVERAL DAYS

## 2025-07-02 ASSESSMENT — PAIN SCALES - GENERAL: PAINLEVEL_OUTOF10: MODERATE PAIN (6)

## 2025-07-02 NOTE — PROGRESS NOTES
Assessment & Plan     Tendinopathy of left rotator cuff  Here today in follow-up of left shoulder MRI which showed tendinopathy of supraspinatus and infraspinatus as well as some glenohumeral arthritis.  We talked about options and I think she would benefit from cortisone injection.  Her  has had 1 of those in the past and felt it really helped out so we will help get her set up with orthopedics on this.  I discussed that these are somewhat 2 separate areas so they may be able to inject both.  Is really the glenohumeral joint that is trickier  - Orthopedic  Referral; Future    Glenohumeral arthritis, left  As above  - Orthopedic  Referral; Future    Chronic midline low back pain without sciatica  Stable and up-to-date on routine monitoring  - oxyCODONE IR (ROXICODONE) 15 MG tablet; Take 1 tablet (15 mg) by mouth 3 times daily as needed for pain.    Chronic, continuous use of opioids  As above  - oxyCODONE IR (ROXICODONE) 15 MG tablet; Take 1 tablet (15 mg) by mouth 3 times daily as needed for pain.                Nicole Aguilar is a 50 year old, presenting for the following health issues:  Musculoskeletal Problem (Left arm pain)        7/2/2025     3:07 PM   Additional Questions   Roomed by Justyna STAPLETON   Accompanied by   Duy     Musculoskeletal Problem    History of Present Illness       Reason for visit:  Follow up    She eats 0-1 servings of fruits and vegetables daily.She consumes 1 sweetened beverage(s) daily.She exercises with enough effort to increase her heart rate 9 or less minutes per day.  She exercises with enough effort to increase her heart rate 3 or less days per week.   She is taking medications regularly.            Here today in follow-up of left shoulder pain      Review of Systems  Constitutional, HEENT, cardiovascular, pulmonary, gi and gu systems are negative, except as otherwise noted.      Objective    BP (!) 86/66 (BP Location: Right arm, Patient  "Position: Sitting, Cuff Size: Adult Large)   Pulse 85   Temp 97.9  F (36.6  C) (Oral)   Resp 16   Ht 1.568 m (5' 1.75\")   Wt 69.1 kg (152 lb 6.4 oz)   LMP 09/12/2016 (Exact Date)   SpO2 99%   BMI 28.10 kg/m    Body mass index is 28.1 kg/m .  Physical Exam   Alert, pleasant, upbeat, and in no apparent discomfort.  S1 and S2 normal, no murmurs, clicks, gallops or rubs. Regular rate and rhythm. Chest is clear; no wheezes or rales. No edema or JVD.  Past labs reviewed with the patient.   Reviewed imaging            Signed Electronically by: Jordyn Loredo MD    "

## 2025-07-03 ENCOUNTER — PATIENT OUTREACH (OUTPATIENT)
Dept: CARE COORDINATION | Facility: CLINIC | Age: 50
End: 2025-07-03
Payer: COMMERCIAL

## 2025-07-07 ENCOUNTER — PATIENT OUTREACH (OUTPATIENT)
Dept: CARE COORDINATION | Facility: CLINIC | Age: 50
End: 2025-07-07
Payer: COMMERCIAL

## 2025-07-26 ENCOUNTER — HEALTH MAINTENANCE LETTER (OUTPATIENT)
Age: 50
End: 2025-07-26

## 2025-07-28 ENCOUNTER — MYC MEDICAL ADVICE (OUTPATIENT)
Dept: FAMILY MEDICINE | Facility: CLINIC | Age: 50
End: 2025-07-28
Payer: COMMERCIAL

## 2025-07-28 DIAGNOSIS — K91.2 POSTSURGICAL MALABSORPTION: ICD-10-CM

## 2025-07-28 DIAGNOSIS — I10 HYPERTENSION GOAL BP (BLOOD PRESSURE) < 140/90: ICD-10-CM

## 2025-07-28 DIAGNOSIS — F11.90 CHRONIC, CONTINUOUS USE OF OPIOIDS: ICD-10-CM

## 2025-07-28 DIAGNOSIS — G44.86 CERVICOGENIC HEADACHE: ICD-10-CM

## 2025-07-28 DIAGNOSIS — M54.50 CHRONIC MIDLINE LOW BACK PAIN WITHOUT SCIATICA: ICD-10-CM

## 2025-07-28 DIAGNOSIS — G89.29 CHRONIC NECK PAIN: ICD-10-CM

## 2025-07-28 DIAGNOSIS — G89.29 CHRONIC MIDLINE LOW BACK PAIN WITHOUT SCIATICA: ICD-10-CM

## 2025-07-28 DIAGNOSIS — J30.1 SEASONAL ALLERGIC RHINITIS DUE TO POLLEN: ICD-10-CM

## 2025-07-28 DIAGNOSIS — G44.209 TENSION HEADACHE: ICD-10-CM

## 2025-07-28 DIAGNOSIS — Z98.84 S/P LAPAROSCOPIC SLEEVE GASTRECTOMY: ICD-10-CM

## 2025-07-28 DIAGNOSIS — M62.838 NECK MUSCLE SPASM: ICD-10-CM

## 2025-07-28 DIAGNOSIS — G47.00 INSOMNIA, UNSPECIFIED TYPE: ICD-10-CM

## 2025-07-28 DIAGNOSIS — M54.2 CHRONIC NECK PAIN: ICD-10-CM

## 2025-07-28 DIAGNOSIS — K59.00 CONSTIPATION, UNSPECIFIED CONSTIPATION TYPE: ICD-10-CM

## 2025-07-28 DIAGNOSIS — E66.811 CLASS 1 OBESITY WITH SERIOUS COMORBIDITY AND BODY MASS INDEX (BMI) OF 32.0 TO 32.9 IN ADULT, UNSPECIFIED OBESITY TYPE: ICD-10-CM

## 2025-07-28 RX ORDER — CALCIUM CARBONATE/VITAMIN D3 600 MG-10
1 TABLET ORAL 2 TIMES DAILY
Qty: 180 TABLET | Refills: 1 | Status: SHIPPED | OUTPATIENT
Start: 2025-07-28

## 2025-07-28 RX ORDER — PREGABALIN 100 MG/1
100 CAPSULE ORAL 2 TIMES DAILY
Qty: 180 CAPSULE | Refills: 1 | Status: SHIPPED | OUTPATIENT
Start: 2025-07-28

## 2025-07-28 RX ORDER — ZOLPIDEM TARTRATE 10 MG/1
TABLET ORAL
Qty: 90 TABLET | Refills: 1 | Status: SHIPPED | OUTPATIENT
Start: 2025-07-28

## 2025-07-28 RX ORDER — OMEPRAZOLE 20 MG/1
20 CAPSULE, DELAYED RELEASE ORAL
Qty: 90 CAPSULE | Refills: 3 | Status: SHIPPED | OUTPATIENT
Start: 2025-07-28

## 2025-07-28 RX ORDER — MENTHOL 5.8 MG/1
2 LOZENGE ORAL DAILY
Qty: 180 TABLET | Refills: 3 | Status: SHIPPED | OUTPATIENT
Start: 2025-07-28

## 2025-07-28 RX ORDER — LORATADINE 10 MG/1
10 TABLET ORAL DAILY
Qty: 90 TABLET | Refills: 2 | Status: SHIPPED | OUTPATIENT
Start: 2025-07-28

## 2025-07-28 RX ORDER — OXYCODONE HYDROCHLORIDE 15 MG/1
15 TABLET ORAL 3 TIMES DAILY PRN
Qty: 90 TABLET | Refills: 0 | Status: SHIPPED | OUTPATIENT
Start: 2025-08-12

## 2025-07-28 RX ORDER — AMOXICILLIN 250 MG
1 CAPSULE ORAL 2 TIMES DAILY PRN
Qty: 30 TABLET | Refills: 0 | Status: SHIPPED | OUTPATIENT
Start: 2025-07-28

## 2025-07-28 RX ORDER — LANOLIN ALCOHOL/MO/W.PET/CERES
100 CREAM (GRAM) TOPICAL WEEKLY
Qty: 4 TABLET | Refills: 11 | Status: SHIPPED | OUTPATIENT
Start: 2025-07-28

## 2025-07-28 RX ORDER — LOSARTAN POTASSIUM 100 MG/1
100 TABLET ORAL DAILY
Qty: 90 TABLET | Refills: 0 | Status: SHIPPED | OUTPATIENT
Start: 2025-07-28

## 2025-07-28 NOTE — TELEPHONE ENCOUNTER
Routing to provider to review and advise on refill of all medications. Meds and pharmacy pended.    Kirby Boyer RN  [facility]

## 2025-08-19 ENCOUNTER — PATIENT OUTREACH (OUTPATIENT)
Dept: CARE COORDINATION | Facility: CLINIC | Age: 50
End: 2025-08-19
Payer: COMMERCIAL

## 2025-09-03 ENCOUNTER — PATIENT OUTREACH (OUTPATIENT)
Dept: CARE COORDINATION | Facility: CLINIC | Age: 50
End: 2025-09-03
Payer: COMMERCIAL

## (undated) DEVICE — GLOVE PROTEXIS MICRO 7.5  2D73PM75

## (undated) DEVICE — SOL NACL 0.9% IRRIG 3000ML BAG 2B7477

## (undated) DEVICE — DRAPE BREAST/CHEST 29420

## (undated) DEVICE — STPL RELOAD REG TISSUE ECHELON 60 X 3.6MM BLUE GST60B

## (undated) DEVICE — RX VISTASEAL FIBRIN SEALANT W/THROMBIN 10ML VST10

## (undated) DEVICE — EVAC SYSTEM CLEAR FLOW SC082500

## (undated) DEVICE — PREP CHLORAPREP 26ML TINTED ORANGE  260815

## (undated) DEVICE — TUBING C02 INSUFFLATION LAP FILTER HEATER 6198

## (undated) DEVICE — ENDO TROCAR FIRST ENTRY KII FIOS Z-THRD 05X100MM CTF03

## (undated) DEVICE — STPL LINE REINFORCEMENT 60MM ECHELON ECH60R

## (undated) DEVICE — GLOVE PROTEXIS BLUE W/NEU-THERA 7.5  2D73EB75

## (undated) DEVICE — PAD CHUX UNDERPAD 23X24" 7136

## (undated) DEVICE — ENDO SCOPE WARMER LF TM500

## (undated) DEVICE — DRSG BANDAID 1X3" FABRIC CURITY LATEX FREE KC44101

## (undated) DEVICE — ESU LIGASURE SEALER/DIVIDER RETRACT L-HOOK 37CM LF5637

## (undated) DEVICE — CLIP APPLIER ENDO ROTATING 10MM MED/LG ER320

## (undated) DEVICE — SU MONOCRYL 4-0 PS-2 18" UND Y496G

## (undated) DEVICE — KIT ENDO FIRST STEP DISINFECTANT 200ML W/POUCH EP-4

## (undated) DEVICE — DECANTER BAG 2002S

## (undated) DEVICE — KIT ENDO TURNOVER/PROCEDURE W/CLEAN A SCOPE LINERS 103888

## (undated) DEVICE — SOL WATER IRRIG 1000ML BOTTLE 2F7114

## (undated) DEVICE — PACK LAP CHOLE SLC15LCFSD

## (undated) DEVICE — ESU HOLDER LAP INST DISP PURPLE LONG 330MM H-PRO-330

## (undated) DEVICE — ENDO SNARE EXACTO COLD 9MM LOOP 2.4MMX230CM 00711115

## (undated) DEVICE — Device

## (undated) DEVICE — DRAPE LEGGINGS 8421

## (undated) DEVICE — ENDO TROCAR FIRST ENTRY KII FIOS Z-THRD 12X100MM CTF73

## (undated) DEVICE — SUCTION CANISTER MEDIVAC LINER 3000ML W/LID 65651-530

## (undated) DEVICE — SOL NACL 0.9% IRRIG 1000ML BOTTLE 2F7124

## (undated) DEVICE — SUCTION IRR STRYKERFLOW II W/TIP 250-070-520

## (undated) DEVICE — LINEN TOWEL PACK X5 5464

## (undated) DEVICE — GASTRECTOMY SLEEVE STABILIZATION SYSTEM VISIGI 3D 36FR 5236B

## (undated) DEVICE — APPLICATOR VISTASEAL RIGID TIP 35CM VSTL35

## (undated) DEVICE — SU VICRYL 0 TIE 12X18" J906G

## (undated) DEVICE — ENDO TRAP POLYP QUICK CATCH 710201

## (undated) DEVICE — ESU GROUND PAD UNIVERSAL W/O CORD

## (undated) DEVICE — ENDO TROCAR SLEEVE KII Z-THREADED 12X100MM CTS22

## (undated) DEVICE — DRSG GAUZE 4X4" 3033

## (undated) DEVICE — CLIP HEMOSTASIS ASSURANCE W16 MM BX00711884

## (undated) DEVICE — STPL POWERED ECHELON 60MM PSEE60A

## (undated) DEVICE — SYR 50ML CATH TIP W/O NDL 309620

## (undated) DEVICE — DECANTER VIAL 2006S

## (undated) DEVICE — BNDG ABDOMINAL BINDER 9X62-84" 79-89210

## (undated) RX ORDER — ONDANSETRON 2 MG/ML
INJECTION INTRAMUSCULAR; INTRAVENOUS
Status: DISPENSED
Start: 2022-05-11

## (undated) RX ORDER — HYDROMORPHONE HCL IN WATER/PF 6 MG/30 ML
PATIENT CONTROLLED ANALGESIA SYRINGE INTRAVENOUS
Status: DISPENSED
Start: 2022-05-11

## (undated) RX ORDER — ATROPINE SULFATE 0.4 MG/ML
AMPUL (ML) INJECTION
Status: DISPENSED
Start: 2023-09-26

## (undated) RX ORDER — KETAMINE HCL IN NACL, ISO-OSM 100MG/10ML
SYRINGE (ML) INJECTION
Status: DISPENSED
Start: 2022-05-11

## (undated) RX ORDER — HEPARIN SODIUM 5000 [USP'U]/.5ML
INJECTION, SOLUTION INTRAVENOUS; SUBCUTANEOUS
Status: DISPENSED
Start: 2022-05-11

## (undated) RX ORDER — LIDOCAINE HYDROCHLORIDE 20 MG/ML
INJECTION, SOLUTION EPIDURAL; INFILTRATION; INTRACAUDAL; PERINEURAL
Status: DISPENSED
Start: 2022-05-11

## (undated) RX ORDER — ACETAMINOPHEN 325 MG/1
TABLET ORAL
Status: DISPENSED
Start: 2022-05-11

## (undated) RX ORDER — FENTANYL CITRATE 50 UG/ML
INJECTION, SOLUTION INTRAMUSCULAR; INTRAVENOUS
Status: DISPENSED
Start: 2024-11-26

## (undated) RX ORDER — PROPOFOL 10 MG/ML
INJECTION, EMULSION INTRAVENOUS
Status: DISPENSED
Start: 2022-05-11

## (undated) RX ORDER — FENTANYL CITRATE 0.05 MG/ML
INJECTION, SOLUTION INTRAMUSCULAR; INTRAVENOUS
Status: DISPENSED
Start: 2022-05-11

## (undated) RX ORDER — KETOROLAC TROMETHAMINE 30 MG/ML
INJECTION, SOLUTION INTRAMUSCULAR; INTRAVENOUS
Status: DISPENSED
Start: 2022-05-11

## (undated) RX ORDER — HYDROMORPHONE HYDROCHLORIDE 1 MG/ML
INJECTION, SOLUTION INTRAMUSCULAR; INTRAVENOUS; SUBCUTANEOUS
Status: DISPENSED
Start: 2022-05-11

## (undated) RX ORDER — CEFAZOLIN SODIUM/WATER 2 G/20 ML
SYRINGE (ML) INTRAVENOUS
Status: DISPENSED
Start: 2022-05-11

## (undated) RX ORDER — METOPROLOL TARTRATE 1 MG/ML
INJECTION, SOLUTION INTRAVENOUS
Status: DISPENSED
Start: 2023-09-26

## (undated) RX ORDER — BUPIVACAINE HYDROCHLORIDE 2.5 MG/ML
INJECTION, SOLUTION EPIDURAL; INFILTRATION; INTRACAUDAL
Status: DISPENSED
Start: 2022-05-11

## (undated) RX ORDER — FENTANYL CITRATE 50 UG/ML
INJECTION, SOLUTION INTRAMUSCULAR; INTRAVENOUS
Status: DISPENSED
Start: 2022-05-11

## (undated) RX ORDER — DEXMEDETOMIDINE HYDROCHLORIDE 4 UG/ML
INJECTION, SOLUTION INTRAVENOUS
Status: DISPENSED
Start: 2022-05-11

## (undated) RX ORDER — GLYCOPYRROLATE 0.2 MG/ML
INJECTION, SOLUTION INTRAMUSCULAR; INTRAVENOUS
Status: DISPENSED
Start: 2022-05-11

## (undated) RX ORDER — DOBUTAMINE HYDROCHLORIDE 200 MG/100ML
INJECTION INTRAVENOUS
Status: DISPENSED
Start: 2023-09-26

## (undated) RX ORDER — EPINEPHRINE 1 MG/ML
INJECTION, SOLUTION INTRAMUSCULAR; SUBCUTANEOUS
Status: DISPENSED
Start: 2022-05-11

## (undated) RX ORDER — DEXAMETHASONE SODIUM PHOSPHATE 4 MG/ML
INJECTION, SOLUTION INTRA-ARTICULAR; INTRALESIONAL; INTRAMUSCULAR; INTRAVENOUS; SOFT TISSUE
Status: DISPENSED
Start: 2022-05-11

## (undated) RX ORDER — FAMOTIDINE 20 MG/1
TABLET, FILM COATED ORAL
Status: DISPENSED
Start: 2022-05-11

## (undated) RX ORDER — ONDANSETRON 2 MG/ML
INJECTION INTRAMUSCULAR; INTRAVENOUS
Status: DISPENSED
Start: 2022-12-27